# Patient Record
Sex: MALE | Race: WHITE | Employment: OTHER | URBAN - METROPOLITAN AREA
[De-identification: names, ages, dates, MRNs, and addresses within clinical notes are randomized per-mention and may not be internally consistent; named-entity substitution may affect disease eponyms.]

---

## 2017-01-20 ENCOUNTER — HOSPITAL ENCOUNTER (EMERGENCY)
Age: 77
Discharge: HOME | End: 2017-01-20
Attending: EMERGENCY MEDICINE
Payer: COMMERCIAL

## 2017-01-20 ENCOUNTER — APPOINTMENT (OUTPATIENT)
Dept: CT IMAGING | Age: 77
End: 2017-01-20
Attending: EMERGENCY MEDICINE
Payer: COMMERCIAL

## 2017-01-20 VITALS
BODY MASS INDEX: 23.1 KG/M2 | TEMPERATURE: 100.6 F | DIASTOLIC BLOOD PRESSURE: 71 MMHG | HEART RATE: 80 BPM | WEIGHT: 165 LBS | SYSTOLIC BLOOD PRESSURE: 161 MMHG | HEIGHT: 71 IN | RESPIRATION RATE: 17 BRPM | OXYGEN SATURATION: 98 %

## 2017-01-20 DIAGNOSIS — R33.9 URINARY RETENTION: Primary | ICD-10-CM

## 2017-01-20 LAB
ALBUMIN SERPL BCP-MCNC: 3.4 G/DL (ref 3.5–5)
ALBUMIN/GLOB SERPL: 0.9 {RATIO} (ref 1.1–2.2)
ALP SERPL-CCNC: 135 U/L (ref 45–117)
ALT SERPL-CCNC: 33 U/L (ref 12–78)
ANION GAP BLD CALC-SCNC: 7 MMOL/L (ref 5–15)
APPEARANCE UR: CLEAR
AST SERPL W P-5'-P-CCNC: 20 U/L (ref 15–37)
BACTERIA URNS QL MICRO: NEGATIVE /HPF
BASOPHILS # BLD AUTO: 0 K/UL (ref 0–0.1)
BASOPHILS # BLD: 0 % (ref 0–1)
BILIRUB SERPL-MCNC: 0.3 MG/DL (ref 0.2–1)
BILIRUB UR QL: NEGATIVE
BUN SERPL-MCNC: 20 MG/DL (ref 6–20)
BUN/CREAT SERPL: 9 (ref 12–20)
CALCIUM SERPL-MCNC: 8.7 MG/DL (ref 8.5–10.1)
CHLORIDE SERPL-SCNC: 95 MMOL/L (ref 97–108)
CO2 SERPL-SCNC: 27 MMOL/L (ref 21–32)
COLOR UR: ABNORMAL
CREAT SERPL-MCNC: 2.13 MG/DL (ref 0.7–1.3)
EOSINOPHIL # BLD: 0.1 K/UL (ref 0–0.4)
EOSINOPHIL NFR BLD: 0 % (ref 0–7)
EPITH CASTS URNS QL MICRO: ABNORMAL /LPF
ERYTHROCYTE [DISTWIDTH] IN BLOOD BY AUTOMATED COUNT: 14.3 % (ref 11.5–14.5)
GLOBULIN SER CALC-MCNC: 3.9 G/DL (ref 2–4)
GLUCOSE BLD STRIP.AUTO-MCNC: 308 MG/DL (ref 65–100)
GLUCOSE BLD STRIP.AUTO-MCNC: 385 MG/DL (ref 65–100)
GLUCOSE SERPL-MCNC: 619 MG/DL (ref 65–100)
GLUCOSE UR STRIP.AUTO-MCNC: >1000 MG/DL
HCT VFR BLD AUTO: 37.3 % (ref 36.6–50.3)
HGB BLD-MCNC: 11.9 G/DL (ref 12.1–17)
HGB UR QL STRIP: NEGATIVE
HYALINE CASTS URNS QL MICRO: ABNORMAL /LPF (ref 0–5)
KETONES UR QL STRIP.AUTO: NEGATIVE MG/DL
LEUKOCYTE ESTERASE UR QL STRIP.AUTO: NEGATIVE
LIPASE SERPL-CCNC: 109 U/L (ref 73–393)
LYMPHOCYTES # BLD AUTO: 10 % (ref 12–49)
LYMPHOCYTES # BLD: 1.2 K/UL (ref 0.8–3.5)
MCH RBC QN AUTO: 28.3 PG (ref 26–34)
MCHC RBC AUTO-ENTMCNC: 31.9 G/DL (ref 30–36.5)
MCV RBC AUTO: 88.6 FL (ref 80–99)
MONOCYTES # BLD: 0.7 K/UL (ref 0–1)
MONOCYTES NFR BLD AUTO: 6 % (ref 5–13)
NEUTS SEG # BLD: 9.9 K/UL (ref 1.8–8)
NEUTS SEG NFR BLD AUTO: 84 % (ref 32–75)
NITRITE UR QL STRIP.AUTO: NEGATIVE
PH UR STRIP: 5.5 [PH] (ref 5–8)
PLATELET # BLD AUTO: 261 K/UL (ref 150–400)
POTASSIUM SERPL-SCNC: 4.6 MMOL/L (ref 3.5–5.1)
POTASSIUM SERPL-SCNC: 5.5 MMOL/L (ref 3.5–5.1)
PROT SERPL-MCNC: 7.3 G/DL (ref 6.4–8.2)
PROT UR STRIP-MCNC: ABNORMAL MG/DL
RBC # BLD AUTO: 4.21 M/UL (ref 4.1–5.7)
RBC #/AREA URNS HPF: ABNORMAL /HPF (ref 0–5)
SERVICE CMNT-IMP: ABNORMAL
SERVICE CMNT-IMP: ABNORMAL
SODIUM SERPL-SCNC: 129 MMOL/L (ref 136–145)
SP GR UR REFRACTOMETRY: 1.03 (ref 1–1.03)
UA: UC IF INDICATED,UAUC: ABNORMAL
UROBILINOGEN UR QL STRIP.AUTO: 0.2 EU/DL (ref 0.2–1)
WBC # BLD AUTO: 11.9 K/UL (ref 4.1–11.1)
WBC URNS QL MICRO: ABNORMAL /HPF (ref 0–4)

## 2017-01-20 PROCEDURE — 83690 ASSAY OF LIPASE: CPT | Performed by: EMERGENCY MEDICINE

## 2017-01-20 PROCEDURE — 77030034850

## 2017-01-20 PROCEDURE — 96361 HYDRATE IV INFUSION ADD-ON: CPT

## 2017-01-20 PROCEDURE — 74176 CT ABD & PELVIS W/O CONTRAST: CPT

## 2017-01-20 PROCEDURE — 74011250636 HC RX REV CODE- 250/636: Performed by: EMERGENCY MEDICINE

## 2017-01-20 PROCEDURE — 85025 COMPLETE CBC W/AUTO DIFF WBC: CPT | Performed by: EMERGENCY MEDICINE

## 2017-01-20 PROCEDURE — 81001 URINALYSIS AUTO W/SCOPE: CPT | Performed by: EMERGENCY MEDICINE

## 2017-01-20 PROCEDURE — 82962 GLUCOSE BLOOD TEST: CPT

## 2017-01-20 PROCEDURE — 99285 EMERGENCY DEPT VISIT HI MDM: CPT

## 2017-01-20 PROCEDURE — 36415 COLL VENOUS BLD VENIPUNCTURE: CPT | Performed by: EMERGENCY MEDICINE

## 2017-01-20 PROCEDURE — 51702 INSERT TEMP BLADDER CATH: CPT

## 2017-01-20 PROCEDURE — 96374 THER/PROPH/DIAG INJ IV PUSH: CPT

## 2017-01-20 PROCEDURE — 74011636637 HC RX REV CODE- 636/637: Performed by: EMERGENCY MEDICINE

## 2017-01-20 PROCEDURE — 84132 ASSAY OF SERUM POTASSIUM: CPT | Performed by: EMERGENCY MEDICINE

## 2017-01-20 PROCEDURE — 80053 COMPREHEN METABOLIC PANEL: CPT | Performed by: EMERGENCY MEDICINE

## 2017-01-20 PROCEDURE — 77030029179 HC BAG URIN DRNG SIMS -A

## 2017-01-20 PROCEDURE — A9270 NON-COVERED ITEM OR SERVICE: HCPCS | Performed by: EMERGENCY MEDICINE

## 2017-01-20 RX ADMIN — SODIUM CHLORIDE 1000 ML: 900 INJECTION, SOLUTION INTRAVENOUS at 17:18

## 2017-01-20 RX ADMIN — SODIUM CHLORIDE 1000 ML: 900 INJECTION, SOLUTION INTRAVENOUS at 18:06

## 2017-01-20 RX ADMIN — HUMAN INSULIN 10 UNITS: 100 INJECTION, SOLUTION SUBCUTANEOUS at 18:06

## 2017-01-20 NOTE — ED NOTES
Pt resting quietly on stretcher, reports relief of pain. Provided blanket; denies further needs at this time. Fluids infusing without difficulty, call bell in reach.

## 2017-01-21 NOTE — ED PROVIDER NOTES
HPI Comments: 77 y.o. male with past medical history significant for diabetes, CAD and COPD who presents from home with chief complaint of severe abdominal pain. Pt says that starting this morning his bowels and kidneys have been hurting with associated nausea and vomiting(4-5 times). He says that he feels like he needs to urinate but is unable to. He has not urinated today but it burns when he tries. He says that he has never had symptoms like this before. He felt completely normal yesterday, and is not taking any new meds. He has no history of problems with his prostate. His last BM was a couple days ago. There are no other acute medical concerns at this time. He did pass a BM in his pants here. Social hx: 2 cigars daily, -alc    PCP: Betty Camarillo MD    Note written by Lazara Dorantes, as dictated by Anya Almanzar DO 4:50 PM      The history is provided by the patient. The history is limited by a language barrier. No  was used. Past Medical History:   Diagnosis Date    CAD (coronary artery disease)     [facility]     Diabetes ([facility]      1970a    Ill-defined condition      SOB    Pneumonia        Past Surgical History:   Procedure Laterality Date    Pr cardiac surg procedure unlist  2000     open heart    Hx heent  1975     nasal surgery    Hx mohs procedure  2013     left         Family History:   Problem Relation Age of Onset    Diabetes Mother     Diabetes Brother        Social History     Social History    Marital status:      Spouse name: N/A    Number of children: N/A    Years of education: N/A     Occupational History    Not on file.      Social History Main Topics    Smoking status: Current Every Day Smoker    Smokeless tobacco: Never Used      Comment: 1-2 cigars daily    Alcohol use No    Drug use: No    Sexual activity: Not on file     Other Topics Concern    Not on file     Social History Narrative         ALLERGIES: Review of patient's allergies indicates no known allergies. Review of Systems   Gastrointestinal: Positive for abdominal pain, nausea and vomiting. Genitourinary: Positive for difficulty urinating. All other systems reviewed and are negative. Vitals:    01/20/17 1630 01/20/17 1853   BP: 129/61 167/67   Pulse: 65 65   Resp: 20 17   Temp: 97.4 °F (36.3 °C) 99 °F (37.2 °C)   SpO2: 99% 97%   Weight: 74.8 kg (165 lb)    Height: 5' 11\" (1.803 m)             Physical Exam      Constitutional: Pt is awake and alert. Pt appears well-developed and well-nourished. NAD. Appears in pain. Thin and elderly. HENT:   Head: Normocephalic and atraumatic. Nose: Nose normal.   Mouth/Throat: Oropharynx is clear and moist. No oropharyngeal exudate. Eyes: Conjunctivae and extraocular motions are normal. Pupils are equal, round, and reactive to light. Right eye exhibits no discharge. Left eye exhibits no discharge. No scleral icterus. Neck: No tracheal deviation present. Supple neck. Cardiovascular: Normal rate, regular rhythm, normal heart sounds and intact distal pulses. Exam reveals no gallop and no friction rub. No murmur heard. Pulmonary/Chest: Effort normal and breath sounds normal.  Pt  has no wheezes. Pt  has no rales. Abdominal: Soft. Pt  exhibits no distension and no mass. Pt  has no rebound and no guarding. Exquisite tenderness suprapubicly. Can feel his bladder dome. Musculoskeletal:  Pt  exhibits no edema and no tenderness. Ext: Normal ROM in all four extremities; not tender to palpation; distal pulses are normal, no edema. Neurological:  Pt is alert. nonfocal neuro exam.  Skin: Skin is warm and dry. Pt  is not diaphoretic. Psychiatric:  Pt  has a normal mood and affect.  Behavior is normal.     Note written by Lazara Murray, as dictated by Juan Obrien DO 4:50 PM          MDM  Number of Diagnoses or Management Options  Urinary retention: ED Course       Procedures    PROGRESS NOTE:  5:10 PM  Belly pain is gone. 500 cc currently drained, more coming. PROGRESS NOTE:  7:05 PM  Rechecked blood sugar at 705 it is 385. Full urine out is 1025 ml.               9:14 PM - developed rectal pressure 20-30 min ago. I disimpacted him. Plan is dc home. Son was here earlier. golytely rx. F/u with Urology. Reviewed CT images        K was 5.5 - rechecking. S/o to Dr Hailee Mary - he will recheck K.  abd pain much better. Home with fox. Labs Reviewed   URINALYSIS W/ REFLEX CULTURE - Abnormal; Notable for the following:        Result Value    Protein TRACE (*)     Glucose >1000 (*)     All other components within normal limits   CBC WITH AUTOMATED DIFF - Abnormal; Notable for the following:     WBC 11.9 (*)     HGB 11.9 (*)     NEUTROPHILS 84 (*)     LYMPHOCYTES 10 (*)     ABS. NEUTROPHILS 9.9 (*)     All other components within normal limits   METABOLIC PANEL, COMPREHENSIVE - Abnormal; Notable for the following:     Sodium 129 (*)     Potassium 5.5 (*)     Chloride 95 (*)     Glucose 619 (*)     Creatinine 2.13 (*)     BUN/Creatinine ratio 9 (*)     GFR est AA 37 (*)     GFR est non-AA 30 (*)     Alk.  phosphatase 135 (*)     Albumin 3.4 (*)     A-G Ratio 0.9 (*)     All other components within normal limits   GLUCOSE, POC - Abnormal; Notable for the following:     Glucose (POC) 385 (*)     All other components within normal limits   GLUCOSE, POC - Abnormal; Notable for the following:     Glucose (POC) 308 (*)     All other components within normal limits   SAMPLES BEING HELD   LIPASE   POC GLUCOSE   POC GLUCOSE   POC GLUCOSE

## 2017-01-21 NOTE — DISCHARGE INSTRUCTIONS
We hope that we have addressed all of your medical concerns. The examination and treatment you received in the Emergency Department were for an emergent problem and were not intended as complete care. It is important that you follow up with your healthcare provider(s) for ongoing care. If your symptoms worsen or do not improve as expected, and you are unable to reach your usual health care provider(s), you should return to the Emergency Department. Today's healthcare is undergoing tremendous change, and patient satisfaction surveys are one of the many tools to assess the quality of medical care. You may receive a survey from the "[employer] regarding your experience in the Emergency Department. I hope that your experience has been completely positive, particularly the medical care that I provided. As such, please participate in the survey; anything less than excellent does not meet my expectations or intentions. [facility] and 8 [facility] participate in nationally recognized quality of care measures. If your blood pressure is greater than 120/80, as reported below, we urge that you seek medical care to address the potential of high blood pressure, commonly known as hypertension. Hypertension can be hereditary or can be caused by certain medical conditions, pain, stress, or \"white coat syndrome. \"       Please make an appointment with your health care provider(s) for follow up of your Emergency Department visit. VITALS:   Patient Vitals for the past 8 hrs:   Temp Pulse Resp BP SpO2   01/20/17 1957 99.5 °F (37.5 °C) 66 17 171/63 98 %   01/20/17 1853 99 °F (37.2 °C) 65 17 167/67 97 %   01/20/17 1630 97.4 °F (36.3 °C) 65 20 129/61 99 %          Thank you for allowing us to provide you with medical care today. We realize that you have many choices for your emergency care needs.   Please choose us in the future for any continued health care arun Benítez DO    [facility], [facility].   Office: [phone]            Recent Results (from the past 24 hour(s))   URINALYSIS W/ REFLEX CULTURE    Collection Time: 01/20/17  5:00 PM   Result Value Ref Range    Color YELLOW/STRAW      Appearance CLEAR CLEAR      Specific gravity 1.027 1.003 - 1.030      pH (UA) 5.5 5.0 - 8.0      Protein TRACE (A) NEG mg/dL    Glucose >1000 (A) NEG mg/dL    Ketone NEGATIVE  NEG mg/dL    Bilirubin NEGATIVE  NEG      Blood NEGATIVE  NEG      Urobilinogen 0.2 0.2 - 1.0 EU/dL    Nitrites NEGATIVE  NEG      Leukocyte Esterase NEGATIVE  NEG      WBC 0-4 0 - 4 /hpf    RBC 0-5 0 - 5 /hpf    Epithelial cells FEW FEW /lpf    Bacteria NEGATIVE  NEG /hpf    UA:UC IF INDICATED CULTURE NOT INDICATED BY UA RESULT CNI      Hyaline Cast 0-2 0 - 5 /lpf   CBC WITH AUTOMATED DIFF    Collection Time: 01/20/17  5:00 PM   Result Value Ref Range    WBC 11.9 (H) 4.1 - 11.1 K/uL    RBC 4.21 4.10 - 5.70 M/uL    HGB 11.9 (L) 12.1 - 17.0 g/dL    HCT 37.3 36.6 - 50.3 %    MCV 88.6 80.0 - 99.0 FL    MCH 28.3 26.0 - 34.0 PG    MCHC 31.9 30.0 - 36.5 g/dL    RDW 14.3 11.5 - 14.5 %    PLATELET 651 230 - 714 K/uL    NEUTROPHILS 84 (H) 32 - 75 %    LYMPHOCYTES 10 (L) 12 - 49 %    MONOCYTES 6 5 - 13 %    EOSINOPHILS 0 0 - 7 %    BASOPHILS 0 0 - 1 %    ABS. NEUTROPHILS 9.9 (H) 1.8 - 8.0 K/UL    ABS. LYMPHOCYTES 1.2 0.8 - 3.5 K/UL    ABS. MONOCYTES 0.7 0.0 - 1.0 K/UL    ABS. EOSINOPHILS 0.1 0.0 - 0.4 K/UL    ABS.  BASOPHILS 0.0 0.0 - 0.1 K/UL   METABOLIC PANEL, COMPREHENSIVE    Collection Time: 01/20/17  5:00 PM   Result Value Ref Range    Sodium 129 (L) 136 - 145 mmol/L    Potassium 5.5 (H) 3.5 - 5.1 mmol/L    Chloride 95 (L) 97 - 108 mmol/L    CO2 27 21 - 32 mmol/L    Anion gap 7 5 - 15 mmol/L    Glucose 619 (HH) 65 - 100 mg/dL    BUN 20 6 - 20 MG/DL    Creatinine 2.13 (H) 0.70 - 1.30 MG/DL    BUN/Creatinine ratio 9 (L) 12 - 20      GFR est AA 37 (L) >60 ml/min/1.73m2 GFR est non-AA 30 (L) >60 ml/min/1.73m2    Calcium 8.7 8.5 - 10.1 MG/DL    Bilirubin, total 0.3 0.2 - 1.0 MG/DL    ALT 33 12 - 78 U/L    AST 20 15 - 37 U/L    Alk. phosphatase 135 (H) 45 - 117 U/L    Protein, total 7.3 6.4 - 8.2 g/dL    Albumin 3.4 (L) 3.5 - 5.0 g/dL    Globulin 3.9 2.0 - 4.0 g/dL    A-G Ratio 0.9 (L) 1.1 - 2.2     LIPASE    Collection Time: 01/20/17  5:00 PM   Result Value Ref Range    Lipase 109 73 - 393 U/L   GLUCOSE, POC    Collection Time: 01/20/17  6:51 PM   Result Value Ref Range    Glucose (POC) 385 (H) 65 - 100 mg/dL    Performed by Pavel Kowalski, POC    Collection Time: 01/20/17  8:00 PM   Result Value Ref Range    Glucose (POC) 308 (H) 65 - 100 mg/dL    Performed by KELLY PACHECO        Ct Abd Pelv Wo Cont    Result Date: 1/20/2017  EXAM: CT ABDOMEN AND PELVIS WITHOUT CONTRAST INDICATION:  Abdominal pain with nausea vomiting and urinary retention. . COMPARISON: 4/11/2016. CONTRAST: None. TECHNIQUE: Unenhanced multislice helical CT was performed from the diaphragm to the symphysis pubis without intravenous contrast administration. Oral contrast was not administered. Contiguous 5 mm axial images were reconstructed and lung and soft tissue windows were generated. Coronal and sagittal reformations were generated. CT dose reduction was achieved through use of a standardized protocol tailored for this examination and automatic exposure control for dose modulation. Adaptive statistical iterative reconstruction (ASIR) was utilized for this examination. FINDINGS: The patient is status post median sternotomy. LOWER CHEST: The visualized portions of the lung bases are clear. The interventricular septum of the heart is visible. The absence of intravenous contrast material reduces the sensitivity for evaluation of the solid parenchymal organs of the abdomen. ABDOMEN: Liver: The liver is normal in size and contour with no focal abnormality. Gallbladder and bile ducts:  There is a tiny stone in the gallbladder. Spleen: No abnormality. Pancreas: No abnormality. Adrenal glands: No abnormality. Kidneys: No focal parenchymal abnormality. There is no renal or ureteral calculus or obstruction. There are renal artery calcifications. PELVIS: Reproductive organs: The prostate gland and seminal vesicles are symmetrical.  Bladder: There is a Maldonado catheter within the urinary bladder. RETROPERITONEUM: The aorta is atherosclerotic and tapers without aneurysm. There is no retroperitoneal adenopathy or mass. There is no pelvic mass or adenopathy. BOWEL AND MESENTERY: The small bowel is normal. The appendix is normal.  There is high attenuation material in the sigmoid colon and rectum and a large amount of stool in the proximal colon. PERITONEUM: There is no ascites or free intraperitoneal air. BONES AND SOFT TISSUES: There are degenerative changes of the spine. IMPRESSION: 1. No acute abdominal or pelvic abnormality. 2. Anemia. 3. Atherosclerotic abdominal aorta without aneurysm. 4. Contrast or high attenuation material in the sigmoid colon and distention of the remainder of the colon with gas and stool. Urinary Retention: Care Instructions  Your Care Instructions    Urinary retention means that you aren't able to urinate. In men, it is often caused by a blockage of the urinary tract from an enlarged prostate gland. In men and women, it can also be caused by an infection or nerve damage. Or it may be a side effect of a medicine. The doctor may have drained the urine from your bladder. If you still have problems passing urine, you may need to use a catheter at home. This is used to empty your bladder until the problem can be fixed. Your doctor may put a catheter in your bladder before you go home. If so, he or she will tell you when to come back to have the catheter removed. The doctor has checked you closely. But problems can develop later.  If you notice any problems or new symptoms, get medical treatment right away. Follow-up care is a key part of your treatment and safety. Be sure to make and go to all appointments, and call your doctor if you are having problems. It's also a good idea to know your test results and keep a list of the medicines you take. How can you care for yourself at home? · Take your medicines exactly as prescribed. Call your doctor if you think you are having a problem with your medicine. You will get more details on the specific medicines your doctor prescribes. · Check with your doctor before you use any over-the-counter medicines. Many cold and allergy medicines, for example, can make this problem worse. Make sure your doctor knows all of the medicines, vitamins, supplements, and herbal remedies you take. · Spread out through the day the amount of fluid you drink. Do not drink a lot at bedtime. · Avoid alcohol and caffeine. · If you have been given a catheter, or if one is already in place, follow the instructions you were given. Always wash your hands before and after you handle the catheter. When should you call for help? Call your doctor now or seek immediate medical care if:  · You cannot urinate at all, or it is getting harder to urinate. · You have symptoms of a urinary tract infection. These may include:  ¨ Pain or burning when you urinate. ¨ A frequent need to urinate without being able to pass much urine. ¨ Pain in the flank, which is just below the rib cage and above the waist on either side of the back. ¨ Blood in your urine. ¨ A fever. Watch closely for changes in your health, and be sure to contact your doctor if:  · You have any problems with your catheter. · You do not get better as expected. Where can you learn more? Go to http://evelio-rosalinda.info/. Enter M244 in the search box to learn more about \"Urinary Retention: Care Instructions. \"  Current as of: August 12, 2016  Content Version: 11.1  © 3604-6900 [employer]. Care instructions adapted under license by [employer] (which disclaims liability or warranty for this information). If you have questions about a medical condition or this instruction, always ask your healthcare professional. [address] any warranty or liability for your use of this information. Learning About Urinary Catheter Care to Prevent Infection  What is a urinary catheter? A urinary catheter is a flexible plastic tube used to drain urine from your bladder when you can't urinate on your own. The catheter allows urine to drain from the bladder into a bag. Two types of drainage bags may be used with a urinary catheter. · A bedside bag is a large bag that you can hang on the side of your bed or on a chair. You can use it overnight or anytime you will be sitting or lying down for a long time. · A leg bag is a small bag that you can use during the day. It is usually attached to your thigh or calf and hidden under your clothes. Having a urinary catheter increases your risk of getting a urinary tract infection. Germs may get on the catheter and cause an infection in your bladder or kidneys. The longer you have a catheter, the more likely it is that you will get an infection. You can help prevent this problem with good hygiene and careful handling of your catheter and drainage bags. How can you help prevent infection? Take care to be clean  · Always wash your hands well before and after you handle your catheter. · Clean the skin around the catheter twice a day using soap and water. Dry with a clean towel afterward. You can shower with your catheter and drainage bag in place unless your doctor told you not to. · When you clean around the catheter, check the surrounding skin for signs of infection. Look for things like pus or irritated, swollen, red, or tender skin around the catheter.   Be careful with your drainage bag  · Always keep the drainage bag below the level of your bladder. This will help keep urine from flowing back into your bladder. · Check often to see that urine is flowing through the catheter into the drainage bag. · Empty the drainage bag when it is half full. This will keep it from overflowing or backing up. · When you empty the drainage bag, do not let the tubing or drain spout touch anything. Be careful with your catheter  · Do not unhook the catheter from the drain tube. That could let germs get into the tube. · Make sure that the catheter tubing does not get twisted or kinked. · Do not tug or pull on the catheter. And make sure that the drainage bag does not drag or pull on the catheter. · Do not put powder or lotion on the skin around the catheter. · Do not have sexual intercourse while wearing a catheter. How do you empty a urine drainage bag? .  If your doctor has asked you to keep a record, write down the amount of urine in the bag before you empty it. Wash your hands before and after you touch the bag. 1. Remove the drain spout from its sleeve at the bottom of the drainage bag.  2. Open the valve on the drain spout. Let the urine flow out into the toilet or a container. Be careful not to let the tubing or drain spout touch anything. 3. After you empty the bag, wipe off any liquid on the end of the drain spout. Close the valve. Then put the drain spout back into its sleeve at the bottom of the collection bag. How do you change from a bedside bag to a leg bag? Wash your hands before and after you handle the bags. 1. Empty the bag attached to the catheter. 2. Put a clean towel under the catheter where it connects to the bag.  3. Fold and pinch the catheter closed to keep urine from leaking out. Many catheters have a clip you can use to pinch the tube closed. 4. Remove the used bag from the catheter. 5. Use an alcohol wipe to clean the tip of the leg bag. Then connect the leg bag to the catheter.   6. Strap the leg bag to your thigh or calf. Be sure the straps are not too tight. How can you clean a drainage bag? Clean your bags every day. Many people clean their bedside bag in the morning when they switch to a leg bag. At night, they attach the bedside bag and clean the leg bag. To clean a drainage ba. Remove the bag from the catheter. 2. Fill the bag with 2 parts vinegar and 3 parts water. Let it stand for 20 minutes. 3. Empty the bag, and let it air dry. When should you call for help? Call your doctor now or seek immediate medical care if:  · You have symptoms of a urinary infection. These may include:  ¨ Pain or burning when you urinate. ¨ A frequent need to urinate without being able to pass much urine. ¨ Pain in the flank, which is just below the rib cage and above the waist on either side of the back. ¨ Blood in your urine. ¨ A fever. · Your urine smells bad. · You see large blood clots in your urine. · No urine or very little urine is flowing into the bag for 4 or more hours. Watch closely for changes in your health, and be sure to contact your doctor if:  · The area around the catheter becomes irritated, swollen, red, or tender, or there is pus draining from it. · Urine is leaking from the place where the catheter enters your body. Follow-up care is a key part of your treatment and safety. Be sure to make and go to all appointments, and call your doctor if you are having problems. It's also a good idea to know your test results and keep a list of the medicines you take. Where can you learn more? Go to http://evelio-rosalinda.info/. Enter C910 in the search box to learn more about \"Learning About Urinary Catheter Care to Prevent Infection. \"  Current as of: 2016  Content Version: 11.1  © 0445-0694 [employer]. Care instructions adapted under license by [employer] (which disclaims liability or warranty for this information).  If you have questions about a medical condition or this instruction, always ask your healthcare professional. [place] [address] any warranty or liability for your use of this information.

## 2017-01-21 NOTE — ED NOTES
Pt resting quietly on stretcher. Pt and family updated on plan, questions answered. Pt bed adjusted for comfort. Call bell in reach.

## 2017-01-21 NOTE — ED NOTES
9:18 PM  Change of shift. Care of patient taken over from Eric Sebastian DO; H&P reviewed, handoff complete. Awaiting results of potassium. Note written by Josefa Perez. Eros Tommy, as dictated by Delmar Rodriguez MD 9:18 PM    PROGRESS NOTE:  10:25 PM  Patient's Potassium is normal. Will discharge. Note written by Josefa Perez.  Eros Georgein, as dictated by Delmar Rodriguez MD 10:26 PM

## 2017-03-29 RX ORDER — TRAZODONE HYDROCHLORIDE 100 MG/1
100 TABLET ORAL
Qty: 30 TAB | Refills: 3 | Status: SHIPPED | OUTPATIENT
Start: 2017-03-29 | End: 2017-04-11

## 2017-04-11 ENCOUNTER — APPOINTMENT (OUTPATIENT)
Dept: CT IMAGING | Age: 77
DRG: 638 | End: 2017-04-11
Attending: STUDENT IN AN ORGANIZED HEALTH CARE EDUCATION/TRAINING PROGRAM
Payer: MEDICARE

## 2017-04-11 ENCOUNTER — APPOINTMENT (OUTPATIENT)
Dept: GENERAL RADIOLOGY | Age: 77
DRG: 638 | End: 2017-04-11
Attending: STUDENT IN AN ORGANIZED HEALTH CARE EDUCATION/TRAINING PROGRAM
Payer: MEDICARE

## 2017-04-11 ENCOUNTER — HOSPITAL ENCOUNTER (INPATIENT)
Age: 77
LOS: 3 days | Discharge: HOME | DRG: 638 | End: 2017-04-14
Attending: STUDENT IN AN ORGANIZED HEALTH CARE EDUCATION/TRAINING PROGRAM | Admitting: INTERNAL MEDICINE
Payer: MEDICARE

## 2017-04-11 DIAGNOSIS — F03.90 DEMENTIA WITHOUT BEHAVIORAL DISTURBANCE, UNSPECIFIED DEMENTIA TYPE: ICD-10-CM

## 2017-04-11 DIAGNOSIS — Z91.199 NONCOMPLIANCE WITH DIABETES TREATMENT: ICD-10-CM

## 2017-04-11 DIAGNOSIS — I73.89 HHS (HYPOTHENAR HAMMER SYNDROME) (HCC): Primary | ICD-10-CM

## 2017-04-11 PROBLEM — R32 URINARY INCONTINENCE: Status: ACTIVE | Noted: 2017-04-11

## 2017-04-11 PROBLEM — J44.9 COPD (CHRONIC OBSTRUCTIVE PULMONARY DISEASE) (HCC): Status: ACTIVE | Noted: 2017-04-11

## 2017-04-11 PROBLEM — I25.10 CAD (CORONARY ARTERY DISEASE): Status: ACTIVE | Noted: 2017-04-11

## 2017-04-11 PROBLEM — E87.1 HYPONATREMIA: Status: ACTIVE | Noted: 2017-04-11

## 2017-04-11 PROBLEM — R55 SYNCOPE: Status: ACTIVE | Noted: 2017-04-11

## 2017-04-11 PROBLEM — E11.65 UNCONTROLLED DIABETES MELLITUS WITH HYPERGLYCEMIA (HCC): Status: ACTIVE | Noted: 2017-04-11

## 2017-04-11 PROBLEM — E86.9 VOLUME DEPLETION: Status: ACTIVE | Noted: 2017-04-11

## 2017-04-11 LAB
ALBUMIN SERPL BCP-MCNC: 3.6 G/DL (ref 3.5–5)
ALBUMIN/GLOB SERPL: 1 {RATIO} (ref 1.1–2.2)
ALP SERPL-CCNC: 117 U/L (ref 45–117)
ALT SERPL-CCNC: 27 U/L (ref 12–78)
ANION GAP BLD CALC-SCNC: 10 MMOL/L (ref 5–15)
APPEARANCE UR: CLEAR
AST SERPL W P-5'-P-CCNC: 17 U/L (ref 15–37)
ATRIAL RATE: 80 BPM
BACTERIA URNS QL MICRO: NEGATIVE /HPF
BASOPHILS # BLD AUTO: 0 K/UL (ref 0–0.1)
BASOPHILS # BLD: 0 % (ref 0–1)
BILIRUB SERPL-MCNC: 0.5 MG/DL (ref 0.2–1)
BILIRUB UR QL: NEGATIVE
BUN SERPL-MCNC: 29 MG/DL (ref 6–20)
BUN/CREAT SERPL: 12 (ref 12–20)
CALCIUM SERPL-MCNC: 9.4 MG/DL (ref 8.5–10.1)
CALCULATED P AXIS, ECG09: 84 DEGREES
CALCULATED R AXIS, ECG10: 78 DEGREES
CALCULATED T AXIS, ECG11: 130 DEGREES
CHLORIDE SERPL-SCNC: 89 MMOL/L (ref 97–108)
CO2 SERPL-SCNC: 25 MMOL/L (ref 21–32)
COLOR UR: ABNORMAL
CREAT SERPL-MCNC: 2.38 MG/DL (ref 0.7–1.3)
DIAGNOSIS, 93000: NORMAL
EOSINOPHIL # BLD: 0.1 K/UL (ref 0–0.4)
EOSINOPHIL NFR BLD: 2 % (ref 0–7)
EPITH CASTS URNS QL MICRO: ABNORMAL /LPF
ERYTHROCYTE [DISTWIDTH] IN BLOOD BY AUTOMATED COUNT: 14.3 % (ref 11.5–14.5)
EST. AVERAGE GLUCOSE BLD GHB EST-MCNC: 384 MG/DL
GLOBULIN SER CALC-MCNC: 3.7 G/DL (ref 2–4)
GLUCOSE BLD STRIP.AUTO-MCNC: 157 MG/DL (ref 65–100)
GLUCOSE BLD STRIP.AUTO-MCNC: 188 MG/DL (ref 65–100)
GLUCOSE BLD STRIP.AUTO-MCNC: 237 MG/DL (ref 65–100)
GLUCOSE BLD STRIP.AUTO-MCNC: 307 MG/DL (ref 65–100)
GLUCOSE BLD STRIP.AUTO-MCNC: 534 MG/DL (ref 65–100)
GLUCOSE BLD STRIP.AUTO-MCNC: >600 MG/DL (ref 65–100)
GLUCOSE SERPL-MCNC: 827 MG/DL (ref 65–100)
GLUCOSE UR STRIP.AUTO-MCNC: >1000 MG/DL
HBA1C MFR BLD: 15 % (ref 4.2–6.3)
HCT VFR BLD AUTO: 35.1 % (ref 36.6–50.3)
HGB BLD-MCNC: 11.4 G/DL (ref 12.1–17)
HGB UR QL STRIP: ABNORMAL
KETONES UR QL STRIP.AUTO: NEGATIVE MG/DL
LEUKOCYTE ESTERASE UR QL STRIP.AUTO: NEGATIVE
LYMPHOCYTES # BLD AUTO: 23 % (ref 12–49)
LYMPHOCYTES # BLD: 1.6 K/UL (ref 0.8–3.5)
MCH RBC QN AUTO: 28.6 PG (ref 26–34)
MCHC RBC AUTO-ENTMCNC: 32.5 G/DL (ref 30–36.5)
MCV RBC AUTO: 88.2 FL (ref 80–99)
MONOCYTES # BLD: 0.6 K/UL (ref 0–1)
MONOCYTES NFR BLD AUTO: 9 % (ref 5–13)
NEUTS SEG # BLD: 4.7 K/UL (ref 1.8–8)
NEUTS SEG NFR BLD AUTO: 66 % (ref 32–75)
NITRITE UR QL STRIP.AUTO: NEGATIVE
P-R INTERVAL, ECG05: 178 MS
PH UR STRIP: 5 [PH] (ref 5–8)
PLATELET # BLD AUTO: 277 K/UL (ref 150–400)
POTASSIUM SERPL-SCNC: 4.6 MMOL/L (ref 3.5–5.1)
PROT SERPL-MCNC: 7.3 G/DL (ref 6.4–8.2)
PROT UR STRIP-MCNC: NEGATIVE MG/DL
Q-T INTERVAL, ECG07: 338 MS
QRS DURATION, ECG06: 88 MS
QTC CALCULATION (BEZET), ECG08: 389 MS
RBC # BLD AUTO: 3.98 M/UL (ref 4.1–5.7)
RBC #/AREA URNS HPF: ABNORMAL /HPF (ref 0–5)
SERVICE CMNT-IMP: ABNORMAL
SODIUM SERPL-SCNC: 124 MMOL/L (ref 136–145)
SP GR UR REFRACTOMETRY: 1.03 (ref 1–1.03)
TROPONIN I SERPL-MCNC: 0.05 NG/ML
UROBILINOGEN UR QL STRIP.AUTO: 0.2 EU/DL (ref 0.2–1)
VENTRICULAR RATE, ECG03: 80 BPM
WBC # BLD AUTO: 7.1 K/UL (ref 4.1–11.1)
WBC URNS QL MICRO: ABNORMAL /HPF (ref 0–4)

## 2017-04-11 PROCEDURE — 85025 COMPLETE CBC W/AUTO DIFF WBC: CPT | Performed by: EMERGENCY MEDICINE

## 2017-04-11 PROCEDURE — 93005 ELECTROCARDIOGRAM TRACING: CPT

## 2017-04-11 PROCEDURE — 74011636637 HC RX REV CODE- 636/637: Performed by: NURSE PRACTITIONER

## 2017-04-11 PROCEDURE — 74011000258 HC RX REV CODE- 258: Performed by: STUDENT IN AN ORGANIZED HEALTH CARE EDUCATION/TRAINING PROGRAM

## 2017-04-11 PROCEDURE — 74011636637 HC RX REV CODE- 636/637: Performed by: INTERNAL MEDICINE

## 2017-04-11 PROCEDURE — 81001 URINALYSIS AUTO W/SCOPE: CPT | Performed by: STUDENT IN AN ORGANIZED HEALTH CARE EDUCATION/TRAINING PROGRAM

## 2017-04-11 PROCEDURE — 74011250636 HC RX REV CODE- 250/636: Performed by: INTERNAL MEDICINE

## 2017-04-11 PROCEDURE — 83036 HEMOGLOBIN GLYCOSYLATED A1C: CPT | Performed by: STUDENT IN AN ORGANIZED HEALTH CARE EDUCATION/TRAINING PROGRAM

## 2017-04-11 PROCEDURE — 74011636637 HC RX REV CODE- 636/637: Performed by: STUDENT IN AN ORGANIZED HEALTH CARE EDUCATION/TRAINING PROGRAM

## 2017-04-11 PROCEDURE — 74011250637 HC RX REV CODE- 250/637: Performed by: INTERNAL MEDICINE

## 2017-04-11 PROCEDURE — 65660000000 HC RM CCU STEPDOWN

## 2017-04-11 PROCEDURE — 82962 GLUCOSE BLOOD TEST: CPT

## 2017-04-11 PROCEDURE — 96365 THER/PROPH/DIAG IV INF INIT: CPT

## 2017-04-11 PROCEDURE — 74011250636 HC RX REV CODE- 250/636: Performed by: STUDENT IN AN ORGANIZED HEALTH CARE EDUCATION/TRAINING PROGRAM

## 2017-04-11 PROCEDURE — 70450 CT HEAD/BRAIN W/O DYE: CPT

## 2017-04-11 PROCEDURE — 80053 COMPREHEN METABOLIC PANEL: CPT | Performed by: EMERGENCY MEDICINE

## 2017-04-11 PROCEDURE — 99285 EMERGENCY DEPT VISIT HI MDM: CPT

## 2017-04-11 PROCEDURE — 71010 XR CHEST SNGL V: CPT

## 2017-04-11 PROCEDURE — 84484 ASSAY OF TROPONIN QUANT: CPT | Performed by: STUDENT IN AN ORGANIZED HEALTH CARE EDUCATION/TRAINING PROGRAM

## 2017-04-11 PROCEDURE — 36415 COLL VENOUS BLD VENIPUNCTURE: CPT | Performed by: EMERGENCY MEDICINE

## 2017-04-11 PROCEDURE — 96366 THER/PROPH/DIAG IV INF ADDON: CPT

## 2017-04-11 RX ORDER — TRAZODONE HYDROCHLORIDE 100 MG/1
50 TABLET ORAL
COMMUNITY
End: 2017-07-17

## 2017-04-11 RX ORDER — MAGNESIUM SULFATE 100 %
4 CRYSTALS MISCELLANEOUS AS NEEDED
Status: DISCONTINUED | OUTPATIENT
Start: 2017-04-11 | End: 2017-04-11 | Stop reason: SDUPTHER

## 2017-04-11 RX ORDER — GLIPIZIDE 2.5 MG/1
2.5 TABLET, EXTENDED RELEASE ORAL 2 TIMES DAILY
COMMUNITY
End: 2017-04-26

## 2017-04-11 RX ORDER — INSULIN LISPRO 100 [IU]/ML
INJECTION, SOLUTION INTRAVENOUS; SUBCUTANEOUS
Status: DISCONTINUED | OUTPATIENT
Start: 2017-04-12 | End: 2017-04-11

## 2017-04-11 RX ORDER — SODIUM CHLORIDE 0.9 % (FLUSH) 0.9 %
5-10 SYRINGE (ML) INJECTION AS NEEDED
Status: DISCONTINUED | OUTPATIENT
Start: 2017-04-11 | End: 2017-04-14 | Stop reason: HOSPADM

## 2017-04-11 RX ORDER — INSULIN LISPRO 100 [IU]/ML
INJECTION, SOLUTION INTRAVENOUS; SUBCUTANEOUS
Status: DISCONTINUED | OUTPATIENT
Start: 2017-04-11 | End: 2017-04-11

## 2017-04-11 RX ORDER — MAGNESIUM SULFATE 100 %
4 CRYSTALS MISCELLANEOUS AS NEEDED
Status: DISCONTINUED | OUTPATIENT
Start: 2017-04-11 | End: 2017-04-11

## 2017-04-11 RX ORDER — SODIUM CHLORIDE 9 MG/ML
100 INJECTION, SOLUTION INTRAVENOUS CONTINUOUS
Status: DISCONTINUED | OUTPATIENT
Start: 2017-04-11 | End: 2017-04-13

## 2017-04-11 RX ORDER — ARIPIPRAZOLE 5 MG/1
5 TABLET ORAL DAILY
Status: DISCONTINUED | OUTPATIENT
Start: 2017-04-12 | End: 2017-04-14 | Stop reason: HOSPADM

## 2017-04-11 RX ORDER — DEXTROSE 50 % IN WATER (D50W) INTRAVENOUS SYRINGE
12.5-25 AS NEEDED
Status: DISCONTINUED | OUTPATIENT
Start: 2017-04-11 | End: 2017-04-11 | Stop reason: SDUPTHER

## 2017-04-11 RX ORDER — FINASTERIDE 5 MG/1
5 TABLET, FILM COATED ORAL DAILY
COMMUNITY
End: 2017-10-14

## 2017-04-11 RX ORDER — DEXTROSE 50 % IN WATER (D50W) INTRAVENOUS SYRINGE
12.5-25 AS NEEDED
Status: DISCONTINUED | OUTPATIENT
Start: 2017-04-11 | End: 2017-04-11

## 2017-04-11 RX ORDER — DULOXETIN HYDROCHLORIDE 60 MG/1
60 CAPSULE, DELAYED RELEASE ORAL DAILY
Status: DISCONTINUED | OUTPATIENT
Start: 2017-04-12 | End: 2017-04-14 | Stop reason: HOSPADM

## 2017-04-11 RX ORDER — INSULIN LISPRO 100 [IU]/ML
INJECTION, SOLUTION INTRAVENOUS; SUBCUTANEOUS
Status: DISCONTINUED | OUTPATIENT
Start: 2017-04-11 | End: 2017-04-14 | Stop reason: HOSPADM

## 2017-04-11 RX ORDER — SODIUM CHLORIDE 0.9 % (FLUSH) 0.9 %
5-10 SYRINGE (ML) INJECTION EVERY 8 HOURS
Status: DISCONTINUED | OUTPATIENT
Start: 2017-04-11 | End: 2017-04-14 | Stop reason: HOSPADM

## 2017-04-11 RX ORDER — INSULIN GLARGINE 100 [IU]/ML
10 INJECTION, SOLUTION SUBCUTANEOUS
Status: DISCONTINUED | OUTPATIENT
Start: 2017-04-11 | End: 2017-04-12

## 2017-04-11 RX ORDER — ROSUVASTATIN CALCIUM 10 MG/1
5 TABLET, COATED ORAL
Status: DISCONTINUED | OUTPATIENT
Start: 2017-04-11 | End: 2017-04-14 | Stop reason: HOSPADM

## 2017-04-11 RX ORDER — MAGNESIUM SULFATE 100 %
4 CRYSTALS MISCELLANEOUS AS NEEDED
Status: DISCONTINUED | OUTPATIENT
Start: 2017-04-11 | End: 2017-04-14 | Stop reason: HOSPADM

## 2017-04-11 RX ORDER — GABAPENTIN 100 MG/1
100 CAPSULE ORAL 2 TIMES DAILY
COMMUNITY
End: 2017-04-26 | Stop reason: ALTCHOICE

## 2017-04-11 RX ORDER — DULOXETIN HYDROCHLORIDE 60 MG/1
60 CAPSULE, DELAYED RELEASE ORAL DAILY
COMMUNITY
End: 2019-02-04

## 2017-04-11 RX ORDER — TAMSULOSIN HYDROCHLORIDE 0.4 MG/1
0.4 CAPSULE ORAL DAILY
COMMUNITY
End: 2019-02-12

## 2017-04-11 RX ORDER — TRAZODONE HYDROCHLORIDE 50 MG/1
50 TABLET ORAL
Status: DISCONTINUED | OUTPATIENT
Start: 2017-04-11 | End: 2017-04-14 | Stop reason: HOSPADM

## 2017-04-11 RX ORDER — HEPARIN SODIUM 5000 [USP'U]/ML
5000 INJECTION, SOLUTION INTRAVENOUS; SUBCUTANEOUS EVERY 8 HOURS
Status: DISCONTINUED | OUTPATIENT
Start: 2017-04-11 | End: 2017-04-14 | Stop reason: HOSPADM

## 2017-04-11 RX ORDER — IPRATROPIUM BROMIDE AND ALBUTEROL SULFATE 2.5; .5 MG/3ML; MG/3ML
3 SOLUTION RESPIRATORY (INHALATION)
Status: DISCONTINUED | OUTPATIENT
Start: 2017-04-11 | End: 2017-04-14 | Stop reason: HOSPADM

## 2017-04-11 RX ORDER — TAMSULOSIN HYDROCHLORIDE 0.4 MG/1
0.4 CAPSULE ORAL DAILY
Status: DISCONTINUED | OUTPATIENT
Start: 2017-04-12 | End: 2017-04-14 | Stop reason: HOSPADM

## 2017-04-11 RX ORDER — GABAPENTIN 100 MG/1
100 CAPSULE ORAL 2 TIMES DAILY
Status: DISCONTINUED | OUTPATIENT
Start: 2017-04-11 | End: 2017-04-14 | Stop reason: HOSPADM

## 2017-04-11 RX ORDER — LEVOTHYROXINE SODIUM 50 UG/1
50 TABLET ORAL
Status: DISCONTINUED | OUTPATIENT
Start: 2017-04-12 | End: 2017-04-14 | Stop reason: HOSPADM

## 2017-04-11 RX ORDER — ARIPIPRAZOLE 5 MG/1
5 TABLET ORAL DAILY
COMMUNITY
End: 2017-07-17

## 2017-04-11 RX ORDER — UREA 10 %
100 LOTION (ML) TOPICAL DAILY
COMMUNITY
End: 2019-02-12

## 2017-04-11 RX ORDER — FINASTERIDE 5 MG/1
5 TABLET, FILM COATED ORAL DAILY
Status: DISCONTINUED | OUTPATIENT
Start: 2017-04-12 | End: 2017-04-14 | Stop reason: HOSPADM

## 2017-04-11 RX ORDER — ASPIRIN 81 MG/1
81 TABLET ORAL DAILY
Status: DISCONTINUED | OUTPATIENT
Start: 2017-04-12 | End: 2017-04-14 | Stop reason: HOSPADM

## 2017-04-11 RX ORDER — DEXTROSE 50 % IN WATER (D50W) INTRAVENOUS SYRINGE
12.5-25 AS NEEDED
Status: DISCONTINUED | OUTPATIENT
Start: 2017-04-11 | End: 2017-04-14 | Stop reason: HOSPADM

## 2017-04-11 RX ADMIN — ROSUVASTATIN CALCIUM 5 MG: 10 TABLET, FILM COATED ORAL at 21:27

## 2017-04-11 RX ADMIN — HEPARIN SODIUM 5000 UNITS: 5000 INJECTION, SOLUTION INTRAVENOUS; SUBCUTANEOUS at 21:26

## 2017-04-11 RX ADMIN — TRAZODONE HYDROCHLORIDE 50 MG: 50 TABLET ORAL at 21:27

## 2017-04-11 RX ADMIN — Medication 10 ML: at 21:27

## 2017-04-11 RX ADMIN — SODIUM CHLORIDE 10.8 UNITS/HR: 900 INJECTION, SOLUTION INTRAVENOUS at 13:01

## 2017-04-11 RX ADMIN — Medication 10 ML: at 21:28

## 2017-04-11 RX ADMIN — GABAPENTIN 100 MG: 100 CAPSULE ORAL at 21:27

## 2017-04-11 RX ADMIN — SODIUM CHLORIDE 1000 ML: 900 INJECTION, SOLUTION INTRAVENOUS at 13:07

## 2017-04-11 RX ADMIN — INSULIN LISPRO 2 UNITS: 100 INJECTION, SOLUTION INTRAVENOUS; SUBCUTANEOUS at 21:26

## 2017-04-11 RX ADMIN — INSULIN GLARGINE 10 UNITS: 100 INJECTION, SOLUTION SUBCUTANEOUS at 21:27

## 2017-04-11 RX ADMIN — SODIUM CHLORIDE 100 ML/HR: 900 INJECTION, SOLUTION INTRAVENOUS at 21:28

## 2017-04-11 NOTE — H&P
Hospitalist Admission Note    NAME:  Zeus Arias   :   1940   MRN:   [id number]     Date/Time:  2017 6:02 PM    Subjective:     CHIEF COMPLAINT:    Chief Complaint   Patient presents with   Lior Goldman    Dizziness       HISTORY OF PRESENT ILLNESS:     Martinez Fischer is a 77 y.o.  male with h/o diabetes,CAD,s/p CABG,COPD,Urinary incontinence,cognitive problem,brought to the emergency room due to syncopal episode at home which lasted few seconds. The records from the emergency room indicate that patient's family members have reported that patient has been dizzy and has stumbled the last few days. They added that today morning patient had a syncopal episode lasting few seconds and fell on the ground,injuring his left shoulder. The family also has reported that patient is not eating well and has not been taking his insulin as he should. Patient who is able to talk,says that he has been feeling dizzy for almost a year. He reports that he fell 3 days ago,trying to reach the bathroom and there was no loss of consciousness. Patient is concerned that his urinary incontinence is making him very uncomfortable and his balance is getting worse. In the ER,CT scan of head did not show any acute process. Lab works showed glucose 827,sodium 124,creatinine 2.38. Patient was started on insulin drip in the ER and I was called for admission.     Past Medical History:   Diagnosis Date    CAD (coronary artery disease)     [facility]) [facility] [address]     [facility]     1970a    Ill-defined condition     SOB    Pneumonia     Urinary incontinence         Social History   Substance Use Topics    Smoking status: Current Every Day Smoker    Smokeless tobacco: Never Used      Comment: 1-2 cigars daily    Alcohol use No        Family History   Problem Relation Age of Onset    Diabetes Mother     Diabetes Brother         No Known Allergies     Prior to Admission medications    Medication Sig Start Date End Date Taking? Authorizing Provider   cyanocobalamin (VITAMIN B-12) 100 mcg tablet Take 100 mcg by mouth daily. Yes Historical Provider   gabapentin (NEURONTIN) 100 mg capsule Take 100 mg by mouth two (2) times a day. Yes Historical Provider   traZODone (DESYREL) 100 mg tablet Take 50 mg by mouth nightly. Yes Historical Provider   DULoxetine (CYMBALTA) 60 mg capsule Take 60 mg by mouth daily. Yes Historical Provider   finasteride (PROSCAR) 5 mg tablet Take 5 mg by mouth daily. Yes Historical Provider   glipiZIDE SR (GLUCOTROL XL) 2.5 mg CR tablet Take 2.5 mg by mouth two (2) times a day. Yes Historical Provider   tamsulosin (FLOMAX) 0.4 mg capsule Take 0.4 mg by mouth daily. Yes Historical Provider   ARIPiprazole (ABILIFY) 5 mg tablet Take 5 mg by mouth daily. Yes Historical Provider   HYDROcodone-acetaminophen (NORCO) 7.5-325 mg per tablet Take 1 Tab by mouth two (2) times a day. 6/17/16  Yes Historical Provider   insulin detemir (LEVEMIR FLEXTOUCH) 100 unit/mL (3 mL) inpn 20 Units by SubCUTAneous route nightly. Yes Historical Provider   insulin aspart (NOVOLOG) 100 unit/mL inpn Sliding scale   Yes Historical Provider   omeprazole (PRILOSEC) 20 mg capsule Take 20 mg by mouth as needed. 3/11/16  Yes Historical Provider   levothyroxine (SYNTHROID) 50 mcg tablet Take 50 mcg by mouth Daily (before breakfast). 7/2/15  Yes Historical Provider   aspirin delayed-release (ASPIR-81) 81 mg tablet Take 81 mg by mouth daily. Yes Historical Provider   rosuvastatin (CRESTOR) 5 mg tablet Take 5 mg by mouth nightly. Historical Provider       REVIEW OF SYSTEMS:    Constitutional:  10 lbs loss as per family in 2 weeks. No fever. HEENT:  No headache or visual changes  Cardiovascular:  No chest pain, no palpitations. Respiratory:  No coughing, wheezing, or shortness of breath. GI:  No abdominal pain. No nausea or vomiting. No diarrhea  :  Urinary incontinence.   Skin: No rashes or moles  Neuro:  Syncope,unsteady gait.  Hematological:  No bruising or bleeding  Endocrine:  No diabetes or thyroid disease  Objective:   VITALS:       Visit Vitals    /51 (BP 1 Location: Right arm, BP Patient Position: At rest)    Pulse 62    Temp 97.4 °F (36.3 °C)    Resp 14    Ht 5' 11\" (1.803 m)    Wt 68.7 kg (151 lb 6.4 oz)    SpO2 98%    BMI 21.12 kg/m2       O2 Device: Room air    PHYSICAL EXAM:   General:  Lying in bed in no acute distress. HEENT:  Pupils equal.  Sclera anicteric. Conjunctiva pink. Mucous membranes dried                           Neck:      Supple. Trachea midline. No accessory muscle use. No thyromegaly. No jugular venous distention  CV:          Regular rate and rhythm. Lungs:     Clear to auscultation bilaterally. No Wheezing or Rhonchi. No rales. Abdomen:   Soft, non-tender. Not distended. Bowel sounds normal. No organomegaly  Extremities: No cyanosis. No edema. No clubbing  Neurologic: Alert and oriented X 3. Good sensation,good muscles tone and good sensation. Did not make patient walk. Skin:                Warm and dry. No rashes. LAB DATA REVIEWED:    Recent Results (from the past 24 hour(s))   EKG, 12 LEAD, INITIAL    Collection Time: 04/11/17 10:50 AM   Result Value Ref Range    Ventricular Rate 80 BPM    Atrial Rate 80 BPM    P-R Interval 178 ms    QRS Duration 88 ms    Q-T Interval 338 ms    QTC Calculation (Bezet) 389 ms    Calculated P Axis 84 degrees    Calculated R Axis 78 degrees    Calculated T Axis 130 degrees    Diagnosis       Sinus rhythm with premature atrial complexes in a pattern of bigeminy  Nonspecific T wave abnormality  When compared with ECG of 11-APR-2016 02:41,  premature atrial complexes are now present  Vent.  rate has increased BY  26 BPM  Nonspecific T wave abnormality now evident in Inferior leads  Nonspecific T wave abnormality, worse in Anterior leads  Confirmed by Dora Delacruz M.D., Pineda Bernardo ([id number]) on 4/11/2017 11:40:11 AM     CBC WITH AUTOMATED DIFF Collection Time: 04/11/17 10:56 AM   Result Value Ref Range    WBC 7.1 4.1 - 11.1 K/uL    RBC 3.98 (L) 4.10 - 5.70 M/uL    HGB 11.4 (L) 12.1 - 17.0 g/dL    HCT 35.1 (L) 36.6 - 50.3 %    MCV 88.2 80.0 - 99.0 FL    MCH 28.6 26.0 - 34.0 PG    MCHC 32.5 30.0 - 36.5 g/dL    RDW 14.3 11.5 - 14.5 %    PLATELET 156 809 - 382 K/uL    NEUTROPHILS 66 32 - 75 %    LYMPHOCYTES 23 12 - 49 %    MONOCYTES 9 5 - 13 %    EOSINOPHILS 2 0 - 7 %    BASOPHILS 0 0 - 1 %    ABS. NEUTROPHILS 4.7 1.8 - 8.0 K/UL    ABS. LYMPHOCYTES 1.6 0.8 - 3.5 K/UL    ABS. MONOCYTES 0.6 0.0 - 1.0 K/UL    ABS. EOSINOPHILS 0.1 0.0 - 0.4 K/UL    ABS. BASOPHILS 0.0 0.0 - 0.1 K/UL   METABOLIC PANEL, COMPREHENSIVE    Collection Time: 04/11/17 10:56 AM   Result Value Ref Range    Sodium 124 (L) 136 - 145 mmol/L    Potassium 4.6 3.5 - 5.1 mmol/L    Chloride 89 (L) 97 - 108 mmol/L    CO2 25 21 - 32 mmol/L    Anion gap 10 5 - 15 mmol/L    Glucose 827 (HH) 65 - 100 mg/dL    BUN 29 (H) 6 - 20 MG/DL    Creatinine 2.38 (H) 0.70 - 1.30 MG/DL    BUN/Creatinine ratio 12 12 - 20      GFR est AA 32 (L) >60 ml/min/1.73m2    GFR est non-AA 27 (L) >60 ml/min/1.73m2    Calcium 9.4 8.5 - 10.1 MG/DL    Bilirubin, total 0.5 0.2 - 1.0 MG/DL    ALT (SGPT) 27 12 - 78 U/L    AST (SGOT) 17 15 - 37 U/L    Alk.  phosphatase 117 45 - 117 U/L    Protein, total 7.3 6.4 - 8.2 g/dL    Albumin 3.6 3.5 - 5.0 g/dL    Globulin 3.7 2.0 - 4.0 g/dL    A-G Ratio 1.0 (L) 1.1 - 2.2     TROPONIN I    Collection Time: 04/11/17 10:56 AM   Result Value Ref Range    Troponin-I, Qt. 0.05 (H) <0.05 ng/mL   GLUCOSE, POC    Collection Time: 04/11/17 12:53 PM   Result Value Ref Range    Glucose (POC) >600 (HH) 65 - 100 mg/dL    Performed by Mikala Smith    URINALYSIS W/ RFLX MICROSCOPIC    Collection Time: 04/11/17  1:42 PM   Result Value Ref Range    Color YELLOW/STRAW      Appearance CLEAR CLEAR      Specific gravity 1.029 1.003 - 1.030      pH (UA) 5.0 5.0 - 8.0      Protein NEGATIVE  NEG mg/dL    Glucose >1000 (A) NEG mg/dL    Ketone NEGATIVE  NEG mg/dL    Bilirubin NEGATIVE  NEG      Blood SMALL (A) NEG      Urobilinogen 0.2 0.2 - 1.0 EU/dL    Nitrites NEGATIVE  NEG      Leukocyte Esterase NEGATIVE  NEG      WBC 0-4 0 - 4 /hpf    RBC 0-5 0 - 5 /hpf    Epithelial cells FEW FEW /lpf    Bacteria NEGATIVE  NEG /hpf   GLUCOSE, POC    Collection Time: 04/11/17  2:02 PM   Result Value Ref Range    Glucose (POC) 534 (H) 65 - 100 mg/dL    Performed by Kenyatta Burns    GLUCOSE, POC    Collection Time: 04/11/17  3:10 PM   Result Value Ref Range    Glucose (POC) 307 (H) 65 - 100 mg/dL    Performed by Kenyatta Burns    GLUCOSE, POC    Collection Time: 04/11/17  4:16 PM   Result Value Ref Range    Glucose (POC) 188 (H) 65 - 100 mg/dL    Performed by Kenyatta Burns        Assessment/Plan:      Principal Problem:    Uncontrolled diabetes mellitus with hyperglycemia ([facility] [address] (4/11/2017)    Active Problems:    Non compliance w medication regimen (7/23/2015)      Cognitive disorder (7/11/2016)      Overview: Due to [facility]      Syncope (4/11/2017)      Volume depletion (4/11/2017)      Hyponatremia (4/11/2017)      Urinary incontinence (4/11/2017)      CAD (coronary artery disease) (4/11/2017)      COPD (chronic obstructive pulmonary disease) ([facility] (4/11/2017)       ___________________________________________________  PLAN:    1. Uncontrolled diabetes with hyperglycemia/DM    -AG normal    -Pt was started on insulin drip in ER. Then on ssi after blood sugar stabilized. -Will start pt on lantus 10 units q hs and continue sliding scale with lispro.    -Hgba1C  2. Hyponatremia/Volume depletion    -Due to uncontrolled diabetes    -On iv fluid and will monitor electrolytes. 3.Syncope:    -Likely orthostatic change. Will also consider arrhythmia as EKG showed Sinus rhythm with premature atrial complexes in a pattern of bigeminy,nonspecific T wave abnormality.    -Will transfer to [facility] for monitoring. Will consider cardiology consult if any abnormality noted. -Continue cardiac enzymes. If necessary,will consider ECHO    -CT scan of head without acute process    -PT/OT  4. Cognitive disorder:dementia as per family report    -On psych medications including abilify,trozadone,cymbalta,neurontin. ?these medications can also cause cognitive issues. 5.Incontinence of urine    -No evidence of UTI. On proscar,flomar.    -Incontinence is a frequent cause leading to nursing home admission by family    -Will call for [facility] manager for placement consideration.    -Maldonado cath was ordered. Will also consider diapers. 6.CAD s/p CABG    -On aspirin    -Telemetry monitoring  7. COPD    -Duo-neb as needed.   GI:on ppi  DVT prophylaxis:on sc heparin  Full code      ___________________________________________________  Admitting Physician: Conor Ramey MD

## 2017-04-11 NOTE — ED NOTES
Patient's blood glucose is 188. Per Dr Ashlie Abdi, stop insulin infusion and continue to monitor blood glucose.

## 2017-04-11 NOTE — ED PROVIDER NOTES
HPI Comments: 77 y.o. male with past medical history significant for DM, CAD, COPD, pneumonia, urinary incontinence and open heart surgery who presents from home for evaluation after a syncopal episode. Per daughter and step-daughter, patient has been experiencing dizziness and stumbling for the past few weeks. He arrives today after experiencing a syncopal episode this morning, witnessed by a family member. Patient is described to have become dizzy, lose consciousness and fall to the ground, injuring his left shoulder. Patient was unconscious for only a few seconds. He currently complains of left shoulder and left neck ache. Per family, patient's dementia is progressing and he has not been taking his medications or insulin regularly. Patient has had a decreased appetite and lost 10 pounds in the past 2 weeks. Patient vomited multiple times Friday to Saturday (4 days ago to 2 days ago) but has not since. Patient is not on dialysis. There are no other acute medical concerns at this time. Social hx: Tobacco use. No alcohol use. PCP: Riya Bond MD    Note written by aníbal Dial, as dictated by Heide Luna MD 12:48 PM      The history is provided by the patient. Past Medical History:   Diagnosis Date    CAD (coronary artery disease)     [facility]) ([facility] [address]     Diabetes ([facility] [address]     1970a    Ill-defined condition     SOB    Pneumonia     Urinary incontinence        Past Surgical History:   Procedure Laterality Date    CARDIAC SURG PROCEDURE UNLIST  2000    open heart    HX HEENT  1975    nasal surgery    HX MOHS PROCEDURES  2013    left         Family History:   Problem Relation Age of Onset    Diabetes Mother     Diabetes Brother        Social History     Social History    Marital status:      Spouse name: N/A    Number of children: N/A    Years of education: N/A     Occupational History    Not on file.      Social History Main Topics    Smoking status: Current Every Day Smoker    Smokeless tobacco: Never Used      Comment: 1-2 cigars daily    Alcohol use No    Drug use: No    Sexual activity: Not on file     Other Topics Concern    Not on file     Social History Narrative         ALLERGIES: Review of patient's allergies indicates no known allergies. Review of Systems   Constitutional: Positive for appetite change and unexpected weight change. Respiratory: Negative for shortness of breath. Gastrointestinal: Negative for abdominal pain. Musculoskeletal: Positive for arthralgias. Neurological: Positive for dizziness and syncope. All other systems reviewed and are negative. Vitals:    04/11/17 1044   BP: 119/71   Pulse: 81   Resp: 20   Temp: 97.4 °F (36.3 °C)   Weight: 68.7 kg (151 lb 6.4 oz)   Height: 5' 11\" (1.803 m)            Physical Exam   Constitutional: He appears well-developed and well-nourished. No distress. HENT:   Head: Normocephalic and atraumatic. Dehydrated appearing. DMM. Eyes: Conjunctivae and EOM are normal.   Neck: Normal range of motion. Cardiovascular: Normal rate and normal heart sounds. No murmur heard. Pulmonary/Chest: Effort normal and breath sounds normal. No respiratory distress. He has no wheezes. Abdominal: Soft. Bowel sounds are normal. He exhibits no distension. There is no tenderness. There is no rebound. Musculoskeletal: Normal range of motion. He exhibits no edema or tenderness. Neurological: He is alert. No cranial nerve deficit. He exhibits normal muscle tone. Coordination normal.   Confused. Oriented to person and place only. Skin: Skin is warm and dry. He is not diaphoretic. Nursing note and vitals reviewed. Note written by aníbal Mohamud, as dictated by Suleman Fleming MD 12:49 PM      [facility]  ED Course       Procedures  ED EKG interpretation:  Rhythm: normal sinus rhythm with occasional PAC. Rate (approx.): 80; Bigeminy; nonspecific T wave changes;  No STEMI  Note written by aníbal Smith, as dictated by Vanessa Overton MD 12:49 PM     CONSULT NOTE:  3:33 PM Vanessa Overton MD spoke with Dr. Chinyere Wilkes, Consult for Hospitalist.  Discussed available diagnostic tests and clinical findings. He is in agreement with care plans as outlined and will admit.

## 2017-04-11 NOTE — ED TRIAGE NOTES
Pt. complains of dizziness that started over a week ago. Per family member pt. fell this am, found in bathroom by family member. Complains of neck pain described as ache. Per family member pt. Has had 10lb weight loss over the past 2-3 weeks, had episodes of vomiting this past weekend. Pt. states he has no appetite.

## 2017-04-11 NOTE — IP AVS SNAPSHOT
[address] 
[phone] Patient: Aliya Roach MRN: [id number] VHO:29/60/1945 You are allergic to the following No active allergies Recent Documentation Height Weight BMI Smoking Status 1.803 m 74.9 kg 23.03 kg/m2 Current Every Day Smoker Emergency Contacts Name Discharge Info Relation Home Work Mobile Concetta Henderson DISCHARGE CAREGIVER [3] Child [2] [phone] Albertina oCbos DISCHARGE CAREGIVER [3] Child [2]   [phone] About your hospitalization You were admitted on:  April 11, 2017 You last received care in the:  [address] You were discharged on:  April 14, 2017 Unit phone number:  [phone] Why you were hospitalized Your primary diagnosis was:  Uncontrolled Diabetes Mellitus With Hyperglycemia (Hcc) Your diagnoses also included:  Syncope, Volume Depletion, Hyponatremia, Cognitive Disorder, Urinary Incontinence, Cad (Coronary Artery Disease), Copd (Chronic Obstructive Pulmonary Disease) (Hcc), Non Compliance W Medication Regimen Providers Seen During Your Hospitalizations Provider Role Specialty Primary office phone Vanessa Overton MD Attending Provider Emergency Medicine [phone] Martha Arias MD Attending Provider Internal Medicine [phone] Emily Hercules MD Attending Provider Internal Medicine [phone] Your Primary Care Physician (PCP) Primary Care Physician Office Phone Office Fax Lluvia Osborne [phone] Follow-up Information Follow up With Details Comments Contact Info [address] [place] [id number] 
[phone] Caitlin Aj MD In 7 days for hospital follow up  [address] 
[phone]  Kenzie Lam MD In 14 days Please call to schedule follow up Diabetes appointment [address] [place] 
[phone] Current Discharge Medication List  
  
START taking these medications Dose & Instructions Dispensing Information Comments Morning Noon Evening Bedtime  
 midodrine 5 mg tablet Commonly known as:  Justin Knox Your last dose was: Your next dose is:    
   
   
 Dose:  5 mg Take 1 Tab by mouth two (2) times daily (with meals) for 30 days. Quantity:  60 Tab Refills:  0 CONTINUE these medications which have CHANGED Dose & Instructions Dispensing Information Comments Morning Noon Evening Bedtime  
 insulin detemir 100 unit/mL (3 mL) Inpn Commonly known as:  Desirae Dani What changed:  how much to take Your last dose was: Your next dose is:    
   
   
 Dose:  15 Units 15 Units by SubCUTAneous route nightly. Quantity:  2 Pen Refills:  0 CONTINUE these medications which have NOT CHANGED Dose & Instructions Dispensing Information Comments Morning Noon Evening Bedtime ARIPiprazole 5 mg tablet Commonly known as:  ABILIFY Your last dose was: Your next dose is:    
   
   
 Dose:  5 mg Take 5 mg by mouth daily. Refills:  0  
     
   
   
   
  
 ASPIR-81 81 mg tablet Generic drug:  aspirin delayed-release Your last dose was: Your next dose is:    
   
   
 Dose:  81 mg Take 81 mg by mouth daily. Refills:  0  
     
   
   
   
  
 CRESTOR 5 mg tablet Generic drug:  rosuvastatin Your last dose was: Your next dose is:    
   
   
 Dose:  5 mg Take 5 mg by mouth nightly. Refills:  0 DULoxetine 60 mg capsule Commonly known as:  CYMBALTA Your last dose was: Your next dose is:    
   
   
 Dose:  60 mg Take 60 mg by mouth daily. Refills:  0  
     
   
   
   
  
 finasteride 5 mg tablet Commonly known as:  PROSCAR Your last dose was: Your next dose is:    
   
   
 Dose:  5 mg Take 5 mg by mouth daily. Refills:  0  
     
   
   
   
  
 gabapentin 100 mg capsule Commonly known as:  NEURONTIN Your last dose was: Your next dose is:    
   
   
 Dose:  100 mg Take 100 mg by mouth two (2) times a day. Refills:  0  
     
   
   
   
  
 glipiZIDE SR 2.5 mg CR tablet Commonly known as:  GLUCOTROL XL Your last dose was: Your next dose is:    
   
   
 Dose:  2.5 mg Take 2.5 mg by mouth two (2) times a day. Refills:  0 HYDROcodone-acetaminophen 7.5-325 mg per tablet Commonly known as:  Inna Mandril Your last dose was: Your next dose is:    
   
   
 Dose:  1 Tab Take 1 Tab by mouth two (2) times a day. Refills:  0  
     
   
   
   
  
 insulin aspart 100 unit/mL Inpn Commonly known as:  Terri Ashland Your last dose was: Your next dose is:    
   
   
 Sliding scale Refills:  0  
     
   
   
   
  
 levothyroxine 50 mcg tablet Commonly known as:  SYNTHROID Your last dose was: Your next dose is:    
   
   
 Dose:  50 mcg Take 50 mcg by mouth Daily (before breakfast). Refills:  1  
     
   
   
   
  
 omeprazole 20 mg capsule Commonly known as:  PRILOSEC Your last dose was: Your next dose is:    
   
   
 Dose:  20 mg Take 20 mg by mouth as needed. Refills:  11  
     
   
   
   
  
 tamsulosin 0.4 mg capsule Commonly known as:  FLOMAX Your last dose was: Your next dose is:    
   
   
 Dose:  0.4 mg Take 0.4 mg by mouth daily. Refills:  0  
     
   
   
   
  
 traZODone 100 mg tablet Commonly known as:  Lequita Gallus Your last dose was: Your next dose is:    
   
   
 Dose:  50 mg Take 50 mg by mouth nightly. Refills:  0  
     
   
   
   
  
 VITAMIN B-12 100 mcg tablet Generic drug:  cyanocobalamin Your last dose was: Your next dose is:    
   
   
 Dose:  100 mcg Take 100 mcg by mouth daily. Refills:  0 Where to Get Your Medications Information on where to get these meds will be given to you by the nurse or doctor. ! Ask your nurse or doctor about these medications  
  insulin detemir 100 unit/mL (3 mL) Inpn  
 midodrine 5 mg tablet Discharge Instructions Discharge Instructions PATIENT ID: Twan Renee MRN: [id number] YOB: 1940 DATE OF ADMISSION: 4/11/2017 11:47 AM   
DATE OF DISCHARGE: 4/14/2017 PRIMARY CARE PROVIDER: Chan Dumas MD  
 
ATTENDING PHYSICIAN: Gokul Hull MD 
DISCHARGING PROVIDER: Telma Diehl NP To contact this individual call [phone] and ask the  to page. If unavailable ask to be transferred the Adult Hospitalist Department. DISCHARGE DIAGNOSES Uncontrolled Type II Diabetes with Severe Hyperglycemia Cognitive Dysfunction Orthostatic Hypotension Dizziness CONSULTATIONS: None PROCEDURES/SURGERIES: * No surgery found * PENDING TEST RESULTS:  
At the time of discharge the following test results are still pending: none FOLLOW UP APPOINTMENTS:  
Follow-up Information Follow up With Details Comments Contact Info [address] [place] [address] 
[phone] Chan Dumas MD In 7 days for hospital follow up  [facility] 
[phone] ADDITIONAL CARE RECOMMENDATIONS:  
 
1. Please follow up with Dr. Jordon Gregory within one month after discharge. 2. Please decrease your daily long acting insulin dose to 15 units . Follow a strict diabetic diet. Please check your blood sugar at least three times daily before meals and keep a record of these numbers to take with you to your follow up appointment with Dr. Jordon Gregory. 3. You have also been put on a medication called Midrodine 5 mg two times daily to help deal with your orthostatic hypotension. You should start taking this and let your primary care physician know that we have started you on this medication . 4. I recommend that you establish care with a  
 
DIET: Diabetic Diet ACTIVITY: Activity as tolerated DISCHARGE MEDICATIONS: 
 See Medication Reconciliation Form · It is important that you take the medication exactly as they are prescribed. · Keep your medication in the bottles provided by the pharmacist and keep a list of the medication names, dosages, and times to be taken in your wallet. · Do not take other medications without consulting your doctor. NOTIFY YOUR PHYSICIAN FOR ANY OF THE FOLLOWING:  
Fever over 101 degrees for 24 hours. Chest pain, shortness of breath, fever, chills, nausea, vomiting, diarrhea, change in mentation, falling, weakness, bleeding. Severe pain or pain not relieved by medications. Or, any other signs or symptoms that you may have questions about. DISPOSITION: 
 X Home With:  
X OT X PT X HH X RN  
  
 SNF/Inpatient Rehab/LTAC Independent/assisted living Hospice Other: CDMP Checked: Yes X PROBLEM LIST Updated: Yes X Signed:  
Maritza Verduzco NP 
4/14/2017 
1:31 PM 
 
 
 
Discharge Orders None Introducing [facility]! Kris Rico introduces [employer] patient portal. Now you can access parts of your medical record, email your doctor's office, and request medication refills online. 1. In your internet browser, go to [email or web address] 2. Click on the First Time User? Click Here link in the Sign In box. You will see the New Member Sign Up page. 3. Enter your [employer] Access Code exactly as it appears below. You will not need to use this code after youve completed the sign-up process.  If you do not sign up before the expiration date, you must request a new code. 
 
· [employer] Access Code: [email or web address] Expires: 4/20/2017  6:14 PM 
 
4. Enter the last four digits of your Social Security Number (xxxx) and Date of Birth (mm/dd/yyyy) as indicated and click Submit. You will be taken to the next sign-up page. 5. Create a [employer] ID. This will be your [employer] login ID and cannot be changed, so think of one that is secure and easy to remember. 6. Create a [employer] password. You can change your password at any time. 7. Enter your Password Reset Question and Answer. This can be used at a later time if you forget your password. 8. Enter your e-mail address. You will receive e-mail notification when new information is available in [address] 9. Click Sign Up. You can now view and download portions of your medical record. 10. Click the Download Summary menu link to download a portable copy of your medical information. If you have questions, please visit the Frequently Asked Questions section of the [employer] website. Remember, [employer] is NOT to be used for urgent needs. For medical emergencies, dial 911. Now available from your iPhone and Android! General Information Please provide this summary of care documentation to your next provider. Patient Signature:  ____________________________________________________________ Date:  ____________________________________________________________  
  
Franny Mayte Provider Signature:  ____________________________________________________________ Date:  ____________________________________________________________

## 2017-04-11 NOTE — DIABETES MGMT
DTC Initial Consult Note    Recommendations/ Comments: Patient admitted with [facility]. Started on the insulin drip BG>600 drip rate 10.8 units/hr. He has been seen by DTC staff when in-patient in 2015. Of note, patient last saw his Endo , Dr Margarito gilliam in August.  Dr. Suellen Soulier note includes concern with patient's dementia, and skipping his insulin. Appears he resides alone with family nearby. He has a BG meter has a history of checking 1-2 times a day but meds may only be taken 1-2 times a week. Patient had been advised to take meal time Novolog and glipizide. Due to concerns of dementia, likely will need a simplified regimen. Since insulin and diabetes meds have been inconsistent at home and renal status is poor,a reduced dose of the basal insulin from home dose will be needed. He calculates for his weight to require 15 units/basal insulin. Once A1c is available and have results from insulin drip, better able to address transition insulin needs and home basal dose. Patient will need assistance with his medications, nutrition and BG monitoring once discharged. Creatinine elevated on admission. Will reassess once patient is better hydrated  Chart reviewed on Rosa Mc. Patient is a 77 y.o. male with history Type 2 Diabetes on oral agent (monotherapy): glipizide (Glucotrol)  insulin injections: Levemir 24 units q day, Novolog insulin 3 untis with light carb meals, 5 units with lfull meal at home. A1c:   Lab Results   Component Value Date/Time    Hemoglobin A1c 9.3 08/06/2015 06:46 AM    Hemoglobin A1c, External 11.2 01/27/2016       Recent Glucose Results:   Lab Results   Component Value Date/Time     (HH) 04/11/2017 10:56 AM    GLUCPOC 534 (H) 04/11/2017 02:02 PM    GLUCPOC >600 ([place]) 04/11/2017 12:53 PM        Lab Results   Component Value Date/Time    Creatinine 2.38 04/11/2017 10:56 AM       Active Orders   There are no active orders of the following type(s): Diet.         PO intake: No data found. Current hospital DM medication: Insulin drip 10.8 units/hr, BG >600, multiplier 0.02, ordered to be target 200-300    Will continue to follow as needed.     Thank you  Oumar Pickett RD,CDE   Radha [address]

## 2017-04-11 NOTE — PROGRESS NOTES
Admission Medication Reconciliation:    Information obtained from: Patient's daughter / Rx Query    Chief Complaint for this Admission:  Fall / dizziness    Allergies:  Review of patient's allergies indicates no known allergies. Comments/Recommendations: Discussed PTA medications with the daughter. 1) Removed Advair  2) Added Duloxetine, Finasteride, Glipizide, Tamsulosin, and Aripiprazole - Patient's daughter stated that he was on these medications  3) Modified lorazepam dose to one-half tablet daily (from one daily), Norco to one BID (from Q6H prn), and gabapentin to 100 mg BID (from 300 mg QHS)  4) Patient's daughter is not aware of how many times he checks his blood sugar   5) She also reports that he is not compliant with his medications and takes his regimen once a week instead of daily  6) Confirmed with daughter and Rx Query that he is on scheduled Norco    Prior to Admission Medications:   Prior to Admission Medications   Prescriptions Last Dose Informant Patient Reported? Taking? ARIPiprazole (ABILIFY) 5 mg tablet 4/4/2017  Yes Yes   Sig: Take 5 mg by mouth daily. DULoxetine (CYMBALTA) 60 mg capsule 4/4/2017  Yes Yes   Sig: Take 60 mg by mouth daily. HYDROcodone-acetaminophen (NORCO) 7.5-325 mg per tablet 4/4/2017  Yes Yes   Sig: Take 1 Tab by mouth two (2) times a day. aspirin delayed-release (ASPIR-81) 81 mg tablet 4/4/2017  Yes Yes   Sig: Take 81 mg by mouth daily. cyanocobalamin (VITAMIN B-12) 100 mcg tablet 4/4/2017  Yes Yes   Sig: Take 100 mcg by mouth daily. finasteride (PROSCAR) 5 mg tablet 4/4/2017  Yes Yes   Sig: Take 5 mg by mouth daily. gabapentin (NEURONTIN) 100 mg capsule 4/4/2017  Yes Yes   Sig: Take 100 mg by mouth two (2) times a day. glipiZIDE SR (GLUCOTROL XL) 2.5 mg CR tablet 4/4/2017  Yes Yes   Sig: Take 2.5 mg by mouth two (2) times a day.    insulin aspart (NOVOLOG) 100 unit/mL inpn 4/4/2017  Yes Yes   Sig: Sliding scale   insulin detemir (LEVEMIR FLEXTOUCH) 100 unit/mL (3 mL) inpn 2017  Yes Yes   Si Units by SubCUTAneous route nightly. levothyroxine (SYNTHROID) 50 mcg tablet 2017  Yes Yes   Sig: Take 50 mcg by mouth Daily (before breakfast). omeprazole (PRILOSEC) 20 mg capsule 2017  Yes Yes   Sig: Take 20 mg by mouth as needed. rosuvastatin (CRESTOR) 5 mg tablet   Yes No   Sig: Take 5 mg by mouth nightly. tamsulosin (FLOMAX) 0.4 mg capsule 2017  Yes Yes   Sig: Take 0.4 mg by mouth daily. traZODone (DESYREL) 100 mg tablet 2017  Yes Yes   Sig: Take 50 mg by mouth nightly.       Facility-Administered Medications: None     Caro Shine  PharmD Candidate   CECILLE Foley

## 2017-04-11 NOTE — IP AVS SNAPSHOT
Current Discharge Medication List  
  
START taking these medications Dose & Instructions Dispensing Information Comments Morning Noon Evening Bedtime  
 midodrine 5 mg tablet Commonly known as:  Zoe Box Your last dose was: Your next dose is:    
   
   
 Dose:  5 mg Take 1 Tab by mouth two (2) times daily (with meals) for 30 days. Quantity:  60 Tab Refills:  0 CONTINUE these medications which have CHANGED Dose & Instructions Dispensing Information Comments Morning Noon Evening Bedtime  
 insulin detemir 100 unit/mL (3 mL) Inpn Commonly known as:  Dawn Villa What changed:  how much to take Your last dose was: Your next dose is:    
   
   
 Dose:  15 Units 15 Units by SubCUTAneous route nightly. Quantity:  2 Pen Refills:  0 CONTINUE these medications which have NOT CHANGED Dose & Instructions Dispensing Information Comments Morning Noon Evening Bedtime ARIPiprazole 5 mg tablet Commonly known as:  ABILIFY Your last dose was: Your next dose is:    
   
   
 Dose:  5 mg Take 5 mg by mouth daily. Refills:  0  
     
   
   
   
  
 ASPIR-81 81 mg tablet Generic drug:  aspirin delayed-release Your last dose was: Your next dose is:    
   
   
 Dose:  81 mg Take 81 mg by mouth daily. Refills:  0  
     
   
   
   
  
 CRESTOR 5 mg tablet Generic drug:  rosuvastatin Your last dose was: Your next dose is:    
   
   
 Dose:  5 mg Take 5 mg by mouth nightly. Refills:  0 DULoxetine 60 mg capsule Commonly known as:  CYMBALTA Your last dose was: Your next dose is:    
   
   
 Dose:  60 mg Take 60 mg by mouth daily. Refills:  0  
     
   
   
   
  
 finasteride 5 mg tablet Commonly known as:  PROSCAR Your last dose was: Your next dose is: Dose:  5 mg Take 5 mg by mouth daily. Refills:  0  
     
   
   
   
  
 gabapentin 100 mg capsule Commonly known as:  NEURONTIN Your last dose was: Your next dose is:    
   
   
 Dose:  100 mg Take 100 mg by mouth two (2) times a day. Refills:  0  
     
   
   
   
  
 glipiZIDE SR 2.5 mg CR tablet Commonly known as:  GLUCOTROL XL Your last dose was: Your next dose is:    
   
   
 Dose:  2.5 mg Take 2.5 mg by mouth two (2) times a day. Refills:  0 HYDROcodone-acetaminophen 7.5-325 mg per tablet Commonly known as:  Joyice Bretejase Your last dose was: Your next dose is:    
   
   
 Dose:  1 Tab Take 1 Tab by mouth two (2) times a day. Refills:  0  
     
   
   
   
  
 insulin aspart 100 unit/mL Inpn Commonly known as:  Clearence Finner Your last dose was: Your next dose is:    
   
   
 Sliding scale Refills:  0  
     
   
   
   
  
 levothyroxine 50 mcg tablet Commonly known as:  SYNTHROID Your last dose was: Your next dose is:    
   
   
 Dose:  50 mcg Take 50 mcg by mouth Daily (before breakfast). Refills:  1  
     
   
   
   
  
 omeprazole 20 mg capsule Commonly known as:  PRILOSEC Your last dose was: Your next dose is:    
   
   
 Dose:  20 mg Take 20 mg by mouth as needed. Refills:  11  
     
   
   
   
  
 tamsulosin 0.4 mg capsule Commonly known as:  FLOMAX Your last dose was: Your next dose is:    
   
   
 Dose:  0.4 mg Take 0.4 mg by mouth daily. Refills:  0  
     
   
   
   
  
 traZODone 100 mg tablet Commonly known as:  Ruthe Boga Your last dose was: Your next dose is:    
   
   
 Dose:  50 mg Take 50 mg by mouth nightly. Refills:  0  
     
   
   
   
  
 VITAMIN B-12 100 mcg tablet Generic drug:  cyanocobalamin Your last dose was: Your next dose is:    
   
   
 Dose:  100 mcg Take 100 mcg by mouth daily. Refills:  0 Where to Get Your Medications Information on where to get these meds will be given to you by the nurse or doctor. ! Ask your nurse or doctor about these medications  
  insulin detemir 100 unit/mL (3 mL) Inpn  
 midodrine 5 mg tablet

## 2017-04-12 LAB
ALBUMIN SERPL BCP-MCNC: 2.8 G/DL (ref 3.5–5)
ALBUMIN/GLOB SERPL: 0.8 {RATIO} (ref 1.1–2.2)
ALP SERPL-CCNC: 84 U/L (ref 45–117)
ALT SERPL-CCNC: 22 U/L (ref 12–78)
ANION GAP BLD CALC-SCNC: 7 MMOL/L (ref 5–15)
AST SERPL W P-5'-P-CCNC: 22 U/L (ref 15–37)
BASOPHILS # BLD AUTO: 0 K/UL (ref 0–0.1)
BASOPHILS # BLD: 1 % (ref 0–1)
BILIRUB SERPL-MCNC: 0.4 MG/DL (ref 0.2–1)
BUN SERPL-MCNC: 21 MG/DL (ref 6–20)
BUN/CREAT SERPL: 14 (ref 12–20)
CALCIUM SERPL-MCNC: 8.2 MG/DL (ref 8.5–10.1)
CHLORIDE SERPL-SCNC: 108 MMOL/L (ref 97–108)
CHOLEST SERPL-MCNC: 120 MG/DL
CK MB CFR SERPL CALC: 2.6 % (ref 0–2.5)
CK MB SERPL-MCNC: 3.7 NG/ML (ref 5–25)
CK SERPL-CCNC: 143 U/L (ref 39–308)
CO2 SERPL-SCNC: 25 MMOL/L (ref 21–32)
CREAT SERPL-MCNC: 1.55 MG/DL (ref 0.7–1.3)
EOSINOPHIL # BLD: 0.3 K/UL (ref 0–0.4)
EOSINOPHIL NFR BLD: 4 % (ref 0–7)
ERYTHROCYTE [DISTWIDTH] IN BLOOD BY AUTOMATED COUNT: 14.1 % (ref 11.5–14.5)
EST. AVERAGE GLUCOSE BLD GHB EST-MCNC: ABNORMAL MG/DL
GLOBULIN SER CALC-MCNC: 3.3 G/DL (ref 2–4)
GLUCOSE BLD STRIP.AUTO-MCNC: 189 MG/DL (ref 65–100)
GLUCOSE BLD STRIP.AUTO-MCNC: 227 MG/DL (ref 65–100)
GLUCOSE BLD STRIP.AUTO-MCNC: 290 MG/DL (ref 65–100)
GLUCOSE BLD STRIP.AUTO-MCNC: 299 MG/DL (ref 65–100)
GLUCOSE SERPL-MCNC: 197 MG/DL (ref 65–100)
HBA1C MFR BLD: 15.5 % (ref 4.2–6.3)
HCT VFR BLD AUTO: 29.4 % (ref 36.6–50.3)
HDLC SERPL-MCNC: 41 MG/DL
HDLC SERPL: 2.9 {RATIO} (ref 0–5)
HGB BLD-MCNC: 9.8 G/DL (ref 12.1–17)
LDLC SERPL CALC-MCNC: 51 MG/DL (ref 0–100)
LIPID PROFILE,FLP: NORMAL
LYMPHOCYTES # BLD AUTO: 31 % (ref 12–49)
LYMPHOCYTES # BLD: 2 K/UL (ref 0.8–3.5)
MCH RBC QN AUTO: 28.5 PG (ref 26–34)
MCHC RBC AUTO-ENTMCNC: 33.3 G/DL (ref 30–36.5)
MCV RBC AUTO: 85.5 FL (ref 80–99)
MONOCYTES # BLD: 0.6 K/UL (ref 0–1)
MONOCYTES NFR BLD AUTO: 10 % (ref 5–13)
NEUTS SEG # BLD: 3.7 K/UL (ref 1.8–8)
NEUTS SEG NFR BLD AUTO: 54 % (ref 32–75)
PLATELET # BLD AUTO: 238 K/UL (ref 150–400)
POTASSIUM SERPL-SCNC: 3.8 MMOL/L (ref 3.5–5.1)
PROT SERPL-MCNC: 6.1 G/DL (ref 6.4–8.2)
RBC # BLD AUTO: 3.44 M/UL (ref 4.1–5.7)
SERVICE CMNT-IMP: ABNORMAL
SODIUM SERPL-SCNC: 140 MMOL/L (ref 136–145)
TRIGL SERPL-MCNC: 140 MG/DL (ref ?–150)
TROPONIN I SERPL-MCNC: 0.04 NG/ML
VLDLC SERPL CALC-MCNC: 28 MG/DL
WBC # BLD AUTO: 6.7 K/UL (ref 4.1–11.1)

## 2017-04-12 PROCEDURE — 82550 ASSAY OF CK (CPK): CPT | Performed by: HOSPITALIST

## 2017-04-12 PROCEDURE — 83036 HEMOGLOBIN GLYCOSYLATED A1C: CPT | Performed by: HOSPITALIST

## 2017-04-12 PROCEDURE — 74011636637 HC RX REV CODE- 636/637: Performed by: NURSE PRACTITIONER

## 2017-04-12 PROCEDURE — 80053 COMPREHEN METABOLIC PANEL: CPT | Performed by: INTERNAL MEDICINE

## 2017-04-12 PROCEDURE — 82962 GLUCOSE BLOOD TEST: CPT

## 2017-04-12 PROCEDURE — G8978 MOBILITY CURRENT STATUS: HCPCS

## 2017-04-12 PROCEDURE — 80061 LIPID PANEL: CPT | Performed by: INTERNAL MEDICINE

## 2017-04-12 PROCEDURE — 74011250636 HC RX REV CODE- 250/636: Performed by: INTERNAL MEDICINE

## 2017-04-12 PROCEDURE — 74011250637 HC RX REV CODE- 250/637: Performed by: INTERNAL MEDICINE

## 2017-04-12 PROCEDURE — 97116 GAIT TRAINING THERAPY: CPT

## 2017-04-12 PROCEDURE — 36415 COLL VENOUS BLD VENIPUNCTURE: CPT | Performed by: INTERNAL MEDICINE

## 2017-04-12 PROCEDURE — 84484 ASSAY OF TROPONIN QUANT: CPT | Performed by: HOSPITALIST

## 2017-04-12 PROCEDURE — G8979 MOBILITY GOAL STATUS: HCPCS

## 2017-04-12 PROCEDURE — 74011000250 HC RX REV CODE- 250: Performed by: INTERNAL MEDICINE

## 2017-04-12 PROCEDURE — 74011250637 HC RX REV CODE- 250/637: Performed by: NURSE PRACTITIONER

## 2017-04-12 PROCEDURE — C9113 INJ PANTOPRAZOLE SODIUM, VIA: HCPCS | Performed by: INTERNAL MEDICINE

## 2017-04-12 PROCEDURE — 65660000000 HC RM CCU STEPDOWN

## 2017-04-12 PROCEDURE — 85025 COMPLETE CBC W/AUTO DIFF WBC: CPT | Performed by: INTERNAL MEDICINE

## 2017-04-12 PROCEDURE — 97161 PT EVAL LOW COMPLEX 20 MIN: CPT

## 2017-04-12 RX ORDER — INSULIN GLARGINE 100 [IU]/ML
20 INJECTION, SOLUTION SUBCUTANEOUS
Status: DISCONTINUED | OUTPATIENT
Start: 2017-04-12 | End: 2017-04-13

## 2017-04-12 RX ORDER — GLIPIZIDE 2.5 MG/1
2.5 TABLET, EXTENDED RELEASE ORAL 2 TIMES DAILY
Status: DISCONTINUED | OUTPATIENT
Start: 2017-04-12 | End: 2017-04-14 | Stop reason: HOSPADM

## 2017-04-12 RX ADMIN — SODIUM CHLORIDE 40 MG: 9 INJECTION INTRAMUSCULAR; INTRAVENOUS; SUBCUTANEOUS at 09:17

## 2017-04-12 RX ADMIN — INSULIN LISPRO 5 UNITS: 100 INJECTION, SOLUTION INTRAVENOUS; SUBCUTANEOUS at 17:43

## 2017-04-12 RX ADMIN — DULOXETINE HYDROCHLORIDE 60 MG: 60 CAPSULE, DELAYED RELEASE ORAL at 09:17

## 2017-04-12 RX ADMIN — INSULIN GLARGINE 20 UNITS: 100 INJECTION, SOLUTION SUBCUTANEOUS at 22:32

## 2017-04-12 RX ADMIN — TAMSULOSIN HYDROCHLORIDE 0.4 MG: 0.4 CAPSULE ORAL at 09:17

## 2017-04-12 RX ADMIN — Medication 10 ML: at 06:31

## 2017-04-12 RX ADMIN — Medication 10 ML: at 21:50

## 2017-04-12 RX ADMIN — INSULIN LISPRO 5 UNITS: 100 INJECTION, SOLUTION INTRAVENOUS; SUBCUTANEOUS at 12:26

## 2017-04-12 RX ADMIN — SODIUM CHLORIDE 100 ML/HR: 900 INJECTION, SOLUTION INTRAVENOUS at 17:43

## 2017-04-12 RX ADMIN — HEPARIN SODIUM 5000 UNITS: 5000 INJECTION, SOLUTION INTRAVENOUS; SUBCUTANEOUS at 21:49

## 2017-04-12 RX ADMIN — GABAPENTIN 100 MG: 100 CAPSULE ORAL at 17:43

## 2017-04-12 RX ADMIN — ARIPIPRAZOLE 5 MG: 5 TABLET ORAL at 09:17

## 2017-04-12 RX ADMIN — LEVOTHYROXINE SODIUM 50 MCG: 50 TABLET ORAL at 06:32

## 2017-04-12 RX ADMIN — GLIPIZIDE 2.5 MG: 2.5 TABLET, FILM COATED, EXTENDED RELEASE ORAL at 17:43

## 2017-04-12 RX ADMIN — HEPARIN SODIUM 5000 UNITS: 5000 INJECTION, SOLUTION INTRAVENOUS; SUBCUTANEOUS at 06:30

## 2017-04-12 RX ADMIN — TRAZODONE HYDROCHLORIDE 50 MG: 50 TABLET ORAL at 21:48

## 2017-04-12 RX ADMIN — GLIPIZIDE 2.5 MG: 2.5 TABLET, FILM COATED, EXTENDED RELEASE ORAL at 12:26

## 2017-04-12 RX ADMIN — ASPIRIN 81 MG: 81 TABLET, COATED ORAL at 09:18

## 2017-04-12 RX ADMIN — GABAPENTIN 100 MG: 100 CAPSULE ORAL at 09:17

## 2017-04-12 RX ADMIN — Medication 10 ML: at 15:20

## 2017-04-12 RX ADMIN — HEPARIN SODIUM 5000 UNITS: 5000 INJECTION, SOLUTION INTRAVENOUS; SUBCUTANEOUS at 15:19

## 2017-04-12 RX ADMIN — INSULIN LISPRO 2 UNITS: 100 INJECTION, SOLUTION INTRAVENOUS; SUBCUTANEOUS at 21:49

## 2017-04-12 RX ADMIN — FINASTERIDE 5 MG: 5 TABLET, FILM COATED ORAL at 09:17

## 2017-04-12 RX ADMIN — SODIUM CHLORIDE 100 ML/HR: 900 INJECTION, SOLUTION INTRAVENOUS at 06:31

## 2017-04-12 RX ADMIN — INSULIN LISPRO 2 UNITS: 100 INJECTION, SOLUTION INTRAVENOUS; SUBCUTANEOUS at 06:31

## 2017-04-12 RX ADMIN — ROSUVASTATIN CALCIUM 5 MG: 10 TABLET, FILM COATED ORAL at 21:48

## 2017-04-12 NOTE — PROGRESS NOTES
Problem: Falls - Risk of  Goal: *Absence of falls  Outcome: Progressing Towards Goal  Bed in low position, locked bed wheels. Call bell and personal items within reach. Bed alarm in place. Patient door is open. Hourly rounding performed. Goal: *Knowledge of fall prevention  Outcome: Progressing Towards Goal  Instructed patient to call when when needing assistance. Patient demonstrates understanding. 3715- Bedside and Verbal shift change report given to [address] [facility] (oncoming nurse) by Ed Sleet (offgoing nurse). Report included the following information SBAR, Kardex, Intake/Output, MAR and Recent Results.

## 2017-04-12 NOTE — PROGRESS NOTES
NUTRITION COMPLETE ASSESSMENT    RECOMMENDATIONS:   1. Continue current diet  2. Encourage increased po intake  3. Weekly weights     Interventions/Plan:   Food/Nutrient Delivery:            RD to add Glucerna BID  RD to follow po intake, meals/supplements, wt status    Assessment:   Reason for Assessment:   [x]BPA/MST Referral     Diet: Consistent carb (1800 kcal, 2g Na)  Supplements: None  Nutritionally Significant Medications: [x] Reviewed & Includes: Glipizide, Lantus, Humalog, Synthroid, Protonix, NS @100mL/hr  Meal Intake: No data found. Pre-Hospitalization:  Usual Appetite: Poor    Current Hospitalization:   Fluid Restriction:  None  Appetite: Fair  PO Ability: Independent Average po intake: Unsure  Average supplements intake:  N/A      Subjective:  \"I don't have much appetite. \"    Objective:  Pt seen for MST. Pt admitted with uncontrolled diabetes. PMHx: diabetes,CAD,s/p CABG,COPD,Urinary incontinence,cognitive problem (dementia per family report). Reviewed chart, spoke with pt. Pt states having no appetite recently; states eating at least half of bfast but couldn't remember what he had. Per family, EHR notes, pt has lost ~10 lbs over past few weeks. Per EHR -170; pt states  lbs. Offered pt supplements; pt states he will try (has never had before); offered snacks, pt accepted. Noted extremely elevated blood sugars, A1c 15.5 (pt non-compliant with insulin at home). Pt currently on insulin- DTC following. RD to follow po intake, supplement acceptance, wt status. Estimated Nutrition Needs:   Kcals/day: 1547 Kcals/day (1224-1520 kcal/day (MSJ x 1.2-1.3))  Protein: 70 g (70-77g/day (1-1.1g/kg))  Fluid: 1800 ml (1mL/kcal)  Based On: [address] [facility]  Weight Used: Actual wt    Pt expected to meet estimated nutrient needs:  []   Yes     []  No [x] Unable to predict at this time    Nutrition Diagnosis:   1.  Unintended weight loss related to decreased appetite, recent N/V as evidenced by pt with ~10 lb wt loss over past month (per family)      Goals:      Pt will consume at least 50% meals, supplements, snack over next 5-7 days     Monitoring & Evaluation:    - Total energy intake   - Weight/weight change   -      Previous Nutrition Goals Met:   N/A  Previous Recommendations:    N/A    Education & Discharge Needs:   [x] None Identified   [] Identified and addressed    [] Participated in care plan, discharge planning, and/or interdisciplinary rounds        Cultural, Anglican and ethnic food preferences identified: None    Skin Integrity: [x]Intact  []Other  Edema: [x]None []Other  Last BM: 4/7? Food Allergies: [x]None []Other  Diet Restrictions: Cultural/Catholic Preference(s): None     Anthropometrics:    Weight Loss Metrics 4/12/2017 1/20/2017 10/10/2016 8/2/2016 7/11/2016 4/21/2016 4/14/2016   Today's Wt 153 lb 10.6 oz 165 lb 172 lb 169 lb 165 lb 6.4 oz 171 lb 172 lb   BMI 21.43 kg/m2 23.01 kg/m2 23.99 kg/m2 23.58 kg/m2 23.08 kg/m2 23.86 kg/m2 24 kg/m2      Weight Source: Standing scale (comment)  Height: 5' 11\" (180.3 cm),    Body mass index is 21.43 kg/(m^2).    ,     ,      Labs:    Lab Results   Component Value Date/Time    Sodium 140 04/12/2017 03:18 AM    Potassium 3.8 04/12/2017 03:18 AM    Chloride 108 04/12/2017 03:18 AM    CO2 25 04/12/2017 03:18 AM    Glucose 197 04/12/2017 03:18 AM    BUN 21 04/12/2017 03:18 AM    Creatinine 1.55 04/12/2017 03:18 AM    Calcium 8.2 04/12/2017 03:18 AM    Magnesium 2.0 04/30/2015 11:35 AM    Albumin 2.8 04/12/2017 03:18 AM     Lab Results   Component Value Date/Time    Hemoglobin A1c 15.5 04/12/2017 03:18 AM    Hemoglobin A1c, External 11.2 01/27/2016         Edwin Baer RD

## 2017-04-12 NOTE — PROGRESS NOTES
Care Management Interventions  PCP Verified by CM: Yes Vadim Ferreira MD, last office visit was 10 days to 2 weeks ago)  Mode of Transport at Discharge: Other (see comment) (family)  Physical Therapy Consult: Yes  Current Support Network: Own Home (Step son and grandson live with him)  Confirm Follow Up Transport: Self  Plan discussed with Pt/Family/Caregiver: Yes     Chart reviewed. Met with pt to introduce self and role. Pt is a , self care, and retired. He has Medicare A & B and Medicaid insurances. He drives for transportation. He uses [facility] on [address] He does not use DME and has not used home O2. He had home health about 3 months ago but cannot remember the name. He was in rehab at [facility] about 2 to 3 years ago. His daughter, Eduin Recinos, [phone], is POA, and handles all his business. CM received consult to assess placement. Pt stated that he will return home after discharge. PT documentation recommending HH at this time. CM will follow for discharge planning.      Britney Singleton RN ACM CRM

## 2017-04-12 NOTE — PROGRESS NOTES
PT Note:    Orders received and acknowledged. Chart reviewed. Nursing requested to hold PT evaluation currently as patient's BP in standing 80's/50's and patient with unsteadiness in static standing. Will continue to follow.

## 2017-04-12 NOTE — ED NOTES
TRANSFER - OUT REPORT:    Verbal report given to Rita on Alina Sellers  being transferred to [facility] for routine progression of care       Report consisted of patients Situation, Background, Assessment and   Recommendations(SBAR). Information from the following report(s) SBAR, ED Summary, [facility] and Recent Results was reviewed with the receiving nurse. Lines:   Peripheral IV 04/11/17 Right Antecubital (Active)   Site Assessment Clean, dry, & intact 4/11/2017 11:02 AM   Phlebitis Assessment 0 4/11/2017 11:02 AM   Infiltration Assessment 0 4/11/2017 11:02 AM   Dressing Status Clean, dry, & intact 4/11/2017 11:02 AM   Dressing Type Topical skin adhesive;Transparent;Tape 4/11/2017 11:02 AM   Hub Color/Line Status Pink;Flushed 4/11/2017 11:02 AM   Action Taken Blood drawn 4/11/2017 11:02 AM       Peripheral IV 04/11/17 Left;Upper Arm (Active)   Site Assessment Clean, dry, & intact 4/11/2017  1:33 PM   Phlebitis Assessment 0 4/11/2017  1:33 PM   Infiltration Assessment 0 4/11/2017  1:33 PM   Dressing Status Clean, dry, & intact 4/11/2017  1:33 PM   Hub Color/Line Status Blue 4/11/2017  1:33 PM        Opportunity for questions and clarification was provided.       Patient transported with:   Monitor

## 2017-04-12 NOTE — CDMP QUERY
Please give the clinical significance of the following lab data in a patient without a documented h/o CKD. BUN 29/21  Creatinine 2.38/1.55  GFR 27/44      Please clarify and document your clinical opinion in the progress notes and discharge summary including the definitive and/or presumptive diagnosis, (suspected or probable), related to the above clinical findings. Please include clinical findings supporting your diagnosis.       Thank you  Francoise Madrigal  [facility]  [phone]

## 2017-04-12 NOTE — PROGRESS NOTES
Problem: Diabetes Self-Management  Goal: *Disease process and treatment process  Define diabetes and identify own type of diabetes; list 3 options for treating diabetes. Outcome: Not Progressing Towards Goal  Patient would benefit from continued education on diabetes process    Orthostatic blood pressure decreased from lying, to sit, to stand, lowest 80s/50s. Pt up to chair with PT, no c/o dizziness. Hourly rounds performed throughout shift. Bedside and Verbal shift change report given to LONNIE Orellana (oncoming nurse) by Emelia Spence RN (offgoing nurse). Report included the following information SBAR, Kardex, Intake/Output, MAR, Recent Results and Cardiac Rhythm NSR/Sbrady.

## 2017-04-12 NOTE — PROGRESS NOTES
Problem: Mobility Impaired (Adult and Pediatric)  Goal: *Acute Goals and Plan of Care (Insert Text)  Physical Therapy Goals  Initiated 4/12/2017  1. Patient will move from supine to sit and sit to supine , scoot up and down and roll side to side in bed with independence within 7 day(s). 2. Patient will transfer from bed to chair and chair to bed with modified independence using the least restrictive device within 7 day(s). 3. Patient will perform sit to stand with modified independence within 7 day(s). 4. Patient will ambulate with modified independence for 150 feet with the least restrictive device within 7 day(s). 5. Patient will ascend/descend 2 stairs with 1 handrail(s) with supervision/set-up within 7 day(s). PHYSICAL THERAPY EVALUATION  Patient: Andrade Ryan (25 y.o. male)  Date: 4/12/2017  Primary Diagnosis: Uncontrolled diabetes mellitus with hyperglycemia (HCC)        Precautions:   Fall      ASSESSMENT :  Based on the objective data described below, the patient presents with +orthostatic hypotension although symptomatic and generally decreased independence with mobility due to high fall risk. PTA patient was significant fall history per daughter who was present, she also reports patient with early signs of dementia. He lives with his son who is not home during the day, daughter reports they plan to hire an aide for during the day. Patient overall CGA for mobility. Patient with initial + orthostatic hypotension with sit to stand but asymptomatic-see doc flow for details. Patient ambulated 30 feet without AD and CGA, narrow SHARON noted although no overt LOB. Patient's BP improved with activity and stable for patient to stay up in chair at end of session. RN aware of patient's vitals. Next session recommend COMER assessment with DGI follow up if patient scores high on COMER. Recommend HHPT. .       Patient will benefit from skilled intervention to address the above impairments.   Patients rehabilitation potential is considered to be Good  Factors which may influence rehabilitation potential include:   [X]         None noted  [ ]         Mental ability/status  [ ]         Medical condition  [ ]         Home/family situation and support systems  [ ]         Safety awareness  [ ]         Pain tolerance/management  [ ]         Other:        PLAN :  Recommendations and Planned Interventions:  [X]           Bed Mobility Training             [X]    Neuromuscular Re-Education  [X]           Transfer Training                   [ ]    Orthotic/Prosthetic Training  [X]           Gait Training                         [ ]    Modalities  [X]           Therapeutic Exercises           [ ]    Edema Management/Control  [X]           Therapeutic Activities            [X]    Patient and Family Training/Education  [ ]           Other (comment):     Frequency/Duration: Patient will be followed by physical therapy  3 times a week to address goals. Discharge Recommendations: Home Health  Further Equipment Recommendations for Discharge: TBD       SUBJECTIVE:   Patient stated I felt lightheaded at first but now I'm better.       OBJECTIVE DATA SUMMARY:   HISTORY:    Past Medical History:   Diagnosis Date    CAD (coronary artery disease)      [facility]      Diabetes ([facility]       1970a    Ill-defined condition       SOB    Pneumonia      Urinary incontinence       Past Surgical History:   Procedure Laterality Date    CARDIAC SURG PROCEDURE UNLIST   2000     open heart    HX HEENT   1975     nasal surgery    HX MOHS PROCEDURES   2013     left     Prior Level of Function/Home Situation: independent but significant fall history (\"multiple\" per daughter), early signs of dementia per daughter  Personal factors and/or comorbidities impacting plan of care:      Home Situation  Home Environment: Private residence  # Steps to Enter: 3  Rails to Enter: Yes  Hand Rails : Bilateral  One/Two Story Residence: One story  Living Alone: No  Support Systems: Family member(s)  Patient Expects to be Discharged to[de-identified] Private residence  Current DME Used/Available at Home: johnie Calhoun     EXAMINATION/PRESENTATION/DECISION MAKING:   Critical Behavior:  Neurologic State: Alert, Appropriate for age, Restless  Orientation Level: Oriented to person, Oriented to place, Oriented to time, Disoriented to situation  Cognition: Impulsive, Follows commands, Impaired decision making, Poor safety awareness, Appropriate for age attention/concentration     Hearing: Auditory  Auditory Impairment: Hard of hearing, bilateral, Hearing aid(s)  Hearing Aids/Status: At home  Skin:    Edema:   Range Of Motion:  AROM: Within functional limits           PROM: Within functional limits           Strength:    Strength: Generally decreased, functional                    Tone & Sensation:                                  Coordination:     Vision:      Functional Mobility:  Bed Mobility:     Supine to Sit: Contact guard assistance        Transfers:  Sit to Stand: Contact guard assistance  Stand to Sit: Contact guard assistance  Stand Pivot Transfers: Contact guard assistance                    Balance:   Sitting: Intact  Standing: Impaired  Standing - Static: Good  Standing - Dynamic : Fair  Ambulation/Gait Training:  Distance (ft): 30 Feet (ft)  Assistive Device: Gait belt  Ambulation - Level of Assistance: Contact guard assistance        Gait Abnormalities: Decreased step clearance        Base of Support: Narrowed     Speed/Audra: Pace decreased (<100 feet/min)  Step Length: Right shortened;Left shortened                                           Stairs:                           Therapeutic Exercises:         Functional Measure:  Tinetti test:      Sitting Balance: 1  Arises: 2  Attempts to Rise: 2  Immediate Standing Balance: 2  Standing Balance: 2  Nudged: 2  Eyes Closed: 1  Turn 360 Degrees - Continuous/Discontinuous: 0  Turn 360 Degrees - Steady/Unsteady: 0  Sitting Down: 2  Balance Score: 14  Indication of Gait: 1  R Step Length/Height: 1  L Step Length/Height: 1  R Foot Clearance: 1  L Foot Clearance: 1  Step Symmetry: 1  Step Continuity: 1  Path: 1  Trunk: 2  Walking Time: 0  Gait Score: 10  Total Score: 24         Tinetti Test and G-code impairment scale:  Percentage of Impairment CH     0%    CI     1-19% CJ     20-39% CK     40-59% CL     60-79% CM     80-99% CN      100%   Tinetti  Score 0-28 28 23-27 17-22 12-16 6-11 1-5 0          Tinetti Tool Score Risk of Falls  <19 = High Fall Risk  19-24 = Moderate Fall Risk  25-28 = Low Fall Risk  Tinetti ME. Performance-Oriented Assessment of Mobility Problems in Elderly Patients. [address]; [id number]. (Scoring Description: PT Bulletin Feb. 10, 1993)     Older adults: Cielo Samaniego et al, 2009; n = 1000 [facility] elderly evaluated with ABC, SEVERO, ADL, and IADL)  · Mean SEVERO score for males aged 69-68 years = 26.21(3.40)  · Mean SEVERO score for females age 69-68 years = 25.16(4.30)  · Mean SEVERO score for males over 80 years = 23.29(6.02)  · Mean SEVERO score for females over 80 years = 17.20(8.32)            G codes: In compliance with CMSs Claims Based Outcome Reporting, the following G-code set was chosen for this patient based on their primary functional limitation being treated: The outcome measure chosen to determine the severity of the functional limitation was the Tinetti with a score of 24/28 which was correlated with the impairment scale.       · Mobility - Walking and Moving Around:               - CURRENT STATUS:    CI - 1%-19% impaired, limited or restricted               - GOAL STATUS:           CH - 0% impaired, limited or restricted               - D/C STATUS:                       ---------------To be determined---------------      Physical Therapy Evaluation Charge Determination   History Examination Presentation Decision-Making HIGH Complexity :3+ comorbidities / personal factors will impact the outcome/ POC  LOW Complexity : 1-2 Standardized tests and measures addressing body structure, function, activity limitation and / or participation in recreation  LOW Complexity : Stable, uncomplicated  Other outcome measures Tinetti  LOW       Based on the above components, the patient evaluation is determined to be of the following complexity level: LOW      Pain:  Pain Scale 1: Numeric (0 - 10)  Pain Intensity 1: 0              Activity Tolerance:   See above, +orthostatic hypotension although asymptomatic  Please refer to the flowsheet for vital signs taken during this treatment. After treatment:   [X]         Patient left in no apparent distress sitting up in chair  [ ]         Patient left in no apparent distress in bed  [X]         Call bell left within reach  [X]         Nursing notified  [X]         Caregiver present  [ ]         Bed alarm activated      COMMUNICATION/EDUCATION:   The patients plan of care was discussed with: Registered Nurse.  [X]         Fall prevention education was provided and the patient/caregiver indicated understanding. [X]         Patient/family have participated as able in goal setting and plan of care. [X]         Patient/family agree to work toward stated goals and plan of care. [ ]         Patient understands intent and goals of therapy, but is neutral about his/her participation. [ ]         Patient is unable to participate in goal setting and plan of care.      Thank you for this referral.  Rick Ferreira, PT   Time Calculation: 21 mins

## 2017-04-12 NOTE — PROGRESS NOTES
2000: TRANSFER - IN REPORT:    Verbal report received from David Sierra (name) on [facility]  being received from Susana Brito (unit) for routine progression of care    Report consisted of patients Situation, Background, Assessment and   Recommendations(SBAR). Information from the following report(s) SBAR, Kardex, ED Summary, Intake/Output, MAR and Recent Results was reviewed with the receiving nurse. Opportunity for questions and clarification was provided. Assessment completed upon patients arrival to unit and care assumed. 2030: Patient arrived on stretcher from ED, initial assessment, skin assessment, and vital signs completed  Primary Nurse Maurizio Naik and Simon Sanchez, RN performed a dual skin assessment on this patient No impairment noted    2100: Paged Hugh Hitchcock regarding the [facility] Дмитрий Todd then discontinued the insulin drip order and switched the carb coverage insulin to sliding scale to be given ACHS    2130: Blood glucose 237, 2 units Sliding scale insulin given along with 10 units of lantus. 1835 dose of heparin given. Paged pharmacy to retime the heparin.

## 2017-04-12 NOTE — DIABETES MGMT
DTC Progress Note    Recommendations/ Comments: Met with the patient to further assess what he is doing for diabetes management at home. He is able to state that he takes insulin at home . One is by a scale and the other once a day. Uses insulin pens. \"But I have been slack recently. \" He does have a BG meter states he is suppose to check every day 1-2 times. \"Been slack with that too. \"  When asked about seeing Dr. Katie Ryan his Endo he states \"I saw him about 8 months ago and all I got was a lecture. \"   He states his daughter helps with his pills, but he does not take them consistently. Concern is he lives alone with family close by. Would recommend closer supervision of his medications and BG monitoring. Explained the A1c means he has had really high BG and this could be what was precipitating the constant need to urinate. Will need to include family in his discharge plan to address management of his diabetes. Chart reviewed on [facility] Pat[facility]. Patient is a 77 y.o. male with history Type 2 Diabetes on oral agent (monotherapy): glipizide (Glucotrol)  insulin injections: Levemir 24 units q day, Novolog insulin 3 untis with light carb meals, 5 units with lfull meal at home. A1c:   Lab Results   Component Value Date/Time    Hemoglobin A1c 15.5 04/12/2017 03:18 AM    Hemoglobin A1c 15.0 04/11/2017 10:56 AM    Hemoglobin A1c, External 11.2 01/27/2016       Recent Glucose Results:   Lab Results   Component Value Date/Time     (H) 04/12/2017 03:18 AM    GLUCPOC 299 (H) 04/12/2017 12:06 PM    GLUCPOC 189 (H) 04/12/2017 06:22 AM    GLUCPOC 237 (H) 04/11/2017 09:16 PM        Lab Results   Component Value Date/Time    Creatinine 1.55 04/12/2017 03:18 AM       Active Orders   Diet    DIET DIABETIC WITH OPTIONS Consistent Carb 1800kcal; Regular; 2 GM NA (House Low NA)        PO intake: No data found.       Current hospital DM medication: Lantus 10 units q hs, Lispro Correctional insulin with normal sensitivity    Will continue to follow as needed.     Thank you  Casimiro Pires RD,CDE   [address]

## 2017-04-13 LAB
ALBUMIN SERPL BCP-MCNC: 2.9 G/DL (ref 3.5–5)
ALBUMIN/GLOB SERPL: 0.9 {RATIO} (ref 1.1–2.2)
ALP SERPL-CCNC: 88 U/L (ref 45–117)
ALT SERPL-CCNC: 28 U/L (ref 12–78)
ANION GAP BLD CALC-SCNC: 6 MMOL/L (ref 5–15)
AST SERPL W P-5'-P-CCNC: 37 U/L (ref 15–37)
BASOPHILS # BLD AUTO: 0 K/UL (ref 0–0.1)
BASOPHILS # BLD: 1 % (ref 0–1)
BILIRUB SERPL-MCNC: 0.2 MG/DL (ref 0.2–1)
BUN SERPL-MCNC: 16 MG/DL (ref 6–20)
BUN/CREAT SERPL: 12 (ref 12–20)
CALCIUM SERPL-MCNC: 7.7 MG/DL (ref 8.5–10.1)
CHLORIDE SERPL-SCNC: 113 MMOL/L (ref 97–108)
CO2 SERPL-SCNC: 24 MMOL/L (ref 21–32)
CREAT SERPL-MCNC: 1.38 MG/DL (ref 0.7–1.3)
EOSINOPHIL # BLD: 0.3 K/UL (ref 0–0.4)
EOSINOPHIL NFR BLD: 4 % (ref 0–7)
ERYTHROCYTE [DISTWIDTH] IN BLOOD BY AUTOMATED COUNT: 14.4 % (ref 11.5–14.5)
GLOBULIN SER CALC-MCNC: 3.3 G/DL (ref 2–4)
GLUCOSE BLD STRIP.AUTO-MCNC: 125 MG/DL (ref 65–100)
GLUCOSE BLD STRIP.AUTO-MCNC: 144 MG/DL (ref 65–100)
GLUCOSE BLD STRIP.AUTO-MCNC: 215 MG/DL (ref 65–100)
GLUCOSE BLD STRIP.AUTO-MCNC: 65 MG/DL (ref 65–100)
GLUCOSE BLD STRIP.AUTO-MCNC: 89 MG/DL (ref 65–100)
GLUCOSE SERPL-MCNC: 126 MG/DL (ref 65–100)
HCT VFR BLD AUTO: 31.1 % (ref 36.6–50.3)
HGB BLD-MCNC: 10.2 G/DL (ref 12.1–17)
LYMPHOCYTES # BLD AUTO: 32 % (ref 12–49)
LYMPHOCYTES # BLD: 2.1 K/UL (ref 0.8–3.5)
MAGNESIUM SERPL-MCNC: 2.3 MG/DL (ref 1.6–2.4)
MCH RBC QN AUTO: 28.9 PG (ref 26–34)
MCHC RBC AUTO-ENTMCNC: 32.8 G/DL (ref 30–36.5)
MCV RBC AUTO: 88.1 FL (ref 80–99)
MONOCYTES # BLD: 0.5 K/UL (ref 0–1)
MONOCYTES NFR BLD AUTO: 7 % (ref 5–13)
NEUTS SEG # BLD: 3.6 K/UL (ref 1.8–8)
NEUTS SEG NFR BLD AUTO: 56 % (ref 32–75)
PHOSPHATE SERPL-MCNC: 2.7 MG/DL (ref 2.6–4.7)
PLATELET # BLD AUTO: 232 K/UL (ref 150–400)
POTASSIUM SERPL-SCNC: 4 MMOL/L (ref 3.5–5.1)
PROT SERPL-MCNC: 6.2 G/DL (ref 6.4–8.2)
RBC # BLD AUTO: 3.53 M/UL (ref 4.1–5.7)
SERVICE CMNT-IMP: ABNORMAL
SERVICE CMNT-IMP: NORMAL
SERVICE CMNT-IMP: NORMAL
SODIUM SERPL-SCNC: 143 MMOL/L (ref 136–145)
WBC # BLD AUTO: 6.5 K/UL (ref 4.1–11.1)

## 2017-04-13 PROCEDURE — 74011250636 HC RX REV CODE- 250/636: Performed by: INTERNAL MEDICINE

## 2017-04-13 PROCEDURE — 65660000000 HC RM CCU STEPDOWN

## 2017-04-13 PROCEDURE — 83735 ASSAY OF MAGNESIUM: CPT

## 2017-04-13 PROCEDURE — 84100 ASSAY OF PHOSPHORUS: CPT

## 2017-04-13 PROCEDURE — 74011636637 HC RX REV CODE- 636/637: Performed by: NURSE PRACTITIONER

## 2017-04-13 PROCEDURE — 97530 THERAPEUTIC ACTIVITIES: CPT

## 2017-04-13 PROCEDURE — 97116 GAIT TRAINING THERAPY: CPT

## 2017-04-13 PROCEDURE — 74011250637 HC RX REV CODE- 250/637: Performed by: INTERNAL MEDICINE

## 2017-04-13 PROCEDURE — 36415 COLL VENOUS BLD VENIPUNCTURE: CPT

## 2017-04-13 PROCEDURE — 82962 GLUCOSE BLOOD TEST: CPT

## 2017-04-13 PROCEDURE — 74011250637 HC RX REV CODE- 250/637: Performed by: NURSE PRACTITIONER

## 2017-04-13 PROCEDURE — 93306 TTE W/DOPPLER COMPLETE: CPT

## 2017-04-13 PROCEDURE — 80053 COMPREHEN METABOLIC PANEL: CPT

## 2017-04-13 PROCEDURE — 97165 OT EVAL LOW COMPLEX 30 MIN: CPT

## 2017-04-13 PROCEDURE — 85025 COMPLETE CBC W/AUTO DIFF WBC: CPT

## 2017-04-13 RX ORDER — INSULIN GLARGINE 100 [IU]/ML
15 INJECTION, SOLUTION SUBCUTANEOUS
Status: DISCONTINUED | OUTPATIENT
Start: 2017-04-13 | End: 2017-04-14 | Stop reason: HOSPADM

## 2017-04-13 RX ORDER — MIDODRINE HYDROCHLORIDE 5 MG/1
5 TABLET ORAL 2 TIMES DAILY WITH MEALS
Status: DISCONTINUED | OUTPATIENT
Start: 2017-04-13 | End: 2017-04-14 | Stop reason: HOSPADM

## 2017-04-13 RX ORDER — INSULIN GLARGINE 100 [IU]/ML
12 INJECTION, SOLUTION SUBCUTANEOUS
Status: DISCONTINUED | OUTPATIENT
Start: 2017-04-13 | End: 2017-04-13

## 2017-04-13 RX ORDER — POLYETHYLENE GLYCOL 3350 17 G/17G
17 POWDER, FOR SOLUTION ORAL
Status: DISCONTINUED | OUTPATIENT
Start: 2017-04-13 | End: 2017-04-14 | Stop reason: HOSPADM

## 2017-04-13 RX ADMIN — TRAZODONE HYDROCHLORIDE 50 MG: 50 TABLET ORAL at 23:23

## 2017-04-13 RX ADMIN — INSULIN GLARGINE 15 UNITS: 100 INJECTION, SOLUTION SUBCUTANEOUS at 23:23

## 2017-04-13 RX ADMIN — Medication 10 ML: at 06:43

## 2017-04-13 RX ADMIN — MIDODRINE HYDROCHLORIDE 5 MG: 5 TABLET ORAL at 16:55

## 2017-04-13 RX ADMIN — ASPIRIN 81 MG: 81 TABLET, COATED ORAL at 09:08

## 2017-04-13 RX ADMIN — ROSUVASTATIN CALCIUM 5 MG: 10 TABLET, FILM COATED ORAL at 23:23

## 2017-04-13 RX ADMIN — HEPARIN SODIUM 5000 UNITS: 5000 INJECTION, SOLUTION INTRAVENOUS; SUBCUTANEOUS at 16:54

## 2017-04-13 RX ADMIN — Medication 10 ML: at 22:00

## 2017-04-13 RX ADMIN — GABAPENTIN 100 MG: 100 CAPSULE ORAL at 09:08

## 2017-04-13 RX ADMIN — HEPARIN SODIUM 5000 UNITS: 5000 INJECTION, SOLUTION INTRAVENOUS; SUBCUTANEOUS at 06:39

## 2017-04-13 RX ADMIN — Medication 10 ML: at 06:39

## 2017-04-13 RX ADMIN — SODIUM CHLORIDE 100 ML/HR: 900 INJECTION, SOLUTION INTRAVENOUS at 04:49

## 2017-04-13 RX ADMIN — TAMSULOSIN HYDROCHLORIDE 0.4 MG: 0.4 CAPSULE ORAL at 09:08

## 2017-04-13 RX ADMIN — GLIPIZIDE 2.5 MG: 2.5 TABLET, FILM COATED, EXTENDED RELEASE ORAL at 17:00

## 2017-04-13 RX ADMIN — INSULIN LISPRO 3 UNITS: 100 INJECTION, SOLUTION INTRAVENOUS; SUBCUTANEOUS at 16:54

## 2017-04-13 RX ADMIN — HEPARIN SODIUM 5000 UNITS: 5000 INJECTION, SOLUTION INTRAVENOUS; SUBCUTANEOUS at 23:24

## 2017-04-13 RX ADMIN — LEVOTHYROXINE SODIUM 50 MCG: 50 TABLET ORAL at 06:39

## 2017-04-13 RX ADMIN — DULOXETINE HYDROCHLORIDE 60 MG: 60 CAPSULE, DELAYED RELEASE ORAL at 09:08

## 2017-04-13 RX ADMIN — ARIPIPRAZOLE 5 MG: 5 TABLET ORAL at 09:08

## 2017-04-13 RX ADMIN — Medication 10 ML: at 14:00

## 2017-04-13 RX ADMIN — GLIPIZIDE 2.5 MG: 2.5 TABLET, FILM COATED, EXTENDED RELEASE ORAL at 09:08

## 2017-04-13 RX ADMIN — GABAPENTIN 100 MG: 100 CAPSULE ORAL at 16:56

## 2017-04-13 RX ADMIN — FINASTERIDE 5 MG: 5 TABLET, FILM COATED ORAL at 09:08

## 2017-04-13 NOTE — PROGRESS NOTES
Problem: Self Care Deficits Care Plan (Adult)  Goal: *Acute Goals and Plan of Care (Insert Text)  Occupational Therapy Goals  Initiated 4/13/2017  1. Patient will perform lower body ADLs with independence within 7 day(s). 2. Patient will perform upper body ADLs standing 5 mins without fatigue or LOB with independence within 7 day(s). 3. Patient will perform all aspects of toileting with independence within 7day(s). 4. Patient will participate in upper extremity therapeutic exercise/activities with independence for 10 minutes within 7 day(s). 5. Patient will utilize energy conservation techniques during functional activities without cues within 7 day(s). OCCUPATIONAL THERAPY EVALUATION  Patient: Ernestina Licona (89 y.o. male)  Date: 4/13/2017  Primary Diagnosis: Uncontrolled diabetes mellitus with hyperglycemia ([facility] [address]        Precautions:   Fall      ASSESSMENT :  Based on the objective data described below, the patient presents with overall supervision-CGA x1 with functional mobility, up to CGA x1 for dynamic standing ADLs, and independent for upper body ADLs s/p syncopal episode. Patient received supine in bed. Has been progressing well with mobility and PT for higher level balance deficits. Patient was oriented and able to provide full history today; has been impulsive with getting OOB. Patient would benefit from [facility] in next session to obtain baseline cognition. Patient will benefit from home health services and continued assist from family as appropriate. Will continue to follow for higher level cognition and safety education. Patient will benefit from skilled intervention to address the above impairments.   Patients rehabilitation potential is considered to be Good  Factors which may influence rehabilitation potential include:   [ ]             None noted  [ ]             Mental ability/status  [ ]             Medical condition  [ ]             Home/family situation and support systems  [ ] Safety awareness  [ ]             Pain tolerance/management  [ ]             Other:        PLAN :  Recommendations and Planned Interventions:  [X]               Self Care Training                  [X]        Therapeutic Activities  [X]               Functional Mobility Training    [ ]        Cognitive Retraining  [X]               Therapeutic Exercises           [X]        Endurance Activities  [X]               Balance Training                   [ ]        Neuromuscular Re-Education  [ ]               Visual/Perceptual Training     [X]   Home Safety Training  [X]               Patient Education                 [X]        Family Training/Education  [ ]               Other (comment):     Frequency/Duration: Patient will be followed by occupational therapy 3 times a week to address goals. Discharge Recommendations: Home Health  Further Equipment Recommendations for Discharge: Anticipate no needs       SUBJECTIVE:   Patient stated I have two boys at home to help me if I need it.       OBJECTIVE DATA SUMMARY:   HISTORY:   Past Medical History:   Diagnosis Date    CAD (coronary artery disease)      COPD (chronic obstructive pulmonary disease) ([facility] [address]      Diabetes ([facility] [address]       1970a    Ill-defined condition       SOB    Pneumonia      Urinary incontinence       Past Surgical History:   Procedure Laterality Date    CARDIAC SURG PROCEDURE UNLIST   2000     open heart    HX HEENT   1975     nasal surgery    HX MOHS PROCEDURES   2013     left        Prior Level of Function/Home Situation: Per patient report, lives at home with step-son and grandson (both adults). Patient with baseline dementia. Patient dresses/bathes self. He is independent with ADLs.   Expanded or extensive additional review of patient history:      Home Situation  Home Environment: Private residence  # Steps to Enter: 3  Rails to Enter: Yes  Hand Rails : Bilateral  One/Two Story Residence: One story  Living Alone: No  Support Systems: Family member(s)  Patient Expects to be Discharged to[de-identified] Private residence  Current DME Used/Available at Home: johnie Escoto  [ ]  Right hand dominant   [ ]  Left hand dominant     EXAMINATION OF PERFORMANCE DEFICITS:  Cognitive/Behavioral Status:  Neurologic State: Alert  Orientation Level: Oriented X4  Cognition: Appropriate for age attention/concentration; Follows commands  Perception: Appears intact  Perseveration: No perseveration noted  Safety/Judgement: Fall prevention; Awareness of environment;Decreased awareness of need for assistance (Impulsive)     Skin: Appears intact     Edema: None noted in BUEs     Hearing: Auditory  Auditory Impairment: Hard of hearing, bilateral, Hearing aid(s)  Hearing Aids/Status: At home     Vision/Perceptual:                           Acuity: Within Defined Limits;Able to read normal print without difficulty          Range of Motion:  AROM: Within functional limits  PROM: Within functional limits                       Strength:  Strength: Generally decreased, functional                 Coordination:  Coordination: Within functional limits  Fine Motor Skills-Upper: Left Intact; Right Intact    Gross Motor Skills-Upper: Left Intact; Right Intact     Tone & Sensation:  Tone: Normal  Sensation: Intact                       Balance:  Sitting: Intact; Without support  Standing: Intact; Without support  Standing - Static: Good  Standing - Dynamic : Good     Functional Mobility and Transfers for ADLs:  Bed Mobility:  Supine to Sit: Independent  Sit to Supine: Independent     Transfers:  Sit to Stand: Contact guard assistance  Stand to Sit: Contact guard assistance  Toilet Transfer : Contact guard assistance     ADL Assessment:  Feeding: Independent     Oral Facial Hygiene/Grooming: Independent     Bathing: Contact guard assistance     Upper Body Dressing: Independent     Lower Body Dressing: Contact guard assistance     Toileting: Independent                 ADL Intervention and task modifications:     Lower Body Dressing Assistance  Leg Crossed Method Used: Yes  Position Performed: Supine           Cognitive Retraining  Safety/Judgement: Fall prevention; Awareness of environment;Decreased awareness of need for assistance (Impulsive)     Functional Measure:  Barthel Index:      Bathin  Bladder: 10  Bowels: 10  Groomin  Dressin  Feeding: 10  Mobility: 10  Stairs: 0  Toilet Use: 10  Transfer (Bed to Chair and Back): 10  Total: 70         Barthel and G-code impairment scale:  Percentage of impairment CH  0% CI  1-19% CJ  20-39% CK  40-59% CL  60-79% CM  80-99% CN  100%   Barthel Score 0-100 100 99-80 79-60 59-40 20-39 1-19    0   Barthel Score 0-20 20 17-19 13-16 9-12 5-8 1-4 0      The Barthel ADL Index: Guidelines  1. The index should be used as a record of what a patient does, not as a record of what a patient could do. 2. The main aim is to establish degree of independence from any help, physical or verbal, however minor and for whatever reason. 3. The need for supervision renders the patient not independent. 4. A patient's performance should be established using the best available evidence. Asking the patient, friends/relatives and nurses are the usual sources, but direct observation and common sense are also important. However direct testing is not needed. 5. Usually the patient's performance over the preceding 24-48 hours is important, but occasionally longer periods will be relevant. 6. Middle categories imply that the patient supplies over 50 per cent of the effort. 7. Use of aids to be independent is allowed. Maria Antonia Oh., Barthel, D.W. (0809). Functional evaluation: the Barthel Index. [address] [facility] (14)2. Elvera Minors joon BROOKLYN Jansen, Ginna Verma., Brown Will., Roderick Brito, [address] (). Measuring the change indisability after inpatient rehabilitation; comparison of the responsiveness of the Barthel Index and Functional Broadbent Measure.  Journal of Neurology, Neurosurgery, and Psychiatry, 66(4), 188-184. AMANDA Niño, LINDA Anderson, & David Spicer M.A. (2004.) Assessment of post-stroke quality of life in cost-effectiveness studies: The usefulness of the Barthel Index and the EuroQoL-5D. Quality of Life Research, 13, 887-37            G codes: In compliance with CMSs Claims Based Outcome Reporting, the following G-code set was chosen for this patient based on their primary functional limitation being treated: The outcome measure chosen to determine the severity of the functional limitation was the Barthel Index with a score of 70/100 which was correlated with the impairment scale. · Self Care:               - CURRENT STATUS:    CJ - 20%-39% impaired, limited or restricted               - GOAL STATUS:           CI - 1%-19% impaired, limited or restricted               - D/C STATUS:                       ---------------To be determined---------------      Occupational Therapy Evaluation Charge Determination   History Examination Decision-Making   LOW Complexity : Brief history review  LOW Complexity : 1-3 performance deficits relating to physical, cognitive , or psychosocial skils that result in activity limitations and / or participation restrictions  MEDIUM Complexity : Patient may present with comorbidities that affect occupational performnce. Miniml to moderate modification of tasks or assistance (eg, physical or verbal ) with assesment(s) is necessary to enable patient to complete evaluation       Based on the above components, the patient evaluation is determined to be of the following complexity level: LOW   Activity Tolerance:   Good. Please refer to the flowsheet for vital signs taken during this treatment.   After treatment:   [ ] Patient left in no apparent distress sitting up in chair  [X] Patient left in no apparent distress in bed  [X] Call bell left within reach  [X] Nursing notified  [ ] Caregiver present  [X] Bed alarm activated      COMMUNICATION/EDUCATION:   The patients plan of care was discussed with: Registered Nurse.  [X] Home safety education was provided and the patient/caregiver indicated understanding. [X] Patient/family have participated as able in goal setting and plan of care. [ ] Patient/family agree to work toward stated goals and plan of care. [ ] Patient understands intent and goals of therapy, but is neutral about his/her participation. [ ] Patient is unable to participate in goal setting and plan of care. This patients plan of care is appropriate for delegation to [facility].      Thank you for this referral.  Gerianne Eisenmenger, OT  Time Calculation: 9 mins

## 2017-04-13 NOTE — PROGRESS NOTES
1600: Bedside and Verbal shift change report given to Nancy Cooper RN (oncoming nurse) by José Antonio Raman RN (offgoing nurse). Report included the following information SBAR, Kardex, Intake/Output, MAR, Accordion, Recent Results, Med Rec Status and Cardiac Rhythm NSR/Sinus Luci Khan.     Hourly rounds completed throughout shift

## 2017-04-13 NOTE — PROGRESS NOTES
Hospitalist Progress Note  Stepan Miguel NP  Office: [phone]  Cell: [phone]      Date of Service:  2017  NAME:  Karel Dominguez  :  1940  MRN:  [id number]      Admission Summary:     Mr. Rome Lemos is a 77year old male with type II Diabetes , CAD, s/p CABG, COPD, urinary incontinence, cognitive problems. He was brought into the ED from home due to syncopal episode at home. Family members report that he has been dizzy and stumbling around for the past few days. This morning he had a witnessed syncopal episode followed by falling to the ground. The patient reports that he has not been feeling well recently and cannot remember the last time he took his insulin as he should. He often \"forgets\" to take his medications and is not taking them consistently. His urinary incontinence and balance is reportedly getting worse. Labwork shows glucose of 827 , sodium 125, and creatinine of 2.38. He is admitted to the hospitalist service for further workup and treatment. Interval history / Subjective:       \" I just can't remember to take my medications when I am supposed to , I feel dizzy pretty much all the time. \"      Assessment & Plan:     Uncontrolled diabetes with hyperglycemia/DM - BS remains 189-290 today  -AG normal  -Pt was started on insulin drip in ER. Then on ssi after blood sugar stabilized. - Increase lantus to 20 units q hs ( takes 24 at home)  and continue sliding scale with lispro. - restart glipizide   -Hgba1C is 15.4 !  - follows with Dr. Pérez Kidd outpatient, but has not seen him in ~ 8 months     Hyponatremia/Volume depletion  -Due to uncontrolled diabetes  -On iv fluid and will monitor electrolytes. Acute Kidney Injury (POA):    - [pre-renal , related to above issues , resolving with IV fluid hydration   - creatinine down from 2.38--> 1.55  - will continue to monitor     Syncope in the setting of Orthostatic Hypotension:  - orthostatic vitals revealed significant drop in systolic BP from sitting to standing ( 822--> 94 systolic)   -Likely orthostatic change. Will also consider arrhythmia as EKG showed Sinus rhythm with premature atrial complexes in a pattern of bigeminy,nonspecific T wave abnormality.  -Will transfer to [facility] for monitoring. Will consider cardiology consult if any abnormality noted. -ECHO ordered -- will consult cards if needed   -CT scan of head without acute process  -PT/OT- reports the need for likely home health PT/OT   - consider midodrine     Cognitive disorder:dementia as per family report  -On psych medications including abilify,trazadone,cymbalta,neurontin. ?these medications can also cause cognitive issues. Incontinence of urine  -No evidence of UTI. On proscar,flomar.  -Incontinence is a frequent cause leading to nursing home admission by family  -his severely uncontrolled diabetes is likely leading to polyuria as well     CAD s/p CABG  -On aspirin  -Telemetry monitoring    COPD  -Duo-neb as needed. Code status: Full   DVT prophylaxis: Heparin     Care Plan discussed with: Patient/Family , , and Dr. Amarjit Mendez. Plan of care was discussed at length with his daughter at bedside.      Disposition: Home w/Family     Hospital Problems  Date Reviewed: 4/11/2017          Codes Class Noted POA    * (Principal)Uncontrolled diabetes mellitus with hyperglycemia ([facility] [address] ICD-10-CM: E11.65  ICD-9-CM: 250.02  4/11/2017 Yes        Syncope ICD-10-CM: R55  ICD-9-CM: 780.2  4/11/2017 Yes        Volume depletion ICD-10-CM: E86.9  ICD-9-CM: 276.50  4/11/2017 Yes        Hyponatremia ICD-10-CM: E87.1  ICD-9-CM: 276.1  4/11/2017 Yes        Urinary incontinence ICD-10-CM: R32  ICD-9-CM: 788.30  4/11/2017 Yes        CAD (coronary artery disease) ICD-10-CM: I25.10  ICD-9-CM: 414.00  4/11/2017 Yes        COPD (chronic obstructive pulmonary disease) (HCC) ICD-10-CM: J44.9  ICD-9-CM: 496  4/11/2017 Yes        Cognitive disorder ICD-10-CM: F09  ICD-9-CM: 294.9  7/11/2016 Yes    Overview Signed 7/11/2016  2:00 PM by Sasha Mcclendon MD     Due to [facility]             Non compliance w medication regimen ICD-10-CM: Z91.14  ICD-9-CM: V15.81  7/23/2015 Yes                Review of Systems:   A comprehensive review of systems was negative except for that written in the HPI. Vital Signs:    Last 24hrs VS reviewed since prior progress note. Most recent are:  Visit Vitals    /51 (BP 1 Location: Right arm, BP Patient Position: At rest)    Pulse 61    Temp 97.5 °F (36.4 °C)    Resp 17    Ht 5' 11\" (1.803 m)    Wt 69.7 kg (153 lb 10.6 oz)    SpO2 98%    BMI 21.43 kg/m2         Intake/Output Summary (Last 24 hours) at 04/12/17 2103  Last data filed at 04/12/17 1745   Gross per 24 hour   Intake          2008. 33 ml   Output             1250 ml   Net           758.33 ml        Physical Examination:             Constitutional:  No acute distress, cooperative, pleasant    ENT:  Oral mucous moist, oropharynx benign. Neck supple,    Resp:  CTA bilaterally. No wheezing/rhonchi/rales. No accessory muscle use   CV:  Regular rhythm, normal rate, + systolic murmur, gallops, rubs    GI:  Soft, non distended, non tender. normoactive bowel sounds, no hepatosplenomegaly     Musculoskeletal:  No edema, warm, 2+ pulses throughout    Neurologic:  Moves all extremities.   AAOx2-3 , slow response time with flat affect           Data Review:    Review and/or order of clinical lab test  Review and/or order of tests in the radiology section of CPT  Review and/or order of tests in the medicine section of CPT      Labs:     Recent Labs      04/12/17 0318 04/11/17   1056   WBC  6.7  7.1   HGB  9.8*  11.4*   HCT  29.4*  35.1*   PLT  238  277     Recent Labs      04/12/17 0318 04/11/17   1056   NA  140  124*   K  3.8  4.6   CL  108  89*   CO2  25  25   BUN  21*  29*   CREA  1.55*  2.38*   GLU  197*  827*   CA  8.2*  9.4     Recent Labs      04/12/17 0318 04/11/17   1056   SGOT  22  17   ALT  22  27   AP  84  117   TBILI  0.4  0.5   TP  6.1*  7.3   ALB  2.8*  3.6   GLOB  3.3  3.7     No results for input(s): INR, PTP, APTT in the last 72 hours. No lab exists for component: INREXT   No results for input(s): FE, TIBC, PSAT, FERR in the last 72 hours. No results found for: FOL, RBCF   No results for input(s): PH, PCO2, PO2 in the last 72 hours.   Recent Labs      04/12/17   0318  04/11/17   1056   CPK  143   --    CKNDX  2.6*   --    TROIQ  0.04  0.05*     Lab Results   Component Value Date/Time    Cholesterol, total 120 04/12/2017 03:18 AM    HDL Cholesterol 41 04/12/2017 03:18 AM    LDL, calculated 51 04/12/2017 03:18 AM    Triglyceride 140 04/12/2017 03:18 AM    CHOL/HDL Ratio 2.9 04/12/2017 03:18 AM     Lab Results   Component Value Date/Time    Glucose (POC) 290 04/12/2017 04:40 PM    Glucose (POC) 299 04/12/2017 12:06 PM    Glucose (POC) 189 04/12/2017 06:22 AM    Glucose (POC) 237 04/11/2017 09:16 PM    Glucose (POC) 157 04/11/2017 06:49 PM     Lab Results   Component Value Date/Time    Color YELLOW/STRAW 04/11/2017 01:42 PM    Appearance CLEAR 04/11/2017 01:42 PM    Specific gravity 1.029 04/11/2017 01:42 PM    pH (UA) 5.0 04/11/2017 01:42 PM    Protein NEGATIVE  04/11/2017 01:42 PM    Glucose >1000 04/11/2017 01:42 PM    Ketone NEGATIVE  04/11/2017 01:42 PM    Bilirubin NEGATIVE  04/11/2017 01:42 PM    Urobilinogen 0.2 04/11/2017 01:42 PM    Nitrites NEGATIVE  04/11/2017 01:42 PM    Leukocyte Esterase NEGATIVE  04/11/2017 01:42 PM    Epithelial cells FEW 04/11/2017 01:42 PM    Bacteria NEGATIVE  04/11/2017 01:42 PM    WBC 0-4 04/11/2017 01:42 PM    RBC 0-5 04/11/2017 01:42 PM         Medications Reviewed:     Current Facility-Administered Medications   Medication Dose Route Frequency    glipiZIDE SR (GLUCOTROL XL) tablet 2.5 mg  2.5 mg Oral BID    sodium chloride (NS) flush 5-10 mL  5-10 mL IntraVENous Q8H    sodium chloride (NS) flush 5-10 mL  5-10 mL IntraVENous PRN    ARIPiprazole (ABILIFY) tablet 5 mg  5 mg Oral DAILY    aspirin delayed-release tablet 81 mg  81 mg Oral DAILY    DULoxetine (CYMBALTA) capsule 60 mg  60 mg Oral DAILY    finasteride (PROSCAR) tablet 5 mg  5 mg Oral DAILY    gabapentin (NEURONTIN) capsule 100 mg  100 mg Oral BID    levothyroxine (SYNTHROID) tablet 50 mcg  50 mcg Oral ACB    rosuvastatin (CRESTOR) tablet 5 mg  5 mg Oral QHS    tamsulosin (FLOMAX) capsule 0.4 mg  0.4 mg Oral DAILY    traZODone (DESYREL) tablet 50 mg  50 mg Oral QHS    sodium chloride (NS) flush 5-10 mL  5-10 mL IntraVENous Q8H    sodium chloride (NS) flush 5-10 mL  5-10 mL IntraVENous PRN    heparin (porcine) injection 5,000 Units  5,000 Units SubCUTAneous Q8H    0.9% sodium chloride infusion  100 mL/hr IntraVENous CONTINUOUS    albuterol-ipratropium (DUO-NEB) 2.5 MG-0.5 MG/3 ML  3 mL Nebulization Q4H PRN    insulin glargine (LANTUS) injection 10 Units  10 Units SubCUTAneous QHS    glucose chewable tablet 16 g  4 Tab Oral PRN    dextrose (D50W) injection syrg 12.5-25 g  12.5-25 g IntraVENous PRN    glucagon (GLUCAGEN) injection 1 mg  1 mg IntraMUSCular PRN    insulin lispro (HUMALOG) injection   SubCUTAneous AC&HS    influenza vaccine 2016-17 (36mos+)(PF) (FLUZONE/FLUARIX/FLULAVAL QUAD) injection 0.5 mL  0.5 mL IntraMUSCular PRIOR TO DISCHARGE     ______________________________________________________________________  EXPECTED LENGTH OF STAY: 3d 0h  ACTUAL LENGTH OF STAY:          1                 Marleni Overton NP

## 2017-04-13 NOTE — DIABETES MGMT
DTC Progress Note    Recommendations/ Comments: Chart reviewed due to hypoglycemia. Noted that Lantus dose has been decreased. Spoke with  to request that when [address] is ordered for patient, that nursing is also ordered for assistance with diabetes management. Patient is a 77 y.o. male with history Type 2 Diabetes on oral agent (monotherapy): glipizide (Glucotrol)  insulin injections: Levemir 24 units q day, Novolog insulin 3 untis with light carb meals, 5 units with lfull meal at home. A1c:   Lab Results   Component Value Date/Time    Hemoglobin A1c 15.5 04/12/2017 03:18 AM    Hemoglobin A1c 15.0 04/11/2017 10:56 AM    Hemoglobin A1c, External 11.2 01/27/2016       Recent Glucose Results:   Lab Results   Component Value Date/Time     (H) 04/13/2017 01:27 AM    GLUCPOC 125 (H) 04/13/2017 11:32 AM    GLUCPOC 89 04/13/2017 06:45 AM    GLUCPOC 65 04/13/2017 06:25 AM        Lab Results   Component Value Date/Time    Creatinine 1.38 04/13/2017 01:27 AM       Active Orders   Diet    DIET DIABETIC WITH OPTIONS Consistent Carb 1800kcal; Regular; 2 GM NA (House Low NA)        PO intake:   Patient Vitals for the past 72 hrs:   % Diet Eaten   04/12/17 1745 100 %   04/12/17 1205 100 %   04/12/17 0801 100 %       Current hospital DM medication: Lantus 12 units,, Glipizide 2.5 mg BID and Humalog for correction, normal sensitivity. Will continue to follow as needed.     Thank you  Ean Ag, MS, RN, CDE

## 2017-04-13 NOTE — PROGRESS NOTES
Problem: Mobility Impaired (Adult and Pediatric)  Goal: *Acute Goals and Plan of Care (Insert Text)  Physical Therapy Goals  Initiated 4/12/2017  1. Patient will move from supine to sit and sit to supine , scoot up and down and roll side to side in bed with independence within 7 day(s). 2. Patient will transfer from bed to chair and chair to bed with modified independence using the least restrictive device within 7 day(s). 3. Patient will perform sit to stand with modified independence within 7 day(s). 4. Patient will ambulate with modified independence for 150 feet with the least restrictive device within 7 day(s). 5. Patient will ascend/descend 2 stairs with 1 handrail(s) with supervision/set-up within 7 day(s). PHYSICAL THERAPY TREATMENT  Patient: Karel Dominguez (62 y.o. male)  Date: 4/13/2017  Diagnosis: Uncontrolled diabetes mellitus with hyperglycemia ([address] Uncontrolled diabetes mellitus with hyperglycemia ([address]       Precautions: Fall      ASSESSMENT:  Patient more mobile today demonstrating good overall balance with gait and ADL's. He remains asymptomatic in terms of any orthostatic hypotension, but did experience some drop in BP with static standing. Orthostatics could not be formally  measured since he was already up with feet hanging off the edge of the bed upon arrival.  However, after walking to the bathroom and back and standing to change his clothes, his BP was 136/67 in sitting after walking. Then in static standing, /42 then 120/49 with HR stable at 90. He scored very highly on the COMER balance test, will try DGI tomorrow. At this point, his primary balance issues appear memory/cognitive in nature.   Progression toward goals:  [X]    Improving appropriately and progressing toward goals  [ ]    Improving slowly and progressing toward goals  [ ]    Not making progress toward goals and plan of care will be adjusted       PLAN:  Patient continues to benefit from skilled intervention to address the above impairments. Continue treatment per established plan of care. Discharge Recommendations:  Home Health  Further Equipment Recommendations for Discharge:  none       SUBJECTIVE:   Patient stated I need to get up and change.       OBJECTIVE DATA SUMMARY:   Critical Behavior:  Neurologic State: Alert  Orientation Level: Oriented X4  Cognition: Impulsive     Functional Mobility Training:  Bed Mobility:     Supine to Sit: Independent  Sit to Supine: Independent           Transfers:                                   Balance:  Sitting: Intact; Without support  Standing: Intact; Without support  Standing - Static: Good  Standing - Dynamic : Good  Ambulation/Gait Training:  Distance (ft): 100 Feet (ft)  Assistive Device: Gait belt  Ambulation - Level of Assistance: Supervision        Gait Abnormalities: Decreased step clearance              Speed/Audra: Pace decreased (<100 feet/min)  Step Length: Right shortened;Left shortened  Swing Pattern: Left symmetrical;Right symmetrical       Cano Balance Test:      Sitting to Standin  Standing Unsupported: 4  Sitting with Back Unsupported: 4  Standing to Sittin  Transfers: 4  Standing Unsupported with Eyes Closed: 4  Standing Unsupported with Feet Together: 4  Reach Forward with Outstretched Arm: 4   Object: 4  Turn to Look Over Shoulders: 4  Turn 360 Degrees: 4  Alternate Foot on Step/Stool: 1  Standing Unsupported One Foot in Front: 3  Stand on One Le  Total: 52             56=Maximum possible score;   0-20=High fall risk  21-40=Moderate fall risk   41-56=Low fall risk      Cano Balance Test and G-code impairment scale:  Percentage of Impairment CH     0%    CI     1-19% CJ     20-39% CK     40-59% CL     60-79% CM     80-99% CN      100%   Cano   Score 0-56 56 45-55 34-44 23-33 12-22 1-11 0               Pain:  Pain Scale 1: Numeric (0 - 10)  Pain Intensity 1: 0              Activity Tolerance:   Good overall, see BP's above  Please refer to the flowsheet for vital signs taken during this treatment.   After treatment:   [X]    Patient left in no apparent distress sitting up in chair  [ ]    Patient left in no apparent distress in bed  [X]    Call bell left within reach  [X]    Nursing notified  [ ]    Caregiver present  [X]    Bed alarm activated      COMMUNICATION/COLLABORATION:   The patients plan of care was discussed with: Physical Therapist and Registered Nurse     Cee Farley, PT   Time Calculation: 37 mins

## 2017-04-13 NOTE — PROGRESS NOTES
Bedside shift change report given to [place] (oncoming nurse) by Baudilio Gong and Raul Dupont RN (offgoing nurse).  Report included the following information SBAR, Kardex, ED Summary, Intake/Output, MAR, Recent Results and Cardiac Rhythm SR, SB.

## 2017-04-14 ENCOUNTER — HOME HEALTH ADMISSION (OUTPATIENT)
Dept: HOME HEALTH SERVICES | Facility: HOME HEALTH | Age: 77
End: 2017-04-14
Payer: MEDICARE

## 2017-04-14 VITALS
WEIGHT: 165.12 LBS | OXYGEN SATURATION: 99 % | HEART RATE: 78 BPM | BODY MASS INDEX: 23.12 KG/M2 | TEMPERATURE: 97.9 F | HEIGHT: 71 IN | RESPIRATION RATE: 16 BRPM | DIASTOLIC BLOOD PRESSURE: 50 MMHG | SYSTOLIC BLOOD PRESSURE: 118 MMHG

## 2017-04-14 LAB
ANION GAP BLD CALC-SCNC: 6 MMOL/L (ref 5–15)
BUN SERPL-MCNC: 11 MG/DL (ref 6–20)
BUN/CREAT SERPL: 8 (ref 12–20)
CALCIUM SERPL-MCNC: 7.7 MG/DL (ref 8.5–10.1)
CHLORIDE SERPL-SCNC: 114 MMOL/L (ref 97–108)
CO2 SERPL-SCNC: 24 MMOL/L (ref 21–32)
CREAT SERPL-MCNC: 1.32 MG/DL (ref 0.7–1.3)
GLUCOSE BLD STRIP.AUTO-MCNC: 243 MG/DL (ref 65–100)
GLUCOSE BLD STRIP.AUTO-MCNC: 84 MG/DL (ref 65–100)
GLUCOSE SERPL-MCNC: 99 MG/DL (ref 65–100)
POTASSIUM SERPL-SCNC: 4 MMOL/L (ref 3.5–5.1)
SERVICE CMNT-IMP: ABNORMAL
SERVICE CMNT-IMP: NORMAL
SODIUM SERPL-SCNC: 144 MMOL/L (ref 136–145)

## 2017-04-14 PROCEDURE — 36415 COLL VENOUS BLD VENIPUNCTURE: CPT

## 2017-04-14 PROCEDURE — 74011250637 HC RX REV CODE- 250/637: Performed by: NURSE PRACTITIONER

## 2017-04-14 PROCEDURE — 97530 THERAPEUTIC ACTIVITIES: CPT

## 2017-04-14 PROCEDURE — 82962 GLUCOSE BLOOD TEST: CPT

## 2017-04-14 PROCEDURE — 74011636637 HC RX REV CODE- 636/637: Performed by: NURSE PRACTITIONER

## 2017-04-14 PROCEDURE — 74011250636 HC RX REV CODE- 250/636: Performed by: INTERNAL MEDICINE

## 2017-04-14 PROCEDURE — 80048 BASIC METABOLIC PNL TOTAL CA: CPT

## 2017-04-14 PROCEDURE — 74011250637 HC RX REV CODE- 250/637: Performed by: INTERNAL MEDICINE

## 2017-04-14 PROCEDURE — 97116 GAIT TRAINING THERAPY: CPT

## 2017-04-14 RX ORDER — MIDODRINE HYDROCHLORIDE 5 MG/1
5 TABLET ORAL 2 TIMES DAILY WITH MEALS
Qty: 60 TAB | Refills: 0 | Status: SHIPPED | OUTPATIENT
Start: 2017-04-14 | End: 2017-04-26

## 2017-04-14 RX ORDER — DOCUSATE SODIUM 100 MG/1
100 CAPSULE, LIQUID FILLED ORAL
Status: DISCONTINUED | OUTPATIENT
Start: 2017-04-14 | End: 2017-04-14 | Stop reason: HOSPADM

## 2017-04-14 RX ADMIN — HEPARIN SODIUM 5000 UNITS: 5000 INJECTION, SOLUTION INTRAVENOUS; SUBCUTANEOUS at 06:23

## 2017-04-14 RX ADMIN — GLIPIZIDE 2.5 MG: 2.5 TABLET, FILM COATED, EXTENDED RELEASE ORAL at 08:38

## 2017-04-14 RX ADMIN — ASPIRIN 81 MG: 81 TABLET, COATED ORAL at 08:38

## 2017-04-14 RX ADMIN — ARIPIPRAZOLE 5 MG: 5 TABLET ORAL at 08:38

## 2017-04-14 RX ADMIN — TAMSULOSIN HYDROCHLORIDE 0.4 MG: 0.4 CAPSULE ORAL at 08:38

## 2017-04-14 RX ADMIN — FINASTERIDE 5 MG: 5 TABLET, FILM COATED ORAL at 08:38

## 2017-04-14 RX ADMIN — Medication 10 ML: at 06:23

## 2017-04-14 RX ADMIN — MIDODRINE HYDROCHLORIDE 5 MG: 5 TABLET ORAL at 08:38

## 2017-04-14 RX ADMIN — INSULIN LISPRO 3 UNITS: 100 INJECTION, SOLUTION INTRAVENOUS; SUBCUTANEOUS at 12:09

## 2017-04-14 RX ADMIN — DULOXETINE HYDROCHLORIDE 60 MG: 60 CAPSULE, DELAYED RELEASE ORAL at 08:38

## 2017-04-14 RX ADMIN — GABAPENTIN 100 MG: 100 CAPSULE ORAL at 08:38

## 2017-04-14 RX ADMIN — LEVOTHYROXINE SODIUM 50 MCG: 50 TABLET ORAL at 06:46

## 2017-04-14 NOTE — PROGRESS NOTES
Problem: Mobility Impaired (Adult and Pediatric)  Goal: *Acute Goals and Plan of Care (Insert Text)  Physical Therapy Goals  Initiated 4/12/2017  1. Patient will move from supine to sit and sit to supine , scoot up and down and roll side to side in bed with independence within 7 day(s). 2. Patient will transfer from bed to chair and chair to bed with modified independence using the least restrictive device within 7 day(s). 3. Patient will perform sit to stand with modified independence within 7 day(s). 4. Patient will ambulate with modified independence for 150 feet with the least restrictive device within 7 day(s). 5. Patient will ascend/descend 2 stairs with 1 handrail(s) with supervision/set-up within 7 day(s). PHYSICAL THERAPY TREATMENT  Patient: Abraham Patiño (50 y.o. male)  Date: 4/14/2017  Diagnosis: Uncontrolled diabetes mellitus with hyperglycemia ([address] Uncontrolled diabetes mellitus with hyperglycemia ([address]       Precautions: Fall, bed alarm because of fall      ASSESSMENT:  Pt received in supine agreeable to PT. Orthostatics performed: supine /52 and HR 62. He came to sitting independently and in sitting 113/53 and HR 64, He stood independently and in standing /41 and HR 79. He participated in a Tinetti and post test in sitting his BP was 118/50 and HR 78. He was NOT symptomatic when orthostatic. He amb another 200 feet with distant supervision and remained up to the chair post session. His Tinetti score was 28/28 indicating low fall risk. He is on target for discharge to home. .  Progression toward goals:  [X]    Improving appropriately and progressing toward goals  [ ]    Improving slowly and progressing toward goals  [ ]    Not making progress toward goals and plan of care will be adjusted       PLAN:  Patient continues to benefit from skilled intervention to address the above impairments. Continue treatment per established plan of care.   Discharge Recommendations:  Home Health, very short term, home safety assessment given fall prior to admission. Further Equipment Recommendations for Discharge:  none       SUBJECTIVE:   Patient stated I feel fine.       OBJECTIVE DATA SUMMARY:   Chart checked, pt cleared by nursing. Critical Behavior:  Neurologic State: Alert  Orientation Level: Oriented X4  Cognition: Poor safety awareness, Follows commands  Safety/Judgement: Fall prevention, Awareness of environment  Functional Mobility Training:  Bed Mobility:     Supine to Sit: Independent              Transfers:  Sit to Stand: Independent  Stand to Sit: Independent                             Balance:  Sitting: Intact; With support  Standing: Intact; Without support  Ambulation/Gait Training:  Distance (ft): 200 Feet (ft)  Assistive Device: Gait belt  Ambulation - Level of Assistance: Supervision        Gait Abnormalities: Decreased step clearance        Base of Support: Narrowed             Tinetti test:      Sitting Balance: 1  Arises: 2  Attempts to Rise: 2  Immediate Standing Balance: 2  Standing Balance: 2  Nudged: 2  Eyes Closed: 1  Turn 360 Degrees - Continuous/Discontinuous: 1  Turn 360 Degrees - Steady/Unsteady: 1  Sitting Down: 2  Balance Score: 16  Indication of Gait: 1  R Step Length/Height: 1  L Step Length/Height: 1  R Foot Clearance: 1  L Foot Clearance: 1  Step Symmetry: 1  Step Continuity: 1  Path: 2  Trunk: 2  Walking Time: 1  Gait Score: 12  Total Score: 28         Tinetti Test and G-code impairment scale:  Percentage of Impairment CH     0%    CI     1-19% CJ     20-39% CK     40-59% CL     60-79% CM     80-99% CN      100%   Tinetti  Score 0-28 28 23-27 17-22 12-16 6-11 1-5 0          Tinetti Tool Score Risk of Falls  <19 = High Fall Risk  19-24 = Moderate Fall Risk  25-28 = Low Fall Risk  Tinetti ME. Performance-Oriented Assessment of Mobility Problems in Elderly Patients. [address]; [id number].  (Scoring Description: PT Bulletin Feb. 10, 1993)     Older adults: Lella Brittle et al, 2009; n = [address] elderly evaluated with ABC, SEVERO, ADL, and IADL)  · Mean SEVERO score for males aged 69-68 years = 26.21(3.40)  · Mean SEVERO score for females age 69-68 years = 25.16(4.30)  · Mean SEVERO score for males over 80 years = 23.29(6.02)  · Mean SEVERO score for females over 80 years = 17.20(8.32)                                Stairs:                       Neuro Re-Education:     Therapeutic Exercises:      Pain:  Pain Scale 1: Numeric (0 - 10)  Pain Intensity 1: 0              Activity Tolerance:   See assessment above   Please refer to the flowsheet for vital signs taken during this treatment.   After treatment:   [X]    Patient left in no apparent distress sitting up in chair on activated alarm pad  [ ]    Patient left in no apparent distress in bed  [X]    Call bell left within reach  [X]    Nursing notified  [ ]    Caregiver present  [ ]    Bed alarm activated      COMMUNICATION/COLLABORATION:   The patients plan of care was discussed with: Registered Nurse     Jacqueline Pacheco   Time Calculation: 23 mins

## 2017-04-14 NOTE — DISCHARGE INSTRUCTIONS
Discharge Instructions       PATIENT ID: Hugh Fontaine  MRN: [id number]   YOB: 1940    DATE OF ADMISSION: 4/11/2017 11:47 AM    DATE OF DISCHARGE: 4/14/2017    PRIMARY CARE PROVIDER: Paulo Cox MD     ATTENDING PHYSICIAN: Maris Mora MD  DISCHARGING PROVIDER: Diego Nicholson NP    To contact this individual call [phone] and ask the  to page. If unavailable ask to be transferred the Adult Hospitalist Department. DISCHARGE DIAGNOSES     Uncontrolled Type II Diabetes with Severe Hyperglycemia   Cognitive Dysfunction   Orthostatic Hypotension   Dizziness       CONSULTATIONS: None    PROCEDURES/SURGERIES: * No surgery found *    PENDING TEST RESULTS:   At the time of discharge the following test results are still pending: none    FOLLOW UP APPOINTMENTS:   Follow-up Information     Follow up With Details Comments Contact Info    Stephanie Pedro [place] [phone]    Paulo Cox MD In 7 days for hospital follow up  [address]i [address] [phone]             ADDITIONAL CARE RECOMMENDATIONS:     1. Please follow up with Dr. Leonel Ann within one month after discharge. 2. Please decrease your daily long acting insulin dose to 15 units . Follow a strict diabetic diet. Please check your blood sugar at least three times daily before meals and keep a record of these numbers to take with you to your follow up appointment with Dr. Leonel Ann. 3. You have also been put on a medication called Midrodine 5 mg two times daily to help deal with your orthostatic hypotension. You should start taking this and let your primary care physician know that we have started you on this medication .    4. I recommend that you establish care with a     DIET: Diabetic Diet    ACTIVITY: Activity as tolerated      DISCHARGE MEDICATIONS:   See Medication Reconciliation Form    · It is important that you take the medication exactly as they are prescribed. · Keep your medication in the bottles provided by the pharmacist and keep a list of the medication names, dosages, and times to be taken in your wallet. · Do not take other medications without consulting your doctor. NOTIFY YOUR PHYSICIAN FOR ANY OF THE FOLLOWING:   Fever over 101 degrees for 24 hours. Chest pain, shortness of breath, fever, chills, nausea, vomiting, diarrhea, change in mentation, falling, weakness, bleeding. Severe pain or pain not relieved by medications. Or, any other signs or symptoms that you may have questions about. DISPOSITION:   X Home With:   X OT X PT X HH X RN       SNF/Inpatient Rehab/LTAC    Independent/assisted living    Hospice    Other:     CDMP Checked: Yes X     PROBLEM LIST Updated:   Yes X       Signed:   Benjie Talley NP  4/14/2017  1:31 PM

## 2017-04-14 NOTE — PROGRESS NOTES
Hospitalist Progress Note  Ashley Martínez NP  Office: [phone]  Cell: [phone]      Date of Service:  2017  NAME:  Ben Brunson  :  1940  MRN:  [id number]      Admission Summary:     Mr. Flaco Chopra is a 77year old male with type II Diabetes , CAD, s/p CABG, COPD, urinary incontinence, cognitive problems. He was brought into the ED from home due to syncopal episode at home. Family members report that he has been dizzy and stumbling around for the past few days. This morning he had a witnessed syncopal episode followed by falling to the ground. The patient reports that he has not been feeling well recently and cannot remember the last time he took his insulin as he should. He often \"forgets\" to take his medications and is not taking them consistently. His urinary incontinence and balance is reportedly getting worse. Labwork shows glucose of 827 , sodium 125, and creatinine of 2.38. He is admitted to the hospitalist service for further workup and treatment. Interval history / Subjective:       \" I am feeling much more level headed today, I have just been all kinds of messed up\". Assessment & Plan:     Uncontrolled diabetes with hyperglycemia/DM - BS remains 189-290 today  -AG normal  -Pt was started on insulin drip in ER. Then on ssi after blood sugar stabilized. - Increase lantus to 20 units q hs ( takes 24 at home)  However this caused him to have some lows including 65 this morning    - decrease Lantus to 15 units for tomorrow , continue to evaluate and adjust as needed  - patient to follow up with Dr. Agnieszka Mireles outpatient   - restart glipizide   -Hgba1C is 15.4 ! Hyponatremia/Volume depletion- resolved  -Due to uncontrolled diabetes  -On iv fluid and will monitor electrolytes.     Acute on Chronic Kidney Injury (POA): Likely Chronic related to Diabetic Nephropathy   - [pre-renal , related to above issues , resolving with IV fluid hydration   - creatinine down from 2.38--> 1.38  - will continue to monitor     Syncope in the setting of Orthostatic Hypotension:  - orthostatic vitals revealed significant drop in systolic BP from sitting to standing ( 056--> 94 systolic)   -Likely orthostatic change. Will also consider arrhythmia as EKG showed Sinus rhythm with premature atrial complexes in a pattern of bigeminy,nonspecific T wave abnormality. -ECHO ordered -- shows preserved EF with some mild MV regurgitation and left atrium dilation   -CT scan of head without acute process  -PT/OT- home health PT/OT has been ordered   - adding in midrodine 5 mg BID today , reassess orthostatic BP tomorrow with PT     Cognitive disorder:dementia as per family report  -On psych medications including abilify,trazadone,cymbalta,neurontin. ?these medications can also cause cognitive issues. - recommend follow up with a geriatric medicine doctor as an outpatient for further cognitive testing     Incontinence of urine  -No evidence of UTI. On proscar,flomar.  -Incontinence is a frequent cause leading to nursing home admission by family  -his severely uncontrolled diabetes is likely leading to polyuria as well     CAD s/p CABG  -On aspirin  -Telemetry monitoring    COPD  -Duo-neb as needed. Code status: Full   DVT prophylaxis: Heparin     Care Plan discussed with: Patient/Family , , and Dr. Divine Carrillo. Plan of care was discussed at length with his daughter at bedside.      Plan for discharge home tomorrow     Disposition: Home w/Family     Family plans to hire CNA to assist patient with medications and other ADL's      Hospital Problems  Date Reviewed: 4/11/2017          Codes Class Noted POA    * (Principal)Uncontrolled diabetes mellitus with hyperglycemia ([facility] ICD-10-CM: E11.65  ICD-9-CM: 250.02  4/11/2017 Yes        Syncope ICD-10-CM: R55  ICD-9-CM: 780.2  4/11/2017 Yes        Volume depletion ICD-10-CM: E86.9  ICD-9-CM: 276.50  4/11/2017 Yes Hyponatremia ICD-10-CM: E87.1  ICD-9-CM: 276.1  4/11/2017 Yes        Urinary incontinence ICD-10-CM: R32  ICD-9-CM: 788.30  4/11/2017 Yes        CAD (coronary artery disease) ICD-10-CM: I25.10  ICD-9-CM: 414.00  4/11/2017 Yes        COPD (chronic obstructive pulmonary disease) (HCC) ICD-10-CM: J44.9  ICD-9-CM: 496  4/11/2017 Yes        Cognitive disorder ICD-10-CM: F09  ICD-9-CM: 294.9  7/11/2016 Yes    Overview Signed 7/11/2016  2:00 PM by Cherie Alvarado MD     Due to [facility]             Non compliance w medication regimen ICD-10-CM: Z91.14  ICD-9-CM: V15.81  7/23/2015 Yes                Review of Systems:   A comprehensive review of systems was negative except for that written in the HPI. Vital Signs:    Last 24hrs VS reviewed since prior progress note. Most recent are:  Visit Vitals    BP (!) 151/96 (BP 1 Location: Left arm, BP Patient Position: At rest)    Pulse 79    Temp 97.9 °F (36.6 °C)    Resp 16    Ht 5' 11\" (1.803 m)    Wt 74.2 kg (163 lb 9.3 oz)    SpO2 99%    BMI 22.81 kg/m2         Intake/Output Summary (Last 24 hours) at 04/13/17 2023  Last data filed at 04/13/17 1737   Gross per 24 hour   Intake              480 ml   Output              300 ml   Net              180 ml        Physical Examination:             Constitutional:  No acute distress, cooperative, pleasant    ENT:  Oral mucous moist, oropharynx benign. Neck supple,    Resp:  CTA bilaterally. No wheezing/rhonchi/rales. No accessory muscle use   CV:  Regular rhythm, normal rate, + systolic murmur, gallops, rubs    GI:  Soft, non distended, non tender. normoactive bowel sounds, no hepatosplenomegaly     Musculoskeletal:  No edema, warm, 2+ pulses throughout    Neurologic:  Moves all extremities.   AAOx2-3 , slow response time with flat affect           Data Review:    Review and/or order of clinical lab test  Review and/or order of tests in the radiology section of CPT  Review and/or order of tests in the medicine section of CPT      Labs:     Recent Labs      04/13/17 0127 04/12/17 0318   WBC  6.5  6.7   HGB  10.2*  9.8*   HCT  31.1*  29.4*   PLT  232  238     Recent Labs      04/13/17 0127 04/12/17 0318 04/11/17   1056   NA  143  140  124*   K  4.0  3.8  4.6   CL  113*  108  89*   CO2  24  25  25   BUN  16  21*  29*   CREA  1.38*  1.55*  2.38*   GLU  126*  197*  827*   CA  7.7*  8.2*  9.4   MG  2.3   --    --    PHOS  2.7   --    --      Recent Labs      04/13/17 0127 04/12/17 0318 04/11/17   1056   SGOT  37  22  17   ALT  28  22  27   AP  88  84  117   TBILI  0.2  0.4  0.5   TP  6.2*  6.1*  7.3   ALB  2.9*  2.8*  3.6   GLOB  3.3  3.3  3.7     No results for input(s): INR, PTP, APTT in the last 72 hours. No lab exists for component: INREXT, INREXT   No results for input(s): FE, TIBC, PSAT, FERR in the last 72 hours. No results found for: FOL, RBCF   No results for input(s): PH, PCO2, PO2 in the last 72 hours.   Recent Labs      04/12/17 0318 04/11/17   1056   CPK  143   --    CKNDX  2.6*   --    TROIQ  0.04  0.05*     Lab Results   Component Value Date/Time    Cholesterol, total 120 04/12/2017 03:18 AM    HDL Cholesterol 41 04/12/2017 03:18 AM    LDL, calculated 51 04/12/2017 03:18 AM    Triglyceride 140 04/12/2017 03:18 AM    CHOL/HDL Ratio 2.9 04/12/2017 03:18 AM     Lab Results   Component Value Date/Time    Glucose (POC) 215 04/13/2017 04:32 PM    Glucose (POC) 125 04/13/2017 11:32 AM    Glucose (POC) 89 04/13/2017 06:45 AM    Glucose (POC) 65 04/13/2017 06:25 AM    Glucose (POC) 227 04/12/2017 09:25 PM     Lab Results   Component Value Date/Time    Color YELLOW/STRAW 04/11/2017 01:42 PM    Appearance CLEAR 04/11/2017 01:42 PM    Specific gravity 1.029 04/11/2017 01:42 PM    pH (UA) 5.0 04/11/2017 01:42 PM    Protein NEGATIVE  04/11/2017 01:42 PM    Glucose >1000 04/11/2017 01:42 PM    Ketone NEGATIVE  04/11/2017 01:42 PM    Bilirubin NEGATIVE  04/11/2017 01:42 PM    Urobilinogen 0.2 04/11/2017 01:42 PM Nitrites NEGATIVE  04/11/2017 01:42 PM    Leukocyte Esterase NEGATIVE  04/11/2017 01:42 PM    Epithelial cells FEW 04/11/2017 01:42 PM    Bacteria NEGATIVE  04/11/2017 01:42 PM    WBC 0-4 04/11/2017 01:42 PM    RBC 0-5 04/11/2017 01:42 PM         Medications Reviewed:     Current Facility-Administered Medications   Medication Dose Route Frequency    polyethylene glycol (MIRALAX) packet 17 g  17 g Oral DAILY PRN    insulin glargine (LANTUS) injection 15 Units  15 Units SubCUTAneous QHS    midodrine (PROAMITINE) tablet 5 mg  5 mg Oral BID WITH MEALS    glipiZIDE SR (GLUCOTROL XL) tablet 2.5 mg  2.5 mg Oral BID    sodium chloride (NS) flush 5-10 mL  5-10 mL IntraVENous Q8H    sodium chloride (NS) flush 5-10 mL  5-10 mL IntraVENous PRN    ARIPiprazole (ABILIFY) tablet 5 mg  5 mg Oral DAILY    aspirin delayed-release tablet 81 mg  81 mg Oral DAILY    DULoxetine (CYMBALTA) capsule 60 mg  60 mg Oral DAILY    finasteride (PROSCAR) tablet 5 mg  5 mg Oral DAILY    gabapentin (NEURONTIN) capsule 100 mg  100 mg Oral BID    levothyroxine (SYNTHROID) tablet 50 mcg  50 mcg Oral ACB    rosuvastatin (CRESTOR) tablet 5 mg  5 mg Oral QHS    tamsulosin (FLOMAX) capsule 0.4 mg  0.4 mg Oral DAILY    traZODone (DESYREL) tablet 50 mg  50 mg Oral QHS    sodium chloride (NS) flush 5-10 mL  5-10 mL IntraVENous Q8H    sodium chloride (NS) flush 5-10 mL  5-10 mL IntraVENous PRN    heparin (porcine) injection 5,000 Units  5,000 Units SubCUTAneous Q8H    albuterol-ipratropium (DUO-NEB) 2.5 MG-0.5 MG/3 ML  3 mL Nebulization Q4H PRN    glucose chewable tablet 16 g  4 Tab Oral PRN    dextrose (D50W) injection syrg 12.5-25 g  12.5-25 g IntraVENous PRN    glucagon (GLUCAGEN) injection 1 mg  1 mg IntraMUSCular PRN    insulin lispro (HUMALOG) injection   SubCUTAneous AC&HS    influenza vaccine 2016-17 (36mos+)(PF) (FLUZONE/FLUARIX/FLULAVAL QUAD) injection 0.5 mL  0.5 mL IntraMUSCular PRIOR TO DISCHARGE ______________________________________________________________________  EXPECTED LENGTH OF STAY: 3d 0h  ACTUAL LENGTH OF STAY:          [id number] [address], NP

## 2017-04-14 NOTE — PROGRESS NOTES
Problem: Falls - Risk of  Goal: *Absence of falls  Outcome: Progressing Towards Goal  Bed in low position, locked bed wheels. Call bell and personal items within reach. Bed alarm in place and audible. Hourly rounding performed. 0745- Bedside and Verbal shift change report given to Rory Andre (oncoming nurse) by Herman Koo (offgoing nurse). Report included the following information SBAR, Kardex, MAR and Recent Results.

## 2017-04-14 NOTE — PROGRESS NOTES
Discharge instructions given with prescriptions to patient and his son. Both verbalized understanding. PIV removed and tele removed.

## 2017-04-14 NOTE — PROGRESS NOTES
UPDATE:  Patient discharged home today. [facility] has accepted patient and knows to call patient's daughter to set up appointment times for therapies. Spoke with daughter to get [facility] choice. She chose [facility] INPAT[facility]. Referral sent to [facility] via [facility] per protocol. Also faxed MAYTE and Ty Antonio to [facility], at [phone], per request of patient's daughter, for personal care in patient's home.

## 2017-04-16 ENCOUNTER — HOME CARE VISIT (OUTPATIENT)
Dept: SCHEDULING | Facility: HOME HEALTH | Age: 77
End: 2017-04-16
Payer: MEDICARE

## 2017-04-16 VITALS
OXYGEN SATURATION: 99 % | BODY MASS INDEX: 21 KG/M2 | TEMPERATURE: 98.5 F | HEART RATE: 76 BPM | RESPIRATION RATE: 18 BRPM | HEIGHT: 71 IN | WEIGHT: 150 LBS | SYSTOLIC BLOOD PRESSURE: 104 MMHG | DIASTOLIC BLOOD PRESSURE: 58 MMHG

## 2017-04-16 PROCEDURE — G0299 HHS/HOSPICE OF RN EA 15 MIN: HCPCS

## 2017-04-16 PROCEDURE — 3331090002 HH PPS REVENUE DEBIT

## 2017-04-16 PROCEDURE — 400013 HH SOC

## 2017-04-16 PROCEDURE — 3331090001 HH PPS REVENUE CREDIT

## 2017-04-17 ENCOUNTER — HOME CARE VISIT (OUTPATIENT)
Dept: SCHEDULING | Facility: HOME HEALTH | Age: 77
End: 2017-04-17
Payer: MEDICARE

## 2017-04-17 PROCEDURE — 3331090001 HH PPS REVENUE CREDIT

## 2017-04-17 PROCEDURE — 3331090002 HH PPS REVENUE DEBIT

## 2017-04-17 PROCEDURE — G0151 HHCP-SERV OF PT,EA 15 MIN: HCPCS

## 2017-04-18 ENCOUNTER — HOME CARE VISIT (OUTPATIENT)
Dept: HOME HEALTH SERVICES | Facility: HOME HEALTH | Age: 77
End: 2017-04-18
Payer: MEDICARE

## 2017-04-18 VITALS
TEMPERATURE: 98 F | HEART RATE: 76 BPM | OXYGEN SATURATION: 97 % | RESPIRATION RATE: 17 BRPM | DIASTOLIC BLOOD PRESSURE: 60 MMHG | SYSTOLIC BLOOD PRESSURE: 117 MMHG

## 2017-04-18 PROCEDURE — 3331090002 HH PPS REVENUE DEBIT

## 2017-04-18 PROCEDURE — 3331090001 HH PPS REVENUE CREDIT

## 2017-04-19 ENCOUNTER — HOME CARE VISIT (OUTPATIENT)
Dept: SCHEDULING | Facility: HOME HEALTH | Age: 77
End: 2017-04-19
Payer: MEDICARE

## 2017-04-19 PROCEDURE — 3331090001 HH PPS REVENUE CREDIT

## 2017-04-19 PROCEDURE — G0299 HHS/HOSPICE OF RN EA 15 MIN: HCPCS

## 2017-04-19 PROCEDURE — 3331090002 HH PPS REVENUE DEBIT

## 2017-04-20 VITALS
SYSTOLIC BLOOD PRESSURE: 136 MMHG | DIASTOLIC BLOOD PRESSURE: 62 MMHG | HEART RATE: 75 BPM | RESPIRATION RATE: 18 BRPM | TEMPERATURE: 98.4 F | OXYGEN SATURATION: 96 %

## 2017-04-20 PROCEDURE — 3331090002 HH PPS REVENUE DEBIT

## 2017-04-20 PROCEDURE — 3331090001 HH PPS REVENUE CREDIT

## 2017-04-21 ENCOUNTER — HOME CARE VISIT (OUTPATIENT)
Dept: SCHEDULING | Facility: HOME HEALTH | Age: 77
End: 2017-04-21
Payer: MEDICARE

## 2017-04-21 VITALS
HEART RATE: 88 BPM | RESPIRATION RATE: 16 BRPM | DIASTOLIC BLOOD PRESSURE: 62 MMHG | TEMPERATURE: 98.2 F | OXYGEN SATURATION: 98 % | SYSTOLIC BLOOD PRESSURE: 118 MMHG

## 2017-04-21 PROCEDURE — G0157 HHC PT ASSISTANT EA 15: HCPCS

## 2017-04-21 PROCEDURE — 3331090001 HH PPS REVENUE CREDIT

## 2017-04-21 PROCEDURE — 3331090002 HH PPS REVENUE DEBIT

## 2017-04-21 NOTE — DISCHARGE SUMMARY
Discharge Summary       PATIENT ID: Morteza Smith  MRN: [id number]   YOB: 1940    DATE OF ADMISSION: 4/11/2017 11:47 AM    DATE OF DISCHARGE: 4/14/2017  PRIMARY CARE PROVIDER: Myrlene Nissen, MD       DISCHARGING PHYSICIAN: Yemi Moses NP    To contact this individual call [phone] and ask the  to page. If unavailable ask to be transferred the Adult Hospitalist Department. CONSULTATIONS: None    PROCEDURES/SURGERIES: * No surgery found *    ADMITTING DIAGNOSES & HOSPITAL COURSE:     Mr. Bertha Mejia is a 77year old male with type II Diabetes , CAD, s/p CABG, COPD, urinary incontinence, cognitive problems. He was brought into the ED from home due to syncopal episode at home. Family members report that he has been dizzy and stumbling around for the past few days. This morning he had a witnessed syncopal episode followed by falling to the ground. The patient reports that he has not been feeling well recently and cannot remember the last time he took his insulin as he should. He often \"forgets\" to take his medications and is not taking them consistently. His urinary incontinence and balance is reportedly getting worse. Labwork shows glucose of 827 , sodium 125, and creatinine of 2.38. He was treated for severe hyperglycemia , cognitive impairments, dehydration, acute on chronic kidney injury and orthostatic hypotension. He will discharge home with home health set up as well as plans for follow up with PCP, endocrinology . I also discussed with his daughter the need for a full cognitive workup with either PCP or a geriatric medicine physician. See below for further plans/ treatment.            DISCHARGE DIAGNOSES / PLAN:      Uncontrolled diabetes with hyperglycemia/DM - BS remains 189-290 today  -AG normal  -Pt was started on insulin drip in ER. Then on ssi after blood sugar stabilized.   - Increase lantus to 20 units q hs ( takes 24 at home) However this caused him to have some lows including 65 this morning   - discharge with Lantus to 15 units daily -- educated at length on administration and proper use of insulin. - patient to follow up with Dr. Rossy Mojica outpatient   - restart glipizide   -Hgba1C is 15.4 !     Hyponatremia/Volume depletion- resolved  -Due to uncontrolled diabetes  -On iv fluid and will monitor electrolytes.     Acute on Chronic Kidney Injury (POA): Likely Chronic related to Diabetic Nephropathy   - [pre-renal , related to above issues , resolving with IV fluid hydration   - creatinine down from 2.38--> 1.38  - follow up outpatient      Syncope in the setting of Orthostatic Hypotension:  - orthostatic vitals revealed significant drop in systolic BP from sitting to standing ( 196--> 94 systolic)   -Likely orthostatic change. Will also consider arrhythmia as EKG showed Sinus rhythm with premature atrial complexes in a pattern of bigeminy,nonspecific T wave abnormality. -ECHO ordered -- shows preserved EF with some mild MV regurgitation and left atrium dilation   -CT scan of head without acute process  -PT/OT- home health PT/OT has been ordered   - adding in midrodine 5 mg BID today , orthostatics much improved on day of discharge      Cognitive disorder:dementia as per family report  -On psych medications including abilify,trazadone,cymbalta,neurontin. ?these medications can also cause cognitive issues. - recommend follow up with a geriatric medicine doctor as an outpatient for further cognitive testing      Incontinence of urine  -No evidence of UTI. On proscar,flomar.  -Incontinence is a frequent cause leading to nursing home admission by family  -his severely uncontrolled diabetes is likely leading to polyuria as well      CAD s/p CABG  -On aspirin  -Telemetry monitoring     COPD  -Duo-neb as needed.        PENDING TEST RESULTS:   At the time of discharge the following test results are still pending: none    FOLLOW UP APPOINTMENTS:    Follow-up Information     Follow up With Details Comments Contact Info    [address] [facility] [address]  [place] [address]  [phone]    Riya Bond MD In 7 days for hospital follow up  834 Lana Rocha MD In 14 days Please call to schedule follow up Diabetes appointment [address] [facility] [address] [facility] [address]  [phone]             ADDITIONAL CARE RECOMMENDATIONS:     1. Please follow up with Dr. Evi Bennett within one month after discharge. 2. Please decrease your daily long acting insulin dose to 15 units . Follow a strict diabetic diet. Please check your blood sugar at least three times daily before meals and keep a record of these numbers to take with you to your follow up appointment with Dr. Evi Bennett. 3. You have also been put on a medication called Midrodine 5 mg two times daily to help deal with your orthostatic hypotension. You should start taking this and let your primary care physician know that we have started you on this medication . 4. I recommend that you establish care with a geriatric medicine physician. DIET: Resume previous diet    ACTIVITY: Activity as tolerated    DISCHARGE MEDICATIONS:  Discharge Medication List as of 4/14/2017  2:00 PM      START taking these medications    Details   midodrine (PROAMITINE) 5 mg tablet Take 1 Tab by mouth two (2) times daily (with meals) for 30 days. , Print, Disp-60 Tab, R-0         CONTINUE these medications which have CHANGED    Details   insulin detemir (LEVEMIR FLEXTOUCH) 100 unit/mL (3 mL) inpn 15 Units by SubCUTAneous route nightly. , Print, Disp-2 Pen, R-0         CONTINUE these medications which have NOT CHANGED    Details   cyanocobalamin (VITAMIN B-12) 100 mcg tablet Take 100 mcg by mouth daily. , Historical Med      gabapentin (NEURONTIN) 100 mg capsule Take 100 mg by mouth two (2) times a day., Historical Med      traZODone (DESYREL) 100 mg tablet Take 50 mg by mouth nightly., Historical Med DULoxetine (CYMBALTA) 60 mg capsule Take 60 mg by mouth daily. , Historical Med      finasteride (PROSCAR) 5 mg tablet Take 5 mg by mouth daily. , Historical Med      glipiZIDE SR (GLUCOTROL XL) 2.5 mg CR tablet Take 2.5 mg by mouth two (2) times a day., Historical Med      tamsulosin (FLOMAX) 0.4 mg capsule Take 0.4 mg by mouth daily. , Historical Med      ARIPiprazole (ABILIFY) 5 mg tablet Take 5 mg by mouth daily. , Historical Med      HYDROcodone-acetaminophen (NORCO) 7.5-325 mg per tablet Take 1 Tab by mouth two (2) times a day., Historical Med, R-0      insulin aspart (NOVOLOG) 100 unit/mL inpn Sliding scale, Historical Med      omeprazole (PRILOSEC) 20 mg capsule Take 20 mg by mouth as needed., Historical Med, R-11      levothyroxine (SYNTHROID) 50 mcg tablet Take 50 mcg by mouth Daily (before breakfast). , Historical Med, R-1      aspirin delayed-release (ASPIR-81) 81 mg tablet Take 81 mg by mouth daily. , Historical Med      rosuvastatin (CRESTOR) 5 mg tablet Take 5 mg by mouth nightly., Historical Med               NOTIFY YOUR PHYSICIAN FOR ANY OF THE FOLLOWING:   Fever over 101 degrees for 24 hours. Chest pain, shortness of breath, fever, chills, nausea, vomiting, diarrhea, change in mentation, falling, weakness, bleeding. Severe pain or pain not relieved by medications. Or, any other signs or symptoms that you may have questions about.     DISPOSITION:  X  Home With:  X OT X PT X HH  RN       Long term SNF/Inpatient Rehab    Independent/assisted living    Hospice    Other:       PATIENT CONDITION AT DISCHARGE:     Functional status    Poor     Deconditioned     Independent      Cognition     Lucid     Forgetful     Dementia      Catheters/lines (plus indication)    [place]     PICC     PEG    X None      Code status   X  Full code     DNR          CHRONIC MEDICAL DIAGNOSES:  Problem List as of 4/14/2017  Date Reviewed: 4/14/2017          Codes Class Noted - Resolved    * (Principal)Uncontrolled diabetes mellitus with hyperglycemia ([facility] ICD-10-CM: E11.65  ICD-9-CM: 250.02  4/11/2017 - Present        Syncope ICD-10-CM: R55  ICD-9-CM: 780.2  4/11/2017 - Present        Volume depletion ICD-10-CM: E86.9  ICD-9-CM: 276.50  4/11/2017 - Present        Hyponatremia ICD-10-CM: E87.1  ICD-9-CM: 276.1  4/11/2017 - Present        Urinary incontinence ICD-10-CM: R32  ICD-9-CM: 788.30  4/11/2017 - Present        CAD (coronary artery disease) ICD-10-CM: I25.10  ICD-9-CM: 414.00  4/11/2017 - Present        COPD (chronic obstructive pulmonary disease) ([facility] ICD-10-CM: J44.9  ICD-9-CM: 496  4/11/2017 - Present        Cognitive disorder ICD-10-CM: F09  ICD-9-CM: 294.9  7/11/2016 - Present    Overview Signed 7/11/2016  2:00 PM by Julita Langford MD     Due to [facility]             Moderate recurrent major depression ([facility] ICD-10-CM: F33.1  ICD-9-CM: 296.32  7/11/2016 - Present        Complicated grief GJU-16-ST: F43.29, Z63.4  ICD-9-CM: 309.0  7/23/2015 - Present        Non compliance w medication regimen ICD-10-CM: Z91.14  ICD-9-CM: V15.81  7/23/2015 - Present        RESOLVED: Pneumonia ICD-10-CM: J18.9  ICD-9-CM: 486  8/6/2015 - 8/8/2015        RESOLVED: Major psychotic depression, single episode ([facility] ICD-10-CM: F32.3  ICD-9-CM: 296.24  7/23/2015 - 7/11/2016              Greater than 30 minutes were spent with the patient on counseling and coordination of care    Signed:   Donnie Hoffman NP  4/21/2017  7:04 PM

## 2017-04-22 PROCEDURE — 3331090001 HH PPS REVENUE CREDIT

## 2017-04-22 PROCEDURE — 3331090002 HH PPS REVENUE DEBIT

## 2017-04-23 PROCEDURE — 3331090001 HH PPS REVENUE CREDIT

## 2017-04-23 PROCEDURE — 3331090002 HH PPS REVENUE DEBIT

## 2017-04-24 ENCOUNTER — HOME CARE VISIT (OUTPATIENT)
Dept: SCHEDULING | Facility: HOME HEALTH | Age: 77
End: 2017-04-24
Payer: MEDICARE

## 2017-04-24 VITALS
DIASTOLIC BLOOD PRESSURE: 60 MMHG | RESPIRATION RATE: 20 BRPM | HEART RATE: 80 BPM | TEMPERATURE: 97.9 F | SYSTOLIC BLOOD PRESSURE: 120 MMHG | OXYGEN SATURATION: 98 %

## 2017-04-24 PROCEDURE — 3331090002 HH PPS REVENUE DEBIT

## 2017-04-24 PROCEDURE — G0157 HHC PT ASSISTANT EA 15: HCPCS

## 2017-04-24 PROCEDURE — 3331090001 HH PPS REVENUE CREDIT

## 2017-04-25 ENCOUNTER — HOME CARE VISIT (OUTPATIENT)
Dept: SCHEDULING | Facility: HOME HEALTH | Age: 77
End: 2017-04-25
Payer: MEDICARE

## 2017-04-25 ENCOUNTER — TELEPHONE (OUTPATIENT)
Dept: ENDOCRINOLOGY | Age: 77
End: 2017-04-25

## 2017-04-25 VITALS
OXYGEN SATURATION: 98 % | RESPIRATION RATE: 16 BRPM | TEMPERATURE: 98.6 F | SYSTOLIC BLOOD PRESSURE: 114 MMHG | HEART RATE: 78 BPM | DIASTOLIC BLOOD PRESSURE: 54 MMHG

## 2017-04-25 PROCEDURE — 3331090002 HH PPS REVENUE DEBIT

## 2017-04-25 PROCEDURE — 3331090001 HH PPS REVENUE CREDIT

## 2017-04-25 PROCEDURE — G0299 HHS/HOSPICE OF RN EA 15 MIN: HCPCS

## 2017-04-25 NOTE — TELEPHONE ENCOUNTER
There is a cancellation tomorrow at the [facility] office at 1:30pm. I scheduled this and left a message for patient to call back to confirm.

## 2017-04-25 NOTE — TELEPHONE ENCOUNTER
4/25/2017  12:12 PM      Mr. Lam Moore daughter Shanon Braden called stating that his sugar reading today was 570 and last week he was in the hospital with a blood sugar of [phone] requesting a sooner appointment and a call back, please call [phone] when you have a moment.       Thanks

## 2017-04-26 ENCOUNTER — HOME CARE VISIT (OUTPATIENT)
Dept: SCHEDULING | Facility: HOME HEALTH | Age: 77
End: 2017-04-26
Payer: MEDICARE

## 2017-04-26 ENCOUNTER — OFFICE VISIT (OUTPATIENT)
Dept: ENDOCRINOLOGY | Age: 77
End: 2017-04-26

## 2017-04-26 VITALS
HEIGHT: 71 IN | WEIGHT: 157 LBS | BODY MASS INDEX: 21.98 KG/M2 | SYSTOLIC BLOOD PRESSURE: 102 MMHG | HEART RATE: 80 BPM | DIASTOLIC BLOOD PRESSURE: 47 MMHG

## 2017-04-26 VITALS
DIASTOLIC BLOOD PRESSURE: 66 MMHG | RESPIRATION RATE: 13 BRPM | TEMPERATURE: 99.1 F | HEART RATE: 76 BPM | SYSTOLIC BLOOD PRESSURE: 128 MMHG | OXYGEN SATURATION: 98 %

## 2017-04-26 DIAGNOSIS — G62.9 NEUROPATHY: ICD-10-CM

## 2017-04-26 DIAGNOSIS — I95.1 AUTONOMIC ORTHOSTATIC HYPOTENSION: ICD-10-CM

## 2017-04-26 PROCEDURE — 3331090001 HH PPS REVENUE CREDIT

## 2017-04-26 PROCEDURE — 3331090002 HH PPS REVENUE DEBIT

## 2017-04-26 PROCEDURE — G0157 HHC PT ASSISTANT EA 15: HCPCS

## 2017-04-26 RX ORDER — GABAPENTIN 100 MG/1
100 CAPSULE ORAL 2 TIMES DAILY
Qty: 180 CAP | Refills: 3 | OUTPATIENT
Start: 2017-04-26

## 2017-04-26 RX ORDER — FLUDROCORTISONE ACETATE 0.1 MG/1
TABLET ORAL
Qty: 30 TAB | Refills: 3 | Status: SHIPPED | OUTPATIENT
Start: 2017-04-26 | End: 2017-08-11 | Stop reason: SDUPTHER

## 2017-04-26 RX ORDER — GABAPENTIN 100 MG/1
100 CAPSULE ORAL 2 TIMES DAILY
Qty: 60 CAP | Refills: 10 | Status: SHIPPED | OUTPATIENT
Start: 2017-04-26 | End: 2018-03-20 | Stop reason: ALTCHOICE

## 2017-04-26 NOTE — MR AVS SNAPSHOT
Visit Information Date & Time Provider Department Dept. Phone Encounter #  
 4/26/2017  1:30 PM Rosi Fowler, [address] [facility] [phone] Follow-up Instructions Return in about 3 months (around 7/26/2017). Upcoming Health Maintenance Date Due  
 FOOT EXAM Q1 12/11/1950 MICROALBUMIN Q1 12/11/1950 EYE EXAM RETINAL OR DILATED Q1 12/11/1950 DTaP/Tdap/Td series (1 - Tdap) 12/11/1962 ZOSTER VACCINE AGE 60> 12/11/2001 GLAUCOMA SCREENING Q2Y 12/11/2006 Pneumococcal 65+ Low/Medium Risk (1 of 2 - PCV13) 12/11/2006 MEDICARE YEARLY EXAM 12/11/2006 INFLUENZA AGE 9 TO ADULT 8/1/2016 HEMOGLOBIN A1C Q6M 10/12/2017 LIPID PANEL Q1 4/12/2018 Allergies as of 4/26/2017  Review Complete On: 4/26/2017 By: Rosi Fowler MD  
 No Known Allergies Current Immunizations  Reviewed on 4/13/2017 No immunizations on file. Not reviewed this visit You Were Diagnosed With   
  
 Codes Comments Diabetes mellitus, insulin dependent (IDDM), controlled ([facility]    -  Primary ICD-10-CM: E11.9, Z79.4 ICD-9-CM: 250.00, V58.67 Vitals BP Pulse Height(growth percentile) Weight(growth percentile) BMI Smoking Status 102/47 (BP 1 Location: Left arm, BP Patient Position: Standing) 80 5' 11\" (1.803 m) 157 lb (71.2 kg) 21.9 kg/m2 Current Every Day Smoker Vitals History BMI and BSA Data Body Mass Index Body Surface Area  
 21.9 kg/m 2 1.89 m 2 Preferred Pharmacy Pharmacy Name Phone [id number] [address] [facility] [phone] Your Updated Medication List  
  
   
This list is accurate as of: 4/26/17  2:21 PM.  Always use your most recent med list.  
  
  
  
  
 ARIPiprazole 5 mg tablet Commonly known as:  ABILIFY Take 5 mg by mouth daily. ASPIR-81 81 mg tablet Generic drug:  aspirin delayed-release Take 81 mg by mouth daily. CRESTOR 5 mg tablet Generic drug:  rosuvastatin Take 5 mg by mouth nightly. DULoxetine 60 mg capsule Commonly known as:  CYMBALTA Take 60 mg by mouth daily. finasteride 5 mg tablet Commonly known as:  PROSCAR Take 5 mg by mouth daily. fludrocortisone 0.1 mg tablet Commonly known as:  FLORINEF One-half tablet (0.05 mg or 50 mcg). For low blood pressure  
  
 gabapentin 100 mg capsule Commonly known as:  NEURONTIN Take 100 mg by mouth two (2) times a day. HYDROcodone-acetaminophen 7.5-325 mg per tablet Commonly known as:  Kriste Rosangela Take 1 Tab by mouth two (2) times a day. insulin aspart 100 unit/mL Inpn Commonly known as:  Larraine Goodpasture Sliding scale  
  
 insulin detemir 100 unit/mL (3 mL) Inpn Commonly known as:  LEVEMIR FLEXTOUCH  
15 Units by SubCUTAneous route nightly. levothyroxine 50 mcg tablet Commonly known as:  SYNTHROID Take 50 mcg by mouth Daily (before breakfast). MIRALAX 17 gram/dose powder Generic drug:  polyethylene glycol Take 17 g by mouth daily. NAMENDA XR 28 mg capsule Generic drug:  memantine ER Take 28 mg by mouth daily. omeprazole 20 mg capsule Commonly known as:  PRILOSEC Take 20 mg by mouth as needed. tamsulosin 0.4 mg capsule Commonly known as:  FLOMAX Take 0.4 mg by mouth daily. traZODone 100 mg tablet Commonly known as:  Rahul Ramp Take 50 mg by mouth nightly. VITAMIN B-12 100 mcg tablet Generic drug:  cyanocobalamin Take 100 mcg by mouth daily. Prescriptions Sent to Pharmacy Refills  
 fludrocortisone (FLORINEF) 0.1 mg tablet 3 Sig: One-half tablet (0.05 mg or 50 mcg). For low blood pressure Class: Normal  
 Pharmacy: [facility] #[id number] - [address]  #: [phone] Follow-up Instructions Return in about 3 months (around 7/26/2017). To-Do List   
 04/27/2017 To Be Determined Appointment with Carly Martinez LPN at [place] [address]  
  
 05/02/2017 To Be Determined Appointment with Prachi Lyle PTA at [place] [address]  
  
 05/02/2017 To Be Determined Appointment with Carly Martinez LPN at [place] [address]  
  
 05/04/2017 To Be Determined Appointment with Elis Sousa PT at [place] [address]  
  
 05/04/2017 To Be Determined Appointment with Cm Richard RN at [place] [address] Patient Instructions Diabetes : 
Diabetes type II  
- Monitor glucoses in AM and other times as we can - Watch starch/carbohydrate intake * Stop glipizide Levemir - 20 units once daily in AM. Novolog for high glucoses:  
201-230: add 1 unit 231-260: add 2 units 261-290: add 3 units 291-320: add 4 units 321-350: add 5 units 351-380: add 6 units 381-410: add 7 units 411 +     : add 8 units Goals for blood glucose: 
Fasting  (less than 150) Lunch, Dinner, Bedtime -  (less than 180). Blood pressure: 
-fludrocortisone 1/2 tablet once daily Cholesterol: 
Can take rosuvastatin in AM. Introducing [facility]! Celso Cuveas introduces babberly patient portal. Now you can access parts of your medical record, email your doctor's office, and request medication refills online. 1. In your internet browser, go to [email or web address] 2. Click on the First Time User? Click Here link in the Sign In box. You will see the New Member Sign Up page. 3. Enter your babberly Access Code exactly as it appears below. You will not need to use this code after youve completed the sign-up process. If you do not sign up before the expiration date, you must request a new code. · babberly Access Code: [email or web address] Expires: 7/25/2017  2:21 PM 
 
 4. Enter the last four digits of your Social Security Number (xxxx) and Date of Birth (mm/dd/yyyy) as indicated and click Submit. You will be taken to the next sign-up page. 5. Create a WeatherBug ID. This will be your WeatherBug login ID and cannot be changed, so think of one that is secure and easy to remember. 6. Create a WeatherBug password. You can change your password at any time. 7. Enter your Password Reset Question and Answer. This can be used at a later time if you forget your password. 8. Enter your e-mail address. You will receive e-mail notification when new information is available in [address] 9. Click Sign Up. You can now view and download portions of your medical record. 10. Click the Download Summary menu link to download a portable copy of your medical information. If you have questions, please visit the Frequently Asked Questions section of the WeatherBug website. Remember, WeatherBug is NOT to be used for urgent needs. For medical emergencies, dial 911. Now available from your iPhone and Android! Please provide this summary of care documentation to your next provider. Your primary care clinician is listed as Rubin Best. If you have any questions after today's visit, please call [phone].

## 2017-04-26 NOTE — PATIENT INSTRUCTIONS
Diabetes :  Diabetes type II   - Monitor glucoses in AM and other times as we can  - Watch starch/carbohydrate intake    * Stop glipizide    Levemir - 20 units once daily in AM.     Novolog for high glucoses:   201-230: add 1 unit  231-260: add 2 units  261-290: add 3 units  291-320: add 4 units  321-350: add 5 units  351-380: add 6 units  381-410: add 7 units  411 +     : add 8 units    Goals for blood glucose:  Fasting  (less than 150)  Lunch, Dinner, Bedtime -  (less than 180).     Blood pressure:  -fludrocortisone 1/2 tablet once daily    Cholesterol:  Can take rosuvastatin in AM.

## 2017-04-26 NOTE — PROGRESS NOTES
History of Present Illness: Alina Sellers is a 77 y.o. male presents for follow-up of diabetes. Has had diabetes for 40 years. His daughter in-law and daughter accompany him today. He has had a recent admission earlier this month for syncopal episode due to poorly controlled diabetes, dehydration and orthostatic hypotension. This admission was reviewed, particularly MAR and glucose levels     Diabetes related complications:  + nephropathy - sees Dr Jasson Solano for eye exam. No known retinopathy  + neuropathy.      Current diabetes regimen:  Levemir 20 units at night - daughter and daughter-in-law suspect he misses this often. Glipizide XL 2.5 mg twice daily    He now has home CNA which comes daily in AM to help assure he takes medications.     Glucoses:brings log, which has glucoses noted daily at varying times. Values are high 200s-400s over last 10 days. CKD - GFR 27 on admission and 53 on discharge. Hospitalization  Component      Latest Ref Rng & Units 4/14/2017 4/14/2017 4/13/2017 4/13/2017          11:32 AM  6:23 AM  9:27 PM  4:32 PM   GLUCOSE,FAST - POC      65 - 100 mg/dL 243 (H) 84 144 (H) 215 (H)     Component      Latest Ref Rng & Units 4/13/2017 4/13/2017 4/13/2017 4/12/2017          11:32 AM  6:45 AM  6:25 AM  9:25 PM   GLUCOSE,FAST - POC      65 - 100 mg/dL 125 (H) 89 65 227 (H)     Component      Latest Ref Rng & Units 4/12/2017 4/12/2017 4/12/2017 4/11/2017           4:40 PM 12:06 PM  6:22 AM  9:16 PM   GLUCOSE,FAST - POC      65 - 100 mg/dL 290 (H) 299 (H) 189 (H) 237 (H)     Received Lantus 10 units on night of 11th, 20 units on night of 12th and 15 units on night of 13th. Humalog given in small doses for values > 150.    Mr Brandon Peng and family suspect intake during hospitalization was rather small        Past Medical History:   Diagnosis Date    CAD (coronary artery disease)     [facility]ronic obstructive [facility]) [facility]     Diabetes ([facility]     1970a    Ill-defined condition     SOB    Pneumonia     Urinary incontinence      Current Outpatient Prescriptions   Medication Sig    memantine ER (NAMENDA XR) 28 mg capsule Take 28 mg by mouth daily.  polyethylene glycol (MIRALAX) 17 gram/dose powder Take 17 g by mouth daily.  insulin detemir (LEVEMIR FLEXTOUCH) 100 unit/mL (3 mL) inpn 15 Units by SubCUTAneous route nightly.  midodrine (PROAMITINE) 5 mg tablet Take 1 Tab by mouth two (2) times daily (with meals) for 30 days.  cyanocobalamin (VITAMIN B-12) 100 mcg tablet Take 100 mcg by mouth daily.  gabapentin (NEURONTIN) 100 mg capsule Take 100 mg by mouth two (2) times a day.  traZODone (DESYREL) 100 mg tablet Take 50 mg by mouth nightly.  DULoxetine (CYMBALTA) 60 mg capsule Take 60 mg by mouth daily.  finasteride (PROSCAR) 5 mg tablet Take 5 mg by mouth daily.  glipiZIDE SR (GLUCOTROL XL) 2.5 mg CR tablet Take 2.5 mg by mouth two (2) times a day.  tamsulosin (FLOMAX) 0.4 mg capsule Take 0.4 mg by mouth daily.  ARIPiprazole (ABILIFY) 5 mg tablet Take 5 mg by mouth daily.  HYDROcodone-acetaminophen (NORCO) 7.5-325 mg per tablet Take 1 Tab by mouth two (2) times a day.  omeprazole (PRILOSEC) 20 mg capsule Take 20 mg by mouth as needed.  rosuvastatin (CRESTOR) 5 mg tablet Take 5 mg by mouth nightly.  levothyroxine (SYNTHROID) 50 mcg tablet Take 50 mcg by mouth Daily (before breakfast).  insulin aspart (NOVOLOG) 100 unit/mL inpn Sliding scale    aspirin delayed-release (ASPIR-81) 81 mg tablet Take 81 mg by mouth daily. No current facility-administered medications for this visit.       No Known Allergies      Physical Examination:  Visit Vitals    /47 (BP 1 Location: Left arm, BP Patient Position: Standing)    Pulse 80    Ht 5' 11\" (1.803 m)    Wt 157 lb (71.2 kg)    BMI 21.9 kg/m2   -   - General: pleasant, no distress, normal gait   HEENT: hearing intact, EOMI, clear sclera without icterus  - Cardiovascular: regular, normal rate   - Respiratory: normal effort  - Integumentary: no edema  - Psychiatric: normal mood and affect    Data Reviewed:   Component      Latest Ref Rng & Units 4/12/2017 4/12/2017           3:18 AM  3:18 AM   Cholesterol, total      <200 MG/DL  120   Triglyceride      <150 MG/DL  140   HDL Cholesterol      MG/DL  41   LDL, calculated      0 - 100 MG/DL  51   VLDL, calculated      MG/DL  28   CHOL/HDL Ratio      0 - 5.0    2.9   Hemoglobin A1c, (calculated)      4.2 - 6.3 % 15.5 (H)        Assessment/Plan:   1. Diabetes mellitus, insulin dependent (IDDM), controlled ([facility]   - not controlled. Due to dementia compliance is challenging  - will first have him take Levemir 20 units one daily in MORNING.   - Stop glipizide   - monitor glucoses. 2. Neuropathy - renewed gabapentin   3. Autonomic orthostatic hypotension   - he can only remember to take midodrine once daily in AM. Not sure he took this AM.  BP in office dropped 20 mg/dl from sitting to standing.  - Add fludrocortisone 50 mcg once daily as this is longer-lasting and lends itself more to once daily dosing. Reviewed recent echocardiogram showing very good function. - advised them to notify us if he develops significant edema   4. Dementia : will try to keep regimen as simple as possible and have most medications once daily  5. Dyslipidemia- can take rosuvastatin in AM too.      Greater than 50% of 40 minute visit was spent counseling the patient and family about diabetes, role of carbohydrate intake/diet, reviewing hospitalization medications and resultant glucose values, and discussing rationale for above treatment plan and goals    Patient Instructions   Diabetes :  Diabetes type II   - Monitor glucoses in AM and other times as we can  - Watch starch/carbohydrate intake    * Stop glipizide    Levemir - 20 units once daily in AM.     Novolog for high glucoses:   201-230: add 1 unit  231-260: add 2 units  261-290: add 3 units  291-320: add 4 units  321-350: add 5 units  351-380: add 6 units  381-410: add 7 units  411 +     : add 8 units    Goals for blood glucose:  Fasting  (less than 150)  Lunch, Dinner, Bedtime -  (less than 180). Blood pressure:  -fludrocortisone 1/2 tablet once daily    Cholesterol:  Can take rosuvastatin in AM.         Follow-up Disposition:  Return in about 3 months (around 7/26/2017).     Copy sent to:

## 2017-04-27 ENCOUNTER — HOME CARE VISIT (OUTPATIENT)
Dept: SCHEDULING | Facility: HOME HEALTH | Age: 77
End: 2017-04-27
Payer: MEDICARE

## 2017-04-27 PROCEDURE — 3331090002 HH PPS REVENUE DEBIT

## 2017-04-27 PROCEDURE — G0299 HHS/HOSPICE OF RN EA 15 MIN: HCPCS

## 2017-04-27 PROCEDURE — 3331090001 HH PPS REVENUE CREDIT

## 2017-04-28 VITALS
TEMPERATURE: 98.6 F | RESPIRATION RATE: 16 BRPM | HEART RATE: 75 BPM | DIASTOLIC BLOOD PRESSURE: 54 MMHG | SYSTOLIC BLOOD PRESSURE: 112 MMHG | OXYGEN SATURATION: 98 %

## 2017-04-28 PROCEDURE — 3331090001 HH PPS REVENUE CREDIT

## 2017-04-28 PROCEDURE — 3331090002 HH PPS REVENUE DEBIT

## 2017-04-29 PROCEDURE — 3331090002 HH PPS REVENUE DEBIT

## 2017-04-29 PROCEDURE — 3331090001 HH PPS REVENUE CREDIT

## 2017-04-30 PROCEDURE — 3331090002 HH PPS REVENUE DEBIT

## 2017-04-30 PROCEDURE — 3331090001 HH PPS REVENUE CREDIT

## 2017-05-01 ENCOUNTER — HOME CARE VISIT (OUTPATIENT)
Dept: SCHEDULING | Facility: HOME HEALTH | Age: 77
End: 2017-05-01
Payer: MEDICARE

## 2017-05-01 VITALS
DIASTOLIC BLOOD PRESSURE: 56 MMHG | OXYGEN SATURATION: 97 % | RESPIRATION RATE: 14 BRPM | SYSTOLIC BLOOD PRESSURE: 112 MMHG | HEART RATE: 87 BPM | TEMPERATURE: 98.1 F

## 2017-05-01 PROCEDURE — G0157 HHC PT ASSISTANT EA 15: HCPCS

## 2017-05-01 PROCEDURE — 3331090002 HH PPS REVENUE DEBIT

## 2017-05-01 PROCEDURE — 3331090001 HH PPS REVENUE CREDIT

## 2017-05-01 PROCEDURE — G0299 HHS/HOSPICE OF RN EA 15 MIN: HCPCS

## 2017-05-01 NOTE — TELEPHONE ENCOUNTER
Please have them increase Levemir to 30 units daily. The glipizide may have been worth 10 units or more, but I would prefer he simply take more Levemir. Please have him update us in 1 week. Remind him to watch carbohydrate intake as well.

## 2017-05-01 NOTE — TELEPHONE ENCOUNTER
5/1/2017  1:40 PM      Please call Doug Ayla Boy daughter Tita Romero at [phone].         Thanks

## 2017-05-01 NOTE — TELEPHONE ENCOUNTER
I returned the call and spoke to Mr. Cobos's daughter. Blood sugars have been 430-500 since his appointment. She confirmed patient is taking Levemir 20 units in the morning and following the sliding scale for Novolog. She is unsure why his blood sugar is so high and stated there have been no changes since his appointment. Please advise.

## 2017-05-02 PROCEDURE — 3331090001 HH PPS REVENUE CREDIT

## 2017-05-02 PROCEDURE — 3331090002 HH PPS REVENUE DEBIT

## 2017-05-03 VITALS
OXYGEN SATURATION: 98 % | DIASTOLIC BLOOD PRESSURE: 62 MMHG | TEMPERATURE: 97.7 F | HEART RATE: 76 BPM | RESPIRATION RATE: 18 BRPM | SYSTOLIC BLOOD PRESSURE: 138 MMHG

## 2017-05-03 PROCEDURE — 3331090002 HH PPS REVENUE DEBIT

## 2017-05-03 PROCEDURE — 3331090001 HH PPS REVENUE CREDIT

## 2017-05-04 ENCOUNTER — HOME CARE VISIT (OUTPATIENT)
Dept: SCHEDULING | Facility: HOME HEALTH | Age: 77
End: 2017-05-04
Payer: MEDICARE

## 2017-05-04 PROCEDURE — 3331090003 HH PPS REVENUE ADJ

## 2017-05-04 PROCEDURE — G0299 HHS/HOSPICE OF RN EA 15 MIN: HCPCS

## 2017-05-04 PROCEDURE — 3331090002 HH PPS REVENUE DEBIT

## 2017-05-04 PROCEDURE — 3331090001 HH PPS REVENUE CREDIT

## 2017-05-05 VITALS
TEMPERATURE: 98.4 F | RESPIRATION RATE: 18 BRPM | SYSTOLIC BLOOD PRESSURE: 140 MMHG | HEART RATE: 82 BPM | OXYGEN SATURATION: 99 % | DIASTOLIC BLOOD PRESSURE: 64 MMHG

## 2017-05-05 VITALS
SYSTOLIC BLOOD PRESSURE: 107 MMHG | OXYGEN SATURATION: 97 % | TEMPERATURE: 98 F | RESPIRATION RATE: 17 BRPM | HEART RATE: 70 BPM | DIASTOLIC BLOOD PRESSURE: 67 MMHG

## 2017-05-05 PROCEDURE — 3331090001 HH PPS REVENUE CREDIT

## 2017-05-05 PROCEDURE — 3331090002 HH PPS REVENUE DEBIT

## 2017-05-06 PROCEDURE — 3331090001 HH PPS REVENUE CREDIT

## 2017-05-06 PROCEDURE — 3331090002 HH PPS REVENUE DEBIT

## 2017-05-07 PROCEDURE — 3331090002 HH PPS REVENUE DEBIT

## 2017-05-07 PROCEDURE — 3331090001 HH PPS REVENUE CREDIT

## 2017-05-08 PROCEDURE — 3331090002 HH PPS REVENUE DEBIT

## 2017-05-08 PROCEDURE — 3331090001 HH PPS REVENUE CREDIT

## 2017-05-09 PROCEDURE — 3331090001 HH PPS REVENUE CREDIT

## 2017-05-09 PROCEDURE — 3331090002 HH PPS REVENUE DEBIT

## 2017-05-10 PROCEDURE — 3331090001 HH PPS REVENUE CREDIT

## 2017-05-10 PROCEDURE — 3331090002 HH PPS REVENUE DEBIT

## 2017-05-11 PROCEDURE — 3331090001 HH PPS REVENUE CREDIT

## 2017-05-11 PROCEDURE — 3331090002 HH PPS REVENUE DEBIT

## 2017-05-12 PROCEDURE — 3331090002 HH PPS REVENUE DEBIT

## 2017-05-12 PROCEDURE — 3331090001 HH PPS REVENUE CREDIT

## 2017-06-30 ENCOUNTER — TELEPHONE (OUTPATIENT)
Dept: ENDOCRINOLOGY | Age: 77
End: 2017-06-30

## 2017-06-30 NOTE — TELEPHONE ENCOUNTER
I called Mishel Ortega back. They have a CNA who visits every morning and administers 30 units of Levemir. Patient takes Novolog in the morning only for high blood sugars based on the following sliding scale:  201-230: add 1 unit  231-260: add 2 units  261-290: add 3 units  291-320: add 4 units  321-350: add 5 units  351-380: add 6 units  381-410: add 7 units  411 +     : add 8 units    There is no one with him to administer Novolog during the day, and patient refused to take any medication or insulin at night. Mishel Ortega stated the problem Is noncompliance and poor diet. I suggested removing foods high in sugar and carbs from the home, and she stated they do this the best they can but that is all he eats.

## 2017-06-30 NOTE — TELEPHONE ENCOUNTER
----- Message from Zoë Steele sent at 6/29/2017  5:46 PM EDT -----  Regarding: Dr. Katarzyna Schroeder, pt's daughter, states if able to increase Rx \"Levimer\" to 40 units from 30 units, due to blood sugar levels being above 550. Best contact number [phone].

## 2017-06-30 NOTE — TELEPHONE ENCOUNTER
Gina Weeks,  Please make a note to call back to check in to see if there has been any improvement after addition of Trulicity in 56-60 days.

## 2017-06-30 NOTE — TELEPHONE ENCOUNTER
It appears Victoza or Trulicity would be covered. We could try sending one of these to his pharmacy and I can call to confirm copay. Pharmacy insurance is [employer] [phone].

## 2017-06-30 NOTE — TELEPHONE ENCOUNTER
We could see if Victoza or Trulicity would be covered and affordable. These would help with mealtime control and may help decrease appetite.

## 2017-06-30 NOTE — TELEPHONE ENCOUNTER
I returned the call from patient's daughter, Lea Queen. Lea Queen stated patient's blood sugars have been 450-550+ for the past several weeks. There have been no medication changes, no illness or infection, diet remains very poor and she feels this is the reason for the high sugars. He is taking Levemir 30 units daily. Lea Queen asked if this can be increased?

## 2017-06-30 NOTE — TELEPHONE ENCOUNTER
Is she 100% sure he is receiving Levemir 30 units every day? 15 units of Lantus covered his baseline insulin needs very well in hospital.  I am hesitant to increase further because if he could not eat for a period he could become severely hypoglycemic. How often is he monitoring? Would she be able to provide Humalog/novolog insulin to be given based on the high glucose value? Could certain foods which are very high in sugars or carbohydrates be removed from his pantry/refigerator? I am not sure if he could afford a GLP-1 agonist, but this would likely be very helpful.

## 2017-07-03 RX ORDER — INSULIN PUMP SYRINGE, 3 ML
EACH MISCELLANEOUS
Qty: 1 KIT | Refills: 0 | Status: SHIPPED | OUTPATIENT
Start: 2017-07-03 | End: 2017-07-17

## 2017-07-05 ENCOUNTER — TELEPHONE (OUTPATIENT)
Dept: ENDOCRINOLOGY | Age: 77
End: 2017-07-05

## 2017-07-05 NOTE — TELEPHONE ENCOUNTER
----- Message from Dina García sent at 7/5/2017 12:05 PM EDT -----  Regarding: /telephone  Rosie Balbuena, patient's daughter would like a return phone call from [facility] or the nurse regarding the diabetes test strips. The wrong prescription was called in on Friday. Ms. Lalitha Kwon can be reached at [phone].

## 2017-07-05 NOTE — TELEPHONE ENCOUNTER
Patient's daughter, Ayla Saleh, stated the wrong test strips were sent to her pharmacy. Patient is using the Glycominds meter and would like strips for this sent to the pharmacy.

## 2017-07-06 RX ORDER — LANCETS
EACH MISCELLANEOUS
Qty: 100 EACH | Refills: 11 | Status: SHIPPED | OUTPATIENT
Start: 2017-07-06 | End: 2017-07-17

## 2017-07-06 RX ORDER — INSULIN PUMP SYRINGE, 3 ML
EACH MISCELLANEOUS
Qty: 1 KIT | Refills: 0 | Status: SHIPPED | OUTPATIENT
Start: 2017-07-06 | End: 2017-07-17

## 2017-07-07 NOTE — TELEPHONE ENCOUNTER
Patient's daughter had requested a branded test strip that the pharmacy does not carry. I called the pharmacy on 7/6/17 after receiving call from patient's daughter stating the pharmacy did not have a prescription for test strips. Per pharmacists request, One Touch ultra meter and testing supplies were sent on 7/6/17.

## 2017-07-17 ENCOUNTER — APPOINTMENT (OUTPATIENT)
Dept: CT IMAGING | Age: 77
End: 2017-07-17
Attending: HOSPITALIST
Payer: MEDICARE

## 2017-07-17 ENCOUNTER — HOSPITAL ENCOUNTER (OUTPATIENT)
Age: 77
Setting detail: OBSERVATION
Discharge: HOME HEALTH | End: 2017-07-18
Attending: EMERGENCY MEDICINE | Admitting: HOSPITALIST
Payer: MEDICARE

## 2017-07-17 DIAGNOSIS — R42 DIZZINESS: ICD-10-CM

## 2017-07-17 DIAGNOSIS — Z91.14 NONCOMPLIANCE WITH MEDICATION REGIMEN: ICD-10-CM

## 2017-07-17 DIAGNOSIS — I95.1 ORTHOSTATIC HYPOTENSION: Primary | ICD-10-CM

## 2017-07-17 LAB
ALBUMIN SERPL BCP-MCNC: 3.4 G/DL (ref 3.5–5)
ALBUMIN/GLOB SERPL: 1 {RATIO} (ref 1.1–2.2)
ALP SERPL-CCNC: 125 U/L (ref 45–117)
ALT SERPL-CCNC: 68 U/L (ref 12–78)
ANION GAP BLD CALC-SCNC: 7 MMOL/L (ref 5–15)
APPEARANCE UR: CLEAR
AST SERPL W P-5'-P-CCNC: 44 U/L (ref 15–37)
BACTERIA URNS QL MICRO: NEGATIVE /HPF
BASOPHILS # BLD AUTO: 0 K/UL (ref 0–0.1)
BASOPHILS # BLD: 0 % (ref 0–1)
BILIRUB SERPL-MCNC: 0.3 MG/DL (ref 0.2–1)
BILIRUB UR QL: NEGATIVE
BUN SERPL-MCNC: 14 MG/DL (ref 6–20)
BUN/CREAT SERPL: 9 (ref 12–20)
CALCIUM SERPL-MCNC: 8.7 MG/DL (ref 8.5–10.1)
CHLORIDE SERPL-SCNC: 96 MMOL/L (ref 97–108)
CO2 SERPL-SCNC: 31 MMOL/L (ref 21–32)
COLOR UR: ABNORMAL
CREAT SERPL-MCNC: 1.64 MG/DL (ref 0.7–1.3)
EOSINOPHIL # BLD: 0.1 K/UL (ref 0–0.4)
EOSINOPHIL NFR BLD: 2 % (ref 0–7)
EPITH CASTS URNS QL MICRO: ABNORMAL /LPF
ERYTHROCYTE [DISTWIDTH] IN BLOOD BY AUTOMATED COUNT: 14.1 % (ref 11.5–14.5)
FOLATE SERPL-MCNC: 12.1 NG/ML (ref 5–21)
GLOBULIN SER CALC-MCNC: 3.5 G/DL (ref 2–4)
GLUCOSE BLD STRIP.AUTO-MCNC: 400 MG/DL (ref 65–100)
GLUCOSE BLD STRIP.AUTO-MCNC: 444 MG/DL (ref 65–100)
GLUCOSE BLD STRIP.AUTO-MCNC: 93 MG/DL (ref 65–100)
GLUCOSE SERPL-MCNC: 384 MG/DL (ref 65–100)
GLUCOSE UR STRIP.AUTO-MCNC: >1000 MG/DL
HCT VFR BLD AUTO: 35 % (ref 36.6–50.3)
HGB BLD-MCNC: 11.2 G/DL (ref 12.1–17)
HGB UR QL STRIP: NEGATIVE
HYALINE CASTS URNS QL MICRO: ABNORMAL /LPF (ref 0–5)
KETONES UR QL STRIP.AUTO: NEGATIVE MG/DL
LEUKOCYTE ESTERASE UR QL STRIP.AUTO: NEGATIVE
LYMPHOCYTES # BLD AUTO: 27 % (ref 12–49)
LYMPHOCYTES # BLD: 1.8 K/UL (ref 0.8–3.5)
MCH RBC QN AUTO: 28.7 PG (ref 26–34)
MCHC RBC AUTO-ENTMCNC: 32 G/DL (ref 30–36.5)
MCV RBC AUTO: 89.7 FL (ref 80–99)
MONOCYTES # BLD: 0.6 K/UL (ref 0–1)
MONOCYTES NFR BLD AUTO: 8 % (ref 5–13)
NEUTS SEG # BLD: 4.2 K/UL (ref 1.8–8)
NEUTS SEG NFR BLD AUTO: 63 % (ref 32–75)
NITRITE UR QL STRIP.AUTO: NEGATIVE
PH UR STRIP: 6 [PH] (ref 5–8)
PLATELET # BLD AUTO: 229 K/UL (ref 150–400)
POTASSIUM SERPL-SCNC: 4.3 MMOL/L (ref 3.5–5.1)
PROT SERPL-MCNC: 6.9 G/DL (ref 6.4–8.2)
PROT UR STRIP-MCNC: ABNORMAL MG/DL
RBC # BLD AUTO: 3.9 M/UL (ref 4.1–5.7)
RBC #/AREA URNS HPF: ABNORMAL /HPF (ref 0–5)
SERVICE CMNT-IMP: ABNORMAL
SERVICE CMNT-IMP: ABNORMAL
SERVICE CMNT-IMP: NORMAL
SODIUM SERPL-SCNC: 134 MMOL/L (ref 136–145)
SP GR UR REFRACTOMETRY: 1.01 (ref 1–1.03)
TROPONIN I SERPL-MCNC: <0.04 NG/ML
TSH SERPL DL<=0.05 MIU/L-ACNC: 1.06 UIU/ML (ref 0.36–3.74)
UROBILINOGEN UR QL STRIP.AUTO: 1 EU/DL (ref 0.2–1)
VIT B12 SERPL-MCNC: 887 PG/ML (ref 211–911)
WBC # BLD AUTO: 6.7 K/UL (ref 4.1–11.1)
WBC URNS QL MICRO: ABNORMAL /HPF (ref 0–4)

## 2017-07-17 PROCEDURE — 96361 HYDRATE IV INFUSION ADD-ON: CPT

## 2017-07-17 PROCEDURE — 82962 GLUCOSE BLOOD TEST: CPT

## 2017-07-17 PROCEDURE — 84484 ASSAY OF TROPONIN QUANT: CPT | Performed by: EMERGENCY MEDICINE

## 2017-07-17 PROCEDURE — 82746 ASSAY OF FOLIC ACID SERUM: CPT | Performed by: HOSPITALIST

## 2017-07-17 PROCEDURE — 74011250636 HC RX REV CODE- 250/636: Performed by: EMERGENCY MEDICINE

## 2017-07-17 PROCEDURE — 36415 COLL VENOUS BLD VENIPUNCTURE: CPT | Performed by: EMERGENCY MEDICINE

## 2017-07-17 PROCEDURE — G8978 MOBILITY CURRENT STATUS: HCPCS

## 2017-07-17 PROCEDURE — 74011250637 HC RX REV CODE- 250/637: Performed by: HOSPITALIST

## 2017-07-17 PROCEDURE — 96360 HYDRATION IV INFUSION INIT: CPT

## 2017-07-17 PROCEDURE — G8979 MOBILITY GOAL STATUS: HCPCS

## 2017-07-17 PROCEDURE — 99285 EMERGENCY DEPT VISIT HI MDM: CPT

## 2017-07-17 PROCEDURE — 82607 VITAMIN B-12: CPT | Performed by: HOSPITALIST

## 2017-07-17 PROCEDURE — 85025 COMPLETE CBC W/AUTO DIFF WBC: CPT | Performed by: EMERGENCY MEDICINE

## 2017-07-17 PROCEDURE — 99218 HC RM OBSERVATION: CPT

## 2017-07-17 PROCEDURE — 81001 URINALYSIS AUTO W/SCOPE: CPT | Performed by: EMERGENCY MEDICINE

## 2017-07-17 PROCEDURE — G8980 MOBILITY D/C STATUS: HCPCS

## 2017-07-17 PROCEDURE — 70450 CT HEAD/BRAIN W/O DYE: CPT

## 2017-07-17 PROCEDURE — 74011636637 HC RX REV CODE- 636/637: Performed by: HOSPITALIST

## 2017-07-17 PROCEDURE — 84443 ASSAY THYROID STIM HORMONE: CPT | Performed by: HOSPITALIST

## 2017-07-17 PROCEDURE — 93005 ELECTROCARDIOGRAM TRACING: CPT

## 2017-07-17 PROCEDURE — 74011250636 HC RX REV CODE- 250/636: Performed by: HOSPITALIST

## 2017-07-17 PROCEDURE — 80053 COMPREHEN METABOLIC PANEL: CPT | Performed by: EMERGENCY MEDICINE

## 2017-07-17 RX ORDER — DULOXETIN HYDROCHLORIDE 60 MG/1
60 CAPSULE, DELAYED RELEASE ORAL DAILY
Status: DISCONTINUED | OUTPATIENT
Start: 2017-07-18 | End: 2017-07-18 | Stop reason: HOSPADM

## 2017-07-17 RX ORDER — FINASTERIDE 5 MG/1
5 TABLET, FILM COATED ORAL DAILY
Status: DISCONTINUED | OUTPATIENT
Start: 2017-07-18 | End: 2017-07-18 | Stop reason: HOSPADM

## 2017-07-17 RX ORDER — PANTOPRAZOLE SODIUM 40 MG/1
40 TABLET, DELAYED RELEASE ORAL DAILY
Status: DISCONTINUED | OUTPATIENT
Start: 2017-07-18 | End: 2017-07-18 | Stop reason: HOSPADM

## 2017-07-17 RX ORDER — ONDANSETRON 2 MG/ML
4 INJECTION INTRAMUSCULAR; INTRAVENOUS
Status: DISCONTINUED | OUTPATIENT
Start: 2017-07-17 | End: 2017-07-18 | Stop reason: HOSPADM

## 2017-07-17 RX ORDER — INSULIN LISPRO 100 [IU]/ML
10 INJECTION, SOLUTION INTRAVENOUS; SUBCUTANEOUS ONCE
Status: COMPLETED | OUTPATIENT
Start: 2017-07-17 | End: 2017-07-17

## 2017-07-17 RX ORDER — INSULIN LISPRO 100 [IU]/ML
INJECTION, SOLUTION INTRAVENOUS; SUBCUTANEOUS
Status: DISCONTINUED | OUTPATIENT
Start: 2017-07-17 | End: 2017-07-18 | Stop reason: HOSPADM

## 2017-07-17 RX ORDER — SODIUM CHLORIDE 0.9 % (FLUSH) 0.9 %
5-10 SYRINGE (ML) INJECTION EVERY 8 HOURS
Status: DISCONTINUED | OUTPATIENT
Start: 2017-07-17 | End: 2017-07-18 | Stop reason: HOSPADM

## 2017-07-17 RX ORDER — TRAZODONE HYDROCHLORIDE 50 MG/1
50 TABLET ORAL
COMMUNITY
End: 2017-10-14

## 2017-07-17 RX ORDER — GABAPENTIN 100 MG/1
100 CAPSULE ORAL 2 TIMES DAILY
Status: DISCONTINUED | OUTPATIENT
Start: 2017-07-17 | End: 2017-07-18 | Stop reason: HOSPADM

## 2017-07-17 RX ORDER — ROSUVASTATIN CALCIUM 10 MG/1
5 TABLET, COATED ORAL
Status: DISCONTINUED | OUTPATIENT
Start: 2017-07-17 | End: 2017-07-18 | Stop reason: HOSPADM

## 2017-07-17 RX ORDER — TRAZODONE HYDROCHLORIDE 50 MG/1
50 TABLET ORAL
Status: DISCONTINUED | OUTPATIENT
Start: 2017-07-17 | End: 2017-07-18 | Stop reason: HOSPADM

## 2017-07-17 RX ORDER — MIDODRINE HYDROCHLORIDE 5 MG/1
5 TABLET ORAL
Status: DISCONTINUED | OUTPATIENT
Start: 2017-07-18 | End: 2017-07-18 | Stop reason: HOSPADM

## 2017-07-17 RX ORDER — ASPIRIN 81 MG/1
81 TABLET ORAL DAILY
Status: DISCONTINUED | OUTPATIENT
Start: 2017-07-18 | End: 2017-07-18 | Stop reason: HOSPADM

## 2017-07-17 RX ORDER — FLUDROCORTISONE ACETATE 0.1 MG/1
50 TABLET ORAL DAILY
Status: DISCONTINUED | OUTPATIENT
Start: 2017-07-18 | End: 2017-07-18 | Stop reason: HOSPADM

## 2017-07-17 RX ORDER — ACETAMINOPHEN 325 MG/1
650 TABLET ORAL
Status: DISCONTINUED | OUTPATIENT
Start: 2017-07-17 | End: 2017-07-18 | Stop reason: HOSPADM

## 2017-07-17 RX ORDER — LEVOTHYROXINE SODIUM 50 UG/1
50 TABLET ORAL
Status: DISCONTINUED | OUTPATIENT
Start: 2017-07-18 | End: 2017-07-18 | Stop reason: HOSPADM

## 2017-07-17 RX ORDER — SODIUM CHLORIDE 9 MG/ML
100 INJECTION, SOLUTION INTRAVENOUS CONTINUOUS
Status: DISCONTINUED | OUTPATIENT
Start: 2017-07-17 | End: 2017-07-18 | Stop reason: HOSPADM

## 2017-07-17 RX ORDER — UREA 10 %
100 LOTION (ML) TOPICAL DAILY
Status: DISCONTINUED | OUTPATIENT
Start: 2017-07-18 | End: 2017-07-18 | Stop reason: HOSPADM

## 2017-07-17 RX ORDER — MAGNESIUM SULFATE 100 %
4 CRYSTALS MISCELLANEOUS AS NEEDED
Status: DISCONTINUED | OUTPATIENT
Start: 2017-07-17 | End: 2017-07-18 | Stop reason: HOSPADM

## 2017-07-17 RX ORDER — DEXTROSE 50 % IN WATER (D50W) INTRAVENOUS SYRINGE
12.5-25 AS NEEDED
Status: DISCONTINUED | OUTPATIENT
Start: 2017-07-17 | End: 2017-07-17 | Stop reason: CLARIF

## 2017-07-17 RX ORDER — LORAZEPAM 0.5 MG/1
0.5 TABLET ORAL
Status: DISCONTINUED | OUTPATIENT
Start: 2017-07-17 | End: 2017-07-18 | Stop reason: HOSPADM

## 2017-07-17 RX ORDER — SODIUM CHLORIDE 0.9 % (FLUSH) 0.9 %
5-10 SYRINGE (ML) INJECTION AS NEEDED
Status: DISCONTINUED | OUTPATIENT
Start: 2017-07-17 | End: 2017-07-18 | Stop reason: HOSPADM

## 2017-07-17 RX ORDER — DOCUSATE SODIUM 100 MG/1
100 CAPSULE, LIQUID FILLED ORAL 2 TIMES DAILY
Status: DISCONTINUED | OUTPATIENT
Start: 2017-07-17 | End: 2017-07-18 | Stop reason: HOSPADM

## 2017-07-17 RX ORDER — HYDROCODONE BITARTRATE AND ACETAMINOPHEN 7.5; 325 MG/1; MG/1
1 TABLET ORAL
Status: DISCONTINUED | OUTPATIENT
Start: 2017-07-17 | End: 2017-07-18 | Stop reason: HOSPADM

## 2017-07-17 RX ORDER — INSULIN GLARGINE 100 [IU]/ML
30 INJECTION, SOLUTION SUBCUTANEOUS ONCE
Status: COMPLETED | OUTPATIENT
Start: 2017-07-17 | End: 2017-07-17

## 2017-07-17 RX ORDER — INSULIN GLARGINE 100 [IU]/ML
30 INJECTION, SOLUTION SUBCUTANEOUS
Status: DISCONTINUED | OUTPATIENT
Start: 2017-07-18 | End: 2017-07-18 | Stop reason: HOSPADM

## 2017-07-17 RX ORDER — MIDODRINE HYDROCHLORIDE 5 MG/1
5 TABLET ORAL
COMMUNITY
End: 2019-03-20

## 2017-07-17 RX ORDER — LORAZEPAM 0.5 MG/1
0.5 TABLET ORAL
COMMUNITY
End: 2018-03-20

## 2017-07-17 RX ADMIN — DOCUSATE SODIUM 100 MG: 100 CAPSULE, LIQUID FILLED ORAL at 22:37

## 2017-07-17 RX ADMIN — INSULIN LISPRO 10 UNITS: 100 INJECTION, SOLUTION INTRAVENOUS; SUBCUTANEOUS at 18:53

## 2017-07-17 RX ADMIN — Medication 10 ML: at 22:43

## 2017-07-17 RX ADMIN — INSULIN GLARGINE 30 UNITS: 100 INJECTION, SOLUTION SUBCUTANEOUS at 18:54

## 2017-07-17 RX ADMIN — ROSUVASTATIN CALCIUM 5 MG: 10 TABLET ORAL at 22:38

## 2017-07-17 RX ADMIN — SODIUM CHLORIDE 100 ML/HR: 900 INJECTION, SOLUTION INTRAVENOUS at 22:37

## 2017-07-17 RX ADMIN — GABAPENTIN 100 MG: 100 CAPSULE ORAL at 22:37

## 2017-07-17 RX ADMIN — TRAZODONE HYDROCHLORIDE 50 MG: 50 TABLET ORAL at 22:38

## 2017-07-17 RX ADMIN — SODIUM CHLORIDE 1000 ML: 900 INJECTION, SOLUTION INTRAVENOUS at 17:41

## 2017-07-17 NOTE — SENIOR SERVICES NOTE
physical Therapy Emergency Department EVALUATION  Patient: Gilbert Bloom (85 y.o. male)  Date: 7/17/2017  Primary Diagnosis: There are no admission diagnoses documented for this encounter. Precautions: Orthostatic hypotension     ASSESSMENT :  Chart reviewed. Patient cleared to be seen by nursing. Please see flow sheet as patient is orthostatic. BPs dropping from 170/51 in supine to 105/41 in standing after 1-2 minutes. Pt is performing bed mobility and transfers with independence. Focus on education about orhtostatic hypotension for both family and patient and taking his time when changing positions. At this time, not safe to ambulate out in the hallway. Patient demonstrating marching in place and sidestepping along EOB with supervision. Pt reporting one fall in the last month when he \"got dizzy and fell outside\". Suspect that falling is secondary to a drop in BP rather than a balance concern, as patient scored 19/28 on Tinetti balance without completing gait assessment portion. Suspect fall risk will decrease as BPs are addressed. Family having concerns about declining mentation and some withdrawn behavior at home. Pt may benefit from a home safety evaluation, though suspect patient is approaching functional baseline. PLAN :  Discharge Recommendations:     [x]   Home with family, HHPT for safety evaluation    []   Skilled nursing facility  []   Admission to hospital with rehab likely needed  []   Inpatient rehab referral  []   Outpatient physical therapy referral  []   Other:    Further Equipment Recommendations for Discharge: none  []   Rolling walker with 5\" wheels  []   Crutches   []   Idamae Sciara   []   Wheelchair   []   Other:     COMMUNICATION/EDUCATION:   Communication/Collaboration:  [x]   Fall prevention education was provided and the patient/caregiver indicated understanding. [x]   Patient/family have participated as able and agree with findings and recommendations.   []   Patient is unable to participate in plan of care at this time. Findings and recommendations were discussed with: MD physician and CM       SUBJECTIVE:   Patient stated I just ended up on the lawn. Then I stood up and went about my day.     OBJECTIVE DATA SUMMARY:   HISTORY:    Past Medical History:   Diagnosis Date    CAD (coronary artery disease)     [facility] [address]     Diabetes ([facility] [address]     1970a    Ill-defined condition     SOB    Pneumonia     Urinary incontinence      Past Surgical History:   Procedure Laterality Date    CARDIAC SURG PROCEDURE UNLIST  2000    open heart    HX HEENT  1975    nasal surgery    HX MOHS PROCEDURES  2013    left     Prior Level of Function/Home Situation: Pt lives in an apartment alone. Previously driving and ambulating independently. One fall in the last month  Personal factors and/or comorbidities impacting plan of care:     Home Situation  Home Environment: Private residence  # Steps to Enter: 7  Rails to Enter: Yes  Hand Rails : Bilateral  One/Two Story Residence: One story  Living Alone: Yes  Support Systems: Child(del)  Patient Expects to be Discharged to[de-identified] Private residence  Current DME Used/Available at Home: None    EXAMINATION/PRESENTATION/DECISION MAKING:   Critical Behavior:  Neurologic State: Alert, Eyes open spontaneously  Orientation Level: Oriented X4  Cognition: Follows commands, Recognition of people/places     Hearing: Auditory  Auditory Impairment: Hard of hearing, bilateral  Strength:    Strength:  Within functional limits     Tone & Sensation:   Tone: Normal  Sensation: Impaired (bilat foot neuropathy)    Coordination:  Coordination: Within functional limits  Functional Mobility:  Bed Mobility:     Supine to Sit: Independent  Sit to Supine: Independent     Transfers:  Sit to Stand: Supervision  Stand to Sit: Supervision        Bed to Chair: Supervision              Balance:   Sitting: Intact  Standing: Impaired  Standing - Static: Good  Standing - Dynamic : Fair  Ambulation/Gait Training:  Distance (ft): 3 Feet (ft)     Ambulation - Level of Assistance: Supervision                 Base of Support: Narrowed     Speed/Audra: Slow  Step Length: Right shortened;Left shortened       Special Tests:  Tinetti test:    Sitting Balance: 1  Arises: 1  Attempts to Rise: 2  Immediate Standing Balance: 2  Standing Balance: 2  Nudged: 2  Eyes Closed: 1  Turn 360 Degrees - Continuous/Discontinuous: 1  Turn 360 Degrees - Steady/Unsteady: 1  Sitting Down: 1  Balance Score: 14  Indication of Gait: 1  R Step Length/Height: 0  L Step Length/Height: 0  R Foot Clearance: 1  L Foot Clearance: 1  Step Symmetry: 1  Step Continuity: 1  Path: 0  Trunk: 0  Walking Time: 0  Gait Score: 5  Total Score: 19       Tinetti Test and G-code impairment scale:  Percentage of Impairment CH    0%   CI    1-19% CJ    20-39% CK    40-59% CL    60-79% CM    80-99% CN     100%   Tinetti  Score 0-28 28 23-27 17-22 12-16 6-11 1-5 0       Tinetti Tool Score Risk of Falls  <19 = High Fall Risk  19-24 = Moderate Fall Risk  25-28 = Low Fall Risk  Tinetti ME. Performance-Oriented Assessment of Mobility Problems in Elderly Patients. Chaudhari 66; [id number]. (Scoring Description: PT Bulletin Feb. 10, 1993)    Older adults: Ervin Jefferson et al, 2009; n = 1000 [facility] elderly evaluated with ABC, SEVERO, ADL, and IADL)  · Mean SEVERO score for males aged 69-68 years = 26.21(3.40)  · Mean SEVERO score for females age 69-68 years = 25.16(4.30)  · Mean SEVERO score for males over 80 years = 23.29(6.02)  · Mean SEVERO score for females over 80 years = 17.20(8.32)        In compliance with CMSs Claims Based Outcome Reporting, the following G-code set was chosen for this patient based on their primary functional limitation being treated: The outcome measure chosen to determine the severity of the functional limitation was the Tinetti with a score of 19/28 which was correlated with the impairment scale.     ? Mobility - Walking and Moving Around:     - CURRENT STATUS: CJ - 20%-39% impaired, limited or restricted    - GOAL STATUS: CJ - 20%-39% impaired, limited or restricted    - D/C STATUS:  CJ - 20%-39% impaired, limited or restricted        Pain:Pain Scale 1: Numeric (0 - 10)  Pain Intensity 1: 0  Activity Tolerance:   Please refer to the flowsheet for vital signs taken during this treatment.   After treatment:   []         Patient left in no apparent distress sitting up in chair  [x]         Patient left in no apparent distress in bed  [x]         Call bell left within reach  [x]         Nursing notified  [x]         Caregiver present  []         Bed alarm activated        Thank you for this referral.  Wanda Burgess  PT, DPT   Time Calculation: 28 mins

## 2017-07-17 NOTE — PROGRESS NOTES
Admission Medication Reconciliation:    Information obtained from: Daughter, RX query    Significant PMH/Disease States:   Past Medical History:   Diagnosis Date    CAD (coronary artery disease)     [facility] [address]     Diabetes ([facility] [address]     1970a    Ill-defined condition     SOB    Pneumonia     Urinary incontinence        Chief Complaint for this Admission:  HTN, high blood sugar    Allergies:  Review of patient's allergies indicates no known allergies. Prior to Admission Medications:   Prior to Admission Medications   Prescriptions Last Dose Informant Patient Reported? Taking? DULoxetine (CYMBALTA) 60 mg capsule   Yes Yes   Sig: Take 60 mg by mouth daily. HYDROcodone-acetaminophen (NORCO) 7.5-325 mg per tablet   Yes Yes   Sig: Take 1 Tab by mouth two (2) times daily as needed. LORazepam (ATIVAN) 0.5 mg tablet   Yes Yes   Sig: Take 0.5 mg by mouth two (2) times daily as needed for Anxiety. aspirin delayed-release (ASPIR-81) 81 mg tablet   Yes Yes   Sig: Take 81 mg by mouth daily. cyanocobalamin (VITAMIN B-12) 100 mcg tablet   Yes Yes   Sig: Take 100 mcg by mouth daily. dulaglutide (TRULICITY) 4.06 NS/1.5 mL sub-q pen   No Yes   Si.5 mL by SubCUTAneous route every seven (7) days. finasteride (PROSCAR) 5 mg tablet   Yes Yes   Sig: Take 5 mg by mouth daily. fludrocortisone (FLORINEF) 0.1 mg tablet   No Yes   Sig: One-half tablet (0.05 mg or 50 mcg). For low blood pressure   gabapentin (NEURONTIN) 100 mg capsule   No Yes   Sig: Take 1 Cap by mouth two (2) times a day. insulin aspart (NOVOLOG) 100 unit/mL in 2017 at 0700  Yes Yes   Sig: Sliding scale   insulin detemir (LEVEMIR FLEXTOUCH) 100 unit/mL (3 mL) in 2017 at 0700  Yes Yes   Si Units by SubCUTAneous route daily. levothyroxine (SYNTHROID) 50 mcg tablet   Yes Yes   Sig: Take 50 mcg by mouth Daily (before breakfast).    midodrine (PROAMITINE) 5 mg tablet   Yes Yes   Sig: Take 5 mg by mouth two (2) times a day. omeprazole (PRILOSEC) 20 mg capsule   Yes Yes   Sig: Take 20 mg by mouth as needed. rosuvastatin (CRESTOR) 5 mg tablet   Yes Yes   Sig: Take 5 mg by mouth nightly. tamsulosin (FLOMAX) 0.4 mg capsule   Yes Yes   Sig: Take 0.4 mg by mouth daily. traZODone (DESYREL) 50 mg tablet   Yes Yes   Sig: Take 50 mg by mouth nightly. Facility-Administered Medications: None         Comments/Recommendations: Pt is a poor historian. Daughter provided medication history after comparing to RX query. Daughter reports that patient takes medication on his own on the weekend, so we don't know if he took any of his daily medications over the weekend. She did report that he took his insulin this am, 30 units levemir and 12 units novolog - reports patient currently taking levemir every morning rather than evening. Patient also taking Trulicity - daughter reports that his dose is due today, however she was informed that this medication is not on formulary and she would have to provide own med.       New:  Lorazepam (pt has not needed recently), Midodrine  Changed:  Levemir  D/C:  Abilify, Memantine, Miralax

## 2017-07-17 NOTE — ED PROVIDER NOTES
HPI Comments: 77 y.o. male with past medical history significant for DM, CAD, COPD, pneumonia, and urinary incontinence who presents in a wheelchair from his PCP's office accompanied by his 2 daughters and 1 son with chief complaint of dizziness. Pt states dizziness has been ongoing for 6-12 months with low blood pressure readings. Furthermore, pt's blood sugars have been uncontrolled. Pt states he is only taking Levemir to control his blood sugars. However, upon further investigation, pt's son and 2 daughters who arrived with the pt state that pt is also prescribed a short-acting glucose medication that he forgets to take. They express concern regarding multiple issues with the pt. Pt has been fallen several times over the last few weeks, with his most recent event occurring last week (they deny head injury). Pt has been increasingly confused with decreased short-team memory which is causing him to forget to take his medications and eat. Pt has a home-health nurse who comes to help him with food, but she is not with him 24/7. Pt was admitted to [facility] for 1 week 6 weeks ago due to uncontrolled blood sugars. Pt specifically denies CP, SOB, back pain and abd pain. There are no other acute medical concerns at this time. Social hx: current every day tobacco smoker; denies EtOH use; denies illicit drug use  PCP: Jennifer Villa MD    Note written by Lazara Lainez, as dictated by Gallo Krause MD 4:30 PM        The history is provided by the patient and a relative.         Past Medical History:   Diagnosis Date    CAD (coronary artery disease)     [facility] [address]     Diabetes ([facility] [address]     1970a    Ill-defined condition     SOB    Pneumonia     Urinary incontinence        Past Surgical History:   Procedure Laterality Date    CARDIAC SURG PROCEDURE UNLIST  2000    open heart    HX HEENT  1975    nasal surgery    HX MOHS PROCEDURES  2013    left         Family History: Problem Relation Age of Onset    Diabetes Mother     Diabetes Brother        Social History     Social History    Marital status:      Spouse name: N/A    Number of children: N/A    Years of education: N/A     Occupational History    Not on file. Social History Main Topics    Smoking status: Current Every Day Smoker    Smokeless tobacco: Never Used      Comment: 1-2 cigars daily    Alcohol use No    Drug use: No    Sexual activity: Not on file     Other Topics Concern    Not on file     Social History Narrative         ALLERGIES: Review of patient's allergies indicates no known allergies. Review of Systems   Respiratory: Negative for shortness of breath. Cardiovascular: Negative for chest pain. Gastrointestinal: Negative for abdominal pain. Neurological: Positive for dizziness. All other systems reviewed and are negative. Vitals:    07/17/17 1500 07/17/17 1620   BP: 115/56 173/51   Pulse: 62 (!) 59   Resp: 16 16   Temp: 98.1 °F (36.7 °C)    SpO2: 98% 100%   Weight: 69.2 kg (152 lb 9.6 oz)    Height: 5' 11\" (1.803 m)             Physical Exam   Constitutional: He is oriented to person, place, and time. He appears well-developed and well-nourished. No distress. HENT:   Head: Normocephalic and atraumatic. Eyes: Conjunctivae and EOM are normal. Pupils are equal, round, and reactive to light. Neck: Normal range of motion. Neck supple. Cardiovascular: Normal rate, regular rhythm and intact distal pulses. Exam reveals no friction rub. Murmur heard. Pulmonary/Chest: Effort normal and breath sounds normal. No respiratory distress. He has no wheezes. He has no rales. He exhibits no tenderness. Abdominal: Soft. Bowel sounds are normal. He exhibits no distension. There is no tenderness. There is no rebound and no guarding. Musculoskeletal: Normal range of motion. He exhibits no edema or tenderness. Neurological: He is alert and oriented to person, place, and time.  No cranial nerve deficit. Coordination normal.   Appears to know year and president   Skin: Skin is warm and dry. He is not diaphoretic. No pallor. Psychiatric: He has a normal mood and affect. His behavior is normal.   Nursing note and vitals reviewed. MDM  Number of Diagnoses or Management Options  Dizziness:   Noncompliance with medication regimen:   Orthostatic hypotension:   Diagnosis management comments: Likely chronic orthostatics and hyperglycemia. Not taking meds as rx'd? Discussed at length with family need for long term care. Was sent by pcp today for \"a tune up and rehab\" though I explained will likely need more than rehab as he is forgetting to take his meds. No head injury no sx indicating need for head ct. May benefit from inpt mri       Amount and/or Complexity of Data Reviewed  Clinical lab tests: ordered and reviewed  Obtain history from someone other than the patient: yes (family)  Review and summarize past medical records: yes (Echo 4/17 with mitral regurg)  Discuss the patient with other providers: yes (hospitalist)  Independent visualization of images, tracings, or specimens: yes (ekg)    Patient Progress  Patient progress: stable    ED Course       Procedures      EKG interpretation: (Preliminary)  Rhythm: normal sinus rhythm; and regular . Rate (approx.): 60; Axis: normal; P wave: normal; QRS interval: normal ; ST/T wave: non-specific changes;  5:01 PM  Spoke with PT. Pt's blood pressure dropped from 170 to 105 sitting to standing. She was unable to walk him secondary to concerns about blood pressure. 5:37 PM  Pt's family thinks the pt's dizziness is intermittent. Pt is not currently feeling dizzy. Pt's family thinks at one time pt was taking midodrine  but finished the prescription and did not refill it. Family does not think the pt hit his head when he fell. Pt had MRI of brain years ago     5:40 PM  Discussed available lab and imaging results with pt and family.  Both verbalize understanding and agree with care plan. Will admit pt to the hospitalist service for further evaluation and management. CONSULT NOTE:  5:44 PM Sukumar Finnegan MD spoke with Dr. Moris Argueta, Consult for Hospitalist.  Discussed available diagnostic tests and clinical findings. He is in agreement with care plans as outlined. Dr. Moris Argueta will evaluate and admit the patient.

## 2017-07-17 NOTE — ED TRIAGE NOTES
TRIAGE NOTE: Per daughter, VINICIUS JARQUIN [facility] doctor recommended that we bring him in because his blood sugar was 577 and also because of orthostatic hypotension. \" Per daughter his BP \"dropped 30 points\" from sitting to standing

## 2017-07-17 NOTE — IP AVS SNAPSHOT
[address] 
[phone] Patient: Hunter Gonzalez MRN: [id number] [facility]:[id number] Current Discharge Medication List  
  
START taking these medications Dose & Instructions Dispensing Information Comments Morning Noon Evening Bedtime  
 docusate sodium 100 mg capsule Commonly known as:  Grey Guppy Your last dose was: Your next dose is:    
   
   
 Dose:  100 mg Take 1 Cap by mouth daily for 90 days. Quantity:  14 Cap Refills:  0 CONTINUE these medications which have NOT CHANGED Dose & Instructions Dispensing Information Comments Morning Noon Evening Bedtime ASPIR-81 81 mg tablet Generic drug:  aspirin delayed-release Your last dose was: Your next dose is:    
   
   
 Dose:  81 mg Take 81 mg by mouth daily. Refills:  0  
     
   
   
   
  
 CRESTOR 5 mg tablet Generic drug:  rosuvastatin Your last dose was: Your next dose is:    
   
   
 Dose:  5 mg Take 5 mg by mouth nightly. Refills:  0  
     
   
   
   
  
 dulaglutide 0.75 mg/0.5 mL sub-q pen Commonly known as:  TRULICITY Your last dose was: Your next dose is:    
   
   
 Dose:  0.75 mg  
0.5 mL by SubCUTAneous route every seven (7) days. Quantity:  4 Pen Refills:  10 DULoxetine 60 mg capsule Commonly known as:  CYMBALTA Your last dose was: Your next dose is:    
   
   
 Dose:  60 mg Take 60 mg by mouth daily. Refills:  0  
     
   
   
   
  
 finasteride 5 mg tablet Commonly known as:  PROSCAR Your last dose was: Your next dose is:    
   
   
 Dose:  5 mg Take 5 mg by mouth daily. Refills:  0  
     
   
   
   
  
 fludrocortisone 0.1 mg tablet Commonly known as:  FLORINEF Your last dose was: Your next dose is: One-half tablet (0.05 mg or 50 mcg). For low blood pressure Quantity:  30 Tab Refills:  3  
     
   
   
   
  
 gabapentin 100 mg capsule Commonly known as:  NEURONTIN Your last dose was: Your next dose is:    
   
   
 Dose:  100 mg Take 1 Cap by mouth two (2) times a day. Quantity:  60 Cap Refills:  10 HYDROcodone-acetaminophen 7.5-325 mg per tablet Commonly known as:  Tita Jo Your last dose was: Your next dose is:    
   
   
 Dose:  1 Tab Take 1 Tab by mouth two (2) times daily as needed. Refills:  0  
     
   
   
   
  
 insulin aspart 100 unit/mL Inpn Commonly known as:  Jesusita Norlander Your last dose was: Your next dose is:    
   
   
 Sliding scale Refills:  0  
     
   
   
   
  
 insulin detemir 100 unit/mL (3 mL) Inpn Commonly known as:  Julia Kenney Your last dose was: Your next dose is:    
   
   
 Dose:  30 Units 30 Units by SubCUTAneous route daily. Refills:  0  
     
   
   
   
  
 levothyroxine 50 mcg tablet Commonly known as:  SYNTHROID Your last dose was: Your next dose is:    
   
   
 Dose:  50 mcg Take 50 mcg by mouth Daily (before breakfast). Refills:  1 LORazepam 0.5 mg tablet Commonly known as:  ATIVAN Your last dose was: Your next dose is:    
   
   
 Dose:  0.5 mg Take 0.5 mg by mouth two (2) times daily as needed for Anxiety. Refills:  0  
     
   
   
   
  
 midodrine 5 mg tablet Commonly known as:  Clover Quesada Your last dose was: Your next dose is:    
   
   
 Dose:  5 mg Take 5 mg by mouth two (2) times a day. Refills:  0  
     
   
   
   
  
 omeprazole 20 mg capsule Commonly known as:  PRILOSEC Your last dose was: Your next dose is:    
   
   
 Dose:  20 mg Take 20 mg by mouth as needed. Refills:  11  
     
   
   
   
  
 tamsulosin 0.4 mg capsule Commonly known as:  FLOMAX Your last dose was: Your next dose is:    
   
   
 Dose:  0.4 mg Take 0.4 mg by mouth daily. Refills:  0  
     
   
   
   
  
 traZODone 50 mg tablet Commonly known as:  Di Shields Your last dose was: Your next dose is:    
   
   
 Dose:  50 mg Take 50 mg by mouth nightly. Refills:  0  
     
   
   
   
  
 VITAMIN B-12 100 mcg tablet Generic drug:  cyanocobalamin Your last dose was: Your next dose is:    
   
   
 Dose:  100 mcg Take 100 mcg by mouth daily. Refills:  0 Where to Get Your Medications Information on where to get these meds will be given to you by the nurse or doctor. ! Ask your nurse or doctor about these medications  
  docusate sodium 100 mg capsule

## 2017-07-17 NOTE — ROUTINE PROCESS
TRANSFER - OUT REPORT:    Verbal report given to Susannah RN (name) on Mitchel Rosales  being transferred to [address] [place] [address] (unit) for routine progression of care       Report consisted of patients Situation, Background, Assessment and   Recommendations(SBAR). Information from the following report(s) SBAR, ED Summary, [facility] and Recent Results was reviewed with the receiving nurse. Lines:   Peripheral IV 07/17/17 Right Antecubital (Active)   Site Assessment Clean, dry, & intact 7/17/2017  3:50 PM   Phlebitis Assessment 0 7/17/2017  3:50 PM   Infiltration Assessment 0 7/17/2017  3:50 PM   Dressing Status Clean, dry, & intact 7/17/2017  3:50 PM   Dressing Type Transparent 7/17/2017  3:50 PM   Hub Color/Line Status Pink;Capped;Flushed;Patent 7/17/2017  3:50 PM   Action Taken Blood drawn 7/17/2017  3:50 PM        Opportunity for questions and clarification was provided.       Patient transported with:  Transport

## 2017-07-17 NOTE — IP AVS SNAPSHOT
[address] 
[phone] Patient: Britt Tang MRN: [id number] CEK:[id number] You are allergic to the following No active allergies Recent Documentation Height Weight BMI Smoking Status 1.803 m 69.2 kg 21.28 kg/m2 Current Every Day Smoker Emergency Contacts Name Discharge Info Relation Home Work Mobile Concetta Henderson DISCHARGE CAREGIVER [3] Child [2] [phone] Albertina Cobos DISCHARGE CAREGIVER [3] Child [2] [phone] About your hospitalization You were admitted on:  July 17, 2017 You last received care in the:  [facility] SUR[facility] You were discharged on:  July 18, 2017 Unit phone number:  [phone] Why you were hospitalized Your primary diagnosis was:  Orthostatic Hypotension Your diagnoses also included:  Non Compliance W Medication Regimen, Uncontrolled Diabetes Mellitus With Hyperglycemia (Hcc), Volume Depletion, Cad (Coronary Artery Disease), Copd (Chronic Obstructive Pulmonary Disease) (Hcc) Providers Seen During Your Hospitalizations Provider Role Specialty Primary office phone Jodie Last MD Attending Provider Emergency Medicine [phone] Heidi Singh MD Attending Provider Internal Medicine [phone] Jeyson Frost MD Attending Provider Internal Medicine [phone] Your Primary Care Physician (PCP) Primary Care Physician Office Phone Office Fax Paul Smith [phone] Follow-up Information Follow up With Details Comments Contact Info Holland Ansari MD On 7/19/2017 10:00 am [address] [facility] 
[phone] [address] [place] [address] On 7/20/2017 home health [address] 
1st Floor [facility] [address] 
[phone] Your Appointments  Monday July 24, 2017  1:40 PM EDT  
 ESTABLISHED PATIENT with Deidre Gallardo MD  
[facility] [address]) [address] [facility] 
[address]  
[phone] Friday August 11, 2017  1:50 PM EDT ROUTINE CARE with MD [facility] [address] [facility]) [address] [facility] [address]  
[phone] Current Discharge Medication List  
  
START taking these medications Dose & Instructions Dispensing Information Comments Morning Noon Evening Bedtime  
 docusate sodium 100 mg capsule Commonly known as:  Gwendel Laser Your last dose was: Your next dose is:    
   
   
 Dose:  100 mg Take 1 Cap by mouth daily for 90 days. Quantity:  14 Cap Refills:  0 CONTINUE these medications which have NOT CHANGED Dose & Instructions Dispensing Information Comments Morning Noon Evening Bedtime ASPIR-81 81 mg tablet Generic drug:  aspirin delayed-release Your last dose was: Your next dose is:    
   
   
 Dose:  81 mg Take 81 mg by mouth daily. Refills:  0  
     
   
   
   
  
 CRESTOR 5 mg tablet Generic drug:  rosuvastatin Your last dose was: Your next dose is:    
   
   
 Dose:  5 mg Take 5 mg by mouth nightly. Refills:  0  
     
   
   
   
  
 dulaglutide 0.75 mg/0.5 mL sub-q pen Commonly known as:  TRULICITY Your last dose was: Your next dose is:    
   
   
 Dose:  0.75 mg  
0.5 mL by SubCUTAneous route every seven (7) days. Quantity:  4 Pen Refills:  10 DULoxetine 60 mg capsule Commonly known as:  CYMBALTA Your last dose was: Your next dose is:    
   
   
 Dose:  60 mg Take 60 mg by mouth daily. Refills:  0  
     
   
   
   
  
 finasteride 5 mg tablet Commonly known as:  PROSCAR Your last dose was: Your next dose is: Dose:  5 mg Take 5 mg by mouth daily. Refills:  0  
     
   
   
   
  
 fludrocortisone 0.1 mg tablet Commonly known as:  FLORINEF Your last dose was: Your next dose is:    
   
   
 One-half tablet (0.05 mg or 50 mcg). For low blood pressure Quantity:  30 Tab Refills:  3  
     
   
   
   
  
 gabapentin 100 mg capsule Commonly known as:  NEURONTIN Your last dose was: Your next dose is:    
   
   
 Dose:  100 mg Take 1 Cap by mouth two (2) times a day. Quantity:  60 Cap Refills:  10 HYDROcodone-acetaminophen 7.5-325 mg per tablet Commonly known as:  Brook Dieter Your last dose was: Your next dose is:    
   
   
 Dose:  1 Tab Take 1 Tab by mouth two (2) times daily as needed. Refills:  0  
     
   
   
   
  
 insulin aspart 100 unit/mL Inpn Commonly known as:  Zainab Zamudio Your last dose was: Your next dose is:    
   
   
 Sliding scale Refills:  0  
     
   
   
   
  
 insulin detemir 100 unit/mL (3 mL) Inpn Commonly known as:  Favian Parks Your last dose was: Your next dose is:    
   
   
 Dose:  30 Units 30 Units by SubCUTAneous route daily. Refills:  0  
     
   
   
   
  
 levothyroxine 50 mcg tablet Commonly known as:  SYNTHROID Your last dose was: Your next dose is:    
   
   
 Dose:  50 mcg Take 50 mcg by mouth Daily (before breakfast). Refills:  1 LORazepam 0.5 mg tablet Commonly known as:  ATIVAN Your last dose was: Your next dose is:    
   
   
 Dose:  0.5 mg Take 0.5 mg by mouth two (2) times daily as needed for Anxiety. Refills:  0  
     
   
   
   
  
 midodrine 5 mg tablet Commonly known as:  Steven Heredia Your last dose was: Your next dose is:    
   
   
 Dose:  5 mg Take 5 mg by mouth two (2) times a day. Refills:  0  
     
   
   
   
  
 omeprazole 20 mg capsule Commonly known as:  PRILOSEC Your last dose was: Your next dose is:    
   
   
 Dose:  20 mg Take 20 mg by mouth as needed. Refills:  11  
     
   
   
   
  
 tamsulosin 0.4 mg capsule Commonly known as:  FLOMAX Your last dose was: Your next dose is:    
   
   
 Dose:  0.4 mg Take 0.4 mg by mouth daily. Refills:  0  
     
   
   
   
  
 traZODone 50 mg tablet Commonly known as:  Donnetandrés Ignacio Your last dose was: Your next dose is:    
   
   
 Dose:  50 mg Take 50 mg by mouth nightly. Refills:  0  
     
   
   
   
  
 VITAMIN B-12 100 mcg tablet Generic drug:  cyanocobalamin Your last dose was: Your next dose is:    
   
   
 Dose:  100 mcg Take 100 mcg by mouth daily. Refills:  0 Where to Get Your Medications Information on where to get these meds will be given to you by the nurse or doctor. ! Ask your nurse or doctor about these medications  
  docusate sodium 100 mg capsule Discharge Instructions PATIENT DISCHARGE INSTRUCTIONS PATIENT DISCHARGE INSTRUCTIONS Jasen Grullon / [id number] : 1940 Admitted 2017 Discharged: 2017 · It is important that you take the medication exactly as they are prescribed. · Keep your medication in the bottles provided by the pharmacist and keep a list of the medication names, dosages, and times to be taken in your wallet. · Do not take other medications without consulting your doctor. What to do at [facility] Recommended Diet:heart healthy diet Recommended Activity:as tolerated If you experience any of the following symptoms unresponsiveness,fever,chills,please seek medical attention. Keep good hydration. Follow up with your pcp Signed By: Gianluca Huynh MD   
 2017 Discharge Orders None [facility] Announcement We are excited to announce that we are making your provider's discharge notes available to you in [employer]. You will see these notes when they are completed and signed by the physician that discharged you from your recent hospital stay. If you have any questions or concerns about any information you see in [employer], please call the Health Information Department where you were seen or reach out to your Primary Care Provider for more information about your plan of care. Introducing [facility]! Marco Salomon introduces [employer] patient portal. Now you can access parts of your medical record, email your doctor's office, and request medication refills online. 1. In your internet browser, go to [email or web address] 2. Click on the First Time User? Click Here link in the Sign In box. You will see the New Member Sign Up page. 3. Enter your [employer] Access Code exactly as it appears below. You will not need to use this code after youve completed the sign-up process. If you do not sign up before the expiration date, you must request a new code. · [employer] Access Code: [email or web address] Expires: 7/25/2017  2:21 PM 
 
4. Enter the last four digits of your Social Security Number (xxxx) and Date of Birth (mm/dd/yyyy) as indicated and click Submit. You will be taken to the next sign-up page. 5. Create a [employer] ID. This will be your [employer] login ID and cannot be changed, so think of one that is secure and easy to remember. 6. Create a [employer] password. You can change your password at any time. 7. Enter your Password Reset Question and Answer. This can be used at a later time if you forget your password. 8. Enter your e-mail address. You will receive e-mail notification when new information is available in [address] 9. Click Sign Up. You can now view and download portions of your medical record.  
10. Click the Download Summary menu link to download a portable copy of your medical information. If you have questions, please visit the Frequently Asked Questions section of the [employer] website. Remember, MyChart is NOT to be used for urgent needs. For medical emergencies, dial 911. Now available from your iPhone and Android! General Information Please provide this summary of care documentation to your next provider. Patient Signature:  ____________________________________________________________ Date:  ____________________________________________________________  
  
Ashlie Armen Provider Signature:  ____________________________________________________________ Date:  ____________________________________________________________

## 2017-07-17 NOTE — ED NOTES
Patient resting comfortably in stretcher. VSS. Respirations equal and unlabored on RA. IV fluids infusing. Patient in no acute distress upon transfer to the floor.

## 2017-07-18 VITALS
HEIGHT: 71 IN | DIASTOLIC BLOOD PRESSURE: 55 MMHG | WEIGHT: 152.6 LBS | RESPIRATION RATE: 16 BRPM | TEMPERATURE: 97.7 F | HEART RATE: 76 BPM | BODY MASS INDEX: 21.36 KG/M2 | OXYGEN SATURATION: 97 % | SYSTOLIC BLOOD PRESSURE: 165 MMHG

## 2017-07-18 LAB
ANION GAP BLD CALC-SCNC: 7 MMOL/L (ref 5–15)
ATRIAL RATE: 62 BPM
BUN SERPL-MCNC: 14 MG/DL (ref 6–20)
BUN/CREAT SERPL: 11 (ref 12–20)
CALCIUM SERPL-MCNC: 8.4 MG/DL (ref 8.5–10.1)
CALCULATED P AXIS, ECG09: 94 DEGREES
CALCULATED R AXIS, ECG10: 87 DEGREES
CALCULATED T AXIS, ECG11: 44 DEGREES
CHLORIDE SERPL-SCNC: 107 MMOL/L (ref 97–108)
CHOLEST SERPL-MCNC: 110 MG/DL
CO2 SERPL-SCNC: 29 MMOL/L (ref 21–32)
CREAT SERPL-MCNC: 1.33 MG/DL (ref 0.7–1.3)
DIAGNOSIS, 93000: NORMAL
ERYTHROCYTE [DISTWIDTH] IN BLOOD BY AUTOMATED COUNT: 14.2 % (ref 11.5–14.5)
EST. AVERAGE GLUCOSE BLD GHB EST-MCNC: 341 MG/DL
GLUCOSE BLD STRIP.AUTO-MCNC: 135 MG/DL (ref 65–100)
GLUCOSE BLD STRIP.AUTO-MCNC: 58 MG/DL (ref 65–100)
GLUCOSE BLD STRIP.AUTO-MCNC: 60 MG/DL (ref 65–100)
GLUCOSE BLD STRIP.AUTO-MCNC: 96 MG/DL (ref 65–100)
GLUCOSE SERPL-MCNC: 56 MG/DL (ref 65–100)
HBA1C MFR BLD: 13.5 % (ref 4.2–6.3)
HCT VFR BLD AUTO: 30.4 % (ref 36.6–50.3)
HDLC SERPL-MCNC: 47 MG/DL
HDLC SERPL: 2.3 {RATIO} (ref 0–5)
HGB BLD-MCNC: 9.8 G/DL (ref 12.1–17)
LDLC SERPL CALC-MCNC: 44 MG/DL (ref 0–100)
LIPID PROFILE,FLP: NORMAL
MCH RBC QN AUTO: 28.5 PG (ref 26–34)
MCHC RBC AUTO-ENTMCNC: 32.2 G/DL (ref 30–36.5)
MCV RBC AUTO: 88.4 FL (ref 80–99)
P-R INTERVAL, ECG05: 146 MS
PLATELET # BLD AUTO: 227 K/UL (ref 150–400)
POTASSIUM SERPL-SCNC: 3.7 MMOL/L (ref 3.5–5.1)
Q-T INTERVAL, ECG07: 412 MS
QRS DURATION, ECG06: 90 MS
QTC CALCULATION (BEZET), ECG08: 418 MS
RBC # BLD AUTO: 3.44 M/UL (ref 4.1–5.7)
SERVICE CMNT-IMP: ABNORMAL
SERVICE CMNT-IMP: NORMAL
SODIUM SERPL-SCNC: 143 MMOL/L (ref 136–145)
TRIGL SERPL-MCNC: 95 MG/DL (ref ?–150)
VENTRICULAR RATE, ECG03: 62 BPM
VLDLC SERPL CALC-MCNC: 19 MG/DL
WBC # BLD AUTO: 8.2 K/UL (ref 4.1–11.1)

## 2017-07-18 PROCEDURE — 80061 LIPID PANEL: CPT | Performed by: HOSPITALIST

## 2017-07-18 PROCEDURE — 74011250636 HC RX REV CODE- 250/636: Performed by: HOSPITALIST

## 2017-07-18 PROCEDURE — 36415 COLL VENOUS BLD VENIPUNCTURE: CPT | Performed by: HOSPITALIST

## 2017-07-18 PROCEDURE — 97116 GAIT TRAINING THERAPY: CPT

## 2017-07-18 PROCEDURE — G8987 SELF CARE CURRENT STATUS: HCPCS

## 2017-07-18 PROCEDURE — 74011250637 HC RX REV CODE- 250/637: Performed by: HOSPITALIST

## 2017-07-18 PROCEDURE — G8988 SELF CARE GOAL STATUS: HCPCS

## 2017-07-18 PROCEDURE — 97532 HC OT COGNITIVE SKILL DEV 15 MIN: CPT

## 2017-07-18 PROCEDURE — 97165 OT EVAL LOW COMPLEX 30 MIN: CPT

## 2017-07-18 PROCEDURE — 99218 HC RM OBSERVATION: CPT

## 2017-07-18 PROCEDURE — 83036 HEMOGLOBIN GLYCOSYLATED A1C: CPT | Performed by: HOSPITALIST

## 2017-07-18 PROCEDURE — 82962 GLUCOSE BLOOD TEST: CPT

## 2017-07-18 PROCEDURE — 96361 HYDRATE IV INFUSION ADD-ON: CPT

## 2017-07-18 PROCEDURE — 80048 BASIC METABOLIC PNL TOTAL CA: CPT | Performed by: HOSPITALIST

## 2017-07-18 PROCEDURE — 85027 COMPLETE CBC AUTOMATED: CPT | Performed by: HOSPITALIST

## 2017-07-18 RX ORDER — DOCUSATE SODIUM 100 MG/1
100 CAPSULE, LIQUID FILLED ORAL DAILY
Qty: 14 CAP | Refills: 0 | Status: SHIPPED | OUTPATIENT
Start: 2017-07-18 | End: 2017-08-11

## 2017-07-18 RX ADMIN — PANTOPRAZOLE SODIUM 40 MG: 40 TABLET, DELAYED RELEASE ORAL at 10:31

## 2017-07-18 RX ADMIN — LEVOTHYROXINE SODIUM 50 MCG: 50 TABLET ORAL at 06:35

## 2017-07-18 RX ADMIN — SODIUM CHLORIDE 100 ML/HR: 900 INJECTION, SOLUTION INTRAVENOUS at 08:37

## 2017-07-18 RX ADMIN — DULOXETINE HYDROCHLORIDE 60 MG: 60 CAPSULE, DELAYED RELEASE ORAL at 10:31

## 2017-07-18 RX ADMIN — Medication 100 MCG: at 10:32

## 2017-07-18 RX ADMIN — Medication 10 ML: at 08:41

## 2017-07-18 RX ADMIN — FINASTERIDE 5 MG: 5 TABLET, FILM COATED ORAL at 10:31

## 2017-07-18 RX ADMIN — FLUDROCORTISONE ACETATE 50 MCG: 0.1 TABLET ORAL at 10:32

## 2017-07-18 RX ADMIN — ASPIRIN 81 MG: 81 TABLET, COATED ORAL at 10:31

## 2017-07-18 RX ADMIN — DOCUSATE SODIUM 100 MG: 100 CAPSULE, LIQUID FILLED ORAL at 10:31

## 2017-07-18 RX ADMIN — GABAPENTIN 100 MG: 100 CAPSULE ORAL at 10:31

## 2017-07-18 NOTE — PROGRESS NOTES
HYPOGLYCEMIC EPISODE DOCUMENTATION    Patient with hypoglycemic episode(s) at 0730(time) on t  (date). BG value(s) pre-treatment 77    Was patient symptomatic?  [] yes, [x] no  Patient was treated with the following rescue medications/treatments: [] D50                [] Glucose tablets                [] Glucagon                [x] 4oz juice                [] 6oz reg soda                [] 8oz low fat milk  BG value post-treatment: 96  Once BG treated and value greater than 80mg/dl, pt was provided with the following:  [] snack  [x] meal  Name of MD notified:protocol was followed  The following orders were received: none

## 2017-07-18 NOTE — DIABETES MGMT
DTC Consult Note    Recommendations/ Comments: Chart review for varied BG's. On admission  yesterday at 1505. Once he received insulin, BG dropped significantly. Hypoglycemia  occurred - BG 60 at 0731 today. Danilo on Lantus 30 units daily and lispro correction scale, normal sensitivity. This raises concerns about his ability to correctly take medication at home  Per chart review patient's appetite is low and he may only a little bit. If appropriate, please consider   Decrease Lantus to 25 units daily  Change correction scale to lispro high sensitivity    For discharge home:  Plans are in place for him to go to the [address] [facility] for rehab and then go home  Simplify home medications:  Discontinue Trulicity and Novolog AC  Continue basal insulin(Levemir) daily  He will need supervision with his medication to make sure he is taking insulin correctly    Consult received for:  [x]             Assessment of home management                []      Medication Recommendations                []             Meter/monitoring     []             Insulin instruction     []             New diagnosis     []             Outpatient education     []             Insulin pump patient     []             Insulin infusion     []             DKA/HHS    Chart reviewed and initial evaluation complete on Marie Irizarry. Patient is a 77 y.o. male with known DM on Levemir 30 units daily, Novolog TID with meals and Trulicity weekly. Chart reviewed for information. Pt was a limited historian due to mental status. Not appropriate for consult. BG monitoring at home - states he has a meter.      Provided patient with the following: [x]             Survival skills education materials               []             Insulin education materials               []             CHO counting education materials               [x]             Outpatient DTC contact number               []             Glucometer Discussed with patient and/or family need for follow up appointment for diabetes management after discharge. A1c:   Lab Results   Component Value Date/Time    Hemoglobin A1c 13.5 07/18/2017 02:14 AM    Hemoglobin A1c, External 11.2 01/27/2016       Recent Glucose Results:   Lab Results   Component Value Date/Time    GLU 56 (L) 07/18/2017 02:14 AM     (H) 07/17/2017 03:48 PM    GLUCPOC 96 07/18/2017 08:29 AM    GLUCPOC 58 (L) 07/18/2017 08:00 AM    GLUCPOC 60 (L) 07/18/2017 07:31 AM        Lab Results   Component Value Date/Time    Creatinine 1.33 07/18/2017 02:14 AM     Estimated Creatinine Clearance: 47 mL/min (based on Cr of 1.33). Active Orders   Diet    DIET DIABETIC CONSISTENT CARB Regular        PO intake: No data found. Current hospital DM medication: Lantus 30 units daily and lispro normal sensitivity correction scale    Will continue to follow as needed. Thank you.   Eloy Parmar RN, CDE

## 2017-07-18 NOTE — PROGRESS NOTES
Hospitalist Progress Note  Renny Pierre MD  Internal medicine/ Hospitalist    Daily Progress Note: 7/18/2017 8:25 AM      Interval history / Subjective: The patient is a 71-year-old gentleman with a past medical history of coronary artery disease as  well as type 2 diabetes, COPD, orthostatic hypotension, and CKD stage III who presented with his family for failure to thrive. Patient nt able to provide enough information due to cognitive dysfunction. He has not been able to take his medications as prescribed,not able to eat when there is no assistance from family. His blood sugar has been uncontrolled. He is admitted for failure to thrive,hyperglycemia,WENDY,mild hyponatremia.     Current Facility-Administered Medications   Medication Dose Route Frequency    aspirin delayed-release tablet 81 mg  81 mg Oral DAILY    cyanocobalamin (VITAMIN B12) tablet 100 mcg  100 mcg Oral DAILY    DULoxetine (CYMBALTA) capsule 60 mg  60 mg Oral DAILY    finasteride (PROSCAR) tablet 5 mg  5 mg Oral DAILY    fludrocortisone (FLORINEF) tablet 50 mcg  50 mcg Oral DAILY    gabapentin (NEURONTIN) capsule 100 mg  100 mg Oral BID    HYDROcodone-acetaminophen (NORCO) 7.5-325 mg per tablet 1 Tab  1 Tab Oral BID PRN    levothyroxine (SYNTHROID) tablet 50 mcg  50 mcg Oral ACB    LORazepam (ATIVAN) tablet 0.5 mg  0.5 mg Oral BID PRN    midodrine (PROAMITINE) tablet 5 mg  5 mg Oral 2 times per day    pantoprazole (PROTONIX) tablet 40 mg  40 mg Oral DAILY    rosuvastatin (CRESTOR) tablet 5 mg  5 mg Oral QHS    traZODone (DESYREL) tablet 50 mg  50 mg Oral QHS    0.9% sodium chloride infusion  100 mL/hr IntraVENous CONTINUOUS    insulin glargine (LANTUS) injection 30 Units  30 Units SubCUTAneous QHS    insulin lispro (HUMALOG) injection   SubCUTAneous AC&HS    glucose chewable tablet 16 g  4 Tab Oral PRN    glucagon (GLUCAGEN) injection 1 mg  1 mg IntraMUSCular PRN    sodium chloride (NS) flush 5-10 mL  5-10 mL IntraVENous Q8H  sodium chloride (NS) flush 5-10 mL  5-10 mL IntraVENous PRN    acetaminophen (TYLENOL) tablet 650 mg  650 mg Oral Q4H PRN    ondansetron (ZOFRAN) injection 4 mg  4 mg IntraVENous Q4H PRN    docusate sodium (COLACE) capsule 100 mg  100 mg Oral BID    dextrose 10 % infusion 125-250 mL  125-250 mL IntraVENous PRN        Objective:     Visit Vitals    /51 (BP 1 Location: Left arm, BP Patient Position: At rest)    Pulse 66    Temp 97.7 °F (36.5 °C)    Resp 16    Ht 5' 11\" (1.803 m)    Wt 69.2 kg (152 lb 9.6 oz)    SpO2 96%    BMI 21.28 kg/m2      O2 Device: Room air    Temp (24hrs), Av.2 °F (36.8 °C), Min:97.7 °F (36.5 °C), Max:98.4 °F (36.9 °C)  Lungs catb  Heart s1s2 nl  Abd:soft,not tender  Extr:no edema  Neuro:aaox3       Data Review    Recent Results (from the past 12 hour(s))   GLUCOSE, POC    Collection Time: 17 10:05 PM   Result Value Ref Range    Glucose (POC) 93 65 - 100 mg/dL    Performed by JB Pierce    Collection Time: 17  2:14 AM   Result Value Ref Range    Sodium 143 136 - 145 mmol/L    Potassium 3.7 3.5 - 5.1 mmol/L    Chloride 107 97 - 108 mmol/L    CO2 29 21 - 32 mmol/L    Anion gap 7 5 - 15 mmol/L    Glucose 56 (L) 65 - 100 mg/dL    BUN 14 6 - 20 MG/DL    Creatinine 1.33 (H) 0.70 - 1.30 MG/DL    BUN/Creatinine ratio 11 (L) 12 - 20      GFR est AA >60 >60 ml/min/1.73m2    GFR est non-AA 52 (L) >60 ml/min/1.73m2    Calcium 8.4 (L) 8.5 - 10.1 MG/DL   CBC W/O DIFF    Collection Time: 17  2:14 AM   Result Value Ref Range    WBC 8.2 4.1 - 11.1 K/uL    RBC 3.44 (L) 4.10 - 5.70 M/uL    HGB 9.8 (L) 12.1 - 17.0 g/dL    HCT 30.4 (L) 36.6 - 50.3 %    MCV 88.4 80.0 - 99.0 FL    MCH 28.5 26.0 - 34.0 PG    MCHC 32.2 30.0 - 36.5 g/dL    RDW 14.2 11.5 - 14.5 %    PLATELET 041 940 - 390 K/uL   LIPID PANEL    Collection Time: 17  2:14 AM   Result Value Ref Range    LIPID PROFILE          Cholesterol, total 110 <200 MG/DL    Triglyceride 95 <150 MG/DL    HDL Cholesterol 47 MG/DL    LDL, calculated 44 0 - 100 MG/DL    VLDL, calculated 19 MG/DL    CHOL/HDL Ratio 2.3 0 - 5.0     HEMOGLOBIN A1C WITH EAG    Collection Time: 07/18/17  2:14 AM   Result Value Ref Range    Hemoglobin A1c 13.5 (H) 4.2 - 6.3 %    Est. average glucose 341 mg/dL   GLUCOSE, POC    Collection Time: 07/18/17  7:31 AM   Result Value Ref Range    Glucose (POC) 60 (L) 65 - 100 mg/dL    Performed by Vika Gutierrez    GLUCOSE, POC    Collection Time: 07/18/17  8:00 AM   Result Value Ref Range    Glucose (POC) 58 (L) 65 - 100 mg/dL    Performed by Sidra Frazier          Assessment/Plan:     Principal Problem:    Orthostatic hypotension (7/17/2017)    Active Problems:    Non compliance w medication regimen (7/23/2015)      Uncontrolled diabetes mellitus with hyperglycemia ([address] (4/11/2017)      Volume depletion (4/11/2017)      CAD (coronary artery disease) (4/11/2017)      COPD (chronic obstructive pulmonary disease) ([address] (4/11/2017)          Care Plan  IV fluid   for placement  SSI  DVT prophylaxis

## 2017-07-18 NOTE — DISCHARGE SUMMARY
Discharge Summary    Patient: Vandana Coleman               Sex: male          DOA: 7/17/2017         YOB: 1940      Age:  77 y.o.        LOS:  LOS: 0 days                Admit Date: 7/17/2017    Discharge Date: 7/18/2017    Admission Diagnoses: Orthostatic hypotension    Discharge Diagnoses:   Dehydration  WENDY  Failure to thrive  Uncontrolled diabetes  Hyponatremia  Cognitive decline   Problem List as of 7/18/2017  Date Reviewed: 4/26/2017          Codes Class Noted - Resolved    * (Principal)Orthostatic hypotension ICD-10-CM: I95.1  ICD-9-CM: 458.0  7/17/2017 - Present        Uncontrolled diabetes mellitus with hyperglycemia ([facility] ICD-10-CM: E11.65  ICD-9-CM: 250.02  4/11/2017 - Present        Syncope ICD-10-CM: R55  ICD-9-CM: 780.2  4/11/2017 - Present        Volume depletion ICD-10-CM: E86.9  ICD-9-CM: 276.50  4/11/2017 - Present        Hyponatremia ICD-10-CM: E87.1  ICD-9-CM: 276.1  4/11/2017 - Present        Urinary incontinence ICD-10-CM: R32  ICD-9-CM: 788.30  4/11/2017 - Present        CAD (coronary artery disease) ICD-10-CM: I25.10  ICD-9-CM: 414.00  4/11/2017 - Present        COPD (chronic obstructive pulmonary disease) ([facility] ICD-10-CM: J44.9  ICD-9-CM: 496  4/11/2017 - Present        Cognitive disorder ICD-10-CM: F09  ICD-9-CM: 294.9  7/11/2016 - Present    Overview Signed 7/11/2016  2:00 PM by Nataliia Serna MD     Due to [facility]             Moderate recurrent major depression ([facility] ICD-10-CM: F33.1  ICD-9-CM: 296.32  7/11/2016 - Present        Complicated grief [id number]: F43.29, Z63.4  ICD-9-CM: 309.0  7/23/2015 - Present        Non compliance w medication regimen ICD-10-CM: Z91.14  ICD-9-CM: V15.81  7/23/2015 - Present        RESOLVED: Pneumonia ICD-10-CM: J18.9  ICD-9-CM: 486  8/6/2015 - 8/8/2015        RESOLVED: Major psychotic depression, single episode ([facility] ICD-10-CM: F32.3  ICD-9-CM: 296.24  7/23/2015 - 7/11/2016              Discharge Medications:     Current Discharge Medication List      START taking these medications    Details   docusate sodium (COLACE) 100 mg capsule Take 1 Cap by mouth daily for 90 days. Qty: 14 Cap, Refills: 0         CONTINUE these medications which have NOT CHANGED    Details   LORazepam (ATIVAN) 0.5 mg tablet Take 0.5 mg by mouth two (2) times daily as needed for Anxiety. midodrine (PROAMITINE) 5 mg tablet Take 5 mg by mouth two (2) times a day. insulin detemir (LEVEMIR FLEXTOUCH) 100 unit/mL (3 mL) inpn 30 Units by SubCUTAneous route daily. traZODone (DESYREL) 50 mg tablet Take 50 mg by mouth nightly. dulaglutide (TRULICITY) 8.82 EJ/0.7 mL sub-q pen 0.5 mL by SubCUTAneous route every seven (7) days. Qty: 4 Pen, Refills: 10      fludrocortisone (FLORINEF) 0.1 mg tablet One-half tablet (0.05 mg or 50 mcg). For low blood pressure  Qty: 30 Tab, Refills: 3      gabapentin (NEURONTIN) 100 mg capsule Take 1 Cap by mouth two (2) times a day. Qty: 60 Cap, Refills: 10      cyanocobalamin (VITAMIN B-12) 100 mcg tablet Take 100 mcg by mouth daily. DULoxetine (CYMBALTA) 60 mg capsule Take 60 mg by mouth daily. finasteride (PROSCAR) 5 mg tablet Take 5 mg by mouth daily. tamsulosin (FLOMAX) 0.4 mg capsule Take 0.4 mg by mouth daily. HYDROcodone-acetaminophen (NORCO) 7.5-325 mg per tablet Take 1 Tab by mouth two (2) times daily as needed. Refills: 0      insulin aspart (NOVOLOG) 100 unit/mL inpn Sliding scale      omeprazole (PRILOSEC) 20 mg capsule Take 20 mg by mouth as needed. Refills: 11      rosuvastatin (CRESTOR) 5 mg tablet Take 5 mg by mouth nightly. levothyroxine (SYNTHROID) 50 mcg tablet Take 50 mcg by mouth Daily (before breakfast). Refills: 1      aspirin delayed-release (ASPIR-81) 81 mg tablet Take 81 mg by mouth daily.              Follow-up:pcp    Discharge Condition:good    Activity:as tolerated    Diet:heart healthy diet    Labs:  Labs: Results:       Chemistry Recent Labs      07/18/17   0214  07/17/17   1548 GLU  56*  384*   NA  143  134*   K  3.7  4.3   CL  107  96*   CO2  29  31   BUN  14  14   CREA  1.33*  1.64*   CA  8.4*  8.7   AGAP  7  7   BUCR  11*  9*   AP   --   125*   TP   --   6.9   ALB   --   3.4*   GLOB   --   3.5   AGRAT   --   1.0*      CBC w/Diff Recent Labs      07/18/17   0214  07/17/17   1548   WBC  8.2  6.7   RBC  3.44*  3.90*   HGB  9.8*  11.2*   HCT  30.4*  35.0*   PLT  227  229   GRANS   --   63   LYMPH   --   27   EOS   --   2      Cardiac Enzymes No results for input(s): CPK, CKND1, MONROE in the last 72 hours. No lab exists for component: CKRMB, TROIP   Coagulation No results for input(s): PTP, INR, APTT in the last 72 hours. No lab exists for component: INREXT    Lipid Panel Lab Results   Component Value Date/Time    Cholesterol, total 110 07/18/2017 02:14 AM    HDL Cholesterol 47 07/18/2017 02:14 AM    LDL, calculated 44 07/18/2017 02:14 AM    VLDL, calculated 19 07/18/2017 02:14 AM    Triglyceride 95 07/18/2017 02:14 AM    CHOL/HDL Ratio 2.3 07/18/2017 02:14 AM      BNP No results for input(s): BNPP in the last 72 hours. Liver Enzymes Recent Labs      07/17/17   1548   TP  6.9   ALB  3.4*   AP  125*   SGOT  44*      Thyroid Studies Lab Results   Component Value Date/Time    TSH 1.06 07/17/2017 03:48 PM          Imaging:  CT head  No hemorrhage or other acute intracranial abnormality. Consults:   none    Treatment Team: Treatment Team: Attending Provider: Sheryl Palafox MD; Consulting Provider: Juanpablo Wagner MD; Utilization Review: Tracee Zepeda RN; Care Manager: Mat Galvan LCSW    Significant Diagnostic Studies:    HISTORY OF PRESENT ILLNESS: The patient is a 71-year-old   gentleman with a past medical history of coronary artery disease as   well as type 2 diabetes, COPD, orthostatic hypotension, and CKD   stage III who presents with his family for failure to thrive.  The patient   unfortunately does have some memory issues and a lot of the history   is provided by his family. Apparently, he has been doing more and   more poorly over the past few months. In particular, he has been   increasingly dizzy with standing. This has been true for about the past   year or so. He is diagnosed with orthostatic hypotension and is   prescribed both midodrine and fludrocortisone. He also has diabetes. His family states the issue is that he often forgets to take his medicine. Per the patient's son, he has a home care aide who comes in the   morning to help him and will give him his medicines. They have made   some adjustments so that they are able to give him his evening   medicines as well when the son or daughter come to visit, but   nonetheless he has been noncompliant. The patient states that he is   taking his insulin, but cannot remember the last time he gave it to   himself. His son notes that nurse checks is blood glucose. The patient   is not sure what his blood glucose has been. According to his family, he   was admitted to [facility] approximately a week ago   due to uncontrolled blood sugars. The patient has had a number of falls   over the past several weeks, the last of which was last week. The   patient and his family deny that he injured himself in any way and did   not hit his head. The patient has been losing weight though he cannot   tell me how much. He notes that occasionally he will make himself a   sandwich at home. The patient's home aide will make him breakfast.   Patient receives Meals On Wheels and his son will bring him dinner,   but the patient is intermittent about eating and oftentimes will only eat a   few berries rather than a full meal which makes the family worried   about hypoglycemia. The patient has had worsening mental status that   has caused a great deal of the issues he is facing right now.      Hospital Course:  Patient was admitted for failure to thrive. He was noted to have WENDY and was started in uv fluid. His kidney functions have improved as his creatinine today is 1.33 from 1.64 on admission. He was hyperglycemic and his sugar is now controlled. Hyponatremia has been corrected. The patient's failure to thrive seems mostly to be secondary to worsening cognitive functions. So long term he will need a residential living facility where he could be monitored and have adjustments made to his medications. Case management has been involved,unfortunatelly patient's insurance did not qualify him for the facility contacted. So  has arranger has arranged for home with home health: skilled nursing for nutrition, diabetic teaching, reality orientation, and PT. Patient is clinically stable stable and cleared for discharge. P/E  Lungs ctab  Heart s1s2 nl  Abdm:soft,not tender  Extr:no edema  Neuro:aaox3,cooperative    Time for discharge:35 minutes.     Sheryl Palafox MD  July 18, 2017

## 2017-07-18 NOTE — H&P
[address]   [address]   HISTORY AND PHYSICAL       Name:  Thor Galeas   MR#:  [id number]   :  1940   Account #:  [de-identified]        Date of Adm:  2017       PRIMARY CARE DOCTOR: Joselyn Lawrence MD.     CHIEF COMPLAINT: Failure to thrive. HISTORY OF PRESENT ILLNESS: The patient is a 78-year-old   gentleman with a past medical history of coronary artery disease as   well as type 2 diabetes, COPD, orthostatic hypotension, and CKD   stage III who presents with his family for failure to thrive. The patient   unfortunately does have some memory issues and a lot of the history   is provided by his family. Apparently, he has been doing more and   more poorly over the past few months. In particular, he has been   increasingly dizzy with standing. This has been true for about the past   year or so. He is diagnosed with orthostatic hypotension and is   prescribed both midodrine and fludrocortisone. He also has diabetes. His family states the issue is that he often forgets to take his medicine. Per the patient's son, he has a home care aide who comes in the   morning to help him and will give him his medicines. They have made   some adjustments so that they are able to give him his evening   medicines as well when the son or daughter come to visit, but   nonetheless he has been noncompliant. The patient states that he is   taking his insulin, but cannot remember the last time he gave it to   himself. His son notes that nurse checks is blood glucose. The patient   is not sure what his blood glucose has been. According to his family, he   was admitted to [facility] approximately a week ago   due to uncontrolled blood sugars. The patient has had a number of falls   over the past several weeks, the last of which was last week. The   patient and his family deny that he injured himself in any way and did   not hit his head.  The patient has been losing weight though he cannot   tell me how much. He notes that occasionally he will make himself a   sandwich at home. The patient's home aide will make him breakfast.   Patient receives Meals On Wheels and his son will bring him dinner,   but the patient is intermittent about eating and oftentimes will only eat a   few berries rather than a full meal which makes the family worried   about hypoglycemia. The patient has had worsening mental status that   has caused a great deal of the issues he is facing right now. PAST MEDICAL HISTORY    1. Type 2 diabetes mellitus. 2. Coronary artery disease. 3. COPD.    4. Orthostatic hypotension. 5. BPH. 6. Unspecified cognitive disorder. MEDICATIONS: Reviewed. ALLERGIES: NO KNOWN DRUG ALLERGIES. REVIEW OF SYSTEMS: The patient denies any dysuria, hematuria,   urinary frequency. He denies any chest pain, palpitations, shortness of   breath. Otherwise 10-point review of systems is negative except for as   mentioned in the HPI and past medical history. FAMILY HISTORY: Reviewed and noncontributory for falls. SOCIAL HISTORY: The patient lives at home alone, though has aide   as described above. No current tobacco, alcohol or illicit drug use. PHYSICAL EXAMINATION   VITAL SIGNS: Temperature 36.7, pulse 57, blood pressure 153/58,   respirations 16, oxygen saturation 98% on room air. GENERAL APPEARANCE: The patient is a pleasant, elderly   gentleman in no acute distress. EYES: Pupils equal, round, reactive to light. Extraocular movements   intact. HEENT: Head is normocephalic, atraumatic. Mucous membranes are   dry. Oropharynx is benign. NECK: Supple. No tenderness to palpation of the cervical spine. Full   range of motion, no jugular venous distention. HEART: Regular rate and rhythm with a 3/6 systolic murmur best   auscultated at the apex. RESPIRATORY: Lungs clear to auscultation bilaterally with normal   work of breathing. GASTROINTESTINAL: Abdomen soft, nontender, nondistended with   posterior bowel sounds. GENITOURINARY: No costovertebral angle tenderness. SKIN: No rash, pallor or jaundice. MUSCULOSKELETAL: Muscle tone is normal. Bulk is decreased even   more so than expected with age. No tenderness to palpation of the   ankle joints, knee joints, hip joints, shoulders, elbows or wrists. NEUROLOGICAL: The patient is alert and oriented x3. Cranial nerves   2 through 12 are intact. Moving all 4 extremities without focal deficits. SIGNIFICANT LABORATORY DATA: White blood cell count 6.7,   hemoglobin 11.2, platelets 923. Sodium 134, potassium 4.3, bicarb 31,   BUN 14, creatinine 1.64, glucose 384, bilirubin 0.3, AST 44, ALT   alkaline phosphatase 125. Troponin less than 0.04. Urinalysis is   negative. OTHER STUDIES: EKG shows sinus rhythm with left ventricular   hypertrophy. ASSESSMENT AND PLAN: The patient is a 69-year-old gentleman   present with failure to thrive as well as orthostatic hypotension and   hyperglycemia. 1. Failure to thrive. The patient's failure to thrive seems mostly to be   secondary to worsening cognitive function. His failure to take his   medications coupled with his medical problems seem to be driving this. I would like to admit him to the hospital. We can consult nutrition as   well as PT and OT, but I think long term he will need a residential living   facility where he could be monitored and have adjustments made to his   medications. 2. Hyperglycemia. The patient has hyperglycemia likely secondary to   noncompliance with his diabetes medications. We will start him on   Lantus insulin as well as sliding scale here in the hospital. He was not   given insulin in the ED and I would like to provide now to bring his   blood sugar down. I would like to also provide IV fluids as he seems to   be dehydrated. We will check a hemoglobin A1c.   3. Orthostatic hypotension.  The patient has chronic orthostatic   hypotension. We will continue him on his fludrocortisone as well as his   midodrine. Unfortunately, he was somewhat hypertensive when supine. He may be a candidate for Northera. At this point, I would like to   hydrate him, schedule his medicines, and see his blood pressure   reacts. He is on an alpha blocker for his benign prostatic hypertrophy   which I would like to hold and we can monitor as this is likely   contributing to his orthostasis. 4. Coronary artery disease, currently no signs of ischemia. I will   continue him on his home medicines. 5. Cognitive dysfunction. The patient has ongoing cognitive dysfunction   that I think is underlying is problems here. He recently had  CT of the   head on 04/11/2017 that showed no acute intracranial process and I   think, given his falls, evaluating for subdural hematoma could be worthwhile. I would like to   obtain a CT of the head to evaluate for subdural hematoma given his   ongoing cog deficits, that would be very helpful. 6. Chronic obstructive pulmonary disease, currently quiescent. Nebs as   needed.          Lin Capellan MD      WT / Alexey Sifuentes   D:  07/17/2017   18:25   T:  07/17/2017   20:12   Job #:  [id number]

## 2017-07-18 NOTE — PROGRESS NOTES
Spiritual Care Partner Volunteer visited patient in 2N on 7/18/17. Documented by:  Osiel Marley M.Div.    Paging Service [phone])

## 2017-07-18 NOTE — PROGRESS NOTES
physical Therapy TREATMENT/DISCHARGE  Patient: Gilbert Bloom (54 y.o. male)  Date: 7/18/2017  Diagnosis: Orthostatic hypotension Orthostatic hypotension  Precautions:  fall, hx of AMS    ASSESSMENT:  Pt is progressing well from evaluation. Pt agreeable to ambulate after BP assessment with this PT. Both OT and RN warn of pt being challenging though not observed in this interaction (DTR-in-law initially present though went home to retrieve clothing for pt). Pt demonstrates stable BPs at this time with sitting and standing and post ambulation (2 laps around nursing station). Supine BP not obtained as pt was standing prior to arrival for pants donning (pt educated on safety with this task and remaining seated during LB dressing until stance to pull up clothing). Pt does not present with significant LOB during ambulation this date though BP stable. Recommend home with 24/7 supervision and care for ADLs. Please contact with any additional needs or concerns should they arise prior to discharge. RN aware. Progression toward goals:  [x]      Improving appropriately and progressing toward goals  []      Improving slowly and progressing toward goals  []      Not making progress toward goals and plan of care will be adjusted     PLAN:  Patient will be discharged from physical therapy at this time. Rationale for discharge:  [x] Goals Achieved  [] [place]  [] Patient not participating in therapy  [] Other:  Discharge Recommendations:  24/7 supervision  Further Equipment Recommendations for Discharge:  NA     SUBJECTIVE:   Patient stated I dont keep track of that [day of the week]. ..    OBJECTIVE DATA SUMMARY:   Critical Behavior:  Neurologic State: Alert  Orientation Level: Oriented X4  Cognition: Memory loss, Impaired decision making, Follows commands, Poor safety awareness  Safety/Judgement: Decreased awareness of environment  Functional Mobility Training:  Bed Mobility:     Supine to Sit: Supervision  Sit to Supine: Independent  Scooting: Independent  Transfers:  Sit to Stand: Supervision  Stand to Sit: Supervision  Bed to Chair:  (NT)  Balance:  Sitting: Intact  Standing: Intact  Standing - Static: Good  Standing - Dynamic : Good (fair-)  Ambulation/Gait Training:  Distance (ft): 400 Feet (ft)  Ambulation - Level of Assistance: Supervision;Stand-by asssistance (IV pole push only)  Base of Support: Narrowed  Speed/Audra: Other (comment) (moderate-fast pace)  Step Length:  (WNL)   Activity Tolerance:   Pt with drop in 's to 160's, asymptomatic and no concerns  Vitals provided to pt and RN (on white board)  After treatment:   [] Patient left in no apparent distress sitting up in chair  [x] Patient left in no apparent distress in bed  [x] Call bell left within reach  [x] Nursing notified  [] Caregiver present  [x] Bed alarm activated    COMMUNICATION/COLLABORATION:   The patients plan of care was discussed with: Registered Nurse    Angel Luis Anand   Time Calculation: 23 mins

## 2017-07-18 NOTE — PROGRESS NOTES
Bedside shift change report given to [address] (oncoming nurse) Mitchel Rebollar RN (offgoing nurse). Report included the following information SBAR, Kardex and Recent Results.

## 2017-07-18 NOTE — PROGRESS NOTES
Reviewed chart. Contacted daughter this am. Discussed admission status along with pt's [facility] program NOT requiring inpt status/3 night stay. She indicated the desire for pt to go short term to SNF for rehab then home. Pt has medicaid personal care via [employer]. Reviewed facilities in which she received listing last night. She selected [facility] due to location near her brothers. Her greatest concern is pt's willingness to go short term. Note PT note in ED. Sent referrals to both facilities this am. Await decision/recommendatons.   Angela Sanchez LCSW

## 2017-07-18 NOTE — PROGRESS NOTES
Problem: Self Care Deficits Care Plan (Adult)  Goal:  Occupational Therapy Goals  Initiated 7/18/2017  1. Patient will complete medication management activity with supervision/set-up within 7 day(s). 2.  Patient will improve their MOCA score by 5 points within 7 days. 3.  Patient will participate in upper extremity therapeutic exercise/activities with supervision/set-up for within 7 day(s). 4.  Patient will utilize energy conservation techniques during functional activities with verbal cues within 7 day(s). OCCUPATIONAL THERAPY EVALUATION  Patient: Mitchel Rosales (64 y.o. male)  Date: 7/18/2017  Primary Diagnosis: Orthostatic hypotension        Precautions: fall         ASSESSMENT :  Based on the objective data described below, the patient presents with good dynamic sitting/stadning balance, good functional reach to feet via ismael sitting, and functional UE ROM/strength however has impaired short term memory,  decreased concentration/attention and has poor safety awareness. Pt cleared for therapy by nursing and was received in bed asleep, but woke with therapist voice and agreeable to work with therapy. Upon arrival BP (133/54). In sitting position BP dropped to 113/55 however pt denied dizziness or discomfort. Pt with overall supervision to CGA (for posterior lean during donning/doffing socks EOB) for bed/fucntional mobility and for completing basic ADLs. BP consistent in standing position so pt transferred to chair with supervision. Pt prefromed MOCA assessment and scored 11/30. Lydia Gaxiola Pt demonstrated frustration during the assessment asking why he needs to do this. Therapy provided pt with the purpose however pt continued to be reluctant and stated that he wasn't concentrating at the end of the test. Overall pt appears to be near his baseline with bed/functional mobility and with completing basic ADLs however has decreased concentration/attention, impaired memory and poor safety awareness.  Recommend completing medication management activity for next session. Pt left in bed, with all needs met expressing that he wants to go home and he is done with therapy. Nursing notified with pt status. Patient will benefit from skilled intervention to address the above impairments. Patients rehabilitation potential is considered to be Good  Factors which may influence rehabilitation potential include:   [ ]             None noted  [X]             Mental ability/status  [ ]             Medical condition  [X]             Home/family situation and support systems  [X]             Safety awareness  [ ]             Pain tolerance/management  [ ]             Other:        PLAN :  Recommendations and Planned Interventions:  [X]               Self Care Training                  [ ]        Therapeutic Activities  [ ]               Functional Mobility Training    [X]        Cognitive Retraining  [ ]               Therapeutic Exercises           [ ]        Endurance Activities  [ ]               Balance Training                   [ ]        Neuromuscular Re-Education  [X]               Visual/Perceptual Training     [X]   Home Safety Training  [X]               Patient Education                 [X]        Family Training/Education  [ ]               Other (comment):     Frequency/Duration: Patient will be followed by occupational therapy 3 times a week to address goals. Discharge Recommendations: [facility] return home with 24 hour care with assist for meals/meds and transportation  Further Equipment Recommendations for Discharge: none       SUBJECTIVE:   Patient stated what do you need me to do? I'm cold.       OBJECTIVE DATA SUMMARY:   HISTORY:   Past Medical History:   Diagnosis Date    CAD (coronary artery disease)      [facility] [address]      [facility]       1970a    Ill-defined condition       SOB    Pneumonia      Urinary incontinence       Past Surgical History: Procedure Laterality Date    CARDIAC SURG PROCEDURE UNLIST   2000     open heart    HX HEENT   1975     nasal surgery    HX MOHS PROCEDURES   2013     left        Prior Level of Function/Home Situation: at baseline, pt in IND in completing basic ADLs, lives with his son in a one story home and ambulates with an AD. Pt has [facility] nursing M-F from 9-2pm.    Expanded or extensive additional review of patient history:      Home Situation  Home Environment: Private residence  # Steps to Enter: 7  Rails to Enter: Yes  Hand Rails : Bilateral  One/Two Story Residence: One story  Living Alone: No  Support Systems: Family member(s)  Patient Expects to be Discharged to[de-identified] Private residence  Current DME Used/Available at Home: None  Tub or Shower Type: Tub/Shower combination (grab bars in bathroom)  [X]  Right hand dominant             [ ]  Left hand dominant     EXAMINATION OF PERFORMANCE DEFICITS:  Cognitive/Behavioral Status:  Neurologic State: Alert  Orientation Level: Oriented X4  Cognition: Memory loss; Impaired decision making; Follows commands;Poor safety awareness  Perception: Appears intact  Perseveration: No perseveration noted  Safety/Judgement: Decreased awareness of environment     Skin: appears intact      Edema: none noted     Hearing: Auditory  Auditory Impairment: Hard of hearing, bilateral     Vision/Perceptual:        Corrective Lenses: Glasses     Range of Motion:     AROM: Generally decreased, functional     Strength:     Strength: Generally decreased, functional     Coordination:  Coordination: Generally decreased, functional  Fine Motor Skills-Upper: Left Intact; Right Intact    Gross Motor Skills-Upper: Left Intact; Right Intact     Tone & Sensation:     Tone: Normal     Balance:  Sitting: Intact  Standing: Intact     Functional Mobility and Transfers for ADLs:  Bed Mobility:  Supine to Sit: Supervision  Sit to Supine: Supervision  Scooting: Supervision     Transfers:  Sit to Stand: Supervision  Stand to Sit: Supervision  Bed to Chair: Supervision  Toilet Transfer : Supervision     ADL Assessment:  Feeding: Independent     Oral Facial Hygiene/Grooming: Independent     Bathing: Stand-by assistance     Upper Body Dressing: Supervision;Setup     Lower Body Dressing: Contact guard assistance     Toileting: Stand by assistance     ADL Intervention and task modifications:      Patient evaluated with [place] Cognitive Assessment ([address] [facility]) to assess cognition in areas of visuospatial, executive, naming, memory, attention, language, abstraction, delayed recall, orientation. Patient scored 11/30. Normal score is greater than or equal to 26/30. Patient with low score in the areas of trail making, copying, and drawing a clock. In addition pt also scored low with memory demonstrating a difficult time with remembering the 5 words. Pt scored low with language demonstrating inability to repeat sentence correctly back to therapy and scored low in abstraction area. Pt orientated to year, place and city. It should be noted that pt reported that he wasn't concentrating throughout the assessment. Confounding factor(s) indicate that the pt has decreased attention/concentration, distractions from hospital room/noises/alarms going off and irritability/minimal participation from patient. . It is recommended that pt receives help with completing IADLs specifically medication management.      Lower Body Dressing Assistance  Socks: Contact guard assistance (CGA d/t post. lean when bring legs up via ismael sitting)  Leg Crossed Method Used: Yes  Position Performed: Seated edge of bed  Cues: Verbal cues provided     Toileting  Toileting Assistance: Stand-by assistance  Bladder Hygiene: Independent  Clothing Management: Stand-by assistance     Cognitive Retraining  Safety/Judgement: Decreased awareness of environment        Functional Measure:  Barthel Index:      Bathin  Bladder: 10  Bowels: 10  Groomin  Dressin  Feeding: 10  Mobility: 0  Stairs: 0  Toilet Use: 10  Transfer (Bed to Chair and Back): 10  Total: 60         Barthel and G-code impairment scale:  Percentage of impairment CH  0% CI  1-19% CJ  20-39% CK  40-59% CL  60-79% CM  80-99% CN  100%   Barthel Score 0-100 100 99-80 79-60 59-40 20-39 1-19    0   Barthel Score 0-20 20 17-19 13-16 9-12 5-8 1-4 0      The Barthel ADL Index: Guidelines  1. The index should be used as a record of what a patient does, not as a record of what a patient could do. 2. The main aim is to establish degree of independence from any help, physical or verbal, however minor and for whatever reason. 3. The need for supervision renders the patient not independent. 4. A patient's performance should be established using the best available evidence. Asking the patient, friends/relatives and nurses are the usual sources, but direct observation and common sense are also important. However direct testing is not needed. 5. Usually the patient's performance over the preceding 24-48 hours is important, but occasionally longer periods will be relevant. 6. Middle categories imply that the patient supplies over 50 per cent of the effort. 7. Use of aids to be independent is allowed. Salvador Toro., Barthel, D.W. (5001). Functional evaluation: the Barthel Index. [facility] (14)2. Lula Libman [place], GULSHANJJOSELITO, Linette Ayers., Gabriela Ramirez., [facility] [address] [facility] (1999). Measuring the change indisability after inpatient rehabilitation; comparison of the responsiveness of the Barthel Index and Functional Dalton Measure. Journal of Neurology, Neurosurgery, and Psychiatry, 66(4), 622-516. Jesus Ramirez, N.J.A, LINDA Anderson, & Trisha Jones, M.A. (2004.) Assessment of post-stroke quality of life in cost-effectiveness studies: The usefulness of the Barthel Index and the EuroQoL-5D. Quality of Life Research, 13, 294-30         G codes:   In compliance with CMSs Claims Based Outcome Reporting, the following G-code set was chosen for this patient based on their primary functional limitation being treated: The outcome measure chosen to determine the severity of the functional limitation was the Barthel Index with a score of 60/100 which was correlated with the impairment scale. · Self Care:               - CURRENT STATUS:    CJ - 20%-39% impaired, limited or restricted               - GOAL STATUS:           CI - 1%-19% impaired, limited or restricted               - D/C STATUS:                       ---------------To be determined---------------      Occupational Therapy Evaluation Charge Determination   History Examination Decision-Making   LOW Complexity : Brief history review  LOW Complexity : 1-3 performance deficits relating to physical, cognitive , or psychosocial skils that result in activity limitations and / or participation restrictions  LOW Complexity : No comorbidities that affect functional and no verbal or physical assistance needed to complete eval tasks       Based on the above components, the patient evaluation is determined to be of the following complexity level: LOW   Activity Tolerance:   B/p lying/sit/standing taken. Pt's BP dropped with positional change from supine to sit however pt denied any dizziness or discomfort. Pt's BP recovered after sitting for a while. Pt tolerated and participated in session well with no NAD throughout session. BP back to 152/67 when sitting in chair. Vitals:     07/18/17 0936 07/18/17 0941 07/18/17 0943 07/18/17 1002   BP: 114/55 124/62 101/58 152/67   BP 1 Location:           BP Patient Position: Sitting Sitting Standing Post activity; Sitting   Pulse: 60 61 61 61   Resp:           Temp:           SpO2: 98% 97% 97% 97%   Weight:           Height:              Please refer to the flowsheet for vital signs taken during this treatment.   After treatment:   [ ] Patient left in no apparent distress sitting up in chair  [X] Patient left in no apparent distress in bed  [X] Call bell left within reach  [X] Nursing notified  [ ] Caregiver present  [X] Bed alarm activated      COMMUNICATION/EDUCATION:   The patients plan of care was discussed with: Registered Nurse.  [X] Home safety education was provided and the patient/caregiver indicated understanding. [ ] Patient/family have participated as able in goal setting and plan of care. [X] Patient/family agree to work toward stated goals and plan of care. [ ] Patient understands intent and goals of therapy, but is neutral about his/her participation. [ ] Patient is unable to participate in goal setting and plan of care. This patients plan of care is appropriate for delegation to [facility]. Thank you for this referral.  Vivian Mclean. Sera  Time Calculation: 40 mins       Regarding student involvement in patient care:  A student participated in this treatment session. Per CMS Medicare statements and AOTA guidelines I certify that the following was true:  1. I was present and directly observed the entire session. 2. I made all skilled judgments and clinical decisions regarding care. 3. I am the practitioner responsible for assessment, treatment, and documentation.           Robles Gonzalez, OT

## 2017-07-18 NOTE — DISCHARGE INSTRUCTIONS
PATIENT DISCHARGE INSTRUCTIONS      PATIENT DISCHARGE INSTRUCTIONS    Romayne Lopes / [id number] : 1940    Admitted 2017 Discharged: 2017       · It is important that you take the medication exactly as they are prescribed. · Keep your medication in the bottles provided by the pharmacist and keep a list of the medication names, dosages, and times to be taken in your wallet. · Do not take other medications without consulting your doctor. What to do at [facility] Accredible diet    Recommended Activity:as tolerated    If you experience any of the following symptoms unresponsiveness,fever,chills,please seek medical attention. Keep good hydration.   Follow up with your pcp        Signed By: Rosa Walker MD     2017

## 2017-07-18 NOTE — PROGRESS NOTES
NUTRITION BRIEF    RECOMMENDATIONS:   1. Basal insulin (Levemir) only if discharging home - per DTC recommendations   2. Include prescription for Glucerna TID to continue at home - can sometimes be provided by [employer] Whe[employer]  3. Referral to outpatient RD if wt loss continues at home  Assessment:   Reason for Assessment: [x] Provider Consult    Diet:  consistent CHO  Supplements: none  Nutritionally Significant Medications: [x] Reviewed & Includes: vit b12, colace, fludrocortisone, lantus (30 units), SSI, synthroid, protonix, middrine, NS @ 100ml/hr     Objective:  Pt admitted for failure to thrive. PMHx: DM, CAD, COPD, CKD3, memory impairment. Decline over the past few months noted with decreased appetite and poor compliance to DM medications d/t poor memory. Several falls noted. Issues with BG control noted but likely d/t medication compliance issues. A1c 13.5%. Hypoglycemia noted this morning. Seen by DTC, recommending decrease in lantus as inpatient. Once home recommending to simplify home regimen for better compliance - just basal insulin (Levemir) daily (see note 7/18). 92% UBW. #. Question if some of the wt loss may be related to poorly controlled BG at home. Wt Readings from Last 10 Encounters:   07/17/17 69.2 kg (152 lb 9.6 oz)   04/26/17 71.2 kg (157 lb)   04/16/17 68 kg (150 lb)   04/14/17 74.9 kg (165 lb 2 oz)   01/20/17 74.8 kg (165 lb)   Per H&P: \"He notes that occasionally he will make himself a   sandwich at home. The patient's home aide will make him breakfast. Patient receives Meals On Wheels and his son will bring him dinner, but the patient is intermittent about eating and oftentimes will only eat a few berries rather than a full meal which makes the family worried about hypoglycemia. \"    Plans for d/c home this afternoon. Recommend continuing Glucerna shakes at home. Can sometimes be sent with [employer] Whe[employer] meals. Consider referral to outpatient dietitian.  If not discharged home will continue to follow for PO intake, BG control and supplement consumption.      Kelsey Vaughn, RD

## 2017-07-18 NOTE — PROGRESS NOTES
Bedside shift change report given to Allegra Molina (oncoming nurse) by Axel Robertson (offgoing nurse). Report included the following information SBAR, ED Summary and MAR.

## 2017-07-18 NOTE — PROGRESS NOTES
[phone]: daughter returned my call from earlier messages. She insisted pt has [facility] [employer]. Explained information given to me. Informed her pt was medically ready for discharge. Arrangements could not be made for SNF care due to having observation status. Facility can not accept pt unless [facility] program gives authorization and per insurance, he is not an enrollee. Informed her of home health arrangements with [facility] as indicated in care manager note in ED as well as follow up appt at MD office for tomorrow. She stated that will not do any good; for, he was just there. They were told by MD he needs short rehab. Supplied her with [facility] [employer] telephone number number to call and speak directly with admissions. She was told if this is resolved and he can go with auth to facility from home, not having to be admitted to hospital for purpose of placement. I also contacted facility this pm and was told when they \"ran\" pt's social security number, it indicated he has traditional medicare and medicaid coverage.  (home health: [facility])  Jan Ferreira      0102: referral sent to [facility] as indicated previous home health agency in ED conversation. He will planned to be seen on July 20th. Follow up appointment with PCP made for July 19th @ 10:00 am. Per office request, faxed discharge instructions/summary/lab (care management notes. Left another message with daughter informing her of above information. (home health: [facility])  Jan Ferreira      1510: [facility]nette of [employer] contacted listed insurance company for authorization and was informed pt does not have this [facility] program as indicated. Left long message for daughter indicating the above. Since he is observation status, he does not meet medicare criteria to enter nursing home setting. Requesting resource center to verify insurance.  Conferred with MD. The option of returning home with home health: skilled nursing for nutrition, diabetic teaching, reality orientation, and PT. Pt has meals on wheels delivery. Await medical plan.   Toyin Ramirez LCSW

## 2017-07-19 ENCOUNTER — HOME HEALTH ADMISSION (OUTPATIENT)
Dept: HOME HEALTH SERVICES | Facility: HOME HEALTH | Age: 77
End: 2017-07-19
Payer: MEDICARE

## 2017-07-23 ENCOUNTER — HOME CARE VISIT (OUTPATIENT)
Dept: HOME HEALTH SERVICES | Facility: HOME HEALTH | Age: 77
End: 2017-07-23

## 2017-07-24 ENCOUNTER — HOME CARE VISIT (OUTPATIENT)
Dept: SCHEDULING | Facility: HOME HEALTH | Age: 77
End: 2017-07-24
Payer: MEDICARE

## 2017-07-24 PROCEDURE — 3331090002 HH PPS REVENUE DEBIT

## 2017-07-24 PROCEDURE — G0299 HHS/HOSPICE OF RN EA 15 MIN: HCPCS

## 2017-07-24 PROCEDURE — 400013 HH SOC

## 2017-07-24 PROCEDURE — 3331090001 HH PPS REVENUE CREDIT

## 2017-07-25 ENCOUNTER — HOME CARE VISIT (OUTPATIENT)
Dept: SCHEDULING | Facility: HOME HEALTH | Age: 77
End: 2017-07-25
Payer: MEDICARE

## 2017-07-25 VITALS
RESPIRATION RATE: 18 BRPM | BODY MASS INDEX: 22.4 KG/M2 | DIASTOLIC BLOOD PRESSURE: 70 MMHG | HEIGHT: 71 IN | TEMPERATURE: 98 F | WEIGHT: 160 LBS | OXYGEN SATURATION: 98 % | HEART RATE: 71 BPM | SYSTOLIC BLOOD PRESSURE: 114 MMHG

## 2017-07-25 VITALS
RESPIRATION RATE: 18 BRPM | DIASTOLIC BLOOD PRESSURE: 80 MMHG | OXYGEN SATURATION: 98 % | TEMPERATURE: 98.9 F | HEART RATE: 60 BPM | SYSTOLIC BLOOD PRESSURE: 120 MMHG

## 2017-07-25 PROCEDURE — 3331090002 HH PPS REVENUE DEBIT

## 2017-07-25 PROCEDURE — 3331090001 HH PPS REVENUE CREDIT

## 2017-07-25 PROCEDURE — G0151 HHCP-SERV OF PT,EA 15 MIN: HCPCS

## 2017-07-26 PROCEDURE — 3331090001 HH PPS REVENUE CREDIT

## 2017-07-26 PROCEDURE — 3331090002 HH PPS REVENUE DEBIT

## 2017-07-27 ENCOUNTER — HOME CARE VISIT (OUTPATIENT)
Dept: SCHEDULING | Facility: HOME HEALTH | Age: 77
End: 2017-07-27
Payer: MEDICARE

## 2017-07-27 ENCOUNTER — HOME CARE VISIT (OUTPATIENT)
Dept: HOME HEALTH SERVICES | Facility: HOME HEALTH | Age: 77
End: 2017-07-27
Payer: MEDICARE

## 2017-07-27 VITALS
HEART RATE: 70 BPM | TEMPERATURE: 98.5 F | SYSTOLIC BLOOD PRESSURE: 140 MMHG | RESPIRATION RATE: 18 BRPM | DIASTOLIC BLOOD PRESSURE: 80 MMHG | OXYGEN SATURATION: 98 %

## 2017-07-27 PROCEDURE — G0151 HHCP-SERV OF PT,EA 15 MIN: HCPCS

## 2017-07-27 PROCEDURE — 3331090002 HH PPS REVENUE DEBIT

## 2017-07-27 PROCEDURE — 3331090001 HH PPS REVENUE CREDIT

## 2017-07-28 ENCOUNTER — HOME CARE VISIT (OUTPATIENT)
Dept: HOME HEALTH SERVICES | Facility: HOME HEALTH | Age: 77
End: 2017-07-28
Payer: MEDICARE

## 2017-07-28 PROCEDURE — 3331090002 HH PPS REVENUE DEBIT

## 2017-07-28 PROCEDURE — 3331090001 HH PPS REVENUE CREDIT

## 2017-07-29 PROCEDURE — 3331090001 HH PPS REVENUE CREDIT

## 2017-07-29 PROCEDURE — 3331090002 HH PPS REVENUE DEBIT

## 2017-07-30 ENCOUNTER — HOME CARE VISIT (OUTPATIENT)
Dept: HOME HEALTH SERVICES | Facility: HOME HEALTH | Age: 77
End: 2017-07-30
Payer: MEDICARE

## 2017-07-30 PROCEDURE — 3331090001 HH PPS REVENUE CREDIT

## 2017-07-30 PROCEDURE — 3331090002 HH PPS REVENUE DEBIT

## 2017-07-31 PROCEDURE — 3331090001 HH PPS REVENUE CREDIT

## 2017-07-31 PROCEDURE — 3331090002 HH PPS REVENUE DEBIT

## 2017-08-01 ENCOUNTER — TELEPHONE (OUTPATIENT)
Dept: ENDOCRINOLOGY | Age: 77
End: 2017-08-01

## 2017-08-01 ENCOUNTER — HOME CARE VISIT (OUTPATIENT)
Dept: HOME HEALTH SERVICES | Facility: HOME HEALTH | Age: 77
End: 2017-08-01
Payer: MEDICARE

## 2017-08-01 ENCOUNTER — HOME CARE VISIT (OUTPATIENT)
Dept: SCHEDULING | Facility: HOME HEALTH | Age: 77
End: 2017-08-01
Payer: MEDICARE

## 2017-08-01 VITALS — OXYGEN SATURATION: 98 % | HEART RATE: 95 BPM | RESPIRATION RATE: 16 BRPM | TEMPERATURE: 98 F

## 2017-08-01 PROCEDURE — G0300 HHS/HOSPICE OF LPN EA 15 MIN: HCPCS

## 2017-08-01 PROCEDURE — 3331090001 HH PPS REVENUE CREDIT

## 2017-08-01 PROCEDURE — 3331090002 HH PPS REVENUE DEBIT

## 2017-08-01 NOTE — TELEPHONE ENCOUNTER
Received call from [address] with [facility] reporting patient's blood sugar was 443 at 11:50am. He took 30 units Levemir at time of call and has not taken any Novolog today. Sliding scale for Novolog only goes to 300 (6 units). He has missed Trulicity the past 2 weeks but filled it yesterday and is planning to resume today. He has not eaten yet today. Per Dr. Linda No, patient should take one time dose of 12 units Novolog now. Home Health will visit him later in the week and will call with an update. Office visit scheduled for 8/11/17.

## 2017-08-02 ENCOUNTER — HOME CARE VISIT (OUTPATIENT)
Dept: SCHEDULING | Facility: HOME HEALTH | Age: 77
End: 2017-08-02
Payer: MEDICARE

## 2017-08-02 PROCEDURE — 3331090001 HH PPS REVENUE CREDIT

## 2017-08-02 PROCEDURE — 3331090002 HH PPS REVENUE DEBIT

## 2017-08-03 ENCOUNTER — HOME CARE VISIT (OUTPATIENT)
Dept: HOME HEALTH SERVICES | Facility: HOME HEALTH | Age: 77
End: 2017-08-03
Payer: MEDICARE

## 2017-08-03 ENCOUNTER — HOME CARE VISIT (OUTPATIENT)
Dept: SCHEDULING | Facility: HOME HEALTH | Age: 77
End: 2017-08-03
Payer: MEDICARE

## 2017-08-03 PROCEDURE — 3331090002 HH PPS REVENUE DEBIT

## 2017-08-03 PROCEDURE — 3331090003 HH PPS REVENUE ADJ

## 2017-08-03 PROCEDURE — 3331090001 HH PPS REVENUE CREDIT

## 2017-08-03 NOTE — TELEPHONE ENCOUNTER
8/3/2017  3:40 PM      Please call [address] [place] Nurse with Ana Marquis at [phone]        Thanks

## 2017-08-03 NOTE — TELEPHONE ENCOUNTER
I returned the call and spoke to nurse Kuldeep Flores. She said patient has been discharged from home health because he is not home bound and Medicare will not pay for home health services. Patient's daughter is planning to bring him to office visit next week.

## 2017-08-06 ENCOUNTER — APPOINTMENT (OUTPATIENT)
Dept: GENERAL RADIOLOGY | Age: 77
End: 2017-08-06
Attending: STUDENT IN AN ORGANIZED HEALTH CARE EDUCATION/TRAINING PROGRAM
Payer: COMMERCIAL

## 2017-08-06 ENCOUNTER — HOSPITAL ENCOUNTER (EMERGENCY)
Age: 77
Discharge: HOME | End: 2017-08-06
Attending: STUDENT IN AN ORGANIZED HEALTH CARE EDUCATION/TRAINING PROGRAM
Payer: COMMERCIAL

## 2017-08-06 VITALS
DIASTOLIC BLOOD PRESSURE: 48 MMHG | HEART RATE: 55 BPM | WEIGHT: 160 LBS | BODY MASS INDEX: 22.4 KG/M2 | TEMPERATURE: 97.2 F | RESPIRATION RATE: 16 BRPM | SYSTOLIC BLOOD PRESSURE: 123 MMHG | OXYGEN SATURATION: 98 % | HEIGHT: 71 IN

## 2017-08-06 DIAGNOSIS — R07.89 ATYPICAL CHEST PAIN: Primary | ICD-10-CM

## 2017-08-06 LAB
ALBUMIN SERPL BCP-MCNC: 3.2 G/DL (ref 3.5–5)
ALBUMIN/GLOB SERPL: 0.7 {RATIO} (ref 1.1–2.2)
ALP SERPL-CCNC: 101 U/L (ref 45–117)
ALT SERPL-CCNC: 38 U/L (ref 12–78)
ANION GAP BLD CALC-SCNC: 2 MMOL/L (ref 5–15)
AST SERPL W P-5'-P-CCNC: ABNORMAL U/L (ref 15–37)
ATRIAL RATE: 66 BPM
BASOPHILS # BLD AUTO: 0 K/UL (ref 0–0.1)
BASOPHILS # BLD: 0 % (ref 0–1)
BILIRUB SERPL-MCNC: 0.4 MG/DL (ref 0.2–1)
BUN SERPL-MCNC: 13 MG/DL (ref 6–20)
BUN/CREAT SERPL: 8 (ref 12–20)
CALCIUM SERPL-MCNC: 8.1 MG/DL (ref 8.5–10.1)
CALCULATED P AXIS, ECG09: 48 DEGREES
CALCULATED R AXIS, ECG10: 63 DEGREES
CALCULATED T AXIS, ECG11: 69 DEGREES
CHLORIDE SERPL-SCNC: 100 MMOL/L (ref 97–108)
CK SERPL-CCNC: 210 U/L (ref 39–308)
CO2 SERPL-SCNC: 27 MMOL/L (ref 21–32)
CREAT SERPL-MCNC: 1.7 MG/DL (ref 0.7–1.3)
DIAGNOSIS, 93000: NORMAL
EOSINOPHIL # BLD: 0.2 K/UL (ref 0–0.4)
EOSINOPHIL NFR BLD: 2 % (ref 0–7)
ERYTHROCYTE [DISTWIDTH] IN BLOOD BY AUTOMATED COUNT: 14.7 % (ref 11.5–14.5)
GLOBULIN SER CALC-MCNC: 4.4 G/DL (ref 2–4)
GLUCOSE BLD STRIP.AUTO-MCNC: 287 MG/DL (ref 65–100)
GLUCOSE SERPL-MCNC: 402 MG/DL (ref 65–100)
HCT VFR BLD AUTO: 35.3 % (ref 36.6–50.3)
HGB BLD-MCNC: 11.3 G/DL (ref 12.1–17)
LYMPHOCYTES # BLD AUTO: 23 % (ref 12–49)
LYMPHOCYTES # BLD: 1.7 K/UL (ref 0.8–3.5)
MCH RBC QN AUTO: 28.8 PG (ref 26–34)
MCHC RBC AUTO-ENTMCNC: 32 G/DL (ref 30–36.5)
MCV RBC AUTO: 89.8 FL (ref 80–99)
MONOCYTES # BLD: 0.6 K/UL (ref 0–1)
MONOCYTES NFR BLD AUTO: 9 % (ref 5–13)
NEUTS SEG # BLD: 4.7 K/UL (ref 1.8–8)
NEUTS SEG NFR BLD AUTO: 66 % (ref 32–75)
P-R INTERVAL, ECG05: 176 MS
PLATELET # BLD AUTO: 216 K/UL (ref 150–400)
POTASSIUM SERPL-SCNC: ABNORMAL MMOL/L (ref 3.5–5.1)
PROT SERPL-MCNC: 7.6 G/DL (ref 6.4–8.2)
Q-T INTERVAL, ECG07: 400 MS
QRS DURATION, ECG06: 84 MS
QTC CALCULATION (BEZET), ECG08: 419 MS
RBC # BLD AUTO: 3.93 M/UL (ref 4.1–5.7)
SERVICE CMNT-IMP: ABNORMAL
SODIUM SERPL-SCNC: 129 MMOL/L (ref 136–145)
TROPONIN I SERPL-MCNC: <0.04 NG/ML
TROPONIN I SERPL-MCNC: <0.04 NG/ML
VENTRICULAR RATE, ECG03: 66 BPM
WBC # BLD AUTO: 7.2 K/UL (ref 4.1–11.1)

## 2017-08-06 PROCEDURE — 82962 GLUCOSE BLOOD TEST: CPT

## 2017-08-06 PROCEDURE — 84484 ASSAY OF TROPONIN QUANT: CPT | Performed by: STUDENT IN AN ORGANIZED HEALTH CARE EDUCATION/TRAINING PROGRAM

## 2017-08-06 PROCEDURE — 36415 COLL VENOUS BLD VENIPUNCTURE: CPT | Performed by: STUDENT IN AN ORGANIZED HEALTH CARE EDUCATION/TRAINING PROGRAM

## 2017-08-06 PROCEDURE — 85025 COMPLETE CBC W/AUTO DIFF WBC: CPT | Performed by: STUDENT IN AN ORGANIZED HEALTH CARE EDUCATION/TRAINING PROGRAM

## 2017-08-06 PROCEDURE — 71010 XR CHEST PORT: CPT

## 2017-08-06 PROCEDURE — 99284 EMERGENCY DEPT VISIT MOD MDM: CPT

## 2017-08-06 PROCEDURE — 96360 HYDRATION IV INFUSION INIT: CPT

## 2017-08-06 PROCEDURE — 82550 ASSAY OF CK (CPK): CPT | Performed by: STUDENT IN AN ORGANIZED HEALTH CARE EDUCATION/TRAINING PROGRAM

## 2017-08-06 PROCEDURE — 93005 ELECTROCARDIOGRAM TRACING: CPT

## 2017-08-06 PROCEDURE — 80053 COMPREHEN METABOLIC PANEL: CPT | Performed by: STUDENT IN AN ORGANIZED HEALTH CARE EDUCATION/TRAINING PROGRAM

## 2017-08-06 PROCEDURE — 74011250636 HC RX REV CODE- 250/636: Performed by: STUDENT IN AN ORGANIZED HEALTH CARE EDUCATION/TRAINING PROGRAM

## 2017-08-06 PROCEDURE — 74011636637 HC RX REV CODE- 636/637: Performed by: STUDENT IN AN ORGANIZED HEALTH CARE EDUCATION/TRAINING PROGRAM

## 2017-08-06 RX ORDER — INSULIN LISPRO 100 [IU]/ML
8 INJECTION, SOLUTION INTRAVENOUS; SUBCUTANEOUS ONCE
Status: DISCONTINUED | OUTPATIENT
Start: 2017-08-06 | End: 2017-08-06

## 2017-08-06 RX ADMIN — INSULIN LISPRO 8 UNITS: 100 INJECTION, SOLUTION INTRAVENOUS; SUBCUTANEOUS at 17:58

## 2017-08-06 RX ADMIN — SODIUM CHLORIDE 1000 ML: 900 INJECTION, SOLUTION INTRAVENOUS at 18:11

## 2017-08-06 NOTE — DISCHARGE INSTRUCTIONS
Chest Pain: Care Instructions  Your Care Instructions  There are many things that can cause chest pain. Some are not serious and will get better on their own in a few days. But some kinds of chest pain need more testing and treatment. Your doctor may have recommended a follow-up visit in the next 8 to 12 hours. If you are not getting better, you may need more tests or treatment. Even though your doctor has released you, you still need to watch for any problems. The doctor carefully checked you, but sometimes problems can develop later. If you have new symptoms or if your symptoms do not get better, get medical care right away. If you have worse or different chest pain or pressure that lasts more than 5 minutes or you passed out (lost consciousness), call 911 or seek other emergency help right away. A medical visit is only one step in your treatment. Even if you feel better, you still need to do what your doctor recommends, such as going to all suggested follow-up appointments and taking medicines exactly as directed. This will help you recover and help prevent future problems. How can you care for yourself at home? · Rest until you feel better. · Take your medicine exactly as prescribed. Call your doctor if you think you are having a problem with your medicine. · Do not drive after taking a prescription pain medicine. When should you call for help? Call 911 if:  · You passed out (lost consciousness). · You have severe difficulty breathing. · You have symptoms of a heart attack. These may include:  ¨ Chest pain or pressure, or a strange feeling in your chest.  ¨ Sweating. ¨ Shortness of breath. ¨ Nausea or vomiting. ¨ Pain, pressure, or a strange feeling in your back, neck, jaw, or upper belly or in one or both shoulders or arms. ¨ Lightheadedness or sudden weakness. ¨ A fast or irregular heartbeat.   After you call 911, the  may tell you to chew 1 adult-strength or 2 to 4 low-dose aspirin. Wait for an ambulance. Do not try to drive yourself. Call your doctor today if:  · You have any trouble breathing. · Your chest pain gets worse. · You are dizzy or lightheaded, or you feel like you may faint. · You are not getting better as expected. · You are having new or different chest pain. Where can you learn more? Go to http://evelio-rosalinda.info/. Enter A120 in the search box to learn more about \"Chest Pain: Care Instructions. \"  Current as of: March 20, 2017  Content Version: 11.3  © 5563-7656 [employer]. Care instructions adapted under license by [employer] (which disclaims liability or warranty for this information). If you have questions about a medical condition or this instruction, always ask your healthcare professional. [address] any warranty or liability for your use of this information. We hope that we have addressed all of your medical concerns. The examination and treatment you received in the Emergency Department were for an emergent problem and were not intended as complete care. It is important that you follow up with your healthcare provider(s) for ongoing care. If your symptoms worsen or do not improve as expected, and you are unable to reach your usual health care provider(s), you should return to the Emergency Department. Today's healthcare is undergoing tremendous change, and patient satisfaction surveys are one of the many tools to assess the quality of medical care. You may receive a survey from the [employer] organization regarding your experience in the Emergency Department. I hope that your experience has been completely positive, particularly the medical care that I provided. As such, please participate in the survey; anything less than excellent does not meet my expectations or intentions.         [address] [facility] and [facility] participate in nationally recognized quality of care measures. If your blood pressure is greater than 120/80, as reported below, we urge that you seek medical care to address the potential of high blood pressure, commonly known as hypertension. Hypertension can be hereditary or can be caused by certain medical conditions, pain, stress, or \"white coat syndrome. \"       Please make an appointment with your health care provider(s) for follow up of your Emergency Department visit. VITALS:   Patient Vitals for the past 8 hrs:   Temp Pulse Resp BP SpO2   08/06/17 1815 97.8 °F (36.6 °C) (!) 53 17 137/46 98 %   08/06/17 1630 - (!) 55 14 145/52 95 %   08/06/17 1426 98 °F (36.7 °C) (!) 58 18 142/59 98 %          Thank you for allowing us to provide you with medical care today. We realize that you have many choices for your emergency care needs. Please choose us in the future for any continued health care needs. Mirtha Chance  [facility], [address] [place] [address].   Office: [phone]            Recent Results (from the past 24 hour(s))   EKG, 12 LEAD, INITIAL    Collection Time: 08/06/17  2:30 PM   Result Value Ref Range    Ventricular Rate 66 BPM    Atrial Rate 66 BPM    P-R Interval 176 ms    QRS Duration 84 ms    Q-T Interval 400 ms    QTC Calculation (Bezet) 419 ms    Calculated P Axis 48 degrees    Calculated R Axis 63 degrees    Calculated T Axis 69 degrees    Diagnosis       Sinus rhythm with marked sinus arrhythmia  Nonspecific ST and T wave abnormality  When compared with ECG of 17-JUL-2017 17:00,  premature atrial complexes are no longer present  Confirmed by Stephanie Estrada M.D., Dorys Carrasquillo ([id number]) on 8/6/2017 5:38:55 PM     CBC WITH AUTOMATED DIFF    Collection Time: 08/06/17  2:35 PM   Result Value Ref Range    WBC 7.2 4.1 - 11.1 K/uL    RBC 3.93 (L) 4.10 - 5.70 M/uL    HGB 11.3 (L) 12.1 - 17.0 g/dL    HCT 35.3 (L) 36.6 - 50.3 %    MCV 89.8 80.0 - 99.0 FL    MCH 28.8 26.0 - 34.0 PG    MCHC 32.0 30.0 - 36.5 g/dL    RDW 14.7 (H) 11.5 - 14.5 %    PLATELET 122 909 - 490 K/uL    NEUTROPHILS 66 32 - 75 %    LYMPHOCYTES 23 12 - 49 %    MONOCYTES 9 5 - 13 %    EOSINOPHILS 2 0 - 7 %    BASOPHILS 0 0 - 1 %    ABS. NEUTROPHILS 4.7 1.8 - 8.0 K/UL    ABS. LYMPHOCYTES 1.7 0.8 - 3.5 K/UL    ABS. MONOCYTES 0.6 0.0 - 1.0 K/UL    ABS. EOSINOPHILS 0.2 0.0 - 0.4 K/UL    ABS. BASOPHILS 0.0 0.0 - 0.1 K/UL   METABOLIC PANEL, COMPREHENSIVE    Collection Time: 08/06/17  2:35 PM   Result Value Ref Range    Sodium 129 (L) 136 - 145 mmol/L    Potassium HEMOLYZED,RECOLLECT REQUESTED 3.5 - 5.1 mmol/L    Chloride 100 97 - 108 mmol/L    CO2 27 21 - 32 mmol/L    Anion gap 2 (L) 5 - 15 mmol/L    Glucose 402 (H) 65 - 100 mg/dL    BUN 13 6 - 20 MG/DL    Creatinine 1.70 (H) 0.70 - 1.30 MG/DL    BUN/Creatinine ratio 8 (L) 12 - 20      GFR est AA 48 (L) >60 ml/min/1.73m2    GFR est non-AA 39 (L) >60 ml/min/1.73m2    Calcium 8.1 (L) 8.5 - 10.1 MG/DL    Bilirubin, total 0.4 0.2 - 1.0 MG/DL    ALT (SGPT) 38 12 - 78 U/L    AST (SGOT) HEMOLYZED,RECOLLECT REQUESTED 15 - 37 U/L    Alk. phosphatase 101 45 - 117 U/L    Protein, total 7.6 6.4 - 8.2 g/dL    Albumin 3.2 (L) 3.5 - 5.0 g/dL    Globulin 4.4 (H) 2.0 - 4.0 g/dL    A-G Ratio 0.7 (L) 1.1 - 2.2     TROPONIN I    Collection Time: 08/06/17  2:35 PM   Result Value Ref Range    Troponin-I, Qt. <0.04 <0.05 ng/mL   CK W/ REFLX CKMB    Collection Time: 08/06/17  2:35 PM   Result Value Ref Range     39 - 308 U/L   TROPONIN I    Collection Time: 08/06/17  5:56 PM   Result Value Ref Range    Troponin-I, Qt. <0.04 <0.05 ng/mL   GLUCOSE, POC    Collection Time: 08/06/17  5:58 PM   Result Value Ref Range    Glucose (POC) 287 (H) 65 - 100 mg/dL    Performed by Ronald Starks        Xr Chest Port    Result Date: 8/6/2017  Chest portable AP History: Chest pain. Comparison: 4/11/2017 Findings:  The patient is status post median sternotomy and CABG. The lungs are well expanded.   No focal consolidation, pleural effusion, or pneumothorax. The cardiomediastinal silhouette is unremarkable. The visualized osseous structures are unremarkable. Impression: No acute cardiopulmonary process.

## 2017-08-06 NOTE — ED TRIAGE NOTES
Pt presents with complaints of intermittent chest pain x 1 week. Pt states these episodes last approximately 10-15 seconds and occur with exertion. Pt denies any chest pain at this time.

## 2017-08-06 NOTE — ED PROVIDER NOTES
HPI Comments: 77 y.o. male with past medical history significant for insulin dependent DM type 2, CAD, COPD who presents from home via EMS with chief complaint of CP. Pt reports that for the past few months he has had transient fleeting intermittent sharp, \"electric-like\" left side CP that last a few seconds before spontaneously resolving. The pain does not radiate and has no exacerbating or relieving factors. Pt's pain is not brought on or worsened by exertion. He denies SOB, palpitations, diaphoresis, dizziness, nausea, vomiting, diarrhea, abdominal pain, fever, chills, cough. Pt reports There are no other acute medical concerns at this time. PCP: Audrey Oh MD  Note written by aníbal Castle, as dictated by Magalie Ronquillo MD 6:20 PM         The history is provided by the patient. Past Medical History:   Diagnosis Date    CAD (coronary artery disease)     [facility] [address]     Diabetes ([facility] [address]     1970a    Ill-defined condition     SOB    Pneumonia     Urinary incontinence        Past Surgical History:   Procedure Laterality Date    CARDIAC SURG PROCEDURE UNLIST  2000    open heart    HX HEENT  1975    nasal surgery    HX MOHS PROCEDURES  2013    left         Family History:   Problem Relation Age of Onset    Diabetes Mother     Diabetes Brother        Social History     Social History    Marital status:      Spouse name: N/A    Number of children: N/A    Years of education: N/A     Occupational History    Not on file. Social History Main Topics    Smoking status: Current Every Day Smoker    Smokeless tobacco: Never Used      Comment: 1-2 cigars daily    Alcohol use No    Drug use: No    Sexual activity: Not on file     Other Topics Concern    Not on file     Social History Narrative         ALLERGIES: Review of patient's allergies indicates no known allergies.     Review of Systems   Constitutional: Negative for chills, diaphoresis and fever. HENT: Negative for congestion, rhinorrhea, sneezing and sore throat. Eyes: Negative for redness and visual disturbance. Respiratory: Negative for cough and shortness of breath. Cardiovascular: Positive for chest pain. Negative for palpitations and leg swelling. Gastrointestinal: Negative for abdominal pain, constipation, diarrhea, nausea and vomiting. Genitourinary: Negative for difficulty urinating and frequency. Musculoskeletal: Negative for back pain, myalgias and neck stiffness. Skin: Negative for rash. Neurological: Negative for dizziness, syncope, weakness and headaches. Hematological: Negative for adenopathy. All other systems reviewed and are negative. Vitals:    08/06/17 1426 08/06/17 1630   BP: 142/59 145/52   Pulse: (!) 58 (!) 55   Resp: 18 14   Temp: 98 °F (36.7 °C)    SpO2: 98% 95%   Weight: 72.6 kg (160 lb)    Height: 5' 11\" (1.803 m)             Physical Exam   Constitutional: He is oriented to person, place, and time. He appears well-developed. No distress. HENT:   Head: Normocephalic and atraumatic. Eyes: Conjunctivae and EOM are normal.   Neck: Normal range of motion. Neck supple. Cardiovascular: Normal rate, regular rhythm and normal heart sounds. No murmur heard. Pulmonary/Chest: Effort normal and breath sounds normal. No respiratory distress. Abdominal: Soft. Bowel sounds are normal. He exhibits no distension. There is no tenderness. There is no rebound. Musculoskeletal: Normal range of motion. He exhibits no edema or tenderness. Neurological: He is alert and oriented to person, place, and time. No cranial nerve deficit. He exhibits normal muscle tone. Coordination normal.   Skin: Skin is warm and dry. Nursing note and vitals reviewed.    Note written by aníbal Castle, as dictated by Magalie Ronquillo MD 6:33 PM      [facility]  ED Course       Procedures    2:30 PM  EKG interpretation: (Preliminary)  Rhythm: Sinus rhythm with marked sinus arrhythmia. Rate (approx.): 66 bpm; Axis: normal; P wave: normal; QRS interval: normal ; ST/T wave: normal.  No significant change when compared to EKG from 7/17/17. Note written by aníbal Ordonez, as dictated by Lyndsey Briseno MD 5:33 PM    The patient is resting comfortably and feels better, is alert and in no distress. The repeat examination is unremarkable and benign. The electrocardiogram shows no signs of acute ischemia and the history, exam, diagnostic testing and current condition do not suggest that this patient is having an acute myocardial infarction, significant arrhythmia, unstable angina, esophageal perforation, pulmonary embolism, aortic dissection, pneumothorax, severe pneumonia, sepsis or other significant pathology that would warrant further testing, continued ED treatment, admission, or cardiology or other specialist consultation at this point. The vital signs have been stable. The patient's condition is stable and appropriate for discharge. The patient will pursue further outpatient evaluation with the primary care physician, other designated physician or cardiologist. The patient and/or caregivers have expressed a clear and thorough understanding and agree to follow up as instructed.

## 2017-08-11 ENCOUNTER — OFFICE VISIT (OUTPATIENT)
Dept: ENDOCRINOLOGY | Age: 77
End: 2017-08-11

## 2017-08-11 VITALS
HEART RATE: 59 BPM | DIASTOLIC BLOOD PRESSURE: 55 MMHG | BODY MASS INDEX: 21.56 KG/M2 | SYSTOLIC BLOOD PRESSURE: 133 MMHG | WEIGHT: 154 LBS | HEIGHT: 71 IN

## 2017-08-11 DIAGNOSIS — I95.1 ORTHOSTATIC HYPOTENSION: ICD-10-CM

## 2017-08-11 DIAGNOSIS — Z79.4 UNCONTROLLED TYPE 2 DIABETES MELLITUS WITH HYPERGLYCEMIA, WITH LONG-TERM CURRENT USE OF INSULIN (HCC): Primary | ICD-10-CM

## 2017-08-11 DIAGNOSIS — E11.65 UNCONTROLLED TYPE 2 DIABETES MELLITUS WITH HYPERGLYCEMIA, WITH LONG-TERM CURRENT USE OF INSULIN (HCC): Primary | ICD-10-CM

## 2017-08-11 RX ORDER — FLUDROCORTISONE ACETATE 0.1 MG/1
TABLET ORAL
Qty: 30 TAB | Refills: 3 | Status: SHIPPED | OUTPATIENT
Start: 2017-08-11 | End: 2017-10-14

## 2017-08-11 NOTE — PATIENT INSTRUCTIONS
Diabetes type II   - Monitor glucoses in AM and other times as we can  - Watch starch/carbohydrate intake    Levemir - 30 units once daily in AM.     Novolog for high glucoses:   201-230: add 1 unit  231-260: add 2 units  261-290: add 3 units  291-320: add 4 units  321-350: add 5 units  351-380: add 6 units  381-410: add 7 units  411 +     : add 8 units    Goals for blood glucose:  Fasting  (less than 150)  Lunch, Dinner, Bedtime -  (less than 180).     Blood pressure:  -fludrocortisone 1/2 tablet once daily  - continue midodrine    Cholesterol:  Can take rosuvastatin in AM.

## 2017-08-11 NOTE — PROGRESS NOTES
History of Present Illness: Bethel Long is a 77 y.o. male presents for follow-up of diabetes. Has had diabetes for 40 years. His daughter in-law accompanies him today. Since his last visit in 4/2017, he was admitted to the hospital for orthostatic hypotension and \"failure to thrive\". He had challenges taking his medications properly at home due to his dementia. Diabetes related complications:  + nephropathy - sees Dr Candelario Gomez for eye exam. No known retinopathy  + neuropathy.      Current diabetes regimen:  Levemir 30 units  Trulicity 8.11 mg weekly -he had been out of this for several weeks, per his family member, was likely not injecting this properly. She described him removing injector away from his body prior to the full injection being given  Novolog for hyperglycemia - he is not regularly taking this    He denies having hypoglycemia    No glucoses available for review. Did review glucoses from hospitalization  Component      Latest Ref Rng & Units 7/18/2017 7/18/2017 7/18/2017 7/17/2017          11:42 AM  8:00 AM  7:31 AM 10:05 PM   GLUCOSE,FAST - POC      65 - 100 mg/dL 135 (H) 58 (L) 60 (L) 93     Component      Latest Ref Rng & Units 7/17/2017           3:44 PM   GLUCOSE,FAST - POC      65 - 100 mg/dL 400 (H)     * He received 10 units Humalog and 30 units Lantus at 4 pm on 7/17/2017     Breakfast: does not recall what he ate for breakfast - banana nut muffin leads he had  Lunch: none yet. Call what he had for dinner last night. Orthostatic hypotension  He is prescribed Midodine 5 mg 3 times daily as well as fludrocortisone 0.05 daily. Not sure whether he is taking fludrocortisone or not.   Crusted a new prescription be sent to the pharmacy be sure    Past Medical History:   Diagnosis Date    CAD (coronary artery disease)     [facility]) [facility] [address]     Diabetes ([facility] [address]     1970a    Ill-defined condition     SOB    Pneumonia     Urinary incontinence Current Outpatient Prescriptions   Medication Sig    LORazepam (ATIVAN) 0.5 mg tablet Take 0.5 mg by mouth two (2) times daily as needed for Anxiety.  midodrine (PROAMITINE) 5 mg tablet Take 5 mg by mouth two (2) times a day.  insulin detemir (LEVEMIR FLEXTOUCH) 100 unit/mL (3 mL) inpn 30 Units by SubCUTAneous route daily.  traZODone (DESYREL) 50 mg tablet Take 50 mg by mouth nightly.  dulaglutide (TRULICITY) 9.04 AQ/8.1 mL sub-q pen 0.5 mL by SubCUTAneous route every seven (7) days.  gabapentin (NEURONTIN) 100 mg capsule Take 1 Cap by mouth two (2) times a day.  cyanocobalamin (VITAMIN B-12) 100 mcg tablet Take 100 mcg by mouth daily.  DULoxetine (CYMBALTA) 60 mg capsule Take 60 mg by mouth daily.  finasteride (PROSCAR) 5 mg tablet Take 5 mg by mouth daily.  tamsulosin (FLOMAX) 0.4 mg capsule Take 0.4 mg by mouth daily.  HYDROcodone-acetaminophen (NORCO) 7.5-325 mg per tablet Take 1 Tab by mouth two (2) times daily as needed.  rosuvastatin (CRESTOR) 5 mg tablet Take 5 mg by mouth nightly.  levothyroxine (SYNTHROID) 50 mcg tablet Take 50 mcg by mouth Daily (before breakfast).  aspirin delayed-release (ASPIR-81) 81 mg tablet Take 81 mg by mouth daily.  docusate sodium (COLACE) 100 mg capsule Take 1 Cap by mouth daily for 90 days.  fludrocortisone (FLORINEF) 0.1 mg tablet One-half tablet (0.05 mg or 50 mcg). For low blood pressure    insulin aspart (NOVOLOG) 100 unit/mL inpn Sliding scale    omeprazole (PRILOSEC) 20 mg capsule Take 20 mg by mouth as needed. No current facility-administered medications for this visit.       No Known Allergies    Review of Systems:  He denies complaints currently, but due to dementia, the accuracy of the review of symptoms of systems is likely to be low    Physical Examination:  Visit Vitals    /55    Pulse (!) 59    Ht 5' 11\" (1.803 m)    Wt 154 lb (69.9 kg)    BMI 21.48 kg/m2   - BP standing - 95/40  - General: pleasant, no distress, normal gait   HEENT: hearing intact, EOMI, clear sclera without icterus  - Cardiovascular: irregular - has a forceful beat, alternating with a weak beat which does not conduct well to wrist or BP cuff  - Respiratory: normal effort  - Integumentary: no edema  - Psychiatric: normal mood and affect    Data Reviewed:   Component      Latest Ref Rng & Units 7/18/2017 7/18/2017 7/18/2017 7/17/2017           2:14 AM  2:14 AM  2:14 AM  3:48 PM   Sodium      136 - 145 mmol/L  143     Potassium      3.5 - 5.1 mmol/L  3.7     Chloride      97 - 108 mmol/L  107     CO2      21 - 32 mmol/L  29     Anion gap      5 - 15 mmol/L  7     Glucose      65 - 100 mg/dL  56 (L)     BUN      6 - 20 MG/DL  14     Creatinine      0.70 - 1.30 MG/DL  1.33 (H)     BUN/Creatinine ratio      12 - 20    11 (L)     GFR est AA      >60 ml/min/1.73m2  >60     GFR est non-AA      >60 ml/min/1.73m2  52 (L)     Calcium      8.5 - 10.1 MG/DL  8.4 (L)     Cholesterol, total      <200 MG/      Triglyceride      <150 MG/DL 95      HDL Cholesterol      MG/DL 47      LDL, calculated      0 - 100 MG/DL 44      VLDL, calculated      MG/DL 19      CHOL/HDL Ratio      0 - 5.0   2.3      Hemoglobin A1c, (calculated)      4.2 - 6.3 %   13.5 (H)    Est. average glucose      mg/dL   341    TSH      0.36 - 3.74 uIU/mL    1.06       Assessment/Plan:   1. Uncontrolled type 2 diabetes mellitus with hyperglycemia, with long-term current use of insulin ([facility] [address]   - reviewed with him that he responded very well to insulin when admitted. Each unit of Humalog lowered glucose about 30 mg/dl and 30 units of Lantus was too much as glucoses dropped low overnight  - Biggest concern for him is compliance. His daughter in law feels he can get Trulicity every week. Reviewed how to perform this injection again. - needs to assure Levemir 30 units every day  - Novolog for high glucoses - reviewed directions again.       2. Orthostatic hypotension   - need to verify he is taking fludrocortisone. Feel this is very important due to his unlikely ability to comply with multiple daily doses of midodrine. Fludrocortisone would be more likely to help control BP overnight for when he awakens  - continue midodrine   Greater than 50% of 25 minute visit was spent counseling the patient about above, reviewing and discussing above and recent hospitalization    Patient Instructions   Diabetes type II   - Monitor glucoses in AM and other times as we can  - Watch starch/carbohydrate intake    Levemir - 30 units once daily in AM.     Novolog for high glucoses:   201-230: add 1 unit  231-260: add 2 units  261-290: add 3 units  291-320: add 4 units  321-350: add 5 units  351-380: add 6 units  381-410: add 7 units  411 +     : add 8 units    Goals for blood glucose:  Fasting  (less than 150)  Lunch, Dinner, Bedtime -  (less than 180). Blood pressure:  -fludrocortisone 1/2 tablet once daily  - continue midodrine    Cholesterol:  Can take rosuvastatin in AM.         Follow-up Disposition:  Return in about 3 months (around 11/11/2017).     Copy sent to:

## 2017-08-11 NOTE — MR AVS SNAPSHOT
Visit Information Date & Time Provider Department Dept. Phone Encounter #  
 8/11/2017  1:50 PM Jose Alberto Bran, [address] [facility] Endocr[facility]ology [id number] Follow-up Instructions Return in about 3 months (around 11/11/2017). Upcoming Health Maintenance Date Due  
 FOOT EXAM Q1 12/11/1950 MICROALBUMIN Q1 12/11/1950 EYE EXAM RETINAL OR DILATED Q1 12/11/1950 DTaP/Tdap/Td series (1 - Tdap) 12/11/1962 ZOSTER VACCINE AGE 60> 10/11/2001 GLAUCOMA SCREENING Q2Y 12/11/2006 Pneumococcal 65+ Low/Medium Risk (1 of 2 - PCV13) 12/11/2006 MEDICARE YEARLY EXAM 12/11/2006 INFLUENZA AGE 9 TO ADULT 8/1/2017 HEMOGLOBIN A1C Q6M 1/18/2018 LIPID PANEL Q1 7/18/2018 Allergies as of 8/11/2017  Review Complete On: 8/11/2017 By: Jose Alberto Bran MD  
 No Known Allergies Current Immunizations  Reviewed on 4/13/2017 No immunizations on file. Not reviewed this visit You Were Diagnosed With   
  
 Codes Comments Uncontrolled type 2 diabetes mellitus with hyperglycemia, with long-term current use of insulin (HCC)    -  Primary ICD-10-CM: E11.65, Z79.4 ICD-9-CM: 250.02, V58.67 Orthostatic hypotension     ICD-10-CM: I95.1 ICD-9-CM: 458.0 Vitals BP Pulse Height(growth percentile) Weight(growth percentile) BMI Smoking Status 133/55 (!) 59 5' 11\" (1.803 m) 154 lb (69.9 kg) 21.48 kg/m2 Current Every Day Smoker Vitals History BMI and BSA Data Body Mass Index Body Surface Area  
 21.48 kg/m 2 1.87 m 2 Preferred Pharmacy Pharmacy Name Phone [id number], [address] [place] [address] [phone] Your Updated Medication List  
  
   
This list is accurate as of: 8/11/17  2:59 PM.  Always use your most recent med list.  
  
  
  
  
 ASPIR-81 81 mg tablet Generic drug:  aspirin delayed-release Take 81 mg by mouth daily. CRESTOR 5 mg tablet Generic drug:  rosuvastatin Take 5 mg by mouth nightly. dulaglutide 0.75 mg/0.5 mL sub-q pen Commonly known as:  TRULICITY  
0.5 mL by SubCUTAneous route every seven (7) days. DULoxetine 60 mg capsule Commonly known as:  CYMBALTA Take 60 mg by mouth daily. finasteride 5 mg tablet Commonly known as:  PROSCAR Take 5 mg by mouth daily. fludrocortisone 0.1 mg tablet Commonly known as:  FLORINEF One-half tablet (0.05 mg or 50 mcg). For low blood pressure  
  
 gabapentin 100 mg capsule Commonly known as:  NEURONTIN Take 1 Cap by mouth two (2) times a day. HYDROcodone-acetaminophen 7.5-325 mg per tablet Commonly known as:  [address] Take 1 Tab by mouth two (2) times daily as needed. insulin aspart 100 unit/mL Inpn Commonly known as:  Jo Ann Miss Sliding scale  
  
 insulin detemir 100 unit/mL (3 mL) Inpn Commonly known as:  LEVEMIR FLEXTOUCH  
30 Units by SubCUTAneous route daily. levothyroxine 50 mcg tablet Commonly known as:  SYNTHROID Take 50 mcg by mouth Daily (before breakfast). LORazepam 0.5 mg tablet Commonly known as:  ATIVAN Take 0.5 mg by mouth two (2) times daily as needed for Anxiety. midodrine 5 mg tablet Commonly known as:  Candance Passe Take 5 mg by mouth two (2) times a day. omeprazole 20 mg capsule Commonly known as:  PRILOSEC Take 20 mg by mouth as needed. tamsulosin 0.4 mg capsule Commonly known as:  FLOMAX Take 0.4 mg by mouth daily. traZODone 50 mg tablet Commonly known as:  Ton[address]eren Lean Take 50 mg by mouth nightly. VITAMIN B-12 100 mcg tablet Generic drug:  cyanocobalamin Take 100 mcg by mouth daily. Follow-up Instructions Return in about 3 months (around 11/11/2017). Patient Instructions Diabetes type II  
- Monitor glucoses in AM and other times as we can - Watch starch/carbohydrate intake Levemir - 30 units once daily in AM. Novolog for high glucoses:  
201-230: add 1 unit 231-260: add 2 units 261-290: add 3 units 291-320: add 4 units 321-350: add 5 units 351-380: add 6 units 381-410: add 7 units 411 +     : add 8 units Goals for blood glucose: 
Fasting  (less than 150) Lunch, Dinner, Bedtime -  (less than 180). Blood pressure: 
-fludrocortisone 1/2 tablet once daily 
- continue midodrine Cholesterol: 
Can take rosuvastatin in AM. Introducing [facility]! [facility] introduces [email or web address] patient portal. Now you can access parts of your medical record, email your doctor's office, and request medication refills online. 1. In your internet browser, go to [email or web address] [employer] 2. Click on the First Time User? Click Here link in the Sign In box. You will see the New Member Sign Up page. 3. Enter your [email or web address] Access Code exactly as it appears below. You will not need to use this code after youve completed the sign-up process. If you do not sign up before the expiration date, you must request a new code. · [email or web address] Access Code: [email or web address] Expires: 10/23/2017  2:29 PM 
 
4. Enter the last four digits of your Social Security Number (xxxx) and Date of Birth (mm/dd/yyyy) as indicated and click Submit. You will be taken to the next sign-up page. 5. Create a Achieved.co ID. This will be your [email or web address] login ID and cannot be changed, so think of one that is secure and easy to remember. 6. Create a Achieved.co password. You can change your password at any time. 7. Enter your Password Reset Question and Answer. This can be used at a later time if you forget your password. 8. Enter your e-mail address. You will receive e-mail notification when new information is available in [address] 9. Click Sign Up. You can now view and download portions of your medical record. 10. Click the Download Summary menu link to download a portable copy of your medical information. If you have questions, please visit the Frequently Asked Questions section of the [employer] website. Remember, [employer] is NOT to be used for urgent needs. For medical emergencies, dial 911. Now available from your iPhone and Android! Please provide this summary of care documentation to your next provider. Your primary care clinician is listed as Soham Ruelas. If you have any questions after today's visit, please call [phone].

## 2017-10-05 ENCOUNTER — TELEPHONE (OUTPATIENT)
Dept: ENDOCRINOLOGY | Age: 77
End: 2017-10-05

## 2017-10-05 NOTE — TELEPHONE ENCOUNTER
Sakshi from [facility] is requesting a call back in regards to patients insulin. She stated that she can be reached at [phone].

## 2017-10-10 RX ORDER — INSULIN GLARGINE 100 [IU]/ML
INJECTION, SOLUTION SUBCUTANEOUS
Qty: 15 ML | Refills: 10 | Status: SHIPPED | OUTPATIENT
Start: 2017-10-10 | End: 2018-04-17 | Stop reason: SDUPTHER

## 2017-10-10 RX ORDER — INSULIN ASPART 100 [IU]/ML
INJECTION, SOLUTION INTRAVENOUS; SUBCUTANEOUS
Qty: 15 ML | Refills: 10 | Status: SHIPPED | OUTPATIENT
Start: 2017-10-10 | End: 2017-10-10 | Stop reason: CLARIF

## 2017-10-10 RX ORDER — INSULIN GLARGINE 100 [IU]/ML
30 INJECTION, SOLUTION SUBCUTANEOUS DAILY
Qty: 15 ML | Refills: 10 | Status: SHIPPED | OUTPATIENT
Start: 2017-10-10 | End: 2017-10-10 | Stop reason: SDUPTHER

## 2017-10-10 RX ORDER — INSULIN LISPRO 100 [IU]/ML
INJECTION, SOLUTION INTRAVENOUS; SUBCUTANEOUS
Qty: 15 ML | Refills: 10 | Status: SHIPPED | OUTPATIENT
Start: 2017-10-10 | End: 2018-03-20

## 2017-10-10 NOTE — TELEPHONE ENCOUNTER
Please confirm total units/day for Novolog. Insurance prefers [employer] or Lantus over [employer]. Please advise which he should switch to.

## 2017-10-10 NOTE — TELEPHONE ENCOUNTER
[facility] sent [facility] stating Novolog is not covered, Humalog is preferred. Please advise if OK to switch to Humalog.

## 2017-10-10 NOTE — TELEPHONE ENCOUNTER
Lantus or Touje[employer] is formulary for Levemir now. Will change to 30 units Lantus once daily. This dose worked well for him while in the hospital in the past and actually lead to a low glucose. His insulin needs appeared much lower while in hospital.    I recommend they monitor more closely after this insulin change. Although Levemir and Lantus are similar, they are not interchangeable.

## 2017-10-14 ENCOUNTER — APPOINTMENT (OUTPATIENT)
Dept: CT IMAGING | Age: 77
End: 2017-10-14
Attending: EMERGENCY MEDICINE
Payer: MEDICARE

## 2017-10-14 ENCOUNTER — HOSPITAL ENCOUNTER (EMERGENCY)
Age: 77
Discharge: HOME | End: 2017-10-14
Attending: EMERGENCY MEDICINE | Admitting: EMERGENCY MEDICINE
Payer: MEDICARE

## 2017-10-14 VITALS
WEIGHT: 152 LBS | TEMPERATURE: 98.2 F | BODY MASS INDEX: 21.28 KG/M2 | SYSTOLIC BLOOD PRESSURE: 144 MMHG | DIASTOLIC BLOOD PRESSURE: 56 MMHG | RESPIRATION RATE: 16 BRPM | OXYGEN SATURATION: 99 % | HEART RATE: 69 BPM | HEIGHT: 71 IN

## 2017-10-14 DIAGNOSIS — R73.9 HYPERGLYCEMIA: ICD-10-CM

## 2017-10-14 DIAGNOSIS — E86.0 DEHYDRATION: Primary | ICD-10-CM

## 2017-10-14 DIAGNOSIS — I95.9 HYPOTENSION, UNSPECIFIED HYPOTENSION TYPE: ICD-10-CM

## 2017-10-14 LAB
ALBUMIN SERPL-MCNC: 3.8 G/DL (ref 3.5–5)
ALBUMIN/GLOB SERPL: 1.1 {RATIO} (ref 1.1–2.2)
ALP SERPL-CCNC: 93 U/L (ref 45–117)
ALT SERPL-CCNC: 32 U/L (ref 12–78)
ANION GAP SERPL CALC-SCNC: 8 MMOL/L (ref 5–15)
APPEARANCE UR: CLEAR
AST SERPL-CCNC: 52 U/L (ref 15–37)
BACTERIA URNS QL MICRO: NEGATIVE /HPF
BASOPHILS # BLD: 0 K/UL (ref 0–0.1)
BASOPHILS NFR BLD: 1 % (ref 0–1)
BILIRUB SERPL-MCNC: 0.5 MG/DL (ref 0.2–1)
BILIRUB UR QL: NEGATIVE
BUN SERPL-MCNC: 16 MG/DL (ref 6–20)
BUN/CREAT SERPL: 9 (ref 12–20)
CALCIUM SERPL-MCNC: 8.7 MG/DL (ref 8.5–10.1)
CHLORIDE SERPL-SCNC: 100 MMOL/L (ref 97–108)
CO2 SERPL-SCNC: 27 MMOL/L (ref 21–32)
COLOR UR: ABNORMAL
CREAT SERPL-MCNC: 1.87 MG/DL (ref 0.7–1.3)
EOSINOPHIL # BLD: 0.1 K/UL (ref 0–0.4)
EOSINOPHIL NFR BLD: 1 % (ref 0–7)
EPITH CASTS URNS QL MICRO: ABNORMAL /LPF
ERYTHROCYTE [DISTWIDTH] IN BLOOD BY AUTOMATED COUNT: 15.1 % (ref 11.5–14.5)
GLOBULIN SER CALC-MCNC: 3.4 G/DL (ref 2–4)
GLUCOSE SERPL-MCNC: 267 MG/DL (ref 65–100)
GLUCOSE UR STRIP.AUTO-MCNC: >1000 MG/DL
HCT VFR BLD AUTO: 36.7 % (ref 36.6–50.3)
HGB BLD-MCNC: 12.2 G/DL (ref 12.1–17)
HGB UR QL STRIP: NEGATIVE
HYALINE CASTS URNS QL MICRO: ABNORMAL /LPF (ref 0–5)
KETONES UR QL STRIP.AUTO: NEGATIVE MG/DL
LEUKOCYTE ESTERASE UR QL STRIP.AUTO: NEGATIVE
LYMPHOCYTES # BLD: 2.1 K/UL (ref 0.8–3.5)
LYMPHOCYTES NFR BLD: 30 % (ref 12–49)
MCH RBC QN AUTO: 29.4 PG (ref 26–34)
MCHC RBC AUTO-ENTMCNC: 33.2 G/DL (ref 30–36.5)
MCV RBC AUTO: 88.4 FL (ref 80–99)
MONOCYTES # BLD: 1 K/UL (ref 0–1)
MONOCYTES NFR BLD: 14 % (ref 5–13)
NEUTS SEG # BLD: 3.8 K/UL (ref 1.8–8)
NEUTS SEG NFR BLD: 54 % (ref 32–75)
NITRITE UR QL STRIP.AUTO: NEGATIVE
PH UR STRIP: 5.5 [PH] (ref 5–8)
PLATELET # BLD AUTO: 272 K/UL (ref 150–400)
POTASSIUM SERPL-SCNC: 4.7 MMOL/L (ref 3.5–5.1)
PROT SERPL-MCNC: 7.2 G/DL (ref 6.4–8.2)
PROT UR STRIP-MCNC: ABNORMAL MG/DL
RBC # BLD AUTO: 4.15 M/UL (ref 4.1–5.7)
RBC #/AREA URNS HPF: ABNORMAL /HPF (ref 0–5)
SODIUM SERPL-SCNC: 135 MMOL/L (ref 136–145)
SP GR UR REFRACTOMETRY: 1.02 (ref 1–1.03)
TROPONIN I SERPL-MCNC: <0.04 NG/ML
UROBILINOGEN UR QL STRIP.AUTO: 1 EU/DL (ref 0.2–1)
WBC # BLD AUTO: 6.9 K/UL (ref 4.1–11.1)
WBC URNS QL MICRO: ABNORMAL /HPF (ref 0–4)

## 2017-10-14 PROCEDURE — 96360 HYDRATION IV INFUSION INIT: CPT

## 2017-10-14 PROCEDURE — 93005 ELECTROCARDIOGRAM TRACING: CPT

## 2017-10-14 PROCEDURE — 84484 ASSAY OF TROPONIN QUANT: CPT | Performed by: EMERGENCY MEDICINE

## 2017-10-14 PROCEDURE — 99284 EMERGENCY DEPT VISIT MOD MDM: CPT

## 2017-10-14 PROCEDURE — 36415 COLL VENOUS BLD VENIPUNCTURE: CPT | Performed by: EMERGENCY MEDICINE

## 2017-10-14 PROCEDURE — 81001 URINALYSIS AUTO W/SCOPE: CPT | Performed by: EMERGENCY MEDICINE

## 2017-10-14 PROCEDURE — 80053 COMPREHEN METABOLIC PANEL: CPT | Performed by: EMERGENCY MEDICINE

## 2017-10-14 PROCEDURE — 70450 CT HEAD/BRAIN W/O DYE: CPT

## 2017-10-14 PROCEDURE — 74011250636 HC RX REV CODE- 250/636: Performed by: EMERGENCY MEDICINE

## 2017-10-14 PROCEDURE — 85025 COMPLETE CBC W/AUTO DIFF WBC: CPT | Performed by: EMERGENCY MEDICINE

## 2017-10-14 RX ORDER — DIPHENHYDRAMINE HCL 50 MG
25 CAPSULE ORAL
COMMUNITY
End: 2018-03-20

## 2017-10-14 RX ADMIN — SODIUM CHLORIDE 1000 ML: 900 INJECTION, SOLUTION INTRAVENOUS at 19:11

## 2017-10-14 NOTE — ED TRIAGE NOTES
Pt reports ha been feeling dizzy with low blood pressure for the past 3 days. Son states if he gets up to fast he becomes dizzy and unstable on his feel. Pt has been eating and drinking well. Pt has had 5 falls in the past 2 days.

## 2017-10-15 LAB
ATRIAL RATE: 85 BPM
CALCULATED P AXIS, ECG09: 104 DEGREES
CALCULATED R AXIS, ECG10: 74 DEGREES
CALCULATED T AXIS, ECG11: 53 DEGREES
DIAGNOSIS, 93000: NORMAL
P-R INTERVAL, ECG05: 152 MS
Q-T INTERVAL, ECG07: 370 MS
QRS DURATION, ECG06: 92 MS
QTC CALCULATION (BEZET), ECG08: 440 MS
VENTRICULAR RATE, ECG03: 85 BPM

## 2017-10-15 NOTE — ED NOTES
Assumed care of patient. In NAD. Pt has no complaints currently. Urine specimen obtained and sent. VSS. Awaiting urine results and disposition.

## 2017-10-15 NOTE — DISCHARGE INSTRUCTIONS
Dehydration: Care Instructions  Your Care Instructions  Dehydration happens when your body loses too much fluid. This might happen when you do not drink enough water or you lose large amounts of fluids from your body because of diarrhea, vomiting, or sweating. Severe dehydration can be life-threatening. Water and minerals called electrolytes help put your body fluids back in balance. Learn the early signs of fluid loss, and drink more fluids to prevent dehydration. Follow-up care is a key part of your treatment and safety. Be sure to make and go to all appointments, and call your doctor if you are having problems. It's also a good idea to know your test results and keep a list of the medicines you take. How can you care for yourself at home? · To prevent dehydration, drink plenty of fluids, enough so that your urine is light yellow or clear like water. Choose water and other caffeine-free clear liquids until you feel better. If you have kidney, heart, or liver disease and have to limit fluids, talk with your doctor before you increase the amount of fluids you drink. · If you do not feel like eating or drinking, try taking small sips of water, sports drinks, or other rehydration drinks. · Get plenty of rest.  To prevent dehydration  · Add more fluids to your diet and daily routine, unless your doctor has told you not to. · During hot weather, drink more fluids. Drink even more fluids if you exercise a lot. Stay away from drinks with alcohol or caffeine. · Watch for the symptoms of dehydration. These include:  ¨ A dry, sticky mouth. ¨ Dark yellow urine, and not much of it. ¨ Dry and sunken eyes. ¨ Feeling very tired. · Learn what problems can lead to dehydration. These include:  ¨ Diarrhea, fever, and vomiting. ¨ Any illness with a fever, such as pneumonia or the flu. ¨ Activities that cause heavy sweating, such as endurance races and heavy outdoor work in hot or humid weather.   ¨ Alcohol or drug abuse or withdrawal.  ¨ Certain medicines, such as cold and allergy pills (antihistamines), diet pills (diuretics), and laxatives. ¨ Certain diseases, such as diabetes, cancer, and heart or kidney disease. When should you call for help? Call 911 anytime you think you may need emergency care. For example, call if:  · You passed out (lost consciousness). Call your doctor now or seek immediate medical care if:  · You are confused and cannot think clearly. · You are dizzy or lightheaded, or you feel like you may faint. · You have signs of needing more fluids. You have sunken eyes and a dry mouth, and you pass only a little dark urine. · You cannot keep fluids down. Watch closely for changes in your health, and be sure to contact your doctor if:  · You are not making tears. · Your skin is very dry and sags slowly back into place after you pinch it. · Your mouth and eyes are very dry. Where can you learn more? Go to http://evelio-rosalinda.info/. Enter X953 in the search box to learn more about \"Dehydration: Care Instructions. \"  Current as of: March 20, 2017  Content Version: 11.3  © 8675-8128 [employer] / ?????.??. Care instructions adapted under license by [employer] (which disclaims liability or warranty for this information). If you have questions about a medical condition or this instruction, always ask your healthcare professional. [place] [address] any warranty or liability for your use of this information. Oral Rehydration: Care Instructions  Your Care Instructions  Dehydration occurs when your body loses too much water. This can happen if you do not drink enough fluids or lose a lot of fluid due to diarrhea, vomiting, or sweating. Being dehydrated can cause health problems and can even be life-threatening. To replace lost fluids, you need to drink liquid that contains special chemicals called electrolytes.  Electrolytes keep your body working well. Plain water does not have electrolytes. You also need to rest to prevent more fluid loss. Replacing water and electrolytes (oral rehydration) completely takes about 36 hours. But you should feel better within a few hours. Follow-up care is a key part of your treatment and safety. Be sure to make and go to all appointments, and call your doctor if you are having problems. It's also a good idea to know your test results and keep a list of the medicines you take. How can you care for yourself at home? · Take frequent sips of a drink such as Gatorade, Powerade, or other rehydration drinks that your doctor suggests. These replace both fluid and important chemicals (electrolytes) you need for balance in your blood. · Drink 2 quarts of cool liquid over 2 to 4 hours. You should have at least 10 glasses of liquid a day to replace lost fluid. If you have kidney, heart, or liver disease and have to limit fluids, talk with your doctor before you increase the amount of fluids you drink. · Make your own drink. Measure everything carefully. The drink may not work well or may even be harmful if the amounts are off. ¨ 1 quart water  ¨ ½ teaspoon salt  ¨ 6 teaspoons sugar  · Do not drink liquid with caffeine, such as coffee and serafin. · Do not drink any alcohol. It can make you dehydrated. · Drink plenty of fluids, enough so that your urine is light yellow or clear like water. If you have kidney, heart, or liver disease and have to limit fluids, talk with your doctor before you increase the amount of fluids you drink. When should you call for help? Call 911 anytime you think you may need emergency care. For example, call if:  · You have signs of severe dehydration, such as:  ¨ You are confused or unable to stay awake. ¨ You passed out (lost consciousness). Call your doctor now or seek immediate medical care if:  · You still have signs of dehydration.  You have sunken eyes and a dry mouth, and you pass only a little dark urine. · You are dizzy or lightheaded, or you feel like you may faint. · You are not able to keep down fluids. Watch closely for changes in your health, and be sure to contact your doctor if:  · You do not get better as expected. Where can you learn more? Go to http://evelio-rosalinda.info/. Enter I040 in the search box to learn more about \"Oral Rehydration: Care Instructions. \"  Current as of: March 20, 2017  Content Version: 11.3  © 4197-6200 [employer]. Care instructions adapted under license by [employer] (which disclaims liability or warranty for this information). If you have questions about a medical condition or this instruction, always ask your healthcare professional. [address] any warranty or liability for your use of this information. Learning About High Blood Sugar  What is high blood sugar? Your body turns the food you eat into glucose (sugar), which it uses for energy. But if your body isn't able to use the sugar right away, it can build up in your blood and lead to high blood sugar. When the amount of sugar in your blood stays too high for too much of the time, you may have diabetes. Diabetes is a disease that can cause serious health problems. The good news is that lifestyle changes may help you get your blood sugar back to normal and avoid or delay diabetes. What causes high blood sugar? Sugar (glucose) can build up in your blood if you:  · Are overweight. · Have a family history of diabetes. · Take certain medicines, such as steroids. What are the symptoms? Having high blood sugar may not cause any symptoms at all. Or it may make you feel very thirsty or very hungry. You may also urinate more often than usual, have blurry vision, or lose weight without trying. How is high blood sugar treated? You can take steps to lower your blood sugar level if you understand what makes it get higher.  Your doctor may want you to learn how to test your blood sugar level at home. Then you can see how illness, stress, or different kinds of food or medicine raise or lower your blood sugar level. Other tests may be needed to see if you have diabetes. How can you prevent high blood sugar? · Watch your weight. If you're overweight, losing just a small amount of weight may help. Reducing fat around your waist is most important. · Limit the amount of calories, sweets, and unhealthy fat you eat. Ask your doctor if a dietitian can help you. A registered dietitian can help you create meal plans that fit your lifestyle. · Get at least 30 minutes of exercise on most days of the week. Exercise helps control your blood sugar. It also helps you maintain a healthy weight. Walking is a good choice. You also may want to do other activities, such as running, swimming, cycling, or playing tennis or team sports. · If your doctor prescribed medicines, take them exactly as prescribed. Call your doctor if you think you are having a problem with your medicine. You will get more details on the specific medicines your doctor prescribes. Follow-up care is a key part of your treatment and safety. Be sure to make and go to all appointments, and call your doctor if you are having problems. It's also a good idea to know your test results and keep a list of the medicines you take. Where can you learn more? Go to http://evelio-rosalinda.info/. Enter O108 in the search box to learn more about \"Learning About High Blood Sugar. \"  Current as of: March 13, 2017  Content Version: 11.3  © 1807-1106 [employer]. Care instructions adapted under license by [employer] (which disclaims liability or warranty for this information).  If you have questions about a medical condition or this instruction, always ask your healthcare professional. [address] any warranty or liability for your use of this information.

## 2017-10-15 NOTE — PROGRESS NOTES
Admission Medication Reconciliation:    Information obtained from: Daughter Infabio [place])    Significant PMH/Disease States:   Past Medical History:   Diagnosis Date    CAD (coronary artery disease)     [facility] [address]     Diabetes ([facility] [address]     1970a    Ill-defined condition     SOB    Pneumonia     Urinary incontinence        Chief Complaint for this Admission:  hypotension, falls    Allergies:  Review of patient's allergies indicates no known allergies. Prior to Admission Medications:   Prior to Admission Medications   Prescriptions Last Dose Informant Patient Reported? Taking? DULoxetine (CYMBALTA) 60 mg capsule 10/14/2017 at am  Yes Yes   Sig: Take 60 mg by mouth daily. HYDROcodone-acetaminophen (NORCO) 7.5-325 mg per tablet   Yes Yes   Sig: Take 1 Tab by mouth two (2) times daily as needed. LANTUS SOLOSTAR 100 unit/mL (3 mL) inpn 10/14/2017 at am  No Yes   Si units once daily. LORazepam (ATIVAN) 0.5 mg tablet   Yes Yes   Sig: Take 0.5 mg by mouth two (2) times daily as needed for Anxiety. aspirin delayed-release (ASPIR-81) 81 mg tablet 10/14/2017 at am  Yes Yes   Sig: Take 81 mg by mouth daily. cyanocobalamin (VITAMIN B-12) 100 mcg tablet 10/14/2017 at am  Yes Yes   Sig: Take 100 mcg by mouth daily. diphenhydrAMINE (BENADRYL) 50 mg capsule 10/14/2017 at Unknown time  Yes Yes   Sig: Take 25 mg by mouth every six (6) hours as needed for Skin Irritation. dulaglutide (TRULICITY) 9.48 GG/3.7 mL sub-q pen 10/9/2017 at am  No No   Si.5 mL by SubCUTAneous route every seven (7) days. gabapentin (NEURONTIN) 100 mg capsule   No Yes   Sig: Take 1 Cap by mouth two (2) times a day. insulin lispro (HUMALOG KWIKPEN) 100 unit/mL kwikpen 10/14/2017 at am  No Yes   Sig: Take as directed with meals and for high blood sugars - up to 32 units/day.    levothyroxine (SYNTHROID) 50 mcg tablet 10/14/2017 at am  Yes Yes   Sig: Take 50 mcg by mouth Daily (before breakfast). midodrine (PROAMITINE) 5 mg tablet 10/14/2017 at am  Yes Yes   Sig: Take 5 mg by mouth two (2) times a day. omeprazole (PRILOSEC) 20 mg capsule 10/14/2017 at am  Yes Yes   Sig: Take 20 mg by mouth as needed. rosuvastatin (CRESTOR) 5 mg tablet 10/14/2017 at am  Yes Yes   Sig: Take 5 mg by mouth daily. tamsulosin (FLOMAX) 0.4 mg capsule 10/14/2017 at am  Yes Yes   Sig: Take 0.4 mg by mouth daily. Facility-Administered Medications: None         Comments/Recommendations: Patient not able to participate in interview due to dementia. Daughter spent a long time talking with me about her dad's health issues and medications. Deleted:  1. Fludrocortisone (stopped, never re-started, she doesn't know why)  2. Sodium chloride tablets (stopped, never restarted, doesn't know why)    Recommended that she take current med list to patient's physician and ask why the above medications were stopped / not re-started. We discussed the role of midodrine, fludrocortisone and sodium tablets in the role of certain types of hypotension and the side effects that can be expected as possible reasons for why they were started / discontinued. Daughter stated that patient recently restarted using his trazodone again, in combination with diphenhydramine (for a rash), we discussed that this combination may exacerbate his hypotension /dizziness and to talk with physician about appropriate use for this gentleman. Daughter stated that patient's falls worsened this past week as well, suggested that she share all of this information with his physician. Patient has taken tamsulosin for many years and did not have issues with orthostasis / dizsiness / falls until more recently. I provided daughter with updated med list and guidance for some of her questions. Thank you for allowing me to participate in the care of this most charming gentleman.     Michelle OvallesD, RN #[id number]

## 2017-10-15 NOTE — ED NOTES
Pt provided with discharge instructions. States he is very upset with the length of time he was in the ED. Verbalizes understanding of his instructions and his follow up care. Left ambulatory with steady gait, all belongings, and stable VS.  Male  with patient.

## 2017-10-15 NOTE — ED NOTES
Pt able to sit up, stand, and ambulate around department without issue. VSS upon returning to bed.   Awaiting disposition

## 2017-10-17 NOTE — CALL BACK NOTE
[facility] PSYCHIATRIC [facility] Emergency Department Follow Up Call Record    Discharged to : Home/Family Home/Home Health/Skilled Facility/Rehab/Assisted Living/Other__home_____  1) Did you receive your discharge instructions? Yes        2) Do you understand them? Yes   This writer spoke briefly with daughter, Nataliia Arcos who stated he was \"about the same\". Brief  discussion with Nataliia Arcos as she was not receptive to follow up call. She stated, \"He's about the same and there's not much we can do about it. \". 3) Are you able to follow them? Yes           If NO, what can I clarify for you? 4) Do you understand your diagnosis? Not applicable         5) Do you know which symptoms should prompt you to call the doctor? Not applicable     6) Were you able to fill and  any medications that were prescribed? Not applicable     7) You were prescribed __N/A_________for ____________________. Common side effects of this medication are____________________. This is not a complete list so please review the forms given from the pharmacy for a complete list.      8) Are there any questions about your medications? No            Have you scheduled any recommended doctors appointments (specialty, PCP) UNknown  If NO, what barriers are you encountering (transportation/lost contact info/cost/  didnt think necessary/no PCP  9) If discharged with Home Health, has the agency contacted you to schedule visit? This patient does receive Home Health. {Mega there anyone available to help you at home (meals, errands, transportation    monitoring) (adult children, neighbors, private duty companions) Not applicable    10) Are you on a special diet? No         If YES, do you understand the requirements for this diet? Education provided? 11) If presented with cough, bronchitis, COPD, asthma, is it ok to ask that the   respiratory disease management educator call you?  Not applicable      12)  A) If presented with fall, were you issued an assistive device in the ED    Are you using? Yes  This patient does use assistive devices. B) If given RX for device, have you obtained? Not applicable       If NO, barriers? C) Therapist recommended: YES. Unknown as to wether the family has discussed need for PT with PCP. Are you able to implement the suggestions? Not applicable        If NO, barriers to implementation? D) Are you having any difficulties with mobility inside your home?     (steps, bed, tub) This patient uses walker. If YES, ask if the [facility] PT can contact patient and good time and number?  13)  At the end of your discharge instructions, there is information about accessing hospitals & HEALTH SERVICES, have you had a chance to review those? No         Do you have any questions about signing up for this service? No   We encourage our patients to be active participants in their healthcare and this site is one of the ways to do that. It will allow you to access parts of your medical record, email your doctors office, schedule appointments, and request medications refills . 14) Are there any other questions that I can answer for you regarding    your Emergency department visit?  NO               Estimated Call Time:____11:20 AM  _______________ Date/Time:_______________

## 2017-10-28 ENCOUNTER — HOSPITAL ENCOUNTER (EMERGENCY)
Age: 77
Discharge: HOME | End: 2017-10-28
Attending: EMERGENCY MEDICINE
Payer: COMMERCIAL

## 2017-10-28 VITALS
TEMPERATURE: 97.4 F | WEIGHT: 138 LBS | BODY MASS INDEX: 19.32 KG/M2 | RESPIRATION RATE: 18 BRPM | SYSTOLIC BLOOD PRESSURE: 126 MMHG | HEART RATE: 79 BPM | DIASTOLIC BLOOD PRESSURE: 77 MMHG | HEIGHT: 71 IN | OXYGEN SATURATION: 99 %
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
## 2017-10-28 PROCEDURE — 99282 EMERGENCY DEPT VISIT SF MDM: CPT

## 2017-10-28 NOTE — ED TRIAGE NOTES
Pt. complains of rash to RLE, left upper arm and hand, looks like ecchymotic area to LUQ. States rash started about a week ago. Pain like fire and itches per pt.

## 2017-10-28 NOTE — ED NOTES
Patient has received verbal discharge instructions from ER MD, verbalizes understanding. Patient left department prior to nursing reassessment or receiving discharge papers.

## 2017-10-28 NOTE — ED PROVIDER NOTES
HPI Comments: 77 y.o. male with past medical history significant for diabetes, CAD, COPD, SOB, pneumonia, and urinary incontinence who presents from home with chief complaint of rash. Patient states that he started to have \"small spots\" on his left hand which were \"itchy and burning\" approximately 1 week ago. Since then, patient states that he started to develop similar rashes on his left upper arm, as well as his right lower leg near his ankles. He reports using topical hydrocortisone ointment without relief. Patient's son states that patient wakes up multiple times at night to take hot showers which reportedly provide patient relief from the itching and burning sensation. Patient is unsure if he is on any new medication. He denies using any new soaps, lotions, or detergents. He denies being on any blood thinners. Patient denies having any mouth sores or fever. There are no other acute medical concerns at this time. Social hx: current everyday smoker, no EtOH use, no drug use  PCP: Ryan Villafuerte MD    Note written by Lazara Lauren, as dictated by Vivien Garcia MD 3:15 PM      The history is provided by the patient. No  was used. Past Medical History:   Diagnosis Date    CAD (coronary artery disease)     [facility] [address]     Diabetes [facility] [address]     1970a    Ill-defined condition     SOB    Pneumonia     Urinary incontinence        Past Surgical History:   Procedure Laterality Date    CARDIAC SURG PROCEDURE UNLIST  2000    open heart    HX HEENT  1975    nasal surgery    HX MOHS PROCEDURES  2013    left         Family History:   Problem Relation Age of Onset    Diabetes Mother     Diabetes Brother        Social History     Social History    Marital status:      Spouse name: N/A    Number of children: N/A    Years of education: N/A     Occupational History    Not on file.      Social History Main Topics    Smoking status: Current Every Day Smoker    Smokeless tobacco: Never Used      Comment: 1-2 cigars daily    Alcohol use No    Drug use: No    Sexual activity: Not on file     Other Topics Concern    Not on file     Social History Narrative         ALLERGIES: Review of patient's allergies indicates no known allergies. Review of Systems   Constitutional: Negative for activity change, chills and fever. HENT: Negative for mouth sores, nosebleeds, sore throat, trouble swallowing and voice change. Eyes: Negative for visual disturbance. Respiratory: Negative for shortness of breath. Cardiovascular: Negative for chest pain and palpitations. Gastrointestinal: Negative for abdominal pain, constipation, diarrhea and nausea. Genitourinary: Negative for difficulty urinating, dysuria, hematuria and urgency. Musculoskeletal: Negative for back pain, neck pain and neck stiffness. Skin: Positive for color change and rash. Allergic/Immunologic: Negative for immunocompromised state. Neurological: Negative for dizziness, seizures, syncope, weakness, light-headedness, numbness and headaches. Psychiatric/Behavioral: Negative for behavioral problems, confusion, hallucinations, self-injury and suicidal ideas. Vitals:    10/28/17 1421   BP: 126/77   Pulse: 79   Resp: 18   Temp: 97.4 °F (36.3 °C)   SpO2: 99%   Weight: 62.6 kg (138 lb)   Height: 5' 11\" (1.803 m)            Physical Exam   Constitutional: He is oriented to person, place, and time. He appears well-developed and well-nourished. No distress. HENT:   Head: Normocephalic and atraumatic. Eyes: Pupils are equal, round, and reactive to light. Neck: Normal range of motion. Neck supple. Cardiovascular: Normal rate, regular rhythm and normal heart sounds. Exam reveals no gallop and no friction rub. No murmur heard. Pulmonary/Chest: Effort normal and breath sounds normal. No respiratory distress. He has no wheezes. Abdominal: Soft.  Bowel sounds are normal. He exhibits no distension. There is no tenderness. There is no rebound and no guarding. Musculoskeletal: Normal range of motion. Neurological: He is alert and oriented to person, place, and time. Skin: Skin is warm. Ecchymosis noted. He is not diaphoretic. Scattered, small, raised, erythematous lesions on arms and legs. 3x4 cm area of ecchymosis secondary to scratching on the lateral aspect of the left upper extremity. Psychiatric: He has a normal mood and affect. His behavior is normal. Judgment and thought content normal.   Nursing note and vitals reviewed. Note written by Lazara Flynn, as dictated by Lit Zapata MD 3:15 PM    [facility]  ED Course   This is a 67-year-old male, with past medical history, review of systems, physical exam as above, isn't in with complaints of rash. Patient states proximally one week of slowly developing rash, characterized as punctate lesions, developing on his left upper extremity first, on the upper arm, now distally, as well as lesions on the lateral aspect of the right lower extremity. She states rash change to an ecchymotic structure in his left arm, likely due to scratching producing ecchymosis. Patient's son states he is concerned as the patient is waking in the middle of night to take hot showers frequently as a provide some relief. As it was in [facility] for elderly appearing male, no acute distress calm with no oral lesions, or edematous scattered punctate lesions on the left upper extremity, left hand, and right lower extremity, resembling insect envenomation, versus contact mild dermatitis, not consistent with developed of shingles. Patient is not able to take H1 patient's due to interference with his blood pressure medicines. We'll advise continue topical care, careful inspection of bedding for bedbugs etc. and follow up with primary care physician.     Procedures

## 2017-10-28 NOTE — DISCHARGE INSTRUCTIONS
Insect Stings and Bites: Care Instructions  Your Care Instructions  Stings and bites from bees, wasps, ants, and other insects often cause pain, swelling, redness, and itching. In some people, especially children, the redness and swelling may be worse. It may extend several inches beyond the affected area. But in most cases, stings and bites don't cause reactions all over the body. If you have had a reaction to an insect sting or bite, you are at risk for a reaction if you get stung or bitten again. Follow-up care is a key part of your treatment and safety. Be sure to make and go to all appointments, and call your doctor if you are having problems. It's also a good idea to know your test results and keep a list of the medicines you take. How can you care for yourself at home? · Do not scratch or rub the skin where the sting or bite occurred. · Put a cold pack or ice cube on the area. Put a thin cloth between the ice and your skin. For some people, a paste of baking soda mixed with a little water helps relieve pain and decrease the reaction. · Take an over-the-counter antihistamine, such as diphenhydramine (Benadryl) or loratadine (Claritin), to relieve swelling, redness, and itching. Calamine lotion or hydrocortisone cream may also help. Do not give antihistamines to your child unless you have checked with the doctor first.  · Be safe with medicines. If your doctor prescribed medicine for your allergy, take it exactly as prescribed. Call your doctor if you think you are having a problem with your medicine. You will get more details on the specific medicines your doctor prescribes. · Your doctor may prescribe a shot of epinephrine to carry with you in case you have a severe reaction. Learn how and when to give yourself the shot, and keep it with you at all times. Make sure it has not . · Go to the emergency room anytime you have a severe reaction.  Go even if you have given yourself epinephrine and are feeling better. Symptoms can come back. When should you call for help? Call 911 anytime you think you may need emergency care. For example, call if:  ? · You have symptoms of a severe allergic reaction. These may include:  ¨ Sudden raised, red areas (hives) all over your body. ¨ Swelling of the throat, mouth, lips, or tongue. ¨ Trouble breathing. ¨ Passing out (losing consciousness). Or you may feel very lightheaded or suddenly feel weak, confused, or restless. ?Call your doctor now or seek immediate medical care if:  ? · You have symptoms of an allergic reaction not right at the sting or bite, such as:  ¨ A rash or small area of hives (raised, red areas on the skin). ¨ Itching. ¨ Swelling. ¨ Belly pain, nausea, or vomiting. ? · You have a lot of swelling around the site (such as your entire arm or leg is swollen). ? · You have signs of infection, such as:  ¨ Increased pain, swelling, redness, or warmth around the sting. ¨ Red streaks leading from the area. ¨ Pus draining from the sting. ¨ A fever. ? Watch closely for changes in your health, and be sure to contact your doctor if:  ? · You do not get better as expected. Where can you learn more? Go to http://evelio-rosalinda.info/. Enter P390 in the search box to learn more about \"Insect Stings and Bites: Care Instructions. \"  Current as of: March 20, 2017  Content Version: 11.4  © 8228-7257 [employer]. Care instructions adapted under license by [employer] (which disclaims liability or warranty for this information). If you have questions about a medical condition or this instruction, always ask your healthcare professional. [place] [address] any warranty or liability for your use of this information.

## 2017-12-13 RX ORDER — TRAZODONE HYDROCHLORIDE 100 MG/1
TABLET ORAL
Qty: 30 TAB | Refills: 3 | OUTPATIENT
Start: 2017-12-13

## 2018-01-22 RX ORDER — TRAZODONE HYDROCHLORIDE 100 MG/1
TABLET ORAL
Qty: 30 TAB | Refills: 3 | Status: SHIPPED | OUTPATIENT
Start: 2018-01-22 | End: 2018-10-10

## 2018-02-09 ENCOUNTER — HOSPITAL ENCOUNTER (OUTPATIENT)
Dept: ULTRASOUND IMAGING | Age: 78
Discharge: HOME | End: 2018-02-09
Attending: INTERNAL MEDICINE
Payer: MEDICARE

## 2018-02-09 DIAGNOSIS — N18.30 CHRONIC KIDNEY DISEASE, STAGE III (MODERATE) (HCC): ICD-10-CM

## 2018-02-09 PROCEDURE — 76770 US EXAM ABDO BACK WALL COMP: CPT

## 2018-02-23 ENCOUNTER — HOSPITAL ENCOUNTER (OUTPATIENT)
Dept: CT IMAGING | Age: 78
Discharge: HOME | End: 2018-02-23
Attending: UROLOGY
Payer: MEDICARE

## 2018-02-23 ENCOUNTER — HOSPITAL ENCOUNTER (OUTPATIENT)
Dept: NUCLEAR MEDICINE | Age: 78
Discharge: HOME | End: 2018-02-23
Attending: UROLOGY
Payer: MEDICARE

## 2018-02-23 DIAGNOSIS — C61 PROSTATE CANCER (HCC): ICD-10-CM

## 2018-02-23 LAB — CREAT BLD-MCNC: 1.6 MG/DL (ref 0.6–1.3)

## 2018-02-23 PROCEDURE — A9503 TC99M MEDRONATE: HCPCS

## 2018-02-23 PROCEDURE — 74176 CT ABD & PELVIS W/O CONTRAST: CPT

## 2018-02-23 PROCEDURE — 74011636320 HC RX REV CODE- 636/320: Performed by: UROLOGY

## 2018-02-23 PROCEDURE — 82565 ASSAY OF CREATININE: CPT

## 2018-02-23 RX ORDER — SODIUM CHLORIDE 0.9 % (FLUSH) 0.9 %
10 SYRINGE (ML) INJECTION
Status: DISCONTINUED | OUTPATIENT
Start: 2018-02-23 | End: 2018-02-23

## 2018-02-23 RX ADMIN — IOHEXOL 50 ML: 240 INJECTION, SOLUTION INTRATHECAL; INTRAVASCULAR; INTRAVENOUS; ORAL at 14:40

## 2018-03-20 ENCOUNTER — OFFICE VISIT (OUTPATIENT)
Dept: ENDOCRINOLOGY | Age: 78
End: 2018-03-20

## 2018-03-20 VITALS
WEIGHT: 157 LBS | SYSTOLIC BLOOD PRESSURE: 122 MMHG | BODY MASS INDEX: 21.98 KG/M2 | HEIGHT: 71 IN | HEART RATE: 69 BPM | DIASTOLIC BLOOD PRESSURE: 59 MMHG

## 2018-03-20 DIAGNOSIS — Z79.4 UNCONTROLLED TYPE 2 DIABETES MELLITUS WITH HYPERGLYCEMIA, WITH LONG-TERM CURRENT USE OF INSULIN (HCC): Primary | ICD-10-CM

## 2018-03-20 DIAGNOSIS — F03.90 DEMENTIA WITHOUT BEHAVIORAL DISTURBANCE, UNSPECIFIED DEMENTIA TYPE: ICD-10-CM

## 2018-03-20 DIAGNOSIS — E11.65 UNCONTROLLED TYPE 2 DIABETES MELLITUS WITH HYPERGLYCEMIA, WITH LONG-TERM CURRENT USE OF INSULIN (HCC): Primary | ICD-10-CM

## 2018-03-20 DIAGNOSIS — I95.1 ORTHOSTATIC HYPOTENSION: ICD-10-CM

## 2018-03-20 DIAGNOSIS — G62.9 NEUROPATHY: ICD-10-CM

## 2018-03-20 RX ORDER — PREGABALIN 50 MG/1
50 CAPSULE ORAL 2 TIMES DAILY
Qty: 60 CAP | Refills: 5 | Status: SHIPPED | OUTPATIENT
Start: 2018-03-20 | End: 2019-02-04

## 2018-03-20 RX ORDER — FLUDROCORTISONE ACETATE 0.1 MG/1
0.2 TABLET ORAL DAILY
Qty: 60 TAB | Refills: 5 | Status: SHIPPED | OUTPATIENT
Start: 2018-03-20 | End: 2018-04-16 | Stop reason: SDUPTHER

## 2018-03-20 NOTE — PROGRESS NOTES
History of Present Illness: Oriana Colon is a 69 y.o. male presents for follow-up of diabetes. Has had diabetes for 40 years. He also has orthostatic hypotension and dementia. Diabetes related complications:  + nephropathy - sees Dr Radha Moss for eye exam. No known retinopathy  + neuropathy. His aunt reports that he complains about his neuropathy a lot. Even described that he will call her the middle the night to complain about his neuropathy.     Current diabetes regimen:  Levemir 30 units  Trulicity 1.5 mg    Glucoses 200s-300s  88 this AM.     Diet:  Reported to eat \"whenever he can get his hands on\"  Breakfast: muffin and oatmeal cookie  Lunch: none yet. Call what he had for dinner last night. Orthostatic hypotension  He is prescribed Midodine 5 mg 3 times daily as well as fludrocortisone 0.1 daily. He continues to feel tired and lightheaded with standing. Diabetic neuropathy: Taking gabapentin duloxetine currently, but control is poor at this time. Past Medical History:   Diagnosis Date    CAD (coronary artery disease)     [facility]) [facility] [address]     Diabetes ([facility] [address]     1970a    Ill-defined condition     SOB    Pneumonia     Urinary incontinence      Current Outpatient Prescriptions   Medication Sig    GLIPIZIDE PO Take  by mouth.  TRULICITY 1.5 IA/6.4 mL sub-q pen INJECT 0.5ML (1.5MG) UNDER THE SKIN ONCE WEEKLY AS DIRECTED    traZODone (DESYREL) 100 mg tablet TAKE 1 TABLET BY MOUTH EVERY NIGHT (Patient taking differently: 50 mg at night)    fludrocortisone (FLORINEF) 0.1 mg tablet Take 1 Tab by mouth daily. To maintain blood pressure    LANTUS SOLOSTAR 100 unit/mL (3 mL) inpn 30 units once daily.  midodrine (PROAMITINE) 5 mg tablet Take 5 mg by mouth three (3) times daily (with meals).  gabapentin (NEURONTIN) 100 mg capsule Take 1 Cap by mouth two (2) times a day.     cyanocobalamin (VITAMIN B-12) 100 mcg tablet Take 100 mcg by mouth daily.    DULoxetine (CYMBALTA) 60 mg capsule Take 60 mg by mouth daily.  tamsulosin (FLOMAX) 0.4 mg capsule Take 0.4 mg by mouth daily.  omeprazole (PRILOSEC) 20 mg capsule Take 20 mg by mouth as needed.  rosuvastatin (CRESTOR) 5 mg tablet Take 5 mg by mouth daily.  levothyroxine (SYNTHROID) 50 mcg tablet Take 50 mcg by mouth Daily (before breakfast).  aspirin delayed-release (ASPIR-81) 81 mg tablet Take 81 mg by mouth daily.  diphenhydrAMINE (BENADRYL) 50 mg capsule Take 25 mg by mouth every six (6) hours as needed for Skin Irritation.  insulin lispro (HUMALOG KWIKPEN) 100 unit/mL kwikpen Take as directed with meals and for high blood sugars - up to 32 units/day.  LORazepam (ATIVAN) 0.5 mg tablet Take 0.5 mg by mouth two (2) times daily as needed for Anxiety.  HYDROcodone-acetaminophen (NORCO) 7.5-325 mg per tablet Take 1 Tab by mouth two (2) times daily as needed. No current facility-administered medications for this visit.       No Known Allergies    Review of Systems:  - Eyes: no blurry vision or double vision  - Cardiovascular: no chest pain  - Respiratory: no shortness of breath  - Musculoskeletal: no myalgias  - Neurological: See HPI  Physical Examination:  Visit Vitals    /59    Pulse 69    Ht 5' 11\" (1.803 m)    Wt 157 lb (71.2 kg)    BMI 21.9 kg/m2   -   - General: pleasant, no distress, normal gait   HEENT: Hard of hearing t, EOMI, clear sclera without icterus  - Cardiovascular: regular, normal rate   - Respiratory: normal effort  - Integumentary: no edema   Diabetic foot exam:     Right: Filament test absent sensation with micro filament   Pulse DP: 2+ (normal)   Deformities: None    - Psychiatric: normal mood and affect    Data Reviewed:   Component      Latest Ref Rng & Units 10/14/2017 7/18/2017 7/18/2017           6:14 PM  2:14 AM  2:14 AM   Sodium      136 - 145 mmol/L 135 (L)     Potassium      3.5 - 5.1 mmol/L 4.7     Chloride      97 - 108 mmol/L 100 CO2      21 - 32 mmol/L 27     Anion gap      5 - 15 mmol/L 8     Glucose      65 - 100 mg/dL 267 (H)     BUN      6 - 20 MG/DL 16     Creatinine      0.70 - 1.30 MG/DL 1.87 (H)     BUN/Creatinine ratio      12 - 20   9 (L)     GFR est AA      >60 ml/min/1.73m2 43 (L)     GFR est non-AA      >60 ml/min/1.73m2 35 (L)     Calcium      8.5 - 10.1 MG/DL 8.7     Bilirubin, total      0.2 - 1.0 MG/DL 0.5     ALT (SGPT)      12 - 78 U/L 32     AST      15 - 37 U/L 52 (H)     Alk. phosphatase      45 - 117 U/L 93     Protein, total      6.4 - 8.2 g/dL 7.2     Albumin      3.5 - 5.0 g/dL 3.8     Globulin      2.0 - 4.0 g/dL 3.4     A-G Ratio      1.1 - 2.2   1.1     Cholesterol, total      <200 MG/DL  110    Triglyceride      <150 MG/DL  95    HDL Cholesterol      MG/DL  47    LDL, calculated      0 - 100 MG/DL  44    VLDL, calculated      MG/DL  19    CHOL/HDL Ratio      0 - 5.0    2.3    Hemoglobin A1c, (calculated)      4.2 - 6.3 %   13.5 (H)   Est. average glucose      mg/dL   341       Assessment/Plan:   1. Uncontrolled type 2 diabetes mellitus with hyperglycemia, with long-term current use of insulin (HCC)   Continue Lantus 30 units daily and Trulicity. Encouraged consideration of carbohydrate intake 1 reviewed glucose control. Recommended monitoring glucoses fasting, dinnertime, and bedtime. Continue Lantus 20 units daily and Trulicity   2. Orthostatic hypotension   Blood pressure decreased to 90 systolic with standing  Increase fludrocortisone to 0.2 mg daily  Continue midodrine 5 mg 3 times daily. May consider increasing to 10 mg further blood pressure raising as needed   3. Neuropathy   Discussed with patient and his daughter that side effects of medications can sometimes be worse of the condition. However, the condition is bad enough that he would like to start medication to help with symptoms.   Discussed importance of glucose control for helping the underlying process  Add Lyrica 50 mg at bedtime and increase to 50 mg twice daily as needed. This is based on school recommendations   4. Dementia without behavioral disturbance, unspecified dementia type   Avoid substantial swings in blood glucose. Dementia does make diabetes challenging as he is not able to remind himself of proper mealtime/diet efforts     Greater than 50% of 25 minute visit was spent counseling the patient about above. Patient Instructions   Diabetes type II   - Monitor glucoses in AM and other times as we can  - Watch starch/carbohydrate intake    Lantus - 30 units once daily in AM.   Continue Trulicity 1.5 mg daily    Goals for blood glucose:  Fasting  (less than 150)  Lunch, Dinner, Bedtime -  (less than 180). Blood pressure:  -fludrocortisone - increase to 2 tablets daily  - continue midodrine 5 mg three times daily. Can increase to 10 mg dose, if needed    Monitor blood pressure standing  - Omron series 3 upper arm cuff    Neuropathy:  Continue duloxetine  Stop gabapentin  Start Lyrica 50 mg once daily at night. If tolerating and greater effect is desired, then increase to 50 mg twice daily. Follow-up Disposition:  Return in about 3 months (around 6/20/2018).     Copy sent to:

## 2018-03-20 NOTE — PATIENT INSTRUCTIONS
Diabetes type II   - Monitor glucoses in AM and other times as we can  - Watch starch/carbohydrate intake    Lantus - 30 units once daily in AM.   Continue Trulicity 1.5 mg daily    Goals for blood glucose:  Fasting  (less than 150)  Lunch, Dinner, Bedtime -  (less than 180). Blood pressure:  -fludrocortisone - increase to 2 tablets daily  - continue midodrine 5 mg three times daily. Can increase to 10 mg dose, if needed    Monitor blood pressure standing  - Omron series 3 upper arm cuff    Neuropathy:  Continue duloxetine  Stop gabapentin  Start Lyrica 50 mg once daily at night. If tolerating and greater effect is desired, then increase to 50 mg twice daily.

## 2018-03-20 NOTE — MR AVS SNAPSHOT
[address] 
[phone] Patient: Chelle Key MRN: [id number] OWY:51/73/7429 Visit Information Date & Time Provider Department Dept. Phone Encounter #  
 3/20/2018 12:10 PM Farzaneh Guevara, [address] [facility] Diabetes and Endocrinology [id number] Follow-up Instructions Return in about 3 months (around 6/20/2018). Upcoming Health Maintenance Date Due  
 FOOT EXAM Q1 12/11/1950 MICROALBUMIN Q1 12/11/1950 EYE EXAM RETINAL OR DILATED Q1 12/11/1950 DTaP/Tdap/Td series (1 - Tdap) 12/11/1962 ZOSTER VACCINE AGE 60> 10/11/2001 GLAUCOMA SCREENING Q2Y 12/11/2006 Pneumococcal 65+ High/Highest Risk (1 of 2 - PCV13) 12/11/2006 Influenza Age 5 to Adult 8/1/2017 HEMOGLOBIN A1C Q6M 1/18/2018 MEDICARE YEARLY EXAM 3/20/2018 LIPID PANEL Q1 7/18/2018 Allergies as of 3/20/2018  Review Complete On: 3/20/2018 By: Farzaneh Guevara MD  
 No Known Allergies Current Immunizations  Reviewed on 4/13/2017 No immunizations on file. Not reviewed this visit You Were Diagnosed With   
  
 Codes Comments Uncontrolled type 2 diabetes mellitus with hyperglycemia, with long-term current use of insulin (HCC)    -  Primary ICD-10-CM: E11.65, Z79.4 ICD-9-CM: 250.02, V58.67 Orthostatic hypotension     ICD-10-CM: I95.1 ICD-9-CM: 458.0 Neuropathy     ICD-10-CM: G62.9 ICD-9-CM: 137. 9 Vitals BP Pulse Height(growth percentile) Weight(growth percentile) BMI Smoking Status 122/59 69 5' 11\" (1.803 m) 157 lb (71.2 kg) 21.9 kg/m2 Current Every Day Smoker Vitals History BMI and BSA Data Body Mass Index Body Surface Area  
 21.9 kg/m 2 1.89 m 2 Preferred Pharmacy Pharmacy Name Phone [id number] [place], [address] [facility] [phone] Your Updated Medication List  
  
   
 This list is accurate as of 3/20/18  1:47 PM.  Always use your most recent med list.  
  
  
  
  
 ASPIR-81 81 mg tablet Generic drug:  aspirin delayed-release Take 81 mg by mouth daily. CRESTOR 5 mg tablet Generic drug:  rosuvastatin Take 5 mg by mouth daily. DULoxetine 60 mg capsule Commonly known as:  CYMBALTA Take 60 mg by mouth daily. fludrocortisone 0.1 mg tablet Commonly known as:  FLORINEF Take 2 Tabs by mouth daily. To maintain blood pressure GLIPIZIDE PO Take  by mouth. HYDROcodone-acetaminophen 7.5-325 mg per tablet Commonly known as:  Mortimer Newanoop Take 1 Tab by mouth two (2) times daily as needed. LANTUS SOLOSTAR U-100 INSULIN 100 unit/mL (3 mL) Inpn Generic drug:  insulin glargine 30 units once daily. levothyroxine 50 mcg tablet Commonly known as:  SYNTHROID Take 50 mcg by mouth Daily (before breakfast). midodrine 5 mg tablet Commonly known as:  Evette Bakes Take 5 mg by mouth three (3) times daily (with meals). omeprazole 20 mg capsule Commonly known as:  PRILOSEC Take 20 mg by mouth as needed. pregabalin 50 mg capsule Commonly known as:  Ellen Milo Take 1 Cap by mouth two (2) times a day. Max Daily Amount: 100 mg.  
  
 tamsulosin 0.4 mg capsule Commonly known as:  FLOMAX Take 0.4 mg by mouth daily. traZODone 100 mg tablet Commonly known as:  DESYREL  
TAKE 1 TABLET BY MOUTH EVERY NIGHT  
  
 TRULICITY 1.5 AX/1.6 mL sub-q pen Generic drug:  dulaglutide INJECT 0.5ML (1.5MG) UNDER THE SKIN ONCE WEEKLY AS DIRECTED  
  
 VITAMIN B-12 100 mcg tablet Generic drug:  cyanocobalamin Take 100 mcg by mouth daily. Prescriptions Printed Refills  
 pregabalin (LYRICA) 50 mg capsule 5 Sig: Take 1 Cap by mouth two (2) times a day. Max Daily Amount: 100 mg. Class: Print Route: Oral  
  
Prescriptions Sent to Pharmacy  Refills  
 fludrocortisone (FLORINEF) 0.1 mg tablet 5  
 Sig: Take 2 Tabs by mouth daily. To maintain blood pressure Class: Normal  
 Pharmacy: [facility]/pharmacy #[id number] #: [phone] Route: Oral  
  
We Performed the Following HEMOGLOBIN A1C WITH EAG [59777 CPT(R)] LIPID PANEL [78350 CPT(R)] METABOLIC PANEL, COMPREHENSIVE [55989 CPT(R)] MICROALBUMIN, UR, RAND W/ MICROALB/CREAT RATIO [id number] CPT(R)] TSH 3RD GENERATION [23625 CPT(R)] Follow-up Instructions Return in about 3 months (around 6/20/2018). To-Do List   
 04/03/2018 8:30 AM  
  Appointment with RAD ONC THERAPY [facility] at [address] ([phone]) Patient Instructions Diabetes type II  
- Monitor glucoses in AM and other times as we can - Watch starch/carbohydrate intake Lantus - 30 units once daily in AM. Continue Trulicity 1.5 mg daily Goals for blood glucose: 
Fasting  (less than 150) Lunch, Dinner, Bedtime -  (less than 180). Blood pressure: 
-fludrocortisone - increase to 2 tablets daily 
- continue midodrine 5 mg three times daily. Can increase to 10 mg dose, if needed Monitor blood pressure standing - Omron series 3 upper arm cuff Neuropathy: 
Continue duloxetine Stop gabapentin Start Lyrica 50 mg once daily at night. If tolerating and greater effect is desired, then increase to 50 mg twice daily. Introducing [facility]! [facility] Rodriguez introduces "[employer] patient portal. Now you can access parts of your medical record, email your doctor's office, and request medication refills online. 1. In your internet browser, go to [email or web address] 2. Click on the First Time User? Click Here link in the Sign In box. You will see the New Member Sign Up page. 3. Enter your "[employer] Access Code exactly as it appears below. You will not need to use this code after youve completed the sign-up process.  If you do not sign up before the expiration date, you must request a new code. · [email or web address] Access Code: [email or web address] Expires: 5/3/2018 10:24 AM 
 
4. Enter the last four digits of your Social Security Number (xxxx) and Date of Birth (mm/dd/yyyy) as indicated and click Submit. You will be taken to the next sign-up page. 5. Create a [employer] ID. This will be your [employer] login ID and cannot be changed, so think of one that is secure and easy to remember. 6. Create a [employer] password. You can change your password at any time. 7. Enter your Password Reset Question and Answer. This can be used at a later time if you forget your password. 8. Enter your e-mail address. You will receive e-mail notification when new information is available in [address] 9. Click Sign Up. You can now view and download portions of your medical record. 10. Click the Download Summary menu link to download a portable copy of your medical information. If you have questions, please visit the Frequently Asked Questions section of the [email or web address] website. Remember, [email or web address] is NOT to be used for urgent needs. For medical emergencies, dial 911. Now available from your iPhone and Android! Please provide this summary of care documentation to your next provider. Your primary care clinician is listed as Payton Olmstead. If you have any questions after today's visit, please call [phone].

## 2018-04-16 RX ORDER — FLUDROCORTISONE ACETATE 0.1 MG/1
0.2 TABLET ORAL DAILY
Qty: 180 TAB | Refills: 3 | Status: SHIPPED | OUTPATIENT
Start: 2018-04-16 | End: 2018-12-29

## 2018-04-17 RX ORDER — INSULIN GLARGINE 100 [IU]/ML
INJECTION, SOLUTION SUBCUTANEOUS
Qty: 30 ML | Refills: 3 | Status: ON HOLD | OUTPATIENT
Start: 2018-04-17 | End: 2018-12-26

## 2018-10-09 ENCOUNTER — APPOINTMENT (OUTPATIENT)
Dept: CT IMAGING | Age: 78
End: 2018-10-09
Attending: EMERGENCY MEDICINE
Payer: MEDICARE

## 2018-10-09 ENCOUNTER — HOSPITAL ENCOUNTER (EMERGENCY)
Age: 78
Discharge: HOME | End: 2018-10-09
Attending: EMERGENCY MEDICINE
Payer: MEDICARE

## 2018-10-09 VITALS
WEIGHT: 157 LBS | TEMPERATURE: 97.8 F | HEART RATE: 68 BPM | RESPIRATION RATE: 19 BRPM | DIASTOLIC BLOOD PRESSURE: 68 MMHG | SYSTOLIC BLOOD PRESSURE: 145 MMHG | HEIGHT: 71 IN | OXYGEN SATURATION: 98 % | BODY MASS INDEX: 21.98 KG/M2

## 2018-10-09 DIAGNOSIS — R55 NEAR SYNCOPE: Primary | ICD-10-CM

## 2018-10-09 LAB
ALBUMIN SERPL-MCNC: 3.3 G/DL (ref 3.5–5)
ALBUMIN/GLOB SERPL: 0.9 {RATIO} (ref 1.1–2.2)
ALP SERPL-CCNC: 79 U/L (ref 45–117)
ALT SERPL-CCNC: 33 U/L (ref 12–78)
ANION GAP SERPL CALC-SCNC: 9 MMOL/L (ref 5–15)
AST SERPL-CCNC: 38 U/L (ref 15–37)
ATRIAL RATE: 94 BPM
BASOPHILS # BLD: 0.1 K/UL (ref 0–0.1)
BASOPHILS NFR BLD: 1 % (ref 0–1)
BILIRUB SERPL-MCNC: 0.3 MG/DL (ref 0.2–1)
BNP SERPL-MCNC: 1897 PG/ML (ref 0–450)
BUN SERPL-MCNC: 11 MG/DL (ref 6–20)
BUN/CREAT SERPL: 7 (ref 12–20)
CALCIUM SERPL-MCNC: 8.4 MG/DL (ref 8.5–10.1)
CALCULATED P AXIS, ECG09: -14 DEGREES
CALCULATED R AXIS, ECG10: 52 DEGREES
CALCULATED T AXIS, ECG11: 138 DEGREES
CHLORIDE SERPL-SCNC: 109 MMOL/L (ref 97–108)
CO2 SERPL-SCNC: 27 MMOL/L (ref 21–32)
COMMENT, HOLDF: NORMAL
CREAT SERPL-MCNC: 1.56 MG/DL (ref 0.7–1.3)
DIAGNOSIS, 93000: NORMAL
DIFFERENTIAL METHOD BLD: ABNORMAL
EOSINOPHIL # BLD: 0.2 K/UL (ref 0–0.4)
EOSINOPHIL NFR BLD: 2 % (ref 0–7)
ERYTHROCYTE [DISTWIDTH] IN BLOOD BY AUTOMATED COUNT: 13.1 % (ref 11.5–14.5)
GLOBULIN SER CALC-MCNC: 3.5 G/DL (ref 2–4)
GLUCOSE BLD STRIP.AUTO-MCNC: 192 MG/DL (ref 65–100)
GLUCOSE SERPL-MCNC: 48 MG/DL (ref 65–100)
HCT VFR BLD AUTO: 36.8 % (ref 36.6–50.3)
HGB BLD-MCNC: 11.8 G/DL (ref 12.1–17)
IMM GRANULOCYTES # BLD: 0 K/UL (ref 0–0.04)
IMM GRANULOCYTES NFR BLD AUTO: 0 % (ref 0–0.5)
LYMPHOCYTES # BLD: 2.4 K/UL (ref 0.8–3.5)
LYMPHOCYTES NFR BLD: 29 % (ref 12–49)
MAGNESIUM SERPL-MCNC: 2 MG/DL (ref 1.6–2.4)
MCH RBC QN AUTO: 31.9 PG (ref 26–34)
MCHC RBC AUTO-ENTMCNC: 32.1 G/DL (ref 30–36.5)
MCV RBC AUTO: 99.5 FL (ref 80–99)
MONOCYTES # BLD: 1.1 K/UL (ref 0–1)
MONOCYTES NFR BLD: 13 % (ref 5–13)
NEUTS SEG # BLD: 4.5 K/UL (ref 1.8–8)
NEUTS SEG NFR BLD: 55 % (ref 32–75)
NRBC # BLD: 0 K/UL (ref 0–0.01)
NRBC BLD-RTO: 0 PER 100 WBC
P-R INTERVAL, ECG05: 162 MS
PLATELET # BLD AUTO: 208 K/UL (ref 150–400)
PMV BLD AUTO: 12.3 FL (ref 8.9–12.9)
POTASSIUM SERPL-SCNC: 3.3 MMOL/L (ref 3.5–5.1)
PROT SERPL-MCNC: 6.8 G/DL (ref 6.4–8.2)
Q-T INTERVAL, ECG07: 362 MS
QRS DURATION, ECG06: 82 MS
QTC CALCULATION (BEZET), ECG08: 466 MS
RBC # BLD AUTO: 3.7 M/UL (ref 4.1–5.7)
SAMPLES BEING HELD,HOLD: NORMAL
SERVICE CMNT-IMP: ABNORMAL
SODIUM SERPL-SCNC: 145 MMOL/L (ref 136–145)
TROPONIN I SERPL-MCNC: <0.05 NG/ML
VENTRICULAR RATE, ECG03: 100 BPM
WBC # BLD AUTO: 8.3 K/UL (ref 4.1–11.1)

## 2018-10-09 PROCEDURE — 83735 ASSAY OF MAGNESIUM: CPT | Performed by: EMERGENCY MEDICINE

## 2018-10-09 PROCEDURE — 74011000250 HC RX REV CODE- 250

## 2018-10-09 PROCEDURE — 82962 GLUCOSE BLOOD TEST: CPT

## 2018-10-09 PROCEDURE — 99285 EMERGENCY DEPT VISIT HI MDM: CPT

## 2018-10-09 PROCEDURE — 96361 HYDRATE IV INFUSION ADD-ON: CPT

## 2018-10-09 PROCEDURE — 83880 ASSAY OF NATRIURETIC PEPTIDE: CPT | Performed by: EMERGENCY MEDICINE

## 2018-10-09 PROCEDURE — 85025 COMPLETE CBC W/AUTO DIFF WBC: CPT | Performed by: EMERGENCY MEDICINE

## 2018-10-09 PROCEDURE — 80053 COMPREHEN METABOLIC PANEL: CPT | Performed by: EMERGENCY MEDICINE

## 2018-10-09 PROCEDURE — 96374 THER/PROPH/DIAG INJ IV PUSH: CPT

## 2018-10-09 PROCEDURE — 84484 ASSAY OF TROPONIN QUANT: CPT | Performed by: EMERGENCY MEDICINE

## 2018-10-09 PROCEDURE — 74011250636 HC RX REV CODE- 250/636: Performed by: EMERGENCY MEDICINE

## 2018-10-09 PROCEDURE — 93005 ELECTROCARDIOGRAM TRACING: CPT

## 2018-10-09 PROCEDURE — 70450 CT HEAD/BRAIN W/O DYE: CPT

## 2018-10-09 PROCEDURE — 36415 COLL VENOUS BLD VENIPUNCTURE: CPT | Performed by: EMERGENCY MEDICINE

## 2018-10-09 RX ORDER — DEXTROSE 50 % IN WATER (D50W) INTRAVENOUS SYRINGE
50
Status: COMPLETED | OUTPATIENT
Start: 2018-10-09 | End: 2018-10-09

## 2018-10-09 RX ORDER — DEXTROSE 50 % IN WATER (D50W) INTRAVENOUS SYRINGE
Status: COMPLETED
Start: 2018-10-09 | End: 2018-10-09

## 2018-10-09 RX ADMIN — SODIUM CHLORIDE 1000 ML: 900 INJECTION, SOLUTION INTRAVENOUS at 13:20

## 2018-10-09 RX ADMIN — DEXTROSE 50 % IN WATER (D50W) INTRAVENOUS SYRINGE 25 G: at 13:59

## 2018-10-09 RX ADMIN — DEXTROSE MONOHYDRATE 25 G: 25 INJECTION, SOLUTION INTRAVENOUS at 13:59

## 2018-10-09 NOTE — ED TRIAGE NOTES
Triage note; pt arrives via EMS from PCP office after having near syncopal episode. Pt BG was 64 at PCP office, was given a piece of candy and EMS reports BG of 144. EMS also reports pt is in A Fib, no known hx of such per pt and daughters.

## 2018-10-09 NOTE — ED NOTES
ED EKG interpretation: 
Rhythm: normal sinus rhythm w/ premature atrial complexes; and regular . Rate (approx.): 100; Axis: normal; ST/T wave: normal; 12:43 As dictated by Shruthi Culver.  Anna Lott MD

## 2018-10-09 NOTE — ED PROVIDER NOTES
HPI Comments: Minerva Bradshaw is a 69 y.o. male who presents to the ED via EMS  with a c/o dizziness, near syncope onset today. Pt's daughter states that he went to see his PCP for a flu shot and medication check, and he was walking to the bathroom when he began falling and almost passed out. Pt denies LOC but his daughter states that he was complaining of being unable to see for 10-15 seconds. Of note, pt's family states that they noticed a rash with surrounding erythema and \"whiteness\" about 1 week ago. Earlier this week, they also report pt had flu/uri sx. Also his blood sugar was 64 at his PCP's office. Pt specifically denies chest pain, shortness of breath, n/v,d , fever, chills, numbness, tingling, abdominal pain, back pain, cough, leg swelling, or any other acute sx. PCP: Liberty Arcos MD 
PMHx significant for: DM, CAD, COPD, PNA PSHx significant for: CABG, Nasal Surgery, Left MOH's Procedure Social Hx: Tobacco: Current every day smoker EtOH: none Illicit drug use: none There are no further complaints or symptoms at this time. Signed by: nandini Hernandezibnatanael for [facility] Rabia Osorio on October 9th, 2018 at 12:53pm. 
 
 
The history is provided by the patient and a relative. No  was used. Past Medical History:  
Diagnosis Date  CAD (coronary artery disease)  COPD (chronic obstructive pulmonary disease) ([facility])  Diabetes ([facility] [address] [facility].  Ill-defined condition SOB  Pneumonia  Urinary incontinence Past Surgical History:  
Procedure Laterality Date [address]  
 open heart  HX HEENT  1975  
 nasal surgery [address] PROCEDURES  2013  
 left Family History:  
Problem Relation Age of Onset  Diabetes Mother  Diabetes Brother Social History Social History  Marital status:    Spouse name: N/A  
 Number of children: N/A  
 Years of education: N/A  
 
 Occupational History  Not on file. Social History Main Topics  Smoking status: Current Every Day Smoker  Smokeless tobacco: Never Used Comment: 1-2 cigars daily  Alcohol use No  
 Drug use: No  
 Sexual activity: Not on file Other Topics Concern  Not on file Social History Narrative ALLERGIES: Review of patient's allergies indicates no known allergies. Review of Systems Constitutional: Negative for chills and fever. Respiratory: Negative for cough and shortness of breath. Cardiovascular: Negative for chest pain and leg swelling. Gastrointestinal: Negative for nausea and vomiting. Musculoskeletal: Negative for back pain and neck pain. Neurological: Positive for dizziness and tremors. All other systems reviewed and are negative. Vitals:  
 10/09/18 1247 BP: 139/53 Pulse: 97 Resp: 18 Temp: 97.8 °F (36.6 °C) SpO2: 99% Weight: 71.2 kg (157 lb) Height: 5' 11\" (1.803 m) Physical Exam  
Constitutional: He is oriented to person, place, and time. He appears well-developed and well-nourished. No distress. HENT:  
Head: Normocephalic and atraumatic. Eyes: Pupils are equal, round, and reactive to light. Neck: Normal range of motion. Neck supple. Cardiovascular: Normal rate and regular rhythm. Exam reveals no gallop and no friction rub. Murmur heard. Systolic murmur is present Irregular systolic ejection murmur Pulmonary/Chest: Effort normal and breath sounds normal. No respiratory distress. He has no wheezes. Abdominal: Soft. Bowel sounds are normal. He exhibits no distension. There is no tenderness. There is no rebound and no guarding. Musculoskeletal: Normal range of motion. Neurological: He is alert and oriented to person, place, and time. Skin: Skin is warm. No rash noted. He is not diaphoretic. Psychiatric: He has a normal mood and affect.  His behavior is normal. Judgment and thought content normal.  
 Nursing note and vitals reviewed. Signed by: Melina Barros, scribing for Bri Denton on October 9th, 2018 at 12:53pm. 
 
 
MDM 
 
 
ED Course This is a 61-year-old male with past medical history, review of systems, physical exam as above, presenting with complaints of near syncope, in the setting of a history of chronic orthostatic hypotension. Per family, the patient was at his primary care physician's office for well check, when he began to fall forward, caught before his family before striking the ground, without loss of consciousness. Upon arrival he is awake, alert, noted to be hypoglycemic while in route. He denies complaints such as chest pain, shortness of breath, focal weakness. Physical exam is remarkable for well-appearing elderly male, in no acute distress with clear breath sounds, soft nontender abdomen, nonfocal neurologic exam, systolic ejection murmur, irregular heartbeat. Patient states he did not take his midodrine this morning, unclear as to how much of his insulin he took. Plan to recheck blood glucose, obtain CBC, CMP, EKG, chest x-ray, cardiac enzymes, BNP and head CT. We will reevaluate and make a disposition based the patient's diagnostics and response to therapy. Procedures 3:37 PM 
Patient with unremarkable lab work and imaging, BG improved with D50, family states took insulin this morning w/o eating, baseline orthostatic hypotension. Unclear as to whether these caused near syncope, offered admission for complete syncope workup, family declined. Will encourage PCP f/u and return precautions.

## 2018-10-09 NOTE — DISCHARGE INSTRUCTIONS
Lightheadedness or Faintness: Care Instructions  Your Care Instructions  Lightheadedness is a feeling that you are about to faint or \"pass out. \" You do not feel as if you or your surroundings are moving. It is different from vertigo, which is the feeling that you or things around you are spinning or tilting. Lightheadedness usually goes away or gets better when you lie down. If lightheadedness gets worse, it can lead to a fainting spell. It is common to feel lightheaded from time to time. Lightheadedness usually is not caused by a serious problem. It often is caused by a short-lasting drop in blood pressure and blood flow to your head that occurs when you get up too quickly from a seated or lying position. Follow-up care is a key part of your treatment and safety. Be sure to make and go to all appointments, and call your doctor if you are having problems. It's also a good idea to know your test results and keep a list of the medicines you take. How can you care for yourself at home? · Lie down for 1 or 2 minutes when you feel lightheaded. After lying down, sit up slowly and remain sitting for 1 to 2 minutes before slowly standing up. · Avoid movements, positions, or activities that have made you lightheaded in the past.  · Get plenty of rest, especially if you have a cold or flu, which can cause lightheadedness. · Make sure you drink plenty of fluids, especially if you have a fever or have been sweating. · Do not drive or put yourself and others in danger while you feel lightheaded. When should you call for help? Call 911 anytime you think you may need emergency care. For example, call if:    · You have symptoms of a stroke. These may include:  ¨ Sudden numbness, tingling, weakness, or loss of movement in your face, arm, or leg, especially on only one side of your body. ¨ Sudden vision changes. ¨ Sudden trouble speaking. ¨ Sudden confusion or trouble understanding simple statements.   ¨ Sudden problems with walking or balance. ¨ A sudden, severe headache that is different from past headaches.     · You have symptoms of a heart attack. These may include:  ¨ Chest pain or pressure, or a strange feeling in the chest.  ¨ Sweating. ¨ Shortness of breath. ¨ Nausea or vomiting. ¨ Pain, pressure, or a strange feeling in the back, neck, jaw, or upper belly or in one or both shoulders or arms. ¨ Lightheadedness or sudden weakness. ¨ A fast or irregular heartbeat. After you call 911, the  may tell you to chew 1 adult-strength or 2 to 4 low-dose aspirin. Wait for an ambulance. Do not try to drive yourself.    Watch closely for changes in your health, and be sure to contact your doctor if:    · Your lightheadedness gets worse or does not get better with home care. Where can you learn more? Go to http://evelio-rosalinda.info/. Enter O878 in the search box to learn more about \"Lightheadedness or Faintness: Care Instructions. \"  Current as of: November 20, 2017  Content Version: 11.8  © 1750-7927 [employer]. Care instructions adapted under license by [employer] (which disclaims liability or warranty for this information). If you have questions about a medical condition or this instruction, always ask your healthcare professional. [address] any warranty or liability for your use of this information.

## 2018-10-09 NOTE — ED NOTES
1400:  Pt given amp D50 after lab called to report critical value on chemistry panel. 1407: Recheck of pt's 192

## 2018-10-10 ENCOUNTER — OFFICE VISIT (OUTPATIENT)
Dept: ENDOCRINOLOGY | Age: 78
End: 2018-10-10

## 2018-10-10 VITALS
SYSTOLIC BLOOD PRESSURE: 136 MMHG | HEART RATE: 86 BPM | DIASTOLIC BLOOD PRESSURE: 66 MMHG | WEIGHT: 156.2 LBS | RESPIRATION RATE: 16 BRPM | OXYGEN SATURATION: 96 % | HEIGHT: 71 IN | BODY MASS INDEX: 21.87 KG/M2

## 2018-10-10 DIAGNOSIS — I25.10 CORONARY ARTERY DISEASE INVOLVING NATIVE CORONARY ARTERY OF NATIVE HEART WITHOUT ANGINA PECTORIS: ICD-10-CM

## 2018-10-10 DIAGNOSIS — I95.1 ORTHOSTATIC HYPOTENSION: ICD-10-CM

## 2018-10-10 DIAGNOSIS — E11.42 DIABETIC POLYNEUROPATHY ASSOCIATED WITH TYPE 2 DIABETES MELLITUS (HCC): ICD-10-CM

## 2018-10-10 DIAGNOSIS — R40.0 DROWSINESS: ICD-10-CM

## 2018-10-10 DIAGNOSIS — E11.65 UNCONTROLLED TYPE 2 DIABETES MELLITUS WITH HYPERGLYCEMIA (HCC): Primary | ICD-10-CM

## 2018-10-10 LAB — GLUCOSE POC: 181 MG/DL

## 2018-10-10 NOTE — PATIENT INSTRUCTIONS
Diabetes type II  
- Monitor glucoses in AM and other times as we can Lantus -  Decrease dose from 30 units to 22 units daily. If taking glipizide, stop Continue Trulicity 1.5 mg daily Goals for blood glucose: 
Fasting  (less than 150) Lunch, Dinner, Bedtime -  (less than 180). ** Notify me if glucoses are often less than 90 and if any are < 70. Blood pressure: 
-fludrocortisone - continue 2 tablets daily - Take midodrine 5 mg upon awakening, and at lunch. Monitor blood pressure sitting AND standing Standing blood pressure is ok if > 358 for systolic/top number. Too low if < 90 Neuropathy (tingling/burning in feet): Lyrica 50 mg once daily at night. This seems to help Cymbalta/duloxetine - unsure if this is helping. It could be causing some of the sleepiness and dizziness.  Could try lowering dose to 30 mg

## 2018-10-10 NOTE — MR AVS SNAPSHOT
[address] [facility] [address] [address] [phone] Patient: Christian Bartlett MRN: [id number] MENDY:45/77/8453 Visit Information Date & Time Provider Department Dept. Phone Encounter #  
 10/10/2018  2:50 PM Deanna Lieberman, [address] [place] [address] Diabetes and Endocrinology [phone] [id number] Follow-up Instructions Return in about 3 months (around 1/10/2019). Upcoming Health Maintenance Date Due  
 FOOT EXAM Q1 12/11/1950 MICROALBUMIN Q1 12/11/1950 EYE EXAM RETINAL OR DILATED Q1 12/11/1950 DTaP/Tdap/Td series (1 - Tdap) 12/11/1962 Shingrix Vaccine Age 50> (1 of 2) 12/11/1991 GLAUCOMA SCREENING Q2Y 12/11/2006 Pneumococcal 65+ High/Highest Risk (1 of 2 - PCV13) 12/11/2006 HEMOGLOBIN A1C Q6M 1/18/2018 MEDICARE YEARLY EXAM 3/20/2018 LIPID PANEL Q1 7/18/2018 Influenza Age 5 to Adult 8/1/2018 Allergies as of 10/10/2018  Review Complete On: 10/10/2018 By: Deanna Lieberman MD  
 No Known Allergies Current Immunizations  Reviewed on 4/13/2017 No immunizations on file. Not reviewed this visit You Were Diagnosed With   
  
 Codes Comments Uncontrolled type 2 diabetes mellitus with hyperglycemia (HCC)    -  Primary ICD-10-CM: E11.65 ICD-9-CM: 250.02 Vitals BP Pulse Resp Height(growth percentile) Weight(growth percentile) SpO2  
 136/66 (BP 1 Location: Left arm, BP Patient Position: Sitting) 86 16 5' 11\" (1.803 m) 156 lb 3.2 oz (70.9 kg) 96% BMI Smoking Status 21.79 kg/m2 Current Every Day Smoker Vitals History BMI and BSA Data Body Mass Index Body Surface Area 21.79 kg/m 2 1.88 m 2 Preferred Pharmacy Pharmacy Name Phone [id number], [address] [place] [address] [phone] Your Updated Medication List  
  
   
This list is accurate as of 10/10/18  3:49 PM.  Always use your most recent med list.  
  
  
  
  
 ASPIR-81 81 mg tablet Generic drug:  aspirin delayed-release Take 81 mg by mouth daily. CRESTOR 5 mg tablet Generic drug:  rosuvastatin Take 5 mg by mouth daily. DULoxetine 60 mg capsule Commonly known as:  CYMBALTA Take 60 mg by mouth daily. ergocalciferol 50,000 unit capsule Commonly known as:  ERGOCALCIFEROL Take 1 Cap by mouth every seven (7) days. Please call to schedule follow-up visit. fludrocortisone 0.1 mg tablet Commonly known as:  FLORINEF Take 2 Tabs by mouth daily. To maintain blood pressure HYDROcodone-acetaminophen 7.5-325 mg per tablet Commonly known as:  Primitivo Awilda Take 1 Tab by mouth two (2) times daily as needed. LANTUS SOLOSTAR U-100 INSULIN 100 unit/mL (3 mL) Inpn Generic drug:  insulin glargine 30 units once daily. levothyroxine 50 mcg tablet Commonly known as:  SYNTHROID Take 50 mcg by mouth Daily (before breakfast). midodrine 5 mg tablet Commonly known as:  [employer] Take 5 mg by mouth three (3) times daily (with meals). omeprazole 20 mg capsule Commonly known as:  PRILOSEC Take 20 mg by mouth daily. * ONETOUCH ULTRA BLUE TEST STRIP strip Generic drug:  glucose blood VI test strips USE TO CHECK BLOOD SUGAR 3 TIMES DAILY. DIAGNOSIS CODE: E11.9  
  
 * ONETOUCH ULTRA BLUE TEST STRIP strip Generic drug:  glucose blood VI test strips USE TO CHECK BLOOD SUGAR 3 TIMES DAILY. DIAGNOSIS CODE: E11.9  
  
 pregabalin 50 mg capsule Commonly known as:  May Laughter Take 1 Cap by mouth two (2) times a day. Max Daily Amount: 100 mg.  
  
 tamsulosin 0.4 mg capsule Commonly known as:  FLOMAX Take 0.4 mg by mouth daily. TRULICITY 1.5 HZ/9.8 mL sub-q pen Generic drug:  dulaglutide INJECT 0.5ML (1.5MG) UNDER THE SKIN ONCE WEEKLY AS DIRECTED  
  
 VITAMIN B-12 100 mcg tablet Generic drug:  cyanocobalamin Take 100 mcg by mouth daily. * Notice: This list has 2 medication(s) that are the same as other medications prescribed for you. Read the directions carefully, and ask your doctor or other care provider to review them with you. We Performed the Following AMB POC GLUCOSE BLOOD, BY GLUCOSE MONITORING DEVICE [10102 CPT(R)] Follow-up Instructions Return in about 3 months (around 1/10/2019). Patient Instructions Diabetes type II  
- Monitor glucoses in AM and other times as we can Lantus -  Decrease dose from 30 units to 22 units daily. If taking glipizide, stop Continue Trulicity 1.5 mg daily Goals for blood glucose: 
Fasting  (less than 150) Lunch, Dinner, Bedtime -  (less than 180). ** Notify me if glucoses are often less than 90 and if any are < 70. Blood pressure: 
-fludrocortisone - continue 2 tablets daily - Take midodrine 5 mg upon awakening, and at lunch. Monitor blood pressure sitting AND standing Standing blood pressure is ok if > 050 for systolic/top number. Too low if < 90 Neuropathy (tingling/burning in feet): Lyrica 50 mg once daily at night. This seems to help Cymbalta/duloxetine - unsure if this is helping. It could be causing some of the sleepiness and dizziness. Could try lowering dose to 30 mg Introducing [facility]! [facility] introduces [employer] patient portal. Now you can access parts of your medical record, email your doctor's office, and request medication refills online. 1. In your internet browser, go to [email or web address] 2. Click on the First Time User? Click Here link in the Sign In box. You will see the New Member Sign Up page. 3. Enter your [employer] Access Code exactly as it appears below. You will not need to use this code after youve completed the sign-up process. If you do not sign up before the expiration date, you must request a new code.  
 
· [employer] Access Code: [email or web address] 
 Expires: 1/7/2019 12:43 PM 
 
4. Enter the last four digits of your Social Security Number (xxxx) and Date of Birth (mm/dd/yyyy) as indicated and click Submit. You will be taken to the next sign-up page. 5. Create a tuul ID. This will be your tuul login ID and cannot be changed, so think of one that is secure and easy to remember. 6. Create a tuul password. You can change your password at any time. 7. Enter your Password Reset Question and Answer. This can be used at a later time if you forget your password. 8. Enter your e-mail address. You will receive e-mail notification when new information is available in [address] 9. Click Sign Up. You can now view and download portions of your medical record. 10. Click the Download Summary menu link to download a portable copy of your medical information. If you have questions, please visit the Frequently Asked Questions section of the tuul website. Remember, tuul is NOT to be used for urgent needs. For medical emergencies, dial 911. Now available from your iPhone and Android! Please provide this summary of care documentation to your next provider. Your primary care clinician is listed as Mahnaz Ballesteros. If you have any questions after today's visit, please call [phone].

## 2018-10-10 NOTE — PROGRESS NOTES
Alcus Phoenix is a 69 y.o. male Chief Complaint Patient presents with  Follow-up  Diabetes Last Glucose 192 mg/dl 1. Have you been to the ER, urgent care clinic since your last visit? Yes, ER on 10/10/18 for Dizziness   Hospitalized since your last visit? No 
 
2. Have you seen or consulted any other health care providers outside of the [address] [place] [address] since your last visit? Include any pap smears or colon screening.  No

## 2018-10-10 NOTE — PROGRESS NOTES
History of Present Illness: Rishi George is a 69 y.o. male presents for follow-up of diabetes. Has had diabetes for 40 years. He also has orthostatic hypotension and dementia. He comes for urgent visit after being seen in ED yesterday for glucose of 48 Diabetes related complications: 
+ nephropathy - sees Dr Susannah Paulino for eye exam. No known retinopathy 
+ neuropathy.  
  
Current diabetes regimen: 
Lantus 30 units Trulicity 1.5 mg Also taking glipizide daily as well Glucoses Log brought. Values can be in 60s fasting or midday. Highest values are typically in 180 range. Diet: 
Appetite and portions vary at home. Orthostatic hypotension Fludrocortisone 0.2 mg daily Midodrine 5 mg three times daily prescribed, but he was only taking this as needed when BP was measured lower. Feels dizzy with standing often. Also feels tired and sleepy during the daytime - not taking trazodone regularly. He is taking Lyrica and Cymbalta for neuropathy Was told to take midodrine more often by ER. Took Midodrine this AM and around noon. BP better in office today. Diabetic neuropathy:  
Taking Lyrica and Cymbalta. Clinically doing well. Improvement with adding Lyrica noted. Past Medical History:  
Diagnosis Date  CAD (coronary artery disease)  COPD (chronic obstructive pulmonary disease) ([facility])  Diabetes ([facility].  Ill-defined condition SOB  Pneumonia  Urinary incontinence Current Outpatient Prescriptions Medication Sig  ergocalciferol (ERGOCALCIFEROL) 50,000 unit capsule Take 1 Cap by mouth every seven (7) days. Please call to schedule follow-up visit.  TRULICITY 1.5 KG/1.2 mL sub-q pen INJECT 0.5ML (1.5MG) UNDER THE SKIN ONCE WEEKLY AS DIRECTED  
 ONETOUCH ULTRA BLUE TEST STRIP strip USE TO CHECK BLOOD SUGAR 3 TIMES DAILY. DIAGNOSIS CODE: E11.9  
 ONETOUCH ULTRA BLUE TEST STRIP strip USE TO CHECK BLOOD SUGAR 3 TIMES DAILY. DIAGNOSIS CODE: E11.9  LANTUS SOLOSTAR U-100 INSULIN 100 unit/mL (3 mL) inpn 30 units once daily.  fludrocortisone (FLORINEF) 0.1 mg tablet Take 2 Tabs by mouth daily. To maintain blood pressure  GLIPIZIDE PO Take  by mouth.  pregabalin (LYRICA) 50 mg capsule Take 1 Cap by mouth two (2) times a day. Max Daily Amount: 100 mg.  traZODone (DESYREL) 100 mg tablet TAKE 1 TABLET BY MOUTH EVERY NIGHT (Patient taking differently: 50 mg at night)  midodrine (PROAMITINE) 5 mg tablet Take 5 mg by mouth three (3) times daily (with meals).  cyanocobalamin (VITAMIN B-12) 100 mcg tablet Take 100 mcg by mouth daily.  DULoxetine (CYMBALTA) 60 mg capsule Take 60 mg by mouth daily.  tamsulosin (FLOMAX) 0.4 mg capsule Take 0.4 mg by mouth daily.  HYDROcodone-acetaminophen (NORCO) 7.5-325 mg per tablet Take 1 Tab by mouth two (2) times daily as needed.  omeprazole (PRILOSEC) 20 mg capsule Take 20 mg by mouth daily.  rosuvastatin (CRESTOR) 5 mg tablet Take 5 mg by mouth daily.  levothyroxine (SYNTHROID) 50 mcg tablet Take 50 mcg by mouth Daily (before breakfast).  aspirin delayed-release (ASPIR-81) 81 mg tablet Take 81 mg by mouth daily. No current facility-administered medications for this visit. No Known Allergies Review of Systems: - Eyes: no blurry vision or double vision - Cardiovascular: no chest pain - Respiratory: no shortness of breath - Musculoskeletal: no myalgias - Neurological: see HPI Physical Examination: 
Visit Vitals  /66 (BP 1 Location: Left arm, BP Patient Position: Sitting)  Pulse 86  Resp 16  
 Ht 5' 11\" (1.803 m)  Wt 156 lb 3.2 oz (70.9 kg)  SpO2 96%  BMI 21.79 kg/m2  
-  systolic when standing - General: pleasant, no distress, normal gait HEENT: hearing intact, EOMI, clear sclera without icterus - Cardiovascular: regular, normal rate - Respiratory: normal effort - Integumentary: no edema Diabetic foot exam:  
 
Left Foot: Visual Exam: normal  
 Pulse DP: 2+ (normal) Filament test: absent sensation - Psychiatric: normal mood and affect Data Reviewed:  
 
Component Latest Ref Rng & Units 10/9/2018 7/18/2017 7/18/2017 12:49 PM  2:14 AM  2:14 AM  
Sodium 136 - 145 mmol/L 145 Potassium 3.5 - 5.1 mmol/L 3.3 (L) Chloride 97 - 108 mmol/L 109 (H)    
CO2 
    21 - 32 mmol/L 27 Anion gap 5 - 15 mmol/L 9 Glucose 65 - 100 mg/dL 48 (LL)    
BUN 
    6 - 20 MG/DL 11 Creatinine 
    0.70 - 1.30 MG/DL 1.56 (H) BUN/Creatinine ratio 12 - 20   7 (L) GFR est AA 
    >60 ml/min/1.73m2 53 (L)    
GFR est non-AA 
    >60 ml/min/1.73m2 43 (L) Calcium 8.5 - 10.1 MG/DL 8.4 (L) Bilirubin, total 
    0.2 - 1.0 MG/DL 0.3 ALT (SGPT) 12 - 78 U/L 33 AST 
    15 - 37 U/L 38 (H) Alk. phosphatase 45 - 117 U/L 79 Protein, total 
    6.4 - 8.2 g/dL 6.8 Albumin 3.5 - 5.0 g/dL 3.3 (L) Globulin 2.0 - 4.0 g/dL 3.5 A-G Ratio 1.1 - 2.2   0.9 (L) Cholesterol, total 
    <200 MG/DL  110 Triglyceride 
    <150 MG/DL  95 HDL Cholesterol MG/DL  47 LDL, calculated 0 - 100 MG/DL  44 VLDL, calculated MG/DL  19   
CHOL/HDL Ratio 0 - 5.0    2.3 Hemoglobin A1c, (calculated) 4.2 - 6.3 %   13.5 (H) Est. average glucose 
    mg/dL   341 Assessment/Plan: 1. Uncontrolled type 2 diabetes mellitus with hyperglycemia (HCC)  
- control has improved. Glucoses are much lower and medication needs have decreased. Main problem currently is hypoglycemia 
- stop glipizide - Lower lantus from 30 to 22 units daily. May need to increase this back up. Should lower dose for values < 90 often. 2. Orthostatic hypotension  
- continue fludrocoritsone 0.2 mg daily 
- recommend he take midodrine 5 mg every day upon awakening and midday. Check BP sitting and standing at home. 3. Coronary artery disease involving native coronary artery of native heart without angina pectoris 4. Diabetic polyneuropathy associated with type 2 diabetes mellitus ([facility]  
- continue Lyrica 
- question benefit for Cymbalta 5. Drowsiness:  Lyrica vs Cymbalta vs other. Consider decreasing or stopping Cymbalta Greater than 50% of 40 minute visit was spent counseling the patient about above. Another family member (niec?) was telephoned in and had questions Patient Instructions Diabetes type II  
- Monitor glucoses in AM and other times as we can Lantus -  Decrease dose from 30 units to 22 units daily. If taking glipizide, stop Continue Trulicity 1.5 mg daily Goals for blood glucose: 
Fasting  (less than 150) Lunch, Dinner, Bedtime -  (less than 180). ** Notify me if glucoses are often less than 90 and if any are < 70. Blood pressure: 
-fludrocortisone - continue 2 tablets daily - Take midodrine 5 mg upon awakening, and at lunch. Monitor blood pressure sitting AND standing Standing blood pressure is ok if > 210 for systolic/top number. Too low if < 90 Neuropathy (tingling/burning in feet): Lyrica 50 mg once daily at night. This seems to help Cymbalta/duloxetine - unsure if this is helping. It could be causing some of the sleepiness and dizziness. Could try lowering dose to 30 mg Follow-up Disposition: 
Return in about 3 months (around 1/10/2019). Copy sent to:

## 2018-10-12 ENCOUNTER — HOSPITAL ENCOUNTER (EMERGENCY)
Age: 78
Discharge: HOME | End: 2018-10-12
Attending: EMERGENCY MEDICINE | Admitting: EMERGENCY MEDICINE
Payer: MEDICARE

## 2018-10-12 VITALS
HEIGHT: 71 IN | OXYGEN SATURATION: 98 % | TEMPERATURE: 98 F | BODY MASS INDEX: 22.4 KG/M2 | DIASTOLIC BLOOD PRESSURE: 76 MMHG | RESPIRATION RATE: 16 BRPM | WEIGHT: 160 LBS | HEART RATE: 89 BPM | SYSTOLIC BLOOD PRESSURE: 151 MMHG

## 2018-10-12 DIAGNOSIS — L03.312 CELLULITIS OF BACK EXCEPT BUTTOCK: ICD-10-CM

## 2018-10-12 DIAGNOSIS — R11.2 NON-INTRACTABLE VOMITING WITH NAUSEA, UNSPECIFIED VOMITING TYPE: Primary | ICD-10-CM

## 2018-10-12 LAB
ANION GAP SERPL CALC-SCNC: 7 MMOL/L (ref 5–15)
APPEARANCE UR: CLEAR
BACTERIA URNS QL MICRO: ABNORMAL /HPF
BASOPHILS # BLD: 0.1 K/UL (ref 0–0.1)
BASOPHILS NFR BLD: 1 % (ref 0–1)
BILIRUB UR QL: NEGATIVE
BUN SERPL-MCNC: 12 MG/DL (ref 6–20)
BUN/CREAT SERPL: 6 (ref 12–20)
CALCIUM SERPL-MCNC: 8.5 MG/DL (ref 8.5–10.1)
CHLORIDE SERPL-SCNC: 108 MMOL/L (ref 97–108)
CO2 SERPL-SCNC: 28 MMOL/L (ref 21–32)
COLOR UR: ABNORMAL
COMMENT, HOLDF: NORMAL
CREAT SERPL-MCNC: 1.88 MG/DL (ref 0.7–1.3)
DIFFERENTIAL METHOD BLD: ABNORMAL
EOSINOPHIL # BLD: 0.1 K/UL (ref 0–0.4)
EOSINOPHIL NFR BLD: 1 % (ref 0–7)
EPITH CASTS URNS QL MICRO: ABNORMAL /LPF
ERYTHROCYTE [DISTWIDTH] IN BLOOD BY AUTOMATED COUNT: 13 % (ref 11.5–14.5)
GLUCOSE SERPL-MCNC: 110 MG/DL (ref 65–100)
GLUCOSE UR STRIP.AUTO-MCNC: NEGATIVE MG/DL
HCT VFR BLD AUTO: 37.2 % (ref 36.6–50.3)
HGB BLD-MCNC: 11.9 G/DL (ref 12.1–17)
HGB UR QL STRIP: NEGATIVE
HYALINE CASTS URNS QL MICRO: ABNORMAL /LPF (ref 0–5)
IMM GRANULOCYTES # BLD: 0 K/UL (ref 0–0.04)
IMM GRANULOCYTES NFR BLD AUTO: 0 % (ref 0–0.5)
KETONES UR QL STRIP.AUTO: NEGATIVE MG/DL
LEUKOCYTE ESTERASE UR QL STRIP.AUTO: NEGATIVE
LYMPHOCYTES # BLD: 1.2 K/UL (ref 0.8–3.5)
LYMPHOCYTES NFR BLD: 14 % (ref 12–49)
MCH RBC QN AUTO: 31.6 PG (ref 26–34)
MCHC RBC AUTO-ENTMCNC: 32 G/DL (ref 30–36.5)
MCV RBC AUTO: 98.9 FL (ref 80–99)
MONOCYTES # BLD: 1.1 K/UL (ref 0–1)
MONOCYTES NFR BLD: 12 % (ref 5–13)
NEUTS SEG # BLD: 6.5 K/UL (ref 1.8–8)
NEUTS SEG NFR BLD: 73 % (ref 32–75)
NITRITE UR QL STRIP.AUTO: NEGATIVE
NRBC # BLD: 0 K/UL (ref 0–0.01)
NRBC BLD-RTO: 0 PER 100 WBC
PH UR STRIP: 6 [PH] (ref 5–8)
PLATELET # BLD AUTO: 200 K/UL (ref 150–400)
PMV BLD AUTO: 12.5 FL (ref 8.9–12.9)
POTASSIUM SERPL-SCNC: 3.1 MMOL/L (ref 3.5–5.1)
PROT UR STRIP-MCNC: 30 MG/DL
RBC # BLD AUTO: 3.76 M/UL (ref 4.1–5.7)
RBC #/AREA URNS HPF: ABNORMAL /HPF (ref 0–5)
SAMPLES BEING HELD,HOLD: NORMAL
SODIUM SERPL-SCNC: 143 MMOL/L (ref 136–145)
SP GR UR REFRACTOMETRY: 1.02 (ref 1–1.03)
UR CULT HOLD, URHOLD: NORMAL
UROBILINOGEN UR QL STRIP.AUTO: 1 EU/DL (ref 0.2–1)
WBC # BLD AUTO: 9 K/UL (ref 4.1–11.1)
WBC URNS QL MICRO: ABNORMAL /HPF (ref 0–4)

## 2018-10-12 PROCEDURE — 85025 COMPLETE CBC W/AUTO DIFF WBC: CPT | Performed by: EMERGENCY MEDICINE

## 2018-10-12 PROCEDURE — 74011250637 HC RX REV CODE- 250/637: Performed by: EMERGENCY MEDICINE

## 2018-10-12 PROCEDURE — 93005 ELECTROCARDIOGRAM TRACING: CPT

## 2018-10-12 PROCEDURE — 80048 BASIC METABOLIC PNL TOTAL CA: CPT | Performed by: EMERGENCY MEDICINE

## 2018-10-12 PROCEDURE — 99285 EMERGENCY DEPT VISIT HI MDM: CPT

## 2018-10-12 PROCEDURE — 81001 URINALYSIS AUTO W/SCOPE: CPT | Performed by: EMERGENCY MEDICINE

## 2018-10-12 PROCEDURE — 36415 COLL VENOUS BLD VENIPUNCTURE: CPT | Performed by: EMERGENCY MEDICINE

## 2018-10-12 RX ORDER — ONDANSETRON 4 MG/1
8 TABLET, ORALLY DISINTEGRATING ORAL
Qty: 15 TAB | Refills: 0 | Status: ON HOLD | OUTPATIENT
Start: 2018-10-12 | End: 2018-12-26

## 2018-10-12 RX ORDER — POTASSIUM CHLORIDE 750 MG/1
40 TABLET, FILM COATED, EXTENDED RELEASE ORAL
Status: COMPLETED | OUTPATIENT
Start: 2018-10-12 | End: 2018-10-12

## 2018-10-12 RX ORDER — DOXYCYCLINE HYCLATE 100 MG
100 TABLET ORAL 2 TIMES DAILY
Qty: 20 TAB | Refills: 0 | Status: SHIPPED | OUTPATIENT
Start: 2018-10-12 | End: 2018-10-22

## 2018-10-12 RX ADMIN — POTASSIUM CHLORIDE 40 MEQ: 750 TABLET, FILM COATED, EXTENDED RELEASE ORAL at 16:20

## 2018-10-12 NOTE — ED PROVIDER NOTES
HPI Comments: 69 y.o. male with past medical history significant for DM, CAD, COPD, PNA, and urinary incontinence who presents from home via EMS with chief complaint of fatigue. Pt's daughter reports Pt was evaluated here 2 days ago for hypoglycemia w/ lightheadedness and dizziness; he began taking sulfamethoxazole yesterday. Per Pt's daughter, Pt began vomiting today accompanied by new congestion, decreased appetite, and occasional confusion. Pt's daughter notes Pt is a severe fall risk. Pt denies prior hx of MRSA. Pt denies diarrhea and abdominal pain. NKDA. There are no other acute medical concerns at this time. Old Chart Review: Pt was found to be hypoglycemic w/ blood glucose of 64 during a PCP visit w/ near-syncope 3 days ago. Pt had his medications adjusted 2 days ago: Lantus was decreased, glipizide was stopped, and midodrine BID was started. Pt's PCP recently considered stopping Pt's Cymbalta d/t fatigue. PCP: Radha Elliott MD 
 
Note written by Lazara Mccrary, as dictated by Mahnaz Palmer MD 3:10 PM 
 
The history is provided by the patient. Past Medical History:  
Diagnosis Date  CAD (coronary artery disease)  COPD (chronic obstructive pulmonary disease) (HCC)  Diabetes ([facility].  Ill-defined condition SOB  Pneumonia  Urinary incontinence Past Surgical History:  
Procedure Laterality Date [address]  
 open heart  HX HEENT  1975  
 nasal surgery [address] PROCEDURES  2013  
 left Family History:  
Problem Relation Age of Onset  Diabetes Mother  Diabetes Brother Social History Social History  Marital status:  Spouse name: N/A  
 Number of children: N/A  
 Years of education: N/A Occupational History  Not on file. Social History Main Topics  Smoking status: Current Every Day Smoker  Smokeless tobacco: Never Used Comment: 1-2 cigars daily  Alcohol use No  
 Drug use: No  
 Sexual activity: Not on file Other Topics Concern  Not on file Social History Narrative ALLERGIES: Review of patient's allergies indicates no known allergies. Review of Systems Constitutional: Positive for appetite change. Negative for chills and fever. HENT: Positive for congestion. Respiratory: Negative for shortness of breath. Cardiovascular: Negative for chest pain. Gastrointestinal: Positive for nausea and vomiting. Negative for abdominal pain, constipation and diarrhea. Neurological: Positive for dizziness. Negative for light-headedness. All other systems reviewed and are negative. Vitals:  
 10/12/18 1429 BP: 149/74 Pulse: 95 Resp: 16 Temp: 98.5 °F (36.9 °C) SpO2: 97% Weight: 72.6 kg (160 lb) Height: 5' 11\" (1.803 m) Physical Exam  
Constitutional: He is oriented to person, place, and time. He appears well-developed. No distress. HENT:  
Head: Normocephalic. R upper posterior head: 3 x 1.5 cm wound w/ surrounding erythema. No surrounding induration or swelling. Eyes: Pupils are equal, round, and reactive to light. No scleral icterus. Neck: Normal range of motion. Neck supple. Cardiovascular: Normal rate and regular rhythm. Murmur heard. Systolic murmur is present Pulmonary/Chest: Effort normal and breath sounds normal.  
Abdominal: Soft. He exhibits distension (mild). There is no tenderness. There is no rebound and no guarding. Musculoskeletal: Normal range of motion. Neurological: He is alert and oriented to person, place, and time. Skin: Skin is warm and dry. He is not diaphoretic. Psychiatric: He has a normal mood and affect. His behavior is normal. Thought content normal.  
Nursing note and vitals reviewed. Note written by Lazara Archibald, as dictated by Katherine Gardiner MD 3:10 PM 
 
MDM Number of Diagnoses or Management Options Cellulitis of back except buttock: established and improving Non-intractable vomiting with nausea, unspecified vomiting type: new and requires workup Diagnosis management comments: Pt presents w nausea/vomiting in the setting of recently starting bactrim for mild cellulitis. No evidence of electrolyte disturbance, systemic infection, sepsis, intractable vomiting, dehydration, allergic reaction, anaphylaxis. Pt improved with IV fluids and Zofran. Will DC on Doxy with Zofran to manage infection and possible side effects of treatment. ED Course Procedures ED EKG interpretation: 
Rhythm: normal sinus rhythm w/ PAC's; Rate (approx.): 90; Axis: normal; ST/T wave: diffuse T wave flattening and inversions; Note written by Lazara Bustillos, as dictated by Barb Aldana MD 3:10 PM 
 
 
PROGRESS NOTE: 
5:50 PM 
Barb Aldana MD plans to switch Pt's abx to doxycycline and discharge home w/ Zofran. The patient's results have been reviewed with them and/or available family. Patient and/or family verbally conveyed their understanding and agreement of the patient's signs, symptoms, diagnosis, treatment and prognosis and additionally agree to follow up as recommended in the discharge instructions or to return to the Emergency Room should their condition change prior to their follow-up appointment. The patient/family verbally agrees with the care-plan and verbally conveys that all of their questions have been answered. The discharge instructions have also been provided to the patient and/or family with some educational information regarding the patient's diagnosis as well a list of reasons why the patient would want to return to the ER prior to their follow-up appointment, should their condition change.

## 2018-10-12 NOTE — ED NOTES
Patient received discharge instructions by MD. Patient verbalized understanding of medication use and other discharge instructions. Updated vitals, IV DC and patient ambulated out of ED with steady gait, assisted by crutch, showing no signs of distress. Pt reports relief of most intense pain. All questions answered.

## 2018-10-13 LAB
ATRIAL RATE: 90 BPM
CALCULATED P AXIS, ECG09: 69 DEGREES
CALCULATED R AXIS, ECG10: 53 DEGREES
CALCULATED T AXIS, ECG11: 169 DEGREES
DIAGNOSIS, 93000: NORMAL
P-R INTERVAL, ECG05: 174 MS
Q-T INTERVAL, ECG07: 330 MS
QRS DURATION, ECG06: 78 MS
QTC CALCULATION (BEZET), ECG08: 403 MS
VENTRICULAR RATE, ECG03: 90 BPM

## 2018-12-24 ENCOUNTER — APPOINTMENT (OUTPATIENT)
Dept: GENERAL RADIOLOGY | Age: 78
DRG: 280 | End: 2018-12-24
Attending: EMERGENCY MEDICINE
Payer: MEDICARE

## 2018-12-24 ENCOUNTER — HOSPITAL ENCOUNTER (INPATIENT)
Age: 78
LOS: 4 days | Discharge: NURSING, REHAB OR LONG-TERM CARE | DRG: 280 | End: 2018-12-29
Attending: EMERGENCY MEDICINE | Admitting: HOSPITALIST
Payer: MEDICARE

## 2018-12-24 DIAGNOSIS — Z71.89 DNR (DO NOT RESUSCITATE) DISCUSSION: ICD-10-CM

## 2018-12-24 DIAGNOSIS — R63.0 ANOREXIA: ICD-10-CM

## 2018-12-24 DIAGNOSIS — R54 FRAILTY: ICD-10-CM

## 2018-12-24 DIAGNOSIS — I50.33 ACUTE ON CHRONIC DIASTOLIC HEART FAILURE (HCC): Primary | ICD-10-CM

## 2018-12-24 DIAGNOSIS — R53.81 DEBILITY: ICD-10-CM

## 2018-12-24 DIAGNOSIS — E87.6 HYPOKALEMIA: ICD-10-CM

## 2018-12-24 DIAGNOSIS — J81.0 ACUTE PULMONARY EDEMA (HCC): ICD-10-CM

## 2018-12-24 DIAGNOSIS — R06.02 SHORTNESS OF BREATH: ICD-10-CM

## 2018-12-24 DIAGNOSIS — R77.8 TROPONIN LEVEL ELEVATED: ICD-10-CM

## 2018-12-24 DIAGNOSIS — E16.2 HYPOGLYCEMIA: ICD-10-CM

## 2018-12-24 DIAGNOSIS — R09.02 HYPOXEMIA: ICD-10-CM

## 2018-12-24 DIAGNOSIS — Z71.89 GOALS OF CARE, COUNSELING/DISCUSSION: ICD-10-CM

## 2018-12-24 LAB
ALBUMIN SERPL-MCNC: 2.5 G/DL (ref 3.5–5)
ALBUMIN/GLOB SERPL: 0.7 {RATIO} (ref 1.1–2.2)
ALP SERPL-CCNC: 82 U/L (ref 45–117)
ALT SERPL-CCNC: 53 U/L (ref 12–78)
ANION GAP SERPL CALC-SCNC: 6 MMOL/L (ref 5–15)
APPEARANCE UR: ABNORMAL
ARTERIAL PATENCY WRIST A: ABNORMAL
ARTERIAL PATENCY WRIST A: YES
AST SERPL-CCNC: 79 U/L (ref 15–37)
BACTERIA URNS QL MICRO: NEGATIVE /HPF
BASE EXCESS BLD CALC-SCNC: 12 MMOL/L
BASE EXCESS BLDV CALC-SCNC: 20 MMOL/L
BASOPHILS # BLD: 0 K/UL (ref 0–0.1)
BASOPHILS NFR BLD: 1 % (ref 0–1)
BDY SITE: ABNORMAL
BDY SITE: ABNORMAL
BILIRUB SERPL-MCNC: 0.4 MG/DL (ref 0.2–1)
BILIRUB UR QL: NEGATIVE
BNP SERPL-MCNC: ABNORMAL PG/ML (ref 0–450)
BUN SERPL-MCNC: 11 MG/DL (ref 6–20)
BUN/CREAT SERPL: 7 (ref 12–20)
CALCIUM SERPL-MCNC: 7.8 MG/DL (ref 8.5–10.1)
CAOX CRY URNS QL MICRO: ABNORMAL
CHLORIDE SERPL-SCNC: 95 MMOL/L (ref 97–108)
CO2 SERPL-SCNC: 37 MMOL/L (ref 21–32)
COLOR UR: ABNORMAL
COMMENT, HOLDF: NORMAL
CREAT SERPL-MCNC: 1.63 MG/DL (ref 0.7–1.3)
DIFFERENTIAL METHOD BLD: ABNORMAL
EOSINOPHIL # BLD: 0.1 K/UL (ref 0–0.4)
EOSINOPHIL NFR BLD: 1 % (ref 0–7)
EPITH CASTS URNS QL MICRO: ABNORMAL /LPF
ERYTHROCYTE [DISTWIDTH] IN BLOOD BY AUTOMATED COUNT: 15.3 % (ref 11.5–14.5)
GAS FLOW.O2 O2 DELIVERY SYS: ABNORMAL L/MIN
GAS FLOW.O2 O2 DELIVERY SYS: ABNORMAL L/MIN
GAS FLOW.O2 SETTING OXYMISER: 2 L/M
GAS FLOW.O2 SETTING OXYMISER: 2 L/M
GLOBULIN SER CALC-MCNC: 3.5 G/DL (ref 2–4)
GLUCOSE BLD STRIP.AUTO-MCNC: 67 MG/DL (ref 65–100)
GLUCOSE BLD STRIP.AUTO-MCNC: 91 MG/DL (ref 65–100)
GLUCOSE SERPL-MCNC: 56 MG/DL (ref 65–100)
GLUCOSE UR STRIP.AUTO-MCNC: 100 MG/DL
HCO3 BLD-SCNC: 35.8 MMOL/L (ref 22–26)
HCO3 BLDV-SCNC: 44.3 MMOL/L (ref 23–28)
HCT VFR BLD AUTO: 34.9 % (ref 36.6–50.3)
HGB BLD-MCNC: 11 G/DL (ref 12.1–17)
HGB UR QL STRIP: NEGATIVE
IMM GRANULOCYTES # BLD: 0 K/UL (ref 0–0.04)
IMM GRANULOCYTES NFR BLD AUTO: 0 % (ref 0–0.5)
KETONES UR QL STRIP.AUTO: NEGATIVE MG/DL
LACTATE BLD-SCNC: 2.08 MMOL/L (ref 0.4–2)
LEUKOCYTE ESTERASE UR QL STRIP.AUTO: NEGATIVE
LYMPHOCYTES # BLD: 0.9 K/UL (ref 0.8–3.5)
LYMPHOCYTES NFR BLD: 13 % (ref 12–49)
MAGNESIUM SERPL-MCNC: 2.1 MG/DL (ref 1.6–2.4)
MCH RBC QN AUTO: 31.3 PG (ref 26–34)
MCHC RBC AUTO-ENTMCNC: 31.5 G/DL (ref 30–36.5)
MCV RBC AUTO: 99.1 FL (ref 80–99)
MONOCYTES # BLD: 0.6 K/UL (ref 0–1)
MONOCYTES NFR BLD: 9 % (ref 5–13)
NEUTS SEG # BLD: 5.4 K/UL (ref 1.8–8)
NEUTS SEG NFR BLD: 76 % (ref 32–75)
NITRITE UR QL STRIP.AUTO: NEGATIVE
NRBC # BLD: 0 K/UL (ref 0–0.01)
NRBC BLD-RTO: 0 PER 100 WBC
PCO2 BLD: 48.8 MMHG (ref 35–45)
PCO2 BLDV: 63.8 MMHG (ref 41–51)
PH BLD: 7.47 [PH] (ref 7.35–7.45)
PH BLDV: 7.45 [PH] (ref 7.32–7.42)
PH UR STRIP: 6 [PH] (ref 5–8)
PLATELET # BLD AUTO: 237 K/UL (ref 150–400)
PMV BLD AUTO: 12.2 FL (ref 8.9–12.9)
PO2 BLD: 53 MMHG (ref 80–100)
PO2 BLDV: 26 MMHG (ref 25–40)
POTASSIUM SERPL-SCNC: 2.3 MMOL/L (ref 3.5–5.1)
PROT SERPL-MCNC: 6 G/DL (ref 6.4–8.2)
PROT UR STRIP-MCNC: 100 MG/DL
RBC # BLD AUTO: 3.52 M/UL (ref 4.1–5.7)
RBC #/AREA URNS HPF: ABNORMAL /HPF (ref 0–5)
SAMPLES BEING HELD,HOLD: NORMAL
SAO2 % BLD: 89 % (ref 92–97)
SAO2 % BLDV: 49 % (ref 65–88)
SERVICE CMNT-IMP: NORMAL
SERVICE CMNT-IMP: NORMAL
SODIUM SERPL-SCNC: 138 MMOL/L (ref 136–145)
SP GR UR REFRACTOMETRY: 1.02 (ref 1–1.03)
SPECIMEN TYPE: ABNORMAL
SPECIMEN TYPE: ABNORMAL
TROPONIN I SERPL-MCNC: 2.57 NG/ML
TSH SERPL DL<=0.05 MIU/L-ACNC: 2.38 UIU/ML (ref 0.36–3.74)
UR CULT HOLD, URHOLD: NORMAL
UROBILINOGEN UR QL STRIP.AUTO: 0.2 EU/DL (ref 0.2–1)
WBC # BLD AUTO: 7 K/UL (ref 4.1–11.1)
WBC URNS QL MICRO: ABNORMAL /HPF (ref 0–4)

## 2018-12-24 PROCEDURE — 85025 COMPLETE CBC W/AUTO DIFF WBC: CPT

## 2018-12-24 PROCEDURE — 36415 COLL VENOUS BLD VENIPUNCTURE: CPT

## 2018-12-24 PROCEDURE — 84443 ASSAY THYROID STIM HORMONE: CPT

## 2018-12-24 PROCEDURE — 84484 ASSAY OF TROPONIN QUANT: CPT

## 2018-12-24 PROCEDURE — 96365 THER/PROPH/DIAG IV INF INIT: CPT

## 2018-12-24 PROCEDURE — 80053 COMPREHEN METABOLIC PANEL: CPT

## 2018-12-24 PROCEDURE — 83880 ASSAY OF NATRIURETIC PEPTIDE: CPT

## 2018-12-24 PROCEDURE — 93005 ELECTROCARDIOGRAM TRACING: CPT

## 2018-12-24 PROCEDURE — 99285 EMERGENCY DEPT VISIT HI MDM: CPT

## 2018-12-24 PROCEDURE — 82962 GLUCOSE BLOOD TEST: CPT

## 2018-12-24 PROCEDURE — 74011250636 HC RX REV CODE- 250/636: Performed by: EMERGENCY MEDICINE

## 2018-12-24 PROCEDURE — 71045 X-RAY EXAM CHEST 1 VIEW: CPT

## 2018-12-24 PROCEDURE — 77030011943

## 2018-12-24 PROCEDURE — 96366 THER/PROPH/DIAG IV INF ADDON: CPT

## 2018-12-24 PROCEDURE — 82803 BLOOD GASES ANY COMBINATION: CPT

## 2018-12-24 PROCEDURE — 81001 URINALYSIS AUTO W/SCOPE: CPT

## 2018-12-24 PROCEDURE — 96375 TX/PRO/DX INJ NEW DRUG ADDON: CPT

## 2018-12-24 PROCEDURE — 83735 ASSAY OF MAGNESIUM: CPT

## 2018-12-24 PROCEDURE — 36600 WITHDRAWAL OF ARTERIAL BLOOD: CPT

## 2018-12-24 PROCEDURE — 83605 ASSAY OF LACTIC ACID: CPT

## 2018-12-24 PROCEDURE — 74011250637 HC RX REV CODE- 250/637: Performed by: EMERGENCY MEDICINE

## 2018-12-24 RX ORDER — POTASSIUM CHLORIDE 750 MG/1
40 TABLET, FILM COATED, EXTENDED RELEASE ORAL
Status: COMPLETED | OUTPATIENT
Start: 2018-12-24 | End: 2018-12-24

## 2018-12-24 RX ORDER — POTASSIUM CHLORIDE 7.45 MG/ML
10 INJECTION INTRAVENOUS
Status: DISPENSED | OUTPATIENT
Start: 2018-12-24 | End: 2018-12-25

## 2018-12-24 RX ORDER — ASPIRIN 325 MG
325 TABLET ORAL
Status: COMPLETED | OUTPATIENT
Start: 2018-12-24 | End: 2018-12-24

## 2018-12-24 RX ORDER — FUROSEMIDE 10 MG/ML
80 INJECTION INTRAMUSCULAR; INTRAVENOUS
Status: COMPLETED | OUTPATIENT
Start: 2018-12-24 | End: 2018-12-24

## 2018-12-24 RX ADMIN — POTASSIUM CHLORIDE 40 MEQ: 750 TABLET, EXTENDED RELEASE ORAL at 22:23

## 2018-12-24 RX ADMIN — FUROSEMIDE 80 MG: 10 INJECTION, SOLUTION INTRAMUSCULAR; INTRAVENOUS at 22:23

## 2018-12-24 RX ADMIN — POTASSIUM CHLORIDE 10 MEQ: 10 INJECTION, SOLUTION INTRAVENOUS at 22:23

## 2018-12-24 RX ADMIN — ASPIRIN 325 MG: 325 TABLET ORAL at 22:23

## 2018-12-25 PROBLEM — I21.4 NSTEMI (NON-ST ELEVATED MYOCARDIAL INFARCTION) (HCC): Status: ACTIVE | Noted: 2018-12-25

## 2018-12-25 PROBLEM — I50.9 CHF (CONGESTIVE HEART FAILURE) (HCC): Status: ACTIVE | Noted: 2018-12-25

## 2018-12-25 PROBLEM — E87.6 HYPOKALEMIA: Status: ACTIVE | Noted: 2018-12-25

## 2018-12-25 LAB
ANION GAP SERPL CALC-SCNC: 6 MMOL/L (ref 5–15)
ANION GAP SERPL CALC-SCNC: 8 MMOL/L (ref 5–15)
APTT PPP: 25.7 SEC (ref 22.1–32)
APTT PPP: 28.9 SEC (ref 22.1–32)
ATRIAL RATE: 92 BPM
BASOPHILS # BLD: 0 K/UL (ref 0–0.1)
BASOPHILS NFR BLD: 0 % (ref 0–1)
BUN SERPL-MCNC: 10 MG/DL (ref 6–20)
BUN SERPL-MCNC: 11 MG/DL (ref 6–20)
BUN/CREAT SERPL: 6 (ref 12–20)
BUN/CREAT SERPL: 7 (ref 12–20)
CALCIUM SERPL-MCNC: 7.6 MG/DL (ref 8.5–10.1)
CALCIUM SERPL-MCNC: 7.6 MG/DL (ref 8.5–10.1)
CALCULATED R AXIS, ECG10: 67 DEGREES
CALCULATED T AXIS, ECG11: 154 DEGREES
CHLORIDE SERPL-SCNC: 92 MMOL/L (ref 97–108)
CHLORIDE SERPL-SCNC: 93 MMOL/L (ref 97–108)
CO2 SERPL-SCNC: 35 MMOL/L (ref 21–32)
CO2 SERPL-SCNC: 40 MMOL/L (ref 21–32)
CREAT SERPL-MCNC: 1.64 MG/DL (ref 0.7–1.3)
CREAT SERPL-MCNC: 1.69 MG/DL (ref 0.7–1.3)
DIAGNOSIS, 93000: NORMAL
DIFFERENTIAL METHOD BLD: ABNORMAL
EOSINOPHIL # BLD: 0.1 K/UL (ref 0–0.4)
EOSINOPHIL NFR BLD: 1 % (ref 0–7)
ERYTHROCYTE [DISTWIDTH] IN BLOOD BY AUTOMATED COUNT: 14.8 % (ref 11.5–14.5)
EST. AVERAGE GLUCOSE BLD GHB EST-MCNC: 143 MG/DL
GLUCOSE BLD STRIP.AUTO-MCNC: 133 MG/DL (ref 65–100)
GLUCOSE BLD STRIP.AUTO-MCNC: 203 MG/DL (ref 65–100)
GLUCOSE BLD STRIP.AUTO-MCNC: 218 MG/DL (ref 65–100)
GLUCOSE BLD STRIP.AUTO-MCNC: 249 MG/DL (ref 65–100)
GLUCOSE BLD STRIP.AUTO-MCNC: 73 MG/DL (ref 65–100)
GLUCOSE BLD STRIP.AUTO-MCNC: 83 MG/DL (ref 65–100)
GLUCOSE SERPL-MCNC: 124 MG/DL (ref 65–100)
GLUCOSE SERPL-MCNC: 235 MG/DL (ref 65–100)
HBA1C MFR BLD: 6.6 % (ref 4.2–6.3)
HCT VFR BLD AUTO: 37.1 % (ref 36.6–50.3)
HGB BLD-MCNC: 11.9 G/DL (ref 12.1–17)
IMM GRANULOCYTES # BLD: 0 K/UL (ref 0–0.04)
IMM GRANULOCYTES NFR BLD AUTO: 0 % (ref 0–0.5)
LYMPHOCYTES # BLD: 1.3 K/UL (ref 0.8–3.5)
LYMPHOCYTES NFR BLD: 19 % (ref 12–49)
MAGNESIUM SERPL-MCNC: 1.9 MG/DL (ref 1.6–2.4)
MCH RBC QN AUTO: 31.2 PG (ref 26–34)
MCHC RBC AUTO-ENTMCNC: 32.1 G/DL (ref 30–36.5)
MCV RBC AUTO: 97.1 FL (ref 80–99)
MONOCYTES # BLD: 0.6 K/UL (ref 0–1)
MONOCYTES NFR BLD: 9 % (ref 5–13)
NEUTS SEG # BLD: 4.9 K/UL (ref 1.8–8)
NEUTS SEG NFR BLD: 71 % (ref 32–75)
NRBC # BLD: 0 K/UL (ref 0–0.01)
NRBC BLD-RTO: 0 PER 100 WBC
PLATELET # BLD AUTO: 254 K/UL (ref 150–400)
PMV BLD AUTO: 12.3 FL (ref 8.9–12.9)
POTASSIUM SERPL-SCNC: 2.4 MMOL/L (ref 3.5–5.1)
POTASSIUM SERPL-SCNC: 2.9 MMOL/L (ref 3.5–5.1)
Q-T INTERVAL, ECG07: 382 MS
QRS DURATION, ECG06: 90 MS
QTC CALCULATION (BEZET), ECG08: 464 MS
RBC # BLD AUTO: 3.82 M/UL (ref 4.1–5.7)
SERVICE CMNT-IMP: ABNORMAL
SERVICE CMNT-IMP: NORMAL
SERVICE CMNT-IMP: NORMAL
SODIUM SERPL-SCNC: 136 MMOL/L (ref 136–145)
SODIUM SERPL-SCNC: 138 MMOL/L (ref 136–145)
THERAPEUTIC RANGE,PTTT: NORMAL SECS (ref 58–77)
THERAPEUTIC RANGE,PTTT: NORMAL SECS (ref 58–77)
TROPONIN I SERPL-MCNC: 2.57 NG/ML
VENTRICULAR RATE, ECG03: 89 BPM
WBC # BLD AUTO: 7 K/UL (ref 4.1–11.1)

## 2018-12-25 PROCEDURE — 74011250637 HC RX REV CODE- 250/637: Performed by: INTERNAL MEDICINE

## 2018-12-25 PROCEDURE — 83735 ASSAY OF MAGNESIUM: CPT

## 2018-12-25 PROCEDURE — 74011250637 HC RX REV CODE- 250/637: Performed by: HOSPITALIST

## 2018-12-25 PROCEDURE — 65660000000 HC RM CCU STEPDOWN

## 2018-12-25 PROCEDURE — 74011250636 HC RX REV CODE- 250/636: Performed by: EMERGENCY MEDICINE

## 2018-12-25 PROCEDURE — 80048 BASIC METABOLIC PNL TOTAL CA: CPT

## 2018-12-25 PROCEDURE — 82962 GLUCOSE BLOOD TEST: CPT

## 2018-12-25 PROCEDURE — 74011250636 HC RX REV CODE- 250/636: Performed by: INTERNAL MEDICINE

## 2018-12-25 PROCEDURE — 85730 THROMBOPLASTIN TIME PARTIAL: CPT

## 2018-12-25 PROCEDURE — 74011250636 HC RX REV CODE- 250/636: Performed by: HOSPITALIST

## 2018-12-25 PROCEDURE — 84484 ASSAY OF TROPONIN QUANT: CPT

## 2018-12-25 PROCEDURE — 85025 COMPLETE CBC W/AUTO DIFF WBC: CPT

## 2018-12-25 PROCEDURE — 74011636637 HC RX REV CODE- 636/637: Performed by: INTERNAL MEDICINE

## 2018-12-25 PROCEDURE — 83036 HEMOGLOBIN GLYCOSYLATED A1C: CPT

## 2018-12-25 PROCEDURE — 36415 COLL VENOUS BLD VENIPUNCTURE: CPT

## 2018-12-25 PROCEDURE — 74011250636 HC RX REV CODE- 250/636: Performed by: FAMILY MEDICINE

## 2018-12-25 RX ORDER — POTASSIUM CHLORIDE 14.9 MG/ML
10 INJECTION INTRAVENOUS ONCE
Status: COMPLETED | OUTPATIENT
Start: 2018-12-25 | End: 2018-12-25

## 2018-12-25 RX ORDER — MIDODRINE HYDROCHLORIDE 5 MG/1
2.5 TABLET ORAL
Status: DISCONTINUED | OUTPATIENT
Start: 2018-12-25 | End: 2018-12-25

## 2018-12-25 RX ORDER — FUROSEMIDE 10 MG/ML
40 INJECTION INTRAMUSCULAR; INTRAVENOUS DAILY
Status: DISCONTINUED | OUTPATIENT
Start: 2018-12-25 | End: 2018-12-25

## 2018-12-25 RX ORDER — FUROSEMIDE 10 MG/ML
40 INJECTION INTRAMUSCULAR; INTRAVENOUS EVERY 12 HOURS
Status: DISCONTINUED | OUTPATIENT
Start: 2018-12-25 | End: 2018-12-25

## 2018-12-25 RX ORDER — ONDANSETRON 2 MG/ML
4 INJECTION INTRAMUSCULAR; INTRAVENOUS
Status: DISCONTINUED | OUTPATIENT
Start: 2018-12-25 | End: 2018-12-29 | Stop reason: HOSPADM

## 2018-12-25 RX ORDER — ROSUVASTATIN CALCIUM 10 MG/1
5 TABLET, COATED ORAL DAILY
Status: DISCONTINUED | OUTPATIENT
Start: 2018-12-25 | End: 2018-12-29 | Stop reason: HOSPADM

## 2018-12-25 RX ORDER — POTASSIUM CHLORIDE 750 MG/1
20 TABLET, FILM COATED, EXTENDED RELEASE ORAL 2 TIMES DAILY
Status: DISCONTINUED | OUTPATIENT
Start: 2018-12-25 | End: 2018-12-25

## 2018-12-25 RX ORDER — DULOXETIN HYDROCHLORIDE 60 MG/1
60 CAPSULE, DELAYED RELEASE ORAL DAILY
Status: DISCONTINUED | OUTPATIENT
Start: 2018-12-25 | End: 2018-12-29 | Stop reason: HOSPADM

## 2018-12-25 RX ORDER — FLUDROCORTISONE ACETATE 0.1 MG/1
0.2 TABLET ORAL DAILY
Status: DISCONTINUED | OUTPATIENT
Start: 2018-12-25 | End: 2018-12-25

## 2018-12-25 RX ORDER — INSULIN LISPRO 100 [IU]/ML
INJECTION, SOLUTION INTRAVENOUS; SUBCUTANEOUS
Status: DISCONTINUED | OUTPATIENT
Start: 2018-12-25 | End: 2018-12-29 | Stop reason: HOSPADM

## 2018-12-25 RX ORDER — POTASSIUM CHLORIDE 7.45 MG/ML
10 INJECTION INTRAVENOUS
Status: DISPENSED | OUTPATIENT
Start: 2018-12-25 | End: 2018-12-25

## 2018-12-25 RX ORDER — SODIUM CHLORIDE 0.9 % (FLUSH) 0.9 %
5-10 SYRINGE (ML) INJECTION AS NEEDED
Status: DISCONTINUED | OUTPATIENT
Start: 2018-12-25 | End: 2018-12-29 | Stop reason: HOSPADM

## 2018-12-25 RX ORDER — POTASSIUM CHLORIDE 750 MG/1
40 TABLET, FILM COATED, EXTENDED RELEASE ORAL
Status: COMPLETED | OUTPATIENT
Start: 2018-12-25 | End: 2018-12-25

## 2018-12-25 RX ORDER — DEXTROSE 50 % IN WATER (D50W) INTRAVENOUS SYRINGE
12.5-25 AS NEEDED
Status: DISCONTINUED | OUTPATIENT
Start: 2018-12-25 | End: 2018-12-29 | Stop reason: HOSPADM

## 2018-12-25 RX ORDER — HEPARIN SODIUM 5000 [USP'U]/ML
4000 INJECTION, SOLUTION INTRAVENOUS; SUBCUTANEOUS ONCE
Status: DISCONTINUED | OUTPATIENT
Start: 2018-12-25 | End: 2018-12-25

## 2018-12-25 RX ORDER — MIDODRINE HYDROCHLORIDE 5 MG/1
5 TABLET ORAL
Status: DISCONTINUED | OUTPATIENT
Start: 2018-12-25 | End: 2018-12-29 | Stop reason: HOSPADM

## 2018-12-25 RX ORDER — HEPARIN SODIUM 5000 [USP'U]/ML
4000 INJECTION, SOLUTION INTRAVENOUS; SUBCUTANEOUS ONCE
Status: COMPLETED | OUTPATIENT
Start: 2018-12-25 | End: 2018-12-25

## 2018-12-25 RX ORDER — PREGABALIN 25 MG/1
50 CAPSULE ORAL 2 TIMES DAILY
Status: DISCONTINUED | OUTPATIENT
Start: 2018-12-25 | End: 2018-12-29 | Stop reason: HOSPADM

## 2018-12-25 RX ORDER — SODIUM CHLORIDE 0.9 % (FLUSH) 0.9 %
5-10 SYRINGE (ML) INJECTION EVERY 8 HOURS
Status: DISCONTINUED | OUTPATIENT
Start: 2018-12-25 | End: 2018-12-29 | Stop reason: HOSPADM

## 2018-12-25 RX ORDER — ACETAMINOPHEN 325 MG/1
650 TABLET ORAL
Status: DISCONTINUED | OUTPATIENT
Start: 2018-12-25 | End: 2018-12-29 | Stop reason: HOSPADM

## 2018-12-25 RX ORDER — MAGNESIUM SULFATE 100 %
4 CRYSTALS MISCELLANEOUS AS NEEDED
Status: DISCONTINUED | OUTPATIENT
Start: 2018-12-25 | End: 2018-12-29 | Stop reason: HOSPADM

## 2018-12-25 RX ORDER — ASPIRIN 81 MG/1
81 TABLET ORAL DAILY
Status: DISCONTINUED | OUTPATIENT
Start: 2018-12-25 | End: 2018-12-29 | Stop reason: HOSPADM

## 2018-12-25 RX ORDER — LEVOTHYROXINE SODIUM 50 UG/1
50 TABLET ORAL
Status: DISCONTINUED | OUTPATIENT
Start: 2018-12-25 | End: 2018-12-29 | Stop reason: HOSPADM

## 2018-12-25 RX ORDER — TAMSULOSIN HYDROCHLORIDE 0.4 MG/1
0.4 CAPSULE ORAL DAILY
Status: DISCONTINUED | OUTPATIENT
Start: 2018-12-25 | End: 2018-12-29 | Stop reason: HOSPADM

## 2018-12-25 RX ORDER — HEPARIN SODIUM 10000 [USP'U]/100ML
11-25 INJECTION, SOLUTION INTRAVENOUS
Status: DISCONTINUED | OUTPATIENT
Start: 2018-12-25 | End: 2018-12-25

## 2018-12-25 RX ORDER — PANTOPRAZOLE SODIUM 40 MG/1
40 TABLET, DELAYED RELEASE ORAL DAILY
Status: DISCONTINUED | OUTPATIENT
Start: 2018-12-25 | End: 2018-12-29 | Stop reason: HOSPADM

## 2018-12-25 RX ORDER — POTASSIUM CHLORIDE 750 MG/1
20 TABLET, FILM COATED, EXTENDED RELEASE ORAL 2 TIMES DAILY
Status: DISCONTINUED | OUTPATIENT
Start: 2018-12-26 | End: 2018-12-29

## 2018-12-25 RX ORDER — FUROSEMIDE 10 MG/ML
40 INJECTION INTRAMUSCULAR; INTRAVENOUS EVERY 12 HOURS
Status: DISCONTINUED | OUTPATIENT
Start: 2018-12-26 | End: 2018-12-27

## 2018-12-25 RX ORDER — FUROSEMIDE 10 MG/ML
60 INJECTION INTRAMUSCULAR; INTRAVENOUS ONCE
Status: COMPLETED | OUTPATIENT
Start: 2018-12-25 | End: 2018-12-25

## 2018-12-25 RX ADMIN — MIDODRINE HYDROCHLORIDE 5 MG: 5 TABLET ORAL at 16:26

## 2018-12-25 RX ADMIN — HEPARIN SODIUM 4000 UNITS: 5000 INJECTION INTRAVENOUS; SUBCUTANEOUS at 03:48

## 2018-12-25 RX ADMIN — Medication 10 ML: at 15:17

## 2018-12-25 RX ADMIN — PANTOPRAZOLE SODIUM 40 MG: 40 TABLET, DELAYED RELEASE ORAL at 08:38

## 2018-12-25 RX ADMIN — Medication 10 ML: at 06:00

## 2018-12-25 RX ADMIN — POTASSIUM CHLORIDE 10 MEQ: 7.46 INJECTION, SOLUTION INTRAVENOUS at 10:45

## 2018-12-25 RX ADMIN — TAMSULOSIN HYDROCHLORIDE 0.4 MG: 0.4 CAPSULE ORAL at 08:38

## 2018-12-25 RX ADMIN — PREGABALIN 50 MG: 25 CAPSULE ORAL at 16:31

## 2018-12-25 RX ADMIN — PREGABALIN 50 MG: 25 CAPSULE ORAL at 08:37

## 2018-12-25 RX ADMIN — Medication 10 ML: at 22:00

## 2018-12-25 RX ADMIN — INSULIN LISPRO 3 UNITS: 100 INJECTION, SOLUTION INTRAVENOUS; SUBCUTANEOUS at 16:26

## 2018-12-25 RX ADMIN — DULOXETINE 60 MG: 60 CAPSULE, DELAYED RELEASE ORAL at 08:37

## 2018-12-25 RX ADMIN — FUROSEMIDE 60 MG: 10 INJECTION, SOLUTION INTRAMUSCULAR; INTRAVENOUS at 19:01

## 2018-12-25 RX ADMIN — LEVOTHYROXINE SODIUM 50 MCG: 50 TABLET ORAL at 07:11

## 2018-12-25 RX ADMIN — POTASSIUM CHLORIDE 10 MEQ: 7.46 INJECTION, SOLUTION INTRAVENOUS at 11:45

## 2018-12-25 RX ADMIN — POTASSIUM CHLORIDE 10 MEQ: 14.9 INJECTION, SOLUTION INTRAVENOUS at 07:11

## 2018-12-25 RX ADMIN — POTASSIUM CHLORIDE 10 MEQ: 7.46 INJECTION, SOLUTION INTRAVENOUS at 08:37

## 2018-12-25 RX ADMIN — HEPARIN SODIUM AND DEXTROSE 11 UNITS/KG/HR: 10000; 5 INJECTION INTRAVENOUS at 03:39

## 2018-12-25 RX ADMIN — MIDODRINE HYDROCHLORIDE 2.5 MG: 5 TABLET ORAL at 08:38

## 2018-12-25 RX ADMIN — POTASSIUM CHLORIDE 40 MEQ: 750 TABLET, EXTENDED RELEASE ORAL at 00:18

## 2018-12-25 RX ADMIN — POTASSIUM CHLORIDE 10 MEQ: 10 INJECTION, SOLUTION INTRAVENOUS at 00:17

## 2018-12-25 RX ADMIN — FLUDROCORTISONE ACETATE 200 MCG: 0.1 TABLET ORAL at 08:37

## 2018-12-25 RX ADMIN — Medication 81 MG: at 08:38

## 2018-12-25 RX ADMIN — MIDODRINE HYDROCHLORIDE 2.5 MG: 5 TABLET ORAL at 12:49

## 2018-12-25 RX ADMIN — ROSUVASTATIN CALCIUM 5 MG: 10 TABLET, FILM COATED ORAL at 08:38

## 2018-12-25 RX ADMIN — Medication 10 ML: at 02:00

## 2018-12-25 RX ADMIN — POTASSIUM CHLORIDE 40 MEQ: 750 TABLET, FILM COATED, EXTENDED RELEASE ORAL at 15:17

## 2018-12-25 RX ADMIN — INSULIN LISPRO 3 UNITS: 100 INJECTION, SOLUTION INTRAVENOUS; SUBCUTANEOUS at 22:50

## 2018-12-25 NOTE — H&P
HISTORY AND PHYSICAL      PCP: Elvi Plaza MD  History source: the patient' daughter (patient is a very poor historian due to dementia)      CC: shortness of breath      HPI: 69 y.o man w/ CAD, CHF, COPD, DM, orthostatic hypotension, who presents with shortness of breath. He is a very poor historian due to dementia and doesn't know he's in a hospital. I spoke to his daughter/POA over the phone who tells me that last night he began complaining of shortness of breath. She also notes that over the last couple of weeks his oral intake has been very poor, and he's had low blood glucose readings in the 60's. The patient denies chest pain but does admit to shortness of breath when specifically asked. Family is not aware of any fever. PMH/PSH:  Past Medical History:   Diagnosis Date    CAD (coronary artery disease)     [facility] [address]     Diabetes ([facility] [address]     1970a    Ill-defined condition     SOB    Pneumonia     Urinary incontinence      Past Surgical History:   Procedure Laterality Date    CARDIAC SURG PROCEDURE UNLIST  2000    open heart    HX HEENT  1975    nasal surgery    HX MOHS PROCEDURES  2013    left       Home meds:   Prior to Admission medications    Medication Sig Start Date End Date Taking? Authorizing Provider   ondansetron (ZOFRAN ODT) 4 mg disintegrating tablet Take 2 Tabs by mouth every eight (8) hours as needed for Nausea. 10/12/18   Erick Strong MD   ergocalciferol (ERGOCALCIFEROL) 50,000 unit capsule Take 1 Cap by mouth every seven (7) days. Please call to schedule follow-up visit. 10/4/18   Philippe Gibbs MD   TRULICITY 1.5 JA/6.2 mL sub-q pen INJECT 0.5ML (1.5MG) UNDER THE SKIN ONCE WEEKLY AS DIRECTED 8/30/18   Philippe Gibbs MD   [facility] ULTRA BLUE TEST STRIP strip USE TO CHECK BLOOD SUGAR 3 TIMES DAILY. DIAGNOSIS CODE: E11.9 8/1/18   Philippe Gibbs MD   [facility] ULTRA BLUE TEST STRIP strip USE TO CHECK BLOOD SUGAR 3 TIMES DAILY. DIAGNOSIS CODE: E11.9 7/17/18   Robles Badder, MD   LANTUS SOLOSTAR U-100 INSULIN 100 unit/mL (3 mL) inpn 30 units once daily. 4/17/18   Delia Wagner MD   fludrocortisone (FLORINEF) 0.1 mg tablet Take 2 Tabs by mouth daily. To maintain blood pressure 4/16/18   Delia Wagner MD   pregabalin (LYRICA) 50 mg capsule Take 1 Cap by mouth two (2) times a day. Max Daily Amount: 100 mg. 3/20/18   Delia Wagner MD   midodrine (PROAMITINE) 5 mg tablet Take 5 mg by mouth three (3) times daily (with meals). Provider, Historical   cyanocobalamin (VITAMIN B-12) 100 mcg tablet Take 100 mcg by mouth daily. Provider, Historical   DULoxetine (CYMBALTA) 60 mg capsule Take 60 mg by mouth daily. Provider, Historical   tamsulosin (FLOMAX) 0.4 mg capsule Take 0.4 mg by mouth daily. Provider, Historical   HYDROcodone-acetaminophen (NORCO) 7.5-325 mg per tablet Take 1 Tab by mouth two (2) times daily as needed. 6/17/16   Provider, Historical   omeprazole (PRILOSEC) 20 mg capsule Take 20 mg by mouth daily. 3/11/16   Provider, Historical   rosuvastatin (CRESTOR) 5 mg tablet Take 5 mg by mouth daily. Provider, Historical   levothyroxine (SYNTHROID) 50 mcg tablet Take 50 mcg by mouth Daily (before breakfast). 7/2/15   Provider, Historical   aspirin delayed-release (ASPIR-81) 81 mg tablet Take 81 mg by mouth daily. Provider, Historical       Allergies:  No Known Allergies    FH:  Family History   Problem Relation Age of Onset    Diabetes Mother     Diabetes Brother        SH:  Social History     Tobacco Use    Smoking status: Current Every Day Smoker    Smokeless tobacco: Never Used    Tobacco comment: 1-2 cigars daily   Substance Use Topics    Alcohol use: No     Alcohol/week: 0.0 oz       ROS: Review of systems not obtained due to patient factors.       PHYSICAL EXAM:  Visit Vitals  /73   Pulse 95   Temp 98.2 °F (36.8 °C)   Resp 16   Ht 5' 11\" (1.803 m)   Wt 88.5 kg (195 lb 1.7 oz)   SpO2 97%   BMI 27.21 kg/m²       Gen: NAD, non-toxic  HEENT: anicteric sclerae, normal conjunctiva, oropharynx clear, MM moist  Neck: supple, trachea midline, no adenopathy  Heart: irreg irreg, systolic murmur, no JVD, 1+ b/l LE edema more significant on the LLE  Lungs: diminished breath sounds in both bases, bibasilar crackles, non-labored respirations  Abd: soft, NT, ND, BS+  Extr: warm  Skin: dry, no rash  Neuro: CN grossly intact, moves all extremities  Psych: confused, oriented to self only      Labs/Imaging:  Recent Results (from the past 24 hour(s))   GLUCOSE, POC    Collection Time: 12/24/18  9:06 PM   Result Value Ref Range    Glucose (POC) 67 65 - 100 mg/dL    Performed by [facility] NIKKI    EKG, 12 LEAD, INITIAL    Collection Time: 12/24/18  9:07 PM   Result Value Ref Range    Ventricular Rate 89 BPM    Atrial Rate 92 BPM    QRS Duration 90 ms    Q-T Interval 382 ms    QTC Calculation (Bezet) 464 ms    Calculated R Axis 67 degrees    Calculated T Axis 154 degrees    Diagnosis       Atrial fibrillation  ST & T wave abnormality, consider lateral ischemia  When compared with ECG of 12-OCT-2018 15:01,  Atrial fibrillation has replaced Sinus rhythm  ST no longer elevated in Inferior leads  T wave inversion no longer evident in Anterior leads  QT has lengthened     SAMPLES BEING HELD    Collection Time: 12/24/18  9:13 PM   Result Value Ref Range    SAMPLES BEING HELD [id number]     COMMENT        Add-on orders for these samples will be processed based on acceptable specimen integrity and analyte stability, which may vary by analyte.    CBC WITH AUTOMATED DIFF    Collection Time: 12/24/18  9:13 PM   Result Value Ref Range    WBC 7.0 4.1 - 11.1 K/uL    RBC 3.52 (L) 4.10 - 5.70 M/uL    HGB 11.0 (L) 12.1 - 17.0 g/dL    HCT 34.9 (L) 36.6 - 50.3 %    MCV 99.1 (H) 80.0 - 99.0 FL    MCH 31.3 26.0 - 34.0 PG    MCHC 31.5 30.0 - 36.5 g/dL    RDW 15.3 (H) 11.5 - 14.5 %    PLATELET 494 245 - 581 K/uL    MPV 12.2 8.9 - 12.9 FL    NRBC 0.0 0  WBC    ABSOLUTE NRBC 0.00 0.00 - 0.01 K/uL    NEUTROPHILS 76 (H) 32 - 75 %    LYMPHOCYTES 13 12 - 49 %    MONOCYTES 9 5 - 13 %    EOSINOPHILS 1 0 - 7 %    BASOPHILS 1 0 - 1 %    IMMATURE GRANULOCYTES 0 0.0 - 0.5 %    ABS. NEUTROPHILS 5.4 1.8 - 8.0 K/UL    ABS. LYMPHOCYTES 0.9 0.8 - 3.5 K/UL    ABS. MONOCYTES 0.6 0.0 - 1.0 K/UL    ABS. EOSINOPHILS 0.1 0.0 - 0.4 K/UL    ABS. BASOPHILS 0.0 0.0 - 0.1 K/UL    ABS. IMM. GRANS. 0.0 0.00 - 0.04 K/UL    DF AUTOMATED     MAGNESIUM    Collection Time: 12/24/18  9:13 PM   Result Value Ref Range    Magnesium 2.1 1.6 - 2.4 mg/dL   METABOLIC PANEL, COMPREHENSIVE    Collection Time: 12/24/18  9:13 PM   Result Value Ref Range    Sodium 138 136 - 145 mmol/L    Potassium 2.3 (LL) 3.5 - 5.1 mmol/L    Chloride 95 (L) 97 - 108 mmol/L    CO2 37 (H) 21 - 32 mmol/L    Anion gap 6 5 - 15 mmol/L    Glucose 56 (L) 65 - 100 mg/dL    BUN 11 6 - 20 MG/DL    Creatinine 1.63 (H) 0.70 - 1.30 MG/DL    BUN/Creatinine ratio 7 (L) 12 - 20      GFR est AA 50 (L) >60 ml/min/1.73m2    GFR est non-AA 41 (L) >60 ml/min/1.73m2    Calcium 7.8 (L) 8.5 - 10.1 MG/DL    Bilirubin, total 0.4 0.2 - 1.0 MG/DL    ALT (SGPT) 53 12 - 78 U/L    AST (SGOT) 79 (H) 15 - 37 U/L    Alk.  phosphatase 82 45 - 117 U/L    Protein, total 6.0 (L) 6.4 - 8.2 g/dL    Albumin 2.5 (L) 3.5 - 5.0 g/dL    Globulin 3.5 2.0 - 4.0 g/dL    A-G Ratio 0.7 (L) 1.1 - 2.2     NT-PRO BNP    Collection Time: 12/24/18  9:13 PM   Result Value Ref Range    NT pro-BNP 21,823 (H) 0 - 450 PG/ML   TROPONIN I    Collection Time: 12/24/18  9:13 PM   Result Value Ref Range    Troponin-I, Qt. 2.57 (H) <0.05 ng/mL   TSH 3RD GENERATION    Collection Time: 12/24/18  9:13 PM   Result Value Ref Range    TSH 2.38 0.36 - 3.74 uIU/mL   POC LACTIC ACID    Collection Time: 12/24/18  9:29 PM   Result Value Ref Range    Lactic Acid (POC) 2.08 (HH) 0.40 - 2.00 mmol/L   GLUCOSE, POC    Collection Time: 12/24/18  9:36 PM   Result Value Ref Range    Glucose (POC) 91 65 - 100 mg/dL    Performed by Jamshid Villa    URINALYSIS W/MICROSCOPIC    Collection Time: 12/24/18  9:37 PM   Result Value Ref Range    Color YELLOW/STRAW      Appearance CLOUDY (A) CLEAR      Specific gravity 1.019 1.003 - 1.030      pH (UA) 6.0 5.0 - 8.0      Protein 100 (A) NEG mg/dL    Glucose 100 (A) NEG mg/dL    Ketone NEGATIVE  NEG mg/dL    Bilirubin NEGATIVE  NEG      Blood NEGATIVE  NEG      Urobilinogen 0.2 0.2 - 1.0 EU/dL    Nitrites NEGATIVE  NEG      Leukocyte Esterase NEGATIVE  NEG      WBC 0-4 0 - 4 /hpf    RBC 0-5 0 - 5 /hpf    Epithelial cells FEW FEW /lpf    Bacteria NEGATIVE  NEG /hpf    CA Oxalate crystals 1+ (A) NEG   URINE CULTURE HOLD SAMPLE    Collection Time: 12/24/18  9:37 PM   Result Value Ref Range    Urine culture hold        URINE ON HOLD IN MICROBIOLOGY DEPT FOR 3 DAYS. IF UNPRESERVED URINE IS SUBMITTED, IT CANNOT BE USED FOR ADDITIONAL TESTING AFTER 24 HRS, RECOLLECTION WILL BE REQUIRED.    POC VENOUS BLOOD GAS    Collection Time: 12/24/18 10:07 PM   Result Value Ref Range    Device: NASAL CANNULA      Flow rate (POC) 2 L/M    pH, venous (POC) 7.450 (H) 7.32 - 7.42      pCO2, venous (POC) 63.8 (H) 41 - 51 MMHG    pO2, venous (POC) 26 25 - 40 mmHg    HCO3, venous (POC) 44.3 (H) 23.0 - 28.0 MMOL/L    sO2, venous (POC) 49 (L) 65 - 88 %    Base excess, venous (POC) 20 mmol/L    Allens test (POC) N/A      Site OTHER      Specimen type (POC) VENOUS BLOOD     POC G3 - PUL    Collection Time: 12/24/18 10:36 PM   Result Value Ref Range    pH (POC) 7.474 (H) 7.35 - 7.45      pCO2 (POC) 48.8 (H) 35.0 - 45.0 MMHG    pO2 (POC) 53 (L) 80 - 100 MMHG    HCO3 (POC) 35.8 (H) 22 - 26 MMOL/L    sO2 (POC) 89 (L) 92 - 97 %    Base excess (POC) 12 mmol/L    Site RIGHT RADIAL      Device: NASAL CANNULA      Flow rate (POC) 2 L/M    Allens test (POC) YES      Specimen type (POC) ARTERIAL     GLUCOSE, POC    Collection Time: 12/25/18 12:52 AM   Result Value Ref Range    Glucose (POC) 73 65 - 100 mg/dL Performed by Rox Perez        Recent Labs     12/24/18 2113   WBC 7.0   HGB 11.0*   HCT 34.9*        Recent Labs     12/24/18 2113      K 2.3*   CL 95*   CO2 37*   BUN 11   CREA 1.63*   GLU 56*   CA 7.8*   MG 2.1     Recent Labs     12/24/18 2113   SGOT 79*   ALT 53   AP 82   TBILI 0.4   TP 6.0*   ALB 2.5*   GLOB 3.5       Recent Labs     12/24/18 2113   TROIQ 2.57*       No results for input(s): INR, PTP, APTT in the last 72 hours. No lab exists for component: INREXT     No results for input(s): PH, PCO2, PO2 in the last 72 hours. Xr Chest Port    Result Date: 12/24/2018  IMPRESSION: CHF. Assessment & Plan:     Acute on chronic CHF, unspecified type: (POA) as evidenced by pulmonary edema, elevated NT pro-BNP, NYHA IV on admission. Last TTE in 2017 with normal LVEF, mild-mod MR  -diurese with IV Lasix  -I/O's, daily weight  -TTE  -note he is on fludrocortisone    NSTEMI: (POA) trop 2.57, this may have precipitated CHF  -trend biomarkers  -start heparin drip (due to borderline renal function)  -continue rosuvastatin  -consult cardiology    Atrial fibrillation: (POA) this is a new diagnosis  -monitor on tele  -heparin drip as above    Hypokalemia: (POA)  -replete  -monitor closely with diuresis    Type 2 DM w/ hypoglycemia, peripheral neuropathy and CKD: (POA)  -hold all insulin  -POC checks q4h  -continue duloxetine and pregabalin for neuropathy  -check a1c    History of orthostatic hypotension:   -on midodrine and fludrocortisone  -BP on the higher side, will begin weaning midodrine. If it's able to be weaned off would start beta blocker  -continue fludrocortisone for now    CKD III:  -monitor with diuresis    Dementia: high risk for delirium while here. Hypoglycemia likely exacerbating confusion.     DVT ppx: anticoagulated  Code status: full - d/w his daughter and Irl Zoëcham  Disposition: TBD    Signed By: Caitlyn Cast MD     December 25, 2018

## 2018-12-25 NOTE — ED TRIAGE NOTES
PT arrives via EMS after developing SOB around dinner time. Upon EMS arrival pt was sating at 85% on RA. Went up to 93% on 4Ls. Hx of A-fib, and CHF. Blood sugar was 60 for EMS.  Blood glucose on arrival 67, pt given 250ml ginger ale on arrival

## 2018-12-25 NOTE — PROGRESS NOTES
Bedside and Verbal shift change report given to Jordyn Escamilla (oncoming nurse) by Salazar Naidu (offgoing nurse). Report included the following information SBAR, Kardex, Intake/Output, MAR and Cardiac Rhythm a-fib.

## 2018-12-25 NOTE — ROUTINE PROCESS
TRANSFER - OUT REPORT:    Verbal report given to Maria Ines(name) on Blaine Primes  being transferred to 6S(unit) for routine progression of care       Report consisted of patients Situation, Background, Assessment and   Recommendations(SBAR). Information from the following report(s) SBAR, ED Summary and MAR was reviewed with the receiving nurse. Lines:   Peripheral IV 12/24/18 Right Wrist (Active)   Site Assessment Clean, dry, & intact 12/24/2018 11:34 PM   Phlebitis Assessment 0 12/24/2018 11:34 PM   Infiltration Assessment 0 12/24/2018 11:34 PM   Dressing Status Clean, dry, & intact 12/24/2018 11:34 PM   Dressing Type Transparent 12/24/2018 11:34 PM   Hub Color/Line Status Pink 12/24/2018 11:34 PM   Action Taken Wrapped 12/24/2018 11:34 PM   Alcohol Cap Used Yes 12/24/2018 11:34 PM        Opportunity for questions and clarification was provided.       Patient transported with:   Registered Nurse

## 2018-12-25 NOTE — CONSULTS
[address]    Yassine Wyatt  MR#: [id number]  : 1940  ACCOUNT #: [de-identified]   DATE OF SERVICE: 2018    HISTORY OF PRESENT ILLNESS:  This 78-year-old man was admitted to the hospital with acute-on-chronic systolic congestive heart failure, and he has a history of coronary artery disease with coronary bypass grafting in  without subsequent catheterization. He has not had clearly regular followup. He has an EF in the 40% range with mild to moderate aortic stenosis. He has diuresed reasonably well and feels much better. There is a history of dementia. He is a very poor historian. With some effort, he knows that he is in the hospital at [facility]. Not clear that he understands the situation overall. There has apparently been no chest pain. No clear-cut orthopnea, PND, edema. Family brought him in. He lives with his son because of shortness of breath. In addition, he was found to have a troponin 2.57. No CPKs done. ProBNP was 21,823. Chest x-ray shows pulmonary edema. PAST MEDICAL HISTORY:  There is significant dementia, urinary incontinence, prior pneumonia, diabetes, COPD, coronary artery disease as described, along with aortic stenosis in addition to open heart bypass surgery in . He has had a Mohs procedure on the left side, nasal surgery in . FAMILY HISTORY:  Positive for diabetes. SOCIAL HISTORY:  Retired . Continues to smoke. No alcohol. Lives with a son. MEDICATIONS:  At presentation, aspirin 81 mg a day, B12 with 100 mcg daily, Cymbalta 60 mg a day, ergocalciferol 50,000 units every 7 days, Florinef 0.1 two a day, Norco p.r.n., Lantus 30 units daily, L-thyroxine 50 mcg daily, midodrine 5 three times a day with meals, omeprazole 20 mg a day, Zofran 4 mg as needed, rosuvastatin 5 mg a day, Lyrica 50 twice daily, Flomax 0.4 daily, Trulicity injections. ALLERGIES:  NONE KNOWN.     REVIEW OF SYSTEMS:  Not reliable. PHYSICAL EXAMINATION:  GENERAL:  Frail elderly man in no acute distress. VITAL SIGNS:  Blood pressure 168/78, pulse 96 and regular, afebrile. He is on 2 liters nasal.  Last sat was 97%. HEENT:  Sitting up, there is no jugular venous distention. Carotids are full, without bruits. Sclerae are clear. LUNGS:  Show scant crackles. HEART:  Regular rate and rhythm with a II/VI systolic ejection murmur at the base. No definite gallop. ABDOMEN:  Soft, nontender. EXTREMITIES:  Without edema. Distal pulses are diminished, but intact. SKIN:  Intact. No skeletal deformities. NEUROLOGIC:  Nonlateralizing, not clearly fully oriented. LABORATORY DATA:  Sodium 136, potassium 2.9, chloride 93, CO2 of 35, glucose 235, BUN 10, creatinine 1.69. Hemoglobin 11.9, hematocrit 37.1, white count 7000, platelets 038,352. Chest x-ray shows pulmonary edema. EKG:  Atrial fibrillation, nonspecific ST-T wave changes, could be a wandering atrial pacemaker. PROBLEMS:  1. Acute-on-chronic systolic congestive heart failure. 2.  Coronary artery disease. 3.  Mild to moderate aortic stenosis. 4.  Significant dementia. 5.  Type 2 diabetes mellitus. 6.  Hypothyroidism. 7.  COPD with current smoking. 8.  History of urinary incontinence. 9.  CKD III  PLAN/RECOMMENDATIONS:  Agree with potassium repletion. Would stop Florinef, it is counterproductive. Continue midodrine at 5 mg a day with meals. Diuresis as appropriate. We can get an updated echo, but no aggressive measures. We will follow with you.       MD Mahi Fortune [facility] [address]  D: 12/25/2018 14:35     T: 12/25/2018 16:04  JOB #: [id number]

## 2018-12-25 NOTE — PROGRESS NOTES
Problem: Falls - Risk of  Goal: *Absence of Falls  Document Jose Fall Risk and appropriate interventions in the flowsheet.   Outcome: Progressing Towards Goal  Fall Risk Interventions:       Mentation Interventions: Bed/chair exit alarm         Elimination Interventions: Call light in reach, Toileting schedule/hourly rounds, Toilet paper/wipes in reach

## 2018-12-25 NOTE — ED PROVIDER NOTES
69 y.o. male with past medical history significant for CAD, COPD, CHF, and diabetes who presents from home via EMS with chief complaint of shortness of breath. Per son, he was waking the pt from his sleep to feed him, but the pt appeared to be having trouble breathing and breathing faster than normal. Furthermore, son reports the pt's blood glucose was 60 upon checking it prior to arrival. Son states there has been no loss of consciousness. Son reports the pt has been increasingly denying meals and drinks over the last several weeks. Son states they have been force feeding and giving him drinks, but is concerned given his history of insulin dependent diabetes. Son notes the pt also appears to be retaining some fluid, as he looks \"puffier\" today. Daughter reports the pt is also more delusional and confused than his dementia baseline. Daughter reports the pt has a history of orthostatic hypertension and is currently on medications. Furthermore, daughter states the pt has not been ambulating much recently. There are no other acute medical concerns at this time. Full history, physical exam, and ROS unable to be obtained due to:  History was mostly provided by the son. Social hx - Tobacco use: current smoker, Alcohol Use: none    PCP: Xena Hu MD    Note written by Lazara Schreiber, as dictated by Iona Lion MD 9:03 PM.          The history is provided by a relative. No  was used.         Past Medical History:   Diagnosis Date    CAD (coronary artery disease)     [facility] [address]     Diabetes ([facility] [address]     1970a    Ill-defined condition     SOB    Pneumonia     Urinary incontinence        Past Surgical History:   Procedure Laterality Date    CARDIAC SURG PROCEDURE UNLIST  2000    open heart    HX HEENT  1975    nasal surgery    HX MOHS PROCEDURES  2013    left         Family History:   Problem Relation Age of Onset    Diabetes Mother     Diabetes Brother        Social History     Socioeconomic History    Marital status:      Spouse name: Not on file    Number of children: Not on file    Years of education: Not on file    Highest education level: Not on file   Social Needs    Financial resource strain: Not on file    Food insecurity - worry: Not on file    Food insecurity - inability: Not on file   [employer] needs - medical: Not on file   [employer] needs - non-medical: Not on file   Occupational History    Not on file   Tobacco Use    Smoking status: Current Every Day Smoker    Smokeless tobacco: Never Used    Tobacco comment: 1-2 cigars daily   Substance and Sexual Activity    Alcohol use: No     Alcohol/week: 0.0 oz    Drug use: No    Sexual activity: Not on file   Other Topics Concern    Not on file   Social History Narrative    Not on file         ALLERGIES: Patient has no known allergies. Review of Systems   Unable to perform ROS: Dementia       Vitals:    12/24/18 2103 12/24/18 2116   BP: 128/62    Pulse: 94    Resp: 21    Temp: 98 °F (36.7 °C)    SpO2: 94% 95%            Physical Exam   Constitutional: He appears well-developed and well-nourished. No distress. HENT:   Head: Normocephalic and atraumatic. Eyes: Conjunctivae are normal.   Neck: Neck supple. No tracheal deviation present. Cardiovascular: Normal rate. An irregularly irregular rhythm present. Murmur heard. Systolic murmur is present with a grade of 2/6. Pulmonary/Chest: Effort normal and breath sounds normal. No respiratory distress. Clear lung sounds bilaterally. Abdominal: Soft. He exhibits no distension. There is no tenderness. Musculoskeletal: Normal range of motion. He exhibits no deformity. 1+ bilateral leg edema. Neurological: He is alert. No cranial nerve deficit. Disoriented with normal speech. Skin: Skin is warm and dry.    Psychiatric: His behavior is normal.   Nursing note and vitals reviewed. Note written by Lazara Valles, as dictated by Cuco Mills MD 9:03 PM.    MDM     69 y.o. male presents with episode of shortness of breath that he complained to his son about while awaking from sleep tonight. Son checked his CBG which was in the 62s and they were concerned this was due to his hypoglycemia. He took some sips of juice en route with EMS. He was hypoxemic into the mid 80s for EMS but this has improved on O2 and he was weaned to 2L. Labs concerning for acute CHF excacerbation and XR shows edema. Chronic a fib may be causing diastolic failure with hypervolemia and now respiratory compromise. Provided ASA for ACS coverage with elevated troponin but no EKG changes or symptoms that are suggestive of this. Hospitalist was consulted for admission and will see the patient in the emergency department. Procedures  ED EKG interpretation:  9:07 PM  Rhythm: atrial fib; and irregular.  Rate (approx.): 89 bpm; ST/T wave: Non-specific ST abnormalities in the inferolateral leads; tracing unchanged from prior on 10/12/18  Note written by Lazara Valles, as dictated by Cuco Mills MD 9:31 PM.

## 2018-12-26 ENCOUNTER — PATIENT OUTREACH (OUTPATIENT)
Dept: INTERNAL MEDICINE CLINIC | Age: 78
End: 2018-12-26

## 2018-12-26 LAB
ANION GAP SERPL CALC-SCNC: 8 MMOL/L (ref 5–15)
BASOPHILS # BLD: 0 K/UL (ref 0–0.1)
BASOPHILS NFR BLD: 0 % (ref 0–1)
BUN SERPL-MCNC: 10 MG/DL (ref 6–20)
BUN/CREAT SERPL: 6 (ref 12–20)
CALCIUM SERPL-MCNC: 8 MG/DL (ref 8.5–10.1)
CHLORIDE SERPL-SCNC: 89 MMOL/L (ref 97–108)
CK MB CFR SERPL CALC: 0.9 % (ref 0–2.5)
CK MB SERPL-MCNC: 6.1 NG/ML (ref 5–25)
CK SERPL-CCNC: 716 U/L (ref 39–308)
CO2 SERPL-SCNC: 39 MMOL/L (ref 21–32)
CREAT SERPL-MCNC: 1.78 MG/DL (ref 0.7–1.3)
DIFFERENTIAL METHOD BLD: ABNORMAL
EOSINOPHIL # BLD: 0.1 K/UL (ref 0–0.4)
EOSINOPHIL NFR BLD: 1 % (ref 0–7)
ERYTHROCYTE [DISTWIDTH] IN BLOOD BY AUTOMATED COUNT: 15.2 % (ref 11.5–14.5)
GLUCOSE BLD STRIP.AUTO-MCNC: 149 MG/DL (ref 65–100)
GLUCOSE BLD STRIP.AUTO-MCNC: 191 MG/DL (ref 65–100)
GLUCOSE BLD STRIP.AUTO-MCNC: 200 MG/DL (ref 65–100)
GLUCOSE BLD STRIP.AUTO-MCNC: 217 MG/DL (ref 65–100)
GLUCOSE SERPL-MCNC: 152 MG/DL (ref 65–100)
HCT VFR BLD AUTO: 36.4 % (ref 36.6–50.3)
HGB BLD-MCNC: 11.5 G/DL (ref 12.1–17)
IMM GRANULOCYTES # BLD: 0 K/UL (ref 0–0.04)
IMM GRANULOCYTES NFR BLD AUTO: 0 % (ref 0–0.5)
LYMPHOCYTES # BLD: 1.1 K/UL (ref 0.8–3.5)
LYMPHOCYTES NFR BLD: 13 % (ref 12–49)
MAGNESIUM SERPL-MCNC: 1.8 MG/DL (ref 1.6–2.4)
MCH RBC QN AUTO: 30.7 PG (ref 26–34)
MCHC RBC AUTO-ENTMCNC: 31.6 G/DL (ref 30–36.5)
MCV RBC AUTO: 97.1 FL (ref 80–99)
MONOCYTES # BLD: 0.9 K/UL (ref 0–1)
MONOCYTES NFR BLD: 11 % (ref 5–13)
NEUTS SEG # BLD: 6.1 K/UL (ref 1.8–8)
NEUTS SEG NFR BLD: 74 % (ref 32–75)
NRBC # BLD: 0 K/UL (ref 0–0.01)
NRBC BLD-RTO: 0 PER 100 WBC
PLATELET # BLD AUTO: 250 K/UL (ref 150–400)
PMV BLD AUTO: 12 FL (ref 8.9–12.9)
POTASSIUM SERPL-SCNC: 2.5 MMOL/L (ref 3.5–5.1)
POTASSIUM SERPL-SCNC: 2.8 MMOL/L (ref 3.5–5.1)
RBC # BLD AUTO: 3.75 M/UL (ref 4.1–5.7)
SERVICE CMNT-IMP: ABNORMAL
SODIUM SERPL-SCNC: 136 MMOL/L (ref 136–145)
T4 FREE SERPL-MCNC: 1.3 NG/DL (ref 0.8–1.5)
TROPONIN I SERPL-MCNC: 1.58 NG/ML
TSH SERPL DL<=0.05 MIU/L-ACNC: 2.89 UIU/ML (ref 0.36–3.74)
WBC # BLD AUTO: 8.2 K/UL (ref 4.1–11.1)

## 2018-12-26 PROCEDURE — 74011636637 HC RX REV CODE- 636/637: Performed by: INTERNAL MEDICINE

## 2018-12-26 PROCEDURE — 83735 ASSAY OF MAGNESIUM: CPT

## 2018-12-26 PROCEDURE — 36415 COLL VENOUS BLD VENIPUNCTURE: CPT

## 2018-12-26 PROCEDURE — 80048 BASIC METABOLIC PNL TOTAL CA: CPT

## 2018-12-26 PROCEDURE — 74011250636 HC RX REV CODE- 250/636: Performed by: INTERNAL MEDICINE

## 2018-12-26 PROCEDURE — 74011250637 HC RX REV CODE- 250/637: Performed by: INTERNAL MEDICINE

## 2018-12-26 PROCEDURE — 82550 ASSAY OF CK (CPK): CPT

## 2018-12-26 PROCEDURE — 97535 SELF CARE MNGMENT TRAINING: CPT

## 2018-12-26 PROCEDURE — 84443 ASSAY THYROID STIM HORMONE: CPT

## 2018-12-26 PROCEDURE — G8987 SELF CARE CURRENT STATUS: HCPCS

## 2018-12-26 PROCEDURE — 97165 OT EVAL LOW COMPLEX 30 MIN: CPT

## 2018-12-26 PROCEDURE — G8988 SELF CARE GOAL STATUS: HCPCS

## 2018-12-26 PROCEDURE — 85025 COMPLETE CBC W/AUTO DIFF WBC: CPT

## 2018-12-26 PROCEDURE — 65660000000 HC RM CCU STEPDOWN

## 2018-12-26 PROCEDURE — 74011250637 HC RX REV CODE- 250/637: Performed by: HOSPITALIST

## 2018-12-26 PROCEDURE — 84484 ASSAY OF TROPONIN QUANT: CPT

## 2018-12-26 PROCEDURE — 93306 TTE W/DOPPLER COMPLETE: CPT

## 2018-12-26 PROCEDURE — 84439 ASSAY OF FREE THYROXINE: CPT

## 2018-12-26 PROCEDURE — 84132 ASSAY OF SERUM POTASSIUM: CPT

## 2018-12-26 PROCEDURE — 82962 GLUCOSE BLOOD TEST: CPT

## 2018-12-26 RX ORDER — POTASSIUM CHLORIDE 14.9 MG/ML
10 INJECTION INTRAVENOUS
Status: DISCONTINUED | OUTPATIENT
Start: 2018-12-26 | End: 2018-12-26

## 2018-12-26 RX ORDER — DICLOFENAC SODIUM 10 MG/G
4 GEL TOPICAL
COMMUNITY
End: 2019-02-04

## 2018-12-26 RX ORDER — INSULIN LISPRO 100 [IU]/ML
3 INJECTION, SOLUTION INTRAVENOUS; SUBCUTANEOUS
Status: ON HOLD | COMMUNITY
End: 2019-02-06 | Stop reason: SDUPTHER

## 2018-12-26 RX ORDER — HEPARIN SODIUM 5000 [USP'U]/ML
5000 INJECTION, SOLUTION INTRAVENOUS; SUBCUTANEOUS EVERY 8 HOURS
Status: DISCONTINUED | OUTPATIENT
Start: 2018-12-26 | End: 2018-12-29 | Stop reason: HOSPADM

## 2018-12-26 RX ORDER — POTASSIUM CHLORIDE 14.9 MG/ML
10 INJECTION INTRAVENOUS
Status: COMPLETED | OUTPATIENT
Start: 2018-12-26 | End: 2018-12-27

## 2018-12-26 RX ORDER — MEMANTINE HYDROCHLORIDE 10 MG/1
10 TABLET ORAL EVERY 12 HOURS
Status: DISCONTINUED | OUTPATIENT
Start: 2018-12-26 | End: 2018-12-29 | Stop reason: HOSPADM

## 2018-12-26 RX ORDER — GUAIFENESIN 100 MG/5ML
81 LIQUID (ML) ORAL DAILY
COMMUNITY
End: 2019-02-04

## 2018-12-26 RX ORDER — ERGOCALCIFEROL 1.25 MG/1
50000 CAPSULE ORAL
COMMUNITY
End: 2019-01-15 | Stop reason: SDUPTHER

## 2018-12-26 RX ORDER — LIDOCAINE 50 MG/G
1 PATCH TOPICAL
COMMUNITY
End: 2018-12-29

## 2018-12-26 RX ORDER — POTASSIUM CHLORIDE 20 MEQ/1
20 TABLET, EXTENDED RELEASE ORAL 2 TIMES DAILY
COMMUNITY
End: 2018-12-29

## 2018-12-26 RX ORDER — LORAZEPAM 2 MG/ML
0.5 INJECTION INTRAMUSCULAR ONCE
Status: COMPLETED | OUTPATIENT
Start: 2018-12-26 | End: 2018-12-26

## 2018-12-26 RX ORDER — INSULIN GLARGINE 100 [IU]/ML
18 INJECTION, SOLUTION SUBCUTANEOUS DAILY
COMMUNITY
End: 2018-12-29

## 2018-12-26 RX ORDER — MEMANTINE HYDROCHLORIDE 28 MG/1
28 CAPSULE, EXTENDED RELEASE ORAL DAILY
COMMUNITY
End: 2019-03-02 | Stop reason: SDUPTHER

## 2018-12-26 RX ORDER — HYDROCODONE BITARTRATE AND ACETAMINOPHEN 5; 325 MG/1; MG/1
1 TABLET ORAL
COMMUNITY
End: 2018-12-29

## 2018-12-26 RX ADMIN — POTASSIUM CHLORIDE 10 MEQ: 200 INJECTION, SOLUTION INTRAVENOUS at 05:14

## 2018-12-26 RX ADMIN — POTASSIUM CHLORIDE 10 MEQ: 200 INJECTION, SOLUTION INTRAVENOUS at 07:28

## 2018-12-26 RX ADMIN — DULOXETINE 60 MG: 60 CAPSULE, DELAYED RELEASE ORAL at 09:00

## 2018-12-26 RX ADMIN — Medication 10 ML: at 06:00

## 2018-12-26 RX ADMIN — FUROSEMIDE 40 MG: 10 INJECTION, SOLUTION INTRAMUSCULAR; INTRAVENOUS at 21:23

## 2018-12-26 RX ADMIN — HEPARIN SODIUM 5000 UNITS: 5000 INJECTION INTRAVENOUS; SUBCUTANEOUS at 13:29

## 2018-12-26 RX ADMIN — FUROSEMIDE 40 MG: 10 INJECTION, SOLUTION INTRAMUSCULAR; INTRAVENOUS at 13:20

## 2018-12-26 RX ADMIN — POTASSIUM CHLORIDE 10 MEQ: 200 INJECTION, SOLUTION INTRAVENOUS at 21:23

## 2018-12-26 RX ADMIN — INSULIN LISPRO 2 UNITS: 100 INJECTION, SOLUTION INTRAVENOUS; SUBCUTANEOUS at 12:24

## 2018-12-26 RX ADMIN — MEMANTINE 10 MG: 10 TABLET ORAL at 21:23

## 2018-12-26 RX ADMIN — HEPARIN SODIUM 5000 UNITS: 5000 INJECTION INTRAVENOUS; SUBCUTANEOUS at 21:23

## 2018-12-26 RX ADMIN — POTASSIUM CHLORIDE 10 MEQ: 200 INJECTION, SOLUTION INTRAVENOUS at 22:33

## 2018-12-26 RX ADMIN — POTASSIUM CHLORIDE 10 MEQ: 200 INJECTION, SOLUTION INTRAVENOUS at 06:31

## 2018-12-26 RX ADMIN — PANTOPRAZOLE SODIUM 40 MG: 40 TABLET, DELAYED RELEASE ORAL at 09:00

## 2018-12-26 RX ADMIN — MIDODRINE HYDROCHLORIDE 5 MG: 5 TABLET ORAL at 17:42

## 2018-12-26 RX ADMIN — INSULIN LISPRO 2 UNITS: 100 INJECTION, SOLUTION INTRAVENOUS; SUBCUTANEOUS at 17:42

## 2018-12-26 RX ADMIN — INSULIN LISPRO 2 UNITS: 100 INJECTION, SOLUTION INTRAVENOUS; SUBCUTANEOUS at 06:53

## 2018-12-26 RX ADMIN — Medication 10 ML: at 21:24

## 2018-12-26 RX ADMIN — POTASSIUM CHLORIDE 10 MEQ: 200 INJECTION, SOLUTION INTRAVENOUS at 04:18

## 2018-12-26 RX ADMIN — LEVOTHYROXINE SODIUM 50 MCG: 50 TABLET ORAL at 06:57

## 2018-12-26 RX ADMIN — ROSUVASTATIN CALCIUM 5 MG: 10 TABLET, FILM COATED ORAL at 09:00

## 2018-12-26 RX ADMIN — MIDODRINE HYDROCHLORIDE 5 MG: 5 TABLET ORAL at 12:00

## 2018-12-26 RX ADMIN — TAMSULOSIN HYDROCHLORIDE 0.4 MG: 0.4 CAPSULE ORAL at 09:00

## 2018-12-26 RX ADMIN — PREGABALIN 50 MG: 25 CAPSULE ORAL at 17:42

## 2018-12-26 RX ADMIN — Medication 10 ML: at 13:20

## 2018-12-26 RX ADMIN — POTASSIUM CHLORIDE 20 MEQ: 750 TABLET, EXTENDED RELEASE ORAL at 17:42

## 2018-12-26 RX ADMIN — Medication 81 MG: at 09:00

## 2018-12-26 RX ADMIN — LORAZEPAM 0.5 MG: 2 INJECTION INTRAMUSCULAR; INTRAVENOUS at 08:17

## 2018-12-26 RX ADMIN — INSULIN LISPRO 3 UNITS: 100 INJECTION, SOLUTION INTRAVENOUS; SUBCUTANEOUS at 21:55

## 2018-12-26 RX ADMIN — POTASSIUM CHLORIDE 20 MEQ: 750 TABLET, EXTENDED RELEASE ORAL at 09:00

## 2018-12-26 NOTE — PROGRESS NOTES
Problem: Heart Failure: Day 1  Goal: Diagnostic Test/Procedures  Outcome: Progressing Towards Goal  Echo completed today. Will relay information to family.

## 2018-12-26 NOTE — PROGRESS NOTES
Transitional Care Team: Initial HUG Note    Date of Assessment: 12/26/18  Time of Assessment:  1:00 PM  Hospital Nurse Navigator: Bennie NOLAN,RN,[facility]    Minerva Bradshaw is a 69 y.o. male inpatient at [facility] with CHF (congestive heart failure) ([facility] on  12/24/2018. This Nurse Navigator accessed chart to offer support and placement in the [address] [place] [address] Team bridges the gaps in care and education surrounding discharge from the acute care facility. The objective is to empower the patient and family in taking a proactive role in the task of preventing readmission within the first thirty days after discharge from the acute care setting. The team is also involved in the efforts to reduce readmission to the acute care setting after stabilization and discharge from the acute care environment either to the skilled nursing facilities or community. NN met with patient and his son however patient was sleeping and been medicated for agitation. Patient has dementia. NN spoke with patient's daughter Maciej Tanner who is interested in a Palliative Consultation with the family to discuss patient goals of care. The following opportunities were noted following assessment:   1. NN requested via Andrea Shell Dr. Mishel Vincent place a Palliative Consultation for patient and family . 2. Patient lives with his adult son Davin Mccarthy. 3. Will follow to determine safest discharge plan. Please note 's assessment and documentation. 4. Please note PharmD documentation 12/26/2018 10:55 hours.     Past Medical History:   Diagnosis Date    CAD (coronary artery disease)     COPD (chronic obstructive pulmonary disease) ([facility]     Diabetes ([facility]     1970a    Ill-defined condition     SOB    Pneumonia     Urinary incontinence        Advance Care Planning 12/25/2018   Patient's Healthcare Decision Maker is: Legal Next of Kin   Confirm Advance Directive None   Patient Would Like to Complete Advance Directive No

## 2018-12-26 NOTE — PROGRESS NOTES
Problem: Falls - Risk of  Goal: *Absence of Falls  Document Jose Fall Risk and appropriate interventions in the flowsheet. Outcome: Progressing Towards Goal  Fall Risk Interventions:  Mobility Interventions: Bed/chair exit alarm, Patient to call before getting OOB    Mentation Interventions: Door open when patient unattended    Medication Interventions: Teach patient to arise slowly, Patient to call before getting OOB    Elimination Interventions: Call light in reach, Toileting schedule/hourly rounds, Toilet paper/wipes in reach             Problem: Pressure Injury - Risk of  Goal: *Prevention of pressure injury  Document Flynn Scale and appropriate interventions in the flowsheet.   Outcome: Progressing Towards Goal  Pressure Injury Interventions:  Sensory Interventions: Assess changes in LOC, Float heels    Moisture Interventions: Absorbent underpads, Limit adult briefs    Activity Interventions: PT/OT evaluation, Increase time out of bed    Mobility Interventions: Float heels, PT/OT evaluation    Nutrition Interventions: Document food/fluid/supplement intake, Offer support with meals,snacks and hydration    Friction and Shear Interventions: Apply protective barrier, creams and emollients, Lift sheet

## 2018-12-26 NOTE — PROGRESS NOTES
Physical Therapy    Orders acknowledged and chart reviewed. Attempted PT eval, however pt sleeping and unable to awaken for participation. Per son and RN report, pt received Ativan this morning. Will return to complete pt evaluation as able.      Claudeen Rising, PT, MPT

## 2018-12-26 NOTE — DIABETES MGMT
DTC Progress Note    Recommendations/ Comments: Chart reviewed due to hyperglycemia. Diet being changed to include consistent carbohydrate. If appropriate, please consider:   - Adding lantus 10 units tid ac (home dose is 18 untis)    Patient is a 69 y.o. male with history Type 2 Diabetes on Lantus 18 units, Lispro scale with meals, and Trulicity at home. A1c:   Lab Results   Component Value Date/Time    Hemoglobin A1c 6.6 (H) 12/25/2018 02:25 AM    Hemoglobin A1c 13.5 (H) 07/18/2017 02:14 AM    Hemoglobin A1c, External 11.2 01/27/2016       Recent Glucose Results:   Lab Results   Component Value Date/Time     (H) 12/26/2018 02:49 AM    GLUCPOC 149 (H) 12/26/2018 11:41 AM    GLUCPOC 191 (H) 12/26/2018 06:47 AM    GLUCPOC 218 (H) 12/25/2018 10:38 PM        Lab Results   Component Value Date/Time    Creatinine 1.78 (H) 12/26/2018 02:49 AM       Active Orders   Diet    DIET CARDIAC Regular        PO intake:   No data found. Will continue to follow as needed.     Thank you  Aliyah Decker, MS, RN, CDE    Time spent: 6 mintues

## 2018-12-26 NOTE — PROGRESS NOTES
Cardiology Progress Note  2018     Admit Date: 2018  Admit Diagnosis: CHF (congestive heart failure) ([facility])  CC: none currently    Assessment:   Principal Problem:    CHF (congestive heart failure) ([facility] (2018)    Active Problems:    NSTEMI (non-ST elevated myocardial infarction) ([facility] (2018)      Hypokalemia (2018)      Plan:   Stable on current regimen. Agree with plans and manage conservatively. DC Telemetry. F/u with PCP. SOR. Volume status:euvolemic  Renal function: stable    For other plans, see orders. Subjective: Meet Dai reports   Chest Pain:  [x]   none,  consistent with  []   non-cardiac   []   atypical   []   angina             [x]   none now    []      on-going  Dyspnea: [x]   none  []   at rest  []   with exertion     []   improved   []   unchanged   []   worsening  PND:       [x]   none  []   overnight    Orthopnea: [x]   none  []   improved  []   unchanged  []   worsening  Presyncope: [x]   none   []   improved    []   unchanged    []   worsening  Ambulated in hallway without symptoms  []   Yes  Ambulated in room without symptoms  []   Yes    Objective:    Physical Exam:  Overall VSSAF;    Visit Vitals  /58 (BP 1 Location: Left arm, BP Patient Position: At rest)   Pulse 88   Temp 98.4 °F (36.9 °C)   Resp 19   Ht 5' 11\" (1.803 m)   Wt 88.5 kg (195 lb 1.7 oz)   SpO2 99%   BMI 27.21 kg/m²     Temp (24hrs), Av.1 °F (36.7 °C), Min:97.3 °F (36.3 °C), Max:98.7 °F (37.1 °C)    Patient Vitals for the past 8 hrs:   Pulse   18 1058 88   18 0700 95    Patient Vitals for the past 8 hrs:   Resp   18 1058 19   18 0700 19    Patient Vitals for the past 8 hrs:   BP   18 1058 136/58   18 0700 150/64          Intake/Output Summary (Last 24 hours) at 2018 1412  Last data filed at 2018 1348  Gross per 24 hour   Intake    Output 1400 ml   Net -1400 ml       General Appearance: No acute distress.   Lethargic post lorazepam.   Ears/Nose/Mouth/Throat:   Normal MM; anicteric. JVP: WNL   Resp:   Lungs clear to auscultation bilaterally. Nl resp effort. Cardiovascular:  irreg, S1, S2 normal, no new murmur. No gallop or rub. Abdomen:   Soft, non-tender, bowel sounds are present. Extremities: No edema bilaterally. Skin:  Neuro: Warm and dry. A/O x3, grossly nonfocal    []      cath site intact w/o hematoma or bruit; distal pulse unchanged. Data Review:     Telemetry independently reviewed : []   Probable wandering atrial pacemaker  ECG independently reviewed:  []   NSR   []   no significant changes  []   no new ECG provided for review  Lab results reviewed as noted below. Current medications reviewed as noted below. No results for input(s): PH, PCO2, PO2 in the last 72 hours. Recent Labs     12/26/18 0249 12/25/18 0213 12/24/18 2113   *  --   --    CKMB 6.1*  --   --    TROIQ 1.58* 2.57* 2.57*     Recent Labs     12/26/18 0249 12/25/18  1251 12/25/18  0529 12/25/18 0213 12/24/18 2113    136 138  --  138   K 2.5* 2.9* 2.4*  --  2.3*   CL 89* 93* 92*  --  95*   CO2 39* 35* 40*  --  37*   BUN 10 10 11  --  11   CREA 1.78* 1.69* 1.64*  --  1.63*   * 235* 124*  --  56*   CA 8.0* 7.6* 7.6*  --  7.8*   ALB  --   --   --   --  2.5*   WBC 8.2  --   --  7.0 7.0   HGB 11.5*  --   --  11.9* 11.0*   HCT 36.4*  --   --  37.1 34.9*     --   --  254 237     Recent Labs     12/24/18 2113   SGOT 79*   ALT 53   AP 82   TBILI 0.4   TP 6.0*   ALB 2.5*   GLOB 3.5     Recent Labs     12/25/18  1251 12/25/18 0213   APTT 25.7 28.9      No results for input(s): FE, TIBC, PSAT, FERR in the last 72 hours.    Lab Results   Component Value Date/Time    Glucose (POC) 149 (H) 12/26/2018 11:41 AM    Glucose (POC) 191 (H) 12/26/2018 06:47 AM    Glucose (POC) 218 (H) 12/25/2018 10:38 PM    Glucose (POC) 249 (H) 12/25/2018 04:09 PM    Glucose (POC) 203 (H) 12/25/2018 12:01 PM       Current Facility-Administered Medications   Medication Dose Route Frequency    heparin (porcine) injection 5,000 Units  5,000 Units SubCUTAneous Q8H    memantine (NAMENDA) tablet 10 mg  10 mg Oral Q12H    aspirin delayed-release tablet 81 mg  81 mg Oral DAILY    DULoxetine (CYMBALTA) capsule 60 mg  60 mg Oral DAILY    levothyroxine (SYNTHROID) tablet 50 mcg  50 mcg Oral ACB    pantoprazole (PROTONIX) tablet 40 mg  40 mg Oral DAILY    rosuvastatin (CRESTOR) tablet 5 mg  5 mg Oral DAILY    tamsulosin (FLOMAX) capsule 0.4 mg  0.4 mg Oral DAILY    sodium chloride (NS) flush 5-10 mL  5-10 mL IntraVENous Q8H    sodium chloride (NS) flush 5-10 mL  5-10 mL IntraVENous PRN    acetaminophen (TYLENOL) tablet 650 mg  650 mg Oral Q4H PRN    ondansetron (ZOFRAN) injection 4 mg  4 mg IntraVENous Q4H PRN    pregabalin (LYRICA) capsule 50 mg  50 mg Oral BID    midodrine (PROAMITINE) tablet 5 mg  5 mg Oral TID WITH MEALS    furosemide (LASIX) injection 40 mg  40 mg IntraVENous Q12H    potassium chloride SR (KLOR-CON 10) tablet 20 mEq  20 mEq Oral BID    insulin lispro (HUMALOG) injection   SubCUTAneous AC&HS    glucose chewable tablet 16 g  4 Tab Oral PRN    dextrose (D50W) injection syrg 12.5-25 g  12.5-25 g IntraVENous PRN    glucagon (GLUCAGEN) injection 1 mg  1 mg IntraMUSCular PRN        Urvashi Stevenson MD

## 2018-12-26 NOTE — PROGRESS NOTES
Problem: Self Care Deficits Care Plan (Adult)  Goal: *Acute Goals and Plan of Care (Insert Text)  Occupational Therapy Goals  Initiated 12/26/2018  1. Patient will perform upper body dressing with modified independence within 7 day(s). 2.  Patient will perform lower body dressing with modified independence within 7 day(s). 3.  Patient will perform bathing with modified independence within 7 day(s). 4.  Patient will perform toilet transfers with modified independence within 7 day(s). 5.  Patient will perform all aspects of toileting with modified independence within 7 day(s). 6.  Patient will participate in upper extremity therapeutic exercise/activities with supervision/set-up for 5 minutes within 7 day(s). 7.  Patient will utilize energy conservation techniques during functional activities with verbal cues within 7 day(s). Occupational Therapy EVALUATION  Patient: Alma Diane (43 y.o. male)  Date: 12/26/2018  Primary Diagnosis: CHF (congestive heart failure) ([facility]       Precautions:   Fall    ASSESSMENT :  Based on the objective data described below, the patient presents with largely MAX A-MIN A for ADL transfers and tasks secondary to confusion (A&Ox1)/dementia, decreased endurance (patient on 4L NC O2 with O2 sats in mid 80s with activity and patient requiring vc for pursed lip breathing), decreased strength, decreased coordination, and decreased balance. Patient is a questionable historian at this time however per chart report, patient lives with his son. Patient reports they live in a 1 story house and patient likes to drive his motorcycle for fun (unknown if this is true). Per chart, patient has had decreased PO intake and has decline with self-care tasks. Recommend rehab vs. Home with 24/7 supervision and Elizabeth Frames pending patient medical progression. Patient will benefit from skilled intervention to address the above impairments.   Patients rehabilitation potential is considered to be Good  Factors which may influence rehabilitation potential include:   [x]             None noted  []             Mental ability/status  []             Medical condition  []             Home/family situation and support systems  []             Safety awareness  []             Pain tolerance/management  []             Other:      PLAN :  Recommendations and Planned Interventions:  [x]               Self Care Training                  [x]        Therapeutic Activities  [x]               Functional Mobility Training    [x]        Cognitive Retraining  [x]               Therapeutic Exercises           [x]        Endurance Activities  [x]               Balance Training                   [x]        Neuromuscular Re-Education  []               Visual/Perceptual Training     [x]   Home Safety Training  [x]               Patient Education                 [x]        Family Training/Education  []               Other (comment):    Frequency/Duration: Patient will be followed by occupational therapy 5 times a week to address goals. Discharge Recommendations: Rehab  Further Equipment Recommendations for Discharge: TBD     SUBJECTIVE:   Patient stated I know Josie Marcus is the President (did not know year).     OBJECTIVE DATA SUMMARY:   HISTORY:   Past Medical History:   Diagnosis Date    CAD (coronary artery disease)     [facility]) [facility]     Diabetes ([facility]     1970a    Ill-defined condition     SOB    Pneumonia     Urinary incontinence      Past Surgical History:   Procedure Laterality Date    CARDIAC SURG PROCEDURE UNLIST  2000    open heart    HX HEENT  1975    nasal surgery    HX MOHS PROCEDURES  2013    left       Prior Level of Function/Environment/Context: Patient is a questionable historian at this time secondary to confusion/dementia. Per chart, patient lives with son. Per patient, the below history was obtained.   Expanded or extensive additional review of patient history:     Home Situation  Home Environment: Private residence  # Steps to Enter: 4  One/Two Story Residence: One story  Living Alone: No  Support Systems: Child(del)  Patient Expects to be Discharged to[de-identified] Unknown  Current DME Used/Available at Home: Cane, straight  Tub or Shower Type: Tub/Shower combination    Hand dominance: Right    EXAMINATION OF PERFORMANCE DEFICITS:  Cognitive/Behavioral Status:  Neurologic State: Alert  Orientation Level: Oriented to person;Disoriented to time;Disoriented to situation;Disoriented to place  Cognition: Follows commands;Decreased attention/concentration;Poor safety awareness  Perception: Appears intact  Perseveration: No perseveration noted  Safety/Judgement: Decreased awareness of need for safety;Decreased awareness of need for assistance    Skin: exposed areas grossly intact    Edema: none noted    Hearing: Auditory  Auditory Impairment: None    Vision/Perceptual:                                Corrective Lenses: Glasses    Range of Motion:  BUE  AROM: Generally decreased, functional  PROM: Generally decreased, functional                      Strength:  BUE  Strength: Generally decreased, functional                Coordination:  Coordination: Generally decreased, functional  Fine Motor Skills-Upper: Left Impaired;Right Impaired    Gross Motor Skills-Upper: Left Intact; Right Intact    Tone & Sensation:  BUE  Tone: Normal  Sensation: Intact(will continue to assess; pt poor historian)                      Balance:  Sitting: Intact; Without support  Standing: Impaired; With support  Standing - Static: Fair  Standing - Dynamic : Fair    Functional Mobility and Transfers for ADLs:  Bed Mobility:  Supine to Sit: Supervision    Transfers:  Sit to Stand: Minimum assistance;Assist x1;Additional time  Stand to Sit: Minimum assistance;Assist x1;Additional time  Bed to Chair: Minimum assistance;Assist x1;Additional time    ADL Assessment:  Feeding: Supervision;Setup; Additional time    Oral Facial Hygiene/Grooming: Setup;Supervision(infer seated 2* decreased balance and FM coordination)    Bathing: Minimum assistance;Assist x1;Additional time    Upper Body Dressing: Minimum assistance;Assist x1;Additional time(hospital gown)    Lower Body Dressing: Stand-by assistance(seated in chair for socks)    Toileting: Assist x1;Additional time;Maximum assistance(EOB; decreased problem-solving)                ADL Intervention and task modifications:                 Lower Body Bathing  Perineal  : Maximum assistance    Upper Body [address]: Minimum  assistance    Lower Body Dressing Assistance  Socks: Supervision/set-up    Toileting  Bowel Hygiene: Total assistance (dependent)  Clothing Management: Maximum assistance    Cognitive Retraining  Safety/Judgement: Decreased awareness of need for safety;Decreased awareness of need for assistance    Functional Measure:  Barthel Index:    Bathin  Bladder: 5  Bowels: 5  Groomin  Dressin  Feeding: 10  Mobility: 10  Stairs: 0  Toilet Use: 5  Transfer (Bed to Chair and Back): 10  Total: 55       Barthel and G-code impairment scale:  Percentage of impairment CH  0% CI  1-19% CJ  20-39% CK  40-59% CL  60-79% CM  80-99% CN  100%   Barthel Score 0-100 100 99-80 79-60 59-40 20-39 1-19   0   Barthel Score 0-20 20 17-19 13-16 9-12 5-8 1-4 0      The Barthel ADL Index: Guidelines  1. The index should be used as a record of what a patient does, not as a record of what a patient could do. 2. The main aim is to establish degree of independence from any help, physical or verbal, however minor and for whatever reason. 3. The need for supervision renders the patient not independent. 4. A patient's performance should be established using the best available evidence. Asking the patient, friends/relatives and nurses are the usual sources, but direct observation and common sense are also important. However direct testing is not needed.   5. Usually the patient's performance over the preceding 24-48 hours is important, but occasionally longer periods will be relevant. 6. Middle categories imply that the patient supplies over 50 per cent of the effort. 7. Use of aids to be independent is allowed. Patito Munoz., Barthel, D.W. (1841). Functional evaluation: the Barthel Index. [address] [facility] (14)2. Wendy George joon BROOKLYN Jansen, Alicia Avila., [facility] [place], [address] [place] [address] Ave (1999). Measuring the change indisability after inpatient rehabilitation; comparison of the responsiveness of the Barthel Index and Functional [place] Measure. Journal of Neurology, Neurosurgery, and Psychiatry, 66(4), 022-880. Judge Frias, N.J.A, LINDA Anderson, & Jono Lawrence M.A. (2004.) Assessment of post-stroke quality of life in cost-effectiveness studies: The usefulness of the Barthel Index and the EuroQoL-5D. Quality of Life Research, 13, 749-95       G codes: In compliance with CMSs Claims Based Outcome Reporting, the following G-code set was chosen for this patient based on their primary functional limitation being treated: The outcome measure chosen to determine the severity of the functional limitation was the Barthel Index with a score of 55/100 which was correlated with the impairment scale. ? Self Care:     - CURRENT STATUS: CK - 40%-59% impaired, limited or restricted    - GOAL STATUS: CI - 1%-19% impaired, limited or restricted    - D/C STATUS:  ---------------To be determined---------------     Occupational Therapy Evaluation Charge Determination   History Examination Decision-Making   LOW Complexity : Brief history review  MEDIUM Complexity : 3-5 performance deficits relating to physical, cognitive , or psychosocial skils that result in activity limitations and / or participation restrictions MEDIUM Complexity : Patient may present with comorbidities that affect occupational performnce.  Miniml to moderate modification of tasks or assistance (eg, physical or verbal ) with assesment(s) is necessary to enable patient to complete evaluation       Based on the above components, the patient evaluation is determined to be of the following complexity level: LOW   Pain:  Pain Scale 1: Numeric (0 - 10)  Pain Intensity 1: 0              Activity Tolerance:   -120 bpm with activity; O2 (4L NC) reaching mid 80s with activity  Please refer to the flowsheet for vital signs taken during this treatment. After treatment:   [x] Patient left in no apparent distress sitting up in chair  [] Patient left in no apparent distress in bed  [x] Call bell left within reach  [x] Nursing notified  [] Caregiver present  [x] Chair alarm activated    COMMUNICATION/EDUCATION:   The patients plan of care was discussed with: Physical Therapist and Registered Nurse. [x] Home safety education was provided and the patient/caregiver indicated understanding. [x] Patient/family have participated as able in goal setting and plan of care. [x] Patient/family agree to work toward stated goals and plan of care. [] Patient understands intent and goals of therapy, but is neutral about his/her participation. [] Patient is unable to participate in goal setting and plan of care. This patients plan of care is appropriate for delegation to [facility].     Thank you for this referral.  Jerry Segundo  Time Calculation: 19 mins

## 2018-12-26 NOTE — PROGRESS NOTES
Hospitalist Progress Note  Ever Dee MD  Answering service: [phone] OR [phone] from in house phone         Date of Service:  2018  NAME:  Anna Mills  :  1940  MRN:  [id number]      Admission Summary:   CC: shortness of breath        HPI: 69 y.o man w/ CAD, CHF, COPD, DM, orthostatic hypotension, who presents with shortness of breath. He is a very poor historian due to dementia and doesn't know he's in a hospital. I spoke to his daughter/POA over the phone who tells me that last night he began complaining of shortness of breath. She also notes that over the last couple of weeks his oral intake has been very poor, and he's had low blood glucose readings in the 60's. The patient denies chest pain but does admit to shortness of breath when specifically asked. Family is not aware of any fever. Interval history / Subjective:       2018 :  No issues other than low k, pul status stable,   Pt was up w [facility] occ delusional but w self care to dressing and eating ;PTA, lives son, and plan is to return home. Discussed w son the likelihood that he may not be as functional on discharge as he was pta. Assessment & Plan:     Assessment & Plan:      Acute on chronic CHF, unspecified type: (POA) as evidenced by pulmonary edema, elevated NT pro-BNP, NYHA IV on admission.  Last TTE in 2017 with normal LVEF, mild-mod MR  -diurese with IV Lasix  -I/O's, daily weight  -TTE  -note he is on fludrocortisone     NSTEMI: (POA) trop 2.57, this may have precipitated CHF  -trend biomarkers  -start heparin drip (due to borderline renal function)  -continue rosuvastatin  -consult cardiology     Atrial fibrillation: (POA) this is a new diagnosis  -monitor on tele  -heparin drip as above     Hypokalemia: (POA)  -replete  -monitor closely with diuresis  Lab Results   Component Value Date/Time    Potassium 2.5 (LL) 2018 02:49 AM    replaced iv and po and f/u closely 12/26/2018     Type 2 DM w/ hypoglycemia, peripheral neuropathy and CKD: (POA)  -hold all insulin  -POC checks q4h  -continue duloxetine and pregabalin for neuropathy  -check a1c  Lab Results   Component Value Date/Time    Glucose 152 (H) 12/26/2018 02:49 AM    Glucose (POC) 191 (H) 12/26/2018 06:47 AM      Lab Results   Component Value Date/Time    Hemoglobin A1c 6.6 (H) 12/25/2018 02:25 AM    Hemoglobin A1c, External 11.2 01/27/2016          History of orthostatic hypotension:   -on midodrine and fludrocortisone  -BP on the higher side, will begin weaning midodrine. If it's able to be weaned off would start beta blocker  -continue fludrocortisone for now     CKD III:  -monitor with diuresis     Dementia: high risk for delirium while here. Hypoglycemia likely exacerbating confusion.        Code status: full - d/w his daughter and Kenia Solares  Disposition: TBD         DVT prophylaxis: heparin     Care Plan discussed with: Patient/Family and Nurse  Disposition: Home w/Family and TBD     Hospital Problems  Date Reviewed: 10/10/2018          Codes Class Noted POA    * (Principal) CHF (congestive heart failure) ([facility] ICD-10-CM: I50.9  ICD-9-CM: 428.0  12/25/2018 Yes        NSTEMI (non-ST elevated myocardial infarction) ([facility] ICD-10-CM: I21.4  ICD-9-CM: 410.70  12/25/2018 Yes        Hypokalemia ICD-10-CM: E87.6  ICD-9-CM: 276.8  12/25/2018 Yes                Review of Systems:   Review of systems not obtained due to patient factors. Vital Signs:    Last 24hrs VS reviewed since prior progress note.  Most recent are:  Visit Vitals  /64 (BP 1 Location: Left arm, BP Patient Position: At rest)   Pulse 95   Temp 97.6 °F (36.4 °C)   Resp 19   Ht 5' 11\" (1.803 m)   Wt 88.5 kg (195 lb 1.7 oz)   SpO2 92%   BMI 27.21 kg/m²         Intake/Output Summary (Last 24 hours) at 12/26/2018 1017  Last data filed at 12/26/2018 0443  Gross per 24 hour   Intake    Output 1150 ml   Net -1150 ml Physical Examination:             Constitutional:  No acute distress, sound asleep    ENT:  Oral mucous moist, oropharynx benign. Neck supple,    Resp:  CTA bilaterally. No wheezing/rhonchi/rales. No accessory muscle use   CV:  Regular rhythm, normal rate, no murmurs, gallops, rubs    GI:  Soft, non distended . normoactive bowel sounds, no hepatosplenomegaly     Musculoskeletal:  No edema, warm, 1+ pulses throughout    Neurologic: Sound asleep      Skin:  Good turgor, no rashes or ulcers       Data Review:    Review and/or order of clinical lab test      Labs:     Recent Labs     12/26/18 0249 12/25/18 0213   WBC 8.2 7.0   HGB 11.5* 11.9*   HCT 36.4* 37.1    254     Recent Labs     12/26/18 0249 12/25/18  1251 12/25/18 0529 12/24/18 2113    136 138 138   K 2.5* 2.9* 2.4* 2.3*   CL 89* 93* 92* 95*   CO2 39* 35* 40* 37*   BUN 10 10 11 11   CREA 1.78* 1.69* 1.64* 1.63*   * 235* 124* 56*   CA 8.0* 7.6* 7.6* 7.8*   MG 1.8 1.9  --  2.1     Recent Labs     12/24/18 2113   SGOT 79*   ALT 53   AP 82   TBILI 0.4   TP 6.0*   ALB 2.5*   GLOB 3.5     Recent Labs     12/25/18  1251 12/25/18 0213   APTT 25.7 28.9      No results for input(s): FE, TIBC, PSAT, FERR in the last 72 hours. Lab Results   Component Value Date/Time    Folate 12.1 07/17/2017 03:48 PM      No results for input(s): PH, PCO2, PO2 in the last 72 hours.   Recent Labs     12/26/18  0249 12/25/18 0213 12/24/18 2113   *  --   --    CKNDX 0.9  --   --    TROIQ 1.58* 2.57* 2.57*     Lab Results   Component Value Date/Time    Cholesterol, total 110 07/18/2017 02:14 AM    HDL Cholesterol 47 07/18/2017 02:14 AM    LDL, calculated 44 07/18/2017 02:14 AM    Triglyceride 95 07/18/2017 02:14 AM    CHOL/HDL Ratio 2.3 07/18/2017 02:14 AM     Lab Results   Component Value Date/Time    Glucose (POC) 191 (H) 12/26/2018 06:47 AM    Glucose (POC) 218 (H) 12/25/2018 10:38 PM    Glucose (POC) 249 (H) 12/25/2018 04:09 PM    Glucose (POC) 203 (H) 12/25/2018 12:01 PM    Glucose (POC) 133 (H) 12/25/2018 08:05 AM     Lab Results   Component Value Date/Time    Color YELLOW/STRAW 12/24/2018 09:37 PM    Appearance CLOUDY (A) 12/24/2018 09:37 PM    Specific gravity 1.019 12/24/2018 09:37 PM    Specific gravity 1.010 07/17/2017 04:12 PM    pH (UA) 6.0 12/24/2018 09:37 PM    Protein 100 (A) 12/24/2018 09:37 PM    Glucose 100 (A) 12/24/2018 09:37 PM    Ketone NEGATIVE  12/24/2018 09:37 PM    Bilirubin NEGATIVE  12/24/2018 09:37 PM    Urobilinogen 0.2 12/24/2018 09:37 PM    Nitrites NEGATIVE  12/24/2018 09:37 PM    Leukocyte Esterase NEGATIVE  12/24/2018 09:37 PM    Epithelial cells FEW 12/24/2018 09:37 PM    Bacteria NEGATIVE  12/24/2018 09:37 PM    WBC 0-4 12/24/2018 09:37 PM    RBC 0-5 12/24/2018 09:37 PM         Medications Reviewed:     Current Facility-Administered Medications   Medication Dose Route Frequency    aspirin delayed-release tablet 81 mg  81 mg Oral DAILY    DULoxetine (CYMBALTA) capsule 60 mg  60 mg Oral DAILY    levothyroxine (SYNTHROID) tablet 50 mcg  50 mcg Oral ACB    pantoprazole (PROTONIX) tablet 40 mg  40 mg Oral DAILY    rosuvastatin (CRESTOR) tablet 5 mg  5 mg Oral DAILY    tamsulosin (FLOMAX) capsule 0.4 mg  0.4 mg Oral DAILY    sodium chloride (NS) flush 5-10 mL  5-10 mL IntraVENous Q8H    sodium chloride (NS) flush 5-10 mL  5-10 mL IntraVENous PRN    acetaminophen (TYLENOL) tablet 650 mg  650 mg Oral Q4H PRN    ondansetron (ZOFRAN) injection 4 mg  4 mg IntraVENous Q4H PRN    pregabalin (LYRICA) capsule 50 mg  50 mg Oral BID    midodrine (PROAMITINE) tablet 5 mg  5 mg Oral TID WITH MEALS    furosemide (LASIX) injection 40 mg  40 mg IntraVENous Q12H    potassium chloride SR (KLOR-CON 10) tablet 20 mEq  20 mEq Oral BID    insulin lispro (HUMALOG) injection   SubCUTAneous AC&HS    glucose chewable tablet 16 g  4 Tab Oral PRN    dextrose (D50W) injection syrg 12.5-25 g  12.5-25 g IntraVENous PRN    glucagon (GLUCAGEN) injection 1 mg  1 mg IntraMUSCular PRN     ______________________________________________________________________  EXPECTED LENGTH OF STAY: 2d 9h  ACTUAL LENGTH OF STAY:          1                 Emiliana Bailey MD

## 2018-12-26 NOTE — PROGRESS NOTES
Admission Medication Reconciliation:    Information obtained from: This medication history was obtained from the patient's son who manages his father's medications. He appears to be a reliable historian. An Rx Query is available, and I reviewed his [place] . Of note, the son denies that hydrocodone-APAP 5-325 mg is a current medication. However, #300 tablets have been filled in 2018. The most recent prescription was on 12/22/18 for #60 tablets for a 30 day supply. The prescribing physician is Alethea Jim[facility]nez at [facility]. Summary:     Medications added: insulin lispro, potassium chloride, memantine ER, lidocaine patches, diclofenac gel  Medications deleted: none  Dose changes: midodrine changed to BID (from TID), hydrocodone changed to 5-325 mg (from 7.5-325 mg)    Inpatient orders were reviewed and Dr. Ruben Patel was contacted regarding changes to the PTA medication list.       Chief Complaint for this Admission:  CHF exacerbation    Significant PMH/Disease States:   Past Medical History:   Diagnosis Date    CAD (coronary artery disease)     COPD (chronic obstructive pulmonary disease) ([facility]     Diabetes ([facility]     1970a    Ill-defined condition     SOB    Pneumonia     Urinary incontinence        Allergies:  Patient has no known allergies. Prior to Admission Medications:   Prior to Admission Medications   Prescriptions Last Dose Informant Patient Reported? Taking? DULoxetine (CYMBALTA) 60 mg capsule   Yes Yes   Sig: Take 60 mg by mouth daily. HYDROcodone-acetaminophen (NORCO) 5-325 mg per tablet   Yes Yes   Sig: Take 1 Tab by mouth two (2) times daily as needed for Pain. aspirin 81 mg chewable tablet   Yes Yes   Sig: Take 81 mg by mouth daily. cyanocobalamin (VITAMIN B-12) 100 mcg tablet   Yes Yes   Sig: Take 100 mcg by mouth daily. diclofenac (VOLTAREN) 1 % gel   Yes Yes   Sig: Apply 4 g to affected area four (4) times daily as needed.    dulaglutide (TRULICITY) 1.5 XZ/6.9 mL sub-q pen   Yes Yes Si.5 mg by SubCUTAneous route Every Thursday. ergocalciferol (ERGOCALCIFEROL) 50,000 unit capsule   Yes Yes   Sig: Take 50,000 Units by mouth Every Friday. fludrocortisone (FLORINEF) 0.1 mg tablet   No Yes   Sig: Take 2 Tabs by mouth daily. To maintain blood pressure   insulin glargine (LANTUS SOLOSTAR U-100 INSULIN) 100 unit/mL (3 mL) inpn   Yes Yes   Si Units by SubCUTAneous route daily. insulin lispro (HUMALOG U-100 INSULIN) 100 unit/mL injection   Yes Yes   Sig: by SubCUTAneous route Before breakfast, lunch, and dinner. (sliding scale insulin)   levothyroxine (SYNTHROID) 50 mcg tablet   Yes Yes   Sig: Take 50 mcg by mouth Daily (before breakfast). lidocaine (LIDODERM) 5 %   Yes Yes   Si Patch by TransDERmal route daily as needed. Apply patch to the affected area for 12 hours a day and remove for 12 hours a day. memantine ER (NAMENDA XR) 28 mg capsule   Yes Yes   Sig: Take 28 mg by mouth daily. midodrine (PROAMITINE) 5 mg tablet   Yes Yes   Sig: Take 5 mg by mouth two (2) times a day. omeprazole (PRILOSEC) 20 mg capsule   Yes Yes   Sig: Take 20 mg by mouth Daily (before breakfast). potassium chloride (K-DUR, KLOR-CON) 20 mEq tablet   Yes Yes   Sig: Take 20 mEq by mouth two (2) times a day. pregabalin (LYRICA) 50 mg capsule   No Yes   Sig: Take 1 Cap by mouth two (2) times a day. Max Daily Amount: 100 mg.   rosuvastatin (CRESTOR) 5 mg tablet   Yes Yes   Sig: Take 5 mg by mouth daily. tamsulosin (FLOMAX) 0.4 mg capsule   Yes Yes   Sig: Take 0.4 mg by mouth daily. Facility-Administered Medications: None         Thank you for allowing me to participate in the care of this patient. Please contact the pharmacy () or the medication reconciliation pharmacist () with any questions. Francis Hwang, Pharm. D., BCPS, BCPPS

## 2018-12-26 NOTE — PROGRESS NOTES
Bedside and Verbal shift change report given to Porfirio (oncoming nurse) by Sasha Goodwin (offgoing nurse). Report included the following information SBAR, Kardex, Intake/Output, MAR and Cardiac Rhythm NSR/Sinus tach.

## 2018-12-26 NOTE — PROGRESS NOTES
Orders acknowledged, chart reviewed, and attempted to see patient for OT evaluation however per RN, patient REYNALDO for echo at this time. Will follow up for OT evaluation as able and appropriate. Thank you.

## 2018-12-26 NOTE — CARDIO/PULMONARY
Cardiac Rehab: Per EMR, patient is a current smoker.  Smoking Cessation Program information placed on the AVS.    Darya Caballero RN

## 2018-12-26 NOTE — PROGRESS NOTES
[address] [facility] met with patient and son Miesha Osborn today to provide an introduction to self, the [id number] [facility]. Bundled Payment Care Program:    Patient was provided a copy of the [facility] letter. Patient's son  states that they have a functioning scale at home. Patient's son Miesha Osborn reports he lives with patient and is caregiver   . Reinforced the importance of checking their weight at the same time daily and calling the cardiologist if their weight is up 3 lbs. in a day or 5 lbs. in a week (or per MD guidelines). Patient's son   has received a \"Living with Heart Failure\" patient education book. Hug At Home Program:    Discussed IPad program with patient's son Miesha Osborn, he was not interested Hug at 's. Faheem Patten to document patient weight daily on the calendar located in the Living with Heart Failure book and bring it to the physician appointments. Patient verbalized agreement. All questions answered at this time. Patient verbalized understanding of all information discussed.

## 2018-12-26 NOTE — NURSE NAVIGATOR
Chart reviewed by Heart Failure Nurse Navigator. Heart Failure database completed. EF:  Prior EF 40/45%; echo currently pending    ACEi/ARB/ARNi:     BB:     Aldosterone Antagonist:     CRT not currently indicated. NYHA Functional Class documentation requested. Heart Failure Teach Back in Patient Education. Heart Failure Avoiding Triggers on Discharge Instructions. Cardiologist: Dr. Seb Ramirez ([facility])      Post discharge follow up phone call to be made within 48-72 hours of discharge.       HF BUNDLE ACTIVE

## 2018-12-27 LAB
ANION GAP SERPL CALC-SCNC: 10 MMOL/L (ref 5–15)
BASOPHILS # BLD: 0 K/UL (ref 0–0.1)
BASOPHILS NFR BLD: 1 % (ref 0–1)
BUN SERPL-MCNC: 12 MG/DL (ref 6–20)
BUN/CREAT SERPL: 7 (ref 12–20)
CALCIUM SERPL-MCNC: 7.6 MG/DL (ref 8.5–10.1)
CHLORIDE SERPL-SCNC: 88 MMOL/L (ref 97–108)
CO2 SERPL-SCNC: 39 MMOL/L (ref 21–32)
CREAT SERPL-MCNC: 1.68 MG/DL (ref 0.7–1.3)
DIFFERENTIAL METHOD BLD: ABNORMAL
EOSINOPHIL # BLD: 0.1 K/UL (ref 0–0.4)
EOSINOPHIL NFR BLD: 1 % (ref 0–7)
ERYTHROCYTE [DISTWIDTH] IN BLOOD BY AUTOMATED COUNT: 15.6 % (ref 11.5–14.5)
GLUCOSE BLD STRIP.AUTO-MCNC: 106 MG/DL (ref 65–100)
GLUCOSE BLD STRIP.AUTO-MCNC: 159 MG/DL (ref 65–100)
GLUCOSE BLD STRIP.AUTO-MCNC: 194 MG/DL (ref 65–100)
GLUCOSE BLD STRIP.AUTO-MCNC: 240 MG/DL (ref 65–100)
GLUCOSE SERPL-MCNC: 165 MG/DL (ref 65–100)
HCT VFR BLD AUTO: 34.2 % (ref 36.6–50.3)
HGB BLD-MCNC: 11 G/DL (ref 12.1–17)
IMM GRANULOCYTES # BLD: 0 K/UL (ref 0–0.04)
IMM GRANULOCYTES NFR BLD AUTO: 0 % (ref 0–0.5)
LYMPHOCYTES # BLD: 1.2 K/UL (ref 0.8–3.5)
LYMPHOCYTES NFR BLD: 16 % (ref 12–49)
MCH RBC QN AUTO: 31.3 PG (ref 26–34)
MCHC RBC AUTO-ENTMCNC: 32.2 G/DL (ref 30–36.5)
MCV RBC AUTO: 97.2 FL (ref 80–99)
MONOCYTES # BLD: 0.9 K/UL (ref 0–1)
MONOCYTES NFR BLD: 11 % (ref 5–13)
NEUTS SEG # BLD: 5.5 K/UL (ref 1.8–8)
NEUTS SEG NFR BLD: 71 % (ref 32–75)
NRBC # BLD: 0 K/UL (ref 0–0.01)
NRBC BLD-RTO: 0 PER 100 WBC
PLATELET # BLD AUTO: 221 K/UL (ref 150–400)
PMV BLD AUTO: 12.5 FL (ref 8.9–12.9)
POTASSIUM SERPL-SCNC: 2.6 MMOL/L (ref 3.5–5.1)
POTASSIUM SERPL-SCNC: 3.6 MMOL/L (ref 3.5–5.1)
RBC # BLD AUTO: 3.52 M/UL (ref 4.1–5.7)
SERVICE CMNT-IMP: ABNORMAL
SODIUM SERPL-SCNC: 137 MMOL/L (ref 136–145)
WBC # BLD AUTO: 7.7 K/UL (ref 4.1–11.1)

## 2018-12-27 PROCEDURE — 77010033678 HC OXYGEN DAILY

## 2018-12-27 PROCEDURE — 85025 COMPLETE CBC W/AUTO DIFF WBC: CPT

## 2018-12-27 PROCEDURE — 74011250637 HC RX REV CODE- 250/637: Performed by: INTERNAL MEDICINE

## 2018-12-27 PROCEDURE — 97530 THERAPEUTIC ACTIVITIES: CPT

## 2018-12-27 PROCEDURE — 74011250636 HC RX REV CODE- 250/636: Performed by: INTERNAL MEDICINE

## 2018-12-27 PROCEDURE — 97161 PT EVAL LOW COMPLEX 20 MIN: CPT

## 2018-12-27 PROCEDURE — 36416 COLLJ CAPILLARY BLOOD SPEC: CPT

## 2018-12-27 PROCEDURE — 97535 SELF CARE MNGMENT TRAINING: CPT

## 2018-12-27 PROCEDURE — 74011250637 HC RX REV CODE- 250/637: Performed by: HOSPITALIST

## 2018-12-27 PROCEDURE — 80048 BASIC METABOLIC PNL TOTAL CA: CPT

## 2018-12-27 PROCEDURE — 65660000000 HC RM CCU STEPDOWN

## 2018-12-27 PROCEDURE — 84132 ASSAY OF SERUM POTASSIUM: CPT

## 2018-12-27 PROCEDURE — 74011636637 HC RX REV CODE- 636/637: Performed by: INTERNAL MEDICINE

## 2018-12-27 PROCEDURE — 82962 GLUCOSE BLOOD TEST: CPT

## 2018-12-27 PROCEDURE — 94762 N-INVAS EAR/PLS OXIMTRY CONT: CPT

## 2018-12-27 RX ORDER — FUROSEMIDE 10 MG/ML
60 INJECTION INTRAMUSCULAR; INTRAVENOUS DAILY
Status: DISCONTINUED | OUTPATIENT
Start: 2018-12-28 | End: 2018-12-28

## 2018-12-27 RX ORDER — POTASSIUM CHLORIDE 14.9 MG/ML
10 INJECTION INTRAVENOUS
Status: COMPLETED | OUTPATIENT
Start: 2018-12-27 | End: 2018-12-27

## 2018-12-27 RX ADMIN — POTASSIUM CHLORIDE 10 MEQ: 200 INJECTION, SOLUTION INTRAVENOUS at 06:35

## 2018-12-27 RX ADMIN — MEMANTINE 10 MG: 10 TABLET ORAL at 08:23

## 2018-12-27 RX ADMIN — POTASSIUM CHLORIDE 20 MEQ: 750 TABLET, EXTENDED RELEASE ORAL at 17:23

## 2018-12-27 RX ADMIN — PREGABALIN 50 MG: 25 CAPSULE ORAL at 08:24

## 2018-12-27 RX ADMIN — TAMSULOSIN HYDROCHLORIDE 0.4 MG: 0.4 CAPSULE ORAL at 08:23

## 2018-12-27 RX ADMIN — LEVOTHYROXINE SODIUM 50 MCG: 50 TABLET ORAL at 06:35

## 2018-12-27 RX ADMIN — HEPARIN SODIUM 5000 UNITS: 5000 INJECTION INTRAVENOUS; SUBCUTANEOUS at 14:11

## 2018-12-27 RX ADMIN — ROSUVASTATIN CALCIUM 5 MG: 10 TABLET, FILM COATED ORAL at 08:23

## 2018-12-27 RX ADMIN — POTASSIUM CHLORIDE 10 MEQ: 200 INJECTION, SOLUTION INTRAVENOUS at 02:14

## 2018-12-27 RX ADMIN — PREGABALIN 50 MG: 25 CAPSULE ORAL at 17:23

## 2018-12-27 RX ADMIN — Medication 10 ML: at 06:00

## 2018-12-27 RX ADMIN — POTASSIUM CHLORIDE 10 MEQ: 200 INJECTION, SOLUTION INTRAVENOUS at 12:01

## 2018-12-27 RX ADMIN — HEPARIN SODIUM 5000 UNITS: 5000 INJECTION INTRAVENOUS; SUBCUTANEOUS at 06:35

## 2018-12-27 RX ADMIN — POTASSIUM CHLORIDE 10 MEQ: 200 INJECTION, SOLUTION INTRAVENOUS at 00:26

## 2018-12-27 RX ADMIN — INSULIN LISPRO 2 UNITS: 100 INJECTION, SOLUTION INTRAVENOUS; SUBCUTANEOUS at 21:47

## 2018-12-27 RX ADMIN — INSULIN LISPRO 2 UNITS: 100 INJECTION, SOLUTION INTRAVENOUS; SUBCUTANEOUS at 12:37

## 2018-12-27 RX ADMIN — DULOXETINE 60 MG: 60 CAPSULE, DELAYED RELEASE ORAL at 08:23

## 2018-12-27 RX ADMIN — POTASSIUM CHLORIDE 20 MEQ: 750 TABLET, EXTENDED RELEASE ORAL at 08:23

## 2018-12-27 RX ADMIN — Medication 10 ML: at 13:57

## 2018-12-27 RX ADMIN — POTASSIUM CHLORIDE 10 MEQ: 200 INJECTION, SOLUTION INTRAVENOUS at 09:38

## 2018-12-27 RX ADMIN — MEMANTINE 10 MG: 10 TABLET ORAL at 21:47

## 2018-12-27 RX ADMIN — HEPARIN SODIUM 5000 UNITS: 5000 INJECTION INTRAVENOUS; SUBCUTANEOUS at 21:47

## 2018-12-27 RX ADMIN — Medication 81 MG: at 08:23

## 2018-12-27 RX ADMIN — Medication 10 ML: at 17:24

## 2018-12-27 RX ADMIN — MIDODRINE HYDROCHLORIDE 5 MG: 5 TABLET ORAL at 17:24

## 2018-12-27 RX ADMIN — PANTOPRAZOLE SODIUM 40 MG: 40 TABLET, DELAYED RELEASE ORAL at 08:23

## 2018-12-27 RX ADMIN — POTASSIUM CHLORIDE 10 MEQ: 200 INJECTION, SOLUTION INTRAVENOUS at 14:13

## 2018-12-27 RX ADMIN — INSULIN LISPRO 2 UNITS: 100 INJECTION, SOLUTION INTRAVENOUS; SUBCUTANEOUS at 08:18

## 2018-12-27 NOTE — ROUTINE PROCESS
Bedside shift change report given to Eleazar Perez (oncoming nurse) by Randene Leventhal (offgoing nurse). Report included the following information SBAR, ED Summary, MAR, Recent Results and Cardiac Rhythm AFIB.

## 2018-12-27 NOTE — PROGRESS NOTES
Problem: Mobility Impaired (Adult and Pediatric)  Goal: *Acute Goals and Plan of Care (Insert Text)  Physical Therapy Goals  Initiated 12/27/2018  1. Patient will move from supine to sit and sit to supine  in bed with modified independence within 7 day(s). 2.  Patient will transfer from bed to chair and chair to bed with supervision/set-up using the least restrictive device within 7 day(s). 3.  Patient will perform sit to stand with supervision/set-up within 7 day(s). 4.  Patient will ambulate with minimal assistance/contact guard assist for 50 feet with the least restrictive device within 7 day(s). 5.  Patient will ascend/descend 4 stairs with single HR handrail(s) with minimal assistance/contact guard assist within 7 day(s). physical Therapy EVALUATION  Patient: Rodo Hernandez (7 y.o. male)  Date: 12/27/2018  Primary Diagnosis: CHF (congestive heart failure) ([facility]       Precautions:   Fall    ASSESSMENT :  Based on the objective data described below, the patient presents with general weakness, decreased tolerance to activity, impaired balance, decreased indep with functional mobility due to CHF. Pt's son notes gradual decline in function over last 6 months, but pt able to ambulate household distances indep with SPC at baseline. Required assist with shower and stair negotiation to porch. Pt with 24 hr assist available between family and aide. Pt oriented to self, place, month this session. Required min A for transfers and side stepping along bed; unable to tolerate further mobility assessment due to drowsiness, HR to 140s with activity, fatigue. Required cues to keep eyes open, BP stable, SpO2 88-92% with 4 L supplemental O2 via NC in place. Pt demo decreased function with mobility as compared to baseline, and increased risk for fall as supported by [facility] score of 9/56. Pt may benefit from follow up SNF level PT at d/c v home with 24 hr assist and [facility].   Will continue to follow and assess rehab potential.    Patient will benefit from skilled intervention to address the above impairments. Patients rehabilitation potential is considered to be Good  Factors which may influence rehabilitation potential include:   []         None noted  []         Mental ability/status  [x]         Medical condition  []         Home/family situation and support systems  []         Safety awareness  []         Pain tolerance/management  []         Other:      PLAN :  Recommendations and Planned Interventions:  [x]           Bed Mobility Training             []    Neuromuscular Re-Education  [x]           Transfer Training                   []    Orthotic/Prosthetic Training  [x]           Gait Training                         []    Modalities  [x]           Therapeutic Exercises           []    Edema Management/Control  [x]           Therapeutic Activities            [x]    Patient and Family Training/Education  []           Other (comment):    Frequency/Duration: Patient will be followed by physical therapy  5 times a week to address goals. Discharge Recommendations: SNF v 24 hr assist and [facility] PT  Further Equipment Recommendations for Discharge: to be determined     SUBJECTIVE:   Patient stated  Min verbal interaction with therapist    OBJECTIVE DATA SUMMARY:   HISTORY:    Past Medical History:   Diagnosis Date    CAD (coronary artery disease)     [facility]     Diabetes ([facility]     1970a    Ill-defined condition     SOB    Pneumonia     Urinary incontinence      Past Surgical History:   Procedure Laterality Date    CARDIAC SURG PROCEDURE UNLIST  2000    open heart    HX HEENT  1975    nasal surgery    HX MOHS PROCEDURES  2013    left     Prior Level of Function/Home Situation: pt lives with son, has aide to assist when family not available. One level home, 4 LAKISHA with HR in place. Pt amb indep with SPC, assist for shower and stairs PTA.   Personal factors and/or comorbidities impacting plan of care: medical status    Home Situation  Home Environment: Private residence  # Steps to Enter: 4  One/Two Story Residence: One story  Living Alone: No  Support Systems: Child(del)  Patient Expects to be Discharged to[de-identified] Unknown  Current DME Used/Available at Home: Cane, straight  Tub or Shower Type: Tub/Shower combination    EXAMINATION/PRESENTATION/DECISION MAKING:   Critical Behavior:  Neurologic State: Alert  Orientation Level: Oriented to person, Oriented to time, Disoriented to situation, Disoriented to place  Cognition: Follows commands  Safety/Judgement: Decreased awareness of need for safety, Decreased awareness of need for assistance  Hearing: Auditory  Auditory Impairment: None  Skin:    Edema:   Range Of Motion:  AROM: Generally decreased, functional                       Strength:    Strength: Generally decreased, functional                    Tone & Sensation:   Tone: Normal              Sensation: Intact               Coordination:  Coordination: Generally decreased, functional  Vision:      Functional Mobility:  Bed Mobility:     Supine to Sit: Supervision  Sit to Supine: Supervision     Transfers:  Sit to Stand: Minimum assistance(assist for lift/ balance)  Stand to Sit: Contact guard assistance                       Balance:   Sitting: Intact; Without support  Standing: Impaired; With support  Standing - Static: Fair  Standing - Dynamic : Fair  Ambulation/Gait Training:                                                         Stairs:               Therapeutic Exercises:       Functional Measure:  Cano Balance Test:    Sitting to Standin  Standing Unsupported: 0  Sitting with Back Unsupported: 4  Standing to Sittin  Transfers: 1  Standing Unsupported with Eyes Closed: 1  Standing Unsupported with Feet Together: 0  Reach Forward with Outstretched Arm: 0   Object: 0  Turn to Look Over Shoulders: 0  Turn 360 Degrees: 0  Alternate Foot on Step/Stool: 0  Standing Unsupported One Foot in Front: 0  Stand on One Le  Total: 9         56=Maximum possible score;   0-20=High fall risk  21-40=Moderate fall risk   41-56=Low fall risk     Cano Balance Test and G-code impairment scale:  Percentage of Impairment CH    0%   CI    1-19% CJ    20-39% CK    40-59% CL    60-79% CM    80-99% CN     100%   Cano   Score 0-56 56 45-55 34-44 23-33 12-22 1-11 0         G codes: In compliance with CMSs Claims Based Outcome Reporting, the following G-code set was chosen for this patient based on their primary functional limitation being treated: The outcome measure chosen to determine the severity of the functional limitation was the [facility] with a score of  which was correlated with the impairment scale. ? Mobility - Walking and Moving Around:     - CURRENT STATUS: CM - 80%-99% impaired, limited or restricted    - GOAL STATUS: CJ - 20%-39% impaired, limited or restricted    - D/C STATUS:  ---------------To be determined---------------      Physical Therapy Evaluation Charge Determination   History Examination Presentation Decision-Making   HIGH Complexity :3+ comorbidities / personal factors will impact the outcome/ POC  LOW Complexity : 1-2 Standardized tests and measures addressing body structure, function, activity limitation and / or participation in recreation  LOW Complexity : Stable, uncomplicated  LOW Complexity : FOTO score of       Based on the above components, the patient evaluation is determined to be of the following complexity level: LOW     Pain:  Pain Scale 1: Numeric (0 - 10)  Pain Intensity 1: 0              Activity Tolerance:   Pt tolerated EOB and side stepping along bed with assist; noted HR up to 140s and Spo2 88-92% with NCO2 in place at 4L  Please refer to the flowsheet for vital signs taken during this treatment.   After treatment:   []         Patient left in no apparent distress sitting up in chair  [x]         Patient left in no apparent distress in bed  [x]         Call bell left within reach  [x]         Nursing notified  []         Caregiver present  [x]         Bed alarm activated    COMMUNICATION/EDUCATION:   The patients plan of care was discussed with: Registered Nurse. [x]         Fall prevention education was provided and the patient/caregiver indicated understanding. [x]         Patient/family have participated as able in goal setting and plan of care. [x]         Patient/family agree to work toward stated goals and plan of care. []         Patient understands intent and goals of therapy, but is neutral about his/her participation. []         Patient is unable to participate in goal setting and plan of care.     Thank you for this referral.  Chay Melchor, PT   Time Calculation: 15 mins

## 2018-12-27 NOTE — PROGRESS NOTES
Bedside shift change report given to [address] [place] [address] (oncoming nurse) by Delaney Price (offgoing nurse). Report included the following information SBAR, MAR, Recent Results and Cardiac Rhythm aFIB.

## 2018-12-27 NOTE — CDMP QUERY
Please clarify if this patient is (was) being treated/managed for:     => Hypertension (POA) in the setting of midodrine for hypotension requiring weaning midodrine and BP monitor.  => Other explanation of clinical findings  => Clinically Undetermined (no explanation for clinical findings)    The medical record reflects the following clinical findings, treatment, and risk factors. Risk Factors:  NSTEMI/CHF/CKD3  Clinical Indicators:    BP range 140s-160s/80s  Treatment: begin weaning midodrine. If it's able to be weaned off would start beta blocker - continue fludrocortisone for now; monitor BP    Please clarify and document your clinical opinion in the progress notes and discharge summary including the definitive and/or presumptive diagnosis, (suspected or probable), related to the above clinical findings. Please include clinical findings supporting your diagnosis.     Thank you,  Gunjan Frankel RN  [phone]

## 2018-12-27 NOTE — PROGRESS NOTES
Hospitalist Progress Note  Deward Burkitt, MD  Answering service: [phone] OR [phone] from in house phone         Date of Service:  2018  NAME:  Hubert Santana  :  1940  MRN:  [id number]      Admission Summary:   CC: shortness of breath        HPI: 69 y.o man w/ CAD, CHF, COPD, DM, orthostatic hypotension, who presents with shortness of breath. He is a very poor historian due to dementia and doesn't know he's in a hospital. I spoke to his daughter/POA over the phone who tells me that last night he began complaining of shortness of breath. She also notes that over the last couple of weeks his oral intake has been very poor, and he's had low blood glucose readings in the 60's. The patient denies chest pain but does admit to shortness of breath when specifically asked. Family is not aware of any fever. Interval history / Subjective:       2018 :   pt somnolent on rounds, no distree, non labored breathing, trace distal edema   Lasix changed to daily at 60 mg , k replaced iv again today lab for am to include mag. Note case discussed with navigator who notes family ( daughter Brynn Al) wishes conference discussion as to best goals of care, and as of 2018 have noted the discrepancy that pharmacy appreciated as to use of opiods. See pharm note,      Assessment & Plan:     Assessment & Plan:      Acute on chronic CHF, unspecified type: (POA) as evidenced by pulmonary edema, elevated NT pro-BNP, NYHA IV on admission.  Last TTE in 2017 with normal LVEF, mild-mod MR  -diurese with IV Lasix  -I/O's, daily weight  -TTE  -note he is on fludrocortisone     NSTEMI: (POA) trop 2.57, this may have precipitated CHF  -trend biomarkers  -start heparin drip (due to borderline renal function)  -continue rosuvastatin  -consult cardiology     Atrial fibrillation: (POA) this is a new diagnosis  -monitor on tele  -heparin drip as above     Hypokalemia: (POA)  -replete  -monitor closely with diuresis  Lab Results   Component Value Date/Time    Potassium 2.6 (LL) 12/27/2018 04:13 AM    replaced iv and po and f/u closely 12/27/2018     Type 2 DM w/ hypoglycemia, peripheral neuropathy and CKD: (POA)  -hold all insulin  -POC checks q4h  -continue duloxetine and pregabalin for neuropathy  -check a1c  Lab Results   Component Value Date/Time    Glucose 165 (H) 12/27/2018 04:13 AM    Glucose (POC) 159 (H) 12/27/2018 07:26 AM      Lab Results   Component Value Date/Time    Hemoglobin A1c 6.6 (H) 12/25/2018 02:25 AM    Hemoglobin A1c, External 11.2 01/27/2016          History of orthostatic hypotension:   -on midodrine and fludrocortisone  -BP on the higher side, will begin weaning midodrine. If it's able to be weaned off would start beta blocker  -continue fludrocortisone for now     CKD III:  -monitor with diuresis     Dementia: high risk for delirium while here. Hypoglycemia likely exacerbating confusion.        Code status: full - d/w his daughter and Greg Double  Disposition: TBD         DVT prophylaxis: heparin     Care Plan discussed with: Patient/Family and Nurse  Disposition: Home w/Family and TBD     Hospital Problems  Date Reviewed: 10/10/2018          Codes Class Noted POA    * (Principal) CHF (congestive heart failure) ([facility] [address] ICD-10-CM: I50.9  ICD-9-CM: 428.0  12/25/2018 Yes        NSTEMI (non-ST elevated [facility] infarction) [facility] ICD-10-CM: I21.4  ICD-9-CM: 410.70  12/25/2018 Yes        Hypokalemia ICD-10-CM: E87.6  ICD-9-CM: 276.8  12/25/2018 Yes                Review of Systems:   Review of systems not obtained due to patient factors. Vital Signs:    Last 24hrs VS reviewed since prior progress note.  Most recent are:  Visit Vitals  /69 (BP 1 Location: Left arm, BP Patient Position: At rest)   Pulse (!) 106   Temp 98.7 °F (37.1 °C)   Resp 20   Ht 5' 11\" (1.803 m)   Wt 71.8 kg (158 lb 4.6 oz)   SpO2 93%   BMI 22.08 kg/m²         Intake/Output Summary (Last 24 hours) at 12/27/2018 0908  Last data filed at 12/27/2018 0818  Gross per 24 hour   Intake 150 ml   Output 2220 ml   Net -2070 ml        Physical Examination:             Constitutional:  No acute distress, somnolent    ENT:  Oral mucous moist, oropharynx benign. Neck supple,    Resp:  CTA bilaterally. No wheezing/rhonchi/rales. No accessory muscle use   CV:  Regular rhythm, normal rate, no murmurs, gallops, rubs    GI:  Soft, non distended . normoactive bowel sounds, no hepatosplenomegaly     Musculoskeletal:  No edema, warm, 1+ pulses throughout    Neurologic: Somnolent      Skin:  Good turgor, no rashes or ulcers       Data Review:    Review and/or order of clinical lab test      Labs:     Recent Labs     12/27/18 0413 12/26/18 0249   WBC 7.7 8.2   HGB 11.0* 11.5*   HCT 34.2* 36.4*    250     Recent Labs     12/27/18 0413 12/26/18  1720 12/26/18  0249 12/25/18  1251  12/24/18  2113     --  136 136   < > 138   K 2.6* 2.8* 2.5* 2.9*   < > 2.3*   CL 88*  --  89* 93*   < > 95*   CO2 39*  --  39* 35*   < > 37*   BUN 12  --  10 10   < > 11   CREA 1.68*  --  1.78* 1.69*   < > 1.63*   *  --  152* 235*   < > 56*   CA 7.6*  --  8.0* 7.6*   < > 7.8*   MG  --   --  1.8 1.9  --  2.1    < > = values in this interval not displayed. Recent Labs     12/24/18 2113   SGOT 79*   ALT 53   AP 82   TBILI 0.4   TP 6.0*   ALB 2.5*   GLOB 3.5     Recent Labs     12/25/18  1251 12/25/18  0213   APTT 25.7 28.9      No results for input(s): FE, TIBC, PSAT, FERR in the last 72 hours. Lab Results   Component Value Date/Time    Folate 12.1 07/17/2017 03:48 PM      No results for input(s): PH, PCO2, PO2 in the last 72 hours.   Recent Labs     12/26/18  0249 12/25/18  0213 12/24/18 2113   *  --   --    CKNDX 0.9  --   --    TROIQ 1.58* 2.57* 2.57*     Lab Results   Component Value Date/Time    Cholesterol, total 110 07/18/2017 02:14 AM    HDL Cholesterol 47 07/18/2017 02:14 AM    LDL, calculated 44 07/18/2017 02:14 AM    Triglyceride 95 07/18/2017 02:14 AM    CHOL/HDL Ratio 2.3 07/18/2017 02:14 AM     Lab Results   Component Value Date/Time    Glucose (POC) 159 (H) 12/27/2018 07:26 AM    Glucose (POC) 217 (H) 12/26/2018 09:51 PM    Glucose (POC) 200 (H) 12/26/2018 04:27 PM    Glucose (POC) 149 (H) 12/26/2018 11:41 AM    Glucose (POC) 191 (H) 12/26/2018 06:47 AM     Lab Results   Component Value Date/Time    Color YELLOW/STRAW 12/24/2018 09:37 PM    Appearance CLOUDY (A) 12/24/2018 09:37 PM    Specific gravity 1.019 12/24/2018 09:37 PM    Specific gravity 1.010 07/17/2017 04:12 PM    pH (UA) 6.0 12/24/2018 09:37 PM    Protein 100 (A) 12/24/2018 09:37 PM    Glucose 100 (A) 12/24/2018 09:37 PM    Ketone NEGATIVE  12/24/2018 09:37 PM    Bilirubin NEGATIVE  12/24/2018 09:37 PM    Urobilinogen 0.2 12/24/2018 09:37 PM    Nitrites NEGATIVE  12/24/2018 09:37 PM    Leukocyte Esterase NEGATIVE  12/24/2018 09:37 PM    Epithelial cells FEW 12/24/2018 09:37 PM    Bacteria NEGATIVE  12/24/2018 09:37 PM    WBC 0-4 12/24/2018 09:37 PM    RBC 0-5 12/24/2018 09:37 PM         Medications Reviewed:     Current Facility-Administered Medications   Medication Dose Route Frequency    potassium chloride 10 mEq in 50 ml IVPB  10 mEq IntraVENous Q1H    [START ON 12/28/2018] furosemide (LASIX) injection 60 mg  60 mg IntraVENous DAILY    heparin (porcine) injection 5,000 Units  5,000 Units SubCUTAneous Q8H    memantine (NAMENDA) tablet 10 mg  10 mg Oral Q12H    aspirin delayed-release tablet 81 mg  81 mg Oral DAILY    DULoxetine (CYMBALTA) capsule 60 mg  60 mg Oral DAILY    levothyroxine (SYNTHROID) tablet 50 mcg  50 mcg Oral ACB    pantoprazole (PROTONIX) tablet 40 mg  40 mg Oral DAILY    rosuvastatin (CRESTOR) tablet 5 mg  5 mg Oral DAILY    tamsulosin (FLOMAX) capsule 0.4 mg  0.4 mg Oral DAILY    sodium chloride (NS) flush 5-10 mL  5-10 mL IntraVENous Q8H    sodium chloride (NS) flush 5-10 mL  5-10 mL IntraVENous PRN    acetaminophen (TYLENOL) tablet 650 mg  650 mg Oral Q4H PRN    ondansetron (ZOFRAN) injection 4 mg  4 mg IntraVENous Q4H PRN    pregabalin (LYRICA) capsule 50 mg  50 mg Oral BID    midodrine (PROAMITINE) tablet 5 mg  5 mg Oral TID WITH MEALS    potassium chloride SR (KLOR-CON 10) tablet 20 mEq  20 mEq Oral BID    insulin lispro (HUMALOG) injection   SubCUTAneous AC&HS    glucose chewable tablet 16 g  4 Tab Oral PRN    dextrose (D50W) injection syrg 12.5-25 g  12.5-25 g IntraVENous PRN    glucagon (GLUCAGEN) injection 1 mg  1 mg IntraMUSCular PRN     ______________________________________________________________________  EXPECTED LENGTH OF STAY: 4d 4h  ACTUAL LENGTH OF STAY:          2                 Andrés Yip MD

## 2018-12-27 NOTE — PROGRESS NOTES
Problem: Falls - Risk of  Goal: *Absence of Falls  Document Jose Fall Risk and appropriate interventions in the flowsheet. Outcome: Progressing Towards Goal  Fall Risk Interventions:  Mobility Interventions: Patient to call before getting OOB    Mentation Interventions: Bed/chair exit alarm, Adequate sleep, hydration, pain control, Increase mobility, More frequent rounding, Reorient patient, Room close to nurse's station    Medication Interventions: Bed/chair exit alarm, Patient to call before getting OOB    Elimination Interventions: Patient to call for help with toileting needs, Toileting schedule/hourly rounds             Comments: Problem: Falls - Risk of  Goal: *Absence of Falls  Document Jose Fall Risk and appropriate interventions in the flowsheet.   Outcome: Progressing Towards Goal  Fall Risk Interventions:  Mobility Interventions: Patient to call before getting OOB     Mentation Interventions: Bed/chair exit alarm, Adequate sleep, hydration, pain control, Increase mobility, More frequent rounding, Reorient patient, Room close to nurse's station     Medication Interventions: Bed/chair exit alarm, Patient to call before getting OOB     Elimination Interventions: Patient to call for help with toileting needs, Toileting schedule/hourly rounds

## 2018-12-27 NOTE — CONSULTS
Palliative Medicine Consult  Sam: [phone])    Patient Name: Kailyn Hough  YOB: 1940    Date of Initial Consult: 12/27/2018  Reason for Consult: Care Decisions  Requesting Provider: Cruz Bhat MD  Primary Care Physician: Racheal Cam MD     SUMMARY:   Kailyn Hough is a 69 y.o. with a past history of CAD, CHF, COPD, DM, and orthostatic hypotension, who was admitted on 12/24/2018 from home with a diagnosis of Acute on chronic CHF. Patient presented to the ED with complaints of shortness of breath. Daughter also notes that over the last couple of weeks his oral intake has been very poor, and he's had low blood glucose readings in the 60's     Current medical issues leading to Palliative Medicine involvement include: goals of care discussion in setting of dementia and other co-morbidites putting him at high risk for readmission       PALLIATIVE DIAGNOSES:   1. Goals of care discussion  2. Frailty  3. Anorexia  4. Debility       PLAN:   1. Met with patient briefly, he was preoccupied with his IV, which he states hurts, because they had to \"poke him with needles\". Otherwise he was a very poor historian  2. I called his daughter Sharyn Go- she is listed in notes as POA, although we have no documentation of this, but there is documentation that Mr Alfa Kang son (whom he lives with) defers to his sister  1. She asked me to call tomorrow between 9-12 as she was at work and was not in a position to have a conversation with me at that point  4. Initial consult note routed to primary continuity provider  5.  Communicated plan of care with: Palliative IDT       GOALS OF CARE / TREATMENT PREFERENCES:     GOALS OF CARE:  Patient/Health Care Proxy Stated Goals: (TBD- discussion to be held tomorrow)      TREATMENT PREFERENCES:   Code Status: Full Code    Advance Care Planning:  [x] [facility] Interdisciplinary Team has updated the ACP Navigator with [address] and Patient Capacity    Primary Decision Maker (Health Care Agent): Starla Reece  Relationship to patient: Daughter  Phone number: [phone]  [] Named in a scanned document   [x] Legal Next of Kin  [] Guardian    Secondary Decision Maker ([address]):   Relationship to patient:  Phone number:  [] Named in a scanned document   [] Legal Next of Kin  [] Guardian        Other Instructions: Other:    As far as possible, the palliative care team has discussed with patient / health care proxy about goals of care / treatment preferences for patient. HISTORY:     History obtained from: chart    CHIEF COMPLAINT: complaints about IV attempts earlier that day    HPI/SUBJECTIVE:    The patient is:   [x] Verbal and participatory  [] Non-participatory due to:     Poor historian  In bed  Does not appear to be in distress, but is sad about multiple IV attempts that made his arm sore      Clinical Pain Assessment (nonverbal scale for severity on nonverbal patients):   Clinical Pain Assessment  Severity: 0     Activity (Movement): Lying quietly, normal position    Duration: for how long has pt been experiencing pain (e.g., 2 days, 1 month, years)  Frequency: how often pain is an issue (e.g., several times per day, once every few days, constant)     FUNCTIONAL ASSESSMENT:     Palliative Performance Scale (PPS):  PPS: 40       PSYCHOSOCIAL/SPIRITUAL SCREENING:     Palliative IDT has assessed this patient for cultural preferences / practices and a referral made as appropriate to needs (Cultural Services, Patient Advocacy, Ethics, etc.)    Any spiritual / Judaism concerns:  [] Yes /  [x] No    Caregiver Burnout:  [] Yes /  [] No /  [x] No Caregiver Present      Anticipatory grief assessment:   [] Normal  / [] Maladaptive       ESAS Anxiety:   unable to assess    ESAS Depression:   unable to assess       REVIEW OF SYSTEMS:     Positive and pertinent negative findings in ROS are noted above in HPI.   The following systems were [] reviewed / [x] unable to be reviewed as noted in HPI  Other findings are noted below. Systems: constitutional, ears/nose/mouth/throat, respiratory, gastrointestinal, genitourinary, musculoskeletal, integumentary, neurologic, psychiatric, endocrine. Positive findings noted below. Modified ESAS Completed by: provider           Pain: 0           Dyspnea: 0                    PHYSICAL EXAM:     From RN flowsheet:  Wt Readings from Last 3 Encounters:   12/27/18 158 lb 4.6 oz (71.8 kg)   10/12/18 160 lb (72.6 kg)   10/10/18 156 lb 3.2 oz (70.9 kg)     Blood pressure 137/61, pulse 98, temperature 98.7 °F (37.1 °C), resp. rate 20, height 5' 11\" (1.803 m), weight 158 lb 4.6 oz (71.8 kg), SpO2 94 %. Pain Scale 1: Numeric (0 - 10)  Pain Intensity 1: 0                 Last bowel movement, if known:     Constitutional: alert, not oriented  Eyes: pupils equal, anicteric  ENMT: no nasal discharge, moist mucous membranes  Respiratory: breathing not labored, symmetric  Musculoskeletal: no deformity, no tenderness to palpation  Skin: warm, dry  Psychiatric: unable to assess  Other:       HISTORY:     Principal Problem:    CHF (congestive heart failure) ([facility] [address] (12/25/2018)    Active Problems:    NSTEMI (non-ST elevated myocardial infarction) ([facility] [address] (12/25/2018)      Hypokalemia (12/25/2018)      Past Medical History:   Diagnosis Date    CAD (coronary artery disease)     COPD (chronic obstructive pulmonary disease) ([facility] [address] [facility]     Diabetes ([facility] [address]     1970a    Ill-defined condition     SOB    Pneumonia     Urinary incontinence       Past Surgical History:   Procedure Laterality Date    CARDIAC SURG PROCEDURE UNLIST  2000    open heart    HX HEENT  1975    nasal surgery    HX MOHS PROCEDURES  2013    left      Family History   Problem Relation Age of Onset    Diabetes Mother     Diabetes Brother       History reviewed, no pertinent family history.   Social History     Tobacco Use    Smoking status: Current Every Day Smoker    Smokeless tobacco: Never Used    Tobacco comment: 1-2 cigars daily   Substance Use Topics    Alcohol use: No     Alcohol/week: 0.0 oz     No Known Allergies   Current Facility-Administered Medications   Medication Dose Route Frequency    potassium chloride 10 mEq in 50 ml IVPB  10 mEq IntraVENous Q1H    [START ON 12/28/2018] furosemide (LASIX) injection 60 mg  60 mg IntraVENous DAILY    heparin (porcine) injection 5,000 Units  5,000 Units SubCUTAneous Q8H    memantine (NAMENDA) tablet 10 mg  10 mg Oral Q12H    aspirin delayed-release tablet 81 mg  81 mg Oral DAILY    DULoxetine (CYMBALTA) capsule 60 mg  60 mg Oral DAILY    levothyroxine (SYNTHROID) tablet 50 mcg  50 mcg Oral ACB    pantoprazole (PROTONIX) tablet 40 mg  40 mg Oral DAILY    rosuvastatin (CRESTOR) tablet 5 mg  5 mg Oral DAILY    tamsulosin (FLOMAX) capsule 0.4 mg  0.4 mg Oral DAILY    sodium chloride (NS) flush 5-10 mL  5-10 mL IntraVENous Q8H    sodium chloride (NS) flush 5-10 mL  5-10 mL IntraVENous PRN    acetaminophen (TYLENOL) tablet 650 mg  650 mg Oral Q4H PRN    ondansetron (ZOFRAN) injection 4 mg  4 mg IntraVENous Q4H PRN    pregabalin (LYRICA) capsule 50 mg  50 mg Oral BID    midodrine (PROAMITINE) tablet 5 mg  5 mg Oral TID WITH MEALS    potassium chloride SR (KLOR-CON 10) tablet 20 mEq  20 mEq Oral BID    insulin lispro (HUMALOG) injection   SubCUTAneous AC&HS    glucose chewable tablet 16 g  4 Tab Oral PRN    dextrose (D50W) injection syrg 12.5-25 g  12.5-25 g IntraVENous PRN    glucagon (GLUCAGEN) injection 1 mg  1 mg IntraMUSCular PRN          LAB AND IMAGING FINDINGS:     Lab Results   Component Value Date/Time    WBC 7.7 12/27/2018 04:13 AM    HGB 11.0 (L) 12/27/2018 04:13 AM    PLATELET 866 55/48/2858 04:13 AM     Lab Results   Component Value Date/Time    Sodium 137 12/27/2018 04:13 AM    Potassium 2.6 (LL) 12/27/2018 04:13 AM    Chloride 88 (L) 12/27/2018 04:13 AM    CO2 39 (H) 12/27/2018 04:13 AM    BUN 12 12/27/2018 04:13 AM    Creatinine 1.68 (H) 12/27/2018 04:13 AM    Calcium 7.6 (L) 12/27/2018 04:13 AM    Magnesium 1.8 12/26/2018 02:49 AM    Phosphorus 2.7 04/13/2017 01:27 AM      Lab Results   Component Value Date/Time    AST (SGOT) 79 (H) 12/24/2018 09:13 PM    Alk. phosphatase 82 12/24/2018 09:13 PM    Protein, total 6.0 (L) 12/24/2018 09:13 PM    Albumin 2.5 (L) 12/24/2018 09:13 PM    Globulin 3.5 12/24/2018 09:13 PM     Lab Results   Component Value Date/Time    aPTT 25.7 12/25/2018 12:51 PM      No results found for: IRON, FE, TIBC, IBCT, PSAT, FERR   No results found for: PH, PCO2, PO2  No components found for: [place]   Lab Results   Component Value Date/Time     (H) 12/26/2018 02:49 AM    CK - MB 6.1 (H) 12/26/2018 02:49 AM                Total time:   Counseling / coordination time, spent as noted above:   > 50% counseling / coordination?:     Prolonged service was provided for  []30 min   []75 min in face to face time in the presence of the patient, spent as noted above. Time Start:   Time End:   Note: this can only be billed with 79995 (initial) or [id number] (follow up). If multiple start / stop times, list each separately.

## 2018-12-27 NOTE — PROGRESS NOTES
Problem: Self Care Deficits Care Plan (Adult)  Goal: *Acute Goals and Plan of Care (Insert Text)  Occupational Therapy Goals  Initiated 12/26/2018  1. Patient will perform upper body dressing with modified independence within 7 day(s). 2.  Patient will perform lower body dressing with modified independence within 7 day(s). 3.  Patient will perform bathing with modified independence within 7 day(s). 4.  Patient will perform toilet transfers with modified independence within 7 day(s). 5.  Patient will perform all aspects of toileting with modified independence within 7 day(s). 6.  Patient will participate in upper extremity therapeutic exercise/activities with supervision/set-up for 5 minutes within 7 day(s). 7.  Patient will utilize energy conservation techniques during functional activities with verbal cues within 7 day(s). Occupational Therapy TREATMENT  Patient: Yordan Gaines (84 y.o. male)  Date: 12/27/2018  Diagnosis: CHF (congestive heart failure) ([facility]) [facility]      Precautions: Fall  Chart, occupational therapy assessment, plan of care, and goals were reviewed. ASSESSMENT:  Patient received supine in bed, alert, confused (Ox2), and agreeable to therapy with encouragement. Patient performed supine-sit with SPV and donned slip on shoes with back seated EOB with set-up. Patient performed sit-stand with min-A and performed standing marching for ~30 seconds with HR fluctuating 110s-130s. Therapist instructed patient to return to seated, patient became drowsy while standing, with difficulty keeping eyes open, decreased responsiveness, reporting dizziness, sudden posterior lean, and was unable to flex hips/ bend to sit. Required max-A for stand-sit/ return to supine, RN notified. Patient with improved alertness with return to supine, responded to questions appropriately. /66,  supine/ post-activity. Recommend rehab at d/c.       Progression toward goals:  []       Improving appropriately and progressing toward goals  [x]       Improving slowly and progressing toward goals  []       Not making progress toward goals and plan of care will be adjusted     PLAN:  Patient continues to benefit from skilled intervention to address the above impairments. Continue treatment per established plan of care. Discharge Recommendations:  Rehab  Further Equipment Recommendations for Discharge:  TBD     SUBJECTIVE:   Patient stated I feel tired.     OBJECTIVE DATA SUMMARY:   Cognitive/Behavioral Status:  Neurologic State: Alert;Confused  Orientation Level: Oriented to person;Oriented to place; Disoriented to situation;Disoriented to time  Cognition: Follows commands  Perception: Appears intact  Perseveration: No perseveration noted  Safety/Judgement: Decreased insight into deficits; Decreased awareness of need for assistance;Decreased awareness of need for safety    Functional Mobility and Transfers for ADLs:  Bed Mobility:  Supine to Sit: Supervision  Sit to Supine: Maximum assistance    Transfers:  Sit to Stand: Minimum assistance;Maximum assistance(min-A sit-stand, max-A stand-sit/ supine)          Balance:  Sitting: Intact  Standing: Impaired  Standing - Static: Fair  Standing - Dynamic : Poor    ADL Intervention:          Lower Body Dressing Assistance  Slip on Shoes with Back: Contact guard assistance(sitting EOB)         Cognitive Retraining  Safety/Judgement: Decreased insight into deficits; Decreased awareness of need for assistance;Decreased awareness of need for safety    Pain:  Pain Scale 1: Numeric (0 - 10)  Pain Intensity 1: 0              Activity Tolerance:   Vitals:    12/27/18 1532 12/27/18 1545   BP: 133/60 121/66   BP 1 Location: Left arm Left arm   BP Patient Position: At rest;Supine Supine; Post activity   Pulse: (!) 105 (!) 110   SpO2: 95% on NC      Please refer to the flowsheet for vital signs taken during this treatment.   After treatment:   [] Patient left in no apparent distress sitting up in chair  [x] Patient left in no apparent distress in bed  [x] Call bell left within reach  [x] Nursing notified  [] Caregiver present  [x] Bed alarm activated    COMMUNICATION/COLLABORATION:   The patients plan of care was discussed with: Physical Therapist and Registered Nurse    Shalonda Chavez OT  Time Calculation: 24 mins

## 2018-12-27 NOTE — PROGRESS NOTES
1930: Bedside shift report received from CARLOS GARCIA [facility]. VSS. 2030: Hospitalist paged again for K of 2.8. [facility]   5679: Critical K- 2.6. Hospitalist paged. Orders received from [facility] ASHA MURCIA MD.  9242: Bedside and Verbal shift change report given to [address] [place] [address] (oncoming nurse) by Quin Kendall (offgoing nurse). Report included the following information SBAR, Kardex, Procedure Summary, Intake/Output, MAR, Accordion and Recent Results.

## 2018-12-27 NOTE — PROGRESS NOTES
Reason for Admission:  Acute respiratory failure. RRAT Score:   28               Resources/supports as identified by patient/family:   Pt has his son and two daughters for his support system. Top Challenges facing patient (as identified by patient/family and CM): Finances/Medication cost?     No. Pt has medicare and medicaid. Transportation? No.Family transports pt and he can utilize [id number] [employer] arrangements? Pt lives with his son. .              Self-care/ADLs/Cognition? This pt needs some assistance with ADL's. Current Advanced Directive/Advance Care Plan:  Not on file. Plan for utilizing home health:    No                      Likelihood of readmission: high                 Transition of Care Plan:   CM met with pt briefly to discuss d/c planning. He deferred this  to his daughter, Erika De Guzman ([phone]). CM did speak with Erika De Guzman to introduce her to the role of CM and transition of care. She verbalized understanding. She stated this pt has medicaid aides every day for about 5 hours. They assist pt with ADL's, cooking, etc She added that her older brother lives with this pt and is there at night time. CM informed the daughter, that as of now, the therapists are recommending rehab for pt at d/c. She is in agreement with this plan and would like referrals to go to [facility] [address]. CM did send these referrals out. Will follow. [address] Management Interventions  PCP Verified by CM: Yes  Palliative Care Criteria Met (RRAT>21 & CHF Dx)?: No  Transition of Care Consult (CM Consult): Discharge Planning  MyChart Signup: No  Discharge Durable Medical Equipment: No  Physical Therapy Consult: Yes  Occupational Therapy Consult: Yes  Speech Therapy Consult: No  Current Support Network:  Other(Pt lives with his son.)  Confirm Follow Up Transport: Family  Plan discussed with Pt/Family/Caregiver: Yes  Freedom of Choice Offered: Yes  [address] Provided?: No

## 2018-12-27 NOTE — PROGRESS NOTES
0730 - Bedside and Verbal shift change report given to lucy pettit (oncoming nurse) by Favio Sosa (offgoing nurse). Report included the following information SBAR, Kardex, Intake/Output, MAR, Recent Results and Cardiac Rhythm afib. 1245 - Bedside and Verbal shift change report given to clif (oncoming nurse) by Sarah Villa (offgoing nurse). Report included the following information SBAR, Kardex, Intake/Output, MAR, Recent Results and Cardiac Rhythm afib.

## 2018-12-27 NOTE — PROGRESS NOTES
Spiritual Care Assessment/Progress Note  [facility]      NAME: Kevin Ramos      MRN: [id number]  AGE: 69 y.o. SEX: male  Alevism Affiliation: Latter day   Language: English     12/27/2018     Total Time (in minutes): 6     Spiritual Assessment begun in [address] through conversation with:         []Patient        [] Family    [] Friend(s)        Reason for Consult: Palliative Care, Initial/Spiritual Assessment     Spiritual beliefs: (Please include comment if needed)     [] Identifies with a stevie tradition:         [] Supported by a stevie community:            [] Claims no spiritual orientation:           [] Seeking spiritual identity:                [] Adheres to an individual form of spirituality:           [x] Not able to assess:                           Identified resources for coping:      [] Prayer                               [] Music                  [] Guided Imagery     [] Family/friends                 [] Pet visits     [] Devotional reading                         [] Unknown     [] Other:                                              Interventions offered during this visit: (See comments for more details)    Patient Interventions:  Other (comment)(Left note at bedside)     Family/Friend(s): Other (comment)(Left note at bedside)     Plan of Care:     [] Support spiritual and/or cultural needs    [] Support AMD and/or advance care planning process      [] Support grieving process   [] Coordinate Rites and/or Rituals    [] Coordination with community clergy   [] No spiritual needs identified at this time   [] Detailed Plan of Care below (See Comments)  [] Make referral to Music Therapy  [] Make referral to Pet Therapy     [] Make referral to Addiction services  [] Make referral to [facility]  [] Make referral to Spiritual Care Partner  [] No future visits requested        [] Follow up visits as needed     Comments: Visited Mr Aby Ro in room [phone] for initial Palliative Care spiritual assessment. Mr Alyson Adam appeared to be sleeping and did not arouse when name was called; no family was present. Left note at bedside assuring them of  availability for support. : Rev. Gabriel Quintana.  Signe Osler; [facility], to contact [phone] call: [phone]

## 2018-12-27 NOTE — DIABETES MGMT
DTC Progress Note    Recommendations/ Comments: Chart reviewed due to hyperglycemia. If appropriate, please consider:   - Adding lantus 10 units tid ac (home dose is 18 units)    Current hospital diabetes medications: correction scale Humalog, normal sensitivity. Patient is a 69 y.o. male with history Type 2 Diabetes on Lantus 18 units, Lispro scale with meals, and Trulicity at home. A1c:   Lab Results   Component Value Date/Time    Hemoglobin A1c 6.6 (H) 12/25/2018 02:25 AM    Hemoglobin A1c 13.5 (H) 07/18/2017 02:14 AM    Hemoglobin A1c, External 11.2 01/27/2016       Recent Glucose Results:   Lab Results   Component Value Date/Time     (H) 12/27/2018 04:13 AM    GLUCPOC 194 (H) 12/27/2018 11:37 AM    GLUCPOC 159 (H) 12/27/2018 07:26 AM    GLUCPOC 217 (H) 12/26/2018 09:51 PM        Lab Results   Component Value Date/Time    Creatinine 1.68 (H) 12/27/2018 04:13 AM       Active Orders   Diet    DIET DIABETIC WITH OPTIONS Consistent Carb 2000kcal; Regular; AHA-LOW-CHOL FAT        PO intake:   No data found. Will continue to follow as needed.     Thank you  Ailyn Mustafa RD, CDE      Time spent: 5 mintues

## 2018-12-28 LAB
ANION GAP SERPL CALC-SCNC: 10 MMOL/L (ref 5–15)
BNP SERPL-MCNC: ABNORMAL PG/ML (ref 0–450)
BUN SERPL-MCNC: 11 MG/DL (ref 6–20)
BUN/CREAT SERPL: 7 (ref 12–20)
CALCIUM SERPL-MCNC: 7.8 MG/DL (ref 8.5–10.1)
CHLORIDE SERPL-SCNC: 90 MMOL/L (ref 97–108)
CO2 SERPL-SCNC: 37 MMOL/L (ref 21–32)
CREAT SERPL-MCNC: 1.57 MG/DL (ref 0.7–1.3)
GLUCOSE BLD STRIP.AUTO-MCNC: 178 MG/DL (ref 65–100)
GLUCOSE BLD STRIP.AUTO-MCNC: 221 MG/DL (ref 65–100)
GLUCOSE BLD STRIP.AUTO-MCNC: 268 MG/DL (ref 65–100)
GLUCOSE BLD STRIP.AUTO-MCNC: 76 MG/DL (ref 65–100)
GLUCOSE BLD STRIP.AUTO-MCNC: 81 MG/DL (ref 65–100)
GLUCOSE SERPL-MCNC: 176 MG/DL (ref 65–100)
MAGNESIUM SERPL-MCNC: 2 MG/DL (ref 1.6–2.4)
POTASSIUM SERPL-SCNC: 3.2 MMOL/L (ref 3.5–5.1)
SERVICE CMNT-IMP: ABNORMAL
SERVICE CMNT-IMP: NORMAL
SERVICE CMNT-IMP: NORMAL
SODIUM SERPL-SCNC: 137 MMOL/L (ref 136–145)

## 2018-12-28 PROCEDURE — 94762 N-INVAS EAR/PLS OXIMTRY CONT: CPT

## 2018-12-28 PROCEDURE — 36415 COLL VENOUS BLD VENIPUNCTURE: CPT

## 2018-12-28 PROCEDURE — 83735 ASSAY OF MAGNESIUM: CPT

## 2018-12-28 PROCEDURE — 97530 THERAPEUTIC ACTIVITIES: CPT

## 2018-12-28 PROCEDURE — 77010033678 HC OXYGEN DAILY

## 2018-12-28 PROCEDURE — 65660000000 HC RM CCU STEPDOWN

## 2018-12-28 PROCEDURE — 80048 BASIC METABOLIC PNL TOTAL CA: CPT

## 2018-12-28 PROCEDURE — 83880 ASSAY OF NATRIURETIC PEPTIDE: CPT

## 2018-12-28 PROCEDURE — 82962 GLUCOSE BLOOD TEST: CPT

## 2018-12-28 PROCEDURE — 76450000000

## 2018-12-28 PROCEDURE — 74011636637 HC RX REV CODE- 636/637: Performed by: INTERNAL MEDICINE

## 2018-12-28 PROCEDURE — 74011250637 HC RX REV CODE- 250/637: Performed by: INTERNAL MEDICINE

## 2018-12-28 PROCEDURE — 74011250636 HC RX REV CODE- 250/636: Performed by: INTERNAL MEDICINE

## 2018-12-28 PROCEDURE — 74011250637 HC RX REV CODE- 250/637: Performed by: HOSPITALIST

## 2018-12-28 RX ORDER — FUROSEMIDE 40 MG/1
80 TABLET ORAL
Status: DISCONTINUED | OUTPATIENT
Start: 2018-12-28 | End: 2018-12-29 | Stop reason: HOSPADM

## 2018-12-28 RX ORDER — FUROSEMIDE 10 MG/ML
60 INJECTION INTRAMUSCULAR; INTRAVENOUS
Status: COMPLETED | OUTPATIENT
Start: 2018-12-28 | End: 2018-12-28

## 2018-12-28 RX ADMIN — MIDODRINE HYDROCHLORIDE 5 MG: 5 TABLET ORAL at 12:00

## 2018-12-28 RX ADMIN — TAMSULOSIN HYDROCHLORIDE 0.4 MG: 0.4 CAPSULE ORAL at 09:30

## 2018-12-28 RX ADMIN — DULOXETINE 60 MG: 60 CAPSULE, DELAYED RELEASE ORAL at 09:30

## 2018-12-28 RX ADMIN — INSULIN LISPRO 2 UNITS: 100 INJECTION, SOLUTION INTRAVENOUS; SUBCUTANEOUS at 08:30

## 2018-12-28 RX ADMIN — ACETAMINOPHEN 650 MG: 325 TABLET ORAL at 20:26

## 2018-12-28 RX ADMIN — HEPARIN SODIUM 5000 UNITS: 5000 INJECTION INTRAVENOUS; SUBCUTANEOUS at 14:29

## 2018-12-28 RX ADMIN — ROSUVASTATIN CALCIUM 5 MG: 10 TABLET, FILM COATED ORAL at 09:31

## 2018-12-28 RX ADMIN — Medication 10 ML: at 21:17

## 2018-12-28 RX ADMIN — INSULIN LISPRO 3 UNITS: 100 INJECTION, SOLUTION INTRAVENOUS; SUBCUTANEOUS at 21:24

## 2018-12-28 RX ADMIN — MEMANTINE 10 MG: 10 TABLET ORAL at 20:23

## 2018-12-28 RX ADMIN — Medication 81 MG: at 09:00

## 2018-12-28 RX ADMIN — INSULIN LISPRO 3 UNITS: 100 INJECTION, SOLUTION INTRAVENOUS; SUBCUTANEOUS at 12:15

## 2018-12-28 RX ADMIN — PREGABALIN 50 MG: 25 CAPSULE ORAL at 09:30

## 2018-12-28 RX ADMIN — HEPARIN SODIUM 5000 UNITS: 5000 INJECTION INTRAVENOUS; SUBCUTANEOUS at 06:17

## 2018-12-28 RX ADMIN — LEVOTHYROXINE SODIUM 50 MCG: 50 TABLET ORAL at 09:31

## 2018-12-28 RX ADMIN — MEMANTINE 10 MG: 10 TABLET ORAL at 09:31

## 2018-12-28 RX ADMIN — Medication 10 ML: at 13:08

## 2018-12-28 RX ADMIN — Medication 10 ML: at 06:20

## 2018-12-28 RX ADMIN — PANTOPRAZOLE SODIUM 40 MG: 40 TABLET, DELAYED RELEASE ORAL at 09:30

## 2018-12-28 RX ADMIN — MIDODRINE HYDROCHLORIDE 5 MG: 5 TABLET ORAL at 17:22

## 2018-12-28 RX ADMIN — MIDODRINE HYDROCHLORIDE 5 MG: 5 TABLET ORAL at 09:32

## 2018-12-28 RX ADMIN — PREGABALIN 50 MG: 25 CAPSULE ORAL at 17:22

## 2018-12-28 RX ADMIN — POTASSIUM CHLORIDE 20 MEQ: 750 TABLET, EXTENDED RELEASE ORAL at 09:30

## 2018-12-28 RX ADMIN — FUROSEMIDE 60 MG: 10 INJECTION, SOLUTION INTRAMUSCULAR; INTRAVENOUS at 09:32

## 2018-12-28 RX ADMIN — HEPARIN SODIUM 5000 UNITS: 5000 INJECTION INTRAVENOUS; SUBCUTANEOUS at 21:17

## 2018-12-28 RX ADMIN — FUROSEMIDE 80 MG: 40 TABLET ORAL at 17:23

## 2018-12-28 RX ADMIN — POTASSIUM CHLORIDE 20 MEQ: 750 TABLET, EXTENDED RELEASE ORAL at 17:21

## 2018-12-28 NOTE — PROGRESS NOTES
Problem: Mobility Impaired (Adult and Pediatric) Goal: *Acute Goals and Plan of Care (Insert Text) Physical Therapy Goals Initiated 12/27/2018 1. Patient will move from supine to sit and sit to supine  in bed with modified independence within 7 day(s). 2.  Patient will transfer from bed to chair and chair to bed with supervision/set-up using the least restrictive device within 7 day(s). 3.  Patient will perform sit to stand with supervision/set-up within 7 day(s). 4.  Patient will ambulate with minimal assistance/contact guard assist for 50 feet with the least restrictive device within 7 day(s). 5.  Patient will ascend/descend 4 stairs with single HR handrail(s) with minimal assistance/contact guard assist within 7 day(s). physical Therapy TREATMENT Patient: Charlotte Quesada (48 y.o. male) Date: 12/28/2018 Diagnosis: CHF (congestive heart failure) ([facility]) CHF (congestive heart failure) ([facility] Precautions: Fall Chart, physical therapy assessment, plan of care and goals were reviewed. ASSESSMENT: 
Pt received in supine, alert, oriented to self, place only. Pt moved supine to sit toward L with min A for lift at trunk; able to scoot to EOB with SBA. Pt reported dizziness; BP measured 91/47. Pt performed AROM bilat ankle pumps and knee ext; BP re-measured at 90/43, pt c/o feeling sleepy. Pt instructed to scoot toward pillow for return to supine, but initiated sit to stand; required mod A for lift/ balance. Pt reported increased dizziness, instructed to return to sit, required mod A to initiate movement due to not responding to verbal cues. Moved sit to supine with mod A to initiate movement and lift LEs; BP measured in flat supine position 92/53. Pt able to scoot toward [facility] with LEs and verbal cues. BP re-measured with MAP of 74, unable to obtain specific value per monitor function. Pt left with HOB elevated to 30 deg, no c/o dizziness. Pt more alert this session, but tolerance to activity limited by orthostatic hypotension. RN informed. Will progress as tolerated. Progression toward goals: 
[]    Improving appropriately and progressing toward goals [x]    Improving slowly and progressing toward goals 
[]    Not making progress toward goals and plan of care will be adjusted PLAN: 
Patient continues to benefit from skilled intervention to address the above impairments. Continue treatment per established plan of care. Discharge Recommendations:  Rehab Further Equipment Recommendations for Discharge: To be determined SUBJECTIVE:  
Patient stated I'll do whatever you want me to. OBJECTIVE DATA SUMMARY:  
Critical Behavior: 
Neurologic State: Drowsy, Confused Orientation Level: Oriented to person, Oriented to place Cognition: Follows commands, Decreased attention/concentration Safety/Judgement: Decreased insight into deficits, Decreased awareness of need for assistance, Decreased awareness of need for safety Functional Mobility Training: 
Bed Mobility: 
  
Supine to Sit: Minimum assistance(min A for lift at trunk) Sit to Supine: Moderate assistance(mod A for lowering control at trunk and lifting LEs) Transfers: 
Sit to Stand: Moderate assistance(assist for lift/ balance) Stand to Sit: Moderate assistance(assist to initiate movement) Balance: 
Sitting: Intact Standing: Impaired Standing - Static: Fair Standing - Dynamic : PoorAmbulation/Gait Training: 
  
  
  
  
  
  
  
  
  
  
  
  
  
  
  
  
  
  
Stairs: 
  
  
   
 
Neuro Re-Education: 
 
Therapeutic Exercises:  
 
Pain: 
Pain Scale 1: Numeric (0 - 10) Pain Intensity 1: 0 Activity Tolerance:  
Poor tolerance to upright due to orthostatic hypotension Please refer to the flowsheet for vital signs taken during this treatment. After treatment:  
[]    Patient left in no apparent distress sitting up in chair [x]    Patient left in no apparent distress in bed 
[x]    Call bell left within reach [x]    Nursing notified 
[]    Caregiver present [x]    Bed alarm activated COMMUNICATION/COLLABORATION:  
The patients plan of care was discussed with: Physical Therapy Assistant and Registered Nurse Coby Rubio PT Time Calculation: 20 mins

## 2018-12-28 NOTE — PROGRESS NOTES
12/28/18 0246 Vital Signs Temp 98.4 °F (36.9 °C) Temp Source Oral  
Pulse (Heart Rate) (!) 101 Heart Rate Source Monitor Cardiac Rhythm A Fib Resp Rate 21  
O2 Sat (%) 99 % Level of Consciousness Alert /50 MAP (Calculated) 84 MEWS Score 3 Md aware. Patient stable, will continue to monitor and contact MD with any changes.

## 2018-12-28 NOTE — ROUTINE PROCESS
Bedside shift change report given to Michelle (oncoming nurse) by Lenka (offgoing nurse). Report included the following information SBAR, Kardex, Procedure Summary, Accordion, Recent Results and Cardiac Rhythm A fib.

## 2018-12-28 NOTE — PROGRESS NOTES
Hospitalist Progress Note Ita Zamudio MD 
Answering service: [phone] OR [phone] from in house phone Date of Service:  2018 NAME:  Randell Quinonez :  1940 MRN:  [id number] Admission Summary:  
CC: shortness of breath 
  
  
HPI: 69 y.o man w/ CAD, CHF, COPD, DM, orthostatic hypotension, who presents with shortness of breath. He is a very poor historian due to dementia and doesn't know he's in a hospital. I spoke to his daughter/POA over the phone who tells me that last night he began complaining of shortness of breath. She also notes that over the last couple of weeks his oral intake has been very poor, and he's had low blood glucose readings in the 60's. The patient denies chest pain but does admit to shortness of breath when specifically asked. Family is not aware of any fever. Interval history / Subjective:  
   
2018 : 
 pt somnolent on rounds, no distree, non labored breathing, trace distal edema Lasix changed to daily at 60 mg , k replaced iv again today lab for am to include mag. Note case discussed with navigator who notes family ( daughter Abdullahi Álvarez) wishes conference discussion as to best goals of care, and as of 2018 have noted the discrepancy that pharmacy appreciated as to use of opiods. See pharm note,  
 
2018 : 
Pt is with markedly elevated nBNP but exam suggest eu-volemic,  Lasix however is changed to po and will start at 80 mg bid and cont to monitor lab shaji electrolytes and nBNP. ; pt is not in distress and alert/pleasant,  Case discussed with RN, pt for family /palliaite meeting today which is a good thing as pt a high risk for recurrent admissions Assessment & Plan:  
 
Assessment & Plan:  
  
Acute on chronic CHF, unspecified type: (POA) as evidenced by pulmonary edema, elevated NT pro-BNP, NYHA IV on admission.  Last TTE in 2017 with normal LVEF, mild-mod MR 
 -diurese with IV Lasix 
-I/O's, daily weight 
-TTE 
-note he is on fludrocortisone Lasix 12/28/2018 is adjusted to 80 mg bid po Adding coreg 12/28/2018 as bp up, Pt not a acd/arb candidate 2n2 to ckd 3 +  
  
 
Hypertension (POA) in the setting of midodrine for hypotension requiring weaning midodrine and BP monitor. NSTEMI: (POA) trop 2.57, this may have precipitated CHF 
-trend biomarkers 
-start heparin drip (due to borderline renal function) 
-continue rosuvastatin 
-consult cardiology 
  
Atrial fibrillation: (POA) this is a new diagnosis 
-monitor on tele 
-heparin drip as above 
 
CKD 3 + not a acd/arb candidate.  
  
Hypokalemia: (POA) -replete 
-monitor closely with diuresis Lab Results Component Value Date/Time Potassium 3.2 (L) 12/28/2018 05:48 AM  
 replaced iv and po and f/u closely  - Type 2 DM w/ hypoglycemia, peripheral neuropathy and CKD: (POA) -hold all insulin 
-POC checks q4h 
-continue duloxetine and pregabalin for neuropathy 
-check a1c Lab Results Component Value Date/Time Glucose 176 (H) 12/28/2018 05:48 AM  
 Glucose (POC) 178 (H) 12/28/2018 07:18 AM  
  
Lab Results Component Value Date/Time Hemoglobin A1c 6.6 (H) 12/25/2018 02:25 AM  
 Hemoglobin A1c, External 11.2 01/27/2016  
 
 
  
History of orthostatic hypotension:  
-on midodrine and fludrocortisone 
-BP on the higher side, will begin weaning midodrine. If it's able to be weaned off would start beta blocker 
-continue fludrocortisone for now 
  
CKD III: 
-monitor with diuresis 
  
Dementia: high risk for delirium while here. Hypoglycemia likely exacerbating confusion. 
  
  
Code status: full - d/w his daughter and SAY Mayen Disposition: TBD 
 
 
  
DVT prophylaxis: heparin Care Plan discussed with: Patient/Family and Nurse Disposition: Home w/Family and TBD Hospital Problems  Date Reviewed: 10/10/2018 Codes Class Noted POA * (Principal) CHF (congestive heart failure) ([facility]) ICD-10-CM: I50.9 ICD-9-CM: 428.0  12/25/2018 Yes NSTEMI (non-ST elevated myocardial infarction) ([facility] ICD-10-CM: I21.4 ICD-9-CM: 410.70  12/25/2018 Yes Hypokalemia ICD-10-CM: E87.6 ICD-9-CM: 276.8  12/25/2018 Yes Review of Systems: No cp no n/v/d/f/chills no sob at rest.  
 
Vital Signs:  
 Last 24hrs VS reviewed since prior progress note. Most recent are: 
Visit Vitals /74 Pulse 98 Temp 98.4 °F (36.9 °C) Resp 20 Ht 5' 11\" (1.803 m) Wt 78.9 kg (173 lb 15.1 oz) SpO2 96% BMI 24.26 kg/m² Intake/Output Summary (Last 24 hours) at 12/28/2018 1108 Last data filed at 12/28/2018 1037 Gross per 24 hour Intake 480 ml Output 500 ml Net -20 ml Physical Examination:  
 
 
     
Constitutional:  No acute distress, somnolent ENT:  Oral mucous moist, oropharynx benign. Neck supple, Resp:  CTA bilaterally. No wheezing/rhonchi/rales. No accessory muscle use CV:  Regular rhythm, normal rate, no murmurs, gallops, rubs GI:  Soft, non distended . normoactive bowel sounds, no hepatosplenomegaly Musculoskeletal:  No edema, warm, 1+ pulses throughout Neurologic: Somnolent Skin:  Good turgor, no rashes or ulcers Data Review:  
 Review and/or order of clinical lab test 
 
 
Labs:  
 
Recent Labs  
  12/27/18 
0413 12/26/18 
0249 WBC 7.7 8.2 HGB 11.0* 11.5* HCT 34.2* 36.4*  
 250 Recent Labs  
  12/28/18 
0548 12/27/18 
1730 12/27/18 
0413  12/26/18 
0249 12/25/18 
1251   --  137  --  136 136  
K 3.2* 3.6 2.6*   < > 2.5* 2.9*  
CL 90*  --  88*  --  89* 93* CO2 37*  --  39*  --  39* 35* BUN 11  --  12  --  10 10 CREA 1.57*  --  1.68*  --  1.78* 1.69* *  --  165*  --  152* 235* CA 7.8*  --  7.6*  --  8.0* 7.6*  
MG 2.0  --   --   --  1.8 1.9  
 < > = values in this interval not displayed. No results for input(s): SGOT, GPT, ALT, AP, TBIL, TBILI, TP, ALB, GLOB, GGT, AML, LPSE in the last 72 hours. No lab exists for component: AMYP, HLPSE Recent Labs  
  12/25/18 
1251 APTT 25.7 No results for input(s): FE, TIBC, PSAT, FERR in the last 72 hours. Lab Results Component Value Date/Time Folate 12.1 07/17/2017 03:48 PM  
  
No results for input(s): PH, PCO2, PO2 in the last 72 hours. Recent Labs  
  12/26/18 
0249 * CKNDX 0.9  
TROIQ 1.58* Lab Results Component Value Date/Time Cholesterol, total 110 07/18/2017 02:14 AM  
 HDL Cholesterol 47 07/18/2017 02:14 AM  
 LDL, calculated 44 07/18/2017 02:14 AM  
 Triglyceride 95 07/18/2017 02:14 AM  
 CHOL/HDL Ratio 2.3 07/18/2017 02:14 AM  
 
Lab Results Component Value Date/Time Glucose (POC) 178 (H) 12/28/2018 07:18 AM  
 Glucose (POC) 240 (H) 12/27/2018 09:12 PM  
 Glucose (POC) 106 (H) 12/27/2018 04:43 PM  
 Glucose (POC) 194 (H) 12/27/2018 11:37 AM  
 Glucose (POC) 159 (H) 12/27/2018 07:26 AM  
 
Lab Results Component Value Date/Time Color YELLOW/STRAW 12/24/2018 09:37 PM  
 Appearance CLOUDY (A) 12/24/2018 09:37 PM  
 Specific gravity 1.019 12/24/2018 09:37 PM  
 Specific gravity 1.010 07/17/2017 04:12 PM  
 pH (UA) 6.0 12/24/2018 09:37 PM  
 Protein 100 (A) 12/24/2018 09:37 PM  
 Glucose 100 (A) 12/24/2018 09:37 PM  
 Ketone NEGATIVE  12/24/2018 09:37 PM  
 Bilirubin NEGATIVE  12/24/2018 09:37 PM  
 Urobilinogen 0.2 12/24/2018 09:37 PM  
 Nitrites NEGATIVE  12/24/2018 09:37 PM  
 Leukocyte Esterase NEGATIVE  12/24/2018 09:37 PM  
 Epithelial cells FEW 12/24/2018 09:37 PM  
 Bacteria NEGATIVE  12/24/2018 09:37 PM  
 WBC 0-4 12/24/2018 09:37 PM  
 RBC 0-5 12/24/2018 09:37 PM  
 
 
 
Medications Reviewed:  
 
Current Facility-Administered Medications Medication Dose Route Frequency  furosemide (LASIX) tablet 80 mg  80 mg Oral ACB&D  
 heparin (porcine) injection 5,000 Units  5,000 Units SubCUTAneous Q8H  
  memantine (NAMENDA) tablet 10 mg  10 mg Oral Q12H  aspirin delayed-release tablet 81 mg  81 mg Oral DAILY  DULoxetine (CYMBALTA) capsule 60 mg  60 mg Oral DAILY  levothyroxine (SYNTHROID) tablet 50 mcg  50 mcg Oral ACB  pantoprazole (PROTONIX) tablet 40 mg  40 mg Oral DAILY  rosuvastatin (CRESTOR) tablet 5 mg  5 mg Oral DAILY  tamsulosin (FLOMAX) capsule 0.4 mg  0.4 mg Oral DAILY  sodium chloride (NS) flush 5-10 mL  5-10 mL IntraVENous Q8H  
 sodium chloride (NS) flush 5-10 mL  5-10 mL IntraVENous PRN  
 acetaminophen (TYLENOL) tablet 650 mg  650 mg Oral Q4H PRN  
 ondansetron (ZOFRAN) injection 4 mg  4 mg IntraVENous Q4H PRN  pregabalin (LYRICA) capsule 50 mg  50 mg Oral BID  midodrine (PROAMITINE) tablet 5 mg  5 mg Oral TID WITH MEALS  potassium chloride SR (KLOR-CON 10) tablet 20 mEq  20 mEq Oral BID  insulin lispro (HUMALOG) injection   SubCUTAneous AC&HS  
 glucose chewable tablet 16 g  4 Tab Oral PRN  
 dextrose (D50W) injection syrg 12.5-25 g  12.5-25 g IntraVENous PRN  
 glucagon (GLUCAGEN) injection 1 mg  1 mg IntraMUSCular PRN  
 
______________________________________________________________________ EXPECTED LENGTH OF STAY: 4d 4h 
ACTUAL LENGTH OF STAY:          3 Deward Burkitt, MD

## 2018-12-28 NOTE — PROGRESS NOTES
Problem: Falls - Risk of 
Goal: *Absence of Falls Document Britney Hilton Fall Risk and appropriate interventions in the flowsheet. Outcome: Progressing Towards Goal 
Fall Risk Interventions: 
Mobility Interventions: Patient to call before getting OOB, OT consult for ADLs, PT Consult for mobility concerns, PT Consult for assist device competence Mentation Interventions: Bed/chair exit alarm, Increase mobility, More frequent rounding, Reorient patient, Update white board Medication Interventions: Bed/chair exit alarm, Patient to call before getting OOB Elimination Interventions: Bed/chair exit alarm, Call light in reach, Toileting schedule/hourly rounds Problem: Pressure Injury - Risk of 
Goal: *Prevention of pressure injury Document Flynn Scale and appropriate interventions in the flowsheet. Outcome: Progressing Towards Goal 
Pressure Injury Interventions: 
Sensory Interventions: Assess changes in LOC, Discuss PT/OT consult with provider Moisture Interventions: Internal/External urinary devices, Limit adult briefs Activity Interventions: Increase time out of bed, Pressure redistribution bed/mattress(bed type), PT/OT evaluation Mobility Interventions: HOB 30 degrees or less, Pressure redistribution bed/mattress (bed type), PT/OT evaluation Nutrition Interventions: Document food/fluid/supplement intake Friction and Shear Interventions: Apply protective barrier, creams and emollients, HOB 30 degrees or less

## 2018-12-28 NOTE — PROGRESS NOTES
Problem: Falls - Risk of 
Goal: *Absence of Falls Document Britney Hilton Fall Risk and appropriate interventions in the flowsheet. Outcome: Progressing Towards Goal 
Fall Risk Interventions: 
Mobility Interventions: Patient to call before getting OOB, OT consult for ADLs, PT Consult for mobility concerns, PT Consult for assist device competence Mentation Interventions: Bed/chair exit alarm, Increase mobility, More frequent rounding, Reorient patient, Update white board Medication Interventions: Bed/chair exit alarm, Patient to call before getting OOB Elimination Interventions: Bed/chair exit alarm, Call light in reach, Toileting schedule/hourly rounds

## 2018-12-28 NOTE — PROGRESS NOTES
Problem: Falls - Risk of 
Goal: *Absence of Falls Document Halie Paige Fall Risk and appropriate interventions in the flowsheet. Outcome: Progressing Towards Goal 
Fall Risk Interventions: 
Mobility Interventions: Patient to call before getting OOB, OT consult for ADLs, PT Consult for mobility concerns, PT Consult for assist device competence Mentation Interventions: Bed/chair exit alarm, Increase mobility, More frequent rounding, Reorient patient, Update white board Medication Interventions: Bed/chair exit alarm, Patient to call before getting OOB Elimination Interventions: Bed/chair exit alarm, Call light in reach, Toileting schedule/hourly rounds Problem: Pressure Injury - Risk of 
Goal: *Prevention of pressure injury Document Flynn Scale and appropriate interventions in the flowsheet. Outcome: Progressing Towards Goal 
Pressure Injury Interventions: 
Sensory Interventions: Assess changes in LOC, Avoid rigorous massage over bony prominences, Discuss PT/OT consult with provider, Maintain/enhance activity level, Minimize linen layers, Pressure redistribution bed/mattress (bed type) Moisture Interventions: Absorbent underpads, Apply protective barrier, creams and emollients, Check for incontinence Q2 hours and as needed, Maintain skin hydration (lotion/cream), Minimize layers, Moisture barrier, Offer toileting Q_hr Activity Interventions: Increase time out of bed, Pressure redistribution bed/mattress(bed type), PT/OT evaluation Mobility Interventions: HOB 30 degrees or less, Pressure redistribution bed/mattress (bed type), PT/OT evaluation Nutrition Interventions: Document food/fluid/supplement intake, Offer support with meals,snacks and hydration Friction and Shear Interventions: HOB 30 degrees or less, Lift sheet, Minimize layers

## 2018-12-28 NOTE — PROGRESS NOTES
Palliative Medicine Consult Sam: [id number]) Patient Name: Meet Dai YOB: 1940 Date of Initial Consult: 12/27/2018 Reason for Consult: Care Decisions Requesting Provider: Marek Meng MD 
Primary Care Physician: Arabella Barton MD 
 
 SUMMARY:  
Meet Dai is a 69 y.o. with a past history of CAD, CHF, COPD, DM, CKD3, and orthostatic hypotension, who was admitted on 12/24/2018 from home with a diagnosis of Acute on chronic CHF. Other issues include afib, dementia, peripheral neuropathy, anorexia, orthostatic hypotension, and pulmonary edema. Patient presented to the ED with complaints of shortness of breath. Daughter also notes that over the last couple of weeks his oral intake has been very poor, and he's had low blood glucose readings in the 60's Current medical issues leading to Palliative Medicine involvement include: goals of care discussion in setting of dementia and other co-morbidites putting him at high risk for readmission Social: former truckdriver, 3 children, , lives at home with son PALLIATIVE DIAGNOSES:  
1. Shortness of breath 2. DNR discussion 3. Frailty 4. Anorexia 5. Debility PLAN:  
1. Family meeting held with 3 children Lopez Dawn and daughter in law Rasheeda Zhong 2. Pt declined option to participate and is limited by dementia 3. Introduced our services and inquired about family's understanding of the patient's condition. 4. Family arrived to the hospital in a rush thinking the pt was imminent. They were unable to verbalize specifics regarding his condition, only that it was serious 5. Explained that the pt has lung disease, heart failure, anorexia, debility, and kidney dysfunction. Because of this his prognosis is not good. Explained that heart failure is progressive and with each admission the pt will become more compromised 6. answered all questions 7. Code status addressed and family agrees to DNR. DDNR signed 8. Referral to our outpatient team placed for home services once the pt is released from skilled rehab 9. HUGS to follow up with family about additional resources 10. Contact information provided if needed 11. Requested copy of MPOA docs again. All the children made aware that without documents, all of the children have equal say in medical decisions. 12. Initial consult note routed to primary continuity provider 13. Communicated plan of care with: Palliative IDT 
 
 
 GOALS OF CARE / TREATMENT PREFERENCES:  
 
GOALS OF CARE: 
Patient/Health Care Proxy Stated Goals: Rehabilitation TREATMENT PREFERENCES:  
Code Status: DNR Advance Care Planning: 
[x] [facility] Interdisciplinary Team has updated the ACP Navigator with [address] and Patient Capacity Primary Decision Maker (Health Care Agent): Gaudencio Saliva Relationship to patient: Daughter Phone number: [phone] [] Named in a scanned document  
[x] Legal Next of Kin 
[] Guardian Secondary Decision Maker (First Alternate Health Care Agent):  
Relationship to patient: 
Phone number: 
[] Named in a scanned document  
[] Legal Next of Kin 
[] Guardian Medical Interventions: Limited additional interventions Other Instructions: Other: As far as possible, the palliative care team has discussed with patient / health care proxy about goals of care / treatment preferences for patient. HISTORY:  
 
History obtained from: chart CHIEF COMPLAINT: \"I'm tired\" HPI/SUBJECTIVE: The patient is:  
[x] Verbal and participatory [] Non-participatory due to:  
 
Poor historian In bed Does not appear to be in distress. Denies pain or shortness of breath Clinical Pain Assessment (nonverbal scale for severity on nonverbal patients):  
Clinical Pain Assessment Severity: 0 Activity (Movement): Lying quietly, normal position Duration: for how long has pt been experiencing pain (e.g., 2 days, 1 month, years) Frequency: how often pain is an issue (e.g., several times per day, once every few days, constant) FUNCTIONAL ASSESSMENT:  
 
Palliative Performance Scale (PPS): PPS: 40 PSYCHOSOCIAL/SPIRITUAL SCREENING:  
 
Palliative IDT has assessed this patient for cultural preferences / practices and a referral made as appropriate to needs (Cultural Services, Patient Advocacy, Ethics, etc.) Any spiritual / Zoroastrian concerns: 
[] Yes /  [x] No 
 
Caregiver Burnout: 
[] Yes /  [x] No /  [] No Caregiver Present Anticipatory grief assessment:  
[x] Normal  / [] Maladaptive ESAS Anxiety: Anxiety: 0 
 
ESAS Depression: Depression: 0 REVIEW OF SYSTEMS:  
 
Positive and pertinent negative findings in ROS are noted above in HPI. The following systems were [x] reviewed / [] unable to be reviewed as noted in HPI Other findings are noted below. Systems: constitutional, ears/nose/mouth/throat, respiratory, gastrointestinal, genitourinary, musculoskeletal, integumentary, neurologic, psychiatric, endocrine. Positive findings noted below. Modified ESAS Completed by: provider Fatigue: 2 Drowsiness: 0 Depression: 0 Pain: 0 Anxiety: 0 Nausea: 0 Anorexia: 5 Dyspnea: 0 Stool Occurrence(s): 1 PHYSICAL EXAM:  
 
From RN flowsheet: 
Wt Readings from Last 3 Encounters:  
12/28/18 173 lb 15.1 oz (78.9 kg) 10/12/18 160 lb (72.6 kg) 10/10/18 156 lb 3.2 oz (70.9 kg) Blood pressure 118/56, pulse 94, temperature 98.5 °F (36.9 °C), resp. rate 16, height 5' 11\" (1.803 m), weight 173 lb 15.1 oz (78.9 kg), SpO2 96 %. Pain Scale 1: Numeric (0 - 10) Pain Intensity 1: 0 Last bowel movement, if known:  
 
Constitutional: alert, nad Eyes: pupils equal, anicteric ENMT: no nasal discharge, moist mucous membranes Respiratory: breathing not labored, symmetric Musculoskeletal: no deformity, no tenderness to palpation Skin: warm, dry Psychiatric: unable to assess Other: 
 
 
 HISTORY:  
 
Principal Problem: 
  CHF (congestive heart failure) ([facility] (12/25/2018) Active Problems: 
  NSTEMI (non-ST elevated myocardial infarction) ([facility] (12/25/2018) Hypokalemia (12/25/2018) Past Medical History:  
Diagnosis Date  CAD (coronary artery disease)  COPD (chronic obstructive pulmonary disease) ([facility])  Diabetes ([facility] [address] [facility].  Ill-defined condition SOB  Pneumonia  Urinary incontinence Past Surgical History:  
Procedure Laterality Date [address]  
 open heart  HX HEENT  1975  
 nasal surgery [address] PROCEDURES  2013  
 left Family History Problem Relation Age of Onset  Diabetes Mother  Diabetes Brother History reviewed, no pertinent family history. Social History Tobacco Use  Smoking status: Current Every Day Smoker  Smokeless tobacco: Never Used  Tobacco comment: 1-2 cigars daily Substance Use Topics  Alcohol use: No  
  Alcohol/week: 0.0 oz No Known Allergies Current Facility-Administered Medications Medication Dose Route Frequency  furosemide (LASIX) tablet 80 mg  80 mg Oral ACB&D  
 heparin (porcine) injection 5,000 Units  5,000 Units SubCUTAneous Q8H  
 memantine (NAMENDA) tablet 10 mg  10 mg Oral Q12H  aspirin delayed-release tablet 81 mg  81 mg Oral DAILY  DULoxetine (CYMBALTA) capsule 60 mg  60 mg Oral DAILY  levothyroxine (SYNTHROID) tablet 50 mcg  50 mcg Oral ACB  pantoprazole (PROTONIX) tablet 40 mg  40 mg Oral DAILY  rosuvastatin (CRESTOR) tablet 5 mg  5 mg Oral DAILY  tamsulosin (FLOMAX) capsule 0.4 mg  0.4 mg Oral DAILY  sodium chloride (NS) flush 5-10 mL  5-10 mL IntraVENous Q8H  
 sodium chloride (NS) flush 5-10 mL  5-10 mL IntraVENous PRN  
 acetaminophen (TYLENOL) tablet 650 mg  650 mg Oral Q4H PRN  
  ondansetron (ZOFRAN) injection 4 mg  4 mg IntraVENous Q4H PRN  pregabalin (LYRICA) capsule 50 mg  50 mg Oral BID  midodrine (PROAMITINE) tablet 5 mg  5 mg Oral TID WITH MEALS  potassium chloride SR (KLOR-CON 10) tablet 20 mEq  20 mEq Oral BID  insulin lispro (HUMALOG) injection   SubCUTAneous AC&HS  
 glucose chewable tablet 16 g  4 Tab Oral PRN  
 dextrose (D50W) injection syrg 12.5-25 g  12.5-25 g IntraVENous PRN  
 glucagon (GLUCAGEN) injection 1 mg  1 mg IntraMUSCular PRN  
 
 
 
 LAB AND IMAGING FINDINGS:  
 
Lab Results Component Value Date/Time WBC 7.7 12/27/2018 04:13 AM  
 HGB 11.0 (L) 12/27/2018 04:13 AM  
 PLATELET 143 63/21/0050 04:13 AM  
 
Lab Results Component Value Date/Time Sodium 137 12/28/2018 05:48 AM  
 Potassium 3.2 (L) 12/28/2018 05:48 AM  
 Chloride 90 (L) 12/28/2018 05:48 AM  
 CO2 37 (H) 12/28/2018 05:48 AM  
 BUN 11 12/28/2018 05:48 AM  
 Creatinine 1.57 (H) 12/28/2018 05:48 AM  
 Calcium 7.8 (L) 12/28/2018 05:48 AM  
 Magnesium 2.0 12/28/2018 05:48 AM  
 Phosphorus 2.7 04/13/2017 01:27 AM  
  
Lab Results Component Value Date/Time AST (SGOT) 79 (H) 12/24/2018 09:13 PM  
 Alk. phosphatase 82 12/24/2018 09:13 PM  
 Protein, total 6.0 (L) 12/24/2018 09:13 PM  
 Albumin 2.5 (L) 12/24/2018 09:13 PM  
 Globulin 3.5 12/24/2018 09:13 PM  
 
Lab Results Component Value Date/Time  
 aPTT 25.7 12/25/2018 12:51 PM  
  
No results found for: IRON, FE, TIBC, IBCT, PSAT, FERR No results found for: PH, PCO2, PO2 No components found for: [place] Lab Results Component Value Date/Time  (H) 12/26/2018 02:49 AM  
 CK - MB 6.1 (H) 12/26/2018 02:49 AM  
  
 
 
   
 
Total time: 65 minutes Counseling / coordination time, spent as noted above: 55 minutes 
> 50% counseling / coordination?: y 
 
Prolonged service was provided for  [x]30 min   []75 min in face to face time in the presence of the patient, spent as noted above. Time Start:  1600 Time End: 2194 Note: this can only be billed with 93720 (initial) or 33314 (follow up). If multiple start / stop times, list each separately.

## 2018-12-28 NOTE — PROGRESS NOTES
Occupational Therapy: defer Chart reviewed, consulted with RN, attempted to join physical therapist for co-treat. Patient with orthostatic hypotension, symptomatic with dizziness/ decreased alertness, BP 90/43 sitting EOB. Will defer at this time and will f/u as able and appropriate.  
 
Minor Jose G, OTR/L

## 2018-12-28 NOTE — CARDIO/PULMONARY
Cardiac Wellness: MI and HF education material to bedside. Pt has history of dementia and is asleep at time of visit. No family in.

## 2018-12-28 NOTE — PROGRESS NOTES
Tara completed a [employer] (medicaid pre-screening) and submitted it to [facility] via the portal. TARA informed Jen from [place] that it was completed. Also, TARA spoke with attending and he will likely d/c pt tomorrow. Gabby Segura

## 2018-12-28 NOTE — PROGRESS NOTES
TARA received bed offers from [facility] [address] for this pt. TARA spoke with daughter, Erika De Guzman, to inform her. She would like this pt to go to [facility]. TARA informed Jen in admissions at [facility]. TARA also notified attending. Arabella Butler

## 2018-12-29 VITALS
RESPIRATION RATE: 16 BRPM | TEMPERATURE: 97.7 F | WEIGHT: 144.7 LBS | BODY MASS INDEX: 20.26 KG/M2 | HEART RATE: 84 BPM | SYSTOLIC BLOOD PRESSURE: 134 MMHG | DIASTOLIC BLOOD PRESSURE: 63 MMHG | HEIGHT: 71 IN | OXYGEN SATURATION: 95 %

## 2018-12-29 LAB
ANION GAP SERPL CALC-SCNC: 7 MMOL/L (ref 5–15)
BNP SERPL-MCNC: ABNORMAL PG/ML (ref 0–450)
BUN SERPL-MCNC: 13 MG/DL (ref 6–20)
BUN/CREAT SERPL: 8 (ref 12–20)
CALCIUM SERPL-MCNC: 7.8 MG/DL (ref 8.5–10.1)
CHLORIDE SERPL-SCNC: 91 MMOL/L (ref 97–108)
CO2 SERPL-SCNC: 39 MMOL/L (ref 21–32)
CREAT SERPL-MCNC: 1.66 MG/DL (ref 0.7–1.3)
GLUCOSE BLD STRIP.AUTO-MCNC: 177 MG/DL (ref 65–100)
GLUCOSE BLD STRIP.AUTO-MCNC: 231 MG/DL (ref 65–100)
GLUCOSE SERPL-MCNC: 193 MG/DL (ref 65–100)
POTASSIUM SERPL-SCNC: 3.2 MMOL/L (ref 3.5–5.1)
SERVICE CMNT-IMP: ABNORMAL
SERVICE CMNT-IMP: ABNORMAL
SODIUM SERPL-SCNC: 137 MMOL/L (ref 136–145)

## 2018-12-29 PROCEDURE — 74011636637 HC RX REV CODE- 636/637: Performed by: INTERNAL MEDICINE

## 2018-12-29 PROCEDURE — 83880 ASSAY OF NATRIURETIC PEPTIDE: CPT

## 2018-12-29 PROCEDURE — 74011250637 HC RX REV CODE- 250/637: Performed by: INTERNAL MEDICINE

## 2018-12-29 PROCEDURE — 80048 BASIC METABOLIC PNL TOTAL CA: CPT

## 2018-12-29 PROCEDURE — 82962 GLUCOSE BLOOD TEST: CPT

## 2018-12-29 PROCEDURE — 74011250636 HC RX REV CODE- 250/636: Performed by: INTERNAL MEDICINE

## 2018-12-29 PROCEDURE — 36415 COLL VENOUS BLD VENIPUNCTURE: CPT

## 2018-12-29 PROCEDURE — 74011250637 HC RX REV CODE- 250/637: Performed by: HOSPITALIST

## 2018-12-29 RX ORDER — POTASSIUM CHLORIDE 750 MG/1
30 TABLET, FILM COATED, EXTENDED RELEASE ORAL 2 TIMES DAILY
Qty: 180 TAB | Refills: 0 | Status: SHIPPED | OUTPATIENT
Start: 2018-12-29 | End: 2019-02-04

## 2018-12-29 RX ORDER — POTASSIUM CHLORIDE 750 MG/1
30 TABLET, FILM COATED, EXTENDED RELEASE ORAL 2 TIMES DAILY
Status: DISCONTINUED | OUTPATIENT
Start: 2018-12-29 | End: 2018-12-29 | Stop reason: HOSPADM

## 2018-12-29 RX ORDER — FUROSEMIDE 40 MG/1
60 TABLET ORAL 2 TIMES DAILY
Qty: 90 TAB | Refills: 0 | Status: SHIPPED | OUTPATIENT
Start: 2018-12-29 | End: 2019-02-04

## 2018-12-29 RX ADMIN — Medication 81 MG: at 09:31

## 2018-12-29 RX ADMIN — LEVOTHYROXINE SODIUM 50 MCG: 50 TABLET ORAL at 09:31

## 2018-12-29 RX ADMIN — TAMSULOSIN HYDROCHLORIDE 0.4 MG: 0.4 CAPSULE ORAL at 09:31

## 2018-12-29 RX ADMIN — DULOXETINE 60 MG: 60 CAPSULE, DELAYED RELEASE ORAL at 09:31

## 2018-12-29 RX ADMIN — MIDODRINE HYDROCHLORIDE 5 MG: 5 TABLET ORAL at 09:31

## 2018-12-29 RX ADMIN — MEMANTINE 10 MG: 10 TABLET ORAL at 09:30

## 2018-12-29 RX ADMIN — POTASSIUM CHLORIDE 30 MEQ: 750 TABLET, EXTENDED RELEASE ORAL at 09:31

## 2018-12-29 RX ADMIN — HEPARIN SODIUM 5000 UNITS: 5000 INJECTION INTRAVENOUS; SUBCUTANEOUS at 13:03

## 2018-12-29 RX ADMIN — ROSUVASTATIN CALCIUM 5 MG: 10 TABLET, FILM COATED ORAL at 09:30

## 2018-12-29 RX ADMIN — HEPARIN SODIUM 5000 UNITS: 5000 INJECTION INTRAVENOUS; SUBCUTANEOUS at 06:23

## 2018-12-29 RX ADMIN — PANTOPRAZOLE SODIUM 40 MG: 40 TABLET, DELAYED RELEASE ORAL at 09:31

## 2018-12-29 RX ADMIN — INSULIN LISPRO 2 UNITS: 100 INJECTION, SOLUTION INTRAVENOUS; SUBCUTANEOUS at 08:30

## 2018-12-29 RX ADMIN — MIDODRINE HYDROCHLORIDE 5 MG: 5 TABLET ORAL at 12:00

## 2018-12-29 RX ADMIN — INSULIN LISPRO 3 UNITS: 100 INJECTION, SOLUTION INTRAVENOUS; SUBCUTANEOUS at 12:30

## 2018-12-29 RX ADMIN — PREGABALIN 50 MG: 25 CAPSULE ORAL at 09:30

## 2018-12-29 RX ADMIN — Medication 10 ML: at 06:23

## 2018-12-29 RX ADMIN — Medication 10 ML: at 13:03

## 2018-12-29 RX ADMIN — FUROSEMIDE 80 MG: 40 TABLET ORAL at 08:30

## 2018-12-29 NOTE — PROGRESS NOTES
Problem: Falls - Risk of 
Goal: *Absence of Falls Document Scott Chisholm Fall Risk and appropriate interventions in the flowsheet. Outcome: Progressing Towards Goal 
Fall Risk Interventions: 
Mobility Interventions: Bed/chair exit alarm, OT consult for ADLs, Patient to call before getting OOB, PT Consult for mobility concerns, PT Consult for assist device competence, Strengthening exercises (ROM-active/passive) Mentation Interventions: Bed/chair exit alarm, Door open when patient unattended, Familiar objects from home, Increase mobility, More frequent rounding, Reorient patient, Room close to nurse's station, Update white board Medication Interventions: Patient to call before getting OOB, Evaluate medications/consider consulting pharmacy Elimination Interventions: Bed/chair exit alarm, Call light in reach, Patient to call for help with toileting needs, Toileting schedule/hourly rounds Problem: Pressure Injury - Risk of 
Goal: *Prevention of pressure injury Document Flynn Scale and appropriate interventions in the flowsheet. Outcome: Progressing Towards Goal 
Pressure Injury Interventions: 
Sensory Interventions: Assess changes in LOC, Avoid rigorous massage over bony prominences, Discuss PT/OT consult with provider, Maintain/enhance activity level, Minimize linen layers, Pressure redistribution bed/mattress (bed type) Moisture Interventions: Absorbent underpads, Check for incontinence Q2 hours and as needed Activity Interventions: Increase time out of bed, Pressure redistribution bed/mattress(bed type), PT/OT evaluation Mobility Interventions: HOB 30 degrees or less, Pressure redistribution bed/mattress (bed type), PT/OT evaluation Nutrition Interventions: Document food/fluid/supplement intake, Offer support with meals,snacks and hydration Friction and Shear Interventions: HOB 30 degrees or less, Lift sheet, Minimize layers

## 2018-12-29 NOTE — ROUTINE PROCESS
I have reviewed discharge instructions with the patient. Discharge medications reviewed and appropriate educational materials and side effects teaching were provided. The patient is confused, A/O to self and does not understand instructions. PIV and Telemetry discontinued. Pt discharged to [facility] facility transferred via stretcher by AMS. Signed copy of AVS placed on pt chart. MARS, KARDEX, H&P,Discharge summary, copy of DDNR sent with pt. Pt denies pain or discomfort. No visible s/s of distress. Pt family aware of transfer. [facility]  No answer or return call.

## 2018-12-29 NOTE — PROGRESS NOTES
Discharge noted. This CM spoke with nursing supervisor Elisha Stapleton at [facility] and they are ready to accept patient today. Nursing can call report to [phone] unit 2. Nursing is also to place AVS, Kardex and MARS in envelope provided. This CM also spoke with patient's daughter Rivka Torres [phone] and she is aware of discharge today as well. Transportation arranged with [facility] for 1500. Starla Bran RN CRM Ext [phone] Received call from [facility] that they are running 30-45 minutes behind for . Unit called and made aware.

## 2018-12-29 NOTE — DISCHARGE INSTRUCTIONS
Smoking Cessation Program: This is a free, phone/text/email based, smoking cessation program. The program is individualized to meet each patient's needs. To enroll use the link - [facility]. [email or web address] or text Agustín Michelle to [phone] from any smart phone.     Yanni Salguero see dc note  Danelle Shirley MD 12/29/2018

## 2018-12-29 NOTE — PROGRESS NOTES
Bedside and Verbal shift change report given to Lenka, [facility] (oncoming nurse) by Varghese Garsia RN (offgoing nurse). Report included the following information SBAR, Kardex, MAR, Recent Results and Cardiac Rhythm A-fib.

## 2018-12-29 NOTE — ROUTINE PROCESS
Bedside shift change report given to Leena Salinas (oncoming nurse) by Jaiden Horan (offgoing nurse). Report included the following information SBAR, Kardex, Procedure Summary, Accordion, Recent Results and Cardiac Rhythm A fib.

## 2018-12-29 NOTE — DISCHARGE SUMMARY
Discharge Summary       PATIENT ID: Rubina Grossman  MRN: [id number]   YOB: 1940    DATE OF ADMISSION: 12/24/2018  8:56 PM    DATE OF DISCHARGE: 12/29/2018    PRIMARY CARE PROVIDER: Jimbo Lorenzo MD     ATTENDING PHYSICIAN: Zac Perez MD   DISCHARGING PROVIDER: Zac Perez MD    To contact this individual call [phone] and ask the  to page. If unavailable ask to be transferred the Adult Hospitalist Department. CONSULTATIONS: IP CONSULT TO PALLIATIVE CARE - PROVIDER    PROCEDURES/SURGERIES: * No surgery found *    ADMITTING DIAGNOSES & HOSPITAL COURSE:   Per  Palliative medicine:        PALLIATIVE DIAGNOSES:   1. Shortness of breath  2. DNR discussion  3. Frailty  4. Anorexia  5. Debility         PLAN:   1. Family meeting held with 3 children Veronica Browne and daughter in law Cloyce Shows  2. Pt declined option to participate and is limited by dementia  3. Introduced our services and inquired about family's understanding of the patient's condition. 4. Family arrived to the hospital in a rush thinking the pt was imminent. They were unable to verbalize specifics regarding his condition, only that it was serious  5. Explained that the pt has lung disease, heart failure, anorexia, debility, and kidney dysfunction. Because of this his prognosis is not good. Explained that heart failure is progressive and with each admission the pt will become more compromised  6. answered all questions  7. Code status addressed and family agrees to DNR. DDNR signed  8. Referral to our outpatient team placed for home services once the pt is released from skilled rehab  9. HUGS to follow up with family about additional resources     10. Contact information provided if needed  11. Requested copy of MPOA docs again. All the children made aware that without documents, all of the children have equal say in medical decisions.   12. Initial consult note routed to primary continuity provider  13. Communicated plan of care with: Palliative IDT          Recent notes:    Admission Summary:   CC: shortness of breath        HPI: 78 y.o man w/ CAD, CHF, COPD, DM, orthostatic hypotension, who presents with shortness of breath. He is a very poor historian due to dementia and doesn't know he's in a hospital. I spoke to his daughter/POA over the phone who tells me that last night he began complaining of shortness of breath. She also notes that over the last couple of weeks his oral intake has been very poor, and he's had low blood glucose readings in the 60's. The patient denies chest pain but does admit to shortness of breath when specifically asked. Family is not aware of any fever.            Interval history / Subjective:       12/27/2018 :   pt somnolent on rounds, no distree, non labored breathing, trace distal edema   Lasix changed to daily at 60 mg , k replaced iv again today lab for am to include mag. Note case discussed with navigator who notes family ( daughter Linda Garcia) wishes conference discussion as to best goals of care, and as of 12/27/2018 have noted the discrepancy that pharmacy appreciated as to use of opiods.  See pharm note,      12/28/2018 :  Pt is with markedly elevated nBNP but exam suggest eu-volemic,  Lasix however is changed to po and will start at 80 mg bid and cont to monitor lab shaji electrolytes and nBNP. ; pt is not in distress and alert/pleasant,  Case discussed with RN, pt for family /palliaite meeting today which is a good thing as pt a high risk for recurrent admissions     12/29/2018 :  Pt's renals essentially stable over week   Recommend changing lasix to 60 mg bid and for close monitoring of BMP on discharge along with bed side exam , consider at snf a range of 40 go 80 mg bid but for now go with 60 mg bid and adjust /hold as needed but would not hold more than one dose in a day.       Assessment & Plan:      Assessment & Plan:      Acute on chronic CHF, unspecified type: (POA) as evidenced by pulmonary edema, elevated NT pro-BNP, NYHA IV on admission. Last TTE in 2017 with normal LVEF, mild-mod MR  -diurese with IV Lasix  -I/O's, daily weight  -TTE  -note he is on fludrocortisone  Lasix 12/28/2018 is adjusted to 80 mg bid po   Adding coreg 12/28/2018 as bp up,   Pt not a acd/arb candidate 2n2 to ckd 3 +         Hypertension (POA) in the setting of midodrine for hypotension requiring weaning midodrine and BP monitor.     NSTEMI: (POA) trop 2.57, this may have precipitated CHF  -trend biomarkers  -start heparin drip (due to borderline renal function)  -continue rosuvastatin  -consult cardiology     Atrial fibrillation: (POA) this is a new diagnosis  -monitor on tele  -heparin drip as above     CKD 3 + not a acd/arb candidate.      Hypokalemia: (POA)  -replete  -monitor closely with diuresis        Lab Results   Component Value Date/Time     Potassium 3.2 (L) 12/28/2018 05:48 AM    replaced iv and po and f/u closely  -      Type 2 DM w/ hypoglycemia, peripheral neuropathy and CKD: (POA)  -hold all insulin  -POC checks q4h  -continue duloxetine and pregabalin for neuropathy  -check a1c        Lab Results   Component Value Date/Time     Glucose 176 (H) 12/28/2018 05:48 AM     Glucose (POC) 178 (H) 12/28/2018 07:18 AM            Lab Results   Component Value Date/Time     Hemoglobin A1c 6.6 (H) 12/25/2018 02:25 AM     Hemoglobin A1c, External 11.2 01/27/2016            History of orthostatic hypotension:   -on midodrine and fludrocortisone  -BP on the higher side, will begin weaning midodrine. If it's able to be weaned off would start beta blocker  -continue fludrocortisone for now     CKD III:  -monitor with diuresis     Dementia: high risk for delirium while here.  Hypoglycemia likely exacerbating confusion.        Code status DNR   Disposition: TBD           DVT prophylaxis: heparin      Care Plan discussed with: Patient/Family and Nurse  Disposition: SNF                DISCHARGE DIAGNOSES / PLAN:      1.  as above       PENDING TEST RESULTS:   At the time of discharge the following test results are still pending: na    FOLLOW UP APPOINTMENTS:    Follow-up Information     Follow up With Specialties Details Why Contact Info    Mariya Mojica MD Internal Medicine In 1 week  [address]  [phone]      Mariya Mojica MD Internal Medicine   [address]    [address] [address] [phone]             ADDITIONAL CARE RECOMMENDATIONS:     Pt's renals essentially stable over week   Recommend changing lasix to 60 mg bid and for close monitoring of BMP on discharge along with bed side exam , consider at snf a range of 40 go 80 mg bid but for now go with 60 mg bid and adjust /hold as needed but would not hold more than one dose in a day. DIET: Diabetic Diet     ACTIVITY: Activity as tolerated    WOUND CARE: na    EQUIPMENT needed: na      DISCHARGE MEDICATIONS:  Current Discharge Medication List      START taking these medications    Details   potassium chloride SR (KLOR-CON 10) 10 mEq tablet Take 3 Tabs by mouth two (2) times a day. Qty: 180 Tab, Refills: 0      furosemide (LASIX) 40 mg tablet Take 1.5 Tabs by mouth two (2) times a day. Qty: 90 Tab, Refills: 0         CONTINUE these medications which have NOT CHANGED    Details   aspirin 81 mg chewable tablet Take 81 mg by mouth daily. ergocalciferol (ERGOCALCIFEROL) 50,000 unit capsule Take 50,000 Units by mouth Every Friday. insulin lispro (HUMALOG U-100 INSULIN) 100 unit/mL injection by SubCUTAneous route Before breakfast, lunch, and dinner. (sliding scale insulin)      memantine ER (NAMENDA XR) 28 mg capsule Take 28 mg by mouth daily. diclofenac (VOLTAREN) 1 % gel Apply 4 g to affected area four (4) times daily as needed. pregabalin (LYRICA) 50 mg capsule Take 1 Cap by mouth two (2) times a day.  Max Daily Amount: 100 mg.  Qty: 60 Cap, Refills: 5    Associated Diagnoses: Neuropathy      midodrine (PROAMITINE) 5 mg tablet Take 5 mg by mouth two (2) times a day. cyanocobalamin (VITAMIN B-12) 100 mcg tablet Take 100 mcg by mouth daily. DULoxetine (CYMBALTA) 60 mg capsule Take 60 mg by mouth daily. tamsulosin (FLOMAX) 0.4 mg capsule Take 0.4 mg by mouth daily. omeprazole (PRILOSEC) 20 mg capsule Take 20 mg by mouth Daily (before breakfast). Refills: 11      rosuvastatin (CRESTOR) 5 mg tablet Take 5 mg by mouth daily. levothyroxine (SYNTHROID) 50 mcg tablet Take 50 mcg by mouth Daily (before breakfast). Refills: 1         STOP taking these medications       insulin glargine (LANTUS SOLOSTAR U-100 INSULIN) 100 unit/mL (3 mL) inpn Comments:   Reason for Stopping:         dulaglutide (TRULICITY) 1.5 XI/5.8 mL sub-q pen Comments:   Reason for Stopping:         potassium chloride (K-DUR, KLOR-CON) 20 mEq tablet Comments:   Reason for Stopping:         lidocaine (LIDODERM) 5 % Comments:   Reason for Stopping:         HYDROcodone-acetaminophen (NORCO) 5-325 mg per tablet Comments:   Reason for Stopping:         fludrocortisone (FLORINEF) 0.1 mg tablet Comments:   Reason for Stopping:         aspirin delayed-release (ASPIR-81) 81 mg tablet Comments:   Reason for Stopping:                 NOTIFY YOUR PHYSICIAN FOR ANY OF THE FOLLOWING:   Fever over 101 degrees for 24 hours. Chest pain, shortness of breath, fever, chills, nausea, vomiting, diarrhea, change in mentation, falling, weakness, bleeding. Severe pain or pain not relieved by medications. Or, any other signs or symptoms that you may have questions about.     DISPOSITION:    Home With:   OT  PT  HH  RN      x Long term SNF/Inpatient Rehab    Independent/assisted living    Hospice    Other:       PATIENT CONDITION AT DISCHARGE:     Functional status   x Poor    x Deconditioned     Independent      Cognition     Lucid     Forgetful    x Dementia Catheters/lines (plus indication)    [place]     PICC     PEG    x None      Code status     Full code    x DNR      PHYSICAL EXAMINATION AT DISCHARGE:   Refer to Progress Note        CHRONIC MEDICAL DIAGNOSES:  Problem List as of 12/29/2018 Date Reviewed: 10/10/2018          Codes Class Noted - Resolved    * (Principal) CHF (congestive heart failure) ([facility] ICD-10-CM: I50.9  ICD-9-CM: 428.0  12/25/2018 - Present        NSTEMI (non-ST elevated myocardial infarction) ([facility] ICD-10-CM: I21.4  ICD-9-CM: 410.70  12/25/2018 - Present        Hypokalemia ICD-10-CM: E87.6  ICD-9-CM: 276.8  12/25/2018 - Present        Orthostatic hypotension ICD-10-CM: I95.1  ICD-9-CM: 458.0  7/17/2017 - Present        Uncontrolled diabetes mellitus with hyperglycemia ([facility] ICD-10-CM: E11.65  ICD-9-CM: 250.02  4/11/2017 - Present        Syncope ICD-10-CM: R55  ICD-9-CM: 780.2  4/11/2017 - Present        Volume depletion ICD-10-CM: E86.9  ICD-9-CM: 276.50  4/11/2017 - Present        Hyponatremia ICD-10-CM: E87.1  ICD-9-CM: 276.1  4/11/2017 - Present        Urinary incontinence ICD-10-CM: R32  ICD-9-CM: 788.30  4/11/2017 - Present        CAD (coronary artery disease) ICD-10-CM: I25.10  ICD-9-CM: 414.00  4/11/2017 - Present        COPD (chronic obstructive pulmonary disease) ([facility] ICD-10-CM: J44.9  ICD-9-CM: 496  4/11/2017 - Present        Cognitive disorder ICD-10-CM: F09  ICD-9-CM: 294.9  7/11/2016 - Present    Overview Signed 7/11/2016  2:00 PM by Kacie Logan MD     Due to [facility]             Moderate recurrent major depression ([facility] ICD-10-CM: F33.1  ICD-9-CM: 296.32  7/11/2016 - Present        Complicated grief QRP-20-RI: F43.29, Z63.4  ICD-9-CM: 309.0  7/23/2015 - Present        Non compliance w medication regimen ICD-10-CM: Z91.14  ICD-9-CM: V15.81  7/23/2015 - Present        RESOLVED: Pneumonia ICD-10-CM: J18.9  ICD-9-CM: 486  8/6/2015 - 8/8/2015        RESOLVED: Major psychotic depression, single episode ([facility] ICD-10-CM: F32.3  ICD-9-CM: 296.24  7/23/2015 - 7/11/2016              Greater than 30  minutes were spent with the patient on counseling and coordination of care    Signed:   Griselda Fray, MD  12/29/2018  9:16 AM

## 2018-12-31 ENCOUNTER — DOCUMENTATION ONLY (OUTPATIENT)
Dept: CASE MANAGEMENT | Age: 78
End: 2018-12-31

## 2018-12-31 NOTE — PROGRESS NOTES
Continuation [facility] [place] [facility] according to the [facility] note:    NN contacted patient's daughter Benjamin Fierro per her request. Patient has Medicare/ LTC  Medicaid and is receiving Personal Care daily in his home. Benjamin Fierro may be interested in [phone] for the patient. She will contact Palliative Care after her father is discharged from rehabilitation. NN explained importance of close follow up with providers soon after discharge. NN remains available to patient / family should they need further assistance or have questions.   Please note Palliative Care Consultation 12/28/2018 @ 1545 hr.  Thank you Physical form needs completion. Placed in Kjersti E Knox, MD mail box. When complete please place in MA outgoing bin

## 2019-01-02 ENCOUNTER — PATIENT OUTREACH (OUTPATIENT)
Dept: CASE MANAGEMENT | Age: 79
End: 2019-01-02

## 2019-01-02 NOTE — PROGRESS NOTES
Community Care Team documentation for patient in [facility] Initial Follow Up Hospital Admission and Diagnosis: [facility] 12/24-12/29/18  Acute on chronic CHF Patient was discharged to [facility]. RRAT score: 28 Advance Care Planning:  DDNR on file. PCP : Vicenta Romo MD 
 
SNF Attending:  Leidy Fallon MD 
 
Spoke with SNF team.  Ensured patient arrived to SNF safely with admission packet in order. PT/OT providing skilled therapy in SNF. Currently ambulating 120 ft with rolling walker. Barrier - breathing with gait training. Patient had a fall today 1/2/19 with right hip pain. Xray being done. Blood sugar in 300's today, medications changed and accuchecks increased to 3 x daily. Plan for discharge home per daughter. Per daughter patient refuses to use rolling walker or any assistive device at home. Barrier - poor safety awareness. LOS/Home health TBD. Community Care Team will follow up weekly with [facility] until discharge. Medications were not reconciled and general patient assessment was not completed during this [facility] outreach. Rex Capellan, MSN, RN, ACNS-BC, [facility] Nurse Navigator, [address] [place] [address] [phone]

## 2019-01-03 ENCOUNTER — PATIENT OUTREACH (OUTPATIENT)
Dept: CARDIOLOGY CLINIC | Age: 79
End: 2019-01-03

## 2019-01-03 NOTE — PROGRESS NOTES
Transition of Care Coordination/Hospital to Post Acute Facility: 
  
Date/Time:  1/3/2019 8:23 AM 
 
Patient was admitted to [address] [place] [address] on 12/24/18 and discharged on 12/29/18 for shortness of breath. Patient was transferred to [address] for continuation of care. Inpatient RRAT score: 28 Top Challenges reviewed Safety concerns/barriers: Patient has dementia. SNF has reported one fall already on 1/2/19. Per chart daughter reports pt will not use assistive devices at  Home. Labs of concern at DC: K 3.2, CR 1.66, Ca 7.8, proBNP 79928 Palliative consulted in hospital, will need follow up at discharge from rehab Method of communication with care team :none Nurse Navigator(NN) unable to reach staff personnel at this time to provide introduction to self and explanation of the Nurse Navigator Role. ACP:  
Does the patient have a current ACP (including DDNR):  yes Does the post acute facility have a copy of the patients ACP:  yes per chart notes copies were sent Medication(s):  
New Medications at Discharge: Potassium 30 meq BID, Lasix 60 mg BID Changed Medications at Discharge: na 
Discontinued Medications at Discharge: lantus, trulicity, lidoderm patch, norco, florinef PCP/Specialist follow up: No future appointments. Opportunity to ask questions was provided. Contact information was provided for future reference or further questions. Will continue to monitor.

## 2019-01-08 ENCOUNTER — PATIENT OUTREACH (OUTPATIENT)
Dept: CARDIOLOGY CLINIC | Age: 79
End: 2019-01-08

## 2019-01-08 ENCOUNTER — PATIENT OUTREACH (OUTPATIENT)
Dept: CASE MANAGEMENT | Age: 79
End: 2019-01-08

## 2019-01-08 NOTE — PROGRESS NOTES
Community Care Team Documentation for Patient in [facility] Subsequent Follow up Patient remains at [facility] ([facility]). See previous [facility] Team notes. PCP : Jo Ann Jensen MD 
 
Spoke with SNF team. Pt had a fall 1/2/19 and was readmitted to [facility] due to hip fx. Pt returned to [facility] today 1/8. PT/OT to reeval today. Daily weights to restart. Pt lives with son and has Medicaid aides every day for about 5 hours. Medications were not reconciled and general patient assessment was not completed during this skilled nursing facility outreach.

## 2019-01-08 NOTE — PROGRESS NOTES
Goals Addressed This Visit's Progress  Reduce risk of CHF exacerbations and complications. 01/03/19 [facility] of [facility]F symptoms/exacerbation [facility] [employer], was transferred to [address]. Left message requesting return call to discuss patient and perform med rec. NN will follow up---love 01/08/19 NN participated in [facility] weekly call. Was informed by [facility] that patient fell and fx'd hip on 1/2/19, was hospitalized at [facility] from 1/2/19 and returned today 1/8/19. PT/OT will re-evaluate. NN will follow up with care coordinator to reconcile medication list and to attempt to obtain lab and weight records tomorrow. ---love

## 2019-01-09 ENCOUNTER — PATIENT OUTREACH (OUTPATIENT)
Dept: CARDIOLOGY CLINIC | Age: 79
End: 2019-01-09

## 2019-01-09 RX ORDER — FLUTICASONE PROPIONATE AND SALMETEROL 250; 50 UG/1; UG/1
1 POWDER RESPIRATORY (INHALATION) EVERY 12 HOURS
COMMUNITY
End: 2019-02-04

## 2019-01-09 RX ORDER — LANOLIN ALCOHOL/MO/W.PET/CERES
325 CREAM (GRAM) TOPICAL DAILY
COMMUNITY
End: 2019-02-12

## 2019-01-09 RX ORDER — ARIPIPRAZOLE 10 MG/1
10 TABLET ORAL DAILY
COMMUNITY
End: 2019-02-04

## 2019-01-09 RX ORDER — TRAMADOL HYDROCHLORIDE 50 MG/1
25 TABLET ORAL
COMMUNITY
End: 2019-02-12

## 2019-01-09 RX ORDER — TRAZODONE HYDROCHLORIDE 50 MG/1
50 TABLET ORAL
COMMUNITY
End: 2019-03-08

## 2019-01-09 RX ORDER — ACETAMINOPHEN 325 MG/1
TABLET ORAL
COMMUNITY
End: 2019-02-04

## 2019-01-09 RX ORDER — ENOXAPARIN SODIUM 100 MG/ML
40 INJECTION SUBCUTANEOUS
COMMUNITY
End: 2019-02-04

## 2019-01-09 RX ORDER — GLIPIZIDE 5 MG/1
TABLET ORAL 2 TIMES DAILY
COMMUNITY
End: 2019-02-04

## 2019-01-09 NOTE — PROGRESS NOTES
Goals Addressed This Visit's Progress  Reduce risk of CHF exacerbations and complications. 01/03/19 [facility] symptoms/exacerbation [facility], was transferred to [address]. Left message requesting return call to discuss patient and perform med rec. NN will follow up---mkbrionna 01/08/19 NN participated in [facility] weekly call. Was informed by [facility] that patient fell and fx'd hip on 1/2/19, was hospitalized at [facility] from 1/2/19 and returned today 1/8/19. PT/OT will re-evaluate. NN will follow up with care coordinator to reconcile medication list and to attempt to obtain lab and weight records tomorrow. ---love 01/09/19 L/M for [facility] mgr contact Jose Miguel Marisa [phone] to obtain information on pt's recent admission. Spoke to Geovanny Sharif, nurse at [facility] taking care of patient today. Reviewed medication list patient is now on post discharge from [facility]. Noted he is no longer on Lasix and Potassium. Geovanny Sharif reports patient was taken off of this due to low BP, however today's /65. She will address restarting these with NP who rounds daily. Gave her Dr Rodo Rome name as MD who saw patient while he was in [facility]. Patient's weight today 135 lbs, Geovanny Sharif reports he is on daily weights. NN will follow up on next CC call next week for updates---[email or web address]

## 2019-01-15 ENCOUNTER — PATIENT OUTREACH (OUTPATIENT)
Dept: CASE MANAGEMENT | Age: 79
End: 2019-01-15

## 2019-01-15 RX ORDER — ERGOCALCIFEROL 1.25 MG/1
CAPSULE ORAL
Qty: 4 CAP | Refills: 3 | Status: SHIPPED | OUTPATIENT
Start: 2019-01-15 | End: 2019-02-04

## 2019-01-15 NOTE — PROGRESS NOTES
Community Care Team Documentation for Patient in [facility] Subsequent Follow up Patient remains at [facility] ([facility]). See previous [facility] Team notes. PCP : Iraida Najera MD 
 
Spoke with SNF team.  PT/OT continue. Ambulating up to 11 ft with rolling walker; self propelling wheelchair; mod to max with all ADLs. Barrier - pain. Changed tylenol order and added oxycodone 5 mg. Discontinued Seroquel due to lethargy. Weight stable at 136 lbs. Plan for discharge to home with son. Has Medicaid aides every day for about 5 hours. Home health TBD. Medications were not reconciled and general patient assessment was not completed during this skilled nursing facility outreach. Ale Capellan, MSN, RN, ACNS-BC, [facility] Nurse Navigator, [address] [place] [address] [phone]

## 2019-01-16 ENCOUNTER — PATIENT OUTREACH (OUTPATIENT)
Dept: CARDIOLOGY CLINIC | Age: 79
End: 2019-01-16

## 2019-01-22 ENCOUNTER — PATIENT OUTREACH (OUTPATIENT)
Dept: CASE MANAGEMENT | Age: 79
End: 2019-01-22

## 2019-01-23 ENCOUNTER — PATIENT OUTREACH (OUTPATIENT)
Dept: CARDIOLOGY CLINIC | Age: 79
End: 2019-01-23

## 2019-01-23 NOTE — PROGRESS NOTES
Goals  Reduce risk of CHF exacerbations and complications. 01/03/19 NN will coordinate efforts with [facility] to reduce risk of CHF symptoms/exacerbation [employer], was transferred to [address]. Left message requesting return call to discuss patient and perform med rec. NN will follow up---mkrw 01/08/19 NN participated in [facility] weekly call. Was informed by [facility] that patient fell and fx'd hip on 1/2/19, was hospitalized at [facility] from 1/2/19 and returned today 1/8/19. PT/OT will re-evaluate. NN will follow up with care coordinator to reconcile medication list and to attempt to obtain lab and weight records tomorrow. ---mkrw 01/09/19 L/M for [facility] Jorge A Dutta  contact Windy Lam [phone] to obtain information on pt's recent admission. Spoke to Veronique Hernandez, nurse at [facility] taking care of patient today. Reviewed medication list patient is now on post discharge from [facility]. Noted he is no longer on Lasix and Potassium. Veronique Hernandez reports patient was taken off of this due to low BP, however today's /65. She will address restarting these with NP who rounds daily. Gave her Dr Ean Ruano name as MD who saw patient while he was in [facility]. Patient's weight today 135 lbs, Veronique Hernandez reports he is on daily weights. NN will follow up on next CC call next week for updates---mkrw 01/16/19 Per [facility] notes patient weight stable at 136 lbs. Is only able to ambulate 11 ft with PT with walker, uses self propelled wheelchair. Pain is a barrier---is on oxycodone and tylenol. Plan is for patient to return home with son and home care sitters during the day. NN will follow up next weekfor updates---mkrw 01/23/19 Staff from [facility] not available for CCT call yesterday. NN reached out to Wilson Talavera, nurse at [facility] taking care of patient today. Weight today 132.2, down 4 lbs from last week.  She reports patient did not get Lasix or potassium restarted from discharge from [facility]. NN will address with team today and follow up next week---love

## 2019-01-29 ENCOUNTER — PATIENT OUTREACH (OUTPATIENT)
Dept: CASE MANAGEMENT | Age: 79
End: 2019-01-29

## 2019-01-29 ENCOUNTER — PATIENT OUTREACH (OUTPATIENT)
Dept: CARDIOLOGY CLINIC | Age: 79
End: 2019-01-29

## 2019-01-29 NOTE — PROGRESS NOTES
Goals Addressed This Visit's Progress  Reduce risk of CHF exacerbations and complications. 01/03/19 [facility] of [facility]F symptoms/exacerbation [employer], was transferred to [address]. Left message requesting return call to discuss patient and perform med rec. NN will follow up---mkrw 01/08/19 NN participated in [facility] weekly call. Was informed by [facility] that patient fell and fx'd hip on 1/2/19, was hospitalized at [facility] from 1/2/19 and returned today 1/8/19. PT/OT will re-evaluate. NN will follow up with care coordinator to reconcile medication list and to attempt to obtain lab and weight records tomorrow. ---mkrw 01/09/19 L/M for [facility] Tra Diaz mgr contact Rio Swartz [phone] to obtain information on pt's recent admission. Spoke to Red Forbes, nurse at [facility] taking care of patient today. Reviewed medication list patient is now on post discharge from [facility]. Noted he is no longer on Lasix and Potassium. [employer] reports patient was taken off of this due to low BP, however today's /65. She will address restarting these with NP who rounds daily. Gave her Dr Yuko Swan name as MD who saw patient while he was in [facility]. Patient's weight today 135 lbs, Red Forbes reports he is on daily weights. NN will follow up on next CC call next week for updates---mkrw 01/16/19 Per [facility] notes patient weight stable at 136 lbs. Is only able to ambulate 11 ft with PT with walker, uses self propelled wheelchair. Pain is a barrier---is on oxycodone and tylenol. Plan is for patient to return home with son and home care sitters during the day. NN will follow up next weekfor updates---mkrw 01/23/19 Staff from [facility] not available for CCT call yesterday. NN reached out to [facility], nurse at [facility] taking care of patient today.  Weight today 132.2, down 4 lbs from last week. She reports patient did not get Lasix or potassium restarted from discharge from [facility]. NN will address with team today and follow up next week---love 01/29/19 NN participated in CCT weekly call. Patient has discharged today home to live with son and will be using [facility] for home care services. NN will contact family for updates and to confirm transition of services. Patient to be discharged from [facility] NN services on 2/1/19---rebeccarw

## 2019-01-29 NOTE — PROGRESS NOTES
Community Care Team Documentation for Patient in [facility] Discharge Note Patient has been discharged from [facility] ([facility] See previous [facility] Team notes. PCP : Jose M Cristobal MD 
 
Spoke with SNF team.  Confirmed pt discharged today 1/29 home with son with [facility]. [facility] for Medicaid personal care aides 5 hours per day. Community Care Team will sign off at this time. Medications were not reconciled and general patient assessment was not completed during this skilled nursing facility outreach.

## 2019-01-30 ENCOUNTER — PATIENT OUTREACH (OUTPATIENT)
Dept: CARDIOLOGY CLINIC | Age: 79
End: 2019-01-30

## 2019-01-31 ENCOUNTER — PATIENT OUTREACH (OUTPATIENT)
Dept: CARDIOLOGY CLINIC | Age: 79
End: 2019-01-31

## 2019-01-31 NOTE — PROGRESS NOTES
Goals  Reduce risk of CHF exacerbations and complications. 01/03/19 NN will coordinate efforts with [facility] to reduce risk of CHF symptoms/exacerbation [employer], was transferred to [address]. Left message requesting return call to discuss patient and perform med rec. NN will follow up---mkrw 01/08/19 NN participated in [facility] weekly call. Was informed by [facility] that patient fell and fx'd hip on 1/2/19, was hospitalized at [facility] from 1/2/19 and returned today 1/8/19. PT/OT will re-evaluate. NN will follow up with care coordinator to reconcile medication list and to attempt to obtain lab and weight records tomorrow. ---mkrw 01/09/19 L/M for [facility] Jorge A Dutta  contact Rio Swartz [phone] to obtain information on pt's recent admission. Spoke to Zee Rainey, nurse at [facility] taking care of patient today. Reviewed medication list patient is now on post discharge from [facility]. Noted he is no longer on Lasix and Potassium. Zee Rainey reports patient was taken off of this due to low BP, however today's /65. She will address restarting these with NP who rounds daily. Gave her Dr Yuko Swan name as MD who saw patient while he was in [facility]. Patient's weight today 135 lbs, Zee Rainey reports he is on daily weights. NN will follow up on next CC call next week for updates---mkrw 01/16/19 Per [facility] notes patient weight stable at 136 lbs. Is only able to ambulate 11 ft with PT with walker, uses self propelled wheelchair. Pain is a barrier---is on oxycodone and tylenol. Plan is for patient to return home with son and home care sitters during the day. NN will follow up next weekfor updates---mkrw 01/23/19 Staff from [facility] not available for CCT call yesterday. NN reached out to [facility], nurse at [facility] taking care of patient today. Weight today 132.2, down 4 lbs from last week.  She reports patient did not get Lasix or potassium restarted from discharge from [facility]. NN will address with team today and follow up next week---mkrw 01/29/19 NN participated in CCT weekly call. Patient has discharged today home to live with son and will be using [facility] for home care services. NN  Contacted Bhaskar Sneed, patient's daughter. She reports that [facility] should be sending SN and PT to start tomorrow. [facility] aides will not be coming until tomorrow as well, however her brother is at home with patient currently. NN will call both agencies to confirm start of care. Patient to be discharged from [facility] NN services on 2/1/19---mkrw 01/30/19 Called [facility], spoke to Beau. He states that patient is on their schedule to be seen today. Left Voicemail for [facility] [phone] requesting return call to confirm services for home aids have been resumed. Spoke to Bhaskar Sneed, patient's daughter. Asked her to contact cardiolgist, Dr Yesenia Lagos to inquire about restarting Lasix and Potassium that was discontinued at [facility] when patient fell. She is still interested in [address] and Pall[facility]. Mailed information today to include  [facility], [address] low sodium diet pamphlets. She reports she still has palliative care information that was given to her in the hospital and will contact. NN will follow up to confirm [facility] in place tomorrow---mkrw 01/31/19 Unable to contact tiana Rubio by phone. Called [facility] to confirm patient was on their services for [facility] aides. Per Ms. Elvis Escalante staff was in a meeting, she will relay message and have someone return call. ---mkrw

## 2019-02-04 ENCOUNTER — APPOINTMENT (OUTPATIENT)
Dept: CT IMAGING | Age: 79
DRG: 637 | End: 2019-02-04
Attending: INTERNAL MEDICINE
Payer: MEDICARE

## 2019-02-04 ENCOUNTER — PATIENT OUTREACH (OUTPATIENT)
Dept: CARDIOLOGY CLINIC | Age: 79
End: 2019-02-04

## 2019-02-04 ENCOUNTER — APPOINTMENT (OUTPATIENT)
Dept: GENERAL RADIOLOGY | Age: 79
DRG: 637 | End: 2019-02-04
Attending: EMERGENCY MEDICINE
Payer: MEDICARE

## 2019-02-04 ENCOUNTER — HOSPITAL ENCOUNTER (INPATIENT)
Age: 79
LOS: 2 days | Discharge: HOME HEALTH | DRG: 637 | End: 2019-02-06
Attending: EMERGENCY MEDICINE | Admitting: INTERNAL MEDICINE
Payer: MEDICARE

## 2019-02-04 ENCOUNTER — TELEPHONE (OUTPATIENT)
Dept: ENDOCRINOLOGY | Age: 79
End: 2019-02-04

## 2019-02-04 DIAGNOSIS — R74.8 CARDIAC ENZYMES ELEVATED: ICD-10-CM

## 2019-02-04 DIAGNOSIS — E16.2 HYPOGLYCEMIA: ICD-10-CM

## 2019-02-04 DIAGNOSIS — I50.9 OTHER CONGESTIVE HEART FAILURE (HCC): Primary | ICD-10-CM

## 2019-02-04 PROBLEM — R41.0 ACUTE DELIRIUM: Status: ACTIVE | Noted: 2019-02-04

## 2019-02-04 LAB
ALBUMIN SERPL-MCNC: 2.3 G/DL (ref 3.5–5)
ALBUMIN/GLOB SERPL: 0.7 {RATIO} (ref 1.1–2.2)
ALP SERPL-CCNC: 107 U/L (ref 45–117)
ALT SERPL-CCNC: 21 U/L (ref 12–78)
ANION GAP SERPL CALC-SCNC: 5 MMOL/L (ref 5–15)
APPEARANCE UR: CLEAR
AST SERPL-CCNC: 32 U/L (ref 15–37)
BASOPHILS # BLD: 0.1 K/UL (ref 0–0.1)
BASOPHILS NFR BLD: 1 % (ref 0–1)
BILIRUB SERPL-MCNC: 0.2 MG/DL (ref 0.2–1)
BILIRUB UR QL: NEGATIVE
BNP SERPL-MCNC: 4731 PG/ML (ref 0–450)
BUN SERPL-MCNC: 17 MG/DL (ref 6–20)
BUN/CREAT SERPL: 15 (ref 12–20)
CALCIUM SERPL-MCNC: 7.9 MG/DL (ref 8.5–10.1)
CHLORIDE SERPL-SCNC: 113 MMOL/L (ref 97–108)
CK MB CFR SERPL CALC: 4.4 % (ref 0–2.5)
CK MB CFR SERPL CALC: 5.1 % (ref 0–2.5)
CK MB SERPL-MCNC: 3.9 NG/ML (ref 5–25)
CK MB SERPL-MCNC: 4.2 NG/ML (ref 5–25)
CK SERPL-CCNC: 82 U/L (ref 39–308)
CK SERPL-CCNC: 88 U/L (ref 39–308)
CO2 SERPL-SCNC: 24 MMOL/L (ref 21–32)
COLOR UR: NORMAL
CREAT SERPL-MCNC: 1.16 MG/DL (ref 0.7–1.3)
CRP SERPL-MCNC: <0.29 MG/DL (ref 0–0.6)
DIFFERENTIAL METHOD BLD: ABNORMAL
EOSINOPHIL # BLD: 0.5 K/UL (ref 0–0.4)
EOSINOPHIL NFR BLD: 5 % (ref 0–7)
ERYTHROCYTE [DISTWIDTH] IN BLOOD BY AUTOMATED COUNT: 17.2 % (ref 11.5–14.5)
ERYTHROCYTE [SEDIMENTATION RATE] IN BLOOD: 46 MM/HR (ref 0–20)
FERRITIN SERPL-MCNC: 75 NG/ML (ref 26–388)
FOLATE SERPL-MCNC: 21.9 NG/ML (ref 5–21)
GLOBULIN SER CALC-MCNC: 3.3 G/DL (ref 2–4)
GLUCOSE BLD STRIP.AUTO-MCNC: 119 MG/DL (ref 65–100)
GLUCOSE BLD STRIP.AUTO-MCNC: 45 MG/DL (ref 65–100)
GLUCOSE BLD STRIP.AUTO-MCNC: 49 MG/DL (ref 65–100)
GLUCOSE BLD STRIP.AUTO-MCNC: 77 MG/DL (ref 65–100)
GLUCOSE BLD STRIP.AUTO-MCNC: 86 MG/DL (ref 65–100)
GLUCOSE BLD STRIP.AUTO-MCNC: 87 MG/DL (ref 65–100)
GLUCOSE BLD STRIP.AUTO-MCNC: 91 MG/DL (ref 65–100)
GLUCOSE SERPL-MCNC: 73 MG/DL (ref 65–100)
GLUCOSE UR STRIP.AUTO-MCNC: NEGATIVE MG/DL
HCT VFR BLD AUTO: 28.4 % (ref 36.6–50.3)
HGB BLD-MCNC: 8.9 G/DL (ref 12.1–17)
HGB UR QL STRIP: NEGATIVE
IMM GRANULOCYTES # BLD AUTO: 0 K/UL (ref 0–0.04)
IMM GRANULOCYTES NFR BLD AUTO: 0 % (ref 0–0.5)
IRON SATN MFR SERPL: 20 % (ref 20–50)
IRON SERPL-MCNC: 39 UG/DL (ref 35–150)
KETONES UR QL STRIP.AUTO: NEGATIVE MG/DL
LEUKOCYTE ESTERASE UR QL STRIP.AUTO: NEGATIVE
LYMPHOCYTES # BLD: 0.9 K/UL (ref 0.8–3.5)
LYMPHOCYTES NFR BLD: 9 % (ref 12–49)
MCH RBC QN AUTO: 30.5 PG (ref 26–34)
MCHC RBC AUTO-ENTMCNC: 31.3 G/DL (ref 30–36.5)
MCV RBC AUTO: 97.3 FL (ref 80–99)
MONOCYTES # BLD: 0.8 K/UL (ref 0–1)
MONOCYTES NFR BLD: 8 % (ref 5–13)
NEUTS SEG # BLD: 8.3 K/UL (ref 1.8–8)
NEUTS SEG NFR BLD: 78 % (ref 32–75)
NITRITE UR QL STRIP.AUTO: NEGATIVE
NRBC # BLD: 0 K/UL (ref 0–0.01)
NRBC BLD-RTO: 0 PER 100 WBC
PH UR STRIP: 5 [PH] (ref 5–8)
PLATELET # BLD AUTO: 257 K/UL (ref 150–400)
PMV BLD AUTO: 12.2 FL (ref 8.9–12.9)
POTASSIUM SERPL-SCNC: 4 MMOL/L (ref 3.5–5.1)
PROT SERPL-MCNC: 5.6 G/DL (ref 6.4–8.2)
PROT UR STRIP-MCNC: NEGATIVE MG/DL
RBC # BLD AUTO: 2.92 M/UL (ref 4.1–5.7)
SERVICE CMNT-IMP: ABNORMAL
SERVICE CMNT-IMP: NORMAL
SODIUM SERPL-SCNC: 142 MMOL/L (ref 136–145)
SP GR UR REFRACTOMETRY: 1.01 (ref 1–1.03)
TIBC SERPL-MCNC: 196 UG/DL (ref 250–450)
TROPONIN I SERPL-MCNC: 0.13 NG/ML
TROPONIN I SERPL-MCNC: 0.22 NG/ML
UR CULT HOLD, URHOLD: NORMAL
UROBILINOGEN UR QL STRIP.AUTO: 0.2 EU/DL (ref 0.2–1)
VIT B12 SERPL-MCNC: 394 PG/ML (ref 193–986)
WBC # BLD AUTO: 10.7 K/UL (ref 4.1–11.1)

## 2019-02-04 PROCEDURE — 74011250637 HC RX REV CODE- 250/637: Performed by: INTERNAL MEDICINE

## 2019-02-04 PROCEDURE — 71046 X-RAY EXAM CHEST 2 VIEWS: CPT

## 2019-02-04 PROCEDURE — 85025 COMPLETE CBC W/AUTO DIFF WBC: CPT

## 2019-02-04 PROCEDURE — 80053 COMPREHEN METABOLIC PANEL: CPT

## 2019-02-04 PROCEDURE — 82746 ASSAY OF FOLIC ACID SERUM: CPT

## 2019-02-04 PROCEDURE — 65660000001 HC RM ICU INTERMED STEPDOWN

## 2019-02-04 PROCEDURE — 93005 ELECTROCARDIOGRAM TRACING: CPT

## 2019-02-04 PROCEDURE — 74011250636 HC RX REV CODE- 250/636: Performed by: INTERNAL MEDICINE

## 2019-02-04 PROCEDURE — 96374 THER/PROPH/DIAG INJ IV PUSH: CPT

## 2019-02-04 PROCEDURE — 74011000250 HC RX REV CODE- 250

## 2019-02-04 PROCEDURE — 36415 COLL VENOUS BLD VENIPUNCTURE: CPT

## 2019-02-04 PROCEDURE — 84484 ASSAY OF TROPONIN QUANT: CPT

## 2019-02-04 PROCEDURE — 85652 RBC SED RATE AUTOMATED: CPT

## 2019-02-04 PROCEDURE — 82607 VITAMIN B-12: CPT

## 2019-02-04 PROCEDURE — 83540 ASSAY OF IRON: CPT

## 2019-02-04 PROCEDURE — 82550 ASSAY OF CK (CPK): CPT

## 2019-02-04 PROCEDURE — 83880 ASSAY OF NATRIURETIC PEPTIDE: CPT

## 2019-02-04 PROCEDURE — 70450 CT HEAD/BRAIN W/O DYE: CPT

## 2019-02-04 PROCEDURE — 81003 URINALYSIS AUTO W/O SCOPE: CPT

## 2019-02-04 PROCEDURE — 99285 EMERGENCY DEPT VISIT HI MDM: CPT

## 2019-02-04 PROCEDURE — 86140 C-REACTIVE PROTEIN: CPT

## 2019-02-04 PROCEDURE — 82728 ASSAY OF FERRITIN: CPT

## 2019-02-04 PROCEDURE — 82962 GLUCOSE BLOOD TEST: CPT

## 2019-02-04 RX ORDER — MIDODRINE HYDROCHLORIDE 5 MG/1
5 TABLET ORAL DAILY
Status: DISCONTINUED | OUTPATIENT
Start: 2019-02-05 | End: 2019-02-06 | Stop reason: HOSPADM

## 2019-02-04 RX ORDER — ACETAMINOPHEN 500 MG
1000 TABLET ORAL
COMMUNITY
End: 2021-01-01

## 2019-02-04 RX ORDER — GUAIFENESIN 100 MG/5ML
81 LIQUID (ML) ORAL DAILY
Status: CANCELLED | OUTPATIENT
Start: 2019-02-05

## 2019-02-04 RX ORDER — LEVOTHYROXINE SODIUM 50 UG/1
50 TABLET ORAL
Status: DISCONTINUED | OUTPATIENT
Start: 2019-02-05 | End: 2019-02-06 | Stop reason: HOSPADM

## 2019-02-04 RX ORDER — ACETAMINOPHEN 500 MG
1000 TABLET ORAL 3 TIMES DAILY
Status: DISCONTINUED | OUTPATIENT
Start: 2019-02-04 | End: 2019-02-06 | Stop reason: HOSPADM

## 2019-02-04 RX ORDER — ASPIRIN 325 MG
325 TABLET, DELAYED RELEASE (ENTERIC COATED) ORAL EVERY 12 HOURS
COMMUNITY
End: 2019-02-12

## 2019-02-04 RX ORDER — LIDOCAINE 50 MG/G
1 PATCH TOPICAL EVERY 24 HOURS
COMMUNITY
End: 2019-02-12 | Stop reason: SDUPTHER

## 2019-02-04 RX ORDER — QUETIAPINE FUMARATE 50 MG/1
50 TABLET, FILM COATED ORAL 2 TIMES DAILY
COMMUNITY
End: 2019-02-12 | Stop reason: SDUPTHER

## 2019-02-04 RX ORDER — TRAZODONE HYDROCHLORIDE 50 MG/1
50 TABLET ORAL
Status: DISCONTINUED | OUTPATIENT
Start: 2019-02-04 | End: 2019-02-06 | Stop reason: HOSPADM

## 2019-02-04 RX ORDER — OMEPRAZOLE 20 MG/1
20 CAPSULE, DELAYED RELEASE ORAL
Status: CANCELLED | OUTPATIENT
Start: 2019-02-05

## 2019-02-04 RX ORDER — ARIPIPRAZOLE 10 MG/1
10 TABLET ORAL DAILY
Status: CANCELLED | OUTPATIENT
Start: 2019-02-05

## 2019-02-04 RX ORDER — HEPARIN SODIUM 5000 [USP'U]/ML
5000 INJECTION, SOLUTION INTRAVENOUS; SUBCUTANEOUS EVERY 8 HOURS
Status: DISCONTINUED | OUTPATIENT
Start: 2019-02-04 | End: 2019-02-06 | Stop reason: HOSPADM

## 2019-02-04 RX ORDER — ASPIRIN 325 MG
325 TABLET, DELAYED RELEASE (ENTERIC COATED) ORAL EVERY 12 HOURS
Status: DISCONTINUED | OUTPATIENT
Start: 2019-02-04 | End: 2019-02-06 | Stop reason: HOSPADM

## 2019-02-04 RX ORDER — UREA 10 %
100 LOTION (ML) TOPICAL DAILY
Status: DISCONTINUED | OUTPATIENT
Start: 2019-02-05 | End: 2019-02-06 | Stop reason: HOSPADM

## 2019-02-04 RX ORDER — FLUTICASONE PROPIONATE AND SALMETEROL 100; 50 UG/1; UG/1
1 POWDER RESPIRATORY (INHALATION) EVERY 12 HOURS
COMMUNITY
End: 2019-02-12

## 2019-02-04 RX ORDER — ROSUVASTATIN CALCIUM 10 MG/1
5 TABLET, COATED ORAL DAILY
Status: DISCONTINUED | OUTPATIENT
Start: 2019-02-05 | End: 2019-02-06 | Stop reason: HOSPADM

## 2019-02-04 RX ORDER — SODIUM CHLORIDE 0.9 % (FLUSH) 0.9 %
5-40 SYRINGE (ML) INJECTION AS NEEDED
Status: DISCONTINUED | OUTPATIENT
Start: 2019-02-04 | End: 2019-02-06 | Stop reason: HOSPADM

## 2019-02-04 RX ORDER — TAMSULOSIN HYDROCHLORIDE 0.4 MG/1
0.4 CAPSULE ORAL DAILY
Status: DISCONTINUED | OUTPATIENT
Start: 2019-02-05 | End: 2019-02-06 | Stop reason: HOSPADM

## 2019-02-04 RX ORDER — BUDESONIDE 0.5 MG/2ML
500 INHALANT ORAL
Status: DISCONTINUED | OUTPATIENT
Start: 2019-02-04 | End: 2019-02-06 | Stop reason: HOSPADM

## 2019-02-04 RX ORDER — DULOXETIN HYDROCHLORIDE 60 MG/1
60 CAPSULE, DELAYED RELEASE ORAL
Status: DISCONTINUED | OUTPATIENT
Start: 2019-02-04 | End: 2019-02-06 | Stop reason: HOSPADM

## 2019-02-04 RX ORDER — ASCORBIC ACID 500 MG
500 TABLET ORAL DAILY
COMMUNITY
End: 2019-02-12

## 2019-02-04 RX ORDER — ACETAMINOPHEN 325 MG/1
325 TABLET ORAL
Status: DISCONTINUED | OUTPATIENT
Start: 2019-02-04 | End: 2019-02-06 | Stop reason: HOSPADM

## 2019-02-04 RX ORDER — AMOXICILLIN 250 MG
2 CAPSULE ORAL
COMMUNITY
End: 2019-02-12 | Stop reason: SDUPTHER

## 2019-02-04 RX ORDER — MEMANTINE HYDROCHLORIDE 10 MG/1
10 TABLET ORAL EVERY 12 HOURS
Status: DISCONTINUED | OUTPATIENT
Start: 2019-02-04 | End: 2019-02-06 | Stop reason: HOSPADM

## 2019-02-04 RX ORDER — FUROSEMIDE 40 MG/1
60 TABLET ORAL 2 TIMES DAILY
Status: CANCELLED | OUTPATIENT
Start: 2019-02-04

## 2019-02-04 RX ORDER — FLUTICASONE PROPIONATE AND SALMETEROL 250; 50 UG/1; UG/1
1 POWDER RESPIRATORY (INHALATION) EVERY 12 HOURS
Status: CANCELLED | OUTPATIENT
Start: 2019-02-04

## 2019-02-04 RX ORDER — PREGABALIN 25 MG/1
50 CAPSULE ORAL 2 TIMES DAILY
Status: CANCELLED | OUTPATIENT
Start: 2019-02-04

## 2019-02-04 RX ORDER — SODIUM CHLORIDE 0.9 % (FLUSH) 0.9 %
5-40 SYRINGE (ML) INJECTION EVERY 8 HOURS
Status: DISCONTINUED | OUTPATIENT
Start: 2019-02-04 | End: 2019-02-06 | Stop reason: HOSPADM

## 2019-02-04 RX ORDER — ARFORMOTEROL TARTRATE 15 UG/2ML
15 SOLUTION RESPIRATORY (INHALATION)
Status: DISCONTINUED | OUTPATIENT
Start: 2019-02-04 | End: 2019-02-06 | Stop reason: HOSPADM

## 2019-02-04 RX ORDER — DULOXETIN HYDROCHLORIDE 20 MG/1
60 CAPSULE, DELAYED RELEASE ORAL
COMMUNITY
End: 2019-02-12

## 2019-02-04 RX ORDER — TRAMADOL HYDROCHLORIDE 50 MG/1
25 TABLET ORAL
Status: DISCONTINUED | OUTPATIENT
Start: 2019-02-04 | End: 2019-02-06 | Stop reason: HOSPADM

## 2019-02-04 RX ORDER — POTASSIUM CHLORIDE 750 MG/1
30 TABLET, FILM COATED, EXTENDED RELEASE ORAL 2 TIMES DAILY
Status: CANCELLED | OUTPATIENT
Start: 2019-02-04

## 2019-02-04 RX ORDER — QUETIAPINE FUMARATE 25 MG/1
50 TABLET, FILM COATED ORAL EVERY 12 HOURS
Status: DISCONTINUED | OUTPATIENT
Start: 2019-02-04 | End: 2019-02-06 | Stop reason: HOSPADM

## 2019-02-04 RX ORDER — ACETAMINOPHEN 325 MG/1
650 TABLET ORAL
Status: DISCONTINUED | OUTPATIENT
Start: 2019-02-04 | End: 2019-02-04

## 2019-02-04 RX ORDER — ONDANSETRON 4 MG/1
4 TABLET, ORALLY DISINTEGRATING ORAL
COMMUNITY
End: 2019-02-12 | Stop reason: SDUPTHER

## 2019-02-04 RX ORDER — DEXTROSE 50 % IN WATER (D50W) INTRAVENOUS SYRINGE
Status: COMPLETED
Start: 2019-02-04 | End: 2019-02-04

## 2019-02-04 RX ORDER — ONDANSETRON 2 MG/ML
4 INJECTION INTRAMUSCULAR; INTRAVENOUS
Status: DISCONTINUED | OUTPATIENT
Start: 2019-02-04 | End: 2019-02-06 | Stop reason: HOSPADM

## 2019-02-04 RX ORDER — DEXTROSE 50 % IN WATER (D50W) INTRAVENOUS SYRINGE
50
Status: COMPLETED | OUTPATIENT
Start: 2019-02-04 | End: 2019-02-05

## 2019-02-04 RX ORDER — IPRATROPIUM BROMIDE AND ALBUTEROL SULFATE 2.5; .5 MG/3ML; MG/3ML
3 SOLUTION RESPIRATORY (INHALATION)
Status: DISCONTINUED | OUTPATIENT
Start: 2019-02-04 | End: 2019-02-06 | Stop reason: HOSPADM

## 2019-02-04 RX ORDER — INSULIN GLARGINE 100 [IU]/ML
15 INJECTION, SOLUTION SUBCUTANEOUS DAILY
Status: ON HOLD | COMMUNITY
End: 2019-02-06 | Stop reason: SDUPTHER

## 2019-02-04 RX ORDER — IBUPROFEN 200 MG
1 TABLET ORAL DAILY
Status: DISCONTINUED | OUTPATIENT
Start: 2019-02-05 | End: 2019-02-06 | Stop reason: HOSPADM

## 2019-02-04 RX ORDER — THERA TABS 400 MCG
1 TAB ORAL DAILY
COMMUNITY
End: 2019-02-12

## 2019-02-04 RX ADMIN — TRAZODONE HYDROCHLORIDE 50 MG: 50 TABLET ORAL at 22:08

## 2019-02-04 RX ADMIN — ASPIRIN 325 MG: 325 TABLET, DELAYED RELEASE ORAL at 22:08

## 2019-02-04 RX ADMIN — DEXTROSE MONOHYDRATE 25 G: 25 INJECTION, SOLUTION INTRAVENOUS at 16:14

## 2019-02-04 RX ADMIN — Medication 10 ML: at 22:08

## 2019-02-04 RX ADMIN — ACETAMINOPHEN 1000 MG: 500 TABLET ORAL at 22:13

## 2019-02-04 RX ADMIN — DULOXETINE 60 MG: 60 CAPSULE, DELAYED RELEASE ORAL at 22:07

## 2019-02-04 RX ADMIN — QUETIAPINE FUMARATE 50 MG: 25 TABLET ORAL at 22:08

## 2019-02-04 RX ADMIN — DEXTROSE 50 % IN WATER (D50W) INTRAVENOUS SYRINGE 25 G: at 16:14

## 2019-02-04 RX ADMIN — MEMANTINE 10 MG: 10 TABLET ORAL at 22:08

## 2019-02-04 NOTE — ED NOTES
Assumed care of patient at this time. Pt alert to self, disoriented to situation, time and place. BG repeated and 45. Dr. Shawn Molina notified. See new orders. 4:33 PM 
Repeat  after treatment. Pt ordered meal tray. Hospitalist at bedside.

## 2019-02-04 NOTE — ROUTINE PROCESS
TRANSFER - OUT REPORT: 
 
Verbal report given to Rafaela Patiño (name) on Mallory Robertson  being transferred to [facility] (unit) for routine progression of care Report consisted of patients Situation, Background, Assessment and  
Recommendations(SBAR). Information from the following report(s) SBAR, Kardex, Intake/Output, MAR and Recent Results was reviewed with the receiving nurse. Lines:  
Peripheral IV 02/04/19 Left; Lower Forearm (Active) Site Assessment Clean, dry, & intact 2/4/2019  4:26 PM  
Phlebitis Assessment 0 2/4/2019  4:26 PM  
Infiltration Assessment 0 2/4/2019  4:26 PM  
Dressing Status Clean, dry, & intact 2/4/2019  4:26 PM  
Dressing Type Transparent 2/4/2019  4:26 PM  
Hub Color/Line Status Flushed 2/4/2019  4:26 PM  
Action Taken Blood drawn 2/4/2019  4:26 PM  
  
 
Opportunity for questions and clarification was provided. Patient transported with: 
 Monitor

## 2019-02-04 NOTE — PROGRESS NOTES
Goals Addressed This Visit's Progress  Reduce risk of CHF exacerbations and complications. 01/03/19 [facility] of [facility] symptoms/exacerbation [employer], was transferred to [address]. Left message requesting return call to discuss patient and perform med rec. NN will follow up---mkrw 01/08/19 NN participated in [facility] weekly call. Was informed by [facility] that patient fell and fx'd hip on 1/2/19, was hospitalized at [facility] from 1/2/19 and returned today 1/8/19. PT/OT will re-evaluate. NN will follow up with care coordinator to reconcile medication list and to attempt to obtain lab and weight records tomorrow. ---mkrw 01/09/19 L/M for [facility] Tra Diaz mgr contact [place] Mon [phone] to obtain information on pt's recent admission. Spoke to Red Forbes, nurse at [facility] taking care of patient today. Reviewed medication list patient is now on post discharge from [facility]. Noted he is no longer on Lasix and Potassium. [employer] reports patient was taken off of this due to low BP, however today's /65. She will address restarting these with NP who rounds daily. Gave her Dr Trinity Rutledge name as MD who saw patient while he was in [facility]. Patient's weight today 135 lbs, Red Forbes reports he is on daily weights. NN will follow up on next CC call next week for updates---mkrw 01/16/19 Per [facility] patient weight stable at 136 lbs. Is only able to ambulate 11 ft with PT with walker, uses self propelled wheelchair. Pain is a barrier---is on oxycodone and tylenol. Plan is for patient to return home with son and home care sitters during the day. NN will follow up next weekfor updates---mkrw 01/23/19 Staff from [facility] not available for CCT call yesterday. NN reached out to [facility], nurse at [facility] taking care of patient today.  Weight today 132.2, down 4 lbs from last week. She reports patient did not get Lasix or potassium restarted from discharge from [facility]. NN will address with team today and follow up next week---mkrw 01/29/19 NN participated in CCT weekly call. Patient has discharged today home to live with son and will be using [facility] and [facility] for home care services. NN  Contacted Melvin Mobley, patient's daughter. She reports that [facility] should be sending SN and PT to start tomorrow. [place] aides will not be coming until tomorrow as well, however her brother is at home with patient currently. NN will call both agencies to confirm start of care. Patient to be discharged from [facility] NN services on 2/1/19---mkrw 01/30/19 Called [facility], spoke to [place]. He states that patient is on their schedule to be seen today. Left Voicemail for [facility] [phone] requesting return call to confirm services for home aids have been resumed. Spoke to Melvin Mobley, patient's daughter. Asked her to contact cardiolgist, Dr Alyssa Brennan to inquire about restarting Lasix and Potassium that was discontinued at [facility] when patient fell. She is still interested in [address] and Palliative. Mailed information today to include  [facility] Based Primary Care Services, [address] low sodium diet pamphlets. She reports she still has palliative care information that was given to her in the hospital and will contact. NN will follow up to confirm [place] in place tomorrow---mkrw 01/31/19 Unable to contact daughter Ghislaine Cox by phone. Called [facility] to confirm patient was on their services for [place] aides. Per Ms. Valarie Fallon staff was in a meeting, she will relay message and have someone return call. ---mkrw 02/04/19 Spoke to daughter Ghislaine Cox. She received information NN sent for [address] and is contacting today.  She also reports [facility] is starting today as well. Daughter reports that she is concerned about patient, she states he is beligerent, refuses to take his medications, is more confused. Asked her if she felt patient should be in a memory care facility vs living with her brother--she states yes however she is getting resistance from her family about this. Sent message to Carlos Rich at [facility] home care to inform her of this. NN will follow up---love

## 2019-02-04 NOTE — ED TRIAGE NOTES
PT presents to the ER from home with complaints of low blood sugar initial  33 and given 1amp glucagon, then 52, and 3rd time blood sugar 65 5 min ago. PT with blood sugar of 91 in Er. PT unable to respond to family, commands according to EMS. PT alert and oriented X2 at present time.

## 2019-02-04 NOTE — ED NOTES
Bedside shift change report given to Belinda Augustine  (oncoming nurse) by Maricel Jackson (offgoing nurse). Report included the following information SBAR, ED Summary and MAR.

## 2019-02-04 NOTE — ED PROVIDER NOTES
69 y.o. male with past medical history significant for dementia, diabetes, CHF h/o NSTEMI, AFIB, CAD, s/p open heart surgery, COPD, h/o pneumonia, s/p right hip replacement, who presents to the ED via EMS, with chief complaint of low blood sugar and altered mental status. EMS personnel report that they transported the patient from home with complaints of altered mental status and low blood sugar. They state that the patient was initially awake, but unresponsive to questions or commands, and his initial blood sugar taken at scene was \"33\". EMS report that they gave the patient oral glucose which raised his blood sugar to \"37\" and later 1 amp glucagon which raised his blood sugar to \"52\" en route, and \"91\" here in the ED. They state that the patient was answering questions by the time they loaded the patient into the ambulance. Family reports that the patient was \"fine\" this morning, but while walking through the house he shouted \"help me, I feel nauseated, I feel like I am going to pass out\". They state that the patient was disoriented, \"did not know where he was\" and became \"clammy\" and pale. Family also notes that the patient has \"slight\" swelling in his bilateral hands, but note that the patient does not exhibit any increased swelling in his arms or legs. They state that the patient experienced swelling and disorientation prior to be admitted for heart failure in December. Pt also complains of urinary urgency and urinary incontinence. Family reports that the patient was released from rehab, where he was receiving care for ~4-5 weeks s/p right hip replacement, on 1/29/19. They state that the patient's insulin was adjusted at the rehab center and he was placed on HumaLog, but they are concerned that the patient may be taking too much insulin. They also note that the patient was taken off of Lasix while in rehab.  Family states that the patient has been \"wandering\" at night, which they believe to be part of dementia. They deny that the patient has experienced any cough, congestion, vomiting or shortness of breath. They also deny that the patient is taking any diuretic. Family notes that the patient ate breakfast and took his insulin the morning. They state that the patient has had a \"voracious\" appetite and has gained \"6 pounds from 136 on 1/29/19\". Pt specifically denies nausea, vomiting, or shortness of breath. There are no other acute medical concerns at this time. Full history, physical exam, and ROS unable to be obtained due to:  dementia. Review of medical records indicate that the patient was admitted from 12/24/18 to 12/29/18 with acute on chronic diastolic heart failure. Records from rehab facility provided by family indicate that the patient is taking Humalog 3 units with all meals, and Lantus 15 units subcutaneously every morning. Social hx: Positive for Tobacco use (current every day smoker); Negative for EtOH use; Negative for Illicit Drug use PCP: Rashida Ojeda MD 
Endocrinologist: Edwin Almazan MD 
 
Note written by Lazara Londono, as dictated by Rosalee Blancas MD 1:40 PM 
 
 
The history is provided by the patient, medical records and a relative. The history is limited by the condition of the patient (dementia). No  was used. Past Medical History:  
Diagnosis Date  CAD (coronary artery disease)  COPD (chronic obstructive pulmonary disease) ([facility])  [facility].  Ill-defined condition SOB  Pneumonia  Urinary incontinence Past Surgical History:  
Procedure Laterality Date [address]  
 open heart  HX HEENT  1975  
 nasal surgery [address] [facility] PROCEDURES  2013  
 left Family History:  
Problem Relation Age of Onset  Diabetes Mother  Diabetes Brother Social History Socioeconomic History  Marital status:  Spouse name: Not on file  Number of children: Not on file  Years of education: Not on file  Highest education level: Not on file Social Needs  Financial resource strain: Not on file  Food insecurity - worry: Not on file  Food insecurity - inability: Not on file  Transportation needs - medical: Not on file  Transportation needs - non-medical: Not on file Occupational History  Not on file Tobacco Use  Smoking status: Current Every Day Smoker  Smokeless tobacco: Never Used  Tobacco comment: 1-2 cigars daily Substance and Sexual Activity  Alcohol use: No  
  Alcohol/week: 0.0 oz  Drug use: No  
 Sexual activity: Not on file Other Topics Concern  Not on file Social History Narrative  Not on file ALLERGIES: Sulfa (sulfonamide antibiotics) Review of Systems Constitutional: Positive for appetite change, diaphoresis and unexpected weight change. Negative for activity change and fatigue. HENT: Negative for congestion, ear pain, facial swelling, sore throat and trouble swallowing. Eyes: Negative for pain, discharge and visual disturbance. Respiratory: Negative for cough, chest tightness, shortness of breath and wheezing. Cardiovascular: Negative for chest pain and palpitations. Gastrointestinal: Negative for abdominal pain, blood in stool, nausea and vomiting. Genitourinary: Positive for urgency. Negative for difficulty urinating, flank pain and hematuria. Positive for urinary incontinence. Musculoskeletal: Positive for joint swelling (bilateral hands). Negative for arthralgias, myalgias and neck pain. Skin: Positive for pallor. Negative for color change and rash. Neurological: Negative for dizziness, weakness, numbness and headaches. Hematological: Negative for adenopathy. Does not bruise/bleed easily. Psychiatric/Behavioral: Positive for confusion. Negative for behavioral problems and sleep disturbance. All other systems reviewed and are negative. Vitals:  
 02/04/19 1310 BP: 114/51 Pulse: 92 Resp: 18 Temp: 97.4 °F (36.3 °C) SpO2: 93% Weight: 70.3 kg (155 lb) Height: 5' 11\" (1.803 m) Physical Exam  
Constitutional: He is oriented to person, place, and time. He appears well-developed and well-nourished. No distress. HENT:  
Head: Normocephalic and atraumatic. Nose: Nose normal.  
Mouth/Throat: Oropharynx is clear and moist.  
Eyes: Conjunctivae and EOM are normal. Pupils are equal, round, and reactive to light. No scleral icterus. Neck: Normal range of motion. Neck supple. No JVD present. No tracheal deviation present. No thyromegaly present. No carotid bruits noted. Cardiovascular: Normal rate, normal heart sounds and intact distal pulses. Exam reveals no gallop and no friction rub. No murmur heard. Occasional arrhythmia. Pulmonary/Chest: Effort normal and breath sounds normal. No respiratory distress. He has no wheezes. He has no rales. He exhibits no tenderness. Abdominal: Soft. Bowel sounds are normal. He exhibits no distension and no mass. There is no tenderness. There is no rebound and no guarding. Musculoskeletal: Normal range of motion. He exhibits no edema or tenderness. Trace pretibial edema. Lymphadenopathy:  
  He has no cervical adenopathy. Neurological: He is alert and oriented to person, place, and time. He has normal reflexes. No cranial nerve deficit. Coordination normal.  
Skin: Skin is warm and dry. No rash noted. No erythema. There is pallor. Psychiatric: He has a normal mood and affect. His behavior is normal. Judgment and thought content normal.  
Nursing note and vitals reviewed. Note written by Lazara Dukes, as dictated by Indigo Gregory MD 1:40 PM 
 
MDM Number of Diagnoses or Management Options Amount and/or Complexity of Data Reviewed Clinical lab tests: ordered and reviewed Tests in the radiology section of CPT®: ordered and reviewed Decide to obtain previous medical records or to obtain history from someone other than the patient: yes Review and summarize past medical records: yes Discuss the patient with other providers: yes Independent visualization of images, tracings, or specimens: yes Risk of Complications, Morbidity, and/or Mortality Presenting problems: high Diagnostic procedures: high Management options: high Patient Progress Patient progress: stable Procedures ED EKG interpretation: 
Time: 13:42 Rhythm: normal sinus rhythm with occasional extra PACs;. Rate (approx.): 74 bpm; Axis: normal; Intervals: normal; ST/T wave: non-specific ST/T wave changes; no ectopy or other acute changes; Note written by Lazara Gerardo, as dictated by Ryne Logan MD 1:48 PM 
 
CONSULT NOTE: 
3:35 PM Ryne Logan MD spoke with Dr. Joseph Moody MD, Consult for Endocrinology. Discussed available diagnostic tests and clinical findings. Dr. Nathan Judge reports that patient had been previously taking Trulicity and may place him back on it. For now, he recommends decreasing dosage of Lantus to 8 units every morning and admitting the patient to the hospitalist service. CONSULT NOTE: 
3:46 PM Ryne Logan MD spoke with Dr. Kenisha Mott MD, Consult for Cardiology. Discussed available diagnostic tests and clinical findings. Dr. Arash Hu reports that Dr. Rudy Cee will evaluate the patient. CONSULT NOTE: 
4:06 PM Ryne Logan MD communicated with Dr. Saniya Heard MD, Consult for Hospitalist via [facility] Text. Discussed available diagnostic tests and clinical findings.  Dr. Marla Orr will evaluate the patient for admission to the hospital. 
 
4:06 PM 
Patient is being admitted to the hospital.  The results of their tests and reasons for their admission have been discussed with them and/or available family. They convey agreement and understanding for the need to be admitted and for their admission diagnosis. Consultation will be made now with the inpatient physician for hospitalization. PROGRESS NOTE: 
5:20 PM 
Dr. Bianca Skinner in the ED at this time to evaluate the patient.

## 2019-02-04 NOTE — PROGRESS NOTES
Admission Medication Reconciliation: 
 
Information obtained from: Discharge medication list (dated 1/29/19) from [facility]. It appears that patient recently had orthopedic surgery based on notations on the medication list.   
 
Patient was instructed to take aspirin  mg Q12H starting 1/18/19 x 4 weeks. Comments/Recommendations: Patient is unable to provide any information regarding home medications due to dementia. No family is present at this time. The following changes were made to the PTA medication list: 
Medications added: insulin [id number] units, ondansetron 4mg ODT, quetiapine 50mg, lidocaine patches Medications removed:aripiprazole 10mg, enoxaparin, furosemide 40mg, glipizide 5mg, menthol, oxycodone, potassium [id number] Medications changed: aspirin 325 mg BID (versus 81 mg daily), APAP 325mg changed to 1000 mg TID, Advair diskus 250/50 changed to 100/50, midodrine changed from BID to once daily, Senna changed to Senna-Plus 8.6-50 Significant PMH/Disease States:  
Past Medical History:  
Diagnosis Date  CAD (coronary artery disease)  COPD (chronic obstructive pulmonary disease) ([facility])  Diabetes ([facility] 1970[facility]  
 Heart failure ([facility]  Ill-defined condition SOB  MI (myocardial infarction) ([facility] 01/2019  Pneumonia  Urinary incontinence Chief Complaint for this Admission: Chief Complaint Patient presents with  Low Blood Sugar  Altered mental status Allergies:  Sulfa (sulfonamide antibiotics) Prior to Admission Medications:  
Prior to Admission Medications Prescriptions Last Dose Informant Patient Reported? Taking? DULoxetine (CYMBALTA) 20 mg capsule   Yes Yes Sig: Take 20 mg by mouth daily. QUEtiapine (SEROQUEL) 50 mg tablet   Yes Yes Sig: Take 50 mg by mouth two (2) times a day. acetaminophen (TYLENOL) 500 mg tablet   Yes Yes Sig: Take 500 mg by mouth every eight (8) hours as needed for Pain. aspirin 81 mg chewable tablet   Yes Yes Sig: Take 81 mg by mouth daily. cyanocobalamin (VITAMIN B-12) 100 mcg tablet   Yes Yes Sig: Take 100 mcg by mouth daily. ferrous sulfate 325 mg (65 mg iron) tablet   Yes Yes Sig: Take  by mouth Daily (before breakfast). fluticasone-salmeterol (ADVAIR DISKUS) 100-50 mcg/dose diskus inhaler   Yes Yes Sig: Take 1 Puff by inhalation every twelve (12) hours. insulin glargine (LANTUS,BASAGLAR) 100 unit/mL (3 mL) inpn   Yes Yes Sig: 15 Units by SubCUTAneous route daily. insulin lispro (HUMALOG U-100 INSULIN) 100 unit/mL injection   Yes Yes Sig: 3 Units by SubCUTAneous route Before breakfast, lunch, and dinner. (sliding scale insulin) levothyroxine (SYNTHROID) 50 mcg tablet   Yes Yes Sig: Take 50 mcg by mouth Daily (before breakfast). memantine ER (NAMENDA XR) 28 mg capsule   Yes Yes Sig: Take 28 mg by mouth daily. midodrine (PROAMITINE) 5 mg tablet   Yes Yes Sig: Take 5 mg by mouth daily. omeprazole (PRILOSEC) 20 mg capsule   Yes Yes Sig: Take 20 mg by mouth Daily (before breakfast). ondansetron (ZOFRAN ODT) 4 mg disintegrating tablet   Yes Yes Sig: Take 4 mg by mouth two (2) times daily as needed for Nausea. rosuvastatin (CRESTOR) 5 mg tablet   Yes Yes Sig: Take 5 mg by mouth daily. senna-docusate (PERICOLACE) 8.6-50 mg per tablet   Yes Yes Sig: Take 1 Tab by mouth daily. tamsulosin (FLOMAX) 0.4 mg capsule   Yes Yes Sig: Take 0.4 mg by mouth daily. traMADol (ULTRAM) 50 mg tablet   Yes Yes Sig: Take 50 mg by mouth every six (6) hours as needed for Pain. traZODone (DESYREL) 50 mg tablet   Yes Yes Sig: Take  by mouth nightly. Facility-Administered Medications: None Alli Reyes, PharmD Candidate [id number]

## 2019-02-04 NOTE — PROGRESS NOTES
02/04/19 Patient met 30 days post hospital FELICIA time as of 2/1/19. Note:  Review of EMR shows patient is currently in ED of [facility] for low blood sugar, AMS.

## 2019-02-04 NOTE — TELEPHONE ENCOUNTER
Venancio Christianson from [facility] ER called regarding this pt and being treated in the ER for hypoglycemia. Dr. Estella Saba is asking Dr. Josie Pink to contact him ASAP. Forwarding verbal message and electronic message to Dr. Josie Pink at this time.

## 2019-02-04 NOTE — PROGRESS NOTES
Spiritual Care Assessment/Progress Note [address] 
 
 
NAME: Ben Hines      MRN: [id number] AGE: 69 y.o. SEX: male Caodaism Affiliation: Be Veloz Language: [place] 2/4/2019     Total Time (in minutes): 10 Spiritual Assessment begun in [address] [facility] [address] DEP through conversation with: 
  
    [x]Patient        [] Family    [] Friend(s) Reason for Consult: Emergency Department visit Spiritual beliefs: (Please include comment if needed) [x] Identifies with a stevie tradition:     
   [x] Supported by a stevie community:        
   [] Claims no spiritual orientation:       
   [] Seeking spiritual identity:            
   [] Adheres to an individual form of spirituality:       
   [] Not able to assess:                   
 
    
Identified resources for coping:  
   [x] Prayer                           
   [] Music                  [] Guided Imagery [x] Family/friends                 [] Pet visits [] Devotional reading                         [] Unknown 
   [] Other:                                        
 
 
Interventions offered during this visit: (See comments for more details) Patient Interventions: Affirmation of emotions/emotional suffering, Affirmation of stevie, Coping skills reviewed/reinforced, Iconic (affirming the presence of God/Higher Power), Prayer (actual), Prayer (assurance of), Caodaism beliefs/image of God discussed Family/Friend(s): Affirmation of emotions/emotional suffering, Affirmation of stevie, Coping skills reviewed/reinforced, Iconic (affirming the presence of God/Higher Power), Normalization of emotional/spiritual concerns, Caodaism beliefs/image of God discussed Plan of Care: 
 
 [x] Support spiritual and/or cultural needs  
 [] Support AMD and/or advance care planning process    
 [] Support grieving process 
 [] Coordinate Rites and/or Rituals  
 [] Coordination with community clergy [] No spiritual needs identified at this time [] Detailed Plan of Care below (See Comments)  [] Make referral to Music Therapy 
[] Make referral to Pet Therapy    
[] Make referral to Addiction services 
[] Make referral to [facility] 
[] Make referral to Spiritual Care Partner 
[] No future visits requested       
[x] Follow up visits as needed Comments: [place] in [place], this  notices pt attempting to get [place] of bed [place] [address] with his daughter helping.  secured assistance for pt from Nurse Moyock. Nurse Gauthier got help for the pt.  stayed with pt and daughter until help getting pt back in bed arrived.  listened as pt shared about his life.  prayed with pt and offered continued Spiritual Support. Chaplain Farhat Intern, MDiv 
[phone])

## 2019-02-05 LAB
ALBUMIN SERPL-MCNC: 2.4 G/DL (ref 3.5–5)
ALBUMIN/GLOB SERPL: 0.7 {RATIO} (ref 1.1–2.2)
ALP SERPL-CCNC: 104 U/L (ref 45–117)
ALT SERPL-CCNC: 23 U/L (ref 12–78)
ANION GAP SERPL CALC-SCNC: 7 MMOL/L (ref 5–15)
AST SERPL-CCNC: 32 U/L (ref 15–37)
ATRIAL RATE: 79 BPM
ATRIAL RATE: 91 BPM
BASOPHILS # BLD: 0.1 K/UL (ref 0–0.1)
BASOPHILS NFR BLD: 1 % (ref 0–1)
BILIRUB SERPL-MCNC: 0.2 MG/DL (ref 0.2–1)
BUN SERPL-MCNC: 15 MG/DL (ref 6–20)
BUN/CREAT SERPL: 12 (ref 12–20)
CALCIUM SERPL-MCNC: 8.4 MG/DL (ref 8.5–10.1)
CALCULATED R AXIS, ECG10: 75 DEGREES
CALCULATED R AXIS, ECG10: 80 DEGREES
CALCULATED T AXIS, ECG11: 23 DEGREES
CALCULATED T AXIS, ECG11: 26 DEGREES
CHLORIDE SERPL-SCNC: 112 MMOL/L (ref 97–108)
CK MB CFR SERPL CALC: 5.5 % (ref 0–2.5)
CK MB SERPL-MCNC: 4.5 NG/ML (ref 5–25)
CK SERPL-CCNC: 82 U/L (ref 39–308)
CO2 SERPL-SCNC: 24 MMOL/L (ref 21–32)
CREAT SERPL-MCNC: 1.21 MG/DL (ref 0.7–1.3)
DIAGNOSIS, 93000: NORMAL
DIAGNOSIS, 93000: NORMAL
DIFFERENTIAL METHOD BLD: ABNORMAL
EOSINOPHIL # BLD: 0.3 K/UL (ref 0–0.4)
EOSINOPHIL NFR BLD: 4 % (ref 0–7)
ERYTHROCYTE [DISTWIDTH] IN BLOOD BY AUTOMATED COUNT: 17.2 % (ref 11.5–14.5)
EST. AVERAGE GLUCOSE BLD GHB EST-MCNC: 154 MG/DL
GLOBULIN SER CALC-MCNC: 3.6 G/DL (ref 2–4)
GLUCOSE BLD STRIP.AUTO-MCNC: 167 MG/DL (ref 65–100)
GLUCOSE BLD STRIP.AUTO-MCNC: 191 MG/DL (ref 65–100)
GLUCOSE BLD STRIP.AUTO-MCNC: 219 MG/DL (ref 65–100)
GLUCOSE BLD STRIP.AUTO-MCNC: 228 MG/DL (ref 65–100)
GLUCOSE BLD STRIP.AUTO-MCNC: 62 MG/DL (ref 65–100)
GLUCOSE BLD STRIP.AUTO-MCNC: 77 MG/DL (ref 65–100)
GLUCOSE BLD STRIP.AUTO-MCNC: 99 MG/DL (ref 65–100)
GLUCOSE SERPL-MCNC: 59 MG/DL (ref 65–100)
HBA1C MFR BLD: 7 % (ref 4.2–6.3)
HCT VFR BLD AUTO: 28.4 % (ref 36.6–50.3)
HGB BLD-MCNC: 9.2 G/DL (ref 12.1–17)
IMM GRANULOCYTES # BLD AUTO: 0 K/UL (ref 0–0.04)
IMM GRANULOCYTES NFR BLD AUTO: 0 % (ref 0–0.5)
LYMPHOCYTES # BLD: 1.4 K/UL (ref 0.8–3.5)
LYMPHOCYTES NFR BLD: 21 % (ref 12–49)
MAGNESIUM SERPL-MCNC: 2 MG/DL (ref 1.6–2.4)
MCH RBC QN AUTO: 30.4 PG (ref 26–34)
MCHC RBC AUTO-ENTMCNC: 32.4 G/DL (ref 30–36.5)
MCV RBC AUTO: 93.7 FL (ref 80–99)
MONOCYTES # BLD: 0.6 K/UL (ref 0–1)
MONOCYTES NFR BLD: 9 % (ref 5–13)
NEUTS SEG # BLD: 4.2 K/UL (ref 1.8–8)
NEUTS SEG NFR BLD: 65 % (ref 32–75)
NRBC # BLD: 0 K/UL (ref 0–0.01)
NRBC BLD-RTO: 0 PER 100 WBC
PHOSPHATE SERPL-MCNC: 3.8 MG/DL (ref 2.6–4.7)
PLATELET # BLD AUTO: 274 K/UL (ref 150–400)
PMV BLD AUTO: 12.1 FL (ref 8.9–12.9)
POTASSIUM SERPL-SCNC: 4 MMOL/L (ref 3.5–5.1)
PROT SERPL-MCNC: 6 G/DL (ref 6.4–8.2)
Q-T INTERVAL, ECG07: 416 MS
Q-T INTERVAL, ECG07: 418 MS
QRS DURATION, ECG06: 80 MS
QRS DURATION, ECG06: 84 MS
QTC CALCULATION (BEZET), ECG08: 461 MS
QTC CALCULATION (BEZET), ECG08: 476 MS
RBC # BLD AUTO: 3.03 M/UL (ref 4.1–5.7)
SERVICE CMNT-IMP: ABNORMAL
SERVICE CMNT-IMP: NORMAL
SERVICE CMNT-IMP: NORMAL
SODIUM SERPL-SCNC: 143 MMOL/L (ref 136–145)
TROPONIN I SERPL-MCNC: 0.22 NG/ML
VENTRICULAR RATE, ECG03: 74 BPM
VENTRICULAR RATE, ECG03: 78 BPM
WBC # BLD AUTO: 6.5 K/UL (ref 4.1–11.1)

## 2019-02-05 PROCEDURE — 82550 ASSAY OF CK (CPK): CPT

## 2019-02-05 PROCEDURE — 80053 COMPREHEN METABOLIC PANEL: CPT

## 2019-02-05 PROCEDURE — 94640 AIRWAY INHALATION TREATMENT: CPT

## 2019-02-05 PROCEDURE — 82962 GLUCOSE BLOOD TEST: CPT

## 2019-02-05 PROCEDURE — 83735 ASSAY OF MAGNESIUM: CPT

## 2019-02-05 PROCEDURE — 84100 ASSAY OF PHOSPHORUS: CPT

## 2019-02-05 PROCEDURE — 74011000250 HC RX REV CODE- 250

## 2019-02-05 PROCEDURE — 83036 HEMOGLOBIN GLYCOSYLATED A1C: CPT

## 2019-02-05 PROCEDURE — 65270000029 HC RM PRIVATE

## 2019-02-05 PROCEDURE — 36415 COLL VENOUS BLD VENIPUNCTURE: CPT

## 2019-02-05 PROCEDURE — 74011250636 HC RX REV CODE- 250/636: Performed by: INTERNAL MEDICINE

## 2019-02-05 PROCEDURE — 74011250637 HC RX REV CODE- 250/637: Performed by: INTERNAL MEDICINE

## 2019-02-05 PROCEDURE — 74011636637 HC RX REV CODE- 636/637: Performed by: INTERNAL MEDICINE

## 2019-02-05 PROCEDURE — 84484 ASSAY OF TROPONIN QUANT: CPT

## 2019-02-05 PROCEDURE — 85025 COMPLETE CBC W/AUTO DIFF WBC: CPT

## 2019-02-05 PROCEDURE — 74011000250 HC RX REV CODE- 250: Performed by: INTERNAL MEDICINE

## 2019-02-05 RX ORDER — DEXTROSE 50 % IN WATER (D50W) INTRAVENOUS SYRINGE
12.5-25 AS NEEDED
Status: DISCONTINUED | OUTPATIENT
Start: 2019-02-05 | End: 2019-02-06 | Stop reason: HOSPADM

## 2019-02-05 RX ORDER — INSULIN GLARGINE 100 [IU]/ML
8 INJECTION, SOLUTION SUBCUTANEOUS
Status: DISCONTINUED | OUTPATIENT
Start: 2019-02-05 | End: 2019-02-06 | Stop reason: HOSPADM

## 2019-02-05 RX ORDER — MAGNESIUM SULFATE 100 %
4 CRYSTALS MISCELLANEOUS AS NEEDED
Status: DISCONTINUED | OUTPATIENT
Start: 2019-02-05 | End: 2019-02-06 | Stop reason: HOSPADM

## 2019-02-05 RX ORDER — DEXTROSE 50 % IN WATER (D50W) INTRAVENOUS SYRINGE
Status: COMPLETED
Start: 2019-02-05 | End: 2019-02-05

## 2019-02-05 RX ADMIN — MEMANTINE 10 MG: 10 TABLET ORAL at 08:56

## 2019-02-05 RX ADMIN — BUDESONIDE 500 MCG: 0.5 INHALANT RESPIRATORY (INHALATION) at 21:54

## 2019-02-05 RX ADMIN — TAMSULOSIN HYDROCHLORIDE 0.4 MG: 0.4 CAPSULE ORAL at 08:57

## 2019-02-05 RX ADMIN — HEPARIN SODIUM 5000 UNITS: 5000 INJECTION, SOLUTION INTRAVENOUS; SUBCUTANEOUS at 12:47

## 2019-02-05 RX ADMIN — BUDESONIDE 500 MCG: 0.5 INHALANT RESPIRATORY (INHALATION) at 07:48

## 2019-02-05 RX ADMIN — Medication 10 ML: at 22:00

## 2019-02-05 RX ADMIN — HEPARIN SODIUM 5000 UNITS: 5000 INJECTION, SOLUTION INTRAVENOUS; SUBCUTANEOUS at 20:51

## 2019-02-05 RX ADMIN — ASPIRIN 325 MG: 325 TABLET, DELAYED RELEASE ORAL at 20:50

## 2019-02-05 RX ADMIN — QUETIAPINE FUMARATE 50 MG: 25 TABLET ORAL at 08:56

## 2019-02-05 RX ADMIN — ARFORMOTEROL TARTRATE 15 MCG: 15 SOLUTION RESPIRATORY (INHALATION) at 21:54

## 2019-02-05 RX ADMIN — QUETIAPINE FUMARATE 50 MG: 25 TABLET ORAL at 20:49

## 2019-02-05 RX ADMIN — LEVOTHYROXINE SODIUM 50 MCG: 50 TABLET ORAL at 06:30

## 2019-02-05 RX ADMIN — ASPIRIN 325 MG: 325 TABLET, DELAYED RELEASE ORAL at 08:56

## 2019-02-05 RX ADMIN — DULOXETINE 60 MG: 60 CAPSULE, DELAYED RELEASE ORAL at 21:03

## 2019-02-05 RX ADMIN — Medication 10 ML: at 18:33

## 2019-02-05 RX ADMIN — ROSUVASTATIN CALCIUM 5 MG: 10 TABLET, FILM COATED ORAL at 08:56

## 2019-02-05 RX ADMIN — DEXTROSE MONOHYDRATE 25 G: 25 INJECTION, SOLUTION INTRAVENOUS at 02:00

## 2019-02-05 RX ADMIN — VITAM B12 100 MCG: 100 TAB at 09:05

## 2019-02-05 RX ADMIN — MEMANTINE 10 MG: 10 TABLET ORAL at 20:49

## 2019-02-05 RX ADMIN — ACETAMINOPHEN 1000 MG: 500 TABLET ORAL at 18:32

## 2019-02-05 RX ADMIN — HEPARIN SODIUM 5000 UNITS: 5000 INJECTION, SOLUTION INTRAVENOUS; SUBCUTANEOUS at 06:29

## 2019-02-05 RX ADMIN — INSULIN GLARGINE 8 UNITS: 100 INJECTION, SOLUTION SUBCUTANEOUS at 21:00

## 2019-02-05 RX ADMIN — ACETAMINOPHEN 1000 MG: 500 TABLET ORAL at 08:56

## 2019-02-05 RX ADMIN — DEXTROSE 50 % IN WATER (D50W) INTRAVENOUS SYRINGE 25 G: at 02:00

## 2019-02-05 RX ADMIN — MIDODRINE HYDROCHLORIDE 5 MG: 5 TABLET ORAL at 08:56

## 2019-02-05 RX ADMIN — ARFORMOTEROL TARTRATE 15 MCG: 15 SOLUTION RESPIRATORY (INHALATION) at 07:48

## 2019-02-05 RX ADMIN — TRAZODONE HYDROCHLORIDE 50 MG: 50 TABLET ORAL at 21:03

## 2019-02-05 RX ADMIN — ACETAMINOPHEN 1000 MG: 500 TABLET ORAL at 21:03

## 2019-02-05 NOTE — PROGRESS NOTES
Reason for Admission:   Patient admitted from home with complaints confusion. Found to be hypoglycemic on admission. History of dementia, CHF and DM. RRAT Score:     32 Resources/supports as identified by patient/family:   Patient lives with his family at home. He also has caregivers 5/hr/day. He is retired and insured with Medicare A/B and Medicaid. Family provides transportation for medical appointments. Top Challenges facing patient (as identified by patient/family and CM): Finances/Medication cost?     See above Transportation? See above Support system or lack thereof? See above Living arrangements? See above Self-care/ADLs/Cognition? Needs assist with all ADLs Current Advanced Directive/Advance Care Plan:   Patient has a DDNR on chart. Plan for utilizing home health:     Patient was open to At [address] [place] [address] and [facility] for caregivers. Likelihood of readmission:  High risk Transition of Care Plan:   Anticipate discharge to home with family and caregivers when medically stable. Care Management Interventions PCP Verified by CM: Yes(Dr Bailey) Palliative Care Criteria Met (RRAT>21 & CHF Dx)?: No 
Mode of Transport at Discharge: (Family car) Transition of Care Consult (CM Consult): Home Health [address]: No 
Reason Outside [place]: Patient already serviced by other home care/hospice agency(Pt was open to At Home care) Current Support Network: Has Personal Caregivers Confirm Follow Up Transport: Family Plan discussed with Pt/Family/Caregiver: Yes [employer] Information Provided?: (NA) Discharge Location Discharge Placement: (TBD) Erica Arenas RN CRM Ext [phone]

## 2019-02-05 NOTE — PROGRESS NOTES
Hospitalist Progress Note Georgia Blancas MD 
Answering service: [phone] OR [phone] from in house phone Date of Service:  2019 NAME:  Nicole Bond :  1940 MRN:  [id number] Admission Summary:  
Acute delirium, low blood surgar in fields, chronic dementia Interval history / Subjective:  
   
2019 : 
No acute distress, no issues. As pt not able to given any useful history, confused to events, persons/place /time. Is pleasant, not agitated not anxious. Assessment & Plan:  
 
Acute delirium resolved Chronic dementia Type 2 diabetes with hypoglycemia. 
 elevated troponin, nl ck, suggest vol/renal issue vs stain contributing, no f/u planned. Anemia chronic. Dyslipidemia. Hypothyroidism. Chronic obstructive pulmonary disease. Depression. Chronic diastolic congestive heart failure. No acute issues thereof Persistent atrial fibrillation, stable rate Tobacco abuse. 
  
 
 
Code status: full DVT prophylaxis: Heparin Care Plan discussed with: Consultant card, Disposition: Home w/Family and TBD Hospital Problems  Date Reviewed: 2019 Codes Class Noted POA * (Principal) Acute delirium ICD-10-CM: R41.0 ICD-9-CM: 780.09  2019 Yes Review of Systems:  
Review of systems not obtained due to patient factors. Vital Signs:  
 Last 24hrs VS reviewed since prior progress note. Most recent are: 
Visit Vitals /65 Pulse 85 Temp 97.8 °F (36.6 °C) Resp 17 Ht 5' 11\" (1.803 m) Wt 63.3 kg (139 lb 8.8 oz) SpO2 100% BMI 19.46 kg/m² Intake/Output Summary (Last 24 hours) at 2019 1024 Last data filed at 2019 5966 Gross per 24 hour Intake  Output 1820 ml Net -1820 ml Physical Examination:  
 
 
     
Constitutional:  No acute distress, cooperative, pleasant   
ENT:  Oral mucous moist, oropharynx benign.  Neck supple,   
 Resp: CTA bilaterally. No wheezing/rhonchi/rales. No accessory muscle use CV:  Regular rhythm, normal rate, no murmurs, gallops, rubs GI:  Soft, non distended, non tender. normoactive bowel sounds, no hepatosplenomegaly Musculoskeletal:  No edema, warm, 1+ pulses throughout Neurologic:  Moves all extremities. Not oriented to time/place, recent event, persons Psych:  no insight, not agitated, not anxious Data Review:  
 Review and/or order of clinical lab test 
 
 
Labs:  
 
Recent Labs 02/05/19 0142 02/04/19 
1344 WBC 6.5 10.7 HGB 9.2* 8.9* HCT 28.4* 28.4*  
 257 Recent Labs 02/05/19 0142 02/04/19 
1344  142  
K 4.0 4.0  
* 113* CO2 24 24 BUN 15 17 CREA 1.21 1.16  
GLU 59* 73  
CA 8.4* 7.9*  
MG 2.0  --   
PHOS 3.8  --   
 
Recent Labs 02/05/19 0142 02/04/19 
1344 SGOT 32 32 ALT 23 21  107 TBILI 0.2 0.2 TP 6.0* 5.6* ALB 2.4* 2.3*  
GLOB 3.6 3.3 No results for input(s): INR, PTP, APTT in the last 72 hours. No lab exists for component: INREXT Recent Labs 02/04/19 1935 TIBC 196* PSAT 20 FERR 75 Lab Results Component Value Date/Time Folate 21.9 (H) 02/04/2019 07:35 PM  
  
No results for input(s): PH, PCO2, PO2 in the last 72 hours. Recent Labs 02/05/19 0142 02/04/19 1935 02/04/19 
1344 CPK 82 82 88 CKNDX 5.5* 5.1* 4.4*  
TROIQ 0.22* 0.22* 0.13* Lab Results Component Value Date/Time Cholesterol, total 110 07/18/2017 02:14 AM  
 HDL Cholesterol 47 07/18/2017 02:14 AM  
 LDL, calculated 44 07/18/2017 02:14 AM  
 Triglyceride 95 07/18/2017 02:14 AM  
 CHOL/HDL Ratio 2.3 07/18/2017 02:14 AM  
 
Lab Results Component Value Date/Time Glucose (POC) 99 02/05/2019 06:03 AM  
 Glucose (POC) 167 (H) 02/05/2019 02:10 AM  
 Glucose (POC) 62 (L) 02/05/2019 01:50 AM  
 Glucose (POC) 77 02/05/2019 01:31 AM  
 Glucose (POC) 87 02/04/2019 08:55 PM  
 
Lab Results Component Value Date/Time Color YELLOW/STRAW 02/04/2019 06:10 PM  
 Appearance CLEAR 02/04/2019 06:10 PM  
 Specific gravity 1.012 02/04/2019 06:10 PM  
 Specific gravity 1.010 07/17/2017 04:12 PM  
 pH (UA) 5.0 02/04/2019 06:10 PM  
 Protein NEGATIVE  02/04/2019 06:10 PM  
 Glucose NEGATIVE  02/04/2019 06:10 PM  
 Ketone NEGATIVE  02/04/2019 06:10 PM  
 Bilirubin NEGATIVE  02/04/2019 06:10 PM  
 Urobilinogen 0.2 02/04/2019 06:10 PM  
 Nitrites NEGATIVE  02/04/2019 06:10 PM  
 Leukocyte Esterase NEGATIVE  02/04/2019 06:10 PM  
 Epithelial cells FEW 12/24/2018 09:37 PM  
 Bacteria NEGATIVE  12/24/2018 09:37 PM  
 WBC 0-4 12/24/2018 09:37 PM  
 RBC 0-5 12/24/2018 09:37 PM  
 
 
 
Medications Reviewed:  
 
Current Facility-Administered Medications Medication Dose Route Frequency  glucose chewable tablet 16 g  4 Tab Oral PRN  
 dextrose (D50W) injection syrg 12.5-25 g  12.5-25 g IntraVENous PRN  
 glucagon (GLUCAGEN) injection 1 mg  1 mg IntraMUSCular PRN  
 cyanocobalamin (VITAMIN B12) tablet 100 mcg  100 mcg Oral DAILY  DULoxetine (CYMBALTA) capsule 60 mg  60 mg Oral QHS  levothyroxine (SYNTHROID) tablet 50 mcg  50 mcg Oral ACB  midodrine (PROAMITINE) tablet 5 mg  5 mg Oral DAILY  rosuvastatin (CRESTOR) tablet 5 mg  5 mg Oral DAILY  tamsulosin (FLOMAX) capsule 0.4 mg  0.4 mg Oral DAILY  traMADol (ULTRAM) tablet 25 mg  25 mg Oral Q6H PRN  
 traZODone (DESYREL) tablet 50 mg  50 mg Oral QHS  sodium chloride (NS) flush 5-40 mL  5-40 mL IntraVENous Q8H  
 sodium chloride (NS) flush 5-40 mL  5-40 mL IntraVENous PRN  
 ondansetron (ZOFRAN) injection 4 mg  4 mg IntraVENous Q4H PRN  
 heparin (porcine) injection 5,000 Units  5,000 Units SubCUTAneous Q8H  
 albuterol-ipratropium (DUO-NEB) 2.5 MG-0.5 MG/3 ML  3 mL Nebulization Q4H PRN  
 nicotine (NICODERM CQ) 21 mg/24 hr patch 1 Patch  1 Patch TransDERmal DAILY  aspirin delayed-release tablet 325 mg  325 mg Oral Q12H  arformoterol (BROVANA) neb solution 15 mcg  15 mcg Nebulization BID RT And  
 budesonide (PULMICORT) 500 mcg/2 ml nebulizer suspension  500 mcg Nebulization BID RT  
 QUEtiapine (SEROquel) tablet 50 mg  50 mg Oral Q12H  
 memantine (NAMENDA) tablet 10 mg  10 mg Oral Q12H  
 acetaminophen (TYLENOL) tablet 1,000 mg  1,000 mg Oral TID  acetaminophen (TYLENOL) tablet 325 mg  325 mg Oral Q4H PRN  
 
______________________________________________________________________ EXPECTED LENGTH OF STAY: - - - 
ACTUAL LENGTH OF STAY:          1 Doreen Mahmood MD

## 2019-02-05 NOTE — CONSULTS
[address]    Name:  Ekaterina Torres  MR#:  [id number]  :  1940  ACCOUNT #:  [de-identified]  DATE OF SERVICE:  2019    ATTENDING PHYSICIAN:  Hola Young. Yon Acevedo MD    HISTORY OF PRESENT ILLNESS:  This 67-year-old man is seen  for an elevated troponin. His troponin today coming to the  ER was 0.13. Total CPK was normal at 88. He has not had  chest pain, shortness of breath. He came in for a low blood  sugar of 33. He was last seen 2018. At that time, he  was demonstrated to have significant dementia and felt that  aggressive measures were not appropriate. His troponins at  that time were 2.57, then coming down. No aggressive  measures were taken. His ejection fraction by echo was 40%  to 45%. Palliative care had seen him. At discharge  summary, he was noted to be a very poor historian due to  dementia and today he does not have any idea that he is in a  hospital.  He is unaware of his situation. He is not in any  distress. There has been a history of orthostatic  hypotension. When he was last admitted, his renal function was  somewhat off with a creatinine of 1.63. PAST MEDICAL HISTORY:  1. Urinary incontinence. 2.  Prior episodes of pneumonia. 3.  Myocardial infarction. 4.  Some chronic shortness of breath. 5.  Chronic systolic heart failure. 6.  Diabetes. 7.  COPD. 8.  Coronary artery disease with bypass grafting in . 9. Mohs procedure . 10.  Nasal surgery . MEDICATIONS:  At presentation,  1. Tylenol p.r.n.  2.  Abilify 10 mg daily. 3.  Aspirin 81 mg a day. 4.  Vitamin B12 100 mcg a day. 5.  Voltaren gel. 6.  Cymbalta 60 mg a day. 7.  Lovenox 40 subcutaneously q. 24.  8.  Ergocalciferol 50,000 units every seven days. 9.  Ferrous sulfate 325 daily. 10.  Advair Diskus. 11.  Furosemide 40 mg 1-1/2 pills twice a day. 12.  Glipizide 5 mg a day with sliding scale insulin  coverage. 13.  L-thyroxine 50 mcg a day.   14. Namenda 28 mg daily. 15.  Menthol. 16.  Midodrine 5 mg twice a day. 17.  Omeprazole 20 mg daily. 18.  Oxycodone p.r.n.  19.  Potassium 30 mEq a day. 20.  Lyrica 50 twice daily. 21.  Crestor 5 mg daily. 22.  Senna daily. 23.  Flomax 0.4 daily. 24.  Tramadol 50 q. 6 hours p.r.n.  25.  Desyrel 50 at bedtime. ALLERGIES:  SULFONAMIDE. SOCIAL HISTORY:  Here with extended family. Continues to  smoke. No alcohol. . FAMILY HISTORY:  Diabetes. PHYSICAL EXAMINATION:  GENERAL:  Thin, elderly man, in no acute distress. Eating  some dinner. Disoriented in terms of place and situation. VITAL SIGNS:  Blood pressure 123/46, pulse 72 with ectopics. HEENT:  _____. NECK:  No JVD. Carotids full without bruits. Thyroid  nonpalpable. LUNGS:  Distant breath sounds, otherwise clear. HEART:  Regular rate and rhythm with ectopics. No murmur. No gallop. ABDOMEN:  Soft, nontender. EXTREMITIES:  Without edema. Distal pulses are diminished. LABORATORY DATA:  Chemistries as noted with creatinine down  to 1. 16. BNP is elevated at 31. Hemoglobin 8.9, hematocrit  28.4, white count 10,700. EKG, sinus rhythm with frequent  PACs, not atrial fibrillation as per the computer reading. Nonspecific ST-T wave changes. PROBLEMS:  1. Elevated troponin. Minimally elevated with normal CPK. No evidence of failure, judging by clinical criteria or  chest x-ray. No acute changes on EKG and in overall  setting, not a candidate for aggressive measures. He has  had a recent echocardiogram.  2.  Hypoglycemia. 3.  History of orthostatic hypotension. 4.  COPD.  5.  Significant dementia. PLANS:  Adjust medical regimen per Hospitalist Service. No  further cardiac studies are appropriate. Monitoring on  telemetry is not appropriate. We will see again as needed.       Adriana Rich MD [id number]  D:  02/04/2019 17:44  T:  02/04/2019 23:40  JOB #:  [id number]

## 2019-02-05 NOTE — H&P
[address]  HISTORY AND PHYSICAL Marion Ruiz 
MR#: [id number] : 1940 ACCOUNT #: [de-identified] ADMIT DATE: 2019 PRIMARY CARE PHYSICIAN:   Francisco Infante MD 
 
SOURCE OF INFORMATION:  The patient's daughter who was present at the bedside. CHIEF COMPLAINT:  Change in mental status. HISTORY OF PRESENT ILLNESS:  This is a 81-year-old man with a past medical history significant for type 2 diabetes, chronic diastolic congestive heart failure, atrial fibrillation, COPD, coronary artery disease, status post CABG, depression, who was in his usual state of health until the day of presentation at the emergency room when the patient developed change in mental status. History was obtained from the patient's daughter. The patient was found to have a blood sugar of 33. EMS was called. The patient received oral glucose and the blood sugar improved to 37 and 1 amp of glucagon was given on the way to the emergency room and the blood sugar improved to 52. When the patient arrived at the emergency room, his lab work confirmed the hypoglycemia. The patient's daughter stated that there was a recent adjustment to his medication for diabetes and the patient most likely receiving too much medication for the diabetes. Also, when the patient arrived at the emergency room, he was found to have elevated troponin level as well as elevated BNP level. The emergency room physician consulted cardiologist.  The patient was subsequently referred to the hospitalist service for evaluation for admission. No history of fever; no rigors, no chills. He was last admitted to this hospital from 2018 to 2018. Patient was admitted with shortness of breath attributed to congestive heart failure. Following that hospitalization, patient was discharged to rehab.   While in rehab, the patient fell sustaining a right hip fracture, was taken to [facility] for repair of the hip fracture. Patient was discharged from rehab to home about a week ago. PAST MEDICAL HISTORY:  Type 2 diabetes, coronary artery disease status post CABG, dyslipidemia, hypothyroidism, dementia, COPD, depression, chronic atrial fibrillation, chronic diastolic congestive heart failure. ALLERGIES:  THE PATIENT IS ALLERGIC TO SULFA. MEDICATIONS:  Tylenol 325 mg every 4 hours as needed for pain, Abilify 10 mg daily, aspirin 81 mg daily, Voltaren 1% gel applied to affected area 4 times daily as needed, Cymbalta 60 mg daily,  ferrous sulfate 325 mg daily, Advair 250/50 inhalation every 12 hours, Lasix 40 mg twice daily, Glucotrol 5 mg twice daily, sliding scale with insulin coverage, Synthroid 50 mcg daily, OxyContin, the dosage is not known,  Lyrica 50 mg twice daily, Crestor 5 mg daily, Flomax 0.4 mg daily, tramadol 50 mg every 6 hours as needed for pain,  trazodone 50 mg daily at bedtime. FAMILY HISTORY: This was reviewed. His mother had diabetes. PAST SURGICAL HISTORY:  This is significant for CABG, right hip surgery. SOCIAL HISTORY:  The patient smokes 1-2 cigars daily. No history of alcohol abuse. REVIEW OF SYSTEMS: 
HEAD, EYES, EARS, NOSE AND THROAT:  No headache, no dizziness, no blurring of vision, no photophobia. RESPIRATORY:  No cough, no shortness of breath, no hemoptysis. CARDIOVASCULAR:  No chest pain, no orthopnea, no palpitation. GASTROINTESTINAL SYSTEM:  No nausea or vomiting; no diarrhea, no constipation. GENITOURINARY:  No dysuria, no urgency and no frequency. All other systems are reviewed and they are negative. PHYSICAL EXAMINATION: 
GENERAL APPEARANCE:  Patient appeared ill, in moderate distress. VITAL SIGNS:  On arrival at the emergency room, temperature 97.4, pulse 92, respiratory rate 18, blood pressure 114/51, oxygen saturation 93% on room air. HEAD:  Normocephalic, atraumatic. EYES:  Normal eye movement. No redness, no drainage, no discharge. EARS:  Normal external ears with no obvious drainage. NOSE:  No deformity, no drainage. MOUTH AND THROAT:   No visible oral lesions. NECK:  Supple; no JVD, no thyromegaly. CHEST:  Clear breath sounds; no wheezing, no crackles. HEART:  Normal S1 and S2. Irregularly irregular; no clinically appreciable murmur. ABDOMEN:  Soft, nontender, normal bowel sounds. CENTRAL NERVOUS SYSTEM:  Alert, oriented to person. No gross focal neurological deficit. EXTREMITIES:  No edema. Pulses 2+ bilaterally. MUSCULOSKELETAL:  No obvious joint deformity or swelling. SKIN:  No active skin lesions seen in the exposed part of the body. PSYCHIATRIC:  Unable to assess mood and affect. LYMPHATIC SYSTEM:  No cervical lymphadenopathy. DIAGNOSTIC DATA:  Her EKG shows atrial fibrillation, nonspecific ST and T-wave abnormalities. Chest x-ray:  No acute cardiopulmonary process. LABORATORY DATA:  Hematology:  WBC 10.7, hemoglobin 8.9, hematocrit 28.4, platelet 567. Chemistry:  Sodium 142, potassium 4.0, chloride 113, CO2 24, glucose 73, BUN 17, creatinine 1.16, calcium 7.9, total bilirubin 0.2, ALT 21, AST 32, alkaline phosphatase 107, total protein 5.6, albumin level 2.3, globulin 3.3. Cardiac profile:  Troponin 0.13, CK-MB 3.9. Pro-BNP  4731. ASSESSMENT: 
1. Acute delirium. 2.  Type 2 diabetes with hypoglycemia. 3.  Non-ST elevation myocardial infarction. 4.  Anemia. 5.  Dyslipidemia. 6.  Hypothyroidism. 7.  Dementia. 8.  Chronic obstructive pulmonary disease. 9.  Depression. 10.  Chronic diastolic congestive heart failure. 11.  Persistent atrial fibrillation. 12.  Tobacco abuse. PLAN: 
1. Acute delirium. We will admit the patient for further evaluation and treatment. Will obtain a CT scan of the head to evaluate the patient for acute pathology.   The acute delirium is most likely due to metabolic event, in this case hypoglycemia which will be discussed below. The patient did not return to normal baseline mental status after treatment of the underlying metabolic etiological factors. Patient may require further evaluation such as MRI and a neurology consult. Will check a TSH level. Will check ESR and C-reactive protein level to evaluate the patient for systemic inflammation. 2.  Type 2 diabetes with hypoglycemia. This is the most likely cause of the patient's acute delirium. Will hold oral agent. Will check the patient's blood sugar closely. Will check hemoglobin A1c level. Patient may require a reduction of his medication for diabetes at the time of discharge home. 3.  Non-ST elevation myocardial infarction. This is a  known patient with coronary artery disease. Will trend troponin level. A cardiology consult has been requested. Will await further recommendation from the cardiologist. 
4.  Anemia. This is most likely due to chronic disease. Will carry out anemia workup including checking the stool guaiac to rule out occult gastrointestinal bleed. 5.  Dyslipidemia. Will resume preadmission medications. 6.  Hypothyroidism. Will check a TSH level and continue Synthroid. 7.  Dementia. Will continue with supportive therapy. 8.  Chronic obstructive pulmonary disease. Will place the patient on DuoNeb as needed. We will also resume home medication. 9.  Depression. Continue with her preadmission medication. 10.  Chronic diastolic congestive heart failure. Will continue with her preadmission medication. Will await further recommendation from the cardiologist.  Will check an echocardiogram. 
11. Persistent  Atrial fibrillation. Will continue with preadmission medication for rate control. Patient is not on any anticoagulation. will check a TSH level. Will await further recommendation from the cardiologist. 
12.  Tobacco abuse. Patient advised to quit smoking.   Will place the patient on Nicoderm patch. 13.  Other issues:  CODE STATUS:  FULL CODE. Patient will be placed on a full dose of heparin for treatment of non-ST elevation myocardial infarction. Because of that, there is no need for DVT prophylaxis. MD LISANDRO Spaulding/[place] 
D: 02/04/2019 17:22    
T: 02/04/2019 22:42 JOB #: [id number] CC: Celso Mack MD

## 2019-02-05 NOTE — CDMP QUERY
Devante Kelly Section,  
 
Pt admitted with hypoglycemia and acute delirium. Pt noted to have acute delirium likely due to metabolic event per H&P. If possible, please document in the progress notes and d/c summary if you are evaluating and / or treating any of the following: Metabolic Encephalopathy Toxic encephalopathy Other Encephalopathy Other, please specify Clinically unable to determine The medical record reflects the following: 
   Risk Factors: Chronic dementia, acute delirium Clinical Indicators: Hypoglycemia, BS on arrival 35. Per H&P, \"the acute delirium is most likely due to metabolic event\". Treatment: Glucagon, serial lab monitoring Thank you, Cole Every [facility] 
[phone]

## 2019-02-05 NOTE — PROGRESS NOTES
1840: TRANSFER - IN REPORT: 
 
Verbal report received from Richard Dumas [place] (name) on Joseph Yost  being received from ED (unit) for routine progression of care Report consisted of patients Situation, Background, Assessment and  
Recommendations(SBAR). Information from the following report(s) SBAR, Kardex, ED Summary, MAR, Accordion and Recent Results was reviewed with the receiving nurse. Opportunity for questions and clarification was provided. Assessment completed upon patients arrival to unit and care assumed. 1930: Primary Nurse Familia Peter and Patrick Huggins RN performed a dual skin assessment on this patient No impairment noted Flynn score is 19 
 
1950: Bedside shift change report given to LONNIE Israel (oncoming nurse) by Hui Trent RN (offgoing nurse). Report included the following information SBAR, Kardex, ED Summary, Intake/Output, MAR, Accordion and Recent Results. Bed alarm placed.

## 2019-02-05 NOTE — DIABETES MGMT
DTC Progress Note Recommendations/ Comments: Chart reviewed on [facility] due to extreme hypoglycemia. Pt admitted on 2/4 2/2 to hypoglycemic event. Pt noted with BG of 45 mg/dl yesterday and 62 mg/dl this am.  
 
 If appropriate, please consider: 
Continue to follow hypoglycemic protocol as needed - pt has received 25 g D50 x 2 since admission, consider D5W IVF if hypoglycemia persists Continue acchuchecks ACHS Monitor PO and Encourage good PO intake - consider HS snack as needed Current hospital DM medication: None Chart reviewed on [facility]. Patient is a 69 y.o. male with known DM on Lantus, 15 units and AC Lispro 3 units at home. A1c:  
Lab Results Component Value Date/Time Hemoglobin A1c 7.0 (H) 02/05/2019 01:42 AM  
 Hemoglobin A1c 6.6 (H) 12/25/2018 02:25 AM  
 Hemoglobin A1c, External 11.2 01/27/2016 Recent Glucose Results:  
Lab Results Component Value Date/Time GLU 59 (L) 02/05/2019 01:42 AM  
 GLU 73 02/04/2019 01:44 PM  
 GLUCPOC 99 02/05/2019 06:03 AM  
 GLUCPOC 167 (H) 02/05/2019 02:10 AM  
 GLUCPOC 62 (L) 02/05/2019 01:50 AM  
  
 
Lab Results Component Value Date/Time Creatinine 1.21 02/05/2019 01:42 AM  
 
Estimated Creatinine Clearance: 45.8 mL/min (based on SCr of 1.21 mg/dL). Active Orders Diet DIET DIABETIC CONSISTENT CARB Regular; 2 GM NA (House Low NA) PO intake: No data found. Will continue to follow as needed. Thank you Cecil Holliday RD Time spent: 5 minutes

## 2019-02-05 NOTE — PROGRESS NOTES
Problem: Patient Education: Go to Patient Education Activity Goal: Patient/Family Education Outcome: Progressing Towards Goal 
Mental status prevents appropriate knowledge of fall prevention. Bed alarm in place, turned on, and audible from the nurses station. Frequent purposeful rounding initiated by staff.

## 2019-02-06 ENCOUNTER — NURSE NAVIGATOR (OUTPATIENT)
Dept: FAMILY MEDICINE CLINIC | Age: 79
End: 2019-02-06

## 2019-02-06 VITALS
HEIGHT: 71 IN | OXYGEN SATURATION: 100 % | BODY MASS INDEX: 19.72 KG/M2 | DIASTOLIC BLOOD PRESSURE: 76 MMHG | WEIGHT: 140.87 LBS | RESPIRATION RATE: 16 BRPM | SYSTOLIC BLOOD PRESSURE: 128 MMHG | TEMPERATURE: 97.6 F | HEART RATE: 100 BPM

## 2019-02-06 DIAGNOSIS — I21.4 NSTEMI (NON-ST ELEVATED MYOCARDIAL INFARCTION) (HCC): ICD-10-CM

## 2019-02-06 DIAGNOSIS — E11.65 UNCONTROLLED TYPE 2 DIABETES MELLITUS WITH HYPERGLYCEMIA (HCC): ICD-10-CM

## 2019-02-06 DIAGNOSIS — I25.10 CORONARY ARTERY DISEASE INVOLVING NATIVE CORONARY ARTERY OF NATIVE HEART WITHOUT ANGINA PECTORIS: Primary | ICD-10-CM

## 2019-02-06 DIAGNOSIS — F33.1 MODERATE RECURRENT MAJOR DEPRESSION (HCC): ICD-10-CM

## 2019-02-06 DIAGNOSIS — S72.001S HIP FRACTURE REQUIRING OPERATIVE REPAIR, RIGHT, SEQUELA: ICD-10-CM

## 2019-02-06 DIAGNOSIS — F09 COGNITIVE DISORDER: ICD-10-CM

## 2019-02-06 DIAGNOSIS — J44.9 CHRONIC OBSTRUCTIVE PULMONARY DISEASE, UNSPECIFIED COPD TYPE (HCC): ICD-10-CM

## 2019-02-06 PROBLEM — R41.0 ACUTE DELIRIUM: Status: RESOLVED | Noted: 2019-02-04 | Resolved: 2019-02-06

## 2019-02-06 LAB
CORTIS SERPL-MCNC: 20 UG/DL
GLUCOSE BLD STRIP.AUTO-MCNC: 127 MG/DL (ref 65–100)
GLUCOSE BLD STRIP.AUTO-MCNC: 87 MG/DL (ref 65–100)
SERVICE CMNT-IMP: ABNORMAL
SERVICE CMNT-IMP: NORMAL

## 2019-02-06 PROCEDURE — 74011250636 HC RX REV CODE- 250/636: Performed by: INTERNAL MEDICINE

## 2019-02-06 PROCEDURE — 82962 GLUCOSE BLOOD TEST: CPT

## 2019-02-06 PROCEDURE — 77030029684 HC NEB SM VOL KT MONA -A

## 2019-02-06 PROCEDURE — 82533 TOTAL CORTISOL: CPT

## 2019-02-06 PROCEDURE — 94640 AIRWAY INHALATION TREATMENT: CPT

## 2019-02-06 PROCEDURE — 94664 DEMO&/EVAL PT USE INHALER: CPT

## 2019-02-06 PROCEDURE — 74011000250 HC RX REV CODE- 250: Performed by: INTERNAL MEDICINE

## 2019-02-06 PROCEDURE — 74011250637 HC RX REV CODE- 250/637: Performed by: INTERNAL MEDICINE

## 2019-02-06 PROCEDURE — 36415 COLL VENOUS BLD VENIPUNCTURE: CPT

## 2019-02-06 RX ORDER — INSULIN LISPRO 100 [IU]/ML
INJECTION, SOLUTION INTRAVENOUS; SUBCUTANEOUS
Qty: 1 VIAL | Refills: 0 | Status: SHIPPED
Start: 2019-02-06 | End: 2019-04-24 | Stop reason: SDUPTHER

## 2019-02-06 RX ORDER — INSULIN GLARGINE 100 [IU]/ML
15 INJECTION, SOLUTION SUBCUTANEOUS
Qty: 8 UNITS | Refills: 1 | Status: SHIPPED
Start: 2019-02-06 | End: 2019-02-12 | Stop reason: SDUPTHER

## 2019-02-06 RX ADMIN — HEPARIN SODIUM 5000 UNITS: 5000 INJECTION, SOLUTION INTRAVENOUS; SUBCUTANEOUS at 03:33

## 2019-02-06 RX ADMIN — VITAM B12 100 MCG: 100 TAB at 09:43

## 2019-02-06 RX ADMIN — QUETIAPINE FUMARATE 50 MG: 25 TABLET ORAL at 09:43

## 2019-02-06 RX ADMIN — BUDESONIDE 500 MCG: 0.5 INHALANT RESPIRATORY (INHALATION) at 09:07

## 2019-02-06 RX ADMIN — MEMANTINE 10 MG: 10 TABLET ORAL at 09:43

## 2019-02-06 RX ADMIN — ROSUVASTATIN CALCIUM 5 MG: 10 TABLET, FILM COATED ORAL at 09:44

## 2019-02-06 RX ADMIN — ARFORMOTEROL TARTRATE 15 MCG: 15 SOLUTION RESPIRATORY (INHALATION) at 09:07

## 2019-02-06 RX ADMIN — ASPIRIN 325 MG: 325 TABLET, DELAYED RELEASE ORAL at 09:43

## 2019-02-06 RX ADMIN — TAMSULOSIN HYDROCHLORIDE 0.4 MG: 0.4 CAPSULE ORAL at 09:43

## 2019-02-06 RX ADMIN — ACETAMINOPHEN 1000 MG: 500 TABLET ORAL at 09:43

## 2019-02-06 RX ADMIN — MIDODRINE HYDROCHLORIDE 5 MG: 5 TABLET ORAL at 09:43

## 2019-02-06 RX ADMIN — Medication 10 ML: at 06:47

## 2019-02-06 RX ADMIN — LEVOTHYROXINE SODIUM 50 MCG: 50 TABLET ORAL at 06:47

## 2019-02-06 NOTE — PROGRESS NOTES
Problem: Falls - Risk of 
Goal: *Absence of Falls Document Lucrecia Shilo Fall Risk and appropriate interventions in the flowsheet. Outcome: Progressing Towards Goal 
Fall Risk Interventions: 
Mobility Interventions: Patient to call before getting OOB, Communicate number of staff needed for ambulation/transfer Medication Interventions: Evaluate medications/consider consulting pharmacy, Patient to call before getting OOB Elimination Interventions: Call light in reach, Patient to call for help with toileting needs, Toileting schedule/hourly rounds History of Falls Interventions: Door open when patient unattended, Room close to nurse's station, Bed/chair exit alarm Problem: Diabetes Maintenance:Ongoing Goal: *Blood Glucose 80 to 180 md/dl Outcome: Progressing Towards Goal 
Patient was not hypoglycemic today. Blood sugar remained in the low 200s today. MD ordered lantus at bedtime. 0800: Bedside shift change report given to Tulio Carney RN (oncoming nurse) by Helen Ryan RN (offgoing nurse). Report included the following information MAR, Accordion, Recent results. 1830Bukelsie De Jesus MD notified of STAT endocrinology consulted not being able to be called due to no endocrinologist coming to [facility]. No new orders received. Patient's daughter asked RN about endocrinology consult, RN updated daughter on situation. Daughter asked to speak with MD. Asia Rowan MD notified of this and provided with daughter's contact information. 1945: Bedside shift change report given to Jacinto Sevilla RN (oncoming nurse) by Tulio Carney RN (offgoing nurse). Report included the following information SBAR, Kardex, MAR, Accordion and Recent Results.

## 2019-02-06 NOTE — PROGRESS NOTES
Patient was moved to hallway bed for discharge, IV was removed, reviewed medications with patients son. No further questions at this time.

## 2019-02-06 NOTE — DISCHARGE INSTRUCTIONS
Discharge Instructions       PATIENT ID: Delia Betts  MRN: [id number]   YOB: 1940    DATE OF ADMISSION: 2/4/2019  1:03 PM    DATE OF DISCHARGE: 2/6/2019    PRIMARY CARE PROVIDER: Te Fraire MD     ATTENDING PHYSICIAN: Jovanni Womack MD  DISCHARGING PROVIDER: Gerhard Almeida MD    To contact this individual call [phone] and ask the  to page. If unavailable ask to be transferred the Adult Hospitalist Department. DISCHARGE DIAGNOSES hypoglycemia     CONSULTATIONS: None    PROCEDURES/SURGERIES: * No surgery found *    PENDING TEST RESULTS:   At the time of discharge the following test results are still pending: na    FOLLOW UP APPOINTMENTS:   Follow-up Information     Follow up With Specialties Details Why Contact Info    Te Fraire MD Internal Medicine In 1 week see in one to two weeks as needed. [address] [place] [address]  [phone]             ADDITIONAL CARE RECOMMENDATIONS: na    DIET: Resume previous diet      ACTIVITY: Activity as tolerated    WOUND CARE: na    EQUIPMENT needed: na      DISCHARGE MEDICATIONS:   See Medication Reconciliation Form    · It is important that you take the medication exactly as they are prescribed. · Keep your medication in the bottles provided by the pharmacist and keep a list of the medication names, dosages, and times to be taken in your wallet. · Do not take other medications without consulting your doctor. NOTIFY YOUR PHYSICIAN FOR ANY OF THE FOLLOWING:   Fever over 101 degrees for 24 hours. Chest pain, shortness of breath, fever, chills, nausea, vomiting, diarrhea, change in mentation, falling, weakness, bleeding. Severe pain or pain not relieved by medications. Or, any other signs or symptoms that you may have questions about.       DISPOSITION:    Home With:   OT  PT  HH  RN       SNF/Inpatient Rehab/LTAC   x Independent/assisted living    Hospice    Other:      Signed: Ju Gandhi MD  2/6/2019  9:09 AM

## 2019-02-06 NOTE — PROGRESS NOTES
CM reviewed chart and noted transfer to [address] [facility]. Pt lives at home and has home health services through [facility] and also has medicaid caregivers through [facility] and [facility]. CM received orders to resume home health services, CM forwarded orders through [employer] to [facility]. [facility] has accepted. Family will provide transport home.   
HIPOLITO Webb, ACM

## 2019-02-06 NOTE — PROGRESS NOTES
10:30a - CCL notified family of patient's discharge. Son to  around 11am. Patient displeased with move to garcia bed. Bed alarm in place while patient waits for transport home. IV removed. 11a - Discharge instructions reviewed with patient's son.

## 2019-02-06 NOTE — PROGRESS NOTES
TRANSFER - OUT REPORT: 
 
Verbal report given to [place] (name) on Hunter Gonzalez  being transferred to [facility](unit) for routine progression of care Report consisted of patients Situation, Background, Assessment and  
Recommendations(SBAR). Information from the following report(s) SBAR, Kardex, Intake/Output, MAR, Accordion, Recent Results and Cardiac Rhythm AFib was reviewed with the receiving nurse. Lines:  
Peripheral IV 02/05/19 Forearm (Active) Site Assessment Clean, dry, & intact 2/5/2019 11:01 PM  
Phlebitis Assessment 0 2/5/2019 11:01 PM  
Infiltration Assessment 0 2/5/2019 11:01 PM  
Dressing Status Clean, dry, & intact 2/5/2019 11:01 PM  
Dressing Type Tape 2/5/2019 11:01 PM  
Hub Color/Line Status Blue 2/5/2019 11:01 PM  
Action Taken Dressing reinforced 2/5/2019 11:01 PM  
Alcohol Cap Used Yes 2/5/2019 11:01 PM  
  
 
Opportunity for questions and clarification was provided. Patient transported with: 
 [place]

## 2019-02-06 NOTE — DISCHARGE SUMMARY
Discharge Summary       PATIENT ID: Nani Queen  MRN: [id number]   YOB: 1940    DATE OF ADMISSION: 2/4/2019  1:03 PM    DATE OF DISCHARGE: 2/6/2019    PRIMARY CARE PROVIDER: Ary Walker MD     ATTENDING PHYSICIAN: Lynn Gonzalez MD   DISCHARGING PROVIDER: Lynn Gonzalez MD    To contact this individual call [phone] and ask the  to page. If unavailable ask to be transferred the Adult Hospitalist Department. CONSULTATIONS: None    PROCEDURES/SURGERIES: * No surgery found *    ADMITTING DIAGNOSES & HOSPITAL COURSE:         Admission Summary:   Acute delirium, low blood surgar in fields, chronic dementia      Interval history / Subjective:       2/5/2019 :  No acute distress, no issues. As pt not able to given any useful history, confused to events, persons/place /time. Is pleasant, not agitated not anxious. 2/6/2019 at baseline, insulins adjusted and stable for dc home , case discussed with daughter Brandon Pierre 2/6/2019 am       Assessment & Plan:      Acute delirium resolved  Chronic dementia      Type 2 diabetes with hypoglycemia.   elevated troponin, nl ck, suggest vol/renal issue vs stain contributing, no f/u planned. Anemia chronic. Dyslipidemia. Hypothyroidism. Chronic obstructive pulmonary disease.     Depression. Chronic diastolic congestive heart failure.  No acute issues thereof    Persistent atrial fibrillation, stable rate    Tobacco abuse.           Code status: full   DVT prophylaxis: Heparin      Care Plan discussed with: Consultant card,   Disposition: Home w/Family and TBD                 DISCHARGE DIAGNOSES / PLAN:      1.  as above      ADDITIONAL CARE RECOMMENDATIONS:   na    PENDING TEST RESULTS:   At the time of discharge the following test results are still pending: na    FOLLOW UP APPOINTMENTS:    Follow-up Information     Follow up With Specialties Details Why Contact Info    Ary Walker MD Internal Medicine In 1 week see in one to two weeks as needed. [address] [facility]  [phone]               DIET: Resume previous diet       ACTIVITY: Activity as tolerated    WOUND CARE: na    EQUIPMENT needed: na      DISCHARGE MEDICATIONS:  Current Discharge Medication List      CONTINUE these medications which have CHANGED    Details   insulin glargine (LANTUS,BASAGLAR) 100 unit/mL (3 mL) inpn 15 Units by SubCUTAneous route nightly. Qty: 8 Units, Refills: 1      insulin lispro (HUMALOG U-100 INSULIN) 100 unit/mL injection 2 units sq for bs greater than 200 ;  3  untis sq for bs greater than 250,  4 units for blood sugar greater than 300 ; call me for bs greater than 350  Qty: 1 Vial, Refills: 0         CONTINUE these medications which have NOT CHANGED    Details   acetaminophen (TYLENOL) 500 mg tablet Take 1,000 mg by mouth every six to eight (6-8) hours as needed for Pain. DULoxetine (CYMBALTA) 20 mg capsule Take 60 mg by mouth nightly. fluticasone-salmeterol (ADVAIR DISKUS) 100-50 mcg/dose diskus inhaler Take 1 Puff by inhalation every twelve (12) hours. senna-docusate (PERICOLACE) 8.6-50 mg per tablet Take 2 Tabs by mouth nightly. ondansetron (ZOFRAN ODT) 4 mg disintegrating tablet Take 4 mg by mouth two (2) times daily as needed for Nausea. aspirin delayed-release 325 mg tablet Take 325 mg by mouth every twelve (12) hours. (VTE prophylaxis; started 1/18/19 x 4 weeks)      therapeutic multivitamin (THERA) tablet Take 1 Tab by mouth daily. ascorbic acid, vitamin C, (VITAMIN C) 500 mg tablet Take 500 mg by mouth daily. lidocaine (LIDODERM) 5 % 1 Patch by TransDERmal route every twenty-four (24) hours. Apply patch to the hip for 12 hours a day and remove for 12 hours a day. ferrous sulfate 325 mg (65 mg iron) tablet Take 325 mg by mouth daily.       traMADol (ULTRAM) 50 mg tablet Take 25 mg by mouth every six (6) hours as needed for Pain. (dose = 0.5 x 50 mg tablet) traZODone (DESYREL) 50 mg tablet Take 50 mg by mouth nightly. midodrine (PROAMITINE) 5 mg tablet Take 5 mg by mouth daily. cyanocobalamin (VITAMIN B-12) 100 mcg tablet Take 100 mcg by mouth daily. tamsulosin (FLOMAX) 0.4 mg capsule Take 0.4 mg by mouth daily. omeprazole (PRILOSEC) 20 mg capsule Take 20 mg by mouth Daily (before breakfast). Refills: 11      rosuvastatin (CRESTOR) 5 mg tablet Take 5 mg by mouth daily. levothyroxine (SYNTHROID) 50 mcg tablet Take 50 mcg by mouth Daily (before breakfast). Refills: 1               NOTIFY YOUR PHYSICIAN FOR ANY OF THE FOLLOWING:   Fever over 101 degrees for 24 hours. Chest pain, shortness of breath, fever, chills, nausea, vomiting, diarrhea, change in mentation, falling, weakness, bleeding. Severe pain or pain not relieved by medications. Or, any other signs or symptoms that you may have questions about. DISPOSITION:    Home With:   OT  PT  HH  RN       Long term SNF/Inpatient Rehab   x Independent/assisted living    Hospice    Other:       PATIENT CONDITION AT DISCHARGE:     Functional status    Poor     Deconditioned    x Independent      Cognition     Lucid     Forgetful    x Dementia      Catheters/lines (plus indication)    [place]     PICC     PEG    x None      Code status    x Full code     DNR      PHYSICAL EXAMINATION AT DISCHARGE:   Refer to Progress Note  Visit Vitals  /76 (BP 1 Location: Left arm, BP Patient Position: Sitting)   Pulse 100   Temp 97.6 °F (36.4 °C)   Resp 16   Ht 5' 11\" (1.803 m)   Wt 63.9 kg (140 lb 14 oz)   SpO2 98%   BMI 19.65 kg/m²                                                Constitutional:  No acute distress, cooperative, pleasant    ENT:  Oral mucous moist, oropharynx benign. Neck supple,    Resp:  CTA bilaterally. No wheezing/rhonchi/rales. No accessory muscle use   CV:  Regular rhythm, normal rate, no murmurs, gallops, rubs    GI:  Soft, non distended, non tender.  normoactive bowel sounds, no hepatosplenomegaly     Musculoskeletal:  No edema, warm, 1+ pulses throughout    Neurologic:  Moves all extremities.   Not oriented to time/place, recent event, persons                          Psych:  no insight, not agitated, not anxious           CHRONIC MEDICAL DIAGNOSES:  Problem List as of 2/6/2019 Date Reviewed: 2/4/2019          Codes Class Noted - Resolved    CHF (congestive heart failure) ([facility] ICD-10-CM: I50.9  ICD-9-CM: 428.0  12/25/2018 - Present        NSTEMI (non-ST elevated myocardial infarction) ([facility] ICD-10-CM: I21.4  ICD-9-CM: 410.70  12/25/2018 - Present        Hypokalemia ICD-10-CM: E87.6  ICD-9-CM: 276.8  12/25/2018 - Present        Orthostatic hypotension ICD-10-CM: I95.1  ICD-9-CM: 458.0  7/17/2017 - Present        Uncontrolled diabetes mellitus with hyperglycemia ([facility] ICD-10-CM: E11.65  ICD-9-CM: 250.02  4/11/2017 - Present        Syncope ICD-10-CM: R55  ICD-9-CM: 780.2  4/11/2017 - Present        Volume depletion ICD-10-CM: E86.9  ICD-9-CM: 276.50  4/11/2017 - Present        Hyponatremia ICD-10-CM: E87.1  ICD-9-CM: 276.1  4/11/2017 - Present        Urinary incontinence ICD-10-CM: R32  ICD-9-CM: 788.30  4/11/2017 - Present        CAD (coronary artery disease) ICD-10-CM: I25.10  ICD-9-CM: 414.00  4/11/2017 - Present        COPD (chronic obstructive pulmonary disease) ([facility] ICD-10-CM: J44.9  ICD-9-CM: 496  4/11/2017 - Present        Cognitive disorder ICD-10-CM: F09  ICD-9-CM: 294.9  7/11/2016 - Present    Overview Signed 7/11/2016  2:00 PM by Bertha Da Silva MD     Due to [facility]             Moderate recurrent major depression ([facility] ICD-10-CM: F33.1  ICD-9-CM: 296.32  7/11/2016 - Present        Complicated grief [facility]-52-XA: F43.29, Z63.4  ICD-9-CM: 309.0  7/23/2015 - Present        Non compliance w medication regimen ICD-10-CM: Z91.14  ICD-9-CM: V15.81  7/23/2015 - Present        * (Principal) RESOLVED: Acute delirium ICD-10-CM: R41.0  ICD-9-CM: 780.09  2/4/2019 - 2/6/2019        RESOLVED: Pneumonia ICD-10-CM: J18.9  ICD-9-CM: 320  8/6/2015 - 8/8/2015        RESOLVED: Major psychotic depression, single episode [facility]) ICD-10-CM: F32.3  ICD-9-CM: 296.24  7/23/2015 - 7/11/2016              Greater than 30  minutes were spent with the patient on counseling and coordination of care    Signed:   Swathi Garduno MD  2/6/2019  9:11 AM

## 2019-02-06 NOTE — PROGRESS NOTES
Primary Nurse Ines Bender and Chaz Serrano RN performed a dual skin assessment on this patient No impairment noted Flynn score is 21

## 2019-02-06 NOTE — PROGRESS NOTES
TRANSFER - IN REPORT: 
 
Verbal report received from [address] (name) on Brian Aguirre  being received from [facility] (unit) for routine progression of care Report consisted of patients Situation, Background, Assessment and  
Recommendations(SBAR). Information from the following report(s) SBAR, Kardex and MAR was reviewed with the receiving nurse. Opportunity for questions and clarification was provided. Assessment completed upon patients arrival to unit and care assumed.

## 2019-02-06 NOTE — PROGRESS NOTES
Home Based Primary Care & Supportive Services  (previously: At Home)  [phone] [facility], [address], [address]  1940    We received a HBPC & SS referral for Marie Irizarry from Ginna Spear NP.   Referral received by:  ____ Epic   ____ Fax   ____ Phone    __x__ Other (Connect Care in-basket message)  We appreciate this referral.    The patient's case has been reviewed and   __x__  Meets /   ____ Does Not Meet the following criteria for an initial provider visit:    __x__  Patient's residence is within 20 miles of [address] [place] [address] office address listed above  (3.1 miles)  ____  Patient is non-ambulatory (bedbound or wheelchair bound without ability to self-propel)  __x__  Patient's residence is determined to be a safe environment for the health care team  __x__  Patient has chronic care management health care needs    This patient also _x___  Meets /  ____ Does Not Meet a priority referral for:    __x__  Home Health integration  __x__  Post-Discharge follow up (pt discharged from [facility] 2/6/19)  __x__  High risk health care needs    Nurse called Dr. Christina Chicas office and left message with nurse Heidi Lombardi to inform PCP of HBPC referral.   A letter will be mailed to Dr. Lisbeth Childs after the first [address] [place] [address] visit to inform him of the transition of care to the [address] [place] [address] team.    PATIENT DEMOGRAPHICS:    Marie Grullon., [address]  [phone]  1940    PRIMARY CARE PROVIDER:  Name:  [facility]  Phone Number: [phone]  Address: [address] [place] [address], [address]  [facility], [address]                                                                                     REFERRAL SOURCE CONTACT INFORMATION:  Name: Ginna Spear  Phone Number:   [phone]    DIAGNOSIS:   Dementia with recent delirium, COPD,  chronic diastolic CHF, persistent afib, Type 2 diabetes with hypoglycemia, chronic anemia, hypothyroidism, depression    PRIMARY CARETAKER: Name: Te Soto  Phone Number: [phone]  Address: [address]  Relationship to Patient: daughter    ACP:    DDNR signed on 12/18/18 is on file. No AMD on file. According to Ms. Panda Anand she is the Primary Healthcare Decision Maker:   Te Soto ([phone])    HOME ENVIRONMENT:     Private residence, 6 stairs to enter, no ramp    ADL's:      Medication Management:  Daughter administers medications  Transportation:  Pt has to be carried in and out of his home  Hygiene: Requires some assistance with bathing and dressing      Mobility:    Ambulates short distances with rollator        Falls within the past 6 months? Yes, dtr says pt fell at [facility] in Jan. 2019 and fractured his hip (was treated at [facility])    DME:  Bedside commode, shower chair    HOME SERVICES:   At [facility]. Dtr says she is trying to get personal care aides through [facility]. She says pt has been approved but they did not have any aides available currently. NUTRITION:    Dtr says pt eats \"whatever he wants\". Meats need to be finely chopped. SKIN:    Intact     TOILETING:    Incontinent at times, wears briefs       WHAT WAS PATIENT'S FUNCTIONAL STATUS 6 MONTHS AGO? About the same (frail, weak)    WHAT BARRIERS DO YOU HAVE TO GETTING IN TO MD OFFICE?    Stairs to exit home, no ramp, has to be carried out of the house, minimal ambulation, recent hip fracture    DATE OF MOST RECENT CONTACT WITH A PROVIDER:  2/6/19 in the hospital    DATE OF MOST RECENT PCP OFFICE VISIT:  Not known    DATE OF MOST RECENT SPECIALIST VISIT/UPCOMING SPECIALIST APPTS:  10/10/18  Dr. Coy Ramirez (endocrinology)/no upcoming appts scheduled    RECENT ED [facility]:    2/4/19-2/6/19   [facility] (acute delirium)  Jan. 2019  [facility]  (right hip fracture)  12/24/18-12/29/18  [facility] (CHF, was discharged to [facility])  ED [facility]:  10/12/18   (syncope)  ED [facility]:  10/9/18 (near syncope)    NOTES:  Pt was discharged home from [facility] on 2/6/19. Will need FELICIA visit.     AN Schafer, RN-BC  Referral [address]  Phone:  [phone]  Fax:  [phone]  [email or web address]

## 2019-02-06 NOTE — PROGRESS NOTES
Hospitalist Progress Note Princess Sebastian MD 
Answering service: [phone] OR [phone] from in house phone Date of Service:  2019 NAME:  Yassine Manley :  1940 MRN:  [id number] Admission Summary:  
Acute delirium, low blood surgar in fields, chronic dementia Interval history / Subjective:  
   
2019 : 
No acute distress, no issues. As pt not able to given any useful history, confused to events, persons/place /time. Is pleasant, not agitated not anxious. 2019 : 
Pt seems at base line mental,  From my standpoint OK for dc back to home w usual support,   Insulins down adjusted last pm    Case has discussed dispo plans w family. Assessment & Plan:  
 
Acute delirium resolved Chronic dementia Metabolic Encephalopathy 2n2 low blood sugars resolved. Type 2 diabetes with hypoglycemia. Lab Results Component Value Date/Time Glucose 59 (L) 2019 01:42 AM  
 Glucose (POC) 87 2019 06:49 AM  
  
 elevated troponin, nl ck, suggest vol/renal issue vs stain contributing, no f/u planned. Anemia chronic. Dyslipidemia. Hypothyroidism. Chronic obstructive pulmonary disease. Depression. Chronic diastolic congestive heart failure. No acute issues thereof Persistent atrial fibrillation, stable rate Tobacco abuse. 
  
 
 
Code status: full DVT prophylaxis: Heparin Care Plan discussed with: Consultant card, Disposition: Home w/Family and TBD- anticipate dc today if family aggreable, Hospital Problems  Date Reviewed: 2019 Codes Class Noted POA * (Principal) Acute delirium ICD-10-CM: R41.0 ICD-9-CM: 780.09  2019 Yes Review of Systems:  
Review of systems not obtained due to patient factors. Vital Signs:  
 Last 24hrs VS reviewed since prior progress note. Most recent are: 
Visit Vitals /55 (BP 1 Location: Right arm, BP Patient Position: At rest) Pulse 80 Temp 98.6 °F (37 °C) Resp 16 Ht 5' 11\" (1.803 m) Wt 63.9 kg (140 lb 14 oz) SpO2 98% BMI 19.65 kg/m² Intake/Output Summary (Last 24 hours) at 2/6/2019 8208 Last data filed at 2/5/2019 2100 Gross per 24 hour Intake 480 ml Output 900 ml Net -420 ml Physical Examination:  
 
 
     
Constitutional:  No acute distress, cooperative, pleasant   
ENT:  Oral mucous moist, oropharynx benign. Neck supple, Resp:  CTA bilaterally. No wheezing/rhonchi/rales. No accessory muscle use CV:  Regular rhythm, normal rate, no murmurs, gallops, rubs GI:  Soft, non distended, non tender. normoactive bowel sounds, no hepatosplenomegaly Musculoskeletal:  No edema, warm, 1+ pulses throughout Neurologic:  Moves all extremities. Not oriented to time/place, recent event, persons Psych:  no insight, not agitated, not anxious Data Review:  
 Review and/or order of clinical lab test 
 
 
Labs:  
 
Recent Labs 02/05/19 
0142 02/04/19 
1344 WBC 6.5 10.7 HGB 9.2* 8.9* HCT 28.4* 28.4*  
 257 Recent Labs 02/05/19 
0142 02/04/19 
1344  142  
K 4.0 4.0  
* 113* CO2 24 24 BUN 15 17 CREA 1.21 1.16  
GLU 59* 73  
CA 8.4* 7.9*  
MG 2.0  --   
PHOS 3.8  --   
 
Recent Labs 02/05/19 
0142 02/04/19 
1344 SGOT 32 32 ALT 23 21  107 TBILI 0.2 0.2 TP 6.0* 5.6* ALB 2.4* 2.3*  
GLOB 3.6 3.3 No results for input(s): INR, PTP, APTT in the last 72 hours. No lab exists for component: INREXT, INREXT Recent Labs 02/04/19 
1935 TIBC 196* PSAT 20 FERR 75 Lab Results Component Value Date/Time Folate 21.9 (H) 02/04/2019 07:35 PM  
  
No results for input(s): PH, PCO2, PO2 in the last 72 hours. Recent Labs 02/05/19 
0142 02/04/19 
1935 02/04/19 
1344 CPK 82 82 88 CKNDX 5.5* 5.1* 4.4*  
TROIQ 0.22* 0.22* 0.13* Lab Results Component Value Date/Time Cholesterol, total 110 07/18/2017 02:14 AM  
 HDL Cholesterol 47 07/18/2017 02:14 AM  
 LDL, calculated 44 07/18/2017 02:14 AM  
 Triglyceride 95 07/18/2017 02:14 AM  
 CHOL/HDL Ratio 2.3 07/18/2017 02:14 AM  
 
Lab Results Component Value Date/Time Glucose (POC) 87 02/06/2019 06:49 AM  
 Glucose (POC) 127 (H) 02/06/2019 12:03 AM  
 Glucose (POC) 191 (H) 02/05/2019 09:04 PM  
 Glucose (POC) 228 (H) 02/05/2019 04:01 PM  
 Glucose (POC) 219 (H) 02/05/2019 12:03 PM  
 
Lab Results Component Value Date/Time Color YELLOW/STRAW 02/04/2019 06:10 PM  
 Appearance CLEAR 02/04/2019 06:10 PM  
 Specific gravity 1.012 02/04/2019 06:10 PM  
 Specific gravity 1.010 07/17/2017 04:12 PM  
 pH (UA) 5.0 02/04/2019 06:10 PM  
 Protein NEGATIVE  02/04/2019 06:10 PM  
 Glucose NEGATIVE  02/04/2019 06:10 PM  
 Ketone NEGATIVE  02/04/2019 06:10 PM  
 Bilirubin NEGATIVE  02/04/2019 06:10 PM  
 Urobilinogen 0.2 02/04/2019 06:10 PM  
 Nitrites NEGATIVE  02/04/2019 06:10 PM  
 Leukocyte Esterase NEGATIVE  02/04/2019 06:10 PM  
 Epithelial cells FEW 12/24/2018 09:37 PM  
 Bacteria NEGATIVE  12/24/2018 09:37 PM  
 WBC 0-4 12/24/2018 09:37 PM  
 RBC 0-5 12/24/2018 09:37 PM  
 
 
 
Medications Reviewed:  
 
Current Facility-Administered Medications Medication Dose Route Frequency  influenza vaccine 2018-19 (6 mos+)(PF) (FLUARIX QUAD/FLULAVAL QUAD) injection 0.5 mL  0.5 mL IntraMUSCular PRIOR TO DISCHARGE  glucose chewable tablet 16 g  4 Tab Oral PRN  
 dextrose (D50W) injection syrg 12.5-25 g  12.5-25 g IntraVENous PRN  
 glucagon (GLUCAGEN) injection 1 mg  1 mg IntraMUSCular PRN  
 insulin glargine (LANTUS) injection 8 Units  8 Units SubCUTAneous QHS  cyanocobalamin (VITAMIN B12) tablet 100 mcg  100 mcg Oral DAILY  DULoxetine (CYMBALTA) capsule 60 mg  60 mg Oral QHS  levothyroxine (SYNTHROID) tablet 50 mcg  50 mcg Oral ACB  midodrine (PROAMITINE) tablet 5 mg  5 mg Oral DAILY  rosuvastatin (CRESTOR) tablet 5 mg  5 mg Oral DAILY  tamsulosin (FLOMAX) capsule 0.4 mg  0.4 mg Oral DAILY  traMADol (ULTRAM) tablet 25 mg  25 mg Oral Q6H PRN  
 traZODone (DESYREL) tablet 50 mg  50 mg Oral QHS  sodium chloride (NS) flush 5-40 mL  5-40 mL IntraVENous Q8H  
 sodium chloride (NS) flush 5-40 mL  5-40 mL IntraVENous PRN  
 ondansetron (ZOFRAN) injection 4 mg  4 mg IntraVENous Q4H PRN  
 heparin (porcine) injection 5,000 Units  5,000 Units SubCUTAneous Q8H  
 albuterol-ipratropium (DUO-NEB) 2.5 MG-0.5 MG/3 ML  3 mL Nebulization Q4H PRN  
 nicotine (NICODERM CQ) 21 mg/24 hr patch 1 Patch  1 Patch TransDERmal DAILY  aspirin delayed-release tablet 325 mg  325 mg Oral Q12H  
 arformoterol (BROVANA) neb solution 15 mcg  15 mcg Nebulization BID RT And  
 budesonide (PULMICORT) 500 mcg/2 ml nebulizer suspension  500 mcg Nebulization BID RT  
 QUEtiapine (SEROquel) tablet 50 mg  50 mg Oral Q12H  
 memantine (NAMENDA) tablet 10 mg  10 mg Oral Q12H  
 acetaminophen (TYLENOL) tablet 1,000 mg  1,000 mg Oral TID  acetaminophen (TYLENOL) tablet 325 mg  325 mg Oral Q4H PRN  
 
______________________________________________________________________ EXPECTED LENGTH OF STAY: 2d 21h ACTUAL LENGTH OF STAY:          2 Teodora Dawn MD

## 2019-02-12 ENCOUNTER — OFFICE VISIT (OUTPATIENT)
Dept: FAMILY MEDICINE CLINIC | Age: 79
End: 2019-02-12

## 2019-02-12 ENCOUNTER — DOCUMENTATION ONLY (OUTPATIENT)
Dept: FAMILY MEDICINE CLINIC | Age: 79
End: 2019-02-12

## 2019-02-12 VITALS
RESPIRATION RATE: 16 BRPM | HEART RATE: 58 BPM | TEMPERATURE: 98.7 F | BODY MASS INDEX: 19.67 KG/M2 | DIASTOLIC BLOOD PRESSURE: 77 MMHG | SYSTOLIC BLOOD PRESSURE: 148 MMHG | WEIGHT: 141 LBS | OXYGEN SATURATION: 98 %

## 2019-02-12 DIAGNOSIS — Z79.4 TYPE 2 DIABETES MELLITUS WITH COMPLICATION, WITH LONG-TERM CURRENT USE OF INSULIN (HCC): ICD-10-CM

## 2019-02-12 DIAGNOSIS — E11.8 TYPE 2 DIABETES MELLITUS WITH COMPLICATION, WITH LONG-TERM CURRENT USE OF INSULIN (HCC): ICD-10-CM

## 2019-02-12 DIAGNOSIS — F02.80 EARLY ONSET ALZHEIMER'S DEMENTIA WITHOUT BEHAVIORAL DISTURBANCE (HCC): Primary | ICD-10-CM

## 2019-02-12 DIAGNOSIS — I50.9 CONGESTIVE HEART FAILURE, UNSPECIFIED HF CHRONICITY, UNSPECIFIED HEART FAILURE TYPE (HCC): ICD-10-CM

## 2019-02-12 DIAGNOSIS — J44.9 CHRONIC OBSTRUCTIVE PULMONARY DISEASE, UNSPECIFIED COPD TYPE (HCC): ICD-10-CM

## 2019-02-12 DIAGNOSIS — C61 PROSTATE CANCER (HCC): ICD-10-CM

## 2019-02-12 DIAGNOSIS — S72.001S CLOSED FRACTURE OF RIGHT HIP, SEQUELA: ICD-10-CM

## 2019-02-12 DIAGNOSIS — N18.4 CKD (CHRONIC KIDNEY DISEASE) STAGE 4, GFR 15-29 ML/MIN (HCC): ICD-10-CM
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
## 2019-02-12 DIAGNOSIS — G30.0 EARLY ONSET ALZHEIMER'S DEMENTIA WITHOUT BEHAVIORAL DISTURBANCE (HCC): Primary | ICD-10-CM

## 2019-02-12 RX ORDER — LIDOCAINE 50 MG/G
1 PATCH TOPICAL EVERY 24 HOURS
Qty: 30 EACH | Refills: 11 | Status: SHIPPED | OUTPATIENT
Start: 2019-02-12 | End: 2019-03-14

## 2019-02-12 RX ORDER — AMOXICILLIN 250 MG
2 CAPSULE ORAL 2 TIMES DAILY
Qty: 120 TAB | Refills: 11 | Status: SHIPPED | OUTPATIENT
Start: 2019-02-12 | End: 2019-03-14

## 2019-02-12 RX ORDER — DULOXETIN HYDROCHLORIDE 60 MG/1
60 CAPSULE, DELAYED RELEASE ORAL DAILY
Qty: 30 CAP | Refills: 11
Start: 2019-02-12 | End: 2019-03-14

## 2019-02-12 RX ORDER — OMEPRAZOLE 20 MG/1
20 CAPSULE, DELAYED RELEASE ORAL
Qty: 30 CAP | Refills: 11 | Status: SHIPPED | OUTPATIENT
Start: 2019-02-12 | End: 2020-01-17

## 2019-02-12 RX ORDER — INSULIN GLARGINE 100 [IU]/ML
8 INJECTION, SOLUTION SUBCUTANEOUS
Qty: 8 UNITS | Refills: 5 | Status: SHIPPED | OUTPATIENT
Start: 2019-02-12 | End: 2019-03-14

## 2019-02-12 RX ORDER — GUAIFENESIN 100 MG/5ML
81 LIQUID (ML) ORAL DAILY
Qty: 30 TAB | Refills: 11 | Status: SHIPPED | OUTPATIENT
Start: 2019-02-12 | End: 2021-01-01

## 2019-02-12 RX ORDER — TRAMADOL HYDROCHLORIDE 50 MG/1
50 TABLET ORAL
Qty: 60 TAB | Refills: 2 | Status: SHIPPED | OUTPATIENT
Start: 2019-02-12 | End: 2019-03-14

## 2019-02-12 RX ORDER — LEVOTHYROXINE SODIUM 50 UG/1
50 TABLET ORAL
Qty: 30 TAB | Refills: 5 | Status: SHIPPED | OUTPATIENT
Start: 2019-02-12 | End: 2019-07-21 | Stop reason: SDUPTHER

## 2019-02-12 RX ORDER — QUETIAPINE FUMARATE 50 MG/1
50 TABLET, FILM COATED ORAL
Qty: 30 TAB | Refills: 5 | Status: SHIPPED | OUTPATIENT
Start: 2019-02-12 | End: 2019-02-19 | Stop reason: SDUPTHER

## 2019-02-12 RX ORDER — ONDANSETRON 4 MG/1
4 TABLET, ORALLY DISINTEGRATING ORAL
Qty: 30 TAB | Refills: 1 | Status: ON HOLD | OUTPATIENT
Start: 2019-02-12 | End: 2022-01-01 | Stop reason: ALTCHOICE

## 2019-02-12 NOTE — PATIENT INSTRUCTIONS
Dear Rene Awad , It was a pleasure seeing you at home today with [facility] ([phone]) Your stated goal: To keep your dad at home, not in a nursing home. This is his wish. This is what we talked about: 1. Breathing 
-He was in the hospital for congestive heart failure after a possible heart attack 
-He also has a history of smoking and has COPD 
-He still smokes cigars sometimes 
-He is not using his inhaler but is using his nebulizer solution 
-We are going to have the home health team evaluate his breathing; we called [facility] to let them know 
-I would like to order an overnight oxygen test as his oxygen may be dropping at night, possibly causing some of his behavioral issues at night 2. Diabetes 
-He has diabetes and is on insulin at this time; Lantus 8 units at night and a sliding scale of Humalog  
-You last gave him 4 units for a sugar of greater than 300 
-He is forgetting that he is eating and eating very frequently, gaining weight and causing his sugars to increase 
-His sugars at night have been elevated significantly 
-You had trouble with the meter today; we will have the [facility] team help with this as well 
-We are going to have the pharmacist come and evaluate his diabetes including his medications for diabetes 
-His sugar goal is 120 to 180 but can be as high as 200 
-We would like to try to keep him under 200 because if higher than this it can cause him to urinate frequently, again possibly contributing to behavioral issues 
-We will get this under better control soon 3. Dementia 
-He is on Namenda at this time 
-He has behavioral issues including sundowning 
-He is on Trazodone 50mg, Seroquel 50mg (at night only), and Cymbalta/Duloxetine 60mg  
-These are a lot of centrally acting medications 
-We will evaluate his behaviors and try to streamline his medications 
-We know this has been challenging -First we need to address the medical problems that could be contributing to this 4. Pain 
-He has pain in his hip, legs and sometimes all over 
-He has a history of prostate cancer; we will do his blood work as assess this as well as get records to learn more about this 
-He takes Tylenol on a schedule and has taken Tramadol also 
-I prescribed Tramadol again today 
-We will monitor his pain and adjust his medications to his needs 5. Poly pharmacy 
-He is on a lot of medications at this time 
-We will work with a pharmacist and his condition and try to simplify his medications 
-You had pill packs from a pharmacy deliver as well as pill packs from [facility] 
-We  current medications from medications that have been stopped or ones we recommended stopping today 
-We stopped: vitamins, Flomax, and Crestor at this time 
-He is refusing at times to take medications, sometimes asking why he takes so many 
-I sent some medications to the pharmacy today and rewrote his list 
-The pharmacist will be contacting you soon 6. Caregiving 
-This has been very challenging 
-Your family is committed to keeping him at home 
-However, night time is getting more difficult  
-Your brother is in the hospital and you were with your dad last night and did not sleep at all 
-Becky Corbin our  was with us today to help look at all the options that may be available to support him at home Health Maintenance Health Maintenance Due Topic Date Due  
 MICROALBUMIN Q1  12/11/1950  
 EYE EXAM RETINAL OR DILATED  12/11/1950  
 DTaP/Tdap/Td series (1 - Tdap) 12/11/1962  Shingrix Vaccine Age 50> (1 of 2) 12/11/1991  GLAUCOMA SCREENING Q2Y  12/11/2006  Pneumococcal 65+ Low/Medium Risk (1 of 2 - PCV13) 12/11/2006  LIPID PANEL Q1  07/18/2018  Influenza Age 5 to Adult  08/01/2018  MEDICARE YEARLY EXAM  10/29/2018 Advance Care Planning This is what you have shared with us about Advance Care Planning Primary Decision [facility] Agent):   Primary Decision Jammie Beckett - Bakari - [phone] Relationship to patient: Daughter 
[] Named in a scanned document  
[x] Legal Next of Kin 
[] Guardian ACP documents you current have include: We filled out another Durable DNR form as you did not have one at home for him 
[] Advance Directive or Living Will 
[x] Durable Do Not Resuscitate 
[] Physician Orders for Scope of Treatment (POST) [] Medical Power of  
[] Other Someone from the Home Based Primary Care Team will see you again in 2 weeks; we will do blood work at that time as well as address health maintenance needs. If there are any concerns before that time, such as medication questions, worsening symptoms or a need to see a physician for an urgent or emergent situation; please call [phone]. A physician is also on call after our normal business hours of 8am to 5pm.  
 
In order to serve you better, please allow up to 48 hours for prescription refills to be processed. Certain medications may require more paperwork or a written prescription that you may need to  from the office. We appreciate you letting us know of any refill requests as soon as possible. Sincerely, The Home Based Primary Care Team 
 
Russell MD Rishabh Radford, MILTON Degroot, LONNIE Logan, LONNIE Chiu, [facility] Preventing Falls: Care Instructions Your Care Instructions Getting around your home safely can be a challenge if you have injuries or health problems that make it easy for you to fall. Loose rugs and furniture in walkways are among the dangers for many older people who have problems walking or who have poor eyesight. People who have conditions such as arthritis, osteoporosis, or dementia also have to be careful not to fall. You can make your home safer with a few simple measures. Follow-up care is a key part of your treatment and safety. Be sure to make and go to all appointments, and call your doctor if you are having problems. It's also a good idea to know your test results and keep a list of the medicines you take. How can you care for yourself at home? Taking care of yourself · You may get dizzy if you do not drink enough water. To prevent dehydration, drink plenty of fluids, enough so that your urine is light yellow or clear like water. Choose water and other caffeine-free clear liquids. If you have kidney, heart, or liver disease and have to limit fluids, talk with your doctor before you increase the amount of fluids you drink. · Exercise regularly to improve your strength, muscle tone, and balance. Walk if you can. Swimming may be a good choice if you cannot walk easily. · Have your vision and hearing checked each year or any time you notice a change. If you have trouble seeing and hearing, you might not be able to avoid objects and could lose your balance. · Know the side effects of the medicines you take. Ask your doctor or pharmacist whether the medicines you take can affect your balance. Sleeping pills or sedatives can affect your balance. · Limit the amount of alcohol you drink. Alcohol can impair your balance and other senses. · Ask your doctor whether calluses or corns on your feet need to be removed. If you wear loose-fitting shoes because of calluses or corns, you can lose your balance and fall. · Talk to your doctor if you have numbness in your feet. Preventing falls at home · Remove raised doorway thresholds, throw rugs, and clutter. Repair loose carpet or raised areas in the floor. · Move furniture and electrical cords to keep them out of walking paths. · Use nonskid floor wax, and wipe up spills right away, especially on ceramic tile floors. · If you use a walker or cane, put rubber tips on it.  If you use crutches, clean the bottoms of them regularly with an abrasive pad, such as steel wool. · Keep your house well lit, especially [facility], and outside walkways. Use night-lights in areas such as hallways and bathrooms. Add extra light switches or use remote switches (such as switches that go on or off when you clap your hands) to make it easier to turn lights on if you have to get up during the night. · Install sturdy handrails on stairways. · Move items in your cabinets so that the things you use a lot are on the lower shelves (about waist level). · Keep a cordless phone and a flashlight with new batteries by your bed. If possible, put a phone in each of the main rooms of your house, or carry a cell phone in case you fall and cannot reach a phone. Or, you can wear a device around your neck or wrist. You push a button that sends a signal for help. · Wear low-heeled shoes that fit well and give your feet good support. Use footwear with nonskid soles. Check the heels and soles of your shoes for wear. Repair or replace worn heels or soles. · Do not wear socks without shoes on wood floors. · Walk on the grass when the sidewalks are slippery. If you live in an area that gets snow and ice in the winter, sprinkle salt on slippery steps and sidewalks. Preventing falls in the bath · Install grab bars and nonskid mats inside and outside your shower or tub and near the toilet and sinks. · Use shower chairs and bath benches. · Use a hand-held shower head that will allow you to sit while showering. · Get into a tub or shower by putting the weaker leg in first. Get out of a tub or shower with your strong side first. 
· Repair loose toilet seats and consider installing a raised toilet seat to make getting on and off the toilet easier. · Keep your bathroom door unlocked while you are in the shower. Where can you learn more? Go to http://evelio-rosalinda.info/. Enter [phone] [id number] [phone] in the search box to learn more about \"Preventing Falls: Care Instructions. \" Current as of: March 15, 2018 Content Version: 11.9 © 3337-4595 [employer]. Care instructions adapted under license by [employer] (which disclaims liability or warranty for this information). If you have questions about a medical condition or this instruction, always ask your healthcare professional. [place] [address] any warranty or liability for your use of this information.

## 2019-02-12 NOTE — PROGRESS NOTES
[facility]   [phone]               Initial Social Work Assessment      Reason for Assessment: New patient    Sources of Information: Pt, pt's dtr Christen Kirk    SOCIAL HISTORY  Relationship Status:   [] Single  []   [] Significant Other  []   [x] Other: , spouse  5 years ago (had been  48 years)    Living Circumstances: Lives with son Katrina Weinstein, who is currently in the hospital with what dtr Christen Kirk describes as caregiver-related stress    Current/Type of Housing:  [] Public/subsidized housing  [] Apartment, Is there an elevator:   [] Assisted Living  [x] [employer], How many floors:  1    FINANCIAL/INSURANCE  Income per month: $1027  Source of Income:    [x] Social Security   [] SSI   [] Pension   [] Employment Income   [] Spouse income    Insurance Information:   [x] Medicare   [x] Medicaid    [] [employer]    Are you having trouble paying for things like rent, food, medications? No    Is there an agency or person who pays your bills? If yes, who? Dtr [employer], dev Berm[address]: Children do grocery shopping and general meal preparation, pt able to prepare some meals himself    Problem with bed bugs, odors, pests, or pets? No. 1 dog named Ok N[place]otte present    Working smoke alarm? [x] Yes [] No    Difficulty getting to exits from the home? No, pt is ambulatory at this time    Firearm Assessment:   Are there firearms in the home? [] Yes, Where are they kept? [x] No      SOCIAL SUPPORT ENVIRONMENT  Support System: Limited support outside of pt's 3 children    How often do social supports visit? No friends visit, one of pt's 3 children visit daily (son Katrina Weinstein lives in home with pt)    How do you socialize? Pt does not socialize    Are you involved with any community agencies? Name/Contact: [facility]     Is or has Adult IDALIA Rubio ever been involved?  No    In the last 6 months, have you seen a ? Psychiatrist? If yes, they be contacted by [employer] team? None    Substance Use:   Tobacco? [] Yes [x] No    Alcohol? [] Yes, How many drinks per week: [x] No   Drugs? [] Yes, which drugs:   [x] No    Do you have an Emergency Call Device system? [] Yes, Which brand? [x] No, Are you interesting in obtaining and subscribing to an Emergency Call Device system? No. Per daughter, pt would not use it and he is never left alone     COGNITIVE/EMOTIONAL   Mental Status: Alert, oriented to person/place, poor short term memory    How is your memory? Poor    In the last 6 months, has anyone told you that you are forgetful? Yes    Do you receive help with ADLs? [x] Yes, Who helps you? How many hours/days? Pt had Medicaid waiver aide prior to hospitalization, in process of being reinstated (35 hours/week and respite on weekends)  [] No, Do you need help? TRANSPORTATION NEEDS ASSESSMENT  Can you use public transportation? [] Yes [x] No  Do you use transportation services provided by: Managed Care Organization? Community Service Resource? Medicaid [employer]      ADVANCED CARE PLANNING  Ghislaine Cox informed that she has Durable POA, and pt has DDNR. She will provide a copy of Durable POA to team as soon as possible. Narrative:  SW visited with pt in the home where he has lived for over 50 years. Pt was alert, oriented to self and familiar objects. Short term memory deficit apparent as evidenced by repeating statements without realizing he had expressed those statements only moments prior. Pt was pleasant, appeared to be in good spirits. Dtr Ghislaine Cox presented as fatigued, affect was flat. She said she barely got any sleep last night because pt has sundowning and becomes very confused at night. She said he was awake at 3am cooking himself food, so she had to remain awake because it is dangerous for pt to use the stove without supervision.  She stated that pt also has been wandering at night, and has occassionally opened the front door and wandered outside and been found walking down the street in the middle of the night. The sundowning and nighttime confusion appears to be daughter's primary stressors at this time. [facility] stated that she lives in [place] and typically her brother Alexandra Lindsey is at home with pt when he is not working, however Alexandra Lindsey ended up in the hospital yesterday for what [facility] described as caregiver-related stress. [facility] stated that she took care of their mother who had dementia for 8 years, and she said, \"I'm just tired. \" [facility] appeared to become irritated with pt's confusion and behaviors during today's visit. SW assessed private hire caregiver resources. [facility] informed that pt has Medicaid and he had a waiver aide prior to hospitalization, [facility] home care agency, 35 hours/week + respite hours on weekends. Aide hours were 930am-3:30pm Monday-Saturday, and she said he had \"respite hours\" on Sundays. Her brother Alexandra Lindsey works full time during the day while pt has an aide in the home, but after work Alexandra Lindsey is pt's primary caregiver which has become very stressful because Alexandra Lindsey is not getting enough sleep and then having to work full time as well. [facility] stated that family does not have the financial resources to pay privately for an aide overnight. Pt has meal delivery service through [employer], sponsored through [employer]. [facility] stated that the family's goal for pt, and pt's goal for himself, is to be able to remain at home. He has lived in this home for over 50 years, and they do not want him to have to go a nursing facility. It appears that they will need additional caregiver support in order for pt's needs to be adequately met as well as to avoid further caregiver burnout. Plan: SW will follow up with pt and family within 1 week to continue assessing caregiver burnout, offer support and assess potential resources.      Anayeli Mcgraw, OWEN, LCSW    [facility]  O: [phone]  C: [phone]

## 2019-02-13 ENCOUNTER — DOCUMENTATION ONLY (OUTPATIENT)
Dept: FAMILY MEDICINE CLINIC | Age: 79
End: 2019-02-13

## 2019-02-13 NOTE — ACP (ADVANCE CARE PLANNING)
Advance Care Planning (ACP) Provider Conversation Snapshot    Date of ACP Conversation: 02/12/19  Persons included in Conversation:  patient and POA  Length of ACP Conversation in minutes:  16 minutes    Authorized Decision Maker (if patient is incapable of making informed decisions): This person is:   Healthcare Agent/Medical Power of  under Advance Directive      Primary Decision Maker: [place] Candelaria  Bakari - [phone]        For Patients with Decision Making Capacity: \"In these circumstances, what matters most to you? \"  Care focused more on comfort or quality of life.     Conversation Outcomes / Follow-Up Plan:   Entered DNR order (If yes, complete Durable DNR form)     Family wants to keep him at home  Wants reversible medical problems treated but not aggressive medical interventions that would prolong life without quality of life

## 2019-02-13 NOTE — PROGRESS NOTES
[facility] [id number] Team Members: Dr. Brandi Del Rio, Ilir Benavidez, NP; Rui Rosales, NP; Aakash Sanders, RN; Sean Rees, RN, Philippe Fonseca RN, Sharyle Setter, SW; Brigido Contreras PSR    Yassine Manley  1940 / [id number]  male    Date of Initial Visit (Start of Care): 2/12/19    Diagnosis: Dementia with recent delirium, COPD,  chronic diastolic CHF, persistent afib, Type 2 diabetes with hypoglycemia, chronic anemia, hypothyroidism, depression    Patient status summary: Homebound patient referred by Palliative Medicine Inpatient Team, Mary Salinas NP to our services due to taxing effort to seek primary care needs in the community. Advance Care Planning:   Primary Decision Maker ([address]): Parmjit Cabrera  Relationship to patient: Daughter  Phone [phone]  [] Named in a scanned document   [x] Legal Next of Kin  [] Guardian    ACP documents you current have include:  [] Advance Directive or Living Will  [x] Durable Do Not Resuscitate  [] Physician Orders for Scope of Treatment (POST)  [] Medical Power of   [] Other    DME/Supplies:  Bedside [employer]       Allergies   Allergen Reactions    Sulfa (Sulfonamide Antibiotics) Unknown (comments)       Nutritional Requirements:   Oral with supported meal preparation    Functional/Activity Level:  Limited ambulation with support of wheelchair and Wheelchair with assistance for transfers    Code Status: DNR    Safety Measures:   Fall risk, Self-care deficity and Smoker    Current Outpatient Medications   Medication Sig    DULoxetine (CYMBALTA) 60 mg capsule Take 1 Cap by mouth daily for 30 days.  lidocaine (LIDODERM) 5 % 1 Patch by TransDERmal route every twenty-four (24) hours for 30 days. Apply patch to the hip for 12 hours a day and remove for 12 hours a day.  senna-docusate (PERICOLACE) 8.6-50 mg per tablet Take 2 Tabs by mouth two (2) times a day for 30 days.  QUEtiapine (SEROQUEL) 50 mg tablet Take 1 Tab by mouth nightly for 30 days.  omeprazole (PRILOSEC) 20 mg capsule Take 1 Cap by mouth Daily (before breakfast) for 30 days.  aspirin 81 mg chewable tablet Take 1 Tab by mouth daily.  traMADol (ULTRAM) 50 mg tablet Take 1 Tab by mouth every six (6) hours as needed for Pain for up to 30 days. Max Daily Amount: 200 mg.    ondansetron (ZOFRAN ODT) 4 mg disintegrating tablet Take 1 Tab by mouth every six (6) hours as needed for Nausea.  levothyroxine (SYNTHROID) 50 mcg tablet Take 1 Tab by mouth Daily (before breakfast) for 30 days.  insulin glargine (LANTUS,BASAGLAR) 100 unit/mL (3 mL) inpn 8 Units by SubCUTAneous route nightly for 30 days.  insulin lispro (HUMALOG U-100 INSULIN) 100 unit/mL injection 2 units sq for bs greater than 200 ;  3  untis sq for bs greater than 250,  4 units for blood sugar greater than 300 ; call me for bs greater than 350    acetaminophen (TYLENOL) 500 mg tablet Take 1,000 mg by mouth every six to eight (6-8) hours as needed for Pain.  traZODone (DESYREL) 50 mg tablet Take 50 mg by mouth nightly.  memantine ER (NAMENDA XR) 28 mg capsule Take 28 mg by mouth daily.  midodrine (PROAMITINE) 5 mg tablet Take 5 mg by mouth daily. No current facility-administered medications for this visit. The Plan of Care has been initiated for within 15 days of New Visit  and reviewed and updated by the Interdisciplinary Group (IDG) as frequently as the patient condition warrants. Plan of Care by Discipline:    1. Provider  Identify patient's health care goal(s), Reduction of polypharmacy within benefit/burden framework appropriate to age, function and disease state, Determine need for laboratory assessment based on patient disease status , Assess results of laboratory testing and adjust treatment plan as appropriate and Create and implement disease /symptom specific plan to manage high risk conditions / symptoms    2. Nursing  Introduce Home Based Primary Care and Supportive Services, Review Emergency Preparedness Plan and Review Health Maintenance with patient and provider; update as appropriate    3. Social Work  New patient assessment of psychosocial needs and social determinants of health, Establish therapeutic relationship through in home visits and telephonic touch points, Assess for caregiver burden and intervene as psychosocially indicated and Provide information on available community resources    4. Other    Plan of Care Orders / Action Items:    1. Avoid nephrotoxic drugs  2.   Monitor CHF to avoid rehospitlization      Estimated Visit Frequency:  Weekly Provider Visit

## 2019-02-13 NOTE — PROGRESS NOTES
Home Based [address]  
[phone] Date of Current Visit: 02/12/19 Date of Initial [facility]PC Visit: 2/12/19 Transition Care Management: 
 
Admission date: 2/4/19 Discharge date: 2/6/19 Hospital: [facility] Discharge diagnosis: Hypoglycemia Nurse Navigator note date: 2/6/19 Nurse Navigator note reviewed in detail: yes We reviewed the hospital course and discharge summary / recommendation including the discharge medications. The patient presented with hypoglycemia. These symptoms have improved. The patient is following the discharge plan as directed with the following details noted in the [facility] visit note below. SUMMARY:  
Yassine Manley is a 69 y.o. who was referred by Palliative Medicine Inpatient Team / Mary Salinas NP. They have the following medical problems:  atrial fibrillation (not anti-coagulated). CAD, CHF, COPD, DM with neuropathy, CKD, prostate cancer, orthostatic hypotension, falls. It is a significant taxing effort to leave the home because: recent fall with hip fracture, recent hospital admission for CHF and COPD followed by stay at [facility], progressive dementia with behavioral disturbance when environment changes. Social: former truckdriver, 3 children, , lives at home with son Laine Tucker but caregivers include hired and support from other 2 children Anika Burrows; Laine Tucker; Gt Garcia) 12/24 to 12/29 - [facility]; 12/29 to 1/2 - [place]; 1/2 to 1/9 [facility] for hip fracture s/p fall; 1/9 to 1/29 - discharged with At [facility]; ED visit to [facility] on 2/4/19 for low blood sugar and admitted until 2/6/19. I have reviewed the RN Referral Intake Note Sharon Le) which includes critical information regarding the patient's health, caregiving and social determinants. GOALS OF CARE / TREATMENT PREFERENCES:  
GOALS OF CARE: 
Patient / health care proxy stated goals: See Patient Instructions for Health Care Goal; Updated each visit TREATMENT PREFERENCES:  
 Code Status:  [] Attempt Resuscitation       [x] Do Not Attempt Resuscitation Advance Care Planning: 
Primary Decision [facility] [place], [place] ([address]):   Primary Decision Maker: Marissa Paige Child - [phone] DIAGNOSES:  
 
  ICD-10-CM ICD-9-CM 1. Early onset Alzheimer's dementia without behavioral disturbance G30.0 331.0 DO NOT RESUSCITATE  
 F02.80 294.10 2. Closed fracture of right hip, sequela S72.001S 905.3 traMADol (ULTRAM) 50 mg tablet 3. Fall, sequela W19. XXXS 909.4 E929.3 4. Prostate cancer ([facility]) C61 185 PSA, DIAGNOSTIC (PROSTATE SPECIFIC AG) 5. CKD (chronic kidney disease) stage 4, GFR 15-29 ml/min ([facility]) H34.1 347.7 METABOLIC PANEL, BASIC  
   CBC W/O DIFF 6. Chronic obstructive pulmonary disease, unspecified COPD type ([facility]) J44.9 496 AMB SUPPLY ORDER  
7. Congestive heart failure, unspecified HF chronicity, unspecified heart failure type ([facility]) I50.9 428.0 AMB SUPPLY ORDER  
   LIPID PANEL 8. Type 2 diabetes mellitus with complication, with long-term current use of insulin ([facility]) E11.8 250.90 MICROALBUMIN, UR, RAND W/ MICROALB/CREAT RATIO  
 Z79.4 V58.67 PLAN:  
 
Patient Instructions Dear Nabor Leon , It was a pleasure seeing you at home today with Home Based [facility] ([phone]) Your stated goal: To keep your dad at home, not in a nursing home. This is his wish. This is what we talked about: 1. Breathing 
-He was in the hospital for congestive heart failure after a possible heart attack 
-He also has a history of smoking and has COPD 
-He still smokes cigars sometimes 
-He is not using his inhaler but is using his nebulizer solution 
-We are going to have the home health team evaluate his breathing; we called At [address] to let them know 
-I would like to order an overnight oxygen test as his oxygen may be dropping at night, possibly causing some of his behavioral issues at night 2. Diabetes -He has diabetes and is on insulin at this time; Lantus 8 units at night and a sliding scale of Humalog  
-You last gave him 4 units for a sugar of greater than 300 
-He is forgetting that he is eating and eating very frequently, gaining weight and causing his sugars to increase 
-His sugars at night have been elevated significantly 
-You had trouble with the meter today; we will have the [facility] team help with this as well 
-We are going to have the pharmacist come and evaluate his diabetes including his medications for diabetes 
-His sugar goal is 120 to 180 but can be as high as 200 
-We would like to try to keep him under 200 because if higher than this it can cause him to urinate frequently, again possibly contributing to behavioral issues 
-We will get this under better control soon 3. Dementia 
-He is on Namenda at this time 
-He has behavioral issues including sundowning 
-He is on Trazodone 50mg, Seroquel 50mg (at night only), and Cymbalta/Duloxetine 60mg  
-These are a lot of centrally acting medications 
-We will evaluate his behaviors and try to streamline his medications 
-We know this has been challenging 
-First we need to address the medical problems that could be contributing to this 4. Pain 
-He has pain in his hip, legs and sometimes all over 
-He has a history of prostate cancer; we will do his blood work as assess this as well as get records to learn more about this 
-He takes Tylenol on a schedule and has taken Tramadol also 
-I prescribed Tramadol again today 
-We will monitor his pain and adjust his medications to his needs 5. [facility] 
-He is on a lot of medications at this time 
-We will work with a pharmacist and his condition and try to simplify his medications 
-You had pill packs from a pharmacy deliver as well as pill packs from [facility] 
-We  current medications from medications that have been stopped or ones we recommended stopping today -We stopped: vitamins, Flomax, and Crestor at this time 
-He is refusing at times to take medications, sometimes asking why he takes so many 
-I sent some medications to the pharmacy today and rewrote his list 
-The pharmacist will be contacting you soon 6. Caregiving 
-This has been very challenging 
-Your family is committed to keeping him at home 
-However, night time is getting more difficult  
-Your brother is in the hospital and you were with your dad last night and did not sleep at all 
-Anna Meng our  was with us today to help look at all the options that may be available to support him at home Health Maintenance Health Maintenance Due Topic Date Due  
 MICROALBUMIN Q1  12/11/1950  
 EYE EXAM RETINAL OR DILATED  12/11/1950  
 DTaP/Tdap/Td series (1 - Tdap) 12/11/1962  Shingrix Vaccine Age 50> (1 of 2) 12/11/1991  GLAUCOMA SCREENING Q2Y  12/11/2006  Pneumococcal 65+ Low/Medium Risk (1 of 2 - PCV13) 12/11/2006  LIPID PANEL Q1  07/18/2018  Influenza Age 5 to Adult  08/01/2018  MEDICARE YEARLY EXAM  10/29/2018 Advance Care Planning This is what you have shared with us about Advance Care Planning Primary Decision [facility] Agent):   Primary Decision MakerDoramaximilian Mohit Villegas - [phone] Relationship to patient: Daughter 
[] Named in a scanned document  
[x] Legal Next of Kin 
[] Guardian ACP documents you current have include: We filled out another Durable DNR form as you did not have one at home for him 
[] Advance Directive or Living Will 
[x] Durable Do Not Resuscitate 
[] Physician Orders for Scope of Treatment (POST) [] Medical Power of  
[] Other Someone from the Home Based Primary Care Team will see you again in 2 weeks; we will do blood work at that time as well as address health maintenance needs.   
 
If there are any concerns before that time, such as medication questions, worsening symptoms or a need to see a physician for an urgent or emergent situation; please call [phone]. A physician is also on call after our normal business hours of 8am to 5pm.  
 
In order to serve you better, please allow up to 48 hours for prescription refills to be processed. Certain medications may require more paperwork or a written prescription that you may need to  from the office. We appreciate you letting us know of any refill requests as soon as possible. Sincerely, The Home Based Primary Care Team 
 
MD Leida Pena, MILTON Catalan, MILTON Eldridge, RN Viri Madrigal, LONNIE Fajardo, [facility] Preventing Falls: Care Instructions Your Care Instructions Getting around your home safely can be a challenge if you have injuries or health problems that make it easy for you to fall. Loose rugs and furniture in walkways are among the dangers for many older people who have problems walking or who have poor eyesight. People who have conditions such as arthritis, osteoporosis, or dementia also have to be careful not to fall. You can make your home safer with a few simple measures. Follow-up care is a key part of your treatment and safety. Be sure to make and go to all appointments, and call your doctor if you are having problems. It's also a good idea to know your test results and keep a list of the medicines you take. How can you care for yourself at home? Taking care of yourself · You may get dizzy if you do not drink enough water. To prevent dehydration, drink plenty of fluids, enough so that your urine is light yellow or clear like water. Choose water and other caffeine-free clear liquids. If you have kidney, heart, or liver disease and have to limit fluids, talk with your doctor before you increase the amount of fluids you drink. · Exercise regularly to improve your strength, muscle tone, and balance. Walk if you can. Swimming may be a good choice if you cannot walk easily. · Have your vision and hearing checked each year or any time you notice a change. If you have trouble seeing and hearing, you might not be able to avoid objects and could lose your balance. · Know the side effects of the medicines you take. Ask your doctor or pharmacist whether the medicines you take can affect your balance. Sleeping pills or sedatives can affect your balance. · Limit the amount of alcohol you drink. Alcohol can impair your balance and other senses. · Ask your doctor whether calluses or corns on your feet need to be removed. If you wear loose-fitting shoes because of calluses or corns, you can lose your balance and fall. · Talk to your doctor if you have numbness in your feet. Preventing falls at home · Remove raised doorway thresholds, throw rugs, and clutter. Repair loose carpet or raised areas in the floor. · Move furniture and electrical cords to keep them out of walking paths. · Use nonskid floor wax, and wipe up spills right away, especially on ceramic tile floors. · If you use a walker or cane, put rubber tips on it. If you use crutches, clean the bottoms of them regularly with an abrasive pad, such as steel wool. · Keep your house well lit, especially [facility], and outside walkways. Use night-lights in areas such as hallways and bathrooms. Add extra light switches or use remote switches (such as switches that go on or off when you clap your hands) to make it easier to turn lights on if you have to get up during the night. · Install sturdy handrails on stairways. · Move items in your cabinets so that the things you use a lot are on the lower shelves (about waist level). · Keep a cordless phone and a flashlight with new batteries by your bed. If possible, put a phone in each of the main rooms of your house, or carry a cell phone in case you fall and cannot reach a phone.  Or, you can wear a device around your neck or wrist. You push a button that sends a signal for help. · Wear low-heeled shoes that fit well and give your feet good support. Use footwear with nonskid soles. Check the heels and soles of your shoes for wear. Repair or replace worn heels or soles. · Do not wear socks without shoes on wood floors. · Walk on the grass when the sidewalks are slippery. If you live in an area that gets snow and ice in the winter, sprinkle salt on slippery steps and sidewalks. Preventing falls in the bath · Install grab bars and nonskid mats inside and outside your shower or tub and near the toilet and sinks. · Use shower chairs and bath benches. · Use a hand-held shower head that will allow you to sit while showering. · Get into a tub or shower by putting the weaker leg in first. Get out of a tub or shower with your strong side first. 
· Repair loose toilet seats and consider installing a raised toilet seat to make getting on and off the toilet easier. · Keep your bathroom door unlocked while you are in the shower. Where can you learn more? Go to http://evelio-rosalinda.info/. Enter [phone] in the search box to learn more about \"Preventing Falls: Care Instructions. \" Current as of: March 15, 2018 Content Version: 11.9 © 0414-9128 [employer]. Care instructions adapted under license by [employer] (which disclaims liability or warranty for this information). If you have questions about a medical condition or this instruction, always ask your healthcare professional. [place] [address] any warranty or liability for your use of this information. PRESCRIPTIONS GIVEN:  
 
Medications Ordered Today Medications  DULoxetine (CYMBALTA) 60 mg capsule Sig: Take 1 Cap by mouth daily for 30 days. Dispense:  30 Cap Refill:  11  
 lidocaine (LIDODERM) 5 %   Si Patch by TransDERmal route every twenty-four (24) hours for 30 days. Apply patch to the hip for 12 hours a day and remove for 12 hours a day. Dispense:  30 Each Refill:  11  
 senna-docusate (PERICOLACE) 8.6-50 mg per tablet Sig: Take 2 Tabs by mouth two (2) times a day for 30 days. Dispense:  120 Tab Refill:  11  
 QUEtiapine (SEROQUEL) 50 mg tablet Sig: Take 1 Tab by mouth nightly for 30 days. Dispense:  30 Tab Refill:  5  
 omeprazole (PRILOSEC) 20 mg capsule Sig: Take 1 Cap by mouth Daily (before breakfast) for 30 days. Dispense:  30 Cap Refill:  11  
 aspirin 81 mg chewable tablet Sig: Take 1 Tab by mouth daily. Dispense:  30 Tab Refill:  11  
 traMADol (ULTRAM) 50 mg tablet Sig: Take 1 Tab by mouth every six (6) hours as needed for Pain for up to 30 days. Max Daily Amount: 200 mg. Dispense:  60 Tab Refill:  2  
 ondansetron (ZOFRAN ODT) 4 mg disintegrating tablet Sig: Take 1 Tab by mouth every six (6) hours as needed for Nausea. Dispense:  30 Tab Refill:  1  
 levothyroxine (SYNTHROID) 50 mcg tablet Sig: Take 1 Tab by mouth Daily (before breakfast) for 30 days. Dispense:  30 Tab Refill:  5  
 insulin glargine (LANTUS,BASAGLAR) 100 unit/mL (3 mL) inpn Si Units by SubCUTAneous route nightly for 30 days. Dispense:  8 Units Refill:  5 HISTORY:  
  
CHIEF COMPLAINT:   
Chief Complaint Patient presents with  Dementia HPI/SUBJECTIVE: The patient is: [x] Verbal / [] Nonverbal  
 
Full history reviewed in CC including medical concerns since 19 - 20; repeated hospitalizations and rehabilitation. Patient cannot provide meaningful history. REVIEW OF SYSTEMS:  
 
The following systems were [x] reviewed / [] unable to be reviewed Systems: constitutional, ears/nose/mouth/throat, respiratory, gastrointestinal, genitourinary, musculoskeletal, integumentary, neurologic, psychiatric, endocrine. Positive findings noted below: weak, dizzy with standing, off balance, behavioral issues / sundowning at night, incontinence, elevated blood glucose FUNCTIONAL ASSESSMENT:  
 
Palliative Performance Scale (PPS):  60 PSYCHOSOCIAL/SPIRITUAL SCREENING:  
  
Any spiritual / Confucianist concerns: [] Yes /  [x] No  
 
Caregiver Burnout: [x] Yes /  [] No /  [] No Caregiver Present PHYSICAL EXAM:  
 
Wt Readings from Last 3 Encounters:  
02/12/19 141 lb (64 kg) 02/06/19 140 lb 14 oz (63.9 kg) 12/29/18 144 lb 11.2 oz (65.6 kg) Blood pressure 148/77, pulse (!) 58, temperature 98.7 °F (37.1 °C), resp. rate 16, weight 141 lb (64 kg), SpO2 98 %. Last bowel movement:  Constipation reported per daughter Constitutional: alert but not oriented, repeats phrases, able to physically maneuver around house and able to follow directions (simple) Eyes: pupils equal, anicteric ENMT: no nasal discharge, moist mucous membranes Cardiovascular: irregularly irregular rhythm, distal pulses intact Respiratory: breathing not labored, symmetric, CTA bilaterally Gastrointestinal: +bowel sounds,  soft non-tender, non-distended Musculoskeletal: no deformity, no tenderness to palpation, no edema Skin: warm, dry, pale Neurologic: A/Ox 3, following commands, moving all extremities equally Psychiatric: full affect, no hallucinations Other: uses cane HISTORY:  
 
Past Medical and Surgical History reviewed in [facility] on date of initial visit Patient Active Problem List  
Diagnosis Code  Complicated grief Z92.78, Z63.4  Non compliance w medication regimen Z91.14  
 Cognitive disorder F09  Moderate recurrent major depression ([facility]) F33.1  Uncontrolled diabetes mellitus with hyperglycemia ([facility]) E11.65  Syncope R55  Volume depletion E86.9  Hyponatremia E87.1  Urinary incontinence R32  
 CAD (coronary artery disease) I25.10  COPD (chronic obstructive pulmonary disease) ([facility]) J44.9  Orthostatic hypotension I95.1  CHF (congestive heart failure) ([facility]) I50.9  NSTEMI (non-ST elevated myocardial infarction) ([facility] [address] I21.4  Hypokalemia E87.6  Prostate cancer ([facility] C61 Family History Problem Relation Age of Onset  Diabetes Mother  Diabetes Brother History reviewed, no pertinent family history. Social History Tobacco Use  Smoking status: Current Every Day Smoker  Smokeless tobacco: Never Used  Tobacco comment: 1-2 cigars daily Substance Use Topics  Alcohol use: No  
  Alcohol/week: 0.0 oz Allergies Allergen Reactions  Sulfa (Sulfonamide Antibiotics) Unknown (comments) Current Outpatient Medications Medication Sig  DULoxetine (CYMBALTA) 60 mg capsule Take 1 Cap by mouth daily for 30 days.  lidocaine (LIDODERM) 5 % 1 Patch by TransDERmal route every twenty-four (24) hours for 30 days. Apply patch to the hip for 12 hours a day and remove for 12 hours a day.  senna-docusate (PERICOLACE) 8.6-50 mg per tablet Take 2 Tabs by mouth two (2) times a day for 30 days.  QUEtiapine (SEROQUEL) 50 mg tablet Take 1 Tab by mouth nightly for 30 days.  omeprazole (PRILOSEC) 20 mg capsule Take 1 Cap by mouth Daily (before breakfast) for 30 days.  aspirin 81 mg chewable tablet Take 1 Tab by mouth daily.  traMADol (ULTRAM) 50 mg tablet Take 1 Tab by mouth every six (6) hours as needed for Pain for up to 30 days. Max Daily Amount: 200 mg.  
 ondansetron (ZOFRAN ODT) 4 mg disintegrating tablet Take 1 Tab by mouth every six (6) hours as needed for Nausea.  levothyroxine (SYNTHROID) 50 mcg tablet Take 1 Tab by mouth Daily (before breakfast) for 30 days.  insulin glargine (LANTUS,BASAGLAR) 100 unit/mL (3 mL) inpn 8 Units by SubCUTAneous route nightly for 30 days.   
 insulin lispro (HUMALOG U-100 INSULIN) 100 unit/mL injection 2 units sq for bs greater than 200 ;  3  untis sq for bs greater than 250,  4 units for blood sugar greater than 300 ; call me for bs greater than 350  acetaminophen (TYLENOL) 500 mg tablet Take 1,000 mg by mouth every six to eight (6-8) hours as needed for Pain.  traZODone (DESYREL) 50 mg tablet Take 50 mg by mouth nightly.  memantine ER (NAMENDA XR) 28 mg capsule Take 28 mg by mouth daily.  midodrine (PROAMITINE) 5 mg tablet Take 5 mg by mouth daily. No current facility-administered medications for this visit. LAB DATA REVIEWED:  
 
Lab Results Component Value Date/Time Hemoglobin A1c 7.0 (H) 02/05/2019 01:42 AM  
 Hemoglobin A1c, External 11.2 01/27/2016 No results found for: Margie [facility], MCA2, [address], MCAU, [address] [facility], MCAOCT Lab Results Component Value Date/Time TSH 2.89 12/26/2018 02:49 AM  
 
No results found for: Iftikhar Raman, Gian Mcghee, Jordon Abraham, [id number] Lab Results Component Value Date/Time WBC 6.5 02/05/2019 01:42 AM  
 HGB 9.2 (L) 02/05/2019 01:42 AM  
 PLATELET 619 23/63/0480 01:42 AM  
 
Lab Results Component Value Date/Time Sodium 143 02/05/2019 01:42 AM  
 Potassium 4.0 02/05/2019 01:42 AM  
 Chloride 112 (H) 02/05/2019 01:42 AM  
 CO2 24 02/05/2019 01:42 AM  
 BUN 15 02/05/2019 01:42 AM  
 Creatinine 1.21 02/05/2019 01:42 AM  
 Calcium 8.4 (L) 02/05/2019 01:42 AM  
 Magnesium 2.0 02/05/2019 01:42 AM  
 Phosphorus 3.8 02/05/2019 01:42 AM  
  
Lab Results Component Value Date/Time AST (SGOT) 32 02/05/2019 01:42 AM  
 Alk. phosphatase 104 02/05/2019 01:42 AM  
 Protein, total 6.0 (L) 02/05/2019 01:42 AM  
 Albumin 2.4 (L) 02/05/2019 01:42 AM  
 Globulin 3.6 02/05/2019 01:42 AM  
 
Lab Results Component Value Date/Time Iron 39 02/04/2019 07:35 PM  
 TIBC 196 (L) 02/04/2019 07:35 PM  
 Iron % saturation 20 02/04/2019 07:35 PM  
 Ferritin 75 02/04/2019 07:35 PM  
  
   
 
Total time: 75min Counseling / coordination time: 50min 
> 50% counseling / coordination?: yes re goals and medication management

## 2019-02-14 ENCOUNTER — TELEPHONE (OUTPATIENT)
Dept: FAMILY MEDICINE CLINIC | Age: 79
End: 2019-02-14

## 2019-02-14 NOTE — TELEPHONE ENCOUNTER
Returned call to patient's daughter, Kwasi Cardona and advised per Karthikeyan Slater NP to make the change, she would need to give patient 4 units of Lantus tonight, and 4 units in the morning, and then continue 8 units in the morning there after.   Kwasi Cardona verbalized understanding

## 2019-02-14 NOTE — TELEPHONE ENCOUNTER
Caller asking if pt can have his insulin shot in the morning instead of at night.  Caller stated it is hard to give pt the medicine at night because of his dementia

## 2019-02-18 ENCOUNTER — HOME VISIT (OUTPATIENT)
Dept: FAMILY MEDICINE CLINIC | Age: 79
End: 2019-02-18

## 2019-02-18 VITALS
OXYGEN SATURATION: 97 % | RESPIRATION RATE: 18 BRPM | HEART RATE: 87 BPM | DIASTOLIC BLOOD PRESSURE: 68 MMHG | TEMPERATURE: 98.4 F | SYSTOLIC BLOOD PRESSURE: 108 MMHG

## 2019-02-18 DIAGNOSIS — G30.0 EARLY ONSET ALZHEIMER'S DEMENTIA WITHOUT BEHAVIORAL DISTURBANCE (HCC): ICD-10-CM

## 2019-02-18 DIAGNOSIS — R35.0 URINARY FREQUENCY: Primary | ICD-10-CM

## 2019-02-18 DIAGNOSIS — B96.20 E. COLI UTI (URINARY TRACT INFECTION): ICD-10-CM

## 2019-02-18 DIAGNOSIS — I50.9 CONGESTIVE HEART FAILURE, UNSPECIFIED HF CHRONICITY, UNSPECIFIED HEART FAILURE TYPE (HCC): ICD-10-CM

## 2019-02-18 DIAGNOSIS — N39.0 E. COLI UTI (URINARY TRACT INFECTION): ICD-10-CM

## 2019-02-18 DIAGNOSIS — E11.649 UNCONTROLLED TYPE 2 DIABETES MELLITUS WITH HYPOGLYCEMIA WITHOUT COMA (HCC): ICD-10-CM

## 2019-02-18 DIAGNOSIS — F02.80 EARLY ONSET ALZHEIMER'S DEMENTIA WITHOUT BEHAVIORAL DISTURBANCE (HCC): ICD-10-CM

## 2019-02-19 ENCOUNTER — TELEPHONE (OUTPATIENT)
Dept: FAMILY MEDICINE CLINIC | Age: 79
End: 2019-02-19

## 2019-02-19 LAB
ALBUMIN/CREAT UR: 63.6 MG/G CREAT (ref 0–30)
BUN SERPL-MCNC: 12 MG/DL (ref 8–27)
BUN/CREAT SERPL: 10 (ref 10–24)
CALCIUM SERPL-MCNC: 9.5 MG/DL (ref 8.6–10.2)
CHLORIDE SERPL-SCNC: 106 MMOL/L (ref 96–106)
CHOLEST SERPL-MCNC: 186 MG/DL (ref 100–199)
CO2 SERPL-SCNC: 20 MMOL/L (ref 20–29)
CREAT SERPL-MCNC: 1.16 MG/DL (ref 0.76–1.27)
CREAT UR-MCNC: 22 MG/DL
ERYTHROCYTE [DISTWIDTH] IN BLOOD BY AUTOMATED COUNT: 16.2 % (ref 12.3–15.4)
GLUCOSE SERPL-MCNC: 112 MG/DL (ref 65–99)
HCT VFR BLD AUTO: 35.6 % (ref 37.5–51)
HDLC SERPL-MCNC: 71 MG/DL
HGB BLD-MCNC: 11.4 G/DL (ref 13–17.7)
LDLC SERPL CALC-MCNC: 93 MG/DL (ref 0–99)
MCH RBC QN AUTO: 31.1 PG (ref 26.6–33)
MCHC RBC AUTO-ENTMCNC: 32 G/DL (ref 31.5–35.7)
MCV RBC AUTO: 97 FL (ref 79–97)
MICROALBUMIN UR-MCNC: 14 UG/ML
PLATELET # BLD AUTO: 346 X10E3/UL (ref 150–379)
POTASSIUM SERPL-SCNC: 4.3 MMOL/L (ref 3.5–5.2)
PSA SERPL-MCNC: 0.2 NG/ML (ref 0–4)
RBC # BLD AUTO: 3.67 X10E6/UL (ref 4.14–5.8)
SODIUM SERPL-SCNC: 146 MMOL/L (ref 134–144)
TRIGL SERPL-MCNC: 110 MG/DL (ref 0–149)
VLDLC SERPL CALC-MCNC: 22 MG/DL (ref 5–40)
WBC # BLD AUTO: 5.6 X10E3/UL (ref 3.4–10.8)

## 2019-02-19 RX ORDER — QUETIAPINE FUMARATE 50 MG/1
75 TABLET, FILM COATED ORAL
Qty: 45 TAB | Refills: 5 | Status: SHIPPED | OUTPATIENT
Start: 2019-02-19 | End: 2019-02-21 | Stop reason: DRUGHIGH

## 2019-02-19 NOTE — TELEPHONE ENCOUNTER
SPENCER called and spoke with pt's dtr Louie Joel. SPENCER called to schedule follow up visit and inquire how father has been this past week. Louie Joel informed that she believes he is \"mentlaly getting worse\", sundowning and overnight confusion remains the primary concern since he is not sleeping at night, therefore her brother who lives with pt and was recently hospitalized with high blood pressure is also not sleeping at night (brother works full time during the day as well). Louie Joel said she is now back home in [place], as caregiving had become too much for her and once her brother came back home from the hospital she decided she was able to go back home to [place]. She informed that pt's Medicaid waiver aide started back again today, so he has  coverage while brother is at work, however the nighttime remains the main safety and caregiver burden concern. She informed that they do not have the personal finances to hire an overnight caregiver, even for a few hours a night. SPENCER offered a visit to see dad and meet with brother, but Louie Joel stated he is returning to work tomorrow after being off since last week's hospitalization, and she refuses SW visit at this time. SPENCER asked Louie Joel if SW could call brother to introduce self and offer support, but Louie Joel refused, saying that she would pass a message along to him. She did not wish to give this SW her brother's phone number. SPENCER encouraged her to call SW should she have any questions or concerns.

## 2019-02-19 NOTE — TELEPHONE ENCOUNTER
Kwasi Cardona stated that patient is up at night wondering, he is combative at times. Per [facility] - DAKSHA CHILDRESS, NP  Increase seroquel to 75mg at bedtime.

## 2019-02-19 NOTE — PROGRESS NOTES
Home Based [address]  
[phone] Date of Current Visit: 02/20/19 SUMMARY:  
For full summary of patient's condition, please see Home Based Primary Care initial visit note on 2/12/019. This patient's chronic conditions including atrial fibrillation (not anti-coagulated). CAD, CHF, COPD, DM with neuropathy, CKD, prostate cancer, orthostatic hypotension, falls have resulted in the inability to leave the home to receive primary medical care without a significant taxing effort. Today we are visiting the patient for the following reason f/u diabetes with hypoglycemia, CHF, Dementia with behaviors. GOALS OF CARE / TREATMENT PREFERENCES This patient's goals of care are: To keep your dad at home, not in a nursing home. This is his wish. This patient's resuscitation status is: 
[] Attempt Resuscitation       [x] Do Not Attempt Resuscitation Primary Decision [facility] Agent):   Primary Decision MakeAshley Villegas - [phone] DIAGNOSES & PLAN:  
 
  ICD-10-CM ICD-9-CM 1. Urinary frequency R35.0 788.41 CULTURE, URINE  
2. 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   
  251.2 3. Early onset Alzheimer's dementia without behavioral disturbance G30.0 331.0 F02.80 294.10   
4. Congestive heart failure, unspecified HF chronicity, unspecified heart failure type (HCC) I50.9 428.0 Patient Instructions Dear Radha Ho , It was a pleasure seeing you at home today with Home Based [address] [place] [address] ([phone]) Your stated goal: To keep your dad at home, not in a nursing home. This is his wish. This is what we talked about: 1. Breathing 
-This was stable today 
-Let me know if he develops a cough, fever, swelling in his legs 2. Diabetes - Please take his blood sugars twice a day and I will have my nurse call you to get them so I can review and adjust his medications as needed as his blood sugars have been low lately 3. Dementia 
-He is on Namenda at this time 
-He has behavioral issues including sundowning 
-He is on Trazodone 50mg, Seroquel 50mg (at night only), and Cymbalta/Duloxetine 60mg  
-These are a lot of centrally acting medications 
-We will continue to work on adjustments of these medications while minimizing side effects 4. Labs 
-We martha labs today and collected a urine because he has urinary frequency 
-We will call you with lab results Health Maintenance Health Maintenance Due Topic Date Due  
 MICROALBUMIN Q1  12/11/1950  
 EYE EXAM RETINAL OR DILATED  12/11/1950  
 DTaP/Tdap/Td series (1 - Tdap) 12/11/1962  Shingrix Vaccine Age 50> (1 of 2) 12/11/1991  GLAUCOMA SCREENING Q2Y  12/11/2006  Pneumococcal 65+ High/Highest Risk (1 of 2 - PCV13) 12/11/2006  LIPID PANEL Q1  07/18/2018  Influenza Age 5 to Adult  08/01/2018  MEDICARE YEARLY EXAM  10/29/2018 Advance Care Planning This is what you have shared with us about Advance Care Planning Primary Decision [facility] Agent):   Primary Decision MakerRexene Peto - Child - [phone] Relationship to patient: Daughter 
[] Named in a scanned document  
[x] Legal Next of Kin 
[] Guardian ACP documents you current have include: We filled out another Durable DNR form as you did not have one at home for him 
[] Advance Directive or Living Will 
[x] Durable Do Not Resuscitate 
[] Physician Orders for Scope of Treatment (POST) [] Medical Power of  
[] Other Someone from the Home Based Primary Care Team will see you again in 2 weeks; we will do blood work at that time as well as address health maintenance needs.   
 
If there are any concerns before that time, such as medication questions, worsening symptoms or a need to see a physician for an urgent or emergent situation; please call [phone]. A physician is also on call after our normal business hours of 8am to 5pm.  
 
In order to serve you better, please allow up to 48 hours for prescription refills to be processed. Certain medications may require more paperwork or a written prescription that you may need to  from the office. We appreciate you letting us know of any refill requests as soon as possible. Sincerely, The Home Based Primary Care Team 
 
Randi De Paz MD 
[place] Sensor, NP Lilian Catalan, MILTON Eldridge, LONNIE Madrigal, LONNIE Fajardo, [facility] Learning About Low Blood Sugar (Hypoglycemia) in Diabetes What is low blood sugar (hypoglycemia)? Hypoglycemia means that your blood sugar is low and your body (especially your brain) is not getting enough fuel. If you have diabetes, your blood sugar can go too low if you take too much of some diabetes medicines. It can also go too low if you miss a meal. And it can happen if you exercise too hard without eating enough food. Some medicines used to treat other health problems can cause low blood sugar too. What are the symptoms? Symptoms of low blood sugar can start quickly. It may take just 10 to 15 minutes. If you have had diabetes for many years, you may not realize that your blood sugar is low until it drops very low. · If your blood sugar level drops below 70 (mild low blood sugar), you may feel tired, anxious, dizzy, weak, shaky, or sweaty. You may have a fast heartbeat or blurry vision. · If your blood sugar level continues to drop (usually below 40), your behavior may change. You may feel more irritable. You may find it hard to concentrate or talk. And you may feel unsteady when you stand or walk. You may become too weak or confused to eat something with sugar to raise your blood sugar level.  
· If your blood sugar level drops very low (usually below 20), you may pass out (lose consciousness). Or you may have a seizure or stroke. If you have symptoms of severe low blood sugar, you need to get medical care right away. If you had a low blood sugar level during the night, you may wake up tired or with a headache. Or you may sweat so much during the night that your pajamas or sheets are damp when you wake up. How is low blood sugar treated? You can treat low blood sugar by eating or drinking something that has 15 grams of carbohydrate. These should be quick-sugar foods. Check your blood sugar level again 15 minutes after having a quick-sugar food to make sure your level is getting back to your target range. Here are examples of quick-sugar foods that have 15 grams of carbohydrate: · 3 to 4 glucose tablets · 1 tube of glucose gel · Hard candy (such as 3 Jolly Ranchers or 5 to [employer]) · 1 tablespoon honey · 2 tablespoons of raisins · ½ cup to ¾ cup (4 to 6 ounces) of fruit juice or regular (not diet) soda · 1 tablespoon of sugar · 1 cup of fat-free milk If you have problems with severe low blood sugar, someone else may have to give you a shot of glucagon. This is a hormone that raises blood sugar levels quickly. How can you prevent low blood sugar? You can take steps to prevent low blood sugar. · Follow your treatment plan. Take your insulin or other diabetes medicine exactly as your doctor prescribed it. Talk with your doctor if you're having low blood sugar often. Your medicine may need to be adjusted if it's causing your low blood sugar. · Check your blood sugar levels often. This helps you find early changes before an emergency happens. · Keep a quick-sugar food with you in case your blood sugar level drops low. · Eat small meals more often so that you don't get too hungry between meals. Don't skip meals. · Balance extra exercise with eating more. Check your blood sugar and learn how it changes after exercise.  If your blood sugar stays at a normal level, you may not need to eat after you exercise. · Limit how much alcohol you drink. Alcohol can make low blood sugar go even lower. Don't drink alcohol if you have problems recognizing the early signs of low blood sugar. · Keep a diary of your symptoms. This helps you learn when changes in your body may signal low blood sugar. And keep track of how often you have low blood sugar, including when you last ate and what you ate. This will help you learn what causes your blood sugar to drop. · Learn about diabetes and low blood sugar. Support groups or a diabetes education center can help you understand how medicines, diet, and exercise affect your blood sugar levels. Since low blood sugar levels can quickly become an emergency, be sure to wear medical alert jewelry, such as a medical alert bracelet. This is to let people know you have diabetes so they can get help for you. You can buy this at most drugstores. And make sure your family, friends, and coworkers know the symptoms of low blood sugar. Teach them what to do to get your sugar level up. Follow-up care is a key part of your treatment and safety. Be sure to make and go to all appointments, and call your doctor if you are having problems. It's also a good idea to know your test results and keep a list of the medicines you take. Where can you learn more? Go to http://evelio-rosalinda.info/. Enter I984 in the search box to learn more about \"Learning About Low Blood Sugar (Hypoglycemia) in Diabetes. \" Current as of: July 25, 2018 Content Version: 11.9 © 6946-7395 [employer]. Care instructions adapted under license by [employer] (which disclaims liability or warranty for this information). If you have questions about a medical condition or this instruction, always ask your healthcare professional. [address] any warranty or liability for your use of this information.  
 
 
 
 
 HISTORY:  
 HISTORY OBTAINED FROM:  
 
CHIEF COMPLAINT:  
Chief Complaint Patient presents with  Dementia  Nocturia  Diabetes  Prostate Cancer HPI/SUBJECTIVE:  Patient urinated six times last night. He hs denied burning. He has been afebrile. BG's in low 100's. He is eating well. He is not sleeping at night disrupting the house. No falls. He ambulates with his cane. Recent Fall: [x] No / [] Yes (when):   
Last bowel movement:  Yesterday REVIEW OF SYSTEMS:  
 
The following systems were [x] reviewed / [] unable to be reviewed Systems: constitutional, ears/nose/mouth/throat, respiratory, gastrointestinal, genitourinary, musculoskeletal, integumentary, neurologic, psychiatric, endocrine. Positive findings noted below: very poor historian but denies dysuria. VITAL SIGNS, PHYSICAL EXAM & FUNCTIONAL ASSESSMENT Vital Signs: Wt Readings from Last 3 Encounters:  
02/12/19 141 lb (64 kg) 02/06/19 140 lb 14 oz (63.9 kg) 12/29/18 144 lb 11.2 oz (65.6 kg) Temp Readings from Last 3 Encounters:  
02/18/19 98.4 °F (36.9 °C) (Oral) 02/12/19 98.7 °F (37.1 °C)  
02/06/19 97.6 °F (36.4 °C) BP Readings from Last 3 Encounters:  
02/18/19 108/68  
02/12/19 148/77  
02/06/19 128/76 Pulse Readings from Last 3 Encounters:  
02/18/19 87  
02/12/19 (!) 58  
02/06/19 100 Physical Exam: 
 
Constitutional: alert but not oriented, repeats phrases, able to physically maneuver around house and able to follow directions (simple) Eyes: pupils equal, anicteric ENMT: no nasal discharge, moist mucous membranes Cardiovascular: irregularly irregular rhythm, distal pulses intact Respiratory: breathing not labored, symmetric, CTA bilaterally Gastrointestinal: +bowel sounds,  soft non-tender, non-distended Musculoskeletal: no deformity, no tenderness to palpation, no edema Skin: warm, dry, pale Neurologic: A/Ox 2, following simple commands, moving all extremities equally Psychiatric: restricted affect, no hallucinations Other:   
 
  
 
Functional Assessment (description): 
 
Palliative Performance Scale: 50 Timed Up and Go (TUG): Pt unable to perform Fall Risk Assessment, last 12 mths 2/12/2019 Able to walk? Yes Fall in past 12 months? Yes Fall with injury? Yes  
Number of falls in past 12 months 2 Fall Risk Score 3 LAB DATA REVIEWED:  
 
Lab Results Component Value Date/Time Hemoglobin A1c 7.0 (H) 02/05/2019 01:42 AM  
 Hemoglobin A1c, External 11.2 01/27/2016 Lab Results Component Value Date/Time Microalb/Creat ratio (ug/mg creat.) 63.6 (H) 02/12/2019 08:49 AM  
 
Lab Results Component Value Date/Time TSH 2.89 12/26/2018 02:49 AM  
 
No results found for: Boby Garcia, Thony Vega, Geri Emerson, [id number] Lab Results Component Value Date/Time WBC 5.6 02/12/2019 08:49 AM  
 HGB 11.4 (L) 02/12/2019 08:49 AM  
 PLATELET 164 32/40/9867 08:49 AM  
 
Lab Results Component Value Date/Time Sodium 146 (H) 02/12/2019 08:49 AM  
 Potassium 4.3 02/12/2019 08:49 AM  
 Chloride 106 02/12/2019 08:49 AM  
 CO2 20 02/12/2019 08:49 AM  
 BUN 12 02/12/2019 08:49 AM  
 Creatinine 1.16 02/12/2019 08:49 AM  
 Calcium 9.5 02/12/2019 08:49 AM  
 Magnesium 2.0 02/05/2019 01:42 AM  
 Phosphorus 3.8 02/05/2019 01:42 AM  
  
Lab Results Component Value Date/Time AST (SGOT) 32 02/05/2019 01:42 AM  
 Alk. phosphatase 104 02/05/2019 01:42 AM  
 Protein, total 6.0 (L) 02/05/2019 01:42 AM  
 Albumin 2.4 (L) 02/05/2019 01:42 AM  
 Globulin 3.6 02/05/2019 01:42 AM  
 
Lab Results Component Value Date/Time Iron 39 02/04/2019 07:35 PM  
 TIBC 196 (L) 02/04/2019 07:35 PM  
 Iron % saturation 20 02/04/2019 07:35 PM  
 Ferritin 75 02/04/2019 07:35 PM  
  
 
 MEDICATIONS & PRESCRIPTIONS Allergies Allergen Reactions  Sulfa (Sulfonamide Antibiotics) Unknown (comments) Current Outpatient Medications Medication Sig  
  DULoxetine (CYMBALTA) 60 mg capsule Take 1 Cap by mouth daily for 30 days.  lidocaine (LIDODERM) 5 % 1 Patch by TransDERmal route every twenty-four (24) hours for 30 days. Apply patch to the hip for 12 hours a day and remove for 12 hours a day.  senna-docusate (PERICOLACE) 8.6-50 mg per tablet Take 2 Tabs by mouth two (2) times a day for 30 days.  omeprazole (PRILOSEC) 20 mg capsule Take 1 Cap by mouth Daily (before breakfast) for 30 days.  aspirin 81 mg chewable tablet Take 1 Tab by mouth daily.  levothyroxine (SYNTHROID) 50 mcg tablet Take 1 Tab by mouth Daily (before breakfast) for 30 days.  insulin glargine (LANTUS,BASAGLAR) 100 unit/mL (3 mL) inpn 8 Units by SubCUTAneous route nightly for 30 days.  insulin lispro (HUMALOG U-100 INSULIN) 100 unit/mL injection 2 units sq for bs greater than 200 ;  3  untis sq for bs greater than 250,  4 units for blood sugar greater than 300 ; call me for bs greater than 350  traZODone (DESYREL) 50 mg tablet Take 50 mg by mouth nightly.  memantine ER (NAMENDA XR) 28 mg capsule Take 28 mg by mouth daily.  midodrine (PROAMITINE) 5 mg tablet Take 5 mg by mouth daily.  QUEtiapine (SEROQUEL) 50 mg tablet Take 1.5 Tabs by mouth nightly for 30 days.  traMADol (ULTRAM) 50 mg tablet Take 1 Tab by mouth every six (6) hours as needed for Pain for up to 30 days. Max Daily Amount: 200 mg.  
 ondansetron (ZOFRAN ODT) 4 mg disintegrating tablet Take 1 Tab by mouth every six (6) hours as needed for Nausea.  acetaminophen (TYLENOL) 500 mg tablet Take 1,000 mg by mouth every six to eight (6-8) hours as needed for Pain. No current facility-administered medications for this visit. No orders of the defined types were placed in this encounter. Total time: 45 minutes Counseling / coordination time:15 minutes on diabetes, hypoglycemia 
> 50% counseling / coordination?: No

## 2019-02-19 NOTE — PATIENT INSTRUCTIONS
Dear Nicole Bond , It was a pleasure seeing you at home today with Home Based [facility] ([phone]) Your stated goal: To keep your dad at home, not in a nursing home. This is his wish. This is what we talked about: 1. Breathing 
-This was stable today 
-Let me know if he develops a cough, fever, swelling in his legs 2. Diabetes - Please take his blood sugars twice a day and I will have my nurse call you to get them so I can review and adjust his medications as needed as his blood sugars have been low lately 3. Dementia 
-He is on Namenda at this time 
-He has behavioral issues including sundowning 
-He is on Trazodone 50mg, Seroquel 50mg (at night only), and Cymbalta/Duloxetine 60mg  
-These are a lot of centrally acting medications 
-We will continue to work on adjustments of these medications while minimizing side effects 4. Labs 
-We martha labs today and collected a urine because he has urinary frequency 
-We will call you with lab results Health Maintenance Health Maintenance Due Topic Date Due  
 MICROALBUMIN Q1  12/11/1950  
 EYE EXAM RETINAL OR DILATED  12/11/1950  
 DTaP/Tdap/Td series (1 - Tdap) 12/11/1962  Shingrix Vaccine Age 50> (1 of 2) 12/11/1991  GLAUCOMA SCREENING Q2Y  12/11/2006  Pneumococcal 65+ High/Highest Risk (1 of 2 - PCV13) 12/11/2006  LIPID PANEL Q1  07/18/2018  Influenza Age 5 to Adult  08/01/2018  MEDICARE YEARLY EXAM  10/29/2018 Advance Care Planning This is what you have shared with us about Advance Care Planning Primary Decision [facility] Agent):   Primary Decision MakerArcainPollo Villegas - [phone] Relationship to patient: Daughter 
[] Named in a scanned document  
[x] Legal Next of Kin 
[] Guardian ACP documents you current have include: We filled out another Durable DNR form as you did not have one at home for him 
[] Advance Directive or Living Will 
[x] Durable Do Not Resuscitate [] Physician Orders for Scope of Treatment (POST) [] Medical Power of  
[] Other Someone from the Home Based Primary Care Team will see you again in 2 weeks; we will do blood work at that time as well as address health maintenance needs. If there are any concerns before that time, such as medication questions, worsening symptoms or a need to see a physician for an urgent or emergent situation; please call [phone]. A physician is also on call after our normal business hours of 8am to 5pm.  
 
In order to serve you better, please allow up to 48 hours for prescription refills to be processed. Certain medications may require more paperwork or a written prescription that you may need to  from the office. We appreciate you letting us know of any refill requests as soon as possible. Sincerely, The Home Based Primary Care Team 
 
MD Kennedy Rizzo NP Jamesetta Kallman, NP Hendrick Char, RN Dock Cowden, RN Catherine Penta, [facility] Learning About Low Blood Sugar (Hypoglycemia) in Diabetes What is low blood sugar (hypoglycemia)? Hypoglycemia means that your blood sugar is low and your body (especially your brain) is not getting enough fuel. If you have diabetes, your blood sugar can go too low if you take too much of some diabetes medicines. It can also go too low if you miss a meal. And it can happen if you exercise too hard without eating enough food. Some medicines used to treat other health problems can cause low blood sugar too. What are the symptoms? Symptoms of low blood sugar can start quickly. It may take just 10 to 15 minutes. If you have had diabetes for many years, you may not realize that your blood sugar is low until it drops very low. · If your blood sugar level drops below 70 (mild low blood sugar), you may feel tired, anxious, dizzy, weak, shaky, or sweaty. You may have a fast heartbeat or blurry vision. · If your blood sugar level continues to drop (usually below 40), your behavior may change. You may feel more irritable. You may find it hard to concentrate or talk. And you may feel unsteady when you stand or walk. You may become too weak or confused to eat something with sugar to raise your blood sugar level. · If your blood sugar level drops very low (usually below 20), you may pass out (lose consciousness). Or you may have a seizure or stroke. If you have symptoms of severe low blood sugar, you need to get medical care right away. If you had a low blood sugar level during the night, you may wake up tired or with a headache. Or you may sweat so much during the night that your pajamas or sheets are damp when you wake up. How is low blood sugar treated? You can treat low blood sugar by eating or drinking something that has 15 grams of carbohydrate. These should be quick-sugar foods. Check your blood sugar level again 15 minutes after having a quick-sugar food to make sure your level is getting back to your target range. Here are examples of quick-sugar foods that have 15 grams of carbohydrate: · 3 to 4 glucose tablets · 1 tube of glucose gel · Hard candy (such as 3 Jolly Ranchers or 5 to [employer]) · 1 tablespoon honey · 2 tablespoons of raisins · ½ cup to ¾ cup (4 to 6 ounces) of fruit juice or regular (not diet) soda · 1 tablespoon of sugar · 1 cup of fat-free milk If you have problems with severe low blood sugar, someone else may have to give you a shot of glucagon. This is a hormone that raises blood sugar levels quickly. How can you prevent low blood sugar? You can take steps to prevent low blood sugar. · Follow your treatment plan. Take your insulin or other diabetes medicine exactly as your doctor prescribed it. Talk with your doctor if you're having low blood sugar often. Your medicine may need to be adjusted if it's causing your low blood sugar. · Check your blood sugar levels often. This helps you find early changes before an emergency happens. · Keep a quick-sugar food with you in case your blood sugar level drops low. · Eat small meals more often so that you don't get too hungry between meals. Don't skip meals. · Balance extra exercise with eating more. Check your blood sugar and learn how it changes after exercise. If your blood sugar stays at a normal level, you may not need to eat after you exercise. · Limit how much alcohol you drink. Alcohol can make low blood sugar go even lower. Don't drink alcohol if you have problems recognizing the early signs of low blood sugar. · Keep a diary of your symptoms. This helps you learn when changes in your body may signal low blood sugar. And keep track of how often you have low blood sugar, including when you last ate and what you ate. This will help you learn what causes your blood sugar to drop. · Learn about diabetes and low blood sugar. Support groups or a diabetes education center can help you understand how medicines, diet, and exercise affect your blood sugar levels. Since low blood sugar levels can quickly become an emergency, be sure to wear medical alert jewelry, such as a medical alert bracelet. This is to let people know you have diabetes so they can get help for you. You can buy this at most drugstores. And make sure your family, friends, and coworkers know the symptoms of low blood sugar. Teach them what to do to get your sugar level up. Follow-up care is a key part of your treatment and safety. Be sure to make and go to all appointments, and call your doctor if you are having problems. It's also a good idea to know your test results and keep a list of the medicines you take. Where can you learn more? Go to http://evelio-rosalinda.info/. Enter F991 in the search box to learn more about \"Learning About Low Blood Sugar (Hypoglycemia) in Diabetes. \" 
 Current as of: July 25, 2018 Content Version: 11.9 © 8929-2158 [employer]. Care instructions adapted under license by [employer] (which disclaims liability or warranty for this information). If you have questions about a medical condition or this instruction, always ask your healthcare professional. [address] any warranty or liability for your use of this information.

## 2019-02-20 ENCOUNTER — TELEPHONE (OUTPATIENT)
Dept: FAMILY MEDICINE CLINIC | Age: 79
End: 2019-02-20

## 2019-02-20 NOTE — TELEPHONE ENCOUNTER
Caller stated the seroquel was increased so she will need a prescription sent to the pharmacy for 75mg tablets because 50mg tablets can not be crushed in half

## 2019-02-21 ENCOUNTER — TELEPHONE (OUTPATIENT)
Dept: FAMILY MEDICINE CLINIC | Age: 79
End: 2019-02-21

## 2019-02-21 LAB — BACTERIA UR CULT: ABNORMAL

## 2019-02-21 RX ORDER — AMPICILLIN 500 MG/1
500 CAPSULE ORAL 2 TIMES DAILY
Qty: 20 CAP | Refills: 0 | Status: SHIPPED | OUTPATIENT
Start: 2019-02-21 | End: 2019-02-21 | Stop reason: ALTCHOICE

## 2019-02-21 RX ORDER — NITROFURANTOIN (MACROCRYSTALS) 100 MG/1
100 CAPSULE ORAL 2 TIMES DAILY
Qty: 14 CAP | Refills: 0 | Status: SHIPPED | OUTPATIENT
Start: 2019-02-21 | End: 2019-02-28

## 2019-02-21 RX ORDER — QUETIAPINE FUMARATE 100 MG/1
100 TABLET, FILM COATED ORAL
Qty: 30 TAB | Refills: 0 | OUTPATIENT
Start: 2019-02-21 | End: 2019-02-28

## 2019-02-21 NOTE — TELEPHONE ENCOUNTER
Returned call to Nora Shah and apologized for pharmacy contacting her before we could on the reason for ordering antibiotic for patient. Advised that MD is cancelling Ampicillin, and ordering Mcrodantin for positive urine culture - advised to take 1 tab BID x 7 days and drink plenty of fluids. Out bound call placed to local [facility] pharmacy to cancel Ampicillin, and ensure that they they did receive Macrodantin. Inquired on benefits of increase Seroquel, per Nora Shah se is not sure as yet - will evaluate and let us know. Inquired on his blood sugar readings. Nora Shah reported that patient is taking 8 units of insulin nightly, and 4 units of Humalog if BS is greater than 300. She reported that some days he has had additional insulin coverage, but will have her brother call me back with BS readings.

## 2019-02-21 NOTE — PROGRESS NOTES
Patient has an E. Coli UTI and is symptomatic. I have sent a prescription for ampicillin to their pharmacy for the family to --please call them. Thanks.

## 2019-02-21 NOTE — TELEPHONE ENCOUNTER
Caller requesting to speak with Lidia Verduzco. Caller stated someone called in ampicillin to the pharmacy and she doesn't know why.  No one told her the pt needed to take this

## 2019-02-22 ENCOUNTER — TELEPHONE (OUTPATIENT)
Dept: FAMILY MEDICINE CLINIC | Age: 79
End: 2019-02-22

## 2019-02-22 NOTE — TELEPHONE ENCOUNTER
Patient's son, Leandro Pierre returned call with the following fasting BS readings: He takes 8 units of Lantus every morning, and Humalog if BS greater than 200    2/15/19 214  306 (4 units)  339 (4 units)  2/16/19 178  314 (4 units)  186  2/17/19 156  188   274  2/18/19 164  369 (4 units)  200  2/19/19 248 (2 units) 236 (2 units)  200  2/20/19 120  229 (2 units)  198  2/21/19 149  190   298 (3 units)  2/22/19 144    Leandro Pierre reported that patient is sleeping better with the increase dose of Seroquel. He still gets up at night, but he thinks it's to urinate. He is taking antibiotics as prescribed. He is encouraged to take complete dose of medication, and increase fluid intake. Leandro Pierre verbalized understanding.     Call duration = 10 minutes

## 2019-02-22 NOTE — TELEPHONE ENCOUNTER
MILTON Garcia, LONNIE   Caller: Unspecified (Today,  9:37 AM)             Bg's are okay overall in am but too high later in day.  Will review them again after uti is treated and make adjustments if needed.       Noted

## 2019-02-25 NOTE — TELEPHONE ENCOUNTER
Outgoing call placed to patients son, Harris Doty and he stated that patient is doing a lot better, no diarrhea, n/v, or fever noted. Patient currently sleeping \"which is a good sign\". Oral Lick to ensure patient is taking medication with food, and plenty of water to prevent nausea and/or vomiting. Harris Doty verbalized understanding and will call if symptoms returns or worsens.

## 2019-02-28 ENCOUNTER — PATIENT MESSAGE (OUTPATIENT)
Dept: FAMILY MEDICINE CLINIC | Age: 79
End: 2019-02-28

## 2019-02-28 DIAGNOSIS — F02.818 LATE ONSET ALZHEIMER'S DISEASE WITH BEHAVIORAL DISTURBANCE (HCC): Primary | ICD-10-CM

## 2019-02-28 DIAGNOSIS — G30.1 LATE ONSET ALZHEIMER'S DISEASE WITH BEHAVIORAL DISTURBANCE (HCC): Primary | ICD-10-CM

## 2019-02-28 RX ORDER — QUETIAPINE FUMARATE 25 MG/1
25 TABLET, FILM COATED ORAL
Qty: 90 TAB | Refills: 1 | Status: SHIPPED | OUTPATIENT
Start: 2019-02-28 | End: 2019-03-20

## 2019-02-28 RX ORDER — QUETIAPINE FUMARATE 50 MG/1
50 TABLET, FILM COATED ORAL
Qty: 90 TAB | Refills: 1 | Status: SHIPPED | OUTPATIENT
Start: 2019-02-28 | End: 2019-03-20

## 2019-03-01 NOTE — TELEPHONE ENCOUNTER
From: Reid Sarabia  To: Darnell Contreras MD  Sent: 2/28/2019 4:32 PM EST  Subject: Prescription Question    We need 75 mg of Seroquel instead of 100. I understand it doesn't come in this dose so we will need a prescription for a 50 & a 25 mg. 50 isn't enough and 100 is too much.   Thank you,  Vera Hirsch

## 2019-03-02 ENCOUNTER — TELEPHONE (OUTPATIENT)
Dept: FAMILY MEDICINE CLINIC | Age: 79
End: 2019-03-02

## 2019-03-02 DIAGNOSIS — G30.1 LATE ONSET ALZHEIMER'S DISEASE WITH BEHAVIORAL DISTURBANCE (HCC): Primary | ICD-10-CM

## 2019-03-02 DIAGNOSIS — F02.818 LATE ONSET ALZHEIMER'S DISEASE WITH BEHAVIORAL DISTURBANCE (HCC): Primary | ICD-10-CM

## 2019-03-02 RX ORDER — MEMANTINE HYDROCHLORIDE 28 MG/1
28 CAPSULE, EXTENDED RELEASE ORAL DAILY
Qty: 90 CAP | Refills: 3 | Status: SHIPPED | OUTPATIENT
Start: 2019-03-02 | End: 2020-03-11 | Stop reason: SDUPTHER

## 2019-03-06 ENCOUNTER — TELEPHONE (OUTPATIENT)
Dept: FAMILY MEDICINE CLINIC | Age: 79
End: 2019-03-06

## 2019-03-07 ENCOUNTER — TELEPHONE (OUTPATIENT)
Dept: FAMILY MEDICINE CLINIC | Age: 79
End: 2019-03-07

## 2019-03-07 ENCOUNTER — HOME VISIT (OUTPATIENT)
Dept: FAMILY MEDICINE CLINIC | Age: 79
End: 2019-03-07

## 2019-03-07 DIAGNOSIS — Z13.39 SCREENING FOR ALCOHOLISM: ICD-10-CM

## 2019-03-07 DIAGNOSIS — Z13.31 SCREENING FOR DEPRESSION: ICD-10-CM

## 2019-03-07 DIAGNOSIS — T14.8XXA MULTIPLE SKIN TEARS: ICD-10-CM

## 2019-03-07 DIAGNOSIS — Z00.00 MEDICARE ANNUAL WELLNESS VISIT, SUBSEQUENT: ICD-10-CM
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
## 2019-03-07 DIAGNOSIS — I95.1 ORTHOSTATIC HYPOTENSION: ICD-10-CM

## 2019-03-07 DIAGNOSIS — R30.0 DYSURIA: ICD-10-CM

## 2019-03-07 RX ORDER — CIPROFLOXACIN 500 MG/1
500 TABLET ORAL 2 TIMES DAILY
Qty: 20 TAB | Refills: 0 | Status: SHIPPED | OUTPATIENT
Start: 2019-03-07 | End: 2019-03-17

## 2019-03-07 NOTE — TELEPHONE ENCOUNTER
Caller stated the pt fell last night, he has urine incontinence, he is confused, and his blood pressure is 93/57

## 2019-03-07 NOTE — TELEPHONE ENCOUNTER
Returned call to Cha and he reported that patient fell in the bathroom last night and hit his elbow, knee and side of his face. He denies hitting his head or having any major concerns as he is now up and ambulating with his walker. He reported that patient is having urinary frequency, incontinence and think his UTI is back or wasn't cleared up from the last time. He reported blood pressure sitting this morning = 93/56, unable to get a reading standing. Weight today = 126.8 lbs which is down from yesterday morning = 132.6 lbs. Blood sugar before dinner last night = 305, and this morning before breakfast = 154. Per Cha patient is eating, and drinking but less than he normally does. Advised I will discuss with NP and have her come by today to evaluate patient.   Cha verbalized understanding

## 2019-03-08 ENCOUNTER — HOME HEALTH ADMISSION (OUTPATIENT)
Dept: HOME HEALTH SERVICES | Facility: HOME HEALTH | Age: 79
End: 2019-03-08
Payer: MEDICARE

## 2019-03-08 VITALS
DIASTOLIC BLOOD PRESSURE: 48 MMHG | HEART RATE: 94 BPM | RESPIRATION RATE: 18 BRPM | TEMPERATURE: 98.2 F | SYSTOLIC BLOOD PRESSURE: 102 MMHG | OXYGEN SATURATION: 97 %

## 2019-03-08 NOTE — PATIENT INSTRUCTIONS
Dear Rai Chin ,    It was a pleasure seeing you at home today with [facility] ([phone])    Your stated goal: To keep your dad at home, not in a nursing home. This is his wish. This is what we talked about:     1. Fall with skin tears to right knee and elbow  -No bones are tender or deformed but if he develops pain, let us know  I-I have placed a home health referral for wound care to help the skin tears heal    2. Diabetes  - His blood sugars are lower in the morning and higher in the evening  -Could you take blood sugars before each meal for three days and call them in to [facility] at our office?    -These numbers will help me adjust his insulin    3. Dementia  -He is on Namenda at this time  -He has behavioral issues including sundowning  -He is on Seroquel 75 mg (at night only), and Cymbalta/Duloxetine 60mg   -These are a lot of centrally acting medications  -We will continue to work on adjustments of these medications while minimizing side effects  -This could lead to falls and he is falling    4. Urinary frequency  -I collected a sample for the lab  -I started ciprofloxacin because of his frequency of urination and change in mental status while we await the culture results    5. Orthostatic hypotension--low blood pressure with position changes  -Can use midodrine tid if needed  -Hold for blood pressure above 140  -We reviewed how to take a standing blood pressure    6. Health Maintenance  Health Maintenance Due   Topic Date Due    EYE EXAM RETINAL OR DILATED  12/11/1950    DTaP/Tdap/Td series (1 - Tdap) 12/11/1962    Shingrix Vaccine Age 50> (1 of 2) 12/11/1991    GLAUCOMA SCREENING Q2Y  12/11/2006    Pneumococcal 65+ High/Highest Risk (1 of 2 - PCV13) 12/11/2006    Influenza Age 9 to Adult  08/01/2018    MEDICARE YEARLY EXAM  10/29/2018     7.   Advance Care Planning   This is what you have shared with us about Advance Care Planning  [facility] ([address]): Primary Decision Maker (Active): Caryn Bucio Child - [phone]    Primary Decision Maker: Nilda Cortez - Son  Relationship to patient: Daughter  [] Named in a scanned document   [x] Legal Next of Kin  [] Guardian    ACP documents you current have include: We filled out another Durable DNR form as you did not have one at home for him  [] Advance Directive or Living Will  [x] Durable Do Not Resuscitate  [] Physician Orders for Scope of Treatment (POST)  [] Medical Power of   [] Other    Someone from the [facility] Team will see you again in 2 weeks; we will do blood work at that time as well as address health maintenance needs. If there are any concerns before that time, such as medication questions, worsening symptoms or a need to see a physician for an urgent or emergent situation; please call [phone]. A physician is also on call after our normal business hours of 8am to 5pm.     In order to serve you better, please allow up to 48 hours for prescription refills to be processed. Certain medications may require more paperwork or a written prescription that you may need to  from the office. We appreciate you letting us know of any refill requests as soon as possible. Sincerely,    The [facility]    MD Estephania Harman, MILTON Jernigan, LONNIE Horton, LONNIE Muniz, [facility]             Learning About Low Blood Sugar (Hypoglycemia) in Diabetes  What is low blood sugar (hypoglycemia)? Hypoglycemia means that your blood sugar is low and your body (especially your brain) is not getting enough fuel. If you have diabetes, your blood sugar can go too low if you take too much of some diabetes medicines. It can also go too low if you miss a meal. And it can happen if you exercise too hard without eating enough food. Some medicines used to treat other health problems can cause low blood sugar too. What are the symptoms?   Symptoms of low blood sugar can start quickly. It may take just 10 to 15 minutes. If you have had diabetes for many years, you may not realize that your blood sugar is low until it drops very low. · If your blood sugar level drops below 70 (mild low blood sugar), you may feel tired, anxious, dizzy, weak, shaky, or sweaty. You may have a fast heartbeat or blurry vision. · If your blood sugar level continues to drop (usually below 40), your behavior may change. You may feel more irritable. You may find it hard to concentrate or talk. And you may feel unsteady when you stand or walk. You may become too weak or confused to eat something with sugar to raise your blood sugar level. · If your blood sugar level drops very low (usually below 20), you may pass out (lose consciousness). Or you may have a seizure or stroke. If you have symptoms of severe low blood sugar, you need to get medical care right away. If you had a low blood sugar level during the night, you may wake up tired or with a headache. Or you may sweat so much during the night that your pajamas or sheets are damp when you wake up. How is low blood sugar treated? You can treat low blood sugar by eating or drinking something that has 15 grams of carbohydrate. These should be quick-sugar foods. Check your blood sugar level again 15 minutes after having a quick-sugar food to make sure your level is getting back to your target range. Here are examples of quick-sugar foods that have 15 grams of carbohydrate:  · 3 to 4 glucose tablets  · 1 tube of glucose gel  · Hard candy (such as 3 Jolly Ranchers or 5 to 7 Life Savers)  · 1 tablespoon honey  · 2 tablespoons of raisins  · ½ cup to ¾ cup (4 to 6 ounces) of fruit juice or regular (not diet) soda  · 1 tablespoon of sugar  · 1 cup of fat-free milk  If you have problems with severe low blood sugar, someone else may have to give you a shot of glucagon. This is a hormone that raises blood sugar levels quickly.   How can you prevent low blood sugar? You can take steps to prevent low blood sugar. · Follow your treatment plan. Take your insulin or other diabetes medicine exactly as your doctor prescribed it. Talk with your doctor if you're having low blood sugar often. Your medicine may need to be adjusted if it's causing your low blood sugar. · Check your blood sugar levels often. This helps you find early changes before an emergency happens. · Keep a quick-sugar food with you in case your blood sugar level drops low. · Eat small meals more often so that you don't get too hungry between meals. Don't skip meals. · Balance extra exercise with eating more. Check your blood sugar and learn how it changes after exercise. If your blood sugar stays at a normal level, you may not need to eat after you exercise. · Limit how much alcohol you drink. Alcohol can make low blood sugar go even lower. Don't drink alcohol if you have problems recognizing the early signs of low blood sugar. · Keep a diary of your symptoms. This helps you learn when changes in your body may signal low blood sugar. And keep track of how often you have low blood sugar, including when you last ate and what you ate. This will help you learn what causes your blood sugar to drop. · Learn about diabetes and low blood sugar. Support groups or a diabetes education center can help you understand how medicines, diet, and exercise affect your blood sugar levels. Since low blood sugar levels can quickly become an emergency, be sure to wear medical alert jewelry, such as a medical alert bracelet. This is to let people know you have diabetes so they can get help for you. You can buy this at most drugstores. And make sure your family, friends, and coworkers know the symptoms of low blood sugar. Teach them what to do to get your sugar level up. Follow-up care is a key part of your treatment and safety.  Be sure to make and go to all appointments, and call your doctor if you are having problems. It's also a good idea to know your test results and keep a list of the medicines you take. Where can you learn more? Go to http://evelio-rosalinda.info/. Enter K403 in the search box to learn more about \"Learning About Low Blood Sugar (Hypoglycemia) in Diabetes. \"  Current as of: July 25, 2018  Content Version: 11.9  © 2399-8900 [employer]. Care instructions adapted under license by [employer] (which disclaims liability or warranty for this information). If you have questions about a medical condition or this instruction, always ask your healthcare professional. [place] [address] any warranty or liability for your use of this information. Medicare Wellness Visit, Male    The best way to live healthy is to have a lifestyle where you eat a well-balanced diet, exercise regularly, limit alcohol use, and quit all forms of tobacco/nicotine, if applicable. Regular preventive services are another way to keep healthy. Preventive services (vaccines, screening tests, monitoring & exams) can help personalize your care plan, which helps you manage your own care. Screening tests can find health problems at the earliest stages, when they are easiest to treat. [employer] follows the current, evidence-based guidelines published by the [place] (USPSTF) when recommending preventive services for our patients. Because we follow these guidelines, sometimes recommendations change over time as research supports it. (For example, a prostate screening blood test is no longer routinely recommended for men with no symptoms.)  Of course, you and your doctor may decide to screen more often for some diseases, based on your risk and co-morbidities (chronic disease you are already diagnosed with).    Preventive services for you include:  - Medicare offers their members a free annual wellness visit, which is time for you and your primary care provider to discuss and plan for your preventive service needs. Take advantage of this benefit every year!  -All adults over age 72 should receive the recommended pneumonia vaccines. Current USPSTF guidelines recommend a series of two vaccines for the best pneumonia protection.   -All adults should have a flu vaccine yearly and an ECG. All adults age 61 and older should receive a shingles vaccine once in their lifetime.    -All adults age 38-68 who are overweight should have a diabetes screening test once every three years.   -Other screening tests & preventive services for persons with diabetes include: an eye exam to screen for diabetic retinopathy, a kidney function test, a foot exam, and stricter control over your cholesterol.   -Cardiovascular screening for adults with routine risk involves an electrocardiogram (ECG) at intervals determined by the provider.   -Colorectal cancer screening should be done for adults age 54-65 with no increased risk factors for colorectal cancer. There are a number of acceptable methods of screening for this type of cancer. Each test has its own benefits and drawbacks. Discuss with your provider what is most appropriate for you during your annual wellness visit. The different tests include: colonoscopy (considered the best screening method), a fecal occult blood test, a fecal DNA test, and sigmoidoscopy.  -All adults born between [facility] should be screened once for Hepatitis C.  -An Abdominal Aortic Aneurysm (AAA) Screening is recommended for men age 73-68 who has ever smoked in their lifetime. Here is a list of your current Health Maintenance items (your personalized list of preventive services) with a due date: There are no preventive care reminders to display for this patient.

## 2019-03-08 NOTE — PROGRESS NOTES
Home Based [address] [facility]   [id number]      Date of Current Visit: 03/07/19     SUMMARY:   For full summary of patient's condition, please see Home Based Primary Care initial visit note on 2/12/019. This patient's chronic conditions including atrial fibrillation (not anti-coagulated). CAD, CHF, COPD, DM with neuropathy, CKD, prostate cancer, orthostatic hypotension, falls have resulted in the inability to leave the home to receive primary medical care without a significant taxing effort. Today we are visiting the patient for the following reason fall with skin tears, urinary frequency, orthostatic hypotension      GOALS OF CARE / TREATMENT PREFERENCES     This patient's goals of care are: To keep your dad at home, not in a nursing home. This is his wish. This patient's resuscitation status is:  [] Attempt Resuscitation       [x] Do Not Attempt Resuscitation    Primary Decision [facility] ([address]):   Primary Decision Maker (Active): Kelsi Kendrick - [phone]    Primary Decision Maker: Mary Beth Second - Son     DIAGNOSES & PLAN:       ICD-10-CM ICD-9-CM    1. Fall, initial encounter [address] [facility] B546.0 REFERRAL TO HOME HEALTH   2. Multiple skin tears T14. 8XXA 879.8 [address]   3. Dysuria R30.0 788. 1 CULTURE, URINE   4. Medicare annual wellness visit, subsequent Z00.00 V70.0 AR ANNUAL ALCOHOL SCREEN [address]   5. Screening for alcoholism Z13.39 V79.1 AR ANNUAL ALCOHOL SCREEN 15 MIN   6. Screening for depression Z13.31 V79.0 DEPRESSION SCREEN ANNUAL       Patient Instructions     Dear Yassine Manley ,    It was a pleasure seeing you at home today with Home Based Primary Care ([phone])    Your stated goal: To keep your dad at home, not in a nursing home. This is his wish. This is what we talked about:     1.  Fall with skin tears to right knee and elbow  -No bones are tender or deformed but if he develops pain, let us know  I-I have placed a home health referral for wound care to help the skin tears heal    2. Diabetes  - His blood sugars are lower in the morning and higher in the evening  -Could you take blood sugars before each meal for three days and call them in to [address] at our office?    -These numbers will help me adjust his insulin    3. Dementia  -He is on Namenda at this time  -He has behavioral issues including sundowning  -He is on Seroquel 75 mg (at night only), and Cymbalta/Duloxetine 60mg   -These are a lot of centrally acting medications  -We will continue to work on adjustments of these medications while minimizing side effects  -This could lead to falls and he is falling    4. Urinary frequency  -I collected a sample for the lab  -I started ciprofloxacin because of his frequency of urination and change in mental status while we await the culture results       Health Maintenance  Health Maintenance Due   Topic Date Due    EYE EXAM RETINAL OR DILATED  12/11/1950    DTaP/Tdap/Td series (1 - Tdap) 12/11/1962    Shingrix Vaccine Age 50> (1 of 2) 12/11/1991    GLAUCOMA SCREENING Q2Y  12/11/2006    Pneumococcal 65+ High/Highest Risk (1 of 2 - PCV13) 12/11/2006    Influenza Age 9 to Adult  08/01/2018    [address] [place] [address]  10/29/2018     Advance Care Planning   This is what you have shared with us about Advance Care Planning  Primary Decision [facility] ([address]):   Primary Decision Maker (Active): Zahra Owens Child - [phone]    Primary Decision Maker: Magdalena Can - Son  Relationship to patient: Daughter  [] Named in a scanned document   [x] Legal Next of Kin  [] Guardian    ACP documents you current have include:  We filled out another Durable DNR form as you did not have one at home for him  [] Advance Directive or Living Will  [x] Durable Do Not Resuscitate  [] Physician Orders for Scope of Treatment (POST)  [] Medical Power of   [] Other    Someone from the Home Based Primary Care Team will see you again in 2 weeks; we will do blood work at that time as well as address health maintenance needs. If there are any concerns before that time, such as medication questions, worsening symptoms or a need to see a physician for an urgent or emergent situation; please call [phone]. A physician is also on call after our normal business hours of 8am to 5pm.     In order to serve you better, please allow up to 48 hours for prescription refills to be processed. Certain medications may require more paperwork or a written prescription that you may need to  from the office. We appreciate you letting us know of any refill requests as soon as possible. Sincerely,    The Home Based Primary Care Team    Dalton Fall, MD Weyman Alexander, NP Orpah Magyar, NP Claudette Hare, LONNIE Thoams, LONNIE Morales, [facility]             Learning About Low Blood Sugar (Hypoglycemia) in Diabetes  What is low blood sugar (hypoglycemia)? Hypoglycemia means that your blood sugar is low and your body (especially your brain) is not getting enough fuel. If you have diabetes, your blood sugar can go too low if you take too much of some diabetes medicines. It can also go too low if you miss a meal. And it can happen if you exercise too hard without eating enough food. Some medicines used to treat other health problems can cause low blood sugar too. What are the symptoms? Symptoms of low blood sugar can start quickly. It may take just 10 to 15 minutes. If you have had diabetes for many years, you may not realize that your blood sugar is low until it drops very low. · If your blood sugar level drops below 70 (mild low blood sugar), you may feel tired, anxious, dizzy, weak, shaky, or sweaty. You may have a fast heartbeat or blurry vision. · If your blood sugar level continues to drop (usually below 40), your behavior may change. You may feel more irritable. You may find it hard to concentrate or talk.  And you may feel unsteady when you stand or walk. You may become too weak or confused to eat something with sugar to raise your blood sugar level. · If your blood sugar level drops very low (usually below 20), you may pass out (lose consciousness). Or you may have a seizure or stroke. If you have symptoms of severe low blood sugar, you need to get medical care right away. If you had a low blood sugar level during the night, you may wake up tired or with a headache. Or you may sweat so much during the night that your pajamas or sheets are damp when you wake up. How is low blood sugar treated? You can treat low blood sugar by eating or drinking something that has 15 grams of carbohydrate. These should be quick-sugar foods. Check your blood sugar level again 15 minutes after having a quick-sugar food to make sure your level is getting back to your target range. Here are examples of quick-sugar foods that have 15 grams of carbohydrate:  · 3 to 4 glucose tablets  · 1 tube of glucose gel  · Hard candy (such as 3 Jolly Ranchers or 5 to 7 Life Savers)  · 1 tablespoon honey  · 2 tablespoons of raisins  · ½ cup to ¾ cup (4 to 6 ounces) of fruit juice or regular (not diet) soda  · 1 tablespoon of sugar  · 1 cup of fat-free milk  If you have problems with severe low blood sugar, someone else may have to give you a shot of glucagon. This is a hormone that raises blood sugar levels quickly. How can you prevent low blood sugar? You can take steps to prevent low blood sugar. · Follow your treatment plan. Take your insulin or other diabetes medicine exactly as your doctor prescribed it. Talk with your doctor if you're having low blood sugar often. Your medicine may need to be adjusted if it's causing your low blood sugar. · Check your blood sugar levels often. This helps you find early changes before an emergency happens. · Keep a quick-sugar food with you in case your blood sugar level drops low.   · Eat small meals more often so that you don't get too hungry between meals. Don't skip meals. · Balance extra exercise with eating more. Check your blood sugar and learn how it changes after exercise. If your blood sugar stays at a normal level, you may not need to eat after you exercise. · Limit how much alcohol you drink. Alcohol can make low blood sugar go even lower. Don't drink alcohol if you have problems recognizing the early signs of low blood sugar. · Keep a diary of your symptoms. This helps you learn when changes in your body may signal low blood sugar. And keep track of how often you have low blood sugar, including when you last ate and what you ate. This will help you learn what causes your blood sugar to drop. · Learn about diabetes and low blood sugar. Support groups or a diabetes education center can help you understand how medicines, diet, and exercise affect your blood sugar levels. Since low blood sugar levels can quickly become an emergency, be sure to wear medical alert jewelry, such as a medical alert bracelet. This is to let people know you have diabetes so they can get help for you. You can buy this at most drugstores. And make sure your family, friends, and coworkers know the symptoms of low blood sugar. Teach them what to do to get your sugar level up. Follow-up care is a key part of your treatment and safety. Be sure to make and go to all appointments, and call your doctor if you are having problems. It's also a good idea to know your test results and keep a list of the medicines you take. Where can you learn more? Go to http://evelio-rosalinda.info/. Enter X228 in the search box to learn more about \"Learning About Low Blood Sugar (Hypoglycemia) in Diabetes. \"  Current as of: July 25, 2018  Content Version: 11.9  © 8352-2164 [employer], [employer]. Care instructions adapted under license by [employer] (which disclaims liability or warranty for this information).  If you have questions about a medical condition or this instruction, always ask your healthcare professional. [address] any warranty or liability for your use of this information. Medicare Wellness Visit, Male    The best way to live healthy is to have a lifestyle where you eat a well-balanced diet, exercise regularly, limit alcohol use, and quit all forms of tobacco/nicotine, if applicable. Regular preventive services are another way to keep healthy. Preventive services (vaccines, screening tests, monitoring & exams) can help personalize your care plan, which helps you manage your own care. Screening tests can find health problems at the earliest stages, when they are easiest to treat. [address] [facility] follows the current, evidence-based guidelines published by the [facility] [place] ([facility]) when recommending preventive services for our patients. Because we follow these guidelines, sometimes recommendations change over time as research supports it. (For example, a prostate screening blood test is no longer routinely recommended for men with no symptoms.)  Of course, you and your doctor may decide to screen more often for some diseases, based on your risk and co-morbidities (chronic disease you are already diagnosed with). Preventive services for you include:  - Medicare offers their members a free annual wellness visit, which is time for you and your primary care provider to discuss and plan for your preventive service needs. Take advantage of this benefit every year!  -All adults over age 72 should receive the recommended pneumonia vaccines. Current USPSTF guidelines recommend a series of two vaccines for the best pneumonia protection.   -All adults should have a flu vaccine yearly and an ECG.  All adults age 61 and older should receive a shingles vaccine once in their lifetime.    -All adults age 38-68 who are overweight should have a diabetes screening test once every three years.   -Other screening tests & preventive services for persons with diabetes include: an eye exam to screen for diabetic retinopathy, a kidney function test, a foot exam, and stricter control over your cholesterol.   -Cardiovascular screening for adults with routine risk involves an electrocardiogram (ECG) at intervals determined by the provider.   -Colorectal cancer screening should be done for adults age 54-65 with no increased risk factors for colorectal cancer. There are a number of acceptable methods of screening for this type of cancer. Each test has its own benefits and drawbacks. Discuss with your provider what is most appropriate for you during your annual wellness visit. The different tests include: colonoscopy (considered the best screening method), a fecal occult blood test, a fecal DNA test, and sigmoidoscopy.  -All adults born between [facility] should be screened once for Hepatitis C.  -An Abdominal Aortic Aneurysm (AAA) Screening is recommended for men age 73-68 who has ever smoked in their lifetime. Here is a list of your current Health Maintenance items (your personalized list of preventive services) with a due date: There are no preventive care reminders to display for this patient. HISTORY:   HISTORY OBTAINED FROM: Son    CHIEF COMPLAINT:   Chief Complaint   Patient presents with    Fall    Dysuria    Abrasion       HPI/SUBJECTIVE:  Patient fell again last night. He was up urinating multiple times. This had stopped after he was treated for a UTI recently when his urine culture grew out E Coli. He skinned his right knee, elbow and face. He did not hit his head. No LOC. Is ambulating at baseline with his cane today. He is only taking midodrine once a day prn. He used to take it three times a day prn. He is on multiple medications that lower blood pressure. His son is going to go ahead and stop trazodone.     Recent Fall: [x] No / [] Yes (when):    Last bowel movement:  Yesterday    REVIEW OF SYSTEMS:     The following systems were [x] reviewed / [] unable to be reviewed  Systems: constitutional, ears/nose/mouth/throat, respiratory, gastrointestinal, genitourinary, musculoskeletal, integumentary, neurologic, psychiatric, endocrine. Positive findings noted below: very poor historian but denies dysuria. VITAL SIGNS, PHYSICAL EXAM & FUNCTIONAL ASSESSMENT     Vital Signs: Wt Readings from Last 3 Encounters:   02/12/19 141 lb (64 kg)   02/06/19 140 lb 14 oz (63.9 kg)   12/29/18 144 lb 11.2 oz (65.6 kg)     Temp Readings from Last 3 Encounters:   03/07/19 98.2 °F (36.8 °C) (Oral)   02/18/19 98.4 °F (36.9 °C) (Oral)   02/12/19 98.7 °F (37.1 °C)     BP Readings from Last 3 Encounters:   03/07/19 102/48   02/18/19 108/68   02/12/19 148/77     Pulse Readings from Last 3 Encounters:   03/07/19 94   02/18/19 87   02/12/19 (!) 58       Physical Exam:    Constitutional: alert but not oriented, repeats phrases, able to physically maneuver around house with cane and able to follow directions (simple); NAD  Eyes: pupils equal, anicteric, conjunctiva pink  ENMT: no nasal discharge, moist mucous membranes  Cardiovascular: irregularly irregular rhythm, distal pulses intact  Respiratory: breathing not labored, symmetric, CTA bilaterally  Gastrointestinal: +bowel sounds,  soft non-tender, non-distended  Musculoskeletal: no deformity, no tenderness to palpation, no edema. Complete bony survey negative for TTP and deformity. Skin: warm, dry, pale. Right knee with 2X 3 cm skin tear, right elbow with 3 X 3 cm skin tear. Superficial abrasions on right cheek.   Neurologic: A/Ox 2, following simple commands, moving all extremities equally  Psychiatric: restricted affect, no hallucinations  Other:           Functional Assessment (description):    Palliative Performance Scale: 50  Timed Up and Go (TUG): Pt unable to perform  Fall Risk Assessment, last 12 mths 2/12/2019   Able to walk? Yes   Fall in past 12 months? Yes   Fall with injury? Yes   Number of falls in past 12 months 2   Fall Risk Score 3        LAB DATA REVIEWED:     Lab Results   Component Value Date/Time    Hemoglobin A1c 7.0 (H) 02/05/2019 01:42 AM    Hemoglobin A1c, External 11.2 01/27/2016     Lab Results   Component Value Date/Time    Microalb/Creat ratio (ug/mg creat.) 63.6 (H) 02/12/2019 08:49 AM     Lab Results   Component Value Date/Time    TSH 2.89 12/26/2018 02:49 AM     No results found for: Desmond Dos Santos [id number]      Lab Results   Component Value Date/Time    WBC 5.6 02/12/2019 08:49 AM    HGB 11.4 (L) 02/12/2019 08:49 AM    PLATELET 717 88/93/6027 08:49 AM     Lab Results   Component Value Date/Time    Sodium 146 (H) 02/12/2019 08:49 AM    Potassium 4.3 02/12/2019 08:49 AM    Chloride 106 02/12/2019 08:49 AM    CO2 20 02/12/2019 08:49 AM    BUN 12 02/12/2019 08:49 AM    Creatinine 1.16 02/12/2019 08:49 AM    Calcium 9.5 02/12/2019 08:49 AM    Magnesium 2.0 02/05/2019 01:42 AM    Phosphorus 3.8 02/05/2019 01:42 AM      Lab Results   Component Value Date/Time    AST (SGOT) 32 02/05/2019 01:42 AM    Alk. phosphatase 104 02/05/2019 01:42 AM    Protein, total 6.0 (L) 02/05/2019 01:42 AM    Albumin 2.4 (L) 02/05/2019 01:42 AM    Globulin 3.6 02/05/2019 01:42 AM     Lab Results   Component Value Date/Time    Iron 39 02/04/2019 07:35 PM    TIBC 196 (L) 02/04/2019 07:35 PM    Iron % saturation 20 02/04/2019 07:35 PM    Ferritin 75 02/04/2019 07:35 PM         MEDICATIONS & PRESCRIPTIONS     Allergies   Allergen Reactions    Sulfa (Sulfonamide Antibiotics) Unknown (comments)      Current Outpatient Medications   Medication Sig    ciprofloxacin HCl (CIPRO) 500 mg tablet Take 1 Tab by mouth two (2) times a day for 10 days.  memantine ER (NAMENDA XR) 28 mg capsule Take 1 Cap by mouth daily.  QUEtiapine (SEROQUEL) 25 mg tablet Take 1 Tab by mouth nightly. Take 1 tab at bedtime along with 50 mg dose.     QUEtiapine (SEROQUEL) 50 mg tablet Take 1 Tab by mouth nightly. 1 po at bedtime. Give with 25 mg pill    DULoxetine (CYMBALTA) 60 mg capsule Take 1 Cap by mouth daily for 30 days.  lidocaine (LIDODERM) 5 % 1 Patch by TransDERmal route every twenty-four (24) hours for 30 days. Apply patch to the hip for 12 hours a day and remove for 12 hours a day.  senna-docusate (PERICOLACE) 8.6-50 mg per tablet Take 2 Tabs by mouth two (2) times a day for 30 days.  omeprazole (PRILOSEC) 20 mg capsule Take 1 Cap by mouth Daily (before breakfast) for 30 days.  aspirin 81 mg chewable tablet Take 1 Tab by mouth daily.  levothyroxine (SYNTHROID) 50 mcg tablet Take 1 Tab by mouth Daily (before breakfast) for 30 days.  insulin glargine (LANTUS,BASAGLAR) 100 unit/mL (3 mL) inpn 8 Units by SubCUTAneous route nightly for 30 days.  insulin lispro (HUMALOG U-100 INSULIN) 100 unit/mL injection 2 units sq for bs greater than 200 ;  3  untis sq for bs greater than 250,  4 units for blood sugar greater than 300 ; call me for bs greater than 350    midodrine (PROAMITINE) 5 mg tablet Take 5 mg by mouth three (3) times daily as needed.  traMADol (ULTRAM) 50 mg tablet Take 1 Tab by mouth every six (6) hours as needed for Pain for up to 30 days. Max Daily Amount: 200 mg.    ondansetron (ZOFRAN ODT) 4 mg disintegrating tablet Take 1 Tab by mouth every six (6) hours as needed for Nausea.  acetaminophen (TYLENOL) 500 mg tablet Take 1,000 mg by mouth every six to eight (6-8) hours as needed for Pain. No current facility-administered medications for this visit. Medications Ordered Today   Medications    ciprofloxacin HCl (CIPRO) 500 mg tablet     Sig: Take 1 Tab by mouth two (2) times a day for 10 days. Dispense:  20 Tab     Refill:  0    At today's encounter I ordered Home Care for this patient.     Primary Diagnosis for Home Care: skin tears  Patient reason for decreased mobility:  Dementia and debility  Patient requires what device(s) for mobility:cane  Home Care Needed:  [x]  Skilled Nursing  []  Physical Therapy  []  Occupational Therapy  []  Other  Other Home Care information (brief summary): Fall with skin tears. Need wound  for healing. Total time: 45 minutes  Counseling / coordination time:15 minutes on diabetes, hfalls, orthostatic hypotension, how to take standing BP  > 50% counseling / coordination?: No    This is the Subsequent Medicare Annual Wellness Exam, performed 12 months or more after the Initial AWV or the last Subsequent AWV    I have reviewed the patient's medical history in detail and updated the computerized patient record. History     Past Medical History:   Diagnosis Date    CAD (coronary artery disease)     [facility] [address]     Diabetes ([facility] [address]     1970a    Heart failure (HCC)     Ill-defined condition     SOB    MI (myocardial infarction) ([facility] [address] 2019    Pneumonia     Urinary incontinence       Past Surgical History:   Procedure Laterality Date    CARDIAC SURG PROCEDURE UNLIST      open heart    HX HEENT      nasal surgery    HX MOHS PROCEDURES      left     Current Outpatient Medications   Medication Sig Dispense Refill    ciprofloxacin HCl (CIPRO) 500 mg tablet Take 1 Tab by mouth two (2) times a day for 10 days. 20 Tab 0    memantine ER (NAMENDA XR) 28 mg capsule Take 1 Cap by mouth daily. 90 Cap 3    QUEtiapine (SEROQUEL) 25 mg tablet Take 1 Tab by mouth nightly. Take 1 tab at bedtime along with 50 mg dose. 90 Tab 1    QUEtiapine (SEROQUEL) 50 mg tablet Take 1 Tab by mouth nightly. 1 po at bedtime. Give with 25 mg pill 90 Tab 1    DULoxetine (CYMBALTA) 60 mg capsule Take 1 Cap by mouth daily for 30 days. 30 Cap 11    lidocaine (LIDODERM) 5 % 1 Patch by TransDERmal route every twenty-four (24) hours for 30 days.  Apply patch to the hip for 12 hours a day and remove for 12 hours a day. 30 Each 11    senna-docusate (PERICOLACE) 8.6-50 mg per tablet Take 2 Tabs by mouth two (2) times a day for 30 days. 120 Tab 11    omeprazole (PRILOSEC) 20 mg capsule Take 1 Cap by mouth Daily (before breakfast) for 30 days. 30 Cap 11    aspirin 81 mg chewable tablet Take 1 Tab by mouth daily. 30 Tab 11    levothyroxine (SYNTHROID) 50 mcg tablet Take 1 Tab by mouth Daily (before breakfast) for 30 days. 30 Tab 5    insulin glargine (LANTUS,BASAGLAR) 100 unit/mL (3 mL) inpn 8 Units by SubCUTAneous route nightly for 30 days. 8 Units 5    insulin lispro (HUMALOG U-100 INSULIN) 100 unit/mL injection 2 units sq for bs greater than 200 ;  3  untis sq for bs greater than 250,  4 units for blood sugar greater than 300 ; call me for bs greater than 350 1 Vial 0    midodrine (PROAMITINE) 5 mg tablet Take 5 mg by mouth three (3) times daily as needed.  traMADol (ULTRAM) 50 mg tablet Take 1 Tab by mouth every six (6) hours as needed for Pain for up to 30 days. Max Daily Amount: 200 mg. 60 Tab 2    ondansetron (ZOFRAN ODT) 4 mg disintegrating tablet Take 1 Tab by mouth every six (6) hours as needed for Nausea. 30 Tab 1    acetaminophen (TYLENOL) 500 mg tablet Take 1,000 mg by mouth every six to eight (6-8) hours as needed for Pain.        Allergies   Allergen Reactions    Sulfa (Sulfonamide Antibiotics) Unknown (comments)     Family History   Problem Relation Age of Onset    Diabetes Mother     Diabetes Brother      Social History     Tobacco Use    Smoking status: Current Every Day Smoker    Smokeless tobacco: Never Used    Tobacco comment: 1-2 cigars daily   Substance Use Topics    Alcohol use: No     Alcohol/week: 0.0 oz     Patient Active Problem List   Diagnosis Code    Complicated grief P54.65, Z63.4    Non compliance w medication regimen Z91.14    Cognitive disorder F09    Moderate recurrent major depression ([facility] [address] F33.1    Uncontrolled diabetes mellitus with hyperglycemia ([facility] [address] E11.65  Syncope R55    Volume depletion E86.9    Hyponatremia E87.1    Urinary incontinence R32    CAD (coronary artery disease) I25.10    COPD (chronic obstructive pulmonary disease) ([facility]) J44.9    Orthostatic hypotension I95.1    CHF (congestive heart failure) ([facility]) I50.9    NSTEMI (non-ST elevated myocardial infarction) ([facility]) I21.4    Hypokalemia E87.6    Prostate cancer ([facility] [address] C61       Depression Risk Factor Screening:   No flowsheet data found. Alcohol Risk Factor Screening: You do not drink alcohol or very rarely. Functional Ability and Level of Safety:   Hearing Loss  Hearing is good. Activities of Daily Living  The home contains: handrails and grab bars  Patient needs help with:  phone, transportation, shopping, preparing meals, laundry, housework, managing medications, managing money, dressing and bathing    Fall Risk  Fall Risk Assessment, last 12 mths 2/12/2019   Able to walk? Yes   Fall in past 12 months? Yes   Fall with injury? Yes   Number of falls in past 12 months 2   Fall Risk Score 3       Abuse Screen  Patient is not abused    Cognitive Screening   Evaluation of Cognitive Function:  Has your family/caregiver stated any concerns about your memory: yes  Abnormal    Patient Care Team   Patient Care Team:  Guero Trejo NP as PCP - General (Nurse Practitioner)  Genet Ndiaye MD (Neurology)  Anthony Lyons MD as Consulting Provider (Endocrinology)  Radha Menendez MD as Physician (Palliative Medicine)  Vinicius Victor, RN as Nurse Navigator    Assessment/Plan   Education and counseling provided:  Are appropriate based on today's review and evaluation    Diagnoses and all orders for this visit:    1. Fall, initial encounter  -     [facility]    2. Multiple skin tears  -     REFERRAL TO [address]    3. Dysuria  -     CULTURE, URINE    4. Medicare annual wellness visit, subsequent  -     [place] AN[place] SCREEN [facility]    5. Screening for alcoholism  -     [place] ANNU[place] ALCOH[place] SCREEN 15 MIN    6. Screening for depression  -     DEPRESSION SCREEN ANNUAL    Other orders  -     ciprofloxacin HCl (CIPRO) 500 mg tablet; Take 1 Tab by mouth two (2) times a day for 10 days. There are no preventive care reminders to display for this patient.

## 2019-03-10 ENCOUNTER — HOME CARE VISIT (OUTPATIENT)
Dept: SCHEDULING | Facility: HOME HEALTH | Age: 79
End: 2019-03-10
Payer: MEDICARE

## 2019-03-10 VITALS
DIASTOLIC BLOOD PRESSURE: 60 MMHG | BODY MASS INDEX: 18.48 KG/M2 | TEMPERATURE: 97.8 F | WEIGHT: 132 LBS | HEART RATE: 80 BPM | SYSTOLIC BLOOD PRESSURE: 102 MMHG | OXYGEN SATURATION: 98 % | RESPIRATION RATE: 18 BRPM | HEIGHT: 71 IN

## 2019-03-10 PROCEDURE — 3331090001 HH PPS REVENUE CREDIT

## 2019-03-10 PROCEDURE — 3331090002 HH PPS REVENUE DEBIT

## 2019-03-10 PROCEDURE — 400013 HH SOC

## 2019-03-10 PROCEDURE — G0299 HHS/HOSPICE OF RN EA 15 MIN: HCPCS

## 2019-03-11 LAB — BACTERIA UR CULT: ABNORMAL

## 2019-03-11 PROCEDURE — 3331090001 HH PPS REVENUE CREDIT

## 2019-03-11 PROCEDURE — 3331090002 HH PPS REVENUE DEBIT

## 2019-03-11 NOTE — PROGRESS NOTES
He has same infection again. He is on the correct antibiotic. This time I want to see if it is clearing. Need an appt 2-3 days after the antibiotic finishes to recheck a urine.

## 2019-03-12 PROCEDURE — A4216 STERILE WATER/SALINE, 10 ML: HCPCS

## 2019-03-12 PROCEDURE — A4452 WATERPROOF TAPE: HCPCS

## 2019-03-12 PROCEDURE — A6212 FOAM DRG <=16 SQ IN W/BORDER: HCPCS

## 2019-03-12 PROCEDURE — 3331090002 HH PPS REVENUE DEBIT

## 2019-03-12 PROCEDURE — 3331090001 HH PPS REVENUE CREDIT

## 2019-03-13 ENCOUNTER — HOME CARE VISIT (OUTPATIENT)
Dept: SCHEDULING | Facility: HOME HEALTH | Age: 79
End: 2019-03-13
Payer: MEDICARE

## 2019-03-13 PROCEDURE — 3331090002 HH PPS REVENUE DEBIT

## 2019-03-13 PROCEDURE — G0300 HHS/HOSPICE OF LPN EA 15 MIN: HCPCS

## 2019-03-13 PROCEDURE — 3331090001 HH PPS REVENUE CREDIT

## 2019-03-13 NOTE — PROGRESS NOTES
Spoke with patient's son, Gurpreet Oreilly and informed him of lab results and instruction per NP. Appointment rescheduled from Friday, 3/15 to Wednesday, 3/20 and urine sample will obtained.   Gurpreet Oreilly verbalized understanding

## 2019-03-14 VITALS
RESPIRATION RATE: 18 BRPM | HEART RATE: 64 BPM | DIASTOLIC BLOOD PRESSURE: 60 MMHG | OXYGEN SATURATION: 98 % | TEMPERATURE: 98.6 F | SYSTOLIC BLOOD PRESSURE: 118 MMHG

## 2019-03-14 PROCEDURE — 3331090002 HH PPS REVENUE DEBIT

## 2019-03-14 PROCEDURE — 3331090001 HH PPS REVENUE CREDIT

## 2019-03-15 PROCEDURE — 3331090002 HH PPS REVENUE DEBIT

## 2019-03-15 PROCEDURE — 3331090001 HH PPS REVENUE CREDIT

## 2019-03-16 PROCEDURE — 3331090001 HH PPS REVENUE CREDIT

## 2019-03-16 PROCEDURE — 3331090002 HH PPS REVENUE DEBIT

## 2019-03-17 PROCEDURE — 3331090001 HH PPS REVENUE CREDIT

## 2019-03-17 PROCEDURE — 3331090002 HH PPS REVENUE DEBIT

## 2019-03-18 ENCOUNTER — TELEPHONE (OUTPATIENT)
Dept: FAMILY MEDICINE CLINIC | Age: 79
End: 2019-03-18

## 2019-03-18 ENCOUNTER — HOME CARE VISIT (OUTPATIENT)
Dept: SCHEDULING | Facility: HOME HEALTH | Age: 79
End: 2019-03-18
Payer: MEDICARE

## 2019-03-18 VITALS
RESPIRATION RATE: 18 BRPM | SYSTOLIC BLOOD PRESSURE: 132 MMHG | OXYGEN SATURATION: 98 % | WEIGHT: 134 LBS | TEMPERATURE: 97.2 F | BODY MASS INDEX: 18.69 KG/M2 | DIASTOLIC BLOOD PRESSURE: 58 MMHG | HEART RATE: 70 BPM

## 2019-03-18 PROCEDURE — 3331090001 HH PPS REVENUE CREDIT

## 2019-03-18 PROCEDURE — 3331090002 HH PPS REVENUE DEBIT

## 2019-03-18 PROCEDURE — G0300 HHS/HOSPICE OF LPN EA 15 MIN: HCPCS

## 2019-03-19 PROCEDURE — 3331090002 HH PPS REVENUE DEBIT

## 2019-03-19 PROCEDURE — 3331090001 HH PPS REVENUE CREDIT

## 2019-03-20 ENCOUNTER — HOME VISIT (OUTPATIENT)
Dept: FAMILY MEDICINE CLINIC | Age: 79
End: 2019-03-20

## 2019-03-20 DIAGNOSIS — E11.65 UNCONTROLLED TYPE 2 DIABETES MELLITUS WITH HYPERGLYCEMIA (HCC): ICD-10-CM

## 2019-03-20 DIAGNOSIS — F02.80 EARLY ONSET ALZHEIMER'S DEMENTIA WITHOUT BEHAVIORAL DISTURBANCE (HCC): ICD-10-CM

## 2019-03-20 DIAGNOSIS — I95.1 ORTHOSTATIC HYPOTENSION: ICD-10-CM

## 2019-03-20 DIAGNOSIS — G89.29 OTHER CHRONIC PAIN: ICD-10-CM

## 2019-03-20 DIAGNOSIS — G30.0 EARLY ONSET ALZHEIMER'S DEMENTIA WITHOUT BEHAVIORAL DISTURBANCE (HCC): ICD-10-CM

## 2019-03-20 DIAGNOSIS — N39.0 RECURRENT UTI: Primary | ICD-10-CM

## 2019-03-20 PROCEDURE — 3331090002 HH PPS REVENUE DEBIT

## 2019-03-20 PROCEDURE — 3331090001 HH PPS REVENUE CREDIT

## 2019-03-20 RX ORDER — QUETIAPINE FUMARATE 50 MG/1
50 TABLET, FILM COATED ORAL
COMMUNITY
End: 2020-09-08 | Stop reason: SDUPTHER

## 2019-03-20 RX ORDER — FLUTICASONE PROPIONATE AND SALMETEROL 100; 50 UG/1; UG/1
1 POWDER RESPIRATORY (INHALATION) EVERY 12 HOURS
Qty: 1 EACH | Status: SHIPPED | OUTPATIENT
Start: 2019-03-20 | End: 2019-04-18

## 2019-03-21 ENCOUNTER — HOME CARE VISIT (OUTPATIENT)
Dept: SCHEDULING | Facility: HOME HEALTH | Age: 79
End: 2019-03-21
Payer: MEDICARE

## 2019-03-21 ENCOUNTER — DOCUMENTATION ONLY (OUTPATIENT)
Dept: FAMILY MEDICINE CLINIC | Age: 79
End: 2019-03-21

## 2019-03-21 DIAGNOSIS — G89.29 OTHER CHRONIC PAIN: ICD-10-CM

## 2019-03-21 PROCEDURE — 3331090002 HH PPS REVENUE DEBIT

## 2019-03-21 PROCEDURE — 3331090001 HH PPS REVENUE CREDIT

## 2019-03-21 PROCEDURE — G0299 HHS/HOSPICE OF RN EA 15 MIN: HCPCS

## 2019-03-21 RX ORDER — INSULIN GLARGINE 100 [IU]/ML
INJECTION, SOLUTION SUBCUTANEOUS
Qty: 1 PEN | Refills: 5
Start: 2019-03-21 | End: 2019-07-17 | Stop reason: SDUPTHER

## 2019-03-22 VITALS
DIASTOLIC BLOOD PRESSURE: 62 MMHG | OXYGEN SATURATION: 99 % | TEMPERATURE: 98.6 F | SYSTOLIC BLOOD PRESSURE: 121 MMHG | HEART RATE: 74 BPM

## 2019-03-22 VITALS
HEART RATE: 68 BPM | DIASTOLIC BLOOD PRESSURE: 80 MMHG | SYSTOLIC BLOOD PRESSURE: 138 MMHG | RESPIRATION RATE: 16 BRPM | OXYGEN SATURATION: 97 % | TEMPERATURE: 97.8 F

## 2019-03-22 LAB — BACTERIA UR CULT: NO GROWTH

## 2019-03-22 PROCEDURE — 3331090002 HH PPS REVENUE DEBIT

## 2019-03-22 PROCEDURE — 3331090001 HH PPS REVENUE CREDIT

## 2019-03-22 RX ORDER — HYDROCODONE BITARTRATE AND ACETAMINOPHEN 5; 325 MG/1; MG/1
1 TABLET ORAL
Qty: 30 TAB | Refills: 0
Start: 2019-03-22 | End: 2019-04-08

## 2019-03-22 NOTE — PROGRESS NOTES
Home Based [address] [facility]  
[phone] Date of Current Visit: 03/20/19 SUMMARY:  
For full summary of patient's condition, please see Home Based Primary Care initial visit note on 2/12/019. This patient's chronic conditions including atrial fibrillation (not anti-coagulated). CAD, CHF, COPD, DM with neuropathy, CKD, prostate cancer, orthostatic hypotension, falls have resulted in the inability to leave the home to receive primary medical care without a significant taxing effort. Today we are visiting the patient for the following reason chronic pain, DM, Dementia, orthostatic hypotension, recurrent UTI's 
 
 GOALS OF CARE / TREATMENT PREFERENCES This patient's goals of care are: To keep your dad at home, not in a nursing home. This is his wish. This patient's resuscitation status is: 
[] Attempt Resuscitation       [x] Do Not Attempt Resuscitation Primary Decision [facility] Agent):   Primary Decision Maker (Active): [facility] - [phone] Primary Decision Maker: Darcie Maloney - Son DIAGNOSES & PLAN:  
 
  ICD-10-CM ICD-9-CM 1. Recurrent UTI N39.0 599.0 CULTURE, URINE 2. Other chronic pain G89.29 338.29 HYDROcodone-acetaminophen (NORCO) 5-325 mg per tablet 3. Orthostatic hypotension I95.1 458.0   
4. Uncontrolled type 2 diabetes mellitus with hyperglycemia (HCC) E11.65 250.02   
5. 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 This is his wish. This is what we talked about: 1. Recurrent UTI's 
-I collected a urine sample today to see if the infections are clearing with antibiotics and we will call you with the results 2. Orthostatic hypotension -He has had no falls since we increased the midodrine to 3 times a day and his blood pressures are not going high 3. Diabetes 
-Blood sugars ae improved 
-Let us know if he is having readings of 90 or below so we can adjust his insulin 4. Chronic pain 
-You have requested a refill of hydrocodone for him 
-He does not take it very often 
-I gave you a prescription for 30 pills 5. Dementia 
-He is on Namenda at this time 
-He has behavioral issues including sundowning 
-He is on Seroquel 50 mg (at night only), and Cymbalta/Duloxetine 60mg  
-These are a lot of centrally acting medications 
-We will continue to work on adjustments of these medications while minimizing side effects 
-He has had no falls since the midodrine was increased 6. Health Maintenance There are no preventive care reminders to display for this patient. 7.  Advance Care Planning This is what you have shared with us about Advance Care Planning Primary Decision [facility] Agent):   Primary Decision Maker (Active): Linn Villegas  [phone] Primary Decision Maker: Donavon Cintron - Son Relationship to patient: Daughter 
[] Named in a scanned document  
[x] Legal Next of Kin 
[] Guardian ACP documents you current have include: We filled out another Durable DNR form as you did not have one at home for him 
[] Advance Directive or Living Will 
[x] Durable Do Not Resuscitate 
[] Physician Orders for Scope of Treatment (POST) [] Medical Power of  
[] Other Someone from the Home Based Primary Care Team will see you again in 4 weeks. If there are any concerns before that time, such as medication questions, worsening symptoms or a need to see a physician for an urgent or emergent situation; please call [phone].  A physician is also on call after our normal business hours of 8am to 5pm.  
 
In order to serve you better, please allow up to 48 hours for prescription refills to be processed. Certain medications may require more paperwork or a written prescription that you may need to  from the office. We appreciate you letting us know of any refill requests as soon as possible. Sincerely, The Home Based Primary Care Team 
 
MD Nikki Higuera, NP Bang Mckenzie, NP Georgina Baker, LONNIE Hester, RN Maricruz So, [facility] HISTORY:  
HISTORY OBTAINED FROM: Son and daughter CHIEF COMPLAINT:  
Chief Complaint Patient presents with  Bladder Infection  
  recurrent infections  Pain (Chronic) HPI/SUBJECTIVE:  Patient has not fallen since midodrine increased to tid. Not even at night. He is eating well. Behaviors are adequately controlled on seroquel overall. His daughter is requesting a refill on his hydrocodone. He usually uses 30 pills in two months. They are sparing with the medication. He is not c/o urinary symptoms. He had two e coli uti's back-to-back and completed 10-day courses of appropriate antibiotics. BG's have been between 100 and 250 without hypoglycemia. His BP' range from 247 to 572 systolic on the midodrine tid. Recent Fall: [x] No / [] Yes (when):   
Last bowel movement:  Yesterday REVIEW OF SYSTEMS:  
 
The following systems were [x] reviewed / [] unable to be reviewed Systems: constitutional, ears/nose/mouth/throat, respiratory, gastrointestinal, genitourinary, musculoskeletal, integumentary, neurologic, psychiatric, endocrine. Positive findings noted below: very poor historian but denies dysuria. VITAL SIGNS, PHYSICAL EXAM & FUNCTIONAL ASSESSMENT Vital Signs: Wt Readings from Last 3 Encounters:  
03/18/19 134 lb (60.8 kg) 03/10/19 132 lb (59.9 kg) 02/12/19 141 lb (64 kg) Temp Readings from Last 3 Encounters:  
03/21/19 98.6 °F (37 °C) (Temporal) 03/20/19 97.8 °F (36.6 °C) (Oral) 03/18/19 97.2 °F (36.2 °C) (Temporal) BP Readings from Last 3 Encounters: 03/21/19 121/62  
03/20/19 138/80  
03/18/19 132/58 Pulse Readings from Last 3 Encounters:  
03/21/19 74  
03/20/19 68  
03/18/19 70 Physical Exam: 
 
Constitutional: alert but not oriented, repeats phrases, able to physically maneuver around house with cane and able to follow directions (simple); NAD Eyes: pupils equal, anicteric, conjunctiva pink ENMT: no nasal discharge, moist mucous membranes Cardiovascular: irregularly irregular rhythm, distal pulses intact Respiratory: breathing not labored, symmetric, CTA bilaterally Gastrointestinal: +bowel sounds,  soft non-tender, non-distended Musculoskeletal: no deformity, no tenderness to palpation, no edema. Complete bony survey negative for TTP and deformity. Skin: warm, dry, pale. Neurologic: A/Ox 2, following simple commands, moving all extremities equally Psychiatric: restricted affect, no hallucinations Other:   
 
  
 
Functional Assessment (description): 
 
Palliative Performance Scale: 50 Timed Up and Go (TUG): Pt unable to perform Fall Risk Assessment, last 12 mths 2/12/2019 Able to walk? Yes Fall in past 12 months? Yes Fall with injury? Yes  
Number of falls in past 12 months 2 Fall Risk Score 3 LAB DATA REVIEWED:  
 
Lab Results Component Value Date/Time Hemoglobin A1c 7.0 (H) 02/05/2019 01:42 AM  
 Hemoglobin A1c, External 11.2 01/27/2016 Lab Results Component Value Date/Time Microalb/Creat ratio (ug/mg creat.) 63.6 (H) 02/12/2019 08:49 AM  
 
Lab Results Component Value Date/Time TSH 2.89 12/26/2018 02:49 AM  
 
No results found for: Kristian Fisher Dore Garter, [id number] Lab Results Component Value Date/Time WBC 5.6 02/12/2019 08:49 AM  
 HGB 11.4 (L) 02/12/2019 08:49 AM  
 PLATELET 358 50/30/9622 08:49 AM  
 
Lab Results Component Value Date/Time  Sodium 146 (H) 02/12/2019 08:49 AM  
 Potassium 4.3 02/12/2019 08:49 AM  
 Chloride 106 02/12/2019 08:49 AM  
 CO2 20 02/12/2019 08:49 AM  
 BUN 12 02/12/2019 08:49 AM  
 Creatinine 1.16 02/12/2019 08:49 AM  
 Calcium 9.5 02/12/2019 08:49 AM  
 Magnesium 2.0 02/05/2019 01:42 AM  
 Phosphorus 3.8 02/05/2019 01:42 AM  
  
Lab Results Component Value Date/Time AST (SGOT) 32 02/05/2019 01:42 AM  
 Alk. phosphatase 104 02/05/2019 01:42 AM  
 Protein, total 6.0 (L) 02/05/2019 01:42 AM  
 Albumin 2.4 (L) 02/05/2019 01:42 AM  
 Globulin 3.6 02/05/2019 01:42 AM  
 
Lab Results Component Value Date/Time Iron 39 02/04/2019 07:35 PM  
 TIBC 196 (L) 02/04/2019 07:35 PM  
 Iron % saturation 20 02/04/2019 07:35 PM  
 Ferritin 75 02/04/2019 07:35 PM  
  
 
 MEDICATIONS & PRESCRIPTIONS Allergies Allergen Reactions  Sulfa (Sulfonamide Antibiotics) Unknown (comments) Current Outpatient Medications Medication Sig  
 HYDROcodone-acetaminophen (NORCO) 5-325 mg per tablet Take 1 Tab by mouth every six (6) hours as needed for Pain for up to 30 days. Max Daily Amount: 4 Tabs.  insulin glargine (LANTUS,BASAGLAR) 100 unit/mL (3 mL) inpn Inject 8 units every morning.  fluticasone propion-salmeterol (ADVAIR) 100-50 mcg/dose diskus inhaler Take 1 Puff by inhalation every twelve (12) hours.  QUEtiapine (SEROQUEL) 50 mg tablet Take 50 mg by mouth nightly.  midodrine (PROAMITINE) 5 mg tablet Take 5 mg by mouth three (3) times daily (with meals).  memantine ER (NAMENDA XR) 28 mg capsule Take 1 Cap by mouth daily.  aspirin 81 mg chewable tablet Take 1 Tab by mouth daily.  insulin lispro (HUMALOG U-100 INSULIN) 100 unit/mL injection 2 units sq for bs greater than 200 ;  3  untis sq for bs greater than 250,  4 units for blood sugar greater than 300 ; call me for bs greater than 350  ondansetron (ZOFRAN ODT) 4 mg disintegrating tablet Take 1 Tab by mouth every six (6) hours as needed for Nausea.  acetaminophen (TYLENOL) 500 mg tablet Take 1,000 mg by mouth every six to eight (6-8) hours as needed for Pain. No current facility-administered medications for this visit. Medications Ordered Today Medications  fluticasone propion-salmeterol (ADVAIR) 100-50 mcg/dose diskus inhaler Sig: Take 1 Puff by inhalation every twelve (12) hours. Dispense:  1 Each Refill:  prn  
 HYDROcodone-acetaminophen (NORCO) 5-325 mg per tablet Sig: Take 1 Tab by mouth every six (6) hours as needed for Pain for up to 30 days. Max Daily Amount: 4 Tabs. Dispense:  30 Tab Refill:  0 Total time: 45 minutes Counseling / coordination time:30 minutes on diabetes, falls, orthostatic hypotension, UTI's, resuming lupon, speaking with Dr. Orta his urologist 
> 50% counseling / coordination?: Yes

## 2019-03-22 NOTE — PROGRESS NOTES
Spoke with patient's daughter and informed her of urine results per NP.   Candelario Manifold appreciative of phone call

## 2019-03-22 NOTE — PROGRESS NOTES
Home Visit for Medication Review as referred by Dr. Jennifer Oliver / Yocasta Hernandez, [address] [place] [address] providers. Pt's son Jesus Shay was the primary caregiver in the home at the time of the visit, and manages the medications. Pt was present, although not interactive during visit. Jesus Shay stays with the patient, and an aid comes while he's at work. They have a good system down for checking his weight, blood pressure, and blood sugar three times daily, and giving the medications. Pill box is used and has meds in the AM, noon, PM slots. History and Medications reviewed in detail with son. SMBGs reviewed as well. Discussed previous medications the patient has been taking that are now stopped. Re reviewed what each medication is for. Added medications to list that patient is currently taking that are not on his list:  -levothyroxine 50 mcg daily  -omeprazole 20 mg daily  -duloxetine 60 mg daily  -senna 2 tablets twice daily unless loose stools  -lidoderm patch     Will consult with [facility] team and patient's PCP regarding medications, and follow up with patient / his family as desired. Will make a detailed medication list for family. Son verbalized understanding of information presented. Answered all of son's questions.      Mauro Condon, PHARMD, CDE

## 2019-03-22 NOTE — PATIENT INSTRUCTIONS
Dear Brian Aguirre , It was a pleasure seeing you at home today with Home Based [facility] ([phone]) Your stated goal: To keep your dad at home, not in a nursing home. This is his wish. This is what we talked about: 1. Recurrent UTI's 
-I collected a urine sample today to see if the infections are clearing with antibiotics and we will call you with the results 2. Orthostatic hypotension 
-He has had no falls since we increased the midodrine to 3 times a day and his blood pressures are not going high 3. Diabetes 
-Blood sugars ae improved 
-Let us know if he is having readings of 90 or below so we can adjust his insulin 4. Chronic pain 
-You have requested a refill of hydrocodone for him 
-He does not take it very often 
-I gave you a prescription for 30 pills 5. Dementia 
-He is on Namenda at this time 
-He has behavioral issues including sundowning 
-He is on Seroquel 50 mg (at night only), and Cymbalta/Duloxetine 60mg  
-These are a lot of centrally acting medications 
-We will continue to work on adjustments of these medications while minimizing side effects 
-He has had no falls since the midodrine was increased 6. Health Maintenance There are no preventive care reminders to display for this patient. 7.  Advance Care Planning This is what you have shared with us about Advance Care Planning Primary Decision [facility] Agent):   Primary Decision Maker (Active): Linn Villegas - [phone] Primary Decision Maker: Tapan Nubiaandrés - Son Relationship to patient: Daughter 
[] Named in a scanned document  
[x] Legal Next of Kin 
[] Guardian ACP documents you current have include: We filled out another Durable DNR form as you did not have one at home for him 
[] Advance Directive or Living Will 
[x] Durable Do Not Resuscitate 
[] Physician Orders for Scope of Treatment (POST) [] Medical Power of  
[] Other Someone from the Home Based Primary Care Team will see you again in 4 weeks. If there are any concerns before that time, such as medication questions, worsening symptoms or a need to see a physician for an urgent or emergent situation; please call [phone]. A physician is also on call after our normal business hours of 8am to 5pm.  
 
In order to serve you better, please allow up to 48 hours for prescription refills to be processed. Certain medications may require more paperwork or a written prescription that you may need to  from the office. We appreciate you letting us know of any refill requests as soon as possible. Sincerely, The Home Based Primary Care Team 
 
Louise Monaco, MD Tonie Kayser, MILTON Yost, MILTON Licona, LONNIE Vidal, LONNIE Anne, [facility] Learning About Diabetes and Your Teeth How does diabetes affect your teeth and gums? When you have diabetes, managing blood sugar levels and taking good care of your teeth and gums are both important. When blood sugar levels are high, there's a greater risk for: · Gum (periodontal) disease. · Tooth decay. · Fungal infections in the mouth, like thrush. · Dry mouth, or xerostomia (say \"zee-ruh-STO-jimmy-\"). The mouth needs saliva to neutralize the acids in your mouth. These acids can lead to gum disease and tooth decay. Keeping your blood sugar levels in your target range can help prevent problems with the teeth and gums. If you have any problems with your teeth or gums, see your dentist. 
How do you care for your teeth and gums when you have diabetes? · Brush your teeth twice a day. · Floss daily. Make sure to press the floss against your teeth and not your gums. · Check each day for areas where your gums might be red or painful. Be sure to let your dentist know of any sores in your mouth.  
· See your dentist regularly for professional cleaning of your teeth and to look for gum problems. Many dentists recommend getting checkups twice a year. Remind your dentist that you have diabetes before any work is done. · Don't smoke or use smokeless tobacco. Tobacco use with diabetes can lead to a greater risk of severe gum disease. If you need help quitting, talk to your doctor about stop-smoking programs and medicines. These can increase your chances of quitting for good. Follow-up care is a key part of your treatment and safety. Be sure to make and go to all appointments, and call your doctor if you are having problems. It's also a good idea to know your test results and keep a list of the medicines you take. Where can you learn more? Go to http://evelio-rosalinda.info/. Enter M974 in the search box to learn more about \"Learning About Diabetes and Your Teeth. \" 
Current as of: July 25, 2018 Content Version: 11.9 © 9804-3204 [employer]. Care instructions adapted under license by [employer] (which disclaims liability or warranty for this information). If you have questions about a medical condition or this instruction, always ask your healthcare professional. [address] any warranty or liability for your use of this information.

## 2019-03-22 NOTE — PROGRESS NOTES
Spoke with Dr. Dimitri Riedel nurse by phone--he is patient's urologist.  They strongly recommend that Mr. Keena Latham continue his lupon injections. We discussed his recurrent UTI's and they would like to collect a urine when he comes in for the injection and do special testing on it.

## 2019-03-22 NOTE — PROGRESS NOTES
Outgoing call placed to patient's daughter, Kizzy Fonseca - advised per Audrey Valle she spoke with urologist office and MD recommends patient continues to receive Lupron injections and will collect urine sample at next visit to do a detailed evaluation on frequent UTI's. Per Kizzy Fonseca she is not sure she can get patient to the office due to transportation. Advised that I can have our Sarah Yadav contact her to assist in determine if patient has transportation benefits and can refer transportation companies if he does. Kizzy Fonseca verbalized understanding and appreciative of call and SW contacting her.

## 2019-03-23 PROCEDURE — 3331090002 HH PPS REVENUE DEBIT

## 2019-03-23 PROCEDURE — 3331090001 HH PPS REVENUE CREDIT

## 2019-03-24 PROCEDURE — 3331090001 HH PPS REVENUE CREDIT

## 2019-03-24 PROCEDURE — 3331090002 HH PPS REVENUE DEBIT

## 2019-03-25 ENCOUNTER — HOME CARE VISIT (OUTPATIENT)
Dept: SCHEDULING | Facility: HOME HEALTH | Age: 79
End: 2019-03-25
Payer: MEDICARE

## 2019-03-25 ENCOUNTER — TELEPHONE (OUTPATIENT)
Dept: FAMILY MEDICINE CLINIC | Age: 79
End: 2019-03-25

## 2019-03-25 VITALS
HEART RATE: 76 BPM | DIASTOLIC BLOOD PRESSURE: 68 MMHG | OXYGEN SATURATION: 98 % | SYSTOLIC BLOOD PRESSURE: 148 MMHG | RESPIRATION RATE: 12 BRPM | TEMPERATURE: 98.9 F

## 2019-03-25 PROCEDURE — 3331090001 HH PPS REVENUE CREDIT

## 2019-03-25 PROCEDURE — 3331090002 HH PPS REVENUE DEBIT

## 2019-03-25 PROCEDURE — G0299 HHS/HOSPICE OF RN EA 15 MIN: HCPCS

## 2019-03-27 ENCOUNTER — TELEPHONE (OUTPATIENT)
Dept: FAMILY MEDICINE CLINIC | Age: 79
End: 2019-03-27

## 2019-03-27 NOTE — TELEPHONE ENCOUNTER
SW called pt's dtr to offer assistance with arranging transportation for dad to go to urologist for Lupon injections, via his Medicaid [facility] ([facility]). Venkatesh Castaneda stated that she does not need help from  saying, \"I've already got help for that. \" SW encouraged Venkatesh Castaneda to call should she have any other questions or concerns that SW may be able to assist with. This SW is not needed to assist with transportation arrangements at this time.

## 2019-03-28 ENCOUNTER — DOCUMENTATION ONLY (OUTPATIENT)
Dept: FAMILY MEDICINE CLINIC | Age: 79
End: 2019-03-28

## 2019-03-28 NOTE — PROGRESS NOTES
[facility]   (previously: At Home)  [phone]    Interdisciplinary Note    HB Team Members: Dr. Viv Arshad, Heath Vincent NP; Jassi De La Garza NP; Peri Capellan, LONNIE; Vanessa Monroe, LONNIE,  SPENCER Long    Diagnosis: Dementia with recent delirium, COPD,  chronic diastolic CHF, persistent afib, Type 2 diabetes with hypoglycemia, chronic anemia, hypothyroidism, depression    Current status summary:     Medication Management: N/A    Last admission/ED visit: 2/6/19    Last Home Based Primary Care visit: 3/22/19    Action Items:  1. Consult with Joann Negrete, about anticholinergic drug (Namenda, Midodrine and Seroquel) are either heavily impactful  2. Side effects of Lupron with burden/benefits of     Advance Care Planning:    Primary Decision Maker (Active): Leonor Licona Child - [phone]    Primary Decision Maker: Kenneth Padgett  Advance Care Planning 2/21/2019   Patient's Healthcare Decision Maker is: Legal Next of Kin   Confirm Advance Directive None   Patient Would Like to Complete Advance Directive Unable   Does the patient have other document types Do Not Resuscitate       Health Maintenance: There are no preventive care reminders to display for this patient.     Follow up visit - 4/19/19

## 2019-04-03 RX ORDER — FLUDROCORTISONE ACETATE 0.1 MG/1
TABLET ORAL
Qty: 180 TAB | Refills: 3 | Status: SHIPPED | OUTPATIENT
Start: 2019-04-03 | End: 2019-05-09

## 2019-04-08 RX ORDER — LIDOCAINE 50 MG/G
PATCH TOPICAL
Qty: 1 PACKAGE | Refills: 0 | Status: ON HOLD
Start: 2019-04-08 | End: 2022-01-01 | Stop reason: ALTCHOICE

## 2019-04-08 RX ORDER — CETIRIZINE HYDROCHLORIDE, PSEUDOEPHEDRINE HYDROCHLORIDE 5; 120 MG/1; MG/1
TABLET, FILM COATED, EXTENDED RELEASE ORAL
Refills: 11 | Status: ON HOLD | COMMUNITY
Start: 2019-03-16 | End: 2022-01-01

## 2019-04-08 RX ORDER — DULOXETIN HYDROCHLORIDE 60 MG/1
CAPSULE, DELAYED RELEASE ORAL
Refills: 3 | COMMUNITY
Start: 2019-03-15 | End: 2022-01-01 | Stop reason: SDUPTHER

## 2019-04-08 RX ORDER — LEVOTHYROXINE SODIUM 50 UG/1
50 TABLET ORAL
Qty: 30 TAB | Refills: 3
Start: 2019-04-08 | End: 2019-08-06 | Stop reason: SDUPTHER

## 2019-04-08 RX ORDER — PHENOL/SODIUM PHENOLATE
20 AEROSOL, SPRAY (ML) MUCOUS MEMBRANE DAILY
Qty: 30 TAB | Refills: 3
Start: 2019-04-08 | End: 2020-01-17 | Stop reason: SDUPTHER

## 2019-04-17 ENCOUNTER — TELEPHONE (OUTPATIENT)
Dept: FAMILY MEDICINE CLINIC | Age: 79
End: 2019-04-17

## 2019-04-17 ENCOUNTER — TELEPHONE (OUTPATIENT)
Dept: PALLATIVE CARE | Age: 79
End: 2019-04-17

## 2019-04-17 ENCOUNTER — PATIENT MESSAGE (OUTPATIENT)
Dept: FAMILY MEDICINE CLINIC | Age: 79
End: 2019-04-17

## 2019-04-17 NOTE — TELEPHONE ENCOUNTER
Returned call to patient's son and advised that patient was scheduled to be seen on Friday, but his sister cancelled that visit. Per Elizabeth Licea patient is napping during the day and not sleeping at night. Advised that we recommend that caregiver keep patient engaged during the day so he can sleep at night. Informed him per Seda Pugh should not be keeping patient awake - recommend that he gives 50 mg nightly and alternating an extra 1/2 tablet every other night -Elizabeth Licea verbalized understanding and reported that he still have some 25 mg tablets in the home which he will give every other night along with the 50 mg. Advised that next visit is scheduled for 5/3/19 and we are requesting for both Elizabeth Licea and sister, Frannie be present for the visit.   Elizabeth Licea verbalized understanding and will add appointment to his calendar

## 2019-04-17 NOTE — TELEPHONE ENCOUNTER
Barbara Myers , patient's son is calling to advise that patient is not sleeping at night and he would like to know if provider can come by to see patient. Please call Barbara Myers or gretel to discuss.

## 2019-04-17 NOTE — TELEPHONE ENCOUNTER
I spoke with Bertha Ryan to advise of appt for 4/19/19, Bertha Ryan declined stated she doesn't want us to come because she will not be there.  Bertha Ryan mentioned the [employer] she sent asking for an appt 5/1/19-5/3/19, I advised her we did see that request but the pt was already scheduled for a follow up 4/19/19 and she advised she would rather us come 5/1/19-5/3/19

## 2019-04-18 DIAGNOSIS — J45.40 MODERATE PERSISTENT ASTHMA WITHOUT COMPLICATION: Primary | ICD-10-CM

## 2019-04-18 RX ORDER — BUDESONIDE AND FORMOTEROL FUMARATE DIHYDRATE 80; 4.5 UG/1; UG/1
2 AEROSOL RESPIRATORY (INHALATION) 2 TIMES DAILY
Qty: 1 INHALER | Refills: 5 | Status: SHIPPED | OUTPATIENT
Start: 2019-04-18 | End: 2019-05-18

## 2019-04-23 ENCOUNTER — TELEPHONE (OUTPATIENT)
Dept: FAMILY MEDICINE CLINIC | Age: 79
End: 2019-04-23

## 2019-04-23 NOTE — TELEPHONE ENCOUNTER
Nidia Stearns reports back that patient blood sugar is 403 after 8 units an hour ago. Per Garcia Journey,   Give 6 unitis, recheck in 1 hour and call back with results to beverly directly. Patient ate potatoes, rice, and gravy for lunch.

## 2019-04-24 ENCOUNTER — TELEPHONE (OUTPATIENT)
Dept: PALLATIVE CARE | Age: 79
End: 2019-04-24

## 2019-04-24 RX ORDER — INSULIN LISPRO 100 [IU]/ML
INJECTION, SOLUTION INTRAVENOUS; SUBCUTANEOUS
Qty: 1 VIAL | Refills: 0
Start: 2019-04-24 | End: 2020-08-24 | Stop reason: SDUPTHER

## 2019-04-24 NOTE — TELEPHONE ENCOUNTER
Per Health system, NP    Change ss to:  <200 4 units  <250 5 units  <300 6 units    Call if over 400    For now  Give 10 units, recheck and call if it is over Emma SALAZAR 935 will see patient next week. Birgit Gamino verbalized understanding and agreed to plan of care.

## 2019-04-30 ENCOUNTER — PATIENT MESSAGE (OUTPATIENT)
Dept: FAMILY MEDICINE CLINIC | Age: 79
End: 2019-04-30

## 2019-04-30 ENCOUNTER — TELEPHONE (OUTPATIENT)
Dept: FAMILY MEDICINE CLINIC | Age: 79
End: 2019-04-30

## 2019-04-30 NOTE — TELEPHONE ENCOUNTER
Outgoing call placed to patient son to obtain blood sugar readings, He reported the followin/29/19 [phone]  19 [phone]  19 [phone]  19 [phone]  19 [phone]  19 [phone]    Patient is given Lantus 11 units every morning, and sliding scale Humalog given. Advised I will forward blood sugar readings to NP and call back with any medication adjustments.   Merle Vogel verbalized understanding

## 2019-05-02 NOTE — TELEPHONE ENCOUNTER
Spoke with patient son and informed him per Rickey Gustafson NP to give patient 2 units of Lantus with lunch, plus sliding scale when he eats - advised not to give if he is not eating.   Nik Junior verbalized understanding

## 2019-05-02 NOTE — TELEPHONE ENCOUNTER
Have patient take 2 units of rapid acting with lunch plus the sliding scale when he eats--don't give if doesn't eat.

## 2019-05-07 ENCOUNTER — DOCUMENTATION ONLY (OUTPATIENT)
Dept: FAMILY MEDICINE CLINIC | Age: 79
End: 2019-05-07

## 2019-05-07 NOTE — PROGRESS NOTES
Spoke with patient's daughter Leigha Bonilla. I discussed that adding another centrally acting agent like Lyrica would be too much for her Dad and she agreed. She can come to the appt we are setting up on Thursday May 9th.

## 2019-05-09 ENCOUNTER — HOME VISIT (OUTPATIENT)
Dept: FAMILY MEDICINE CLINIC | Age: 79
End: 2019-05-09

## 2019-05-09 VITALS
SYSTOLIC BLOOD PRESSURE: 138 MMHG | TEMPERATURE: 98 F | RESPIRATION RATE: 16 BRPM | HEART RATE: 78 BPM | OXYGEN SATURATION: 96 % | DIASTOLIC BLOOD PRESSURE: 74 MMHG

## 2019-05-09 DIAGNOSIS — N18.4 CKD (CHRONIC KIDNEY DISEASE) STAGE 4, GFR 15-29 ML/MIN (HCC): ICD-10-CM

## 2019-05-09 DIAGNOSIS — G30.1 LATE ONSET ALZHEIMER'S DISEASE WITH BEHAVIORAL DISTURBANCE (HCC): ICD-10-CM

## 2019-05-09 DIAGNOSIS — F02.818 LATE ONSET ALZHEIMER'S DISEASE WITH BEHAVIORAL DISTURBANCE (HCC): ICD-10-CM

## 2019-05-09 DIAGNOSIS — E11.65 UNCONTROLLED TYPE 2 DIABETES MELLITUS WITH HYPERGLYCEMIA (HCC): ICD-10-CM

## 2019-05-09 DIAGNOSIS — R52 PAIN: Primary | ICD-10-CM

## 2019-05-09 DIAGNOSIS — I95.1 ORTHOSTATIC HYPOTENSION: ICD-10-CM

## 2019-05-09 DIAGNOSIS — I25.10 CORONARY ARTERY DISEASE INVOLVING NATIVE CORONARY ARTERY OF NATIVE HEART WITHOUT ANGINA PECTORIS: ICD-10-CM

## 2019-05-09 DIAGNOSIS — R35.0 URINARY FREQUENCY: ICD-10-CM

## 2019-05-09 DIAGNOSIS — C61 PROSTATE CANCER (HCC): ICD-10-CM

## 2019-05-09 DIAGNOSIS — J44.9 CHRONIC OBSTRUCTIVE PULMONARY DISEASE, UNSPECIFIED COPD TYPE (HCC): ICD-10-CM

## 2019-05-09 RX ORDER — HYDROCODONE BITARTRATE AND ACETAMINOPHEN 5; 325 MG/1; MG/1
1 TABLET ORAL
Qty: 60 TAB | Refills: 0
Start: 2019-05-09 | End: 2019-05-12

## 2019-05-09 NOTE — PROGRESS NOTES
Home Based Primary Care [facility] FOR [facility]) & Supportive Services   
[phone] NOTES: Home Based Primary Care is a PROVIDER (MD/NP) based interdisciplinary practice (RN, LCSW, Pharmacy, Dietician) for patient's who cannot access (or have a taxing effort) primary / speciality medical care in an office setting. [facility] is NOT [employer] but works with [employer] [address] [employer] when there is a skilled need. Our MD/NPs are integral in Care Transitions; PLEASE CALL [phone] if this patient arrives in the Emergency Department or Hospital.  
 
Date of Current Visit: 05/09/19 Patient/Family present for Current Visit: Patient, son Sarah Martinez, and daughter Goals of Care as stated by the patient/family is: For the patient to be as comfortable as possible, and to die in his own home Total time: 75 minutes Counseling / coordination time: 60 minutes on adjustment of insulin, treatment of pain, adjustment of melatonin, frequent urination and Flomax, skin care, decision making for patient, signs and symptoms of cellulitis, and orthostatic hypotension 
> 50% counseling / coordination?: Yes ASSESSMENT & PLAN Diagnoses and all orders for this visit: 1. Pain 
-     HYDROcodone-acetaminophen (NORCO) 5-325 mg per tablet; Take 1 Tab by mouth two (2) times daily as needed for Pain for up to 3 days. Max Daily Amount: 2 Tabs. 0.5 to 1 tab po bid prn pain 
 -The patient already has a prescription and takes it 1 or 2 times a week 
 -He constantly complains of burning pain in his feet and legs and lower back pain and neck pain 
 -Lidocaine patch and scheduled Tylenol in no way diminished his pain or made a better 
 -The family will try scheduling 1/2 pill of hydrocodone in the afternoons when he has an aide who can watch him and see if he tolerates this without falls or low blood pressure 
 -This can be increased if needed and family will contact me to discuss this if it needs to occur -Nonsteroidals are not an option due to kidney disease 2. Orthostatic hypotension 
 -Much improved off of trazodone 
 -Patient's son is only giving midodrine if his blood pressure is below 95 systolic and he is only had to use it once or twice in the last few weeks 3. Uncontrolled type 2 diabetes mellitus with hyperglycemia ([facility] -Blood glucoses range from 88 and 97 in the mornings to upper 300s at bedtime 
 -There is a lot of variability in his blood sugars due to timing of eating and what he eats 
 -Family reports the patient has never been compliant with his diabetes care plan 
 -We will add 4 units of Humalog standing at lunchtime daily in addition to sliding scale to bring down his highest time of the day which is in the evenings 4. Late onset Alzheimer's disease with behavioral disturbance 
 -Seroquel at bedtime has improved this to where family can work with him better 
 -He is dependent for some ADLs and is refusing skin care and showers most of the time 
 -Family to continue supportive care 
 -There is a lot of caregiver stress in the home and they have discussed placing him at a facility 5. Urinary frequency 
 -We will restart patient's Flomax 
 -He is getting up every 2 hours to urinate 24 hours a day 
 -Patient's family will give this a 1 month trial and if his urination pattern lessens in frequency, we will continue the medication 6. Prostate cancer ([facility] [address] -Patient's daughter will be taking the patient to see his urologist and get his hormone injection for prostate cancer 
 -I have asked his daughter to discuss if he could have metastatic disease secondary to his back pain 7. Coronary artery disease involving native coronary artery of native heart without angina pectoris 
 -This is been stable without symptoms 
 -Continue aspirin 81 mg p.o. daily 8. Chronic obstructive pulmonary disease, unspecified COPD type ([address] -This has been stable and the patient is not smoking 9. CKD (chronic kidney disease) stage 4, GFR 15-29 ml/min ([facility]) -Avoid nephrotoxic drugs and dose all medications according to renal function 10. Picking at skin 
 -Hydrocortisone over-the-counter 
 -Family will try this to see if patient has pruritus and that is why he is picking but did discuss if he is a  ,especially with his dementia, it will be difficult to stop it Follow-up and Dispositions · Return in about 2 months (around 7/9/2019). I have left a SUMMARY / INSTRUCTIONS from today's visit with the patient/family in the home HEALTH MAINTENANCE There are no preventive care reminders to display for this patient. CC & SUBJECTIVE including ROS & FUNCTION Chief Complaint Patient presents with  Follow Up Chronic Condition DM, dementia back pain, peripheral neuropathy Derek Mckeon is a 69 y.o. with chronic medical conditions as listed in the patient's updated Problem List (see below) Their Subjective Information provided as today's visit includes: The patient is more frequently complaining of back pain neck pain and a burning pain in his legs and feet. Scheduled Tylenol did not help this pain at all. The family found an over-the-counter medicine for leg cramps that he bought about 10 years ago and had found that the back removed was taking and they have thrown this out. They do not remember the name of the medication. He continues to go to bed late and sleep late. He is very hard to work with in the mornings and does not want to allow any care or take his medications. He is getting showers more frequently but has been oppositional with family in terms of skin care and cleanliness. He currently has no skin breakdown and home health is not following him for any decubitus ulcers. He is eating well and has a soft bowel movement most days.   When he gets more constipated his son increases his senna and then when he starts having frequent stools he decreases it. He has been off Flomax for several months now and is urinating every 2 hours day and night. Most of his falls occur at night when he gets up to use the bathroom. He continues to pick at his skin, particularly his scalp, causing it to bleed. He has had cellulitis in the past with this and the family wished me to review signs and symptoms of cellulitis today so they could call when they see them. His orthostatic hypotension is improved off the trazodone and he is only getting Midrin as needed 1-2 doses a week. His diabetes control. Wheezing has not been a problem. He denies any chest pain. His daughter is going to schedule him with his urologist for his home hormone injection for prostate cancer. Because the patient is up at night wandering the house, the family would like to try something else for sleep. The daughter gets good relief with melatonin 10 mg and bought some pills for her father. We discussed only using half of the pill at first. 
 
Met with patient's son Michelle Hazel who lives in the home with him and the daughter who lives 35 minutes away his daughter has medical power of  for him. She relates that she took care of her mother for 6+ years in the end of her life and started to care for her father and her home and could not continue to do so. So her brother sold his home and moved him with his dad and there is another brother who lives several houses down who helps with care. Michelle Hazel it gets very overwhelmed with care and stressed and has been hospitalized several times recently for coronary artery disease and other health conditions. His sister relates he often makes decisions based on stress and is not good in understanding his father. His daughter has told the 2 sons if they continue to be so stressed that they cannot care for their father that he will need to be placed in a facility. Positive ROS findings: Unable to obtain from patient secondary to advanced dementia The following systems were [] reviewed / [x] unable to be reviewed Systems: constitutional, ears/nose/mouth/throat, respiratory, gastrointestinal, genitourinary, musculoskeletal, integumentary, neurologic, psychiatric, endocrine. PPS: 30 
Karnofsky:  
Timed Up and Go (TUG): Fall Risk Assessment, last 12 mths 2/12/2019 Able to walk? Yes Fall in past 12 months? Yes Fall with injury? Yes  
Number of falls in past 12 months 2 Fall Risk Score 3 ADVANCE CARE PLANNING The following information was updated I/ reviewed as accurate in Orders and the Advance Care Planning Navigator: 
@TAC@ Primary Decision Maker (Active): [facility] - [phone] Primary Decision Maker: Rose Padgett Advance Care Planning 2/21/2019 Patient's Healthcare Decision Maker is: Legal Next of Kin Confirm Advance Directive None Patient Would Like to Complete Advance Directive Unable Does the patient have other document types Do Not Resuscitate VITAL SIGNS & PHYSICAL EXAM  
 
Median Arm Circumference (inches): Wt Readings from Last 3 Encounters:  
03/18/19 134 lb (60.8 kg) 03/10/19 132 lb (59.9 kg) 02/12/19 141 lb (64 kg) Temp Readings from Last 3 Encounters:  
05/09/19 98 °F (36.7 °C)  
03/25/19 98.9 °F (37.2 °C) (Temporal) 03/21/19 98.6 °F (37 °C) (Temporal) BP Readings from Last 3 Encounters:  
05/09/19 138/74  
03/25/19 148/68  
03/21/19 121/62 Pulse Readings from Last 3 Encounters:  
05/09/19 78  
03/25/19 76  
03/21/19 74 (Constitutional) Patient Physical Status: Frail elderly  male who is angry and does not want to be bothered today in no acute distress Pacemaker: 
ICD: 
Feeding Tube: 
Urine Catheter: 
Other: 
 
Eyes: pupils equal, anicteric, conjunctiva pink ENMT: no nasal discharge, moist mucous membranes, pharynx clear Neck: No JVD thyromegaly or LAD Cardiovascular: regular rhythm, no M/R/G, distal pulses intact, no peripheral edema Respiratory: breathing not labored, symmetric, CTA bilat. Gastrointestinal: + bowel sounds, soft, non-tender, non-distended Musculoskeletal: no deformity, no tenderness to palpation, weakness in all extremities +4 in upper +3 in lower Skin: warm, dry Neurologic: A/Ox3, following simple commands commands, moving all extremities equally Psychiatric: Restricted and angry affect, no hallucinations Other: PROBLEM LIST Patient Active Problem List  
Diagnosis Code  Complicated grief V10.97, Z63.4  Non compliance w medication regimen Z91.14  
 Cognitive disorder F09  Moderate recurrent major depression ([facility]) F33.1  Uncontrolled diabetes mellitus with hyperglycemia ([facility]) E11.65  Syncope R55  Volume depletion E86.9  Hyponatremia E87.1  Urinary incontinence R32  
 CAD (coronary artery disease) I25.10  COPD (chronic obstructive pulmonary disease) ([facility]) J44.9  Orthostatic hypotension I95.1  CHF (congestive heart failure) ([facility]) I50.9  NSTEMI (non-ST elevated myocardial infarction) ([facility] [address] I21.4  Hypokalemia E87.6  Pro[facility] C61 MEDICATIONS & PRESCRIPTIONS Allergies Allergen Reactions  Sulfa (Sulfonamide Antibiotics) Unknown (comments) Current Outpatient Medications Medication Sig  
 HYDROcodone-acetaminophen (NORCO) 5-325 mg per tablet Take 1 Tab by mouth two (2) times daily as needed for Pain for up to 3 days. Max Daily Amount: 2 Tabs. 0.5 to 1 tab po bid prn pain  DULoxetine (CYMBALTA) 60 mg capsule TAKE ONE CAPSULE BY MOUTH EVERY DAY  levothyroxine (SYNTHROID) 50 mcg tablet Take 1 Tab by mouth Daily (before breakfast).  Omeprazole delayed release (PRILOSEC D/R) 20 mg tablet Take 1 Tab by mouth daily.   
 insulin glargine (LANTUS,BASAGLAR) 100 unit/mL (3 mL) inpn Inject 8 units every morning. (Patient taking differently: 13 Units daily. Inject 8 units every morning. Indications: type 2 diabetes mellitus)  QUEtiapine (SEROQUEL) 50 mg tablet Take 50 mg by mouth nightly.  memantine ER (NAMENDA XR) 28 mg capsule Take 1 Cap by mouth daily.  aspirin 81 mg chewable tablet Take 1 Tab by mouth daily.  insulin lispro (HUMALOG U-100 INSULIN) 100 unit/mL injection 3 units sq for bs greater than 200 ; 5  untis sq for bs greater than 250,  6units for blood sugar greater than 300 ; call me for bs greater than 400 (Patient taking differently: 3 units sq for bs greater than 200 ; 5  untis sq for bs greater than 250,  6units for blood sugar greater than 300 ; call me for bs greater than 400  Indications: 4 units with lunch in addition to sliding scale)  budesonide-formoterol (SYMBICORT) 80-4.5 mcg/actuation HFAA Take 2 Puffs by inhalation two (2) times a day for 30 days.  SENNA PLUS 8.6-50 mg per tablet TAKE 2 TABS BY MOUTH TWO (2) TIMES A DAY  lidocaine (LIDODERM) 5 % Apply patch to the affected area for 12 hours a day and remove for 12 hours a day.  midodrine (PROAMITINE) 5 mg tablet Take 5 mg by mouth three (3) times daily (with meals).  ondansetron (ZOFRAN ODT) 4 mg disintegrating tablet Take 1 Tab by mouth every six (6) hours as needed for Nausea.  acetaminophen (TYLENOL) 500 mg tablet Take 1,000 mg by mouth every six to eight (6-8) hours as needed for Pain. No current facility-administered medications for this visit. Medications Ordered Today Medications  HYDROcodone-acetaminophen (NORCO) 5-325 mg per tablet Sig: Take 1 Tab by mouth two (2) times daily as needed for Pain for up to 3 days. Max Daily Amount: 2 Tabs. 0.5 to 1 tab po bid prn pain Dispense:  60 Tab Refill:  0  
  
 
 TEST DATA REVIEWED:  
 
Lab Results Component Value Date/Time Hemoglobin A1c 7.0 (H) 02/05/2019 01:42 AM  
 Hemoglobin A1c, External 11.2 01/27/2016 Lab Results Component Value Date/Time Microalb/Creat ratio (ug/mg creat.) 63.6 (H) 02/12/2019 08:49 AM  
 
Lab Results Component Value Date/Time TSH 2.89 12/26/2018 02:49 AM  
 
No results found for: Shinevaleria Thapa, Ghislaine Soria, Lucindafabi Bernal, [id number] Lab Results Component Value Date/Time WBC 5.6 02/12/2019 08:49 AM  
 HGB 11.4 (L) 02/12/2019 08:49 AM  
 PLATELET 772 05/95/2566 08:49 AM  
 
Lab Results Component Value Date/Time Sodium 146 (H) 02/12/2019 08:49 AM  
 Potassium 4.3 02/12/2019 08:49 AM  
 Chloride 106 02/12/2019 08:49 AM  
 CO2 20 02/12/2019 08:49 AM  
 BUN 12 02/12/2019 08:49 AM  
 Creatinine 1.16 02/12/2019 08:49 AM  
 Calcium 9.5 02/12/2019 08:49 AM  
 Magnesium 2.0 02/05/2019 01:42 AM  
 Phosphorus 3.8 02/05/2019 01:42 AM  
  
Lab Results Component Value Date/Time AST (SGOT) 32 02/05/2019 01:42 AM  
 Alk. phosphatase 104 02/05/2019 01:42 AM  
 Protein, total 6.0 (L) 02/05/2019 01:42 AM  
 Albumin 2.4 (L) 02/05/2019 01:42 AM  
 Globulin 3.6 02/05/2019 01:42 AM  
 
Lab Results Component Value Date/Time  Iron 39 02/04/2019 07:35 PM  
 TIBC 196 (L) 02/04/2019 07:35 PM  
 Iron % saturation 20 02/04/2019 07:35 PM  
 Ferritin 75 02/04/2019 07:35 PM

## 2019-05-09 NOTE — PATIENT INSTRUCTIONS
Learning About Prescribed Opioid Medicine for Chronic Pain Introduction Opioids are medicines used to relieve moderate to severe pain. Examples include fentanyl, hydrocodone, morphine, and oxycodone. Heroin is an example of an illegal opioid. They may be used for pain that may go on for a long time, such as for cancer or arthritis. Opioids relieve pain by changing the way your body feels pain and the way you feel about pain. They don't cure a health problem. But they do help you manage the pain. Your doctor will talk with you about how they fit into your overall treatment plan. Doctors follow a strict set of rules for giving opioids to their patients who have long-term (chronic) pain. This is because these are powerful medicines. They can cause problems if they are not used correctly. They can also be misused. Getting a prescription for an opioid may seem hard to do. You may feel like your doctor doesn't trust you. Your doctor understands this. But the rules are the same for everyone. They help make sure that the medicine will be used safely. What happens before you get a prescription? Your doctor may talk with you about your pain history. He or she may ask about your family history. You may have a physical exam. You may also see a pain specialist. This helps your doctor decide if an opioid is right for you. Your doctor will also review your history of opioid use. Most states have a program that tracks prescriptions. If your state does, your doctor will check it to see if you've had prescriptions for opioids before. You may have a urine test to check for opioids in your system. If you're a woman, you may have a pregnancy test. Opioids are not safe to take if you are pregnant. When your doctor prescribes the medicine, he or she will discuss your treatment plan with you. That includes the risks and benefits of opioid medicines.  Your doctor will tell you how much pain relief and improved ability to function you can expect. The goal is to take the lowest dose that improves your pain for the shortest amount of time. Your doctor will also talk with you about other things you can do to help relieve pain. Your doctor may suggest different medicines or other options, such as steroid injections, ice or heat, physical therapy, or ways to reduce stress. It's always a good idea to ask questions before you get a new prescription. This is especially true when you get a prescription for an opioid. Some questions to ask your doctor include: · Why do I need this medicine? Is it right for me? · How long should I take this medicine? · What can I do to help with side effects, like constipation? · How should I store this medicine? What should I do with leftover or unused opioid medicine? · Do I need a prescription for naloxone? You and your doctor will talk about your responsibilities. You may sign a written agreement. You will agree to take your medicine exactly as your doctor tells you to. Karime Michael also agree to be careful with it and not to share it with others. What happens after you start to use your medicine? Regular follow-up visits to your doctor will help you and your doctor make sure that the medicine is the right treatment for your pain. At every visit, your doctor will check these things: · Is your pain being controlled? · Are you better able to function and be active? · Are you having any side effects, like constipation, nausea, or being too sleepy? · Are the non-opioid pain control measures working? Are there other things you can try? · Are there any signs that you are misusing your medicine? While you are taking an opioid, you may have drug tests and prescription history checks from time to time. Follow-up care is a key part of your treatment and safety.  Be sure to make and go to all appointments, and call your doctor if you are having problems. It's also a good idea to know your test results and keep a list of the medicines you take. Where can you learn more? Go to http://evelio-rosalinda.info/. Enter D374 in the search box to learn more about \"Learning About Prescribed Opioid Medicine for Chronic Pain. \" Current as of: Katia 3, 2018 Content Version: 11.9 © 3569-0917 [employer]. Care instructions adapted under license by [employer] (which disclaims liability or warranty for this information). If you have questions about a medical condition or this instruction, always ask your healthcare professional. [place] [address] any warranty or liability for your use of this information.

## 2019-06-09 ENCOUNTER — TELEPHONE (OUTPATIENT)
Dept: PALLATIVE CARE | Age: 79
End: 2019-06-09

## 2019-06-09 NOTE — TELEPHONE ENCOUNTER
KAMERON on call note:    [facility]. He was called to the home of Mr. Cobos to evaluate for potential UTI in setting of increased urinary frequency and some increased confusion. UA is not consistent for infection therefore abx are not being prescribed. He will send for definitive culture. He shared daughter expressed some frustration at having to provide insurance information to Roberto Iglesias (she was not in the home as she lives 40 min away). Anticipate he will need a visit this week by [address] [place] [address] team to assess.

## 2019-06-28 ENCOUNTER — TELEPHONE (OUTPATIENT)
Dept: PALLATIVE CARE | Age: 79
End: 2019-06-28

## 2019-06-28 NOTE — TELEPHONE ENCOUNTER
Returned call to patient's daughter, Tarun Fleming and confirmed appointment for Tuesday, 7/2/19 at 9:30 am.  Advised that appointment time is typically not scheduled until a couple days prior to appointment, and cannot always guarantee 9:30 arrival.  Tarun Fleming verbalized understanding

## 2019-06-28 NOTE — TELEPHONE ENCOUNTER
Ms. Adriana Iqbal calling stating that message was left from nurse for a . Tom Grimes regarding appt for 12:30pm.  She would like a call from nurse to advise of time for Mr. Cobos's appt. Advised nurse would call her back.

## 2019-07-02 ENCOUNTER — HOME VISIT (OUTPATIENT)
Dept: FAMILY MEDICINE CLINIC | Age: 79
End: 2019-07-02

## 2019-07-02 VITALS
DIASTOLIC BLOOD PRESSURE: 70 MMHG | TEMPERATURE: 97.6 F | HEART RATE: 78 BPM | SYSTOLIC BLOOD PRESSURE: 128 MMHG | OXYGEN SATURATION: 97 % | RESPIRATION RATE: 18 BRPM

## 2019-07-02 DIAGNOSIS — E11.65 UNCONTROLLED TYPE 2 DIABETES MELLITUS WITH HYPERGLYCEMIA (HCC): ICD-10-CM

## 2019-07-02 DIAGNOSIS — C61 PROSTATE CANCER (HCC): ICD-10-CM

## 2019-07-02 DIAGNOSIS — I95.1 ORTHOSTATIC HYPOTENSION: ICD-10-CM

## 2019-07-02 DIAGNOSIS — J44.9 CHRONIC OBSTRUCTIVE PULMONARY DISEASE, UNSPECIFIED COPD TYPE (HCC): ICD-10-CM

## 2019-07-02 DIAGNOSIS — G89.29 OTHER CHRONIC PAIN: Primary | ICD-10-CM

## 2019-07-02 DIAGNOSIS — G30.1 LATE ONSET ALZHEIMER'S DISEASE WITH BEHAVIORAL DISTURBANCE (HCC): ICD-10-CM

## 2019-07-02 DIAGNOSIS — F02.818 LATE ONSET ALZHEIMER'S DISEASE WITH BEHAVIORAL DISTURBANCE (HCC): ICD-10-CM

## 2019-07-02 DIAGNOSIS — F33.1 MODERATE RECURRENT MAJOR DEPRESSION (HCC): ICD-10-CM

## 2019-07-02 DIAGNOSIS — N18.4 CKD (CHRONIC KIDNEY DISEASE) STAGE 4, GFR 15-29 ML/MIN (HCC): ICD-10-CM

## 2019-07-02 RX ORDER — TAMSULOSIN HYDROCHLORIDE 0.4 MG/1
0.4 CAPSULE ORAL DAILY
COMMUNITY
End: 2019-08-13 | Stop reason: SDUPTHER

## 2019-07-02 RX ORDER — HYDROCODONE BITARTRATE AND ACETAMINOPHEN 5; 325 MG/1; MG/1
1 TABLET ORAL
Qty: 60 TAB | Refills: 0
Start: 2019-07-02 | End: 2019-07-05

## 2019-07-02 NOTE — PATIENT INSTRUCTIONS
Prostate-Specific Antigen (PSA) Test: About This Test 
What is it? A prostate-specific antigen (PSA) test measures the amount of PSA in your blood. PSA is released by a man's prostate gland into his blood. A high PSA level may mean that you have an enlargement, infection, or cancer of the prostate. Why is this test done? You may have this test to: · Check for prostate cancer. · Watch prostate cancer and see if treatment is working. How can you prepare for the test? 
Do not ejaculate during the 2 days before your PSA blood test, either during sex or masturbation. What happens before the test? 
Tell your doctor if you have had a: 
· Test to look at your bladder (cystoscopy) in the past several weeks. · Prostate biopsy in the past several weeks. · Prostate infection or urinary tract infection that has not gone away. · Tube (catheter) inserted into your bladder to drain urine recently. What happens during the test? 
A health professional takes a sample of your blood. What happens after the test? 
You can go back to your usual activities right away. When should you call for help? Watch closely for changes in your health, and be sure to contact your doctor if you have any questions about this test. 
Follow-up care is a key part of your treatment and safety. Be sure to make and go to all appointments, and call your doctor if you are having problems. It's also a good idea to keep a list of the medicines you take. Ask your doctor when you can expect to have your test results. Where can you learn more? Go to http://evelio-rosalinda.info/. Enter L336 in the search box to learn more about \"Prostate-Specific Antigen (PSA) Test: About This Test.\" Current as of: March 27, 2018 Content Version: 11.9 © 1937-0781 [employer].  Care instructions adapted under license by [employer] (which disclaims liability or warranty for this information). If you have questions about a medical condition or this instruction, always ask your healthcare professional. [place] [address] any warranty or liability for your use of this information.

## 2019-07-02 NOTE — PROGRESS NOTES
Home Based Primary Care [facility]) & Supportive Services    [phone]      NOTE: Home Based Primary Care is a PROVIDER (MD/NP) based interdisciplinary practice (RN, LCSW, Pharmacy, Dietician) for patient's who cannot access (or have a taxing effort) primary / speciality medical care in an office setting. [facility] is NOT [employer] but works with [address] when there is a skilled need. Our MD/NPs are integral in Care Transitions; PLEASE CALL [phone] if this patient arrives in the Emergency Department or Hospital.          Date of Current Visit: 07/02/19    Patient/Family present for Current Visit: Patient, daughter and care aide    Goals of Care as stated by the patient/family is: to be as happy and comfortable as possible    Preferences for hospitalization if that were to be necessary:  [] Patient DOES NOT WANT hospitalization; focus all treatments at home  [x] Patient WOULD WANT hospitalization for potentially reversible causes  Patient prefers hospitalization at: [facility]      Is this encounter billed on time:No    Total time: 45 min  Counseling / coordination time: 10 minutes on PSA, dementia and behaviors  > 50% counseling / coordination?: No    ASSESSMENT & PLAN       Diagnoses and all orders for this visit:    1. Orthostatic hypotension   -well controlled on midodrine with no falls   -Continue midodrine  2. Prostate cancer ([facility] [address]  -     PSA, DIAGNOSTIC (PROSTATE SPECIFIC AG)   -Family requests a PSA   -Urologist is requesting patient come in for hormone injections as he didn't   Complete them   -Is PSA is not rising rapidly, family is not going to pursue injections because he becomes  so upset when he leaves home and has behaviors  3. Moderate recurrent major depression (HCC)   -Control is good per family   -Continue duloxetine  4.  Uncontrolled type 2 diabetes mellitus with hyperglycemia (HCC)  -     HEMOGLOBIN A1C WITH EAG   -Patients BG's are 150 range in am and 300's in pm   -Family does not want me to tighten control due to his alzheimer's and h/o   Severe hypogycemia with hospitalization  5. Late onset Alzheimer's disease with behavioral disturbance   -Seroquel is working well at bedtime   -Patient is difficult to work with but does like his new aide  6. Chronic obstructive pulmonary disease, unspecified COPD type ([facility] [address]    7. CKD (chronic kidney disease) stage 4, GFR 15-29 ml/min ([facility])  -     METABOLIC PANEL, COMPREHENSIVE  -     CBC WITH AUTOMATED DIFF   -avoid nephrotoxic drugs  8. Other chronic pain   -Refilled hydrocodone   -Pain is well controlled with hydrocodone and lidocaine patches       Follow-up and Dispositions    · Return in about 2 months (around 9/2/2019). I have left a SUMMARY / INSTRUCTIONS from today's visit with the patient/family in the home    HEALTH MAINTENANCE   There are no preventive care reminders to display for this patient. CC & SUBJECTIVE including ROS & FUNCTION     Chief Complaint   Patient presents with    Follow Up Chronic Condition     Dementia, prostate cancer,         Nikki Shock is a 69 y.o. with chronic medical conditions as listed in the patient's updated Problem List (see below)    Their Subjective Information provided as today's visit includes: Patient is difficult to direct anymore. He has been more even tempered with his new aide. They do not feel they can take him to urology for hormone injections secondary to behaviors. They would like a PSA test and if rising rapidly, they would make the effort to get him there. They had to pay him to let me draw blood today. His BG's are in the 150 range in the am's and 300's in the evening. The patient's family does not want tighter glucose control because of difficulty of working with him and his hospitalizations for hypoglycemia  He is eating well.   He has urinary frequency and urgency and has been incontinent more--family does not want to add a new medication because of concern about side effects. Positive ROS findings: Patient refused to answer questions    The following systems were [] reviewed / [x] unable to be reviewed  Systems: constitutional, ears/nose/mouth/throat, respiratory, gastrointestinal, genitourinary, musculoskeletal, integumentary, neurologic, psychiatric, endocrine. PPS:40  Karnofsky:   Timed Up and Go (TUG):   Fall Risk Assessment, last 12 mths 2/12/2019   Able to walk? Yes   Fall in past 12 months? Yes   Fall with injury? Yes   Number of falls in past 12 months 2   Fall Risk Score 3       Current Durable Medical Equipment in Home:  [] Hospital Bed  [] Bedside Commode  [] Wheelchair  [] Edward Becket  [] Han Older    Has a cane  [] Respiratory Support:    ADVANCE CARE PLANNING                                 The following information was updated I/ reviewed as accurate in Orders and the Advance Care Planning Navigator:  @[email or web address]@     Primary Decision Maker (Active): Virginia  Bakari - [phone]    Primary Decision Maker: Rosalio Robles - Son  Advance Care Planning 2/21/2019   Patient's Healthcare Decision Maker is: Legal Next of Kin   Confirm Advance Directive None   Patient Would Like to Complete Advance Directive Unable   Does the patient have other document types Do Not Resuscitate        VITAL SIGNS & PHYSICAL EXAM     Median Arm Circumference (inches):     Wt Readings from Last 3 Encounters:   03/18/19 134 lb (60.8 kg)   03/10/19 132 lb (59.9 kg)   02/12/19 141 lb (64 kg)     Temp Readings from Last 3 Encounters:   07/02/19 97.6 °F (36.4 °C) (Oral)   05/09/19 98 °F (36.7 °C)   03/25/19 98.9 °F (37.2 °C) (Temporal)     BP Readings from Last 3 Encounters:   07/02/19 128/70   05/09/19 138/74   03/25/19 148/68     Pulse Readings from Last 3 Encounters:   07/02/19 78   05/09/19 78   03/25/19 76       (Constitutional) Patient Physical Status: WDWN male in NAD sitting at table feeding himself breakfast    Pacemaker:  ICD:  Feeding Tube:  Urine Catheter:  Other:     Physical Exam Constitutional: He is well-developed, well-nourished, and in no distress. No distress. HENT:   Head: Normocephalic and atraumatic. Mouth/Throat: Oropharynx is clear and moist.   Eyes: Conjunctivae are normal. Right eye exhibits no discharge. Left eye exhibits no discharge. No scleral icterus. Neck: No JVD present. No tracheal deviation present. No thyromegaly present. Cardiovascular: Normal rate, regular rhythm, normal heart sounds and intact distal pulses. Exam reveals no gallop and no friction rub. No murmur heard. Pulmonary/Chest: Effort normal and breath sounds normal. No respiratory distress. He has no wheezes. He has no rales. He exhibits no tenderness. Abdominal: Soft. Bowel sounds are normal. He exhibits no distension and no mass. There is no tenderness. There is no rebound and no guarding. Musculoskeletal: He exhibits deformity. He exhibits no edema. Lymphadenopathy:     He has cervical adenopathy. Neurological: He is alert. Oriented X 1   Skin: Skin is warm and dry. No rash noted. He is not diaphoretic. No erythema. No pallor. Psychiatric:   Agitates easily, does not want to interact   Vitals reviewed.          PROBLEM LIST / PAST MEDICAL / SOCIAL HX     Patient Active Problem List   Diagnosis Code    Complicated grief A13.90, Z63.4    Non compliance w medication regimen Z91.14    Cognitive disorder F09    Moderate recurrent major depression ([facility] [address] F33.1    Uncontrolled diabetes mellitus with hyperglycemia ([facility]) E11.65    Syncope R55    Volume depletion E86.9    Hyponatremia E87.1    Urinary incontinence R32    CAD (coronary artery disease) I25.10    COPD (chronic obstructive pulmonary disease) ([facility]) J44.9    Orthostatic hypotension I95.1    CHF (congestive heart failure) ([facility]) I50.9    NSTEMI (non-ST elevated myocardial infarction) ([facility]) I21.4    Hypokalemia E87.6    Prostate cancer ([facility] [address] C61      Family History   Problem Relation Age of Onset    Diabetes Mother  Diabetes Brother      Social History     Socioeconomic History    Marital status:      Spouse name: Not on file    Number of children: Not on file    Years of education: Not on file    Highest education level: Not on file   Tobacco Use    Smoking status: Current Every Day Smoker    Smokeless tobacco: Never Used    Tobacco comment: 1-2 cigars daily   Substance and Sexual Activity    Alcohol use: No     Alcohol/week: 0.0 oz    Drug use: No        MEDICATIONS & PRESCRIPTIONS     Allergies   Allergen Reactions    Sulfa (Sulfonamide Antibiotics) Unknown (comments)      Current Outpatient Medications   Medication Sig    tamsulosin (FLOMAX) 0.4 mg capsule Take 0.4 mg by mouth daily.  HYDROcodone-acetaminophen (NORCO) 5-325 mg per tablet Take 1 Tab by mouth two (2) times daily as needed for Pain for up to 3 days. Max Daily Amount: 2 Tabs.  ONETOUCH ULTRA BLUE TEST STRIP strip USE TO CHECK BLOOD SUGAR 3 TIMES DAILY. DIAGNOSIS CODE: E11.9    insulin lispro (HUMALOG U-100 INSULIN) 100 unit/mL injection 3 units sq for bs greater than 200 ; 5  untis sq for bs greater than 250,  6units for blood sugar greater than 300 ; call me for bs greater than 400 (Patient taking differently: 3 units sq for bs greater than 200 ; 5  untis sq for bs greater than 250,  6units for blood sugar greater than 300 ; call me for bs greater than 400  Indications: 4 units with lunch in addition to sliding scale)    DULoxetine (CYMBALTA) 60 mg capsule TAKE ONE CAPSULE BY MOUTH EVERY DAY    SENNA PLUS 8.6-50 mg per tablet TAKE 2 TABS BY MOUTH TWO (2) TIMES A DAY    levothyroxine (SYNTHROID) 50 mcg tablet Take 1 Tab by mouth Daily (before breakfast).  Omeprazole delayed release (PRILOSEC D/R) 20 mg tablet Take 1 Tab by mouth daily.  lidocaine (LIDODERM) 5 % Apply patch to the affected area for 12 hours a day and remove for 12 hours a day.     insulin glargine (LANTUS,BASAGLAR) 100 unit/mL (3 mL) inpn Inject 8 units every morning. (Patient taking differently: 13 Units daily. Inject 8 units every morning. Indications: type 2 diabetes mellitus)    QUEtiapine (SEROQUEL) 50 mg tablet Take 50 mg by mouth nightly.  midodrine (PROAMITINE) 5 mg tablet Take 5 mg by mouth three (3) times daily (with meals).  memantine ER (NAMENDA XR) 28 mg capsule Take 1 Cap by mouth daily.  aspirin 81 mg chewable tablet Take 1 Tab by mouth daily.  acetaminophen (TYLENOL) 500 mg tablet Take 1,000 mg by mouth every six to eight (6-8) hours as needed for Pain.  ondansetron (ZOFRAN ODT) 4 mg disintegrating tablet Take 1 Tab by mouth every six (6) hours as needed for Nausea. No current facility-administered medications for this visit. Medications Ordered Today   Medications    HYDROcodone-acetaminophen (NORCO) 5-325 mg per tablet     Sig: Take 1 Tab by mouth two (2) times daily as needed for Pain for up to 3 days. Max Daily Amount: 2 Tabs.      Dispense:  60 Tab     Refill:  0         TEST DATA REVIEWED:     Lab Results   Component Value Date/Time    Hemoglobin A1c 7.0 (H) 02/05/2019 01:42 AM    Hemoglobin A1c, External 11.2 01/27/2016     Lab Results   Component Value Date/Time    Microalb/Creat ratio (ug/mg creat.) 63.6 (H) 02/12/2019 08:49 AM     Lab Results   Component Value Date/Time    TSH 2.89 12/26/2018 02:49 AM     No results found for: Thu Menendez [id number]      Lab Results   Component Value Date/Time    WBC 5.6 02/12/2019 08:49 AM    HGB 11.4 (L) 02/12/2019 08:49 AM    PLATELET 333 85/63/5634 08:49 AM     Lab Results   Component Value Date/Time    Sodium 146 (H) 02/12/2019 08:49 AM    Potassium 4.3 02/12/2019 08:49 AM    Chloride 106 02/12/2019 08:49 AM    CO2 20 02/12/2019 08:49 AM    BUN 12 02/12/2019 08:49 AM    Creatinine 1.16 02/12/2019 08:49 AM    Calcium 9.5 02/12/2019 08:49 AM    Magnesium 2.0 02/05/2019 01:42 AM    Phosphorus 3.8 02/05/2019 01:42 AM      Lab Results   Component Value Date/Time AST (SGOT) 32 02/05/2019 01:42 AM    Alk.  phosphatase 104 02/05/2019 01:42 AM    Protein, total 6.0 (L) 02/05/2019 01:42 AM    Albumin 2.4 (L) 02/05/2019 01:42 AM    Globulin 3.6 02/05/2019 01:42 AM     Lab Results   Component Value Date/Time    Iron 39 02/04/2019 07:35 PM    TIBC 196 (L) 02/04/2019 07:35 PM    Iron % saturation 20 02/04/2019 07:35 PM    Ferritin 75 02/04/2019 07:35 PM

## 2019-07-03 LAB
ALBUMIN SERPL-MCNC: 3.8 G/DL (ref 3.5–4.8)
ALBUMIN/GLOB SERPL: 1.4 {RATIO} (ref 1.2–2.2)
ALP SERPL-CCNC: 103 IU/L (ref 39–117)
ALT SERPL-CCNC: 16 IU/L (ref 0–44)
AST SERPL-CCNC: 27 IU/L (ref 0–40)
BASOPHILS # BLD AUTO: 0 X10E3/UL (ref 0–0.2)
BASOPHILS NFR BLD AUTO: 1 %
BILIRUB SERPL-MCNC: 0.3 MG/DL (ref 0–1.2)
BUN SERPL-MCNC: 11 MG/DL (ref 8–27)
BUN/CREAT SERPL: 8 (ref 10–24)
CALCIUM SERPL-MCNC: 8.9 MG/DL (ref 8.6–10.2)
CHLORIDE SERPL-SCNC: 103 MMOL/L (ref 96–106)
CO2 SERPL-SCNC: 21 MMOL/L (ref 20–29)
CREAT SERPL-MCNC: 1.41 MG/DL (ref 0.76–1.27)
EOSINOPHIL # BLD AUTO: 0.4 X10E3/UL (ref 0–0.4)
EOSINOPHIL NFR BLD AUTO: 6 %
ERYTHROCYTE [DISTWIDTH] IN BLOOD BY AUTOMATED COUNT: 15.2 % (ref 12.3–15.4)
EST. AVERAGE GLUCOSE BLD GHB EST-MCNC: 171 MG/DL
GLOBULIN SER CALC-MCNC: 2.7 G/DL (ref 1.5–4.5)
GLUCOSE SERPL-MCNC: 147 MG/DL (ref 65–99)
HBA1C MFR BLD: 7.6 % (ref 4.8–5.6)
HCT VFR BLD AUTO: 37.3 % (ref 37.5–51)
HGB BLD-MCNC: 11.5 G/DL (ref 13–17.7)
IMM GRANULOCYTES # BLD AUTO: 0 X10E3/UL (ref 0–0.1)
IMM GRANULOCYTES NFR BLD AUTO: 0 %
LYMPHOCYTES # BLD AUTO: 2 X10E3/UL (ref 0.7–3.1)
LYMPHOCYTES NFR BLD AUTO: 32 %
MCH RBC QN AUTO: 29.8 PG (ref 26.6–33)
MCHC RBC AUTO-ENTMCNC: 30.8 G/DL (ref 31.5–35.7)
MCV RBC AUTO: 97 FL (ref 79–97)
MONOCYTES # BLD AUTO: 0.6 X10E3/UL (ref 0.1–0.9)
MONOCYTES NFR BLD AUTO: 10 %
NEUTROPHILS # BLD AUTO: 3.3 X10E3/UL (ref 1.4–7)
NEUTROPHILS NFR BLD AUTO: 51 %
PLATELET # BLD AUTO: 296 X10E3/UL (ref 150–450)
POTASSIUM SERPL-SCNC: 4.6 MMOL/L (ref 3.5–5.2)
PROT SERPL-MCNC: 6.5 G/DL (ref 6–8.5)
PSA SERPL-MCNC: 1.7 NG/ML (ref 0–4)
RBC # BLD AUTO: 3.86 X10E6/UL (ref 4.14–5.8)
SODIUM SERPL-SCNC: 142 MMOL/L (ref 134–144)
WBC # BLD AUTO: 6.4 X10E3/UL (ref 3.4–10.8)

## 2019-07-03 NOTE — PROGRESS NOTES
Spoke with patients daughter and informed her of lab results per NP - daughter inquired on PSA range (0.0-04) and verbalized understanding

## 2019-07-03 NOTE — PROGRESS NOTES
Let daughter know that PSA has gone from 0.2 to 1.7 so without the hormone, it is climbing. As we discussed, they can chose not to treat his prostate cancer because of how difficult he is to work with and because of his dementia. His diabetes control is good overall by A1C. Everything else is unchanged.

## 2019-07-16 ENCOUNTER — PATIENT MESSAGE (OUTPATIENT)
Dept: FAMILY MEDICINE CLINIC | Age: 79
End: 2019-07-16

## 2019-07-17 RX ORDER — INSULIN GLARGINE 100 [IU]/ML
INJECTION, SOLUTION SUBCUTANEOUS
Qty: 3 PEN | Refills: 5 | Status: SHIPPED | OUTPATIENT
Start: 2019-07-17 | End: 2020-08-26 | Stop reason: SDUPTHER

## 2019-07-19 ENCOUNTER — DOCUMENTATION ONLY (OUTPATIENT)
Dept: FAMILY MEDICINE CLINIC | Age: 79
End: 2019-07-19

## 2019-07-19 NOTE — PROGRESS NOTES
Home Based Primary Care & Supportive Services   (previously: At Home)  [phone]    Interdisciplinary Note    [facility] Team Members: Dr. Michael Cruz, Magdalena Natarajan, NP; Kristi Bonilla NP; Maisha Patten, LONNIE; Shelby Rabago, RN,  SPENCER Sy    Diagnosis: Dementia with recent delirium, COPD,  chronic diastolic CHF, persistent afib, Type 2 diabetes with hypoglycemia, chronic anemia, hypothyroidism, depression    Current status summary:     Medication Management: N/A    Last admission/ED visit: 2/6/19    Last Home Based Primary Care visit: 3/22/19    Action Items:  1. Consult with Patric Kuo, about anticholinergic drug (Namenda, Midodrine and Seroquel) are either heavily impactful  2. Side effects of Lupron with burden/benefits of     Advance Care Planning:    Primary Decision Maker (Active): Mame Milady [facility] - [phone]    Primary Decision Maker: [place] Jack - Son  Advance Care Planning 2/21/2019   Patient's Healthcare Decision Maker is: Legal Next of Kin   Confirm Advance Directive None   Patient Would Like to Complete Advance Directive Unable   Does the patient have other document types Do Not Resuscitate       Health Maintenance: There are no preventive care reminders to display for this patient.     Follow up visit - 4/19/19

## 2019-07-19 NOTE — PROGRESS NOTES
[facility] [id number] Team Members: Dr. Gilda Michael, Justice Dan, MILTON; Jordi Huynh, MILTON; Jeronimo Lomax, RN; Gary Richards, RN, Obinna Hammond RN, SPENCER Hopper; Brigido Contreras PSR    Opal Fischeria  1940 / [id number]  male    Date of Initial Visit (Start of Care): 2/12/19  Date of last visit: 7/2/19    Diagnosis: Dementia with recent delirium, COPD,  chronic diastolic CHF, persistent afib, Type 2 diabetes with hypoglycemia, chronic anemia, hypothyroidism, depression    Patient status summary: Patient and POC discussed in IDT meeting for 90 day update. Homebound patient referred by Palliative Medicine Inpatient Team, Citlali Dominguez NP to our services due to taxing effort to seek primary care needs in the community. Advance Care Planning:   Primary Decision Maker ([address]): Jc Ramírez  Relationship to patient: Daughter  Phone [phone]  [] Named in a scanned document   [x] Legal Next of Kin  [] Guardian    ACP documents you current have include:  [] Advance Directive or Living Will  [x] Durable Do Not Resuscitate  [] Physician Orders for Scope of Treatment (POST)  [] Medical Power of   [] Other    DME/Supplies:  Bedside Commode       Allergies   Allergen Reactions    Sulfa (Sulfonamide Antibiotics) Unknown (comments)       Nutritional Requirements:   Oral with supported meal preparation    Functional/Activity Level:  Limited ambulation with support of wheelchair and Wheelchair with assistance for transfers    Code Status: DNR    Safety Measures:   Fall risk, Self-care deficity and Smoker    Current Outpatient Medications   Medication Sig    insulin glargine (LANTUS,BASAGLAR) 100 unit/mL (3 mL) inpn Inject 8 units every morning.  tamsulosin (FLOMAX) 0.4 mg capsule Take 0.4 mg by mouth daily.  ONETOUCH ULTRA BLUE TEST STRIP strip USE TO CHECK BLOOD SUGAR 3 TIMES DAILY.  DIAGNOSIS CODE: E11.9    insulin lispro (HUMALOG U-100 INSULIN) 100 unit/mL injection 3 units sq for bs greater than 200 ; 5  untis sq for bs greater than 250,  6units for blood sugar greater than 300 ; call me for bs greater than 400 (Patient taking differently: 3 units sq for bs greater than 200 ; 5  untis sq for bs greater than 250,  6units for blood sugar greater than 300 ; call me for bs greater than 400  Indications: 4 units with lunch in addition to sliding scale)    DULoxetine (CYMBALTA) 60 mg capsule TAKE ONE CAPSULE BY MOUTH EVERY DAY    SENNA PLUS 8.6-50 mg per tablet TAKE 2 TABS BY MOUTH TWO (2) TIMES A DAY    levothyroxine (SYNTHROID) 50 mcg tablet Take 1 Tab by mouth Daily (before breakfast).  Omeprazole delayed release (PRILOSEC D/R) 20 mg tablet Take 1 Tab by mouth daily.  lidocaine (LIDODERM) 5 % Apply patch to the affected area for 12 hours a day and remove for 12 hours a day.  QUEtiapine (SEROQUEL) 50 mg tablet Take 50 mg by mouth nightly.  midodrine (PROAMITINE) 5 mg tablet Take 5 mg by mouth three (3) times daily (with meals).  memantine ER (NAMENDA XR) 28 mg capsule Take 1 Cap by mouth daily.  aspirin 81 mg chewable tablet Take 1 Tab by mouth daily.  ondansetron (ZOFRAN ODT) 4 mg disintegrating tablet Take 1 Tab by mouth every six (6) hours as needed for Nausea.  acetaminophen (TYLENOL) 500 mg tablet Take 1,000 mg by mouth every six to eight (6-8) hours as needed for Pain. No current facility-administered medications for this visit. The Plan of Care has been reviewed and updated by the Interdisciplinary Group (IDG) as frequently as the patient condition warrants. Plan of Care by Discipline:    1.  Provider  Identify patient's health care goal(s), Reduction of polypharmacy within benefit/burden framework appropriate to age, function and disease state, Determine need for laboratory assessment based on patient disease status , Assess results of laboratory testing and adjust treatment plan as appropriate and Create and implement disease /symptom specific plan to manage high risk conditions / symptoms    2. Nursing  Review Health Maintenance with patient and provider; update as appropriate, Education for safety; falls and Skin assessment in patient's with limited mobility    3. Social Work  Review Emergency Preparedness Plan, Establish therapeutic relationship through in home visits and telephonic touch points and Assess for caregiver burden and intervene as psychosocially indicated    Plan of Care Orders / Action Items:    1. Avoid nephrotoxic drugs  2.   Monitor CHF to avoid rehospitlization      Estimated Visit Frequency: Monthly provider visits

## 2019-08-06 RX ORDER — LEVOTHYROXINE SODIUM 50 UG/1
TABLET ORAL
Qty: 90 TAB | Refills: 1 | Status: SHIPPED | OUTPATIENT
Start: 2019-08-06 | End: 2020-02-06

## 2019-08-13 ENCOUNTER — TELEPHONE (OUTPATIENT)
Dept: FAMILY MEDICINE CLINIC | Age: 79
End: 2019-08-13

## 2019-08-13 DIAGNOSIS — R35.0 BENIGN PROSTATIC HYPERPLASIA WITH URINARY FREQUENCY: Primary | ICD-10-CM

## 2019-08-13 DIAGNOSIS — N40.1 BENIGN PROSTATIC HYPERPLASIA WITH URINARY FREQUENCY: Primary | ICD-10-CM

## 2019-08-13 RX ORDER — TAMSULOSIN HYDROCHLORIDE 0.4 MG/1
0.4 CAPSULE ORAL DAILY
Qty: 90 CAP | Refills: 3 | Status: SHIPPED | OUTPATIENT
Start: 2019-08-13 | End: 2020-06-10 | Stop reason: SDUPTHER

## 2019-08-20 DIAGNOSIS — G30.1 LATE ONSET ALZHEIMER'S DISEASE WITH BEHAVIORAL DISTURBANCE (HCC): ICD-10-CM

## 2019-08-20 DIAGNOSIS — F02.818 LATE ONSET ALZHEIMER'S DISEASE WITH BEHAVIORAL DISTURBANCE (HCC): ICD-10-CM

## 2019-08-20 RX ORDER — QUETIAPINE FUMARATE 25 MG/1
25 TABLET, FILM COATED ORAL
Qty: 90 TAB | Refills: 1 | Status: SHIPPED | OUTPATIENT
Start: 2019-08-20 | End: 2020-04-05

## 2019-09-03 ENCOUNTER — HOME VISIT (OUTPATIENT)
Dept: FAMILY MEDICINE CLINIC | Age: 79
End: 2019-09-03

## 2019-09-03 VITALS
RESPIRATION RATE: 18 BRPM | OXYGEN SATURATION: 97 % | DIASTOLIC BLOOD PRESSURE: 60 MMHG | SYSTOLIC BLOOD PRESSURE: 130 MMHG | TEMPERATURE: 98 F | HEART RATE: 68 BPM

## 2019-09-03 DIAGNOSIS — G30.1 LATE ONSET ALZHEIMER'S DISEASE WITH BEHAVIORAL DISTURBANCE (HCC): ICD-10-CM

## 2019-09-03 DIAGNOSIS — E11.65 UNCONTROLLED TYPE 2 DIABETES MELLITUS WITH HYPERGLYCEMIA (HCC): ICD-10-CM

## 2019-09-03 DIAGNOSIS — N18.4 CKD (CHRONIC KIDNEY DISEASE) STAGE 4, GFR 15-29 ML/MIN (HCC): ICD-10-CM

## 2019-09-03 DIAGNOSIS — B35.3 TINEA PEDIS OF BOTH FEET: ICD-10-CM

## 2019-09-03 DIAGNOSIS — J44.9 CHRONIC OBSTRUCTIVE PULMONARY DISEASE, UNSPECIFIED COPD TYPE (HCC): ICD-10-CM

## 2019-09-03 DIAGNOSIS — F02.818 LATE ONSET ALZHEIMER'S DISEASE WITH BEHAVIORAL DISTURBANCE (HCC): ICD-10-CM

## 2019-09-03 DIAGNOSIS — I50.9 CONGESTIVE HEART FAILURE, UNSPECIFIED HF CHRONICITY, UNSPECIFIED HEART FAILURE TYPE (HCC): ICD-10-CM

## 2019-09-03 DIAGNOSIS — F11.90 OPIOID USE: Primary | ICD-10-CM

## 2019-09-03 RX ORDER — KETOCONAZOLE 20 MG/G
CREAM TOPICAL DAILY
Qty: 60 G | Refills: 2 | Status: SHIPPED | OUTPATIENT
Start: 2019-09-03 | End: 2020-01-17

## 2019-09-03 NOTE — PROGRESS NOTES
Home Based Primary Care [facility]) & Supportive Services    [phone]      NOTE: Home Based Primary Care is a PROVIDER (MD/NP) based interdisciplinary practice (RN, LCSW, Pharmacy, Dietician) for patient's who cannot access (or have a taxing effort) primary / speciality medical care in an office setting. [facility] is NOT [employer] but works with [address] when there is a skilled need. Our MD/NPs are integral in Care Transitions; PLEASE CALL [phone] if this patient arrives in the Emergency Department or Hospital.          Date of Current Visit: 09/03/19    Patient/Family present for Current Visit: Patient, daughter and care aide    Goals of Care as stated by the patient/family is: to be as happy and comfortable as possible    Preferences for hospitalization if that were to be necessary:  [] Patient DOES NOT WANT hospitalization; focus all treatments at home  [x] Patient WOULD WANT hospitalization for potentially reversible causes  Patient prefers hospitalization at: [facility]      Is this encounter billed on time:No    Total time: 45 min  Counseling / coordination time: 20 minutes on tinea pedis, opioids, behaviors, diabetes  > 50% counseling / coordination?: No    ASSESSMENT & PLAN       Diagnoses and all orders for this visit:       Encounter Diagnoses     ICD-10-CM ICD-9-CM   1. Opioid use F11.90 305.50   2. Tinea pedis of both feet B35.3 110.4   3. Late onset Alzheimer's disease with behavioral disturbance G30.1 331.0    F02.81 294.11   4. Uncontrolled type 2 diabetes mellitus with hyperglycemia ([facility]) E11.65 250.02   5. CKD (chronic kidney disease) stage 4, GFR 15-29 ml/min ([facility]) N18.4 585.4   6. Congestive heart failure, unspecified HF chronicity, unspecified heart failure type ([facility]) I50.9 428.0   7. Chronic obstructive pulmonary disease, unspecified COPD type ([facility] J44.9 496   1. Pain agreement signed and uds collected. Refilled hydrocodone for two months.   Patient's neuropathic pain in his feet is increasing. Gabapentin did not work in the past.  Continue hydrocodone. It does not always keep pain in the moderate to low range. 2.  Ketoconazole cream.  Education. 3.  Continue seroquel and namenda. Disease progressing as expected. 4.  Patient eats erratically and has had lows causing hospitalization in the past.  Prefer to err on th high side. Continue to follow. Does not always get SLS with meals  5. Continue to avoid nephrotoxic drugs. 6.  CHF--well compensated. Not on meds. 7.  Copd--well compensated. Only uses prn meds with flares/illness. Not smoking. Follow-up and Dispositions    · Return in about 2 months (around 11/3/2019). I have left a SUMMARY / Leamon Edu from today's visit with the patient/family in the home    ManuelDoug Marcie 97 Maintenance Due   Topic Date Due    Influenza Age 5 to Adult  08/01/2019        CC & SUBJECTIVE including ROS & FUNCTION     Chief Complaint   Patient presents with    Follow Up Chronic Condition     dementia, peripheral neuropathy, OH, DM        Joanne Castillo is a 69 y.o. with chronic medical conditions as listed in the patient's updated Problem List (see below)    Their Subjective Information provided as today's visit includes:  Patient is not trying to leave the home any longer. He is more confused. He is eating well. No constipation. Sleeps much of the day. More alert in the evenings. He does have combative behaviors at night. BG's are in the 100-150 in the am and anywhere from 150-400 in the pm's. He has not had any falls and BP's have been stable without using midodrine. Positive ROS findings: Patient refused to answer questions    The following systems were [] reviewed / [x] unable to be reviewed  Systems: constitutional, ears/nose/mouth/throat, respiratory, gastrointestinal, genitourinary, musculoskeletal, integumentary, neurologic, psychiatric, endocrine.      PPS:40  Karnofsky:   Timed Up and Go (TUG):   Fall Risk Assessment, last 12 mths 2/12/2019   Able to walk? Yes   Fall in past 12 months? Yes   Fall with injury? Yes   Number of falls in past 12 months 2   Fall Risk Score 3       Current Durable Medical Equipment in Home:  [] Hospital Bed  [] Bedside Commode  [] Wheelchair  [] Dayami Heller  [] Julissa Méndez    Has a cane  [] Respiratory Support:    ADVANCE CARE PLANNING                                 The following information was updated I/ reviewed as accurate in Orders and the Advance Care Planning Navigator:  @GCS@     Primary Decision Maker (Active): Linn Villegas  [phone]    Primary Decision Maker: Seven Padgett  Advance Care Planning 2/21/2019   Patient's Healthcare Decision Maker is: Legal Next of Kin   Confirm Advance Directive None   Patient Would Like to Complete Advance Directive Unable   Does the patient have other document types Do Not Resuscitate        VITAL SIGNS & PHYSICAL EXAM     Median Arm Circumference (inches): Wt Readings from Last 3 Encounters:   03/18/19 134 lb (60.8 kg)   03/10/19 132 lb (59.9 kg)   02/12/19 141 lb (64 kg)     Temp Readings from Last 3 Encounters:   09/03/19 98 °F (36.7 °C)   07/02/19 97.6 °F (36.4 °C) (Oral)   05/09/19 98 °F (36.7 °C)     BP Readings from Last 3 Encounters:   09/03/19 130/60   07/02/19 128/70   05/09/19 138/74     Pulse Readings from Last 3 Encounters:   09/03/19 68   07/02/19 78   05/09/19 78            Pacemaker:  ICD:  Feeding Tube:  Urine Catheter:  Other:     Physical Exam   Constitutional: No distress. Frail, thiin   HENT:   Head: Normocephalic and atraumatic. Right Ear: External ear normal.   Left Ear: External ear normal.   Mouth/Throat: Oropharynx is clear and moist.   Eyes: Conjunctivae are normal. Right eye exhibits no discharge. Left eye exhibits no discharge. No scleral icterus. Neck: No JVD present. No tracheal deviation present. No thyromegaly present.    Cardiovascular: Normal rate, regular rhythm, normal heart sounds and intact distal pulses. Exam reveals no gallop and no friction rub. No murmur heard. Pulmonary/Chest: Effort normal and breath sounds normal. No respiratory distress. He has no wheezes. He has no rales. He exhibits no tenderness. Abdominal: Soft. Bowel sounds are normal. He exhibits no distension and no mass. There is no tenderness. There is no rebound and no guarding. Musculoskeletal: He exhibits no edema or deformity. Lymphadenopathy:     He has no cervical adenopathy. Neurological: He is alert. Oriented X 1   Skin: Skin is warm and dry. Rash noted. He is not diaphoretic. No erythema. No pallor. Peeling skin on feet, between toes   Psychiatric:   Agitates easily, does not want to interact   Vitals reviewed.          PROBLEM LIST / PAST MEDICAL / SOCIAL HX     Patient Active Problem List   Diagnosis Code    Complicated grief D97.13, Z63.4    Non compliance w medication regimen Z91.14    Cognitive disorder F09    Moderate recurrent major depression ([facility] F33.1    Uncontrolled diabetes mellitus with hyperglycemia ([facility]) E11.65    Syncope R55    Volume depletion E86.9    Hyponatremia E87.1    Urinary incontinence R32    CAD (coronary artery disease) I25.10    COPD (chronic obstructive pulmonary disease) ([facility]) J44.9    Orthostatic hypotension I95.1    CHF (congestive heart failure) ([facility]) I50.9    NSTEMI (non-ST elevated myocardial infarction) ([facility]) I21.4    Hypokalemia E87.6    Prostate cancer ([facility] C61      Family History   Problem Relation Age of Onset    Diabetes Mother     Diabetes Brother      Social History     Socioeconomic History    Marital status:      Spouse name: Not on file    Number of children: Not on file    Years of education: Not on file    Highest education level: Not on file   Tobacco Use    Smoking status: Current Every Day Smoker    Smokeless tobacco: Never Used    Tobacco comment: 1-2 cigars daily   Substance and Sexual Activity    Alcohol use: No     Alcohol/week: 0.0 standard drinks    Drug use: No        MEDICATIONS & PRESCRIPTIONS     Allergies   Allergen Reactions    Sulfa (Sulfonamide Antibiotics) Unknown (comments)      Current Outpatient Medications   Medication Sig    ketoconazole (NIZORAL) 2 % topical cream Apply  to affected area daily. Rub into feet, between toes and nail beds    QUEtiapine (SEROQUEL) 25 mg tablet TAKE 1 TAB BY MOUTH NIGHTLY. TAKE 1 TAB AT BEDTIME ALONG WITH 50 MG DOSE.  tamsulosin (FLOMAX) 0.4 mg capsule Take 1 Cap by mouth daily.  levothyroxine (SYNTHROID) 50 mcg tablet TAKE 1 TAB BY MOUTH DAILY (BEFORE BREAKFAST)    insulin glargine (LANTUS,BASAGLAR) 100 unit/mL (3 mL) inpn Inject 8 units every morning.  ONETOUCH ULTRA BLUE TEST STRIP strip USE TO CHECK BLOOD SUGAR 3 TIMES DAILY. DIAGNOSIS CODE: E11.9    insulin lispro (HUMALOG U-100 INSULIN) 100 unit/mL injection 3 units sq for bs greater than 200 ; 5  untis sq for bs greater than 250,  6units for blood sugar greater than 300 ; call me for bs greater than 400 (Patient taking differently: 3 units sq for bs greater than 200 ; 5  untis sq for bs greater than 250,  6units for blood sugar greater than 300 ; call me for bs greater than 400  Indications: 4 units with lunch in addition to sliding scale)    DULoxetine (CYMBALTA) 60 mg capsule TAKE ONE CAPSULE BY MOUTH EVERY DAY    SENNA PLUS 8.6-50 mg per tablet TAKE 2 TABS BY MOUTH TWO (2) TIMES A DAY    Omeprazole delayed release (PRILOSEC D/R) 20 mg tablet Take 1 Tab by mouth daily.  QUEtiapine (SEROQUEL) 50 mg tablet Take 50 mg by mouth nightly.  memantine ER (NAMENDA XR) 28 mg capsule Take 1 Cap by mouth daily.  aspirin 81 mg chewable tablet Take 1 Tab by mouth daily.  lidocaine (LIDODERM) 5 % Apply patch to the affected area for 12 hours a day and remove for 12 hours a day.  midodrine (PROAMITINE) 5 mg tablet Take 5 mg by mouth three (3) times daily (with meals).     ondansetron (ZOFRAN ODT) 4 mg disintegrating tablet Take 1 Tab by mouth every six (6) hours as needed for Nausea.  acetaminophen (TYLENOL) 500 mg tablet Take 1,000 mg by mouth every six to eight (6-8) hours as needed for Pain. No current facility-administered medications for this visit. Medications Ordered Today   Medications    ketoconazole (NIZORAL) 2 % topical cream     Sig: Apply  to affected area daily. Rub into feet, between toes and nail beds     Dispense:  60 g     Refill:  2         TEST DATA REVIEWED:     Lab Results   Component Value Date/Time    Hemoglobin A1c 7.6 (H) 07/02/2019 10:36 AM    Hemoglobin A1c, External 11.2 01/27/2016     Lab Results   Component Value Date/Time    Microalb/Creat ratio (ug/mg creat.) 63.6 (H) 02/12/2019 08:49 AM     Lab Results   Component Value Date/Time    TSH 2.89 12/26/2018 02:49 AM     No results found for: Joe Rangel [id number]      Lab Results   Component Value Date/Time    WBC 6.4 07/02/2019 10:36 AM    HGB 11.5 (L) 07/02/2019 10:36 AM    PLATELET 642 50/77/5286 10:36 AM     Lab Results   Component Value Date/Time    Sodium 142 07/02/2019 10:36 AM    Potassium 4.6 07/02/2019 10:36 AM    Chloride 103 07/02/2019 10:36 AM    CO2 21 07/02/2019 10:36 AM    BUN 11 07/02/2019 10:36 AM    Creatinine 1.41 (H) 07/02/2019 10:36 AM    Calcium 8.9 07/02/2019 10:36 AM    Magnesium 2.0 02/05/2019 01:42 AM    Phosphorus 3.8 02/05/2019 01:42 AM      Lab Results   Component Value Date/Time    AST (SGOT) 27 07/02/2019 10:36 AM    Alk.  phosphatase 103 07/02/2019 10:36 AM    Protein, total 6.5 07/02/2019 10:36 AM    Albumin 3.8 07/02/2019 10:36 AM    Globulin 3.6 02/05/2019 01:42 AM     Lab Results   Component Value Date/Time    Iron 39 02/04/2019 07:35 PM    TIBC 196 (L) 02/04/2019 07:35 PM    Iron % saturation 20 02/04/2019 07:35 PM    Ferritin 75 02/04/2019 07:35 PM

## 2019-09-03 NOTE — PATIENT INSTRUCTIONS
Athlete's Foot: Care Instructions  Your Care Instructions    Athlete's foot is an itchy rash on the foot caused by an infection with a fungus. You can get it by going barefoot in wet public areas, such as swimming pools or locker rooms. Many times there is no clear reason why you get athlete's foot. You can easily treat athlete's foot by putting medicine on your feet for 1 to 6 weeks. In some cases, a doctor may prescribe pills to kill the fungus. Follow-up care is a key part of your treatment and safety. Be sure to make and go to all appointments, and call your doctor if you are having problems. It's also a good idea to know your test results and keep a list of the medicines you take. How can you care for yourself at home? · Your doctor may suggest an over-the counter lotion or spray or may prescribe a medicine. Take your medicines exactly as prescribed. Call your doctor if you think you are having a problem with your medicine. · Keep your feet clean and dry. · When you get dressed, put your socks on before your underwear. This can prevent the fungus from spreading from your feet to your groin. To prevent athlete's foot  · Wear flip-flops or other shower sandals in public locker rooms and showers and by the pool. · Dry between your toes after swimming or bathing. · Wear leather shoes or sandals, which let air get to your feet. · Change your socks as needed so your feet stay as dry as possible. · Use antifungal powder on your feet. When should you call for help? Watch closely for changes in your health, and be sure to contact your doctor if:    · You do not get better as expected. Where can you learn more? Go to http://evelio-rosalinda.info/. Enter M498 in the search box to learn more about \"Athlete's Foot: Care Instructions. \"  Current as of: April 1, 2019  Content Version: 12.1  © 0198-1033 [employer].  Care instructions adapted under license by [employer] (which disclaims liability or warranty for this information). If you have questions about a medical condition or this instruction, always ask your healthcare professional. [address] any warranty or liability for your use of this information.

## 2019-09-07 ENCOUNTER — APPOINTMENT (OUTPATIENT)
Dept: GENERAL RADIOLOGY | Age: 79
End: 2019-09-07
Attending: EMERGENCY MEDICINE
Payer: MEDICARE

## 2019-09-07 ENCOUNTER — HOSPITAL ENCOUNTER (EMERGENCY)
Age: 79
Discharge: HOME | End: 2019-09-07
Attending: EMERGENCY MEDICINE
Payer: MEDICARE

## 2019-09-07 ENCOUNTER — APPOINTMENT (OUTPATIENT)
Dept: CT IMAGING | Age: 79
End: 2019-09-07
Attending: EMERGENCY MEDICINE
Payer: MEDICARE

## 2019-09-07 VITALS
OXYGEN SATURATION: 99 % | BODY MASS INDEX: 18.76 KG/M2 | WEIGHT: 134 LBS | SYSTOLIC BLOOD PRESSURE: 95 MMHG | HEART RATE: 83 BPM | DIASTOLIC BLOOD PRESSURE: 47 MMHG | TEMPERATURE: 97.6 F | HEIGHT: 71 IN | RESPIRATION RATE: 19 BRPM

## 2019-09-07 DIAGNOSIS — T14.8XXA MULTIPLE SKIN TEARS: ICD-10-CM

## 2019-09-07 DIAGNOSIS — S01.81XA FACIAL LACERATION, INITIAL ENCOUNTER: ICD-10-CM
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
## 2019-09-07 DIAGNOSIS — R42 DIZZY SPELLS: ICD-10-CM

## 2019-09-07 LAB
ALBUMIN SERPL-MCNC: 3.2 G/DL (ref 3.5–5)
ALBUMIN/GLOB SERPL: 0.9 {RATIO} (ref 1.1–2.2)
ALP SERPL-CCNC: 102 U/L (ref 45–117)
ALT SERPL-CCNC: 19 U/L (ref 12–78)
ANION GAP SERPL CALC-SCNC: 8 MMOL/L (ref 5–15)
AST SERPL-CCNC: 17 U/L (ref 15–37)
ATRIAL RATE: 83 BPM
BASOPHILS # BLD: 0.1 K/UL (ref 0–0.1)
BASOPHILS NFR BLD: 1 % (ref 0–1)
BILIRUB SERPL-MCNC: 0.3 MG/DL (ref 0.2–1)
BUN SERPL-MCNC: 15 MG/DL (ref 6–20)
BUN/CREAT SERPL: 8 (ref 12–20)
CALCIUM SERPL-MCNC: 8.8 MG/DL (ref 8.5–10.1)
CALCULATED R AXIS, ECG10: 49 DEGREES
CALCULATED T AXIS, ECG11: 88 DEGREES
CHLORIDE SERPL-SCNC: 107 MMOL/L (ref 97–108)
CO2 SERPL-SCNC: 27 MMOL/L (ref 21–32)
COMMENT, HOLDF: NORMAL
CREAT SERPL-MCNC: 1.78 MG/DL (ref 0.7–1.3)
DIAGNOSIS, 93000: NORMAL
DIFFERENTIAL METHOD BLD: ABNORMAL
DRUGS UR: NORMAL
EOSINOPHIL # BLD: 0.4 K/UL (ref 0–0.4)
EOSINOPHIL NFR BLD: 5 % (ref 0–7)
ERYTHROCYTE [DISTWIDTH] IN BLOOD BY AUTOMATED COUNT: 14.9 % (ref 11.5–14.5)
GLOBULIN SER CALC-MCNC: 3.4 G/DL (ref 2–4)
GLUCOSE BLD STRIP.AUTO-MCNC: 129 MG/DL (ref 65–100)
GLUCOSE SERPL-MCNC: 70 MG/DL (ref 65–100)
HCT VFR BLD AUTO: 36.5 % (ref 36.6–50.3)
HGB BLD-MCNC: 11.4 G/DL (ref 12.1–17)
IMM GRANULOCYTES # BLD AUTO: 0 K/UL (ref 0–0.04)
IMM GRANULOCYTES NFR BLD AUTO: 1 % (ref 0–0.5)
LYMPHOCYTES # BLD: 2.4 K/UL (ref 0.8–3.5)
LYMPHOCYTES NFR BLD: 30 % (ref 12–49)
MCH RBC QN AUTO: 30.1 PG (ref 26–34)
MCHC RBC AUTO-ENTMCNC: 31.2 G/DL (ref 30–36.5)
MCV RBC AUTO: 96.3 FL (ref 80–99)
MONOCYTES # BLD: 1 K/UL (ref 0–1)
MONOCYTES NFR BLD: 12 % (ref 5–13)
NEUTS SEG # BLD: 4.3 K/UL (ref 1.8–8)
NEUTS SEG NFR BLD: 51 % (ref 32–75)
NRBC # BLD: 0 K/UL (ref 0–0.01)
NRBC BLD-RTO: 0 PER 100 WBC
P-R INTERVAL, ECG05: 152 MS
PLATELET # BLD AUTO: 266 K/UL (ref 150–400)
PMV BLD AUTO: 12.2 FL (ref 8.9–12.9)
POTASSIUM SERPL-SCNC: 4 MMOL/L (ref 3.5–5.1)
PROT SERPL-MCNC: 6.6 G/DL (ref 6.4–8.2)
Q-T INTERVAL, ECG07: 384 MS
QRS DURATION, ECG06: 80 MS
QTC CALCULATION (BEZET), ECG08: 451 MS
RBC # BLD AUTO: 3.79 M/UL (ref 4.1–5.7)
SAMPLES BEING HELD,HOLD: NORMAL
SERVICE CMNT-IMP: ABNORMAL
SODIUM SERPL-SCNC: 142 MMOL/L (ref 136–145)
TROPONIN I SERPL-MCNC: <0.05 NG/ML
VENTRICULAR RATE, ECG03: 83 BPM
WBC # BLD AUTO: 8.2 K/UL (ref 4.1–11.1)

## 2019-09-07 PROCEDURE — 73030 X-RAY EXAM OF SHOULDER: CPT

## 2019-09-07 PROCEDURE — 74011250637 HC RX REV CODE- 250/637: Performed by: EMERGENCY MEDICINE

## 2019-09-07 PROCEDURE — 80053 COMPREHEN METABOLIC PANEL: CPT

## 2019-09-07 PROCEDURE — 90715 TDAP VACCINE 7 YRS/> IM: CPT | Performed by: EMERGENCY MEDICINE

## 2019-09-07 PROCEDURE — 77030002986 HC SUT PROL J&J -A

## 2019-09-07 PROCEDURE — 74011250636 HC RX REV CODE- 250/636: Performed by: EMERGENCY MEDICINE

## 2019-09-07 PROCEDURE — 75810000293 HC SIMP/SUPERF WND  RPR

## 2019-09-07 PROCEDURE — 74011000250 HC RX REV CODE- 250: Performed by: EMERGENCY MEDICINE

## 2019-09-07 PROCEDURE — 82962 GLUCOSE BLOOD TEST: CPT

## 2019-09-07 PROCEDURE — 36415 COLL VENOUS BLD VENIPUNCTURE: CPT

## 2019-09-07 PROCEDURE — 77030002916 HC SUT ETHLN J&J -A

## 2019-09-07 PROCEDURE — 84484 ASSAY OF TROPONIN QUANT: CPT

## 2019-09-07 PROCEDURE — 72125 CT NECK SPINE W/O DYE: CPT

## 2019-09-07 PROCEDURE — 71046 X-RAY EXAM CHEST 2 VIEWS: CPT

## 2019-09-07 PROCEDURE — 70450 CT HEAD/BRAIN W/O DYE: CPT

## 2019-09-07 PROCEDURE — 85025 COMPLETE CBC W/AUTO DIFF WBC: CPT

## 2019-09-07 PROCEDURE — 90471 IMMUNIZATION ADMIN: CPT

## 2019-09-07 PROCEDURE — 99285 EMERGENCY DEPT VISIT HI MDM: CPT

## 2019-09-07 PROCEDURE — 93005 ELECTROCARDIOGRAM TRACING: CPT

## 2019-09-07 RX ORDER — MIDODRINE HYDROCHLORIDE 5 MG/1
5 TABLET ORAL ONCE
Status: COMPLETED | OUTPATIENT
Start: 2019-09-07 | End: 2019-09-07

## 2019-09-07 RX ORDER — ACETAMINOPHEN 325 MG/1
650 TABLET ORAL
Status: COMPLETED | OUTPATIENT
Start: 2019-09-07 | End: 2019-09-07

## 2019-09-07 RX ORDER — LIDOCAINE HYDROCHLORIDE AND EPINEPHRINE 10; 10 MG/ML; UG/ML
10 INJECTION, SOLUTION INFILTRATION; PERINEURAL
Status: COMPLETED | OUTPATIENT
Start: 2019-09-07 | End: 2019-09-07

## 2019-09-07 RX ADMIN — TETANUS TOXOID, REDUCED DIPHTHERIA TOXOID AND ACELLULAR PERTUSSIS VACCINE, ADSORBED 0.5 ML: 5; 2.5; 8; 8; 2.5 SUSPENSION INTRAMUSCULAR at 09:58

## 2019-09-07 RX ADMIN — LIDOCAINE HYDROCHLORIDE,EPINEPHRINE BITARTRATE 100 MG: 10; .01 INJECTION, SOLUTION INFILTRATION; PERINEURAL at 09:59

## 2019-09-07 RX ADMIN — ACETAMINOPHEN 650 MG: 325 TABLET, FILM COATED ORAL at 11:08

## 2019-09-07 RX ADMIN — MIDODRINE HYDROCHLORIDE 5 MG: 5 TABLET ORAL at 11:19

## 2019-09-07 RX ADMIN — POLYMYXIN B SULFATE, BACITRACIN ZINC, NEOMYCIN SULFATE: 5000; 3.5; 4 OINTMENT TOPICAL at 11:08

## 2019-09-07 NOTE — ED TRIAGE NOTES
Patient arrives via EMS from home with c/o dizziness that caused him to fall. +laceration above left eyebrow and left shoulder pain. Patient is confused and a&ox1 at baseline. EMS BS 88. +skin tears to upper and lower extremities.

## 2019-09-07 NOTE — ED PROVIDER NOTES
69 y.o. male with past medical history significant for DM, CAD,COPD, MI, A Fib, Heart failure s/p open heart surgery who presents via EMS to the ED for evaluation after fall. Per EMS, patient was at home today when he began to feel dizzy and fell. He struck his head En route, EMS noted that patient had A fib and a heart rate of 88 bpm. EMS also notes that patient takes a baby ASA every morning, and is confused at baseline. Patient states that he does not know when he fell, and is unsure if he has had any similar previous episodes. He also is unsure of when he last had his tetanus shot. Per family, patient woke up this morning, feeling fine, took his insulin and ate half of his breakfast. Afterwards, family states that patient became confused and fell, no LOC. Family reports that patient has had previous episodes of falling when his sugar becomes too low. Currently, patient reports pain in left shoulder that is exacerbated with movement and palpation. He also notes that he has back pain at baseline, unchanged today. Patient denies chest pain, shortness of breath, and abdominal pain. Full history, physical exam, and ROS unable to be obtained due to:  confusion. Social Hx: yes Tobacco use (1-2 cigar daily), no EtOH use, no Illicit Drug use  PCP: Anna Quiroz NP    Note written by Lazara Aviles, as dictated by Jose Luis Cobb DO 8:46 AM      The history is provided by the patient, the EMS personnel and a relative. History limited by: patient is confused.         Past Medical History:   Diagnosis Date    Atrial fibrillation ([address]     CAD (coronary artery disease)     COPD (chronic obstructive pulmonary disease) (HCC)     Diabetes ([address]     1970a    Heart failure (HCC)     Ill-defined condition     SOB    MI (myocardial infarction) ([address] 01/2019    Pneumonia     Urinary incontinence        Past Surgical History:   Procedure Laterality Date    CARDIAC SURG PROCEDURE UNLIST  2000    open heart    HX HEENT  1975    nasal surgery    HX MOHS PROCEDURES  2013    left         Family History:   Problem Relation Age of Onset    Diabetes Mother     Diabetes Brother        Social History     Socioeconomic History    Marital status:      Spouse name: Not on file    Number of children: Not on file    Years of education: Not on file    Highest education level: Not on file   Occupational History    Not on file   Social Needs    Financial resource strain: Not on file    Food insecurity:     Worry: Not on file     Inability: Not on file    Transportation needs:     Medical: Not on file     Non-medical: Not on file   Tobacco Use    Smoking status: Current Every Day Smoker    Smokeless tobacco: Never Used    Tobacco comment: 1-2 cigars daily   Substance and Sexual Activity    Alcohol use: No     Alcohol/week: 0.0 standard drinks    Drug use: No    Sexual activity: Not on file   Lifestyle    Physical activity:     Days per week: Not on file     Minutes per session: Not on file    Stress: Not on file   Relationships    Social connections:     Talks on phone: Not on file     Gets together: Not on file     Attends Mosque service: Not on file     Active member of club or organization: Not on file     Attends meetings of clubs or organizations: Not on file     Relationship status: Not on file    Intimate partner violence:     Fear of current or ex partner: Not on file     Emotionally abused: Not on file     Physically abused: Not on file     Forced sexual activity: Not on file   Other Topics Concern    Not on file   Social History Narrative    Not on file         ALLERGIES: Sulfa (sulfonamide antibiotics)    Review of Systems   Unable to perform ROS: Other (confusion)   Family later notes that there have been no acute complaints or concerns.      Vitals:    09/07/19 0842   BP: 124/53   Pulse: 82   Resp: 22   Temp: 97.6 °F (36.4 °C)   SpO2: 100%   Weight: 60.8 kg (134 lb)   Height: 5' 11\" (1.803 m)            Physical Exam   Constitutional: He appears well-developed and well-nourished. No distress. Mildly confused   HENT:   Head: Normocephalic. Mouth/Throat: Oropharynx is clear and moist.   Eyes: Pupils are equal, round, and reactive to light. Conjunctivae and EOM are normal.   Neck: Neck supple. No JVD present. No spinous process tenderness and no muscular tenderness present. No tracheal deviation present. Cardiovascular: Normal rate and regular rhythm. Exam reveals no friction rub. No murmur heard. Pulmonary/Chest: Effort normal and breath sounds normal. No respiratory distress. Abdominal: Soft. He exhibits no distension. There is no tenderness. Musculoskeletal: He exhibits no edema, tenderness or deformity. Cervical back: Normal.        Thoracic back: Normal.        Lumbar back: Normal.   Good strength   Neurological: He is alert. Alert to person and place only   Skin: Skin is warm and dry. Capillary refill takes less than 2 seconds. No rash noted. stellate laceration above the left eyebrow about 3 cm, skin tear to left hand and left elbow, abrasion to left knee   Psychiatric: His behavior is normal.     Note written by Lazara Ramirez, as dictated by Alice Burns DO 8:46 AM    [facility]  ED Course as of Sep 07 1335   Sat Sep 07, 2019   1114 Patient became hypotensive when we tried to ambulate him. Pressure normalized when sitting on toilet. Patient is due for his medication, so that has been ordered. Will recheck ambulation status once provided. [GG]      ED Course User Index  [GG] Alice Burns DO   Mr Severino Keith comes in as above. Labs and imaging are as above. Here, the patient is described as at his base line according to family. They note that events like this have happened before when his sugar gets low. Immediately after the patient fell they gave him some juice and crackers and noted that he began to become more awake.  Here, the patient is described as back to his baseline. He has no acute complaints. We did attempt to walk the patient but he became orthostatic once he tried to get up. He was provided his home dose of medications and rested for a while. On multiple re evaluations, the patient was feeling better. He was able to get up and ambulate throughout the ER. He was described as back to his baseline by family. The patients family notes that similar events have happened in the past when his blood sugar drops. After they provided him with food/drink he seemed to get better and remained that way throughtout his ER course. Suture removal return information provided. At d/c the patient was stable and agreeable to all return and follow up instructions. Wound Repair  Date/Time: 9/7/2019 10:30 AM  Performed by: attendingPreparation: skin prepped with Shur-Clens  Pre-procedure re-eval: Immediately prior to the procedure, the patient was reevaluated and found suitable for the planned procedure and any planned medications. Location details: scalp  Wound length:2.6 - 7.5 cm    Anesthesia:  Local Anesthetic: lidocaine 1% with epinephrine  Anesthetic total: 5 mL  Foreign bodies: no foreign bodies  Irrigation solution: saline  Irrigation method: syringe  Debridement: none  Skin closure: 5-0 nylon  Number of sutures: 9  Technique: simple and interrupted  Approximation: close  Dressing: antibiotic ointment  Patient tolerance: Patient tolerated the procedure well with no immediate complications  My total time at bedside, performing this procedure was 16-30 minutes. .ED EKG interpretation:  Rhythm: normal sinus rhythm; and regular . Rate (approx.): 83 bpm; Axis: normal; No acute ischemic changes compared to EKG from 2/4/19. No signs of A fib. Note written by Lazara Cordova, as dictated by Nivia Mcgovern DO 8:50 AM    PROGRESS NOTE:  9:16 AM  Son reports that patient is acting normal and is not more confused than at baseline.

## 2019-09-17 ENCOUNTER — HOME VISIT (OUTPATIENT)
Dept: FAMILY MEDICINE CLINIC | Age: 79
End: 2019-09-17

## 2019-09-17 DIAGNOSIS — G30.1 LATE ONSET ALZHEIMER'S DISEASE WITH BEHAVIORAL DISTURBANCE (HCC): ICD-10-CM

## 2019-09-17 DIAGNOSIS — M54.6 ACUTE MIDLINE THORACIC BACK PAIN: ICD-10-CM

## 2019-09-17 DIAGNOSIS — S21.209A OPEN WOUND OF BACK, UNSPECIFIED LATERALITY, INITIAL ENCOUNTER: Primary | ICD-10-CM

## 2019-09-17 DIAGNOSIS — F02.818 LATE ONSET ALZHEIMER'S DISEASE WITH BEHAVIORAL DISTURBANCE (HCC): ICD-10-CM

## 2019-09-18 ENCOUNTER — TELEPHONE (OUTPATIENT)
Dept: PALLATIVE CARE | Age: 79
End: 2019-09-18

## 2019-09-19 ENCOUNTER — HOME HEALTH ADMISSION (OUTPATIENT)
Dept: HOME HEALTH SERVICES | Facility: HOME HEALTH | Age: 79
End: 2019-09-19
Payer: MEDICARE

## 2019-09-19 VITALS
OXYGEN SATURATION: 97 % | SYSTOLIC BLOOD PRESSURE: 118 MMHG | TEMPERATURE: 98.7 F | RESPIRATION RATE: 18 BRPM | DIASTOLIC BLOOD PRESSURE: 72 MMHG | HEART RATE: 82 BPM

## 2019-09-20 NOTE — PATIENT INSTRUCTIONS
Wound Care: After Your Visit Your Care Instructions Taking good care of your wound at home will help it heal quickly and reduce your chance of infection. The doctor has checked you carefully, but problems can develop later. If you notice any problems or new symptoms, get medical treatment right away. Follow-up care is a key part of your treatment and safety. Be sure to make and go to all appointments, and call your doctor if you are having problems. It's also a good idea to know your test results and keep a list of the medicines you take. How can you care for yourself at home? · Clean the area with soap and water 2 times a day unless your doctor gives you different instructions. Don't use hydrogen peroxide or alcohol, which can slow healing. ¨ You may cover the wound with a thin layer of antibiotic ointment, such as bacitracin, and a nonstick bandage. ¨ Apply more ointment and replace the bandage as needed. · Take pain medicines exactly as directed. Some pain is normal with a wound, but do not ignore pain that is getting worse instead of better. You could have an infection. ¨ If the doctor gave you a prescription medicine for pain, take it as prescribed. ¨ If you are not taking a prescription pain medicine, ask your doctor if you can take an over-the-counter medicine. · Your doctor may have closed your wound with stitches (sutures), staples, or skin glue. ¨ If you have stitches, your doctor may remove them after several days to 2 weeks. Or you may have stitches that dissolve on their own. ¨ If you have staples, your doctor may remove them after 7 to 10 days. ¨ If your wound was closed with skin glue, the glue will wear off in a few days to 2 weeks. When should you call for help? Call your doctor now or seek immediate medical care if: 
· You have signs of infection, such as: 
¨ Increased pain, swelling, warmth, or redness near the wound. ¨ Red streaks leading from the wound. ¨ Pus draining from the wound. ¨ A fever. · You bleed so much from your incision that you soak one or more bandages over 2 to 4 hours. Watch closely for changes in your health, and be sure to contact your doctor if: · The wound is not getting better each day. Where can you learn more? Go to [employer] Enter W348 in the search box to learn more about \"Wound Care: After Your Visit. \"  
© 4731-9620 [employer]. Care instructions adapted under license by [employer] (which disclaims liability or warranty for this information). This care instruction is for use with your licensed healthcare professional. If you have questions about a medical condition or this instruction, always ask your healthcare professional. [address] any warranty or liability for your use of this information. Content Version: 51.8.246809; Last Revised: April 23, 2012

## 2019-09-20 NOTE — PROGRESS NOTES
Home Based Primary Care [facility]) & Supportive Services    [phone]      NOTE: Home Based Primary Care is a PROVIDER (MD/NP) based interdisciplinary practice (RN, LCSW, Pharmacy, Dietician) for patient's who cannot access (or have a taxing effort) primary / speciality medical care in an office setting. [facility] is NOT [facility] but works with [employer] when there is a skilled need. Our MD/NPs are integral in Care Transitions; PLEASE CALL [phone] if this patient arrives in the Emergency Department or Hospital.          Date of Current Visit: 09/17/19    Patient/Family present for Current Visit: Patient, daughter and care aide    Goals of Care as stated by the patient/family is: to be as happy and comfortable as possible    Preferences for hospitalization if that were to be necessary:  [] Patient DOES NOT WANT hospitalization; focus all treatments at home  [x] Patient WOULD WANT hospitalization for potentially reversible causes  Patient prefers hospitalization at: [facility]      Is this encounter billed on time:No    Total time: 15 min  Counseling / coordination time:  5 minutes on wound care, HH  > 50% counseling / coordination?: No    ASSESSMENT & PLAN       Diagnoses and all orders for this visit:       Encounter Diagnoses     ICD-10-CM ICD-9-CM   1. Open wound of back, unspecified laterality, initial encounter S21.209A 876.0   2. Acute midline thoracic back pain M54.6 724.1   3. Late onset Alzheimer's disease with behavioral disturbance G30.1 331.0    F02.81 294.11   1. Area with necrotic tissue. Wound care ordered for healing. No evidence of infection  2. Pain secondary to wound. Foam dressing with help. Tylenol prn  3. Continue supportive care.          I have left a SUMMARY / INSTRUCTIONS from today's visit with the patient/family in the home    [address] Maintenance Due   Topic Date Due    FOOT EXAM Q1  10/10/2019        CC & SUBJECTIVE including ROS & FUNCTION     Chief Complaint   Patient presents with   Kenyatta Mcwilliams is a 69 y.o. with chronic medical conditions as listed in the patient's updated Problem List (see below)    Their Subjective Information provided as today's visit includes:  Patient had a fall and was seen in the ER several weeks ago. He had an abrasion over his T-spine and it has not healed, become larger, and has yellow tissue. The area is tender when he lies on it. No discharge, fever, chills. Positive ROS findings: Patient refused to answer questions    The following systems were [] reviewed / [x] unable to be reviewed  Systems: constitutional, ears/nose/mouth/throat, respiratory, gastrointestinal, genitourinary, musculoskeletal, integumentary, neurologic, psychiatric, endocrine. PPS:40  Karnofsky:   Timed Up and Go (TUG):   Fall Risk Assessment, last 12 mths 2/12/2019   Able to walk? Yes   Fall in past 12 months? Yes   Fall with injury? Yes   Number of falls in past 12 months 2   Fall Risk Score 3       Current Durable Medical Equipment in Home:  [] Hospital Bed  [] Bedside Commode  [] Wheelchair  [] Clay Earnest  [] [place] Jorge    Has a cane  [] Respiratory Support:    ADVANCE CARE PLANNING                                 The following information was updated I/ reviewed as accurate in Orders and the Advance Care Planning Navigator:  [unfilled]     Primary Decision Maker (Active): Linn Villegas - [phone]    Primary Decision Maker: Johnny Wells - Son  Advance Care Planning 2/21/2019   Patient's Healthcare Decision Maker is: Legal Next of Kin   Confirm Advance Directive None   Patient Would Like to Complete Advance Directive Unable   Does the patient have other document types Do Not Resuscitate        VITAL SIGNS & PHYSICAL EXAM     Median Arm Circumference (inches):     Wt Readings from Last 3 Encounters:   09/07/19 134 lb (60.8 kg)   03/18/19 134 lb (60.8 kg)   03/10/19 132 lb (59.9 kg)     Temp Readings from Last 3 Encounters:   09/19/19 98.7 °F (37.1 °C) (Oral)   09/07/19 97.6 °F (36.4 °C)   09/03/19 98 °F (36.7 °C)     BP Readings from Last 3 Encounters:   09/19/19 118/72   09/07/19 95/47   09/03/19 130/60     Pulse Readings from Last 3 Encounters:   09/19/19 82   09/07/19 83   09/03/19 68            Pacemaker:  ICD:  Feeding Tube:  Urine Catheter:  Other:     Physical Exam   Constitutional: No distress. Frail, thiin   HENT:   Head: Normocephalic and atraumatic. Right Ear: External ear normal.   Left Ear: External ear normal.   Mouth/Throat: Oropharynx is clear and moist.   Eyes: Conjunctivae are normal. Right eye exhibits no discharge. Left eye exhibits no discharge. No scleral icterus. Neck: No JVD present. No tracheal deviation present. No thyromegaly present. Cardiovascular: Normal rate, regular rhythm, normal heart sounds and intact distal pulses. Exam reveals no gallop and no friction rub. No murmur heard. Pulmonary/Chest: Effort normal and breath sounds normal. No respiratory distress. He has no wheezes. He has no rales. He exhibits no tenderness. Abdominal: Soft. Bowel sounds are normal. He exhibits no distension and no mass. There is no tenderness. There is no rebound and no guarding. Musculoskeletal: He exhibits no edema or deformity. Lymphadenopathy:     He has no cervical adenopathy. Neurological: He is alert. Oriented X 1   Skin: Skin is warm and dry. No rash noted. He is not diaphoretic. No erythema. No pallor. Mid thoracic spine with 4 X 5 cm abrasion with yellow slough. No erythema or discharge. No odor. Psychiatric:   Agitates easily, does not want to interact   Vitals reviewed.          PROBLEM LIST / PAST MEDICAL / SOCIAL HX     Patient Active Problem List   Diagnosis Code    Complicated grief M04.01, Z63.4    Non compliance w medication regimen Z91.14    Cognitive disorder F09    Moderate recurrent major depression ([facility] [address] F33.1    Uncontrolled diabetes mellitus with hyperglycemia ([facility]) E11.65    Syncope R55    Volume depletion E86.9    Hyponatremia E87.1    Urinary incontinence R32    CAD (coronary artery disease) I25.10    COPD (chronic obstructive pulmonary disease) ([facility]) J44.9    Orthostatic hypotension I95.1    CHF (congestive heart failure) ([facility]) I50.9    NSTEMI (non-ST elevated myocardial infarction) ([facility]) I21.4    Hypokalemia E87.6    Prostate cancer ([facility] [address] C61      Family History   Problem Relation Age of Onset    Diabetes Mother     Diabetes Brother      Social History     Socioeconomic History    Marital status:      Spouse name: Not on file    Number of children: Not on file    Years of education: Not on file    Highest education level: Not on file   Tobacco Use    Smoking status: Current Every Day Smoker    Smokeless tobacco: Never Used    Tobacco comment: 1-2 cigars daily   Substance and Sexual Activity    Alcohol use: No     Alcohol/week: 0.0 standard drinks    Drug use: No        MEDICATIONS & PRESCRIPTIONS     Allergies   Allergen Reactions    Sulfa (Sulfonamide Antibiotics) Unknown (comments)      Current Outpatient Medications   Medication Sig    QUEtiapine (SEROQUEL) 25 mg tablet TAKE 1 TAB BY MOUTH NIGHTLY. TAKE 1 TAB AT BEDTIME ALONG WITH 50 MG DOSE.  tamsulosin (FLOMAX) 0.4 mg capsule Take 1 Cap by mouth daily.  levothyroxine (SYNTHROID) 50 mcg tablet TAKE 1 TAB BY MOUTH DAILY (BEFORE BREAKFAST)    insulin glargine (LANTUS,BASAGLAR) 100 unit/mL (3 mL) inpn Inject 8 units every morning.  ONETOUCH ULTRA BLUE TEST STRIP strip USE TO CHECK BLOOD SUGAR 3 TIMES DAILY.  DIAGNOSIS CODE: E11.9    insulin lispro (HUMALOG U-100 INSULIN) 100 unit/mL injection 3 units sq for bs greater than 200 ; 5  untis sq for bs greater than 250,  6units for blood sugar greater than 300 ; call me for bs greater than 400 (Patient taking differently: 3 units sq for bs greater than 200 ; 5  untis sq for bs greater than 250,  6units for blood sugar greater than 300 ; call me for bs greater than 400  Indications: 4 units with lunch in addition to sliding scale)    DULoxetine (CYMBALTA) 60 mg capsule TAKE ONE CAPSULE BY MOUTH EVERY DAY    SENNA PLUS 8.6-50 mg per tablet TAKE 2 TABS BY MOUTH TWO (2) TIMES A DAY    Omeprazole delayed release (PRILOSEC D/R) 20 mg tablet Take 1 Tab by mouth daily.  lidocaine (LIDODERM) 5 % Apply patch to the affected area for 12 hours a day and remove for 12 hours a day.  QUEtiapine (SEROQUEL) 50 mg tablet Take 50 mg by mouth nightly.  midodrine (PROAMITINE) 5 mg tablet Take 5 mg by mouth three (3) times daily (with meals).  memantine ER (NAMENDA XR) 28 mg capsule Take 1 Cap by mouth daily.  aspirin 81 mg chewable tablet Take 1 Tab by mouth daily.  ketoconazole (NIZORAL) 2 % topical cream Apply  to affected area daily. Rub into feet, between toes and nail beds    ondansetron (ZOFRAN ODT) 4 mg disintegrating tablet Take 1 Tab by mouth every six (6) hours as needed for Nausea.  acetaminophen (TYLENOL) 500 mg tablet Take 1,000 mg by mouth every six to eight (6-8) hours as needed for Pain. No current facility-administered medications for this visit. No orders of the defined types were placed in this encounter.         TEST DATA REVIEWED:     Lab Results   Component Value Date/Time    Hemoglobin A1c 7.6 (H) 07/02/2019 10:36 AM    Hemoglobin A1c, External 11.2 01/27/2016     Lab Results   Component Value Date/Time    Microalb/Creat ratio (ug/mg creat.) 63.6 (H) 02/12/2019 08:49 AM     Lab Results   Component Value Date/Time    TSH 2.89 12/26/2018 02:49 AM     No results found for: Virgin Kitrafael, [id number]      Lab Results   Component Value Date/Time    WBC 8.2 09/07/2019 08:44 AM    HGB 11.4 (L) 09/07/2019 08:44 AM    PLATELET 212 40/71/8957 08:44 AM     Lab Results   Component Value Date/Time    Sodium 142 09/07/2019 08:44 AM    Potassium 4.0 09/07/2019 08:44 AM    Chloride 107 09/07/2019 08:44 AM    CO2 27 09/07/2019 08:44 AM    BUN 15 09/07/2019 08:44 AM    Creatinine 1.78 (H) 09/07/2019 08:44 AM    Calcium 8.8 09/07/2019 08:44 AM    Magnesium 2.0 02/05/2019 01:42 AM    Phosphorus 3.8 02/05/2019 01:42 AM      Lab Results   Component Value Date/Time    AST (SGOT) 17 09/07/2019 08:44 AM    Alk.  phosphatase 102 09/07/2019 08:44 AM    Protein, total 6.6 09/07/2019 08:44 AM    Albumin 3.2 (L) 09/07/2019 08:44 AM    Globulin 3.4 09/07/2019 08:44 AM     Lab Results   Component Value Date/Time    Iron 39 02/04/2019 07:35 PM    TIBC 196 (L) 02/04/2019 07:35 PM    Iron % saturation 20 02/04/2019 07:35 PM    Ferritin 75 02/04/2019 07:35 PM

## 2019-09-21 ENCOUNTER — HOME CARE VISIT (OUTPATIENT)
Dept: SCHEDULING | Facility: HOME HEALTH | Age: 79
End: 2019-09-21
Payer: MEDICARE

## 2019-09-21 VITALS
DIASTOLIC BLOOD PRESSURE: 60 MMHG | WEIGHT: 152 LBS | TEMPERATURE: 98.2 F | RESPIRATION RATE: 16 BRPM | BODY MASS INDEX: 22.51 KG/M2 | SYSTOLIC BLOOD PRESSURE: 122 MMHG | HEIGHT: 69 IN | HEART RATE: 72 BPM

## 2019-09-21 PROCEDURE — 3331090002 HH PPS REVENUE DEBIT

## 2019-09-21 PROCEDURE — 3331090001 HH PPS REVENUE CREDIT

## 2019-09-21 PROCEDURE — G0299 HHS/HOSPICE OF RN EA 15 MIN: HCPCS

## 2019-09-21 PROCEDURE — 400013 HH SOC

## 2019-09-22 PROCEDURE — 3331090001 HH PPS REVENUE CREDIT

## 2019-09-22 PROCEDURE — 3331090002 HH PPS REVENUE DEBIT

## 2019-09-23 ENCOUNTER — HOME CARE VISIT (OUTPATIENT)
Dept: SCHEDULING | Facility: HOME HEALTH | Age: 79
End: 2019-09-23
Payer: MEDICARE

## 2019-09-23 VITALS
DIASTOLIC BLOOD PRESSURE: 80 MMHG | HEART RATE: 103 BPM | OXYGEN SATURATION: 96 % | SYSTOLIC BLOOD PRESSURE: 130 MMHG | TEMPERATURE: 97.8 F

## 2019-09-23 PROCEDURE — 3331090002 HH PPS REVENUE DEBIT

## 2019-09-23 PROCEDURE — G0300 HHS/HOSPICE OF LPN EA 15 MIN: HCPCS

## 2019-09-23 PROCEDURE — 3331090001 HH PPS REVENUE CREDIT

## 2019-09-24 PROCEDURE — 3331090002 HH PPS REVENUE DEBIT

## 2019-09-24 PROCEDURE — 3331090001 HH PPS REVENUE CREDIT

## 2019-09-25 ENCOUNTER — HOME CARE VISIT (OUTPATIENT)
Dept: SCHEDULING | Facility: HOME HEALTH | Age: 79
End: 2019-09-25
Payer: MEDICARE

## 2019-09-25 VITALS
DIASTOLIC BLOOD PRESSURE: 78 MMHG | TEMPERATURE: 98.7 F | HEART RATE: 74 BPM | SYSTOLIC BLOOD PRESSURE: 122 MMHG | OXYGEN SATURATION: 98 % | RESPIRATION RATE: 16 BRPM

## 2019-09-25 PROCEDURE — 3331090001 HH PPS REVENUE CREDIT

## 2019-09-25 PROCEDURE — 3331090002 HH PPS REVENUE DEBIT

## 2019-09-25 PROCEDURE — G0300 HHS/HOSPICE OF LPN EA 15 MIN: HCPCS

## 2019-09-26 PROCEDURE — 3331090001 HH PPS REVENUE CREDIT

## 2019-09-26 PROCEDURE — 3331090002 HH PPS REVENUE DEBIT

## 2019-09-27 ENCOUNTER — HOME CARE VISIT (OUTPATIENT)
Dept: SCHEDULING | Facility: HOME HEALTH | Age: 79
End: 2019-09-27
Payer: MEDICARE

## 2019-09-27 VITALS
OXYGEN SATURATION: 97 % | SYSTOLIC BLOOD PRESSURE: 144 MMHG | RESPIRATION RATE: 16 BRPM | TEMPERATURE: 98.4 F | HEART RATE: 100 BPM | DIASTOLIC BLOOD PRESSURE: 70 MMHG

## 2019-09-27 PROCEDURE — 3331090002 HH PPS REVENUE DEBIT

## 2019-09-27 PROCEDURE — 3331090001 HH PPS REVENUE CREDIT

## 2019-09-27 PROCEDURE — G0299 HHS/HOSPICE OF RN EA 15 MIN: HCPCS

## 2019-09-28 PROCEDURE — 3331090001 HH PPS REVENUE CREDIT

## 2019-09-28 PROCEDURE — 3331090002 HH PPS REVENUE DEBIT

## 2019-09-29 ENCOUNTER — HOME CARE VISIT (OUTPATIENT)
Dept: SCHEDULING | Facility: HOME HEALTH | Age: 79
End: 2019-09-29
Payer: MEDICARE

## 2019-09-29 PROCEDURE — 3331090002 HH PPS REVENUE DEBIT

## 2019-09-29 PROCEDURE — G0299 HHS/HOSPICE OF RN EA 15 MIN: HCPCS

## 2019-09-29 PROCEDURE — 3331090001 HH PPS REVENUE CREDIT

## 2019-09-30 PROCEDURE — 3331090002 HH PPS REVENUE DEBIT

## 2019-09-30 PROCEDURE — 3331090001 HH PPS REVENUE CREDIT

## 2019-10-01 ENCOUNTER — HOME CARE VISIT (OUTPATIENT)
Dept: SCHEDULING | Facility: HOME HEALTH | Age: 79
End: 2019-10-01
Payer: MEDICARE

## 2019-10-01 VITALS
TEMPERATURE: 98.8 F | SYSTOLIC BLOOD PRESSURE: 124 MMHG | DIASTOLIC BLOOD PRESSURE: 68 MMHG | OXYGEN SATURATION: 97 % | RESPIRATION RATE: 16 BRPM | HEART RATE: 78 BPM

## 2019-10-01 PROCEDURE — 3331090002 HH PPS REVENUE DEBIT

## 2019-10-01 PROCEDURE — G0299 HHS/HOSPICE OF RN EA 15 MIN: HCPCS

## 2019-10-01 PROCEDURE — 3331090001 HH PPS REVENUE CREDIT

## 2019-10-02 PROCEDURE — 3331090001 HH PPS REVENUE CREDIT

## 2019-10-02 PROCEDURE — 3331090002 HH PPS REVENUE DEBIT

## 2019-10-03 ENCOUNTER — HOME CARE VISIT (OUTPATIENT)
Dept: SCHEDULING | Facility: HOME HEALTH | Age: 79
End: 2019-10-03
Payer: MEDICARE

## 2019-10-03 VITALS
SYSTOLIC BLOOD PRESSURE: 120 MMHG | OXYGEN SATURATION: 98 % | HEART RATE: 77 BPM | DIASTOLIC BLOOD PRESSURE: 60 MMHG | RESPIRATION RATE: 18 BRPM | TEMPERATURE: 99.1 F

## 2019-10-03 PROCEDURE — 3331090002 HH PPS REVENUE DEBIT

## 2019-10-03 PROCEDURE — G0300 HHS/HOSPICE OF LPN EA 15 MIN: HCPCS

## 2019-10-03 PROCEDURE — 3331090001 HH PPS REVENUE CREDIT

## 2019-10-04 PROCEDURE — 3331090002 HH PPS REVENUE DEBIT

## 2019-10-04 PROCEDURE — 3331090001 HH PPS REVENUE CREDIT

## 2019-10-05 ENCOUNTER — HOME CARE VISIT (OUTPATIENT)
Dept: SCHEDULING | Facility: HOME HEALTH | Age: 79
End: 2019-10-05
Payer: MEDICARE

## 2019-10-05 PROCEDURE — 3331090002 HH PPS REVENUE DEBIT

## 2019-10-05 PROCEDURE — 3331090001 HH PPS REVENUE CREDIT

## 2019-10-05 PROCEDURE — G0300 HHS/HOSPICE OF LPN EA 15 MIN: HCPCS

## 2019-10-06 ENCOUNTER — HOME CARE VISIT (OUTPATIENT)
Dept: SCHEDULING | Facility: HOME HEALTH | Age: 79
End: 2019-10-06
Payer: MEDICARE

## 2019-10-06 PROCEDURE — 3331090001 HH PPS REVENUE CREDIT

## 2019-10-06 PROCEDURE — 3331090002 HH PPS REVENUE DEBIT

## 2019-10-06 PROCEDURE — G0300 HHS/HOSPICE OF LPN EA 15 MIN: HCPCS

## 2019-10-07 PROCEDURE — 3331090002 HH PPS REVENUE DEBIT

## 2019-10-07 PROCEDURE — 3331090001 HH PPS REVENUE CREDIT

## 2019-10-08 ENCOUNTER — HOME CARE VISIT (OUTPATIENT)
Dept: SCHEDULING | Facility: HOME HEALTH | Age: 79
End: 2019-10-08
Payer: MEDICARE

## 2019-10-08 PROCEDURE — 3331090002 HH PPS REVENUE DEBIT

## 2019-10-08 PROCEDURE — 3331090001 HH PPS REVENUE CREDIT

## 2019-10-08 PROCEDURE — G0300 HHS/HOSPICE OF LPN EA 15 MIN: HCPCS

## 2019-10-09 VITALS
RESPIRATION RATE: 18 BRPM | SYSTOLIC BLOOD PRESSURE: 159 MMHG | HEART RATE: 74 BPM | OXYGEN SATURATION: 97 % | DIASTOLIC BLOOD PRESSURE: 78 MMHG | BODY MASS INDEX: 22.59 KG/M2 | WEIGHT: 153 LBS | TEMPERATURE: 98.1 F

## 2019-10-09 VITALS
SYSTOLIC BLOOD PRESSURE: 118 MMHG | SYSTOLIC BLOOD PRESSURE: 122 MMHG | RESPIRATION RATE: 18 BRPM | HEART RATE: 72 BPM | HEART RATE: 78 BPM | TEMPERATURE: 98.1 F | OXYGEN SATURATION: 95 % | DIASTOLIC BLOOD PRESSURE: 70 MMHG | RESPIRATION RATE: 18 BRPM | TEMPERATURE: 98.1 F | DIASTOLIC BLOOD PRESSURE: 64 MMHG | OXYGEN SATURATION: 98 %

## 2019-10-09 PROCEDURE — 3331090001 HH PPS REVENUE CREDIT

## 2019-10-09 PROCEDURE — 3331090002 HH PPS REVENUE DEBIT

## 2019-10-10 ENCOUNTER — HOME CARE VISIT (OUTPATIENT)
Dept: SCHEDULING | Facility: HOME HEALTH | Age: 79
End: 2019-10-10
Payer: MEDICARE

## 2019-10-10 PROCEDURE — 3331090002 HH PPS REVENUE DEBIT

## 2019-10-10 PROCEDURE — 3331090001 HH PPS REVENUE CREDIT

## 2019-10-10 PROCEDURE — G0300 HHS/HOSPICE OF LPN EA 15 MIN: HCPCS

## 2019-10-11 ENCOUNTER — HOME CARE VISIT (OUTPATIENT)
Dept: SCHEDULING | Facility: HOME HEALTH | Age: 79
End: 2019-10-11
Payer: MEDICARE

## 2019-10-11 VITALS
OXYGEN SATURATION: 98 % | HEART RATE: 72 BPM | RESPIRATION RATE: 18 BRPM | SYSTOLIC BLOOD PRESSURE: 110 MMHG | DIASTOLIC BLOOD PRESSURE: 60 MMHG | TEMPERATURE: 97.2 F

## 2019-10-11 PROCEDURE — 3331090001 HH PPS REVENUE CREDIT

## 2019-10-11 PROCEDURE — 3331090002 HH PPS REVENUE DEBIT

## 2019-10-12 PROCEDURE — 3331090001 HH PPS REVENUE CREDIT

## 2019-10-12 PROCEDURE — 3331090002 HH PPS REVENUE DEBIT

## 2019-10-13 PROCEDURE — 3331090002 HH PPS REVENUE DEBIT

## 2019-10-13 PROCEDURE — 3331090001 HH PPS REVENUE CREDIT

## 2019-10-14 PROCEDURE — 3331090002 HH PPS REVENUE DEBIT

## 2019-10-14 PROCEDURE — 3331090001 HH PPS REVENUE CREDIT

## 2019-10-15 ENCOUNTER — HOME CARE VISIT (OUTPATIENT)
Dept: SCHEDULING | Facility: HOME HEALTH | Age: 79
End: 2019-10-15
Payer: MEDICARE

## 2019-10-15 VITALS
TEMPERATURE: 98.5 F | RESPIRATION RATE: 18 BRPM | HEART RATE: 77 BPM | BODY MASS INDEX: 22.77 KG/M2 | SYSTOLIC BLOOD PRESSURE: 130 MMHG | OXYGEN SATURATION: 96 % | WEIGHT: 154.2 LBS | DIASTOLIC BLOOD PRESSURE: 64 MMHG

## 2019-10-15 PROCEDURE — 3331090001 HH PPS REVENUE CREDIT

## 2019-10-15 PROCEDURE — G0300 HHS/HOSPICE OF LPN EA 15 MIN: HCPCS

## 2019-10-15 PROCEDURE — 3331090002 HH PPS REVENUE DEBIT

## 2019-10-16 PROCEDURE — 3331090001 HH PPS REVENUE CREDIT

## 2019-10-16 PROCEDURE — 3331090002 HH PPS REVENUE DEBIT

## 2019-10-17 PROCEDURE — 3331090001 HH PPS REVENUE CREDIT

## 2019-10-17 PROCEDURE — 3331090002 HH PPS REVENUE DEBIT

## 2019-10-18 PROCEDURE — 3331090001 HH PPS REVENUE CREDIT

## 2019-10-18 PROCEDURE — 3331090002 HH PPS REVENUE DEBIT

## 2019-10-19 PROCEDURE — 3331090001 HH PPS REVENUE CREDIT

## 2019-10-19 PROCEDURE — 3331090002 HH PPS REVENUE DEBIT

## 2019-10-20 PROCEDURE — 3331090001 HH PPS REVENUE CREDIT

## 2019-10-20 PROCEDURE — 3331090002 HH PPS REVENUE DEBIT

## 2019-10-21 ENCOUNTER — TELEPHONE (OUTPATIENT)
Dept: FAMILY MEDICINE CLINIC | Age: 79
End: 2019-10-21

## 2019-10-21 ENCOUNTER — PATIENT MESSAGE (OUTPATIENT)
Dept: FAMILY MEDICINE CLINIC | Age: 79
End: 2019-10-21

## 2019-10-21 PROCEDURE — 3331090001 HH PPS REVENUE CREDIT

## 2019-10-21 PROCEDURE — 3331090002 HH PPS REVENUE DEBIT

## 2019-10-21 NOTE — TELEPHONE ENCOUNTER
Jax Bacon called to state that patient's blood pressure was 194/100. Jax Bacon prefers to discuss with Thedora Seen.

## 2019-10-21 NOTE — TELEPHONE ENCOUNTER
Called patient's daughter. Patient has BP's in the low 90's and is symptomatic and takes a midodrine and then it goes up to 190's. Discussed with daughter. Will have them cut midodrine pill in half and try a half dose. She will let us know if this kicks the BP up above 170.

## 2019-10-22 ENCOUNTER — HOME CARE VISIT (OUTPATIENT)
Dept: SCHEDULING | Facility: HOME HEALTH | Age: 79
End: 2019-10-22
Payer: MEDICARE

## 2019-10-22 PROCEDURE — 3331090001 HH PPS REVENUE CREDIT

## 2019-10-22 PROCEDURE — 3331090002 HH PPS REVENUE DEBIT

## 2019-10-22 PROCEDURE — G0300 HHS/HOSPICE OF LPN EA 15 MIN: HCPCS

## 2019-10-23 VITALS
OXYGEN SATURATION: 98 % | TEMPERATURE: 98.1 F | DIASTOLIC BLOOD PRESSURE: 70 MMHG | RESPIRATION RATE: 18 BRPM | WEIGHT: 155 LBS | SYSTOLIC BLOOD PRESSURE: 144 MMHG | HEART RATE: 87 BPM | BODY MASS INDEX: 22.89 KG/M2

## 2019-10-23 PROCEDURE — 3331090001 HH PPS REVENUE CREDIT

## 2019-10-23 PROCEDURE — 3331090002 HH PPS REVENUE DEBIT

## 2019-10-24 PROCEDURE — 3331090001 HH PPS REVENUE CREDIT

## 2019-10-24 PROCEDURE — 3331090002 HH PPS REVENUE DEBIT

## 2019-10-25 ENCOUNTER — TELEPHONE (OUTPATIENT)
Dept: PALLATIVE CARE | Age: 79
End: 2019-10-25

## 2019-10-25 PROCEDURE — 3331090002 HH PPS REVENUE DEBIT

## 2019-10-25 PROCEDURE — 3331090001 HH PPS REVENUE CREDIT

## 2019-10-25 NOTE — TELEPHONE ENCOUNTER
Received call from [facility] from [facility]. She is calling with regards to the CMN that was sent on 10/24/19, regarding the incontinence supplies. The form was not filled out completely. The phone # [phone], fax # [phone]. I informed Ev Guero will send a message to [facility].

## 2019-10-26 PROCEDURE — 3331090001 HH PPS REVENUE CREDIT

## 2019-10-26 PROCEDURE — 3331090002 HH PPS REVENUE DEBIT

## 2019-10-27 PROCEDURE — 3331090001 HH PPS REVENUE CREDIT

## 2019-10-27 PROCEDURE — 3331090002 HH PPS REVENUE DEBIT

## 2019-10-28 PROCEDURE — 3331090001 HH PPS REVENUE CREDIT

## 2019-10-28 PROCEDURE — 3331090002 HH PPS REVENUE DEBIT

## 2019-10-29 ENCOUNTER — HOME CARE VISIT (OUTPATIENT)
Dept: SCHEDULING | Facility: HOME HEALTH | Age: 79
End: 2019-10-29
Payer: MEDICARE

## 2019-10-29 VITALS
BODY MASS INDEX: 23.01 KG/M2 | TEMPERATURE: 98.7 F | DIASTOLIC BLOOD PRESSURE: 64 MMHG | HEART RATE: 77 BPM | WEIGHT: 155.8 LBS | RESPIRATION RATE: 18 BRPM | OXYGEN SATURATION: 98 % | SYSTOLIC BLOOD PRESSURE: 120 MMHG

## 2019-10-29 PROCEDURE — 3331090002 HH PPS REVENUE DEBIT

## 2019-10-29 PROCEDURE — 3331090001 HH PPS REVENUE CREDIT

## 2019-10-29 PROCEDURE — G0300 HHS/HOSPICE OF LPN EA 15 MIN: HCPCS

## 2019-10-30 PROCEDURE — 3331090002 HH PPS REVENUE DEBIT

## 2019-10-30 PROCEDURE — 3331090001 HH PPS REVENUE CREDIT

## 2019-10-30 RX ORDER — PEN NEEDLE, DIABETIC 31 GX3/16"
NEEDLE, DISPOSABLE MISCELLANEOUS
Qty: 100 PEN NEEDLE | Refills: 1 | Status: SHIPPED | OUTPATIENT
Start: 2019-10-30 | End: 2020-01-15

## 2019-10-31 PROCEDURE — 3331090001 HH PPS REVENUE CREDIT

## 2019-10-31 PROCEDURE — 3331090002 HH PPS REVENUE DEBIT

## 2019-11-01 PROCEDURE — 3331090002 HH PPS REVENUE DEBIT

## 2019-11-01 PROCEDURE — 3331090001 HH PPS REVENUE CREDIT

## 2019-11-02 PROCEDURE — 3331090002 HH PPS REVENUE DEBIT

## 2019-11-02 PROCEDURE — 3331090001 HH PPS REVENUE CREDIT

## 2019-11-03 PROCEDURE — 3331090002 HH PPS REVENUE DEBIT

## 2019-11-03 PROCEDURE — 3331090001 HH PPS REVENUE CREDIT

## 2019-11-04 PROCEDURE — 3331090001 HH PPS REVENUE CREDIT

## 2019-11-04 PROCEDURE — 3331090002 HH PPS REVENUE DEBIT

## 2019-11-05 ENCOUNTER — HOME CARE VISIT (OUTPATIENT)
Dept: SCHEDULING | Facility: HOME HEALTH | Age: 79
End: 2019-11-05
Payer: MEDICARE

## 2019-11-05 PROCEDURE — 3331090002 HH PPS REVENUE DEBIT

## 2019-11-05 PROCEDURE — G0300 HHS/HOSPICE OF LPN EA 15 MIN: HCPCS

## 2019-11-05 PROCEDURE — 3331090001 HH PPS REVENUE CREDIT

## 2019-11-06 ENCOUNTER — TELEPHONE (OUTPATIENT)
Dept: FAMILY MEDICINE CLINIC | Age: 79
End: 2019-11-06

## 2019-11-06 VITALS
DIASTOLIC BLOOD PRESSURE: 70 MMHG | HEART RATE: 95 BPM | TEMPERATURE: 98.9 F | WEIGHT: 156.4 LBS | BODY MASS INDEX: 23.1 KG/M2 | OXYGEN SATURATION: 97 % | SYSTOLIC BLOOD PRESSURE: 130 MMHG | RESPIRATION RATE: 18 BRPM

## 2019-11-06 PROCEDURE — 3331090001 HH PPS REVENUE CREDIT

## 2019-11-06 PROCEDURE — 3331090002 HH PPS REVENUE DEBIT

## 2019-11-06 RX ORDER — MULTIVIT WITH MINERALS/HERBS
1 TABLET ORAL DAILY
Qty: 90 TAB | Refills: 2 | Status: ON HOLD | OUTPATIENT
Start: 2019-11-06 | End: 2022-01-01

## 2019-11-06 NOTE — TELEPHONE ENCOUNTER
\"Dad is only eating mashed potatoes. Nothing else! Can we get a prescription for a good multi vitamin & B complex?     Spoke with Daughter. He has been eating only mashed potatoes with butter for about 2 weeks. He has not exhibited any signs or symptoms of anything GI related. He has not had any other issues. Just no appetite for anything else. She would like a vitamin supplement. He will not drink any of the ensure type drinks. Also thinking about maybe a dietary consult. Please advise.

## 2019-11-07 PROCEDURE — 3331090001 HH PPS REVENUE CREDIT

## 2019-11-07 PROCEDURE — 3331090002 HH PPS REVENUE DEBIT

## 2019-11-08 PROCEDURE — 3331090001 HH PPS REVENUE CREDIT

## 2019-11-08 PROCEDURE — 3331090002 HH PPS REVENUE DEBIT

## 2019-11-09 PROCEDURE — 3331090002 HH PPS REVENUE DEBIT

## 2019-11-09 PROCEDURE — 3331090001 HH PPS REVENUE CREDIT

## 2019-11-10 PROCEDURE — 3331090001 HH PPS REVENUE CREDIT

## 2019-11-10 PROCEDURE — 3331090002 HH PPS REVENUE DEBIT

## 2019-11-11 PROCEDURE — 3331090002 HH PPS REVENUE DEBIT

## 2019-11-11 PROCEDURE — 3331090001 HH PPS REVENUE CREDIT

## 2019-11-12 ENCOUNTER — HOME CARE VISIT (OUTPATIENT)
Dept: SCHEDULING | Facility: HOME HEALTH | Age: 79
End: 2019-11-12
Payer: MEDICARE

## 2019-11-12 VITALS
SYSTOLIC BLOOD PRESSURE: 124 MMHG | RESPIRATION RATE: 16 BRPM | HEART RATE: 90 BPM | DIASTOLIC BLOOD PRESSURE: 70 MMHG | OXYGEN SATURATION: 96 % | TEMPERATURE: 98.6 F

## 2019-11-12 PROCEDURE — G0299 HHS/HOSPICE OF RN EA 15 MIN: HCPCS

## 2019-11-12 PROCEDURE — 3331090001 HH PPS REVENUE CREDIT

## 2019-11-12 PROCEDURE — 3331090002 HH PPS REVENUE DEBIT

## 2019-11-20 ENCOUNTER — HOME VISIT (OUTPATIENT)
Dept: FAMILY MEDICINE CLINIC | Age: 79
End: 2019-11-20

## 2019-11-20 VITALS
WEIGHT: 159.1 LBS | OXYGEN SATURATION: 97 % | RESPIRATION RATE: 17 BRPM | HEART RATE: 97 BPM | TEMPERATURE: 97.4 F | HEIGHT: 69 IN | DIASTOLIC BLOOD PRESSURE: 64 MMHG | SYSTOLIC BLOOD PRESSURE: 102 MMHG | BODY MASS INDEX: 23.57 KG/M2

## 2019-11-20 DIAGNOSIS — N18.4 CKD (CHRONIC KIDNEY DISEASE) STAGE 4, GFR 15-29 ML/MIN (HCC): ICD-10-CM

## 2019-11-20 DIAGNOSIS — J44.9 CHRONIC OBSTRUCTIVE PULMONARY DISEASE, UNSPECIFIED COPD TYPE (HCC): ICD-10-CM

## 2019-11-20 DIAGNOSIS — F02.818 LATE ONSET ALZHEIMER'S DISEASE WITH BEHAVIORAL DISTURBANCE (HCC): ICD-10-CM

## 2019-11-20 DIAGNOSIS — G89.29 OTHER CHRONIC PAIN: Primary | ICD-10-CM

## 2019-11-20 DIAGNOSIS — G30.1 LATE ONSET ALZHEIMER'S DISEASE WITH BEHAVIORAL DISTURBANCE (HCC): ICD-10-CM

## 2019-11-20 DIAGNOSIS — E11.65 UNCONTROLLED TYPE 2 DIABETES MELLITUS WITH HYPERGLYCEMIA (HCC): ICD-10-CM

## 2019-11-20 DIAGNOSIS — I95.1 ORTHOSTATIC HYPOTENSION: ICD-10-CM

## 2019-11-20 DIAGNOSIS — E63.1 DIETARY IMBALANCE: ICD-10-CM

## 2019-11-20 RX ORDER — HYDROCODONE BITARTRATE AND ACETAMINOPHEN 5; 325 MG/1; MG/1
1 TABLET ORAL 2 TIMES DAILY
Qty: 60 TAB | Refills: 0 | Status: SHIPPED | OUTPATIENT
Start: 2019-11-20 | End: 2019-12-20

## 2019-11-20 RX ORDER — HYDROCODONE BITARTRATE AND ACETAMINOPHEN 5; 325 MG/1; MG/1
TABLET ORAL
Refills: 0 | COMMUNITY
Start: 2019-10-21 | End: 2019-11-20 | Stop reason: SDUPTHER

## 2019-11-20 NOTE — PROGRESS NOTES
Home Based Primary Care [facility]) & Supportive Services    [phone]      NOTE: Home Based Primary Care is a PROVIDER (MD/NP) based interdisciplinary practice (RN, LCSW, Pharmacy, Dietician) for patient's who cannot access (or have a taxing effort) primary / speciality medical care in an office setting. [facility] is NOT [employer] but works with [address] [email or web address] when there is a skilled need. Our MD/NPs are integral in Care Transitions; PLEASE CALL [phone] if this patient arrives in the Emergency Department or Hospital.          Date of Current Visit: 11/20/19    Patient/Family present for Current Visit: Patient, daughter and care aide    Goals of Care as stated by the patient/family is: to be as happy and comfortable as possible    Preferences for hospitalization if that were to be necessary:  [] Patient DOES NOT WANT hospitalization; focus all treatments at home  [x] Patient WOULD WANT hospitalization for potentially reversible causes  Patient prefers hospitalization at: [facility]      Is this encounter billed on time:No    Total time: 40 min  Counseling / coordination time:  10 minutes on goals of care for diabetes, blood pressures  > 50% counseling / coordination?: No    ASSESSMENT & PLAN       Diagnoses and all orders for this visit:       Encounter Diagnoses     ICD-10-CM ICD-9-CM   1. Other chronic pain G89.29 338.29   2. Late onset Alzheimer's disease with behavioral disturbance ([facility] G30.1 331.0    F02.81 294.11   3. Uncontrolled type 2 diabetes mellitus with hyperglycemia ([facility]) E11.65 250.02   4. CKD (chronic kidney disease) stage 4, GFR 15-29 ml/min ([facility]) N18.4 585.4   5. Chronic obstructive pulmonary disease, unspecified COPD type ([facility] J44.9 496   6. Orthostatic hypotension I95.1 458.0   7. Dietary imbalance E63.1 269.8   1. Well controlled on hydrocodone 1 to 2 tabs a day. Checked . Drug screen and pain agreement UTD. Patient has no constipation, not sedated.   Pain is neuropathic and arthritic. 2.  Only eating potatoes and rice currently and has gained weight. MVI was added per family request.  Still with behaviors and difficult to work with. 3.  BG's are 100 to 300's. Have been liberal with BG's because of dementia and patient not liking to have frequent BG checks. If consistently in 300's, family will call for me to adjust insulin. 4.  Avoid dehydration and nephrotoxic drugs. 5.  Currently stable, not smoking. Has not had an exacerbation in several years per family. 6.  Takes midodrine when his BP is 90 and it usually rebounds to 170. Discussed trying to keep BP between 780 and 027 systolic. He has had no falls. 7. MVI added per family request.      Follow-up and Dispositions    · Return in about 2 months (around 1/20/2020). I have left a SUMMARY / INSTRUCTIONS from today's visit with the patient/family in the home    HEALTH MAINTENANCE     There are no preventive care reminders to display for this patient. CC & SUBJECTIVE including ROS & FUNCTION     Chief Complaint   Patient presents with    Follow Up Chronic Condition     dementia, copd, t2dm, chronic pain        Wagner Ansari is a 69 y.o. with chronic medical conditions as listed in the patient's updated Problem List (see below)    Patient's pain well controlled on hydrocodone without falls or sedation. He has neuropathic pain in his feet and arthritic pain. He is only eating rice and potatoes for several weeks and refuses to eat anything else. His BG's are 100's to 300's. He is always in lower 100's in the am.  Has been refusing BG checks frequently so doesn't get SLS insulin all the time. Midodrine is used 2-3 times a week when his BP is 90 and it often rebounds to 170. He is sleeping well at night. No falls. No constipation. No smoking, no wheezing. Continues to be difficult to direct.         Positive ROS findings: Patient refused to answer questions    The following systems were [] reviewed / [x] unable to be reviewed  Systems: constitutional, ears/nose/mouth/throat, respiratory, gastrointestinal, genitourinary, musculoskeletal, integumentary, neurologic, psychiatric, endocrine. PPS:40  Karnofsky:   Timed Up and Go (TUG):   Fall Risk Assessment, last 12 mths 2/12/2019   Able to walk? Yes   Fall in past 12 months? Yes   Fall with injury? Yes   Number of falls in past 12 months 2   Fall Risk Score 3       Current Durable Medical Equipment in Home:  [] Hospital Bed  [] Bedside Commode  [] Wheelchair  [] Mikala Rich  [] Nicolette Trevizo    Has a cane  [] Respiratory Support:    ADVANCE CARE PLANNING                                 The following information was updated I/ reviewed as accurate in Orders and the Advance Care Planning Navigator:  [unfilled]     Primary Decision Maker (Active): [place] [facility] [phone]    Primary Decision Maker: Diego Padgett  Advance Care Planning 2/21/2019   Patient's Healthcare Decision Maker is: Legal Next of Kin   Confirm Advance Directive None   Patient Would Like to Complete Advance Directive Unable   Does the patient have other document types Do Not Resuscitate        VITAL SIGNS & PHYSICAL EXAM     Median Arm Circumference (inches): Wt Readings from Last 3 Encounters:   11/20/19 159 lb 1.6 oz (72.2 kg)   11/05/19 156 lb 6.4 oz (70.9 kg)   10/29/19 155 lb 12.8 oz (70.7 kg)     Temp Readings from Last 3 Encounters:   11/20/19 97.4 °F (36.3 °C) (Axillary)   11/12/19 98.6 °F (37 °C) (Temporal)   11/05/19 98.9 °F (37.2 °C) (Temporal)     BP Readings from Last 3 Encounters:   11/20/19 102/64   11/12/19 124/70   11/05/19 130/70     Pulse Readings from Last 3 Encounters:   11/20/19 97   11/12/19 90   11/05/19 95            Pacemaker:  ICD:  Feeding Tube:  Urine Catheter:  Other:     Physical Exam   Constitutional: No distress. Frail, thiin   HENT:   Head: Normocephalic and atraumatic.    Right Ear: External ear normal.   Left Ear: External ear normal.   Mouth/Throat: Oropharynx is clear and moist.   Eyes: Conjunctivae are normal. Right eye exhibits no discharge. Left eye exhibits no discharge. No scleral icterus. Neck: No JVD present. No tracheal deviation present. No thyromegaly present. Cardiovascular: Normal rate, regular rhythm, normal heart sounds and intact distal pulses. Exam reveals no gallop and no friction rub. No murmur heard. Pulmonary/Chest: Effort normal and breath sounds normal. No respiratory distress. He has no wheezes. He has no rales. He exhibits no tenderness. Abdominal: Soft. Bowel sounds are normal. He exhibits no distension and no mass. There is no tenderness. There is no rebound and no guarding. Musculoskeletal:         General: No deformity or edema. Lymphadenopathy:     He has no cervical adenopathy. Neurological: He is alert. Oriented X 1   Skin: Skin is warm and dry. No rash noted. He is not diaphoretic. No erythema. No pallor. Psychiatric:   Agitates easily, does not want to interact   Vitals reviewed.          PROBLEM LIST / PAST MEDICAL / SOCIAL HX     Patient Active Problem List   Diagnosis Code    Complicated grief Y59.08, Z63.4    Non compliance w medication regimen Z91.14    Cognitive disorder F09    Moderate recurrent major depression ([facility] F33.1    Uncontrolled diabetes mellitus with hyperglycemia ([facility]) E11.65    Syncope R55    Volume depletion E86.9    Hyponatremia E87.1    Urinary incontinence R32    CAD (coronary artery disease) I25.10    COPD (chronic obstructive pulmonary disease) ([facility]) J44.9    Orthostatic hypotension I95.1    CHF (congestive heart failure) ([facility]) I50.9    NSTEMI (non-ST elevated myocardial infarction) ([facility]) I21.4    Hypokalemia E87.6    Prostate cancer ([facility] C61      Family History   Problem Relation Age of Onset    Diabetes Mother     Diabetes Brother      Social History     Socioeconomic History    Marital status:      Spouse name: Not on file    Number of children: Not on file    Years of education: Not on file    Highest education level: Not on file   Tobacco Use    Smoking status: Current Every Day Smoker    Smokeless tobacco: Never Used    Tobacco comment: 1-2 cigars daily   Substance and Sexual Activity    Alcohol use: No     Alcohol/week: 0.0 standard drinks    Drug use: No        MEDICATIONS & PRESCRIPTIONS     Allergies   Allergen Reactions    Sulfa (Sulfonamide Antibiotics) Unknown (comments)      Current Outpatient Medications   Medication Sig    HYDROcodone-acetaminophen (NORCO) 5-325 mg per tablet Take 1 Tab by mouth two (2) times a day for 30 days. Max Daily Amount: 2 Tabs.  b complex vitamins (SUPER B-50 COMPLEX PLUS) tablet Take 1 Tab by mouth daily.  glucose blood VI test strips (ONETOUCH ULTRA BLUE TEST STRIP) strip USE TO CHECK BLOOD SUGAR 3 TIMES DAILY. DIAGNOSIS CODE: E11.9    QUEtiapine (SEROQUEL) 25 mg tablet TAKE 1 TAB BY MOUTH NIGHTLY. TAKE 1 TAB AT BEDTIME ALONG WITH 50 MG DOSE.  tamsulosin (FLOMAX) 0.4 mg capsule Take 1 Cap by mouth daily.  levothyroxine (SYNTHROID) 50 mcg tablet TAKE 1 TAB BY MOUTH DAILY (BEFORE BREAKFAST)    insulin glargine (LANTUS,BASAGLAR) 100 unit/mL (3 mL) inpn Inject 8 units every morning.  (Patient taking differently: Inject 8 units SQ every morning.)    insulin lispro (HUMALOG U-100 INSULIN) 100 unit/mL injection 3 units sq for bs greater than 200 ; 5  untis sq for bs greater than 250,  6units for blood sugar greater than 300 ; call me for bs greater than 400 (Patient taking differently: 3 units sq for bs greater than 200 ; 5  untis sq for bs greater than 250,  6units for blood sugar greater than 300 ; call me for bs greater than 400  Indications: 4 units with lunch in addition to sliding scale)    DULoxetine (CYMBALTA) 60 mg capsule TAKE ONE CAPSULE BY MOUTH EVERY DAY    SENNA PLUS 8.6-50 mg per tablet TAKE 2 TABS BY MOUTH TWO (2) TIMES A DAY    Omeprazole delayed release (PRILOSEC D/R) 20 mg tablet Take 1 Tab by mouth daily.  QUEtiapine (SEROQUEL) 50 mg tablet Take 50 mg by mouth nightly.  midodrine (PROAMITINE) 5 mg tablet Take 5 mg by mouth three (3) times daily (with meals).  memantine ER (NAMENDA XR) 28 mg capsule Take 1 Cap by mouth daily.  aspirin 81 mg chewable tablet Take 1 Tab by mouth daily.  Insulin Needles, Disposable, (BD ULTRA-FINE MINI PEN NEEDLE) 31 gauge x 3/16\" ndle Use with syringes for insulin injections E11.9    ketoconazole (NIZORAL) 2 % topical cream Apply  to affected area daily. Rub into feet, between toes and nail beds    lidocaine (LIDODERM) 5 % Apply patch to the affected area for 12 hours a day and remove for 12 hours a day.  ondansetron (ZOFRAN ODT) 4 mg disintegrating tablet Take 1 Tab by mouth every six (6) hours as needed for Nausea.  acetaminophen (TYLENOL) 500 mg tablet Take 1,000 mg by mouth every six to eight (6-8) hours as needed for Pain. No current facility-administered medications for this visit. Medications Ordered Today   Medications    HYDROcodone-acetaminophen (NORCO) 5-325 mg per tablet     Sig: Take 1 Tab by mouth two (2) times a day for 30 days. Max Daily Amount: 2 Tabs.      Dispense:  60 Tab     Refill:  0         TEST DATA REVIEWED:     Lab Results   Component Value Date/Time    Hemoglobin A1c 7.6 (H) 07/02/2019 10:36 AM    Hemoglobin A1c, External 11.2 01/27/2016     Lab Results   Component Value Date/Time    Microalb/Creat ratio (ug/mg creat.) 63.6 (H) 02/12/2019 08:49 AM     Lab Results   Component Value Date/Time    TSH 2.89 12/26/2018 02:49 AM     No results found for: TIM Krishna      Lab Results   Component Value Date/Time    WBC 8.2 09/07/2019 08:44 AM    HGB 11.4 (L) 09/07/2019 08:44 AM    PLATELET 485 71/17/6532 08:44 AM     Lab Results   Component Value Date/Time    Sodium 142 09/07/2019 08:44 AM    Potassium 4.0 09/07/2019 08:44 AM    Chloride 107 09/07/2019 08:44 AM    CO2 27 09/07/2019 08:44 AM    BUN 15 09/07/2019 08:44 AM    Creatinine 1.78 (H) 09/07/2019 08:44 AM    Calcium 8.8 09/07/2019 08:44 AM    Magnesium 2.0 02/05/2019 01:42 AM    Phosphorus 3.8 02/05/2019 01:42 AM      Lab Results   Component Value Date/Time    AST (SGOT) 17 09/07/2019 08:44 AM    Alk.  phosphatase 102 09/07/2019 08:44 AM    Protein, total 6.6 09/07/2019 08:44 AM    Albumin 3.2 (L) 09/07/2019 08:44 AM    Globulin 3.4 09/07/2019 08:44 AM     Lab Results   Component Value Date/Time    Iron 39 02/04/2019 07:35 PM    TIBC 196 (L) 02/04/2019 07:35 PM    Iron % saturation 20 02/04/2019 07:35 PM    Ferritin 75 02/04/2019 07:35 PM

## 2019-11-20 NOTE — PATIENT INSTRUCTIONS
Orthostatic Hypotension: Care Instructions Your Care Instructions Orthostatic hypotension is a quick drop in blood pressure. It happens when you get up from sitting or lying down. You may feel faint, lightheaded, or dizzy. When a person sits up or stands up, the body changes the way it pumps blood. This can slow the flow of blood to the brain for a very short time. And that can make you feel lightheaded. Many medicines can cause this problem, especially in older people. Lack of fluids (dehydration) or illnesses such as diabetes or heart disease also can cause it. Follow-up care is a key part of your treatment and safety. Be sure to make and go to all appointments, and call your doctor if you are having problems. It's also a good idea to know your test results and keep a list of the medicines you take. How can you care for yourself at home? · Tell your doctor about any problems you have with your medicines. · If your doctor prescribes medicine to help prevent a low blood pressure problem, take it exactly as prescribed. Call your doctor if you think you are having a problem with your medicine. · Drink plenty of fluids, enough so that your urine is light yellow or clear like water. Choose water and other caffeine-free clear liquids. If you have kidney, heart, or liver disease and have to limit fluids, talk with your doctor before you increase the amount of fluids you drink. · Limit or avoid alcohol and caffeine. · Get up slowly from bed or after sitting for a long time. If you are in bed, roll to your side and swing your legs over the edge of the bed and onto the floor. Push your body up to a sitting position. Wait for a while before you slowly stand up. If you are dizzy or lightheaded, sit or lie down. When should you call for help? Call 911 anytime you think you may need emergency care. For example, call if: 
  · You passed out (lost consciousness).  Watch closely for changes in your health, and be sure to contact your doctor if: 
  · You do not get better as expected. Where can you learn more? Go to http://evelio-rosalinda.info/. Enter Y007 in the search box to learn more about \"Orthostatic Hypotension: Care Instructions. \" Current as of: April 9, 2019 Content Version: 12.2 © 2011-8367 [employer]. Care instructions adapted under license by [employer] (which disclaims liability or warranty for this information). If you have questions about a medical condition or this instruction, always ask your healthcare professional. [place] [address] any warranty or liability for your use of this information.

## 2019-11-21 ENCOUNTER — TELEPHONE (OUTPATIENT)
Dept: PALLATIVE CARE | Age: 79
End: 2019-11-21

## 2019-11-21 NOTE — TELEPHONE ENCOUNTER
Received message that patient's daughter would like to discuss a complaint. Attempted to contact her Don Hebert) at [phone] and left a message for her to call back.

## 2019-12-16 ENCOUNTER — DOCUMENTATION ONLY (OUTPATIENT)
Dept: FAMILY MEDICINE CLINIC | Age: 79
End: 2019-12-16

## 2019-12-16 NOTE — PROGRESS NOTES
[facility] [id number] Team Members: Dr. Rhona Kelley, Joseph Damon, MILTON; Griffin Amin, MILTON; Chester Alvarez, LONNIE; Kaushal Horton, LONNIE, Lis Reyes RN, SPENCER Castro  1940 / [id number]  male    Date of Initial Visit (Start of Care): 2/12/19  Date of last visit: 11/20/19    Diagnosis: Dementia with recent delirium, COPD,  chronic diastolic CHF, persistent afib, Type 2 diabetes with hypoglycemia, chronic anemia, hypothyroidism, depression    Patient status summary: Patient and POC discussed in IDT meeting for 60 day update. Homebound patient referred by Palliative Medicine Inpatient Team, John Paul Haddad NP to our services due to taxing effort to seek primary care needs in the community. DME/Supplies:  Bedside Commode       Primary Decision Maker (Active): [place]  Bakari - [phone]    Primary Decision Maker: Gena Cassidy - Son      Allergies   Allergen Reactions    Sulfa (Sulfonamide Antibiotics) Unknown (comments)       Nutritional Requirements:   Oral with supported meal preparation    Functional/Activity Level:  Limited ambulation with support of wheelchair and Wheelchair with assistance for transfers    Code Status: DNR    Safety Measures:   Fall risk, Self-care deficity and Smoker    Current Outpatient Medications   Medication Sig    HYDROcodone-acetaminophen (NORCO) 5-325 mg per tablet Take 1 Tab by mouth two (2) times a day for 30 days. Max Daily Amount: 2 Tabs.  b complex vitamins (SUPER B-50 COMPLEX PLUS) tablet Take 1 Tab by mouth daily.  glucose blood VI test strips (ONETOUCH ULTRA BLUE TEST STRIP) strip USE TO CHECK BLOOD SUGAR 3 TIMES DAILY. DIAGNOSIS CODE: E11.9    Insulin Needles, Disposable, (BD ULTRA-FINE MINI PEN NEEDLE) 31 gauge x 3/16\" ndle Use with syringes for insulin injections E11.9    ketoconazole (NIZORAL) 2 % topical cream Apply  to affected area daily.  Rub into feet, between toes and nail beds    QUEtiapine (SEROQUEL) 25 mg tablet TAKE 1 TAB BY MOUTH NIGHTLY. TAKE 1 TAB AT BEDTIME ALONG WITH 50 MG DOSE.  tamsulosin (FLOMAX) 0.4 mg capsule Take 1 Cap by mouth daily.  levothyroxine (SYNTHROID) 50 mcg tablet TAKE 1 TAB BY MOUTH DAILY (BEFORE BREAKFAST)    insulin glargine (LANTUS,BASAGLAR) 100 unit/mL (3 mL) inpn Inject 8 units every morning. (Patient taking differently: Inject 8 units SQ every morning.)    insulin lispro (HUMALOG U-100 INSULIN) 100 unit/mL injection 3 units sq for bs greater than 200 ; 5  untis sq for bs greater than 250,  6units for blood sugar greater than 300 ; call me for bs greater than 400 (Patient taking differently: 3 units sq for bs greater than 200 ; 5  untis sq for bs greater than 250,  6units for blood sugar greater than 300 ; call me for bs greater than 400  Indications: 4 units with lunch in addition to sliding scale)    DULoxetine (CYMBALTA) 60 mg capsule TAKE ONE CAPSULE BY MOUTH EVERY DAY    SENNA PLUS 8.6-50 mg per tablet TAKE 2 TABS BY MOUTH TWO (2) TIMES A DAY    Omeprazole delayed release (PRILOSEC D/R) 20 mg tablet Take 1 Tab by mouth daily.  lidocaine (LIDODERM) 5 % Apply patch to the affected area for 12 hours a day and remove for 12 hours a day.  QUEtiapine (SEROQUEL) 50 mg tablet Take 50 mg by mouth nightly.  midodrine (PROAMITINE) 5 mg tablet Take 5 mg by mouth three (3) times daily (with meals).  memantine ER (NAMENDA XR) 28 mg capsule Take 1 Cap by mouth daily.  aspirin 81 mg chewable tablet Take 1 Tab by mouth daily.  ondansetron (ZOFRAN ODT) 4 mg disintegrating tablet Take 1 Tab by mouth every six (6) hours as needed for Nausea.  acetaminophen (TYLENOL) 500 mg tablet Take 1,000 mg by mouth every six to eight (6-8) hours as needed for Pain. No current facility-administered medications for this visit.         The Plan of Care has been reviewed and updated by the Interdisciplinary Group (IDG) as frequently as the patient condition warrants. Plan of Care by Discipline:    1. Provider  Identify patient's health care goal(s), Reduction of polypharmacy within benefit/burden framework appropriate to age, function and disease state, Determine need for laboratory assessment based on patient disease status , Assess results of laboratory testing and adjust treatment plan as appropriate and Create and implement disease /symptom specific plan to manage high risk conditions / symptoms    2. Nursing  Review Health Maintenance with patient and provider; update as appropriate, Education for safety; falls and Skin assessment in patient's with limited mobility    3. Social Work  Review Emergency Preparedness Plan, Establish therapeutic relationship through in home visits and telephonic touch points and Assess for caregiver burden and intervene as psychosocially indicated    Plan of Care Orders / Action Items:    1. Chronic Pain - Controlled on current regimen  2. DM Type 2 - BG ranges between 100-300's - continue on current insulin  3. Orthostatic Hypertension - stable on midodrine    Health Maintenance   Topic Date Due    HEMOGLOBIN A1C Q6M  01/02/2020    FOOT EXAM Q1  01/31/2020 (Originally 10/10/2019)   [address]  03/08/2020 (Originally 12/11/2006)    EYE EXAM RETINAL OR DILATED  03[birth year]08/2020 (Originally 12/11/1950)    Pneumococcal 65+ years (1 of 1 - PPSV23) 11/20/2020 (Originally 12/11/2006)    Shingrix Vaccine Age 50> (1 of 2) 02/15/2025 (Originally 12/11/1991)    MICROALBUMIN Q1  02/12/2020    LIPID PANEL Q1  02/12/2020    MEDICARE YEARLY EXAM  03/08/2020    DTaP/Tdap/Td series (2 - Td) 09/07/2029    Influenza Age 9 to Adult  Completed         Estimated Visit Frequency: Monthly provider visits    The physician has reviewed and discussed the plan of care with the interdisciplinary group on 12/16/19 and agrees.

## 2020-01-02 ENCOUNTER — PATIENT MESSAGE (OUTPATIENT)
Dept: FAMILY MEDICINE CLINIC | Age: 80
End: 2020-01-02

## 2020-01-03 NOTE — TELEPHONE ENCOUNTER
From: Daquan Lao  To: Heidi Rodriguez MD  Sent: 1/2/2020 7:13 PM EST  Subject: Non-Urgent Medical Question    Claricesarah HolguinSravani. I thought we had scheduled an appointment for tomorrow at 11:00 but I don't see anything out there & I haven't received a phone call to verify the time. Can you have the  call me at [phone] so we can get an 11:00 appointment?   Thanks  Rob Meek

## 2020-01-15 ENCOUNTER — HOME VISIT (OUTPATIENT)
Dept: FAMILY MEDICINE CLINIC | Age: 80
End: 2020-01-15

## 2020-01-15 DIAGNOSIS — N18.4 CKD (CHRONIC KIDNEY DISEASE) STAGE 4, GFR 15-29 ML/MIN (HCC): ICD-10-CM

## 2020-01-15 DIAGNOSIS — E11.65 UNCONTROLLED TYPE 2 DIABETES MELLITUS WITH HYPERGLYCEMIA (HCC): ICD-10-CM

## 2020-01-15 DIAGNOSIS — F33.1 MODERATE RECURRENT MAJOR DEPRESSION (HCC): ICD-10-CM

## 2020-01-15 DIAGNOSIS — J44.9 CHRONIC OBSTRUCTIVE PULMONARY DISEASE, UNSPECIFIED COPD TYPE (HCC): ICD-10-CM

## 2020-01-15 DIAGNOSIS — G30.1 LATE ONSET ALZHEIMER'S DISEASE WITH BEHAVIORAL DISTURBANCE (HCC): ICD-10-CM

## 2020-01-15 DIAGNOSIS — I50.9 CONGESTIVE HEART FAILURE, UNSPECIFIED HF CHRONICITY, UNSPECIFIED HEART FAILURE TYPE (HCC): ICD-10-CM

## 2020-01-15 DIAGNOSIS — G89.4 CHRONIC PAIN SYNDROME: Primary | ICD-10-CM

## 2020-01-15 DIAGNOSIS — C61 PROSTATE CANCER (HCC): ICD-10-CM

## 2020-01-15 DIAGNOSIS — F02.818 LATE ONSET ALZHEIMER'S DISEASE WITH BEHAVIORAL DISTURBANCE (HCC): ICD-10-CM

## 2020-01-15 RX ORDER — PEN NEEDLE, DIABETIC 31 GX3/16"
NEEDLE, DISPOSABLE MISCELLANEOUS
Qty: 100 PEN NEEDLE | Refills: 1 | Status: SHIPPED | OUTPATIENT
Start: 2020-01-15 | End: 2020-07-15 | Stop reason: SDUPTHER

## 2020-01-15 RX ORDER — HYDROCODONE BITARTRATE AND ACETAMINOPHEN 5; 325 MG/1; MG/1
1 TABLET ORAL 2 TIMES DAILY
Qty: 60 TAB | Refills: 0 | Status: SHIPPED | OUTPATIENT
Start: 2020-01-15 | End: 2020-01-18

## 2020-01-17 VITALS
SYSTOLIC BLOOD PRESSURE: 132 MMHG | TEMPERATURE: 98.5 F | RESPIRATION RATE: 16 BRPM | HEART RATE: 70 BPM | OXYGEN SATURATION: 95 % | DIASTOLIC BLOOD PRESSURE: 76 MMHG

## 2020-01-17 RX ORDER — OMEPRAZOLE 20 MG/1
CAPSULE, DELAYED RELEASE ORAL
Qty: 90 CAP | Refills: 3 | Status: SHIPPED | OUTPATIENT
Start: 2020-01-17 | End: 2020-04-24

## 2020-01-18 NOTE — PROGRESS NOTES
Home Based Primary Care [facility]) & Supportive Services    [id number]      NOTE: Home Based Primary Care is a PROVIDER (MD/NP) based interdisciplinary practice (RN, LCSW, Pharmacy, Dietician) for patient's who cannot access (or have a taxing effort) primary / speciality medical care in an office setting. [facility] is NOT [employer] but works with [address] when there is a skilled need. Our MD/NPs are integral in Care Transitions; PLEASE CALL [phone] if this patient arrives in the Emergency Department or Hospital.          Date of Current Visit: 1/15/2020    Patient/Family present for Current Visit: Patient, daughter, son and care aide    Goals of Care as stated by the patient/family is: to be as happy and comfortable as possible    Preferences for hospitalization if that were to be necessary:  [] Patient DOES NOT WANT hospitalization; focus all treatments at home  [x] Patient WOULD WANT hospitalization for potentially reversible causes  Patient prefers hospitalization at: [facility]      Is this encounter billed on time:No    Total time: 40 min  Counseling / coordination time:  10 minutes on MVI to supplement poor food choices and options for diabetes  > 50% counseling / coordination?: No    ASSESSMENT & PLAN       Diagnoses and all orders for this visit:       Encounter Diagnoses     ICD-10-CM ICD-9-CM   1. Chronic pain syndrome G89.4 338.4   2. Late onset Alzheimer's disease with behavioral disturbance ([facility] [address] G30.1 331.0    F02.81 294.11   3. Chronic obstructive pulmonary disease, unspecified COPD type ([facility] J44.9 496   4. Congestive heart failure, unspecified HF chronicity, unspecified heart failure type (HCC) I50.9 428.0   5. Moderate recurrent major depression ([facility]) F33.1 296.32   6. Uncontrolled type 2 diabetes mellitus with hyperglycemia ([facility]) E11.65 250.02   7. CKD (chronic kidney disease) stage 4, GFR 15-29 ml/min ([facility]) N18.4 585.4   8. Prostate cancer ([facility] C61 185   1.   Well controlled on hydrocodone 1 to 2 tabs a day. Checked . Drug screen and pain agreement UTD. Patient has no constipation, not sedated. Pain is neuropathic and arthritic. Refilled lortab. 2.  Only eating potatoes and rice currently and has gained weight. MVI was added per family request.  Still with behaviors and difficult to work with. 3.  Currently stable, not smoking. Has not had an exacerbation in several years per family. 4.  No recent exacerbations. No meds. 5.  Family sees no indication of depression on cymbalta--will continue. 6.  BG's are 100 to 300's. Have been liberal with BG's because of dementia and patient not liking to have frequent BG checks. If consistently in 400's, family will call for me to adjust insulin. 7.  Avoid dehydration and nephrotoxic drugs. 8.  Family has elected not to return for Lupron injections or further evaluation and tx. Follow-up and Dispositions    · Return in about 2 months (around 3/15/2020). I have left a SUMMARY / Bernardo Duty from today's visit with the patient/family in the home    [address] Maintenance Due   Topic Date Due    HEMOGLOBIN A1C Q6M  01/02/2020    MICROALBUMIN Q1  02/12/2020    LIPID PANEL Q1  02/12/2020        CC & SUBJECTIVE including ROS & FUNCTION     Chief Complaint   Patient presents with    Follow Up Chronic Condition     dementia with behaviors, diabetes,         Tali Haley is a 67 y.o. with chronic medical conditions as listed in the patient's updated Problem List (see below)    Patient's pain well controlled on hydrocodone without falls or sedation. He has neuropathic pain in his feet and arthritic pain. He is only eating rice and potatoes for several months and refuses to eat anything else. His BG's are 100's to 300's. He is always in lower 100's in the am.  Has been refusing BG checks frequently so doesn't get SLS insulin all the time.   Midodrine is used 2-3 times a week when his BP is 90 and it often rebounds to 170. He is sleeping well at night. No falls. No constipation. No smoking, no wheezing. Continues to be difficult to direct and doesn't like to be bothered. His daugher-in-law comes and gives him a shower once a week--no one else can accomplish this. BP's are ranging from 944 to 270 systolic. No constipation. Incontinent of stool and urine now      Positive ROS findings: Patient refused to answer questions    The following systems were [] reviewed / [x] unable to be reviewed  Systems: constitutional, ears/nose/mouth/throat, respiratory, gastrointestinal, genitourinary, musculoskeletal, integumentary, neurologic, psychiatric, endocrine. PPS:40  Karnofsky:   Timed Up and Go (TUG):   Fall Risk Assessment, last 12 mths 2/12/2019   Able to walk? Yes   Fall in past 12 months? Yes   Fall with injury? Yes   Number of falls in past 12 months 2   Fall Risk Score 3       Current Durable Medical Equipment in Home:  [] Hospital Bed  [] Bedside Commode  [] Wheelchair  [] N[place]  [] [address]    Has a cane  [] Respiratory Support:    ADVANCE CARE PLANNING                                 The following information was updated I/ reviewed as accurate in Orders and the Advance Care Planning Navigator:  @SGE@     Primary Decision Maker (Active): [place] - Child - [phone]    Primary Decision Maker: Julio César Stroud - Son    Secondary Decision Maker: Benjamin Lawson - Other Relative - [phone]  Advance Care Planning 2/21/2019   Patient's Healthcare Decision Maker is: Legal Next of Kin   Confirm Advance Directive None   Patient Would Like to Complete Advance Directive Unable   Does the patient have other document types Do Not Resuscitate        VITAL SIGNS & PHYSICAL EXAM     Median Arm Circumference (inches):     Wt Readings from Last 3 Encounters:   11/20/19 159 lb 1.6 oz (72.2 kg)   11/05/19 156 lb 6.4 oz (70.9 kg)   10/29/19 155 lb 12.8 oz (70.7 kg)     Temp Readings from Last 3 Encounters: 11/20/19 97.4 °F (36.3 °C) (Axillary)   11/12/19 98.6 °F (37 °C) (Temporal)   11/05/19 98.9 °F (37.2 °C) (Temporal)     BP Readings from Last 3 Encounters:   11/20/19 102/64   11/12/19 124/70   11/05/19 130/70     Pulse Readings from Last 3 Encounters:   11/20/19 97   11/12/19 90   11/05/19 95            Pacemaker:  ICD:  Feeding Tube:  Urine Catheter:  Other:     Physical Exam   Constitutional: No distress. Frail, thiin   HENT:   Head: Normocephalic and atraumatic. Right Ear: External ear normal.   Left Ear: External ear normal.   Mouth/Throat: Oropharynx is clear and moist.   Eyes: Conjunctivae are normal. Right eye exhibits no discharge. Left eye exhibits no discharge. No scleral icterus. Neck: No JVD present. No tracheal deviation present. No thyromegaly present. Cardiovascular: Normal rate, regular rhythm, normal heart sounds and intact distal pulses. Exam reveals no gallop and no friction rub. No murmur heard. Pulmonary/Chest: Effort normal and breath sounds normal. No respiratory distress. He has no wheezes. He has no rales. He exhibits no tenderness. Abdominal: Soft. Bowel sounds are normal. He exhibits no distension and no mass. There is no tenderness. There is no rebound and no guarding. Musculoskeletal:         General: No deformity or edema. Lymphadenopathy:     He has no cervical adenopathy. Neurological: He is alert. Oriented X 1   Skin: Skin is warm and dry. No rash noted. He is not diaphoretic. No erythema. No pallor. Psychiatric:   Agitates easily, does not want to interact   Vitals reviewed.          PROBLEM LIST / PAST MEDICAL / SOCIAL HX     Patient Active Problem List   Diagnosis Code    Complicated grief S96.37, Z63.4    Non compliance w medication regimen Z91.14    Cognitive disorder F09    Moderate recurrent major depression ([facility] [address] [facility] F33.1    Uncontrolled diabetes mellitus with hyperglycemia (HCC) E11.65    Syncope R55    Volume depletion E86.9    Hyponatremia E87.1    Urinary incontinence R32    CAD (coronary artery disease) I25.10    COPD (chronic obstructive pulmonary disease) ([facility]) J44.9    Orthostatic hypotension I95.1    CHF (congestive heart failure) ([facility]) I50.9    NSTEMI (non-ST elevated myocardial infarction) ([facility]) I21.4    Hypokalemia E87.6    Prostate cancer ([facility] C61      Family History   Problem Relation Age of Onset    Diabetes Mother     Diabetes Brother      Social History     Socioeconomic History    Marital status:      Spouse name: Not on file    Number of children: Not on file    Years of education: Not on file    Highest education level: Not on file   Tobacco Use    Smoking status: Former Smoker    Smokeless tobacco: Never Used    Tobacco comment: 1-2 cigars daily   Substance and Sexual Activity    Alcohol use: No     Alcohol/week: 0.0 standard drinks    Drug use: No        MEDICATIONS & PRESCRIPTIONS     Allergies   Allergen Reactions    Sulfa (Sulfonamide Antibiotics) Unknown (comments)      Current Outpatient Medications   Medication Sig    HYDROcodone-acetaminophen (NORCO) 5-325 mg per tablet Take 1 Tab by mouth two (2) times a day for 3 days. Max Daily Amount: 2 Tabs.  omeprazole (PRILOSEC) 20 mg capsule TAKE 1 CAP BY MOUTH DAILY (BEFORE BREAKFAST)    Insulin Needles, Disposable, 31 gauge x 3/16\" ndle Use with lantus 8 units every am and lispro tid with meals per sliding scale    b complex vitamins (SUPER B-50 COMPLEX PLUS) tablet Take 1 Tab by mouth daily.  glucose blood VI test strips (ONETOUCH ULTRA BLUE TEST STRIP) strip USE TO CHECK BLOOD SUGAR 3 TIMES DAILY. DIAGNOSIS CODE: E11.9    ketoconazole (NIZORAL) 2 % topical cream Apply  to affected area daily. Rub into feet, between toes and nail beds    QUEtiapine (SEROQUEL) 25 mg tablet TAKE 1 TAB BY MOUTH NIGHTLY. TAKE 1 TAB AT BEDTIME ALONG WITH 50 MG DOSE.  tamsulosin (FLOMAX) 0.4 mg capsule Take 1 Cap by mouth daily.     levothyroxine (SYNTHROID) 50 mcg tablet TAKE 1 TAB BY MOUTH DAILY (BEFORE BREAKFAST)    insulin glargine (LANTUS,BASAGLAR) 100 unit/mL (3 mL) inpn Inject 8 units every morning. (Patient taking differently: Inject 8 units SQ every morning.)    insulin lispro (HUMALOG U-100 INSULIN) 100 unit/mL injection 3 units sq for bs greater than 200 ; 5  untis sq for bs greater than 250,  6units for blood sugar greater than 300 ; call me for bs greater than 400 (Patient taking differently: 3 units sq for bs greater than 200 ; 5  untis sq for bs greater than 250,  6units for blood sugar greater than 300 ; call me for bs greater than 400  Indications: 4 units with lunch in addition to sliding scale)    DULoxetine (CYMBALTA) 60 mg capsule TAKE ONE CAPSULE BY MOUTH EVERY DAY    SENNA PLUS 8.6-50 mg per tablet TAKE 2 TABS BY MOUTH TWO (2) TIMES A DAY    lidocaine (LIDODERM) 5 % Apply patch to the affected area for 12 hours a day and remove for 12 hours a day.  QUEtiapine (SEROQUEL) 50 mg tablet Take 50 mg by mouth nightly.  midodrine (PROAMITINE) 5 mg tablet Take 5 mg by mouth three (3) times daily (with meals).  memantine ER (NAMENDA XR) 28 mg capsule Take 1 Cap by mouth daily.  aspirin 81 mg chewable tablet Take 1 Tab by mouth daily.  ondansetron (ZOFRAN ODT) 4 mg disintegrating tablet Take 1 Tab by mouth every six (6) hours as needed for Nausea.  acetaminophen (TYLENOL) 500 mg tablet Take 1,000 mg by mouth every six to eight (6-8) hours as needed for Pain. No current facility-administered medications for this visit. Medications Ordered Today   Medications    HYDROcodone-acetaminophen (NORCO) 5-325 mg per tablet     Sig: Take 1 Tab by mouth two (2) times a day for 3 days. Max Daily Amount: 2 Tabs.      Dispense:  60 Tab     Refill:  0         TEST DATA REVIEWED:     Lab Results   Component Value Date/Time    Hemoglobin A1c 7.6 (H) 07/02/2019 10:36 AM    Hemoglobin A1c, External 11.2 01/27/2016     Lab Results   Component Value Date/Time    Microalb/Creat ratio (ug/mg creat.) 63.6 (H) 02/12/2019 08:49 AM     Lab Results   Component Value Date/Time    TSH 2.89 12/26/2018 02:49 AM     No results found for: Katharine Cartagena [id number]      Lab Results   Component Value Date/Time    WBC 8.2 09/07/2019 08:44 AM    HGB 11.4 (L) 09/07/2019 08:44 AM    PLATELET 698 91/05/1510 08:44 AM     Lab Results   Component Value Date/Time    Sodium 142 09/07/2019 08:44 AM    Potassium 4.0 09/07/2019 08:44 AM    Chloride 107 09/07/2019 08:44 AM    CO2 27 09/07/2019 08:44 AM    BUN 15 09/07/2019 08:44 AM    Creatinine 1.78 (H) 09/07/2019 08:44 AM    Calcium 8.8 09/07/2019 08:44 AM    Magnesium 2.0 02/05/2019 01:42 AM    Phosphorus 3.8 02/05/2019 01:42 AM      Lab Results   Component Value Date/Time    AST (SGOT) 17 09/07/2019 08:44 AM    Alk.  phosphatase 102 09/07/2019 08:44 AM    Protein, total 6.6 09/07/2019 08:44 AM    Albumin 3.2 (L) 09/07/2019 08:44 AM    Globulin 3.4 09/07/2019 08:44 AM     Lab Results   Component Value Date/Time    Iron 39 02/04/2019 07:35 PM    TIBC 196 (L) 02/04/2019 07:35 PM    Iron % saturation 20 02/04/2019 07:35 PM    Ferritin 75 02/04/2019 07:35 PM

## 2020-01-18 NOTE — PATIENT INSTRUCTIONS
Helping a Person With Alzheimer's Disease: Care Instructions Your Care Instructions Alzheimer's disease is a type of dementia. It affects memory, intelligence, judgment, language, and behavior. It is not clear what causes this disease. But it is the most common form of dementia in older adults. It may take many years to develop. Alzheimer's disease is different than mild memory loss that occurs with aging. Family members usually notice symptoms first. But the person also may realize that something is wrong. Follow-up care is a key part of your loved one's treatment and safety. Be sure to make and go to all appointments, and call your doctor if your loved one is having problems. It's also a good idea to know your loved one's test results and keep a list of the medicines he or she takes. How can you care for your loved one at home? · Develop a routine. The person will feel less frustrated or confused with a clear, simple daily plan. Remind him or her about important facts and events. · Be patient. It may take longer for the person to complete a task than it used to. · Help the person eat a balanced diet. Serve plenty of whole grains, fruits, and vegetables every day. If the person is not eating well at mealtimes, give snacks at midmorning and in the afternoon. Offer drinks such as Boost, Ensure, or Sustacal if he or she is losing weight. · Encourage exercise. Walking and other activity may slow the decline of mental ability. Help the person keep an active mind. Encourage hobbies such as reading and crossword puzzles. · Take steps to help if the person is sundowning. This is the restless behavior and trouble with sleeping that may occur in late afternoon and at night. Try not to let the person nap during the day. Offer a glass of warm milk or caffeine-free tea before bedtime. · Ask family members and friends for help. You may need breaks where others can help care for the person. · Talk to the person's doctor about what resources are available for help in your area. · Review all of the person's medicines with his or her doctor. · For as long as the person is able, allow him or her to make decisions about activities, food, clothing, and other choices. Let the person be independent, even if tasks take more time or are not done perfectly. Tailor tasks to the person's abilities. For example, if cooking is no longer safe, ask for other help. He or she can help set the table or make simple dishes such as a salad. When the person needs help, offer it gently. Keeping safe · Make your home (or the person's home) safe. Tack down rugs, and put no-slip tape in the tub. Install handrails, and put safety switches on stoves and appliances. Keep rooms free of clutter. Make sure walkways around furniture are clear. Do not move furniture around, because the person may become confused. · Use locks on doors and cupboards. Lock up knives, scissors, medicines, cleaning supplies, and other dangerous things. · Do not let the person drive or cook if he or she cannot do it safely. A person with Alzheimer's should not drive unless he or she is able to pass an on-road driving test. Your state 's license bureau can do a driving test if there is any question. · Get medical alert jewelry for the person so you can be contacted if he or she wanders away. If possible, provide a safe place for wandering, such as an enclosed yard or garden. When should you call for help? Call 911 anytime you think you may need emergency care.  For example, call if: 
  · A person who has Alzheimer's disease has disappeared.  
  · A person who has Alzheimer's disease is seriously injured.  
[facility] your doctor now or seek immediate medical care if: 
  · The person you are caring for suddenly sees or hears things that are not there (hallucinates).  
  · The person you are caring for has a sudden, drastic change in his or her behavior.  
 Watch closely for changes in your loved one's health, and be sure to contact the doctor if: 
  · A person who has Alzheimer's disease gradually gets worse or has symptoms that could cause injury.  
  · You need help caring for a person with Alzheimer's disease.  
  · The person has problems with his or her medicine. Where can you learn more? Go to http://evelio-rosalinda.info/. Enter F260 in the search box to learn more about \"Helping a Person With Alzheimer's Disease: Care Instructions. \" Current as of: May 28, 2019 Content Version: 12.2 © 5146-9589 [employer]. Care instructions adapted under license by [employer] (which disclaims liability or warranty for this information). If you have questions about a medical condition or this instruction, always ask your healthcare professional. [address] any warranty or liability for your use of this information.

## 2020-02-06 RX ORDER — LEVOTHYROXINE SODIUM 50 UG/1
TABLET ORAL
Qty: 90 TAB | Refills: 1 | Status: SHIPPED | OUTPATIENT
Start: 2020-02-06 | End: 2020-07-31

## 2020-03-11 ENCOUNTER — HOME VISIT (OUTPATIENT)
Dept: FAMILY MEDICINE CLINIC | Age: 80
End: 2020-03-11

## 2020-03-11 VITALS — OXYGEN SATURATION: 97 % | RESPIRATION RATE: 20 BRPM | HEART RATE: 75 BPM

## 2020-03-11 DIAGNOSIS — G30.1 LATE ONSET ALZHEIMER'S DISEASE WITH BEHAVIORAL DISTURBANCE (HCC): ICD-10-CM

## 2020-03-11 DIAGNOSIS — C61 PROSTATE CANCER (HCC): ICD-10-CM

## 2020-03-11 DIAGNOSIS — E11.65 UNCONTROLLED TYPE 2 DIABETES MELLITUS WITH HYPERGLYCEMIA (HCC): ICD-10-CM

## 2020-03-11 DIAGNOSIS — I50.9 CONGESTIVE HEART FAILURE, UNSPECIFIED HF CHRONICITY, UNSPECIFIED HEART FAILURE TYPE (HCC): ICD-10-CM

## 2020-03-11 DIAGNOSIS — F02.818 LATE ONSET ALZHEIMER'S DISEASE WITH BEHAVIORAL DISTURBANCE (HCC): ICD-10-CM

## 2020-03-11 DIAGNOSIS — Z00.00 MEDICARE ANNUAL WELLNESS VISIT, SUBSEQUENT: ICD-10-CM

## 2020-03-11 DIAGNOSIS — G89.29 OTHER CHRONIC PAIN: Primary | ICD-10-CM

## 2020-03-11 DIAGNOSIS — N18.4 CKD (CHRONIC KIDNEY DISEASE) STAGE 4, GFR 15-29 ML/MIN (HCC): ICD-10-CM

## 2020-03-11 DIAGNOSIS — Z13.39 SCREENING FOR ALCOHOLISM: ICD-10-CM

## 2020-03-11 DIAGNOSIS — J44.9 CHRONIC OBSTRUCTIVE PULMONARY DISEASE, UNSPECIFIED COPD TYPE (HCC): ICD-10-CM

## 2020-03-11 DIAGNOSIS — I95.1 ORTHOSTATIC HYPOTENSION: ICD-10-CM

## 2020-03-11 DIAGNOSIS — R41.82 ALTERED MENTAL STATUS, UNSPECIFIED ALTERED MENTAL STATUS TYPE: ICD-10-CM

## 2020-03-11 RX ORDER — MEMANTINE HYDROCHLORIDE 28 MG/1
28 CAPSULE, EXTENDED RELEASE ORAL DAILY
Qty: 90 CAP | Refills: 3 | COMMUNITY
Start: 2020-03-11 | End: 2020-03-13

## 2020-03-11 RX ORDER — HYDROCODONE BITARTRATE AND ACETAMINOPHEN 5; 325 MG/1; MG/1
1 TABLET ORAL 2 TIMES DAILY
Qty: 60 TAB | Refills: 0 | Status: SHIPPED | OUTPATIENT
Start: 2020-03-11 | End: 2020-04-10

## 2020-03-11 RX ORDER — CIPROFLOXACIN 250 MG/1
250 TABLET, FILM COATED ORAL EVERY 12 HOURS
Qty: 20 TAB | Refills: 0 | Status: SHIPPED | OUTPATIENT
Start: 2020-03-11 | End: 2020-03-21

## 2020-03-11 NOTE — PATIENT INSTRUCTIONS
Altered Mental Status: Care Instructions Your Care Instructions Altered mental status is a change in how well your brain is working. As a result, you may be confused, be less alert than usual, or act in odd ways. This may include seeing or hearing things that aren't really there (hallucinations). A mental status change has many possible causes. For example, it may be the result of an infection, an imbalance of chemicals in the body, or a chronic disease such as diabetes or COPD. It can also be caused by things such as a head injury, taking certain medicines, or using alcohol or drugs. The doctor may do tests to look for the cause. These tests may include urine tests, blood tests, and imaging tests such as a CT scan. Sometimes a clear cause isn't found. But tests can help the doctor rule out a serious cause of your symptoms. A change in mental status can be scary. But mental status will often return to normal when the cause is treated. So it is important to get any follow-up testing or treatment the doctor has suggested. The doctor has checked you carefully, but problems can develop later. If you notice any problems or new symptoms, get medical treatment right away. Follow-up care is a key part of your treatment and safety. Be sure to make and go to all appointments, and call your doctor if you are having problems. It's also a good idea to know your test results and keep a list of the medicines you take. How can you care for yourself at home? · Be safe with medicines. Take your medicines exactly as prescribed. Call your doctor if you think you are having a problem with your medicine. · Have another adult stay with you until you are better. This can help keep you safe. Ask that person to watch for signs that your mental status is getting worse. When should you call for help? Call 911 anytime you think you may need emergency care. For example, call if: 
  · You passed out (lost consciousness).  Call your doctor now or seek immediate medical care if: 
  · Your mental status is getting worse.  
  · You have new symptoms, such as a fever, chills, or shortness of breath.  
  · You do not feel safe.  
 Watch closely for changes in your health, and be sure to contact your doctor if: 
  · You do not get better as expected. Where can you learn more? Go to http://evelio-rosalinda.info/. Enter G962 in the search box to learn more about \"Altered Mental Status: Care Instructions. \" Current as of: March 28, 2019 Content Version: 12.2 © 5067-0028 [employer]. Care instructions adapted under license by [employer] (which disclaims liability or warranty for this information). If you have questions about a medical condition or this instruction, always ask your healthcare professional. [address] any warranty or liability for your use of this information. Medicare Wellness Visit, Male The best way to live healthy is to have a lifestyle where you eat a well-balanced diet, exercise regularly, limit alcohol use, and quit all forms of tobacco/nicotine, if applicable. Regular preventive services are another way to keep healthy. Preventive services (vaccines, screening tests, monitoring & exams) can help personalize your care plan, which helps you manage your own care. Screening tests can find health problems at the earliest stages, when they are easiest to treat. [facility] follows the current, evidence-based guidelines published by the [facility] [place] (USPSTF) when recommending preventive services for our patients. Because we follow these guidelines, sometimes recommendations change over time as research supports it. (For example, a prostate screening blood test is no longer routinely recommended for men with no symptoms).   Of course, you and your doctor may decide to screen more often for some diseases, based on your risk and co-morbidities (chronic disease you are already diagnosed with). Preventive services for you include: - Medicare offers their members a free annual wellness visit, which is time for you and your primary care provider to discuss and plan for your preventive service needs. Take advantage of this benefit every year! 
-All adults over age 72 should receive the recommended pneumonia vaccines. Current USPSTF guidelines recommend a series of two vaccines for the best pneumonia protection.  
-All adults should have a flu vaccine yearly and tetanus vaccine every 10 years. 
-All adults age 48 and older should receive the shingles vaccines (series of two vaccines). -All adults age 38-68 who are overweight should have a diabetes screening test once every three years.  
-Other screening tests & preventive services for persons with diabetes include: an eye exam to screen for diabetic retinopathy, a kidney function test, a foot exam, and stricter control over your cholesterol.  
-Cardiovascular screening for adults with routine risk involves an electrocardiogram (ECG) at intervals determined by the provider.  
-Colorectal cancer screening should be done for adults age 54-65 with no increased risk factors for colorectal cancer. There are a number of acceptable methods of screening for this type of cancer. Each test has its own benefits and drawbacks. Discuss with your provider what is most appropriate for you during your annual wellness visit. The different tests include: colonoscopy (considered the best screening method), a fecal occult blood test, a fecal DNA test, and sigmoidoscopy. 
-All adults born between [facility] should be screened once for Hepatitis C. 
-An Abdominal Aortic Aneurysm (AAA) Screening is recommended for men age 73-68 who has ever smoked in their lifetime. Here is a list of your current Health Maintenance items (your personalized list of preventive services) with a due date: 
Health Maintenance Due Topic Date Due Юлия Bradshaw Eye Exam  12/11/1950  Glaucoma Screening   12/11/2006 Юлия Bradshaw Diabetic Foot Care  10/10/2019  Albumin Urine Test  02/12/2020 Юлия Bradshaw Annual Well Visit  03/08/2020
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
## 2020-03-13 ENCOUNTER — PATIENT MESSAGE (OUTPATIENT)
Dept: FAMILY MEDICINE CLINIC | Age: 80
End: 2020-03-13

## 2020-03-13 DIAGNOSIS — F02.818 LATE ONSET ALZHEIMER'S DISEASE WITH BEHAVIORAL DISTURBANCE (HCC): ICD-10-CM

## 2020-03-13 DIAGNOSIS — G30.1 LATE ONSET ALZHEIMER'S DISEASE WITH BEHAVIORAL DISTURBANCE (HCC): ICD-10-CM

## 2020-03-13 RX ORDER — MEMANTINE HYDROCHLORIDE 28 MG/1
CAPSULE, EXTENDED RELEASE ORAL
Qty: 90 CAP | Refills: 3 | Status: SHIPPED | OUTPATIENT
Start: 2020-03-13 | End: 2021-02-22

## 2020-03-16 ENCOUNTER — TELEPHONE (OUTPATIENT)
Dept: PALLATIVE CARE | Age: 80
End: 2020-03-16

## 2020-03-16 NOTE — TELEPHONE ENCOUNTER
Spoke to Dorys Tracey to review her request for Vitamin D and iron for . Luz Martino from [employer] last visit. Informed her I would review this with Abhay Hanson today and get back with .

## 2020-03-16 NOTE — TELEPHONE ENCOUNTER
Vandana Xie called and stated that she has sent several messages through the [id number] system and none have been answered. She would like to speak directly to [facility] regarding her father.

## 2020-04-04 ENCOUNTER — DOCUMENTATION ONLY (OUTPATIENT)
Dept: PALLATIVE CARE | Age: 80
End: 2020-04-04

## 2020-04-04 NOTE — PROGRESS NOTES
On call note. I was paged by patient daughter to refill Insulin lantus 8 units every morning . Called in 3 pens # 90 days supply to [facility] pharmacy [phone].

## 2020-04-05 DIAGNOSIS — F02.818 LATE ONSET ALZHEIMER'S DISEASE WITH BEHAVIORAL DISTURBANCE (HCC): ICD-10-CM

## 2020-04-05 DIAGNOSIS — G30.1 LATE ONSET ALZHEIMER'S DISEASE WITH BEHAVIORAL DISTURBANCE (HCC): ICD-10-CM

## 2020-04-05 RX ORDER — QUETIAPINE FUMARATE 25 MG/1
25 TABLET, FILM COATED ORAL
Qty: 90 TAB | Refills: 1 | Status: SHIPPED | OUTPATIENT
Start: 2020-04-05 | End: 2021-02-22

## 2020-04-24 DIAGNOSIS — K21.9 GASTROESOPHAGEAL REFLUX DISEASE WITHOUT ESOPHAGITIS: Primary | ICD-10-CM

## 2020-04-24 RX ORDER — OMEPRAZOLE 20 MG/1
20 CAPSULE, DELAYED RELEASE ORAL DAILY
Qty: 90 CAP | Refills: 3 | Status: SHIPPED | OUTPATIENT
Start: 2020-04-24 | End: 2020-04-24 | Stop reason: SDUPTHER

## 2020-04-24 RX ORDER — OMEPRAZOLE 20 MG/1
20 CAPSULE, DELAYED RELEASE ORAL DAILY
Qty: 90 CAP | Refills: 3 | Status: SHIPPED | OUTPATIENT
Start: 2020-04-24 | End: 2021-01-01 | Stop reason: SDUPTHER

## 2020-05-06 ENCOUNTER — DOCUMENTATION ONLY (OUTPATIENT)
Dept: FAMILY MEDICINE CLINIC | Age: 80
End: 2020-05-06
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
## 2020-05-13 ENCOUNTER — TELEPHONE (OUTPATIENT)
Dept: PALLATIVE CARE | Age: 80
End: 2020-05-13

## 2020-05-13 ENCOUNTER — NURSE NAVIGATOR (OUTPATIENT)
Dept: FAMILY MEDICINE CLINIC | Age: 80
End: 2020-05-13

## 2020-05-13 DIAGNOSIS — N39.0 RECURRENT UTI: Primary | ICD-10-CM

## 2020-05-13 RX ORDER — LEVOFLOXACIN 250 MG/1
250 TABLET ORAL DAILY
Qty: 10 TAB | Refills: 0 | Status: SHIPPED | OUTPATIENT
Start: 2020-05-13 | End: 2020-05-23

## 2020-05-13 NOTE — TELEPHONE ENCOUNTER
Returned call for Deshawn Zaragoza re: Deshawn Zaragoza wanting a script for Mr Papito's UTI. No answer on phone left a voicemail requesting a call back for more information about symptoms the patient may be having so I can share the information with the provider. Yahir 53 returned call stating Mr Giuliano Driver is confused and acting \"out of it\"  His urine smells very strong. She also wanted to remind [facility] DAKSHA CHILDRESS the last antibiotic ordered upset his stomach and had to be stopped. Informed her nurse would share this information with [facility] Chela CHILDRESS and would have medication called to pharmacy later today.

## 2020-05-14 ENCOUNTER — TELEPHONE (OUTPATIENT)
Dept: FAMILY MEDICINE CLINIC | Age: 80
End: 2020-05-14

## 2020-05-14 NOTE — TELEPHONE ENCOUNTER
Pt's daughter called again this morning asking for an antibiotic to be called in to pt's pharmacy [facility]. She said she called this morning and nothing was there for him.

## 2020-05-14 NOTE — TELEPHONE ENCOUNTER
Returned call from pts dtr Alexis Caballero letting her know antibiotic Levaquin was called in yesterday for Mr Carmelita Mahmood at the requested pharmacy.

## 2020-05-18 ENCOUNTER — TELEPHONE (OUTPATIENT)
Dept: FAMILY MEDICINE CLINIC | Age: 80
End: 2020-05-18

## 2020-05-18 ENCOUNTER — HOME VISIT (OUTPATIENT)
Dept: FAMILY MEDICINE CLINIC | Age: 80
End: 2020-05-18

## 2020-05-18 VITALS
DIASTOLIC BLOOD PRESSURE: 70 MMHG | TEMPERATURE: 98 F | SYSTOLIC BLOOD PRESSURE: 126 MMHG | RESPIRATION RATE: 17 BRPM | HEART RATE: 72 BPM

## 2020-05-18 DIAGNOSIS — E11.65 UNCONTROLLED TYPE 2 DIABETES MELLITUS WITH HYPERGLYCEMIA (HCC): ICD-10-CM

## 2020-05-18 DIAGNOSIS — C61 PROSTATE CANCER (HCC): ICD-10-CM

## 2020-05-18 DIAGNOSIS — J44.9 CHRONIC OBSTRUCTIVE PULMONARY DISEASE, UNSPECIFIED COPD TYPE (HCC): ICD-10-CM

## 2020-05-18 DIAGNOSIS — I50.9 CONGESTIVE HEART FAILURE, UNSPECIFIED HF CHRONICITY, UNSPECIFIED HEART FAILURE TYPE (HCC): ICD-10-CM

## 2020-05-18 DIAGNOSIS — G89.29 OTHER CHRONIC PAIN: ICD-10-CM

## 2020-05-18 DIAGNOSIS — G30.1 LATE ONSET ALZHEIMER'S DISEASE WITH BEHAVIORAL DISTURBANCE (HCC): ICD-10-CM

## 2020-05-18 DIAGNOSIS — Z71.89 ACP (ADVANCE CARE PLANNING): ICD-10-CM

## 2020-05-18 DIAGNOSIS — I95.1 ORTHOSTATIC HYPOTENSION: ICD-10-CM

## 2020-05-18 DIAGNOSIS — G89.4 CHRONIC PAIN SYNDROME: Primary | ICD-10-CM

## 2020-05-18 DIAGNOSIS — F02.818 LATE ONSET ALZHEIMER'S DISEASE WITH BEHAVIORAL DISTURBANCE (HCC): ICD-10-CM

## 2020-05-18 RX ORDER — HYDROCODONE BITARTRATE AND ACETAMINOPHEN 5; 325 MG/1; MG/1
1 TABLET ORAL
Qty: 60 TAB | Refills: 0 | Status: SHIPPED | OUTPATIENT
Start: 2020-05-18 | End: 2020-05-21

## 2020-05-18 NOTE — TELEPHONE ENCOUNTER
Returned [employer], Cherelle Nelson has sent in electronically the pain medicine for Mr. Obinna Cheekving and should be able to be picked up this afternoon.

## 2020-05-19 NOTE — PATIENT INSTRUCTIONS
Learning About Being High-Risk for COVID-19 Who is at high risk? COVID-19 causes a mild illness in many people who have it. But certain things may increase your risk for more serious illness. These include: · Being age 72 or older. · Smoking. · Living in a long-term care facility. · Having ongoing serious health issues. Some examples are: 
? Chronic lung disease or asthma. ? Heart problems. ? A weakened immune system. ? Cancer treatment. ? Diabetes. This is not a complete list. If you have a chronic health problem, ask your doctor if you should take extra precautions during the outbreak. If you are pregnant, it's safest to consider yourself at higher risk. It's not known yet whether COVID-19 is dangerous for you or your baby. But pregnancy increases the risk for serious illness from viruses similar to COVID-19. How do you stay safe? · Stay home. ? Stay home as much as you can. This may be the easiest way to avoid exposure, as long as no one else in your household has the virus. ? If there are a lot of COVID-19 cases in your community, do not leave your home except to seek medical care. ? Limit visitors right now. It's especially important to avoid contact with anyone who is sick or who might have been exposed. Remember that people may have been exposed without knowing it or having any symptoms. ? Have enough food, medicines, and other supplies on hand so that you don't have to go out. Try some of these options if you don't have what you need. ? Use delivery and takeout services for groceries and meals. ? Have a healthy family member, friend, or neighbor shop for you. ? Ask your doctor for extra prescription medicine. ? Routinely clean and disinfect high-touch surfaces. These include countertops, faucets, door handles, doorknobs, and phones. ? Do not travel. · Wash your hands often and well. ? Wash your hands often, especially after you cough or sneeze.  Use soap and water, and scrub for at least 20 seconds. If soap and water aren't available, use an alcohol-based hand . · Be extra careful if you have to go out. ? Avoid crowds and crowded places. Try to keep 6 feet of space between yourself and others. ? Don't use public transportation, ride-shares, or taxis unless you have no choice. ? Try not to touch things that many other people have touched. Door handles, elevator buttons, shopping cart handles, and handrails on escalators get a lot of touches. ? Carry tissues or paper towels with you. If you must touch something, you'll be able to protect your hands. ? Don't shake hands with anyone. Try a friendly wave instead. ? Don't touch your face, and wash your hands often. ? Wash your hands again as soon as you get home. · Know when to call your doctor. ? Call a doctor if you have symptoms of COVID-19 (fever, cough, shortness of breath). If you are told to get testing or care and must go out, wear a cloth face cover. Current as of: April 15, 2020               Content Version: 12.4 © 6756-4268 [employer]. Care instructions adapted under license by your healthcare professional. If you have questions about a medical condition or this instruction, always ask your healthcare professional. [address] any warranty or liability for your use of this information.

## 2020-05-19 NOTE — PROGRESS NOTES
Home Based Primary Care [facility]) [facility]    [phone]      NOTE: Home Based Primary Care is a PROVIDER (MD/NP) based interdisciplinary practice (RN, LCSW, Pharmacy, Dietician) for patient's who cannot access (or have a taxing effort) primary / speciality medical care in an office setting. [facility] is NOT [employer] but works with [address] when there is a skilled need. Our MD/NPs are integral in Care Transitions; PLEASE CALL [phone] if this patient arrives in the Emergency Department or Hospital.          Date of Current Visit: 5/18/2020    Patient/Family present for Current Visit: Patient, daughter, and care aide    Goals of Care as stated by the patient/family is: to be as happy and comfortable as possible    Preferences for hospitalization if that were to be necessary:  [] Patient DOES NOT WANT hospitalization; focus all treatments at home  [x] Patient WOULD WANT hospitalization for potentially reversible causes  Patient prefers hospitalization at: [facility]      Is this encounter billed on time:No    Total time: 40 min  Counseling / coordination time:   10 minutes on incontinence  > 50% counseling / coordination?: No    ASSESSMENT & PLAN       Diagnoses and all orders for this visit:       Encounter Diagnoses     ICD-10-CM ICD-9-CM   1. Chronic pain syndrome G89.4 338.4   2. Late onset Alzheimer's disease with behavioral disturbance ([facility] G30.1 331.0    F02.81 294.11   3. Other chronic pain G89.29 338.29   4. Uncontrolled type 2 diabetes mellitus with hyperglycemia ([facility]) E11.65 250.02   5. Orthostatic hypotension I95.1 458.0   6. Chronic obstructive pulmonary disease, unspecified COPD type ([facility] J44.9 496   7. Congestive heart failure, unspecified HF chronicity, unspecified heart failure type ([facility]) I50.9 428.0   8. Prostate cancer ([facility] C61 185   1, 3. Well controlled on hydrocodone 1 to 2 tabs a day. Checked . Patient has no constipation, not sedated.   Pain is neuropathic and arthritic. Refilled lortab. 2.  Advanced. Patient has a lot of behaviors. Continue seroqul, memantine. It is almost impossible to do exams on the patient or get vital signs or draw blood    4.     BG's are 100 to 300's. Have been liberal with BG's because of dementia and patient not liking to have frequent BG checks. If consistently in 400's, family will call for me to adjust insulin. 5.  Patient receives midodrine when systolic BP below 329. No recent falls or synncope. 6.  No wheezing recently. No nebs in six months. Continues to smoke cigars on occasion. 7.  No exacerbations in several years. No betablocker or ace/arb secondary to [facility] or diuretics. 8.  Patient has elected not to return to urology for lupron injections secondary to behaviors and advanced dementia. Follow-up and Dispositions    · Return in about 2 months (around 7/18/2020). I have left a SUMMARY / INSTRUCTIONS from today's visit with the patient/family in the home    HEALTH MAINTENANCE     There are no preventive care reminders to display for this patient. CC & SUBJECTIVE including ROS & FUNCTION     Chief Complaint   Patient presents with    Follow Up Chronic Condition     dementia, dm, OH, CHF        Hilton Butt is a 67 y.o. with chronic medical conditions as listed in the patient's updated Problem List (see below)        Patient's pain well controlled on hydrocodone without falls or sedation. He has neuropathic pain in his feet and arthritic pain. He is only eating rice and potatoes for six months now and refuses to eat anything else. His BG's are 100's to 300's. He is always in lower 100's in the am.  Has been refusing BG checks frequently so doesn't get SLS insulin all the time. Midodrine is used 2-3 times a week when his BP is 110 or less and it often rebounds to 170. He is sleeping well at night. No falls. No constipation. No smoking, no wheezing.   Continues to be difficult to direct and doesn't like to be bothered. His daugher-in-law comes and gives him a shower once a week--no one else can accomplish this. BP's are ranging from 177 to 209 systolic. No constipation. Has days where no one can approach him. Positive ROS findings: Patient refused to answer questions    The following systems were [] reviewed / [x] unable to be reviewed  Systems: constitutional, ears/nose/mouth/throat, respiratory, gastrointestinal, genitourinary, musculoskeletal, integumentary, neurologic, psychiatric, endocrine. PPS:40  Karn[place]sky:   Timed Up and Go (TUG):   Fall Risk Assessment, last 12 mths 2/12/2019   Able to walk? Yes   Fall in past 12 months? Yes   Fall with injury? Yes   Number of falls in past 12 months 2   Fall Risk Score 3       Current Durable Medical Equipment in Home:  [] Hospital Bed  [] Bedside Commode  [] Wheelchair  [] Roma Elise  [] [address]    Has a cane  [] Respiratory Support:    ADVANCE CARE PLANNING                                 The following information was updated I/ reviewed as accurate in Orders and the Advance Care Planning Navigator:  [unfilled]     Primary Decision Maker (Active): Virginia  Bakari - [phone]    Primary Decision Maker: Ghislaine Carney - Son    Secondary Decision Maker: Ting Nickie - Other Relative - [phone]  Advance Care Planning 2/21/2019   Patient's Healthcare Decision Maker is: Legal Next of Kin   Confirm Advance Directive None   Patient Would Like to Complete Advance Directive Unable   Does the patient have other document types Do Not Resuscitate        VITAL SIGNS & PHYSICAL EXAM     Median Arm Circumference (inches):     Wt Readings from Last 3 Encounters:   11/20/19 159 lb 1.6 oz (72.2 kg)   11/05/19 156 lb 6.4 oz (70.9 kg)   10/29/19 155 lb 12.8 oz (70.7 kg)     Temp Readings from Last 3 Encounters:   05/18/20 98 °F (36.7 °C) (Oral)   01/17/20 98.5 °F (36.9 °C) (Oral)   11/20/19 97.4 °F (36.3 °C) (Axillary)     BP Readings from Last 3 Encounters:   05/18/20 126/70   01/17/20 132/76   11/20/19 102/64     Pulse Readings from Last 3 Encounters:   05/18/20 72   03/11/20 75   01/17/20 70            Pacemaker:  ICD:  Feeding Tube:  Urine Catheter:  Other:     Physical Exam   Constitutional: He is well-developed, well-nourished, and in no distress. No distress. Frail, thin. Will not allow a complete exam.     HENT:   Head: Normocephalic and atraumatic. Right Ear: External ear normal.   Eyes: Conjunctivae are normal. Right eye exhibits no discharge. Left eye exhibits no discharge. No scleral icterus. Conjunctiva white   Cardiovascular: Normal rate, regular rhythm, normal heart sounds and intact distal pulses. Exam reveals no gallop and no friction rub. No murmur heard. Pulmonary/Chest: Effort normal and breath sounds normal. No respiratory distress. He has no wheezes. He has no rales. He exhibits no tenderness. Abdominal: Soft. Bowel sounds are normal.   Musculoskeletal:         General: No deformity or edema. Neurological: He is alert. Oriented X 1   Skin: Skin is warm and dry. No rash noted. He is not diaphoretic. No erythema. No pallor. Psychiatric:   Agitates easily, does not want to interact. Vitals reviewed.          PROBLEM LIST / PAST MEDICAL / SOCIAL HX     Patient Active Problem List   Diagnosis Code    Complicated grief B55.55    Non compliance w medication regimen Z91.14    Cognitive disorder F09    Moderate recurrent major depression ([facility] F33.1    Uncontrolled diabetes mellitus with hyperglycemia ([facility]) E11.65    Syncope R55    Volume depletion E86.9    Hyponatremia E87.1    Urinary incontinence R32    CAD (coronary artery disease) I25.10    COPD (chronic obstructive pulmonary disease) ([facility]) J44.9    Orthostatic hypotension I95.1    CHF (congestive heart failure) ([facility]) I50.9    NSTEMI (non-ST elevated myocardial infarction) ([facility] I21.4    Hypokalemia E87.6    Prostate cancer ([facility] C61      Family History   Problem Relation Age of Onset    Diabetes Mother     Diabetes Brother      Social History     Socioeconomic History    Marital status:      Spouse name: Not on file    Number of children: Not on file    Years of education: Not on file    Highest education level: Not on file   Tobacco Use    Smoking status: Former Smoker    Smokeless tobacco: Never Used    Tobacco comment: 1-2 cigars daily   Substance and Sexual Activity    Alcohol use: No     Alcohol/week: 0.0 standard drinks    Drug use: No        MEDICATIONS & PRESCRIPTIONS     Allergies   Allergen Reactions    Sulfa (Sulfonamide Antibiotics) Unknown (comments)      Current Outpatient Medications   Medication Sig    HYDROcodone-acetaminophen (NORCO) 5-325 mg per tablet Take 1 Tab by mouth two (2) times daily as needed for Pain for up to 3 days. Max Daily Amount: 2 Tabs.  omeprazole (PRILOSEC) 20 mg capsule Take 1 Cap by mouth daily. Before breakfast    QUEtiapine (SEROquel) 25 mg tablet TAKE 1 TAB BY MOUTH NIGHTLY. TAKE 1 TAB AT BEDTIME ALONG WITH 50 MG DOSE.  memantine ER (NAMENDA XR) 28 mg capsule TAKE 1 CAPSULE BY MOUTH EVERY DAY    levothyroxine (SYNTHROID) 50 mcg tablet TAKE 1 TABLET BY MOUTH EVERY DAY BEFORE BREAKFAST    Insulin Needles, Disposable, 31 gauge x 3/16\" ndle Use with lantus 8 units every am and lispro tid with meals per sliding scale    b complex vitamins (SUPER B-50 COMPLEX PLUS) tablet Take 1 Tab by mouth daily.  glucose blood VI test strips (ONETOUCH ULTRA BLUE TEST STRIP) strip USE TO CHECK BLOOD SUGAR 3 TIMES DAILY. DIAGNOSIS CODE: E11.9    tamsulosin (FLOMAX) 0.4 mg capsule Take 1 Cap by mouth daily.  insulin glargine (LANTUS,BASAGLAR) 100 unit/mL (3 mL) inpn Inject 8 units every morning.  (Patient taking differently: Inject 8 units SQ every morning.)    insulin lispro (HUMALOG U-100 INSULIN) 100 unit/mL injection 3 units sq for bs greater than 200 ; 5  untis sq for bs greater than 250, 6units for blood sugar greater than 300 ; call me for bs greater than 400 (Patient taking differently: 3 units sq for bs greater than 200 ; 5  untis sq for bs greater than 250,  6units for blood sugar greater than 300 ; call me for bs greater than 400  Indications: 4 units with lunch in addition to sliding scale)    DULoxetine (CYMBALTA) 60 mg capsule TAKE ONE CAPSULE BY MOUTH EVERY DAY    SENNA PLUS 8.6-50 mg per tablet TAKE 2 TABS BY MOUTH TWO (2) TIMES A DAY    lidocaine (LIDODERM) 5 % Apply patch to the affected area for 12 hours a day and remove for 12 hours a day.  QUEtiapine (SEROQUEL) 50 mg tablet Take 50 mg by mouth nightly.  midodrine (PROAMITINE) 5 mg tablet Take 5 mg by mouth three (3) times daily (with meals).  aspirin 81 mg chewable tablet Take 1 Tab by mouth daily.  levoFLOXacin (LEVAQUIN) 250 mg tablet Take 1 Tab by mouth daily for 10 days.  ondansetron (ZOFRAN ODT) 4 mg disintegrating tablet Take 1 Tab by mouth every six (6) hours as needed for Nausea.  acetaminophen (TYLENOL) 500 mg tablet Take 1,000 mg by mouth every six to eight (6-8) hours as needed for Pain. No current facility-administered medications for this visit. Medications Ordered Today   Medications    HYDROcodone-acetaminophen (NORCO) 5-325 mg per tablet     Sig: Take 1 Tab by mouth two (2) times daily as needed for Pain for up to 3 days. Max Daily Amount: 2 Tabs.      Dispense:  60 Tab     Refill:  0         TEST DATA REVIEWED:     Lab Results   Component Value Date/Time    Hemoglobin A1c 7.6 (H) 07/02/2019 10:36 AM    Hemoglobin A1c, External 11.2 01/27/2016     Lab Results   Component Value Date/Time    Microalb/Creat ratio (ug/mg creat.) 63.6 (H) 02/12/2019 08:49 AM     Lab Results   Component Value Date/Time    TSH 2.89 12/26/2018 02:49 AM     No results found for: Joon Farris [id number]      Lab Results   Component Value Date/Time    WBC 8.2 09/07/2019 08:44 AM    HGB 11.4 (L) 09/07/2019 08:44 AM    PLATELET 326 84/50/3421 08:44 AM     Lab Results   Component Value Date/Time    Sodium 142 09/07/2019 08:44 AM    Potassium 4.0 09/07/2019 08:44 AM    Chloride 107 09/07/2019 08:44 AM    CO2 27 09/07/2019 08:44 AM    BUN 15 09/07/2019 08:44 AM    Creatinine 1.78 (H) 09/07/2019 08:44 AM    Calcium 8.8 09/07/2019 08:44 AM    Magnesium 2.0 02/05/2019 01:42 AM    Phosphorus 3.8 02/05/2019 01:42 AM      Lab Results   Component Value Date/Time    AST (SGOT) 17 09/07/2019 08:44 AM    Alk. phosphatase 102 09/07/2019 08:44 AM    Protein, total 6.6 09/07/2019 08:44 AM    Albumin 3.2 (L) 09/07/2019 08:44 AM    Globulin 3.4 09/07/2019 08:44 AM     Lab Results   Component Value Date/Time    Iron 39 02/04/2019 07:35 PM    TIBC 196 (L) 02/04/2019 07:35 PM    Iron % saturation 20 02/04/2019 07:35 PM    Ferritin 75 02/04/2019 07:35 PM            This is the Subsequent Medicare Annual Wellness Exam, performed 12 months or more after the Initial AWV or the last Subsequent AWV    I have reviewed the patient's medical history in detail and updated the computerized patient record.      History     Patient Active Problem List   Diagnosis Code    Complicated grief M88.86    Non compliance w medication regimen Z91.14    Cognitive disorder F09    Moderate recurrent [facility] depression ([facility] F33.1    Uncontrolled diabetes mellitus with hyperglycemia ([facility] E11.65    Syncope R55    Volume depletion E86.9    Hyponatremia E87.1    Urinary incontinence R32    CAD (coronary artery disease) I25.10    COPD (chronic obstructive pulmonary disease) ([facility]) J44.9    Orthostatic hypotension I95.1    CHF (congestive heart failure) ([facility]) I50.9    NSTEMI (non-ST elevated myocardial infarction) ([facility]) I21.4    Hypokalemia E87.6    Prostate cancer ([facility] C61     Past Medical History:   Diagnosis Date    Atrial fibrillation ([facility]     CAD (coronary artery disease)     COPD (chronic obstructive pulmonary disease) ([facility]     Diabetes ([facility]     1970a    Heart failure ([facility]     Ill-defined condition     SOB    MI (myocardial infarction) ([facility] 01/2019    Pneumonia     Urinary incontinence       Past Surgical History:   Procedure Laterality Date    CARDIAC SURG PROCEDURE UNLIST  2000    open heart    HX HEENT  1975    nasal surgery    HX MOHS PROCEDURES  2013    left     Current Outpatient Medications   Medication Sig Dispense Refill    HYDROcodone-acetaminophen (NORCO) 5-325 mg per tablet Take 1 Tab by mouth two (2) times daily as needed for Pain for up to 3 days. Max Daily Amount: 2 Tabs. 60 Tab 0    omeprazole (PRILOSEC) 20 mg capsule Take 1 Cap by mouth daily. Before breakfast 90 Cap 3    QUEtiapine (SEROquel) 25 mg tablet TAKE 1 TAB BY MOUTH NIGHTLY. TAKE 1 TAB AT BEDTIME ALONG WITH 50 MG DOSE. 90 Tab 1    memantine ER (NAMENDA XR) 28 mg capsule TAKE 1 CAPSULE BY MOUTH EVERY DAY 90 Cap 3    levothyroxine (SYNTHROID) 50 mcg tablet TAKE 1 TABLET BY MOUTH EVERY DAY BEFORE BREAKFAST 90 Tab 1    Insulin Needles, Disposable, 31 gauge x 3/16\" ndle Use with lantus 8 units every am and lispro tid with meals per sliding scale 100 Pen Needle 1    b complex vitamins (SUPER B-50 COMPLEX PLUS) tablet Take 1 Tab by mouth daily. 90 Tab 2    glucose blood VI test strips (ONETOUCH ULTRA BLUE TEST STRIP) strip USE TO CHECK BLOOD SUGAR 3 TIMES DAILY. DIAGNOSIS CODE: E11.9 100 Strip 5    tamsulosin (FLOMAX) 0.4 mg capsule Take 1 Cap by mouth daily. 90 Cap 3    insulin glargine (LANTUS,BASAGLAR) 100 unit/mL (3 mL) inpn Inject 8 units every morning.  (Patient taking differently: Inject 8 units SQ every morning.) 3 Pen 5    insulin lispro (HUMALOG U-100 INSULIN) 100 unit/mL injection 3 units sq for bs greater than 200 ; 5  untis sq for bs greater than 250,  6units for blood sugar greater than 300 ; call me for bs greater than 400 (Patient taking differently: 3 units sq for bs greater than 200 ; 5  untis sq for bs greater than 250,  6units for blood sugar greater than 300 ; call me for bs greater than 400  Indications: 4 units with lunch in addition to sliding scale) 1 Vial 0    DULoxetine (CYMBALTA) 60 mg capsule TAKE ONE CAPSULE BY MOUTH EVERY DAY  3    SENNA PLUS 8.6-50 mg per tablet TAKE 2 TABS BY MOUTH TWO (2) TIMES A DAY  11    lidocaine (LIDODERM) 5 % Apply patch to the affected area for 12 hours a day and remove for 12 hours a day. 1 Package 0    QUEtiapine (SEROQUEL) 50 mg tablet Take 50 mg by mouth nightly.  midodrine (PROAMITINE) 5 mg tablet Take 5 mg by mouth three (3) times daily (with meals).  aspirin 81 mg chewable tablet Take 1 Tab by mouth daily. 30 Tab 11    levoFLOXacin (LEVAQUIN) 250 mg tablet Take 1 Tab by mouth daily for 10 days. 10 Tab 0    ondansetron (ZOFRAN ODT) 4 mg disintegrating tablet Take 1 Tab by mouth every six (6) hours as needed for Nausea. 30 Tab 1    acetaminophen (TYLENOL) 500 mg tablet Take 1,000 mg by mouth every six to eight (6-8) hours as needed for Pain. Allergies   Allergen Reactions    Sulfa (Sulfonamide Antibiotics) Unknown (comments)       Family History   Problem Relation Age of Onset    Diabetes Mother     Diabetes Brother      Social History     Tobacco Use    Smoking status: Former Smoker    Smokeless tobacco: Never Used    Tobacco comment: 1-2 cigars daily   Substance Use Topics    Alcohol use: No     Alcohol/week: 0.0 standard drinks       Depression Risk Factor Screening:     3 most recent PHQ Screens 3/11/2020   PHQ Not Done Medical Reason (indicate in comments)       Alcohol Risk Factor Screening (MALE > 65): Do you average more 1 drink per night or more than 7 drinks a week: No    In the past three months have you have had more than 4 drinks containing alcohol on one occasion: No      Functional Ability and Level of Safety:   Hearing: The patient needs further evaluation. Cannot do hearing test or aides because of advanced dementia and agitation.     Activities of Daily Living: The home contains: grab bars  Patient needs help with:  phone, transportation, shopping, preparing meals, laundry, housework, managing medications, managing money, dressing, bathing, hygiene and bathroom needs    Ambulation: with difficulty, uses a cane and walker    Fall Risk:  Fall Risk Assessment, last 12 mths 2/12/2019   Able to walk? Yes   Fall in past 12 months? Yes   Fall with injury? Yes   Number of falls in past 12 months 2   Fall Risk Score 3       Abuse Screen:  Patient is not abused    Cognitive Screening   Has your family/caregiver stated any concerns about your memory: yes - has a diagnosis of advanced dementia  Cognitive Screening: not done secondary to advanced dementia and agitation    Patient Care Team   Patient Care Team:  Corrina Mclaughlin NP as PCP - General (Nurse Practitioner)  Roberta Womack MD (Neurology)  Franki Crigler, MD as Consulting Provider (Endocrinology)  Rosemary Schroeder MD (Urology)    Assessment/Plan   Education and counseling provided:  Are appropriate based on today's review and evaluation    Diagnoses and all orders for this visit:    1. Chronic pain syndrome  -     HYDROcodone-acetaminophen (NORCO) 5-325 mg per tablet; Take 1 Tab by mouth two (2) times daily as needed for Pain for up to 3 days. Max Daily Amount: 2 Tabs. 2. Late onset Alzheimer's disease with behavioral disturbance ([address]    3. Other chronic pain    4. Uncontrolled type 2 diabetes mellitus with hyperglycemia ([address]    5. Orthostatic hypotension    6. Chronic obstructive pulmonary disease, unspecified COPD type ([address]    7. Congestive heart failure, unspecified HF chronicity, unspecified heart failure type ([address]    8. Prostate cancer ([address]        There are no preventive care reminders to display for this patient.

## 2020-06-10 DIAGNOSIS — N40.1 BENIGN PROSTATIC HYPERPLASIA WITH URINARY FREQUENCY: ICD-10-CM

## 2020-06-10 DIAGNOSIS — R35.0 BENIGN PROSTATIC HYPERPLASIA WITH URINARY FREQUENCY: ICD-10-CM

## 2020-06-10 RX ORDER — TAMSULOSIN HYDROCHLORIDE 0.4 MG/1
0.4 CAPSULE ORAL DAILY
Qty: 90 CAP | Refills: 3 | Status: ON HOLD | OUTPATIENT
Start: 2020-06-10 | End: 2022-01-01

## 2020-06-11 ENCOUNTER — TELEPHONE (OUTPATIENT)
Dept: ENDOCRINOLOGY | Age: 80
End: 2020-06-11

## 2020-06-11 NOTE — TELEPHONE ENCOUNTER
Received a fax from [facility] for a refill of florinef 0.1 mg tabs to take 2 tabs daily. Patient was previously seen by Dr. Jakub Velasquez and last visit was in 10/18 but Dr. Jakub Velasquez left our practice in Jan 2020. Patient appears to be followed by Trisha Ferrell and I don't see that this med is currently on his med list so will defer to her as to whether he is supposed to be on this med or not.

## 2020-06-16 NOTE — TELEPHONE ENCOUNTER
Patient has not taken medicaition since 5/2019 per [facility].  Asked them to inactivate it so more refill requests would not be sent.

## 2020-07-06 ENCOUNTER — HOME VISIT (OUTPATIENT)
Dept: FAMILY MEDICINE CLINIC | Age: 80
End: 2020-07-06

## 2020-07-06 DIAGNOSIS — G89.4 CHRONIC PAIN SYNDROME: Primary | ICD-10-CM

## 2020-07-06 RX ORDER — HYDROCODONE BITARTRATE AND ACETAMINOPHEN 5; 325 MG/1; MG/1
1 TABLET ORAL
Qty: 60 TAB | Refills: 0 | Status: SHIPPED | OUTPATIENT
Start: 2020-07-06 | End: 2020-07-09

## 2020-07-07 NOTE — PROGRESS NOTES
Patient is in bed and has advanced dementia and refuses visit today and is yelling at me to go away.

## 2020-07-15 DIAGNOSIS — E11.65 UNCONTROLLED TYPE 2 DIABETES MELLITUS WITH HYPERGLYCEMIA (HCC): Primary | ICD-10-CM

## 2020-07-15 RX ORDER — PEN NEEDLE, DIABETIC 31 GX3/16"
NEEDLE, DISPOSABLE MISCELLANEOUS
Qty: 100 PEN NEEDLE | Refills: 1 | Status: SHIPPED | OUTPATIENT
Start: 2020-07-15 | End: 2020-09-06

## 2020-07-27 DIAGNOSIS — E03.9 HYPOTHYROIDISM, UNSPECIFIED TYPE: Primary | ICD-10-CM

## 2020-07-31 DIAGNOSIS — E11.49 TYPE II OR UNSPECIFIED TYPE DIABETES MELLITUS WITH NEUROLOGICAL MANIFESTATIONS, UNCONTROLLED(250.62) (HCC): ICD-10-CM

## 2020-07-31 RX ORDER — LEVOTHYROXINE SODIUM 50 UG/1
TABLET ORAL
Qty: 90 TAB | Refills: 3 | Status: SHIPPED | OUTPATIENT
Start: 2020-07-31 | End: 2022-01-01 | Stop reason: SDUPTHER

## 2020-08-05 ENCOUNTER — TELEPHONE (OUTPATIENT)
Dept: FAMILY MEDICINE CLINIC | Age: 80
End: 2020-08-05

## 2020-08-05 NOTE — TELEPHONE ENCOUNTER
Left a voicemail for pt's Dtr about her concerns with his elevated blood pressures at times, and should he be on medication. Asked if she could call us to discuss if he is having any symptoms when his bp is elevated.  Left call back number

## 2020-08-24 DIAGNOSIS — E11.49 TYPE II OR UNSPECIFIED TYPE DIABETES MELLITUS WITH NEUROLOGICAL MANIFESTATIONS, UNCONTROLLED(250.62) (HCC): Primary | ICD-10-CM

## 2020-08-24 RX ORDER — INSULIN LISPRO 100 [IU]/ML
INJECTION, SOLUTION INTRAVENOUS; SUBCUTANEOUS
Qty: 1 VIAL | Refills: 0
Start: 2020-08-24 | End: 2020-08-24 | Stop reason: SDUPTHER

## 2020-08-24 RX ORDER — INSULIN LISPRO 100 [IU]/ML
INJECTION, SOLUTION INTRAVENOUS; SUBCUTANEOUS
Qty: 1 VIAL | Refills: 3 | Status: SHIPPED | OUTPATIENT
Start: 2020-08-24 | End: 2020-11-10 | Stop reason: SDUPTHER

## 2020-08-26 ENCOUNTER — DOCUMENTATION ONLY (OUTPATIENT)
Dept: FAMILY MEDICINE CLINIC | Age: 80
End: 2020-08-26

## 2020-08-26 DIAGNOSIS — E11.49 TYPE II OR UNSPECIFIED TYPE DIABETES MELLITUS WITH NEUROLOGICAL MANIFESTATIONS, UNCONTROLLED(250.62) (HCC): ICD-10-CM

## 2020-08-26 DIAGNOSIS — E11.65 UNCONTROLLED TYPE 2 DIABETES MELLITUS WITH HYPERGLYCEMIA (HCC): Primary | ICD-10-CM

## 2020-08-26 RX ORDER — INSULIN LISPRO 100 [IU]/ML
INJECTION, SOLUTION INTRAVENOUS; SUBCUTANEOUS
Qty: 3 PEN | Refills: 5 | Status: ON HOLD | OUTPATIENT
Start: 2020-08-26 | End: 2022-01-01

## 2020-08-26 RX ORDER — INSULIN GLARGINE 100 [IU]/ML
INJECTION, SOLUTION SUBCUTANEOUS
Qty: 3 PEN | Refills: 5 | Status: SHIPPED | OUTPATIENT
Start: 2020-08-26 | End: 2021-01-01 | Stop reason: SDUPTHER

## 2020-08-27 NOTE — PROGRESS NOTES
Home Based Primary Care & Supportive Services  Plan of Care    [facility] Team Members: Dr. Nelli Posey, Haley Angeles, MILTON; Harris Estevez NP; Yovani Jones; Fabrizio Jo, RN, Riley Joel RN, SPENCER Lucas,     Huang Liter  1940 / [id number]  male    The physician has reviewed and discussed the plan of care with the interdisciplinary group on 08/27/20 and agrees. Date of Initial Visit (Start of Care): 2/12/19  Date of last visit: 5/18/20,  7/6/20 ( pt refused once provider present)       Diagnosis: Dementia with recent delirium, COPD,  chronic diastolic CHF, persistent afib, Type 2 diabetes with hypoglycemia, chronic anemia, hypothyroidism, depression    Patient status summary: Patient and POC discussed in IDT meeting for 60 day update. Homebound patient referred by Palliative Medicine Inpatient Team, Shakira Sanchez NP to our services due to taxing effort to seek primary care needs in the community. DME/Supplies:  Bedside Commode       Primary Decision Maker (Active): Virginia  Bakari  [phone]    Primary Decision Maker: Erik Gricelfilomena - Dayron    Secondary Decision Maker: Malu Vergara - Aisha Relative - [phone]      Allergies   Allergen Reactions    Sulfa (Sulfonamide Antibiotics) Unknown (comments)       Nutritional Requirements:   Oral with supported meal preparation    Functional/Activity Level:  Limited ambulation with support of wheelchair and Wheelchair with assistance for transfers    Code Status: DNR    Safety Measures:   Fall risk, Self-care deficity and Smoker    Current Outpatient Medications   Medication Sig    insulin glargine (LANTUS,BASAGLAR) 100 unit/mL (3 mL) inpn Inject 8 units every morning.     insulin lispro (HUMALOG) 100 unit/mL kwikpen 3 units sq for bs greater than 200 ; 5  untis sq for bs greater than 250,  6units for blood sugar greater than 300 ; call me for bs greater than 400    levothyroxine (SYNTHROID) 50 mcg tablet TAKE 1 TABLET BY MOUTH EVERY DAY BEFORE BREAKFAST    glucose blood VI test strips (OneTouch Ultra Blue Test Strip) strip USE TO CHECK BLOOD SUGAR 3 TIMES DAILY. DIAGNOSIS CODE: E11.9    Insulin Needles, Disposable, 31 gauge x 3/16\" ndle Use with lantus 8 units every am and lispro tid with meals per sliding scale    tamsulosin (Flomax) 0.4 mg capsule Take 1 Cap by mouth daily.  omeprazole (PRILOSEC) 20 mg capsule Take 1 Cap by mouth daily. Before breakfast    QUEtiapine (SEROquel) 25 mg tablet TAKE 1 TAB BY MOUTH NIGHTLY. TAKE 1 TAB AT BEDTIME ALONG WITH 50 MG DOSE.  memantine ER (NAMENDA XR) 28 mg capsule TAKE 1 CAPSULE BY MOUTH EVERY DAY    b complex vitamins (SUPER B-50 COMPLEX PLUS) tablet Take 1 Tab by mouth daily.  DULoxetine (CYMBALTA) 60 mg capsule TAKE ONE CAPSULE BY MOUTH EVERY DAY    SENNA PLUS 8.6-50 mg per tablet TAKE 2 TABS BY MOUTH TWO (2) TIMES A DAY    lidocaine (LIDODERM) 5 % Apply patch to the affected area for 12 hours a day and remove for 12 hours a day.  QUEtiapine (SEROQUEL) 50 mg tablet Take 50 mg by mouth nightly.  midodrine (PROAMITINE) 5 mg tablet Take 5 mg by mouth three (3) times daily (with meals).  aspirin 81 mg chewable tablet Take 1 Tab by mouth daily.  ondansetron (ZOFRAN ODT) 4 mg disintegrating tablet Take 1 Tab by mouth every six (6) hours as needed for Nausea.  acetaminophen (TYLENOL) 500 mg tablet Take 1,000 mg by mouth every six to eight (6-8) hours as needed for Pain. No current facility-administered medications for this visit. The Plan of Care has been initiated for during discussion at interdisciplinary group meeting  and reviewed and updated by the Interdisciplinary Group (IDG) as frequently as the patient condition warrants. Plan of Care by Discipline    1.  Provider  Ongoing evaluation of treatment interventions for specific disease state, Assessment of medication adverse effects, Determine need for laboratory assessment based on patient disease status  and Create and implement disease /symptom specific plan to manage high risk conditions / symptoms    2. Nursing  Review Health Maintenance with patient and provider; update as appropriate and Education for safety; falls    3. Social Work  Establish therapeutic relationship through in home visits and telephonic touch points        Plan of Care Orders / Action Items:     ICD-10-CM ICD-9-CM    1. Chronic pain syndrome G89.4 338.4   2. Late onset Alzheimer's disease with behavioral disturbance (HCC) G30.1 331.0     F02.81 294.11   3. Other chronic pain G89.29 338.29   4. Uncontrolled type 2 diabetes mellitus with hyperglycemia (HCC) E11.65 250.02   5. Orthostatic hypotension I95.1 458.0   6. Chronic obstructive pulmonary disease, unspecified COPD type ([facility] J44.9 496   7. Congestive heart failure, unspecified HF chronicity, unspecified heart failure type ([facility]) I50.9 428.0   8. Prostate cancer ([facility] C61 185     1, 3. Well controlled on hydrocodone 1 to 2 tabs a day. Checked . Patient has no constipation, not sedated. Pain is neuropathic and arthritic. Refilled lortab. 2.  Advanced. Patient has a lot of behaviors. Continue seroqul, memantine. It is almost impossible to do exams on the patient or get vital signs or draw blood    4.     BG's are 100 to 300's. Have been liberal with BG's because of dementia and patient not liking to have frequent BG checks. If consistently in 400's, family will call for me to adjust insulin. 5.  Patient receives midodrine when systolic BP below 638. No recent falls or synncope. 6.  No wheezing recently. No nebs in six months. Continues to smoke cigars on occasion. 7.  No exacerbations in several years. No betablocker or ace/arb secondary to [place] or diuretics. 8.  Patient has elected not to return to urology for lupron injections secondary to behaviors and advanced dementia.           Health Maintenance   Topic Date Due    Pneumococcal 65+ yrs at Risk Vaccine (1 of 2 - PCV13) 12/11/2006    Foot Exam Q1  10/10/2019    Pneumococcal 65+ years (1 of 1 - PPSV23) 11/20/2020 (Originally 12/11/2006)    MICROALBUMIN Q1  01/25/2023 (Originally 2/12/2020)    GLAUCOMA SCREENING Q2Y  03/11/2023 (Originally 12/11/2006)    Eye Exam Retinal or Dilated  03/11/2023 (Originally 12/11/1950)    Shingrix Vaccine Age 50> (1 of 2) 02/15/2025 (Originally 12/11/1991)    Medicare Yearly Exam  03/12/2021    DTaP/Tdap/Td series (2 - Td) 09/07/2029         Estimated Visit Frequency:  Every 2 month Provider Visit

## 2020-09-04 ENCOUNTER — TELEPHONE (OUTPATIENT)
Dept: PALLATIVE CARE | Age: 80
End: 2020-09-04

## 2020-09-06 DIAGNOSIS — E11.65 UNCONTROLLED TYPE 2 DIABETES MELLITUS WITH HYPERGLYCEMIA (HCC): ICD-10-CM

## 2020-09-06 RX ORDER — PEN NEEDLE, DIABETIC 31 GX3/16"
NEEDLE, DISPOSABLE MISCELLANEOUS
Qty: 100 PEN NEEDLE | Refills: 1 | Status: SHIPPED | OUTPATIENT
Start: 2020-09-06 | End: 2021-01-01 | Stop reason: SDUPTHER

## 2020-09-08 ENCOUNTER — HOME VISIT (OUTPATIENT)
Dept: FAMILY MEDICINE CLINIC | Age: 80
End: 2020-09-08
Payer: MEDICARE

## 2020-09-08 ENCOUNTER — HOSPITAL ENCOUNTER (OUTPATIENT)
Dept: LAB | Age: 80
Discharge: HOME | End: 2020-09-08
Payer: MEDICARE

## 2020-09-08 ENCOUNTER — TELEPHONE (OUTPATIENT)
Dept: FAMILY MEDICINE CLINIC | Age: 80
End: 2020-09-08

## 2020-09-08 DIAGNOSIS — E03.9 HYPOTHYROIDISM, UNSPECIFIED TYPE: ICD-10-CM

## 2020-09-08 DIAGNOSIS — E78.5 HYPERLIPIDEMIA, UNSPECIFIED HYPERLIPIDEMIA TYPE: ICD-10-CM

## 2020-09-08 DIAGNOSIS — I50.9 CONGESTIVE HEART FAILURE, UNSPECIFIED HF CHRONICITY, UNSPECIFIED HEART FAILURE TYPE (HCC): ICD-10-CM

## 2020-09-08 DIAGNOSIS — G30.1 LATE ONSET ALZHEIMER'S DISEASE WITH BEHAVIORAL DISTURBANCE (HCC): ICD-10-CM

## 2020-09-08 DIAGNOSIS — F02.818 LATE ONSET ALZHEIMER'S DISEASE WITH BEHAVIORAL DISTURBANCE (HCC): ICD-10-CM

## 2020-09-08 DIAGNOSIS — G89.4 CHRONIC PAIN SYNDROME: ICD-10-CM

## 2020-09-08 DIAGNOSIS — I95.1 ORTHOSTATIC HYPOTENSION: ICD-10-CM

## 2020-09-08 DIAGNOSIS — N18.4 CKD (CHRONIC KIDNEY DISEASE) STAGE 4, GFR 15-29 ML/MIN (HCC): ICD-10-CM

## 2020-09-08 DIAGNOSIS — E11.49 TYPE II OR UNSPECIFIED TYPE DIABETES MELLITUS WITH NEUROLOGICAL MANIFESTATIONS, UNCONTROLLED(250.62) (HCC): Primary | ICD-10-CM

## 2020-09-08 DIAGNOSIS — C61 PROSTATE CANCER (HCC): ICD-10-CM

## 2020-09-08 PROCEDURE — G8536 NO DOC ELDER MAL SCRN: HCPCS | Performed by: NURSE PRACTITIONER

## 2020-09-08 PROCEDURE — 83036 HEMOGLOBIN GLYCOSYLATED A1C: CPT

## 2020-09-08 PROCEDURE — 99349 HOME/RES VST EST MOD MDM 40: CPT | Performed by: NURSE PRACTITIONER

## 2020-09-08 PROCEDURE — 85025 COMPLETE CBC W/AUTO DIFF WBC: CPT

## 2020-09-08 PROCEDURE — 80053 COMPREHEN METABOLIC PANEL: CPT

## 2020-09-08 PROCEDURE — 84153 ASSAY OF PSA TOTAL: CPT

## 2020-09-08 PROCEDURE — 80061 LIPID PANEL: CPT

## 2020-09-08 PROCEDURE — 1101F PT FALLS ASSESS-DOCD LE1/YR: CPT | Performed by: NURSE PRACTITIONER

## 2020-09-08 PROCEDURE — G8428 CUR MEDS NOT DOCUMENT: HCPCS | Performed by: NURSE PRACTITIONER

## 2020-09-08 PROCEDURE — 84443 ASSAY THYROID STIM HORMONE: CPT

## 2020-09-08 RX ORDER — HYDROCODONE BITARTRATE AND ACETAMINOPHEN 5; 325 MG/1; MG/1
1 TABLET ORAL
Qty: 60 TAB | Refills: 0 | Status: SHIPPED | OUTPATIENT
Start: 2020-09-08 | End: 2020-10-08

## 2020-09-08 RX ORDER — QUETIAPINE FUMARATE 25 MG/1
25 TABLET, FILM COATED ORAL DAILY
Qty: 60 TAB | Refills: 5 | Status: SHIPPED | OUTPATIENT
Start: 2020-09-08 | End: 2020-10-02 | Stop reason: SDUPTHER

## 2020-09-08 RX ORDER — QUETIAPINE FUMARATE 50 MG/1
50 TABLET, FILM COATED ORAL
Qty: 30 TAB | Refills: 5 | Status: SHIPPED | OUTPATIENT
Start: 2020-09-08 | End: 2020-10-02 | Stop reason: SDUPTHER

## 2020-09-08 NOTE — TELEPHONE ENCOUNTER
Covid screen questions for in home appts. Communication details: Via [facility] [place] Telephone Screening (Prior to ALL home visits):       Screening completed with Pts dtr    Has patient or anyone that has close contact with the patient traveled outside of [place] within the past 14 days?: No    If yes, where? Have you taken your temperature today?: No  If yes, what is your temperature? If no, please do so now No not at pts home    Has the patient or anyone in close contact with the patient tested positive for Covid -19? (if yes, when)  No    Did this result in a hospitalized? (if yes, when)      Has the patient or anyone in close contact with the patient experienced any of the following symptoms in the last 14 days? No    Fever: No    Shortness of Breathing or difficulty breathing:     Cough: No    Chills:No     Repeated shaking with chills:No      Muscle pain: no    Headache: No    Sore throat: No    New loss of taste or smell: No    Does the patient use nebulizer treatment? No    Are they scheduled or as needed? scheduled    NOTE: If answer YES to any of the above screening questions, contact your immediate Clinical Supervisor or the  on Call MELVIN SPIVEY for implementation of the appropriate patient care protocol. Supervisor/AOC will verify above assessment for implementation of the appropriate patient care protocol. If patient is symptomatic and does NOT have a thermometer in the home:     Contact immediate Clinical Supervisor/AOC and schedule the home visit     Sharon Her procedural mask prior to entering the home     Provide only ONE procedural mask to the patient to don prior to entering the home and for use for all future home visits.      Route this information to the care team in the \"Directions Box\"     Obtain temperature and follow-up with Clinical Supervisor if patient has a fever for implementation of the appropriate patient care protocol. Universal masking: Request patient and essential caregivers in the home to wear a homemade mask throughout the Home Health visit.

## 2020-09-09 LAB
ALBUMIN SERPL-MCNC: 3.3 G/DL (ref 3.7–4.7)
ALBUMIN/GLOB SERPL: 1.2 {RATIO} (ref 1.2–2.2)
ALP SERPL-CCNC: 164 IU/L (ref 39–117)
ALT SERPL-CCNC: 23 IU/L (ref 0–44)
AST SERPL-CCNC: 27 IU/L (ref 0–40)
BASOPHILS # BLD AUTO: 0.1 X10E3/UL (ref 0–0.2)
BASOPHILS NFR BLD AUTO: 1 %
BILIRUB SERPL-MCNC: 0.2 MG/DL (ref 0–1.2)
BUN SERPL-MCNC: 10 MG/DL (ref 8–27)
BUN/CREAT SERPL: 8 (ref 10–24)
CALCIUM SERPL-MCNC: 8.2 MG/DL (ref 8.6–10.2)
CHLORIDE SERPL-SCNC: 101 MMOL/L (ref 96–106)
CHOLEST SERPL-MCNC: 170 MG/DL (ref 100–199)
CO2 SERPL-SCNC: 22 MMOL/L (ref 20–29)
CREAT SERPL-MCNC: 1.33 MG/DL (ref 0.76–1.27)
EOSINOPHIL # BLD AUTO: 0.2 X10E3/UL (ref 0–0.4)
EOSINOPHIL NFR BLD AUTO: 3 %
ERYTHROCYTE [DISTWIDTH] IN BLOOD BY AUTOMATED COUNT: 13 % (ref 11.6–15.4)
EST. AVERAGE GLUCOSE BLD GHB EST-MCNC: 223 MG/DL
GLOBULIN SER CALC-MCNC: 2.7 G/DL (ref 1.5–4.5)
GLUCOSE SERPL-MCNC: ABNORMAL MG/DL
HBA1C MFR BLD: 9.4 % (ref 4.8–5.6)
HCT VFR BLD AUTO: 36.9 % (ref 37.5–51)
HDLC SERPL-MCNC: 51 MG/DL
HGB BLD-MCNC: 12.3 G/DL (ref 13–17.7)
IMM GRANULOCYTES # BLD AUTO: 0 X10E3/UL (ref 0–0.1)
IMM GRANULOCYTES NFR BLD AUTO: 0 %
LDLC SERPL CALC-MCNC: 103 MG/DL (ref 0–99)
LYMPHOCYTES # BLD AUTO: 1.7 X10E3/UL (ref 0.7–3.1)
LYMPHOCYTES NFR BLD AUTO: 23 %
MCH RBC QN AUTO: 30.8 PG (ref 26.6–33)
MCHC RBC AUTO-ENTMCNC: 33.3 G/DL (ref 31.5–35.7)
MCV RBC AUTO: 93 FL (ref 79–97)
MONOCYTES # BLD AUTO: 1 X10E3/UL (ref 0.1–0.9)
MONOCYTES NFR BLD AUTO: 13 %
NEUTROPHILS # BLD AUTO: 4.3 X10E3/UL (ref 1.4–7)
NEUTROPHILS NFR BLD AUTO: 60 %
PLATELET # BLD AUTO: 292 X10E3/UL (ref 150–450)
POTASSIUM SERPL-SCNC: ABNORMAL MMOL/L
PROT SERPL-MCNC: 6 G/DL (ref 6–8.5)
PSA SERPL-MCNC: 18.5 NG/ML (ref 0–4)
RBC # BLD AUTO: 3.99 X10E6/UL (ref 4.14–5.8)
SODIUM SERPL-SCNC: 139 MMOL/L (ref 134–144)
TRIGL SERPL-MCNC: 89 MG/DL (ref 0–149)
TSH SERPL DL<=0.005 MIU/L-ACNC: 3.23 UIU/ML (ref 0.45–4.5)
VLDLC SERPL CALC-MCNC: 16 MG/DL (ref 5–40)
WBC # BLD AUTO: 7.2 X10E3/UL (ref 3.4–10.8)

## 2020-09-09 NOTE — PROGRESS NOTES
Home Based Primary Care [facility]) & Supportive Services    [phone]      NOTE: Home Based Primary Care is a PROVIDER (MD/NP) based interdisciplinary practice (RN, LCSW, Pharmacy, Dietician) for patient's who cannot access (or have a taxing effort) primary / speciality medical care in an office setting. [facility] is NOT [employer] but works with [employer]. when there is a skilled need. Our MD/NPs are integral in Care Transitions; PLEASE CALL [phone] if this patient arrives in the Emergency Department or Hospital.          Date of Current Visit: 9/08/2020    Patient/Family present for Current Visit: Patient, daughter, and son    Goals of Care as stated by the patient/family is: to be as happy and comfortable as possible    Preferences for hospitalization if that were to be necessary:  [] Patient DOES NOT WANT hospitalization; focus all treatments at home  [x] Patient WOULD WANT hospitalization for potentially reversible causes  Patient prefers hospitalization at: [facility]      Is this encounter billed on time:No    Total time: 40 min  Counseling / coordination time:   15 minutes on ilabs, treating dementia with behaviors, lab draw  > 50% counseling / coordination?: No    ASSESSMENT & PLAN       Diagnoses and all orders for this visit:       Encounter Diagnoses     ICD-10-CM ICD-9-CM   1. Type II or unspecified type diabetes mellitus with neurological manifestations, uncontrolled(250.62) ([facility])  E11.49 250.62   2. Hypothyroidism, unspecified type  E03.9 244.9   3. Orthostatic hypotension  I95.1 458.0   4. Congestive heart failure, unspecified HF chronicity, unspecified heart failure type ([facility])  I50.9 428.0   5. CKD (chronic kidney disease) stage 4, GFR 15-29 ml/min ([facility])  N18.4 585.4   6. Hyperlipidemia, unspecified hyperlipidemia type  E78.5 272.4   7. Late onset Alzheimer's disease with behavioral disturbance ([facility])  G30.1 331.0    F02.81 294.11   8. Chronic pain syndrome  G89.4 338.4   9. Prostate cancer ([facility]  C61 185   1. BG's are 100 to 300's. Have been liberal with BG's because of dementia and patient not liking to have frequent BG checks. If consistently in 400's, family will call for me to adjust insulin. A1C drawn today. 2.  Check TSh. Continue levothyroxine  3. Patient receives midodrine when systolic BP below 662. No recent falls or synncope. 4.  No exacerbations in several years. No betablocker or ace/arb secondary to [facility] or diuretics. 5.  Check CMP, CBC. Avoid nephrotoxic drugs and dehydration. 6.  Check lipids  7. Advanced. Patient has a lot of behaviors. Continue seroqul, memantine. It is almost impossible to do exams on the patient or get vital signs or draw blood. Seroquel adjusted to 50 at hs and 25-50 in am depending on behaviors for the day. 8.  Well controlled on hydrocodone 1 to 2 tabs a day. Checked . Patient has no constipation, not sedated. Pain is neuropathic and arthritic. Refilled lortab. 9.   Patient has elected not to return to urology for lupron injections secondary to behaviors and advanced dementia. .  Check PSA per family request.           I have left a SUMMARY / John Aubrie from today's visit with the patient/family in the home    HEALTH MAINTENANCE     There are no preventive care reminders to display for this patient. CC & SUBJECTIVE including ROS & FUNCTION     Chief Complaint   Patient presents with    Follow Up Chronic Condition     dementia, T2DM        Nilda Lopez is a 67 y.o. with chronic medical conditions as listed in the patient's updated Problem List (see below)        Patient's pain well controlled on hydrocodone without falls or sedation. He has neuropathic pain in his feet and arthritic pain. He is only eating rice and potatoes for six months now and refuses to eat anything else. His BG's are 100's to 300's. Has been refusing BG checks frequently so doesn't get SLS insulin all the time.   Midodrine is used 2-3 times a week when his BP is 110 or less and it often rebounds to 170. He is sleeping poorly at night but sleeps well during the morning. No falls. No constipation. He refuses all showers now and urinates all over himself. .  BP's are ranging from 568 to 226 systolic. No constipation. Has days where no one can approach him. Positive ROS findings: Patient refused to answer questions    The following systems were [] reviewed / [x] unable to be reviewed  Systems: constitutional, ears/nose/mouth/throat, respiratory, gastrointestinal, genitourinary, musculoskeletal, integumentary, neurologic, psychiatric, endocrine. PPS:40  Karnofsky:   Timed Up and Go (TUG):   Fall Risk Assessment, last 12 mths 2/12/2019   Able to walk? Yes   Fall in past 12 months? Yes   Fall with injury? Yes   Number of falls in past 12 months 2   Fall Risk Score 3       Current Durable Medical Equipment in Home:  [] Hospital Bed  [] Bedside Commode  [] Wheelchair  [] Gary Mullen  [] Chip Ordonez    Has a cane  [] Respiratory Support:    ADVANCE CARE PLANNING                                 The following information was updated I/ reviewed as accurate in Orders and the Advance Care Planning Navigator:  @[facility]@     Primary Decision Maker (Active): Linn Villegas - [phone]    Primary Decision Maker: Bess Florez - Son    Secondary Decision Maker: Yara Luis - Other Relative - [phone]  Advance Care Planning 2/21/2019   Patient's Healthcare Decision Maker is: Legal Next of Kin   Confirm Advance Directive None   Patient Would Like to Complete Advance Directive Unable   Does the patient have other document types Do Not Resuscitate        VITAL SIGNS & PHYSICAL EXAM     Median Arm Circumference (inches):     Wt Readings from Last 3 Encounters:   11/20/19 159 lb 1.6 oz (72.2 kg)   11/05/19 156 lb 6.4 oz (70.9 kg)   10/29/19 155 lb 12.8 oz (70.7 kg)     Temp Readings from Last 3 Encounters:   05/18/20 98 °F (36.7 °C) (Oral) 01/17/20 98.5 °F (36.9 °C) (Oral)   11/20/19 97.4 °F (36.3 °C) (Axillary)     BP Readings from Last 3 Encounters:   05/18/20 126/70   01/17/20 132/76   11/20/19 102/64     Pulse Readings from Last 3 Encounters:   05/18/20 72   03/11/20 75   01/17/20 70            Pacemaker:  ICD:  Feeding Tube:  Urine Catheter:  Other:     Physical Exam   Constitutional: He is well-developed, well-nourished, and in no distress. No distress. Frail, thin. Will not allow a complete exam.     HENT:   Head: Normocephalic and atraumatic. Right Ear: External ear normal.   Eyes: Conjunctivae are normal. Right eye exhibits no discharge. Left eye exhibits no discharge. No scleral icterus. Conjunctiva white   Cardiovascular: Normal rate, regular rhythm, normal heart sounds and intact distal pulses. Exam reveals no gallop and no friction rub. No murmur heard. Pulmonary/Chest: Effort normal and breath sounds normal. No respiratory distress. He has no wheezes. He has no rales. He exhibits no tenderness. Abdominal: Soft. Bowel sounds are normal.   Musculoskeletal:         General: No deformity or edema. Neurological: He is alert. Oriented X 1   Skin: Skin is warm and dry. No rash noted. He is not diaphoretic. No erythema. No pallor. Psychiatric:   Agitates easily, does not want to interact. Vitals reviewed.     Diabetic foot exam:     Left Foot:   Visual Exam: normal    Pulse DP: 1+ (weak)   Filament test: reduced sensation    Vibratory sensation: diminished      Right Foot:   Visual Exam: normal    Pulse DP: 1+ (weak)   Filament test: reduced sensation    Vibratory sensation: diminished          PROBLEM LIST / PAST MEDICAL / SOCIAL HX     Patient Active Problem List   Diagnosis Code    Complicated grief M38.27    Non compliance w medication regimen Z91.14    Cognitive disorder F09    Moderate recurrent major depression ([facility] F33.1    Uncontrolled diabetes mellitus with hyperglycemia ([facility] E11.65    Syncope R55    Volume depletion E86.9    Hyponatremia E87.1    Urinary incontinence R32    CAD (coronary artery disease) I25.10    COPD (chronic obstructive pulmonary disease) ([facility]) J44.9    Orthostatic hypotension I95.1    CHF (congestive heart failure) ([facility]) I50.9    NSTEMI (non-ST elevated myocardial infarction) ([facility]) I21.4    Hypokalemia E87.6    Prostate cancer ([facility] C61      Family History   Problem Relation Age of Onset    Diabetes Mother     Diabetes Brother      Social History     Socioeconomic History    Marital status:      Spouse name: Not on file    Number of children: Not on file    Years of education: Not on file    Highest education level: Not on file   Tobacco Use    Smoking status: Former Smoker    Smokeless tobacco: Never Used    Tobacco comment: 1-2 cigars daily   Substance and Sexual Activity    Alcohol use: No     Alcohol/week: 0.0 standard drinks    Drug use: No        MEDICATIONS & PRESCRIPTIONS     Allergies   Allergen Reactions    Sulfa (Sulfonamide Antibiotics) Unknown (comments)      Current Outpatient Medications   Medication Sig    QUEtiapine (SEROqueL) 50 mg tablet Take 1 Tab by mouth nightly.  QUEtiapine (SEROquel) 25 mg tablet Take 1 Tab by mouth daily. 1 - 2 tabs po every am dependent on behavivor    HYDROcodone-acetaminophen (NORCO) 5-325 mg per tablet Take 1 Tab by mouth two (2) times daily as needed for Pain for up to 30 days. Max Daily Amount: 2 Tabs.  Insulin Needles, Disposable, (BD Ultra-Fine Mini Pen Needle) 31 gauge x 3/16\" ndle USE TO INJECT INSULIN 4 TIMES A DAY    insulin glargine (LANTUS,BASAGLAR) 100 unit/mL (3 mL) inpn Inject 8 units every morning.     insulin lispro (HUMALOG) 100 unit/mL kwikpen 3 units sq for bs greater than 200 ; 5  untis sq for bs greater than 250,  6units for blood sugar greater than 300 ; call me for bs greater than 400    levothyroxine (SYNTHROID) 50 mcg tablet TAKE 1 TABLET BY MOUTH EVERY DAY BEFORE BREAKFAST    glucose blood VI test strips (OneTouch Ultra Blue Test Strip) strip USE TO CHECK BLOOD SUGAR 3 TIMES DAILY. DIAGNOSIS CODE: E11.9    tamsulosin (Flomax) 0.4 mg capsule Take 1 Cap by mouth daily.  omeprazole (PRILOSEC) 20 mg capsule Take 1 Cap by mouth daily. Before breakfast    QUEtiapine (SEROquel) 25 mg tablet TAKE 1 TAB BY MOUTH NIGHTLY. TAKE 1 TAB AT BEDTIME ALONG WITH 50 MG DOSE.  memantine ER (NAMENDA XR) 28 mg capsule TAKE 1 CAPSULE BY MOUTH EVERY DAY    b complex vitamins (SUPER B-50 COMPLEX PLUS) tablet Take 1 Tab by mouth daily.  DULoxetine (CYMBALTA) 60 mg capsule TAKE ONE CAPSULE BY MOUTH EVERY DAY    SENNA PLUS 8.6-50 mg per tablet TAKE 2 TABS BY MOUTH TWO (2) TIMES A DAY    lidocaine (LIDODERM) 5 % Apply patch to the affected area for 12 hours a day and remove for 12 hours a day.  midodrine (PROAMITINE) 5 mg tablet Take 5 mg by mouth three (3) times daily (with meals).  aspirin 81 mg chewable tablet Take 1 Tab by mouth daily.  ondansetron (ZOFRAN ODT) 4 mg disintegrating tablet Take 1 Tab by mouth every six (6) hours as needed for Nausea.  acetaminophen (TYLENOL) 500 mg tablet Take 1,000 mg by mouth every six to eight (6-8) hours as needed for Pain. No current facility-administered medications for this visit. Medications Ordered Today   Medications    QUEtiapine (SEROqueL) 50 mg tablet     Sig: Take 1 Tab by mouth nightly. Dispense:  30 Tab     Refill:  5    QUEtiapine (SEROquel) 25 mg tablet     Sig: Take 1 Tab by mouth daily. 1 - 2 tabs po every am dependent on behavivor     Dispense:  60 Tab     Refill:  5    HYDROcodone-acetaminophen (NORCO) 5-325 mg per tablet     Sig: Take 1 Tab by mouth two (2) times daily as needed for Pain for up to 30 days. Max Daily Amount: 2 Tabs.      Dispense:  60 Tab     Refill:  0         TEST DATA REVIEWED:     Lab Results   Component Value Date/Time    Hemoglobin A1c 7.6 (H) 07/02/2019 10:36 AM    Hemoglobin A1c, External 11.2 01/27/2016     Lab Results   Component Value Date/Time    Microalb/Creat ratio (ug/mg creat.) 63.6 (H) 02/12/2019 08:49 AM     Lab Results   Component Value Date/Time    TSH 2.89 12/26/2018 02:49 AM     No results found for: Meera Maddox, [id number]      Lab Results   Component Value Date/Time    WBC 8.2 09/07/2019 08:44 AM    HGB 11.4 (L) 09/07/2019 08:44 AM    PLATELET 047 53/09/9983 08:44 AM     Lab Results   Component Value Date/Time    Sodium 142 09/07/2019 08:44 AM    Potassium 4.0 09/07/2019 08:44 AM    Chloride 107 09/07/2019 08:44 AM    CO2 27 09/07/2019 08:44 AM    BUN 15 09/07/2019 08:44 AM    Creatinine 1.78 (H) 09/07/2019 08:44 AM    Calcium 8.8 09/07/2019 08:44 AM    Magnesium 2.0 02/05/2019 01:42 AM    Phosphorus 3.8 02/05/2019 01:42 AM      Lab Results   Component Value Date/Time    Alk.  phosphatase 102 09/07/2019 08:44 AM    Protein, total 6.6 09/07/2019 08:44 AM    Albumin 3.2 (L) 09/07/2019 08:44 AM    Globulin 3.4 09/07/2019 08:44 AM     Lab Results   Component Value Date/Time    Iron 39 02/04/2019 07:35 PM    TIBC 196 (L) 02/04/2019 07:35 PM    Iron % saturation 20 02/04/2019 07:35 PM    Ferritin 75 02/04/2019 07:35 PM

## 2020-09-09 NOTE — PROGRESS NOTES
Reviewed results with daughter. I am reaching out to his urologist about the increased PSA. Dr. Rodgers Fairly will call me back. BG less well controlled but for a dementia patient who won't allow testing much of the time and is on insulin. TSH well controlled. Continue levothyroxine.

## 2020-09-29 ENCOUNTER — DOCUMENTATION ONLY (OUTPATIENT)
Dept: FAMILY MEDICINE CLINIC | Age: 80
End: 2020-09-29

## 2020-09-29 NOTE — PROGRESS NOTES
Home Based Primary Care & Supportive Services  Plan of Care    [facility] Team Members: Dr. Lysle Lombard, Susannah Amato NP; Ezequiel Mathis NP; Yaneth Garibay; Gina Champion RN, Nichelle Gomez RN, Leslie Dakins, SW    To Mail  1940 / [id number]  male    The physician has reviewed and discussed the plan of care with the interdisciplinary group on 11/04/20 and agrees. Date of Initial Visit (Start of Care): 2/12/19  Date of last visit: 3/11/2020  Last MD visit: needs visit     Diagnosis: 67 y.o. with dementia, COPD,  chronic diastolic CHF, persistent afib, Type 2 diabetes with hypoglycemia, chronic anemia, hypothyroidism, depression    Patient status summary: Patient and POC discussed in IDT meeting for 60 day update. Homebound patient referred by Palliative Medicine Inpatient Team, Isrrael Chacon NP to our services due to taxing effort to seek primary care needs in the community. DME/Supplies:  Bedside Commode       Primary Decision Maker (Active): Linn Villegas - [phone]    Primary Decision Maker: Hugo Padgett    Secondary Decision Maker: Blayne Blocker - Other Relative - [phone]      Allergies   Allergen Reactions    Sulfa (Sulfonamide Antibiotics) Unknown (comments)       Nutritional Requirements:   Oral with supported meal preparation    Functional/Activity Level:  Limited ambulation with support of wheelchair and Wheelchair with assistance for transfers    Code Status: DNR    Safety Measures:   Fall risk, Self-care deficity and Smoker    Current Outpatient Medications   Medication Sig    QUEtiapine (SEROqueL) 50 mg tablet Take 1 Tab by mouth nightly.  QUEtiapine (SEROquel) 25 mg tablet Take 1 Tab by mouth daily. 1 - 2 tabs po every am dependent on behavivor    HYDROcodone-acetaminophen (NORCO) 5-325 mg per tablet Take 1 Tab by mouth two (2) times daily as needed for Pain for up to 30 days. Max Daily Amount: 2 Tabs.     Insulin Needles, Disposable, (BD Ultra-Fine Mini Pen Needle) 31 gauge x 3/16\" ndle USE TO INJECT INSULIN 4 TIMES A DAY    insulin glargine (LANTUS,BASAGLAR) 100 unit/mL (3 mL) inpn Inject 8 units every morning.  insulin lispro (HUMALOG) 100 unit/mL kwikpen 3 units sq for bs greater than 200 ; 5  untis sq for bs greater than 250,  6units for blood sugar greater than 300 ; call me for bs greater than 400    levothyroxine (SYNTHROID) 50 mcg tablet TAKE 1 TABLET BY MOUTH EVERY DAY BEFORE BREAKFAST    glucose blood VI test strips (OneTouch Ultra Blue Test Strip) strip USE TO CHECK BLOOD SUGAR 3 TIMES DAILY. DIAGNOSIS CODE: E11.9    tamsulosin (Flomax) 0.4 mg capsule Take 1 Cap by mouth daily.  omeprazole (PRILOSEC) 20 mg capsule Take 1 Cap by mouth daily. Before breakfast    QUEtiapine (SEROquel) 25 mg tablet TAKE 1 TAB BY MOUTH NIGHTLY. TAKE 1 TAB AT BEDTIME ALONG WITH 50 MG DOSE.  memantine ER (NAMENDA XR) 28 mg capsule TAKE 1 CAPSULE BY MOUTH EVERY DAY    b complex vitamins (SUPER B-50 COMPLEX PLUS) tablet Take 1 Tab by mouth daily.  DULoxetine (CYMBALTA) 60 mg capsule TAKE ONE CAPSULE BY MOUTH EVERY DAY    SENNA PLUS 8.6-50 mg per tablet TAKE 2 TABS BY MOUTH TWO (2) TIMES A DAY    lidocaine (LIDODERM) 5 % Apply patch to the affected area for 12 hours a day and remove for 12 hours a day.  midodrine (PROAMITINE) 5 mg tablet Take 5 mg by mouth three (3) times daily (with meals).  aspirin 81 mg chewable tablet Take 1 Tab by mouth daily.  ondansetron (ZOFRAN ODT) 4 mg disintegrating tablet Take 1 Tab by mouth every six (6) hours as needed for Nausea.  acetaminophen (TYLENOL) 500 mg tablet Take 1,000 mg by mouth every six to eight (6-8) hours as needed for Pain. No current facility-administered medications for this visit. The Plan of Care has been initiated for during discussion at interdisciplinary group meeting  and reviewed and updated by the Interdisciplinary Group (IDG) as frequently as the patient condition warrants. Plan of Care by Discipline:    1. Provider  Ongoing evaluation of treatment interventions for specific disease state, Reduction of polypharmacy within benefit/burden framework appropriate to age, function and disease state, Determine need for laboratory assessment based on patient disease status , Assess results of laboratory testing and adjust treatment plan as appropriate and Create and implement disease /symptom specific plan to manage high risk conditions / symptoms    2. Nursing  Review Health Maintenance with patient and provider; update as appropriate and Provider caregiver / family support for patient's current and changing condition    3. Social Work  Establish therapeutic relationship through in home visits and telephonic touch points    4. Other    Plan of Care Orders / Action Items:       ICD-10-CM ICD-9-CM   1. Type II or unspecified type diabetes mellitus with neurological manifestations, uncontrolled(250.62) ([facility])  E11.49 250.62   2. Hypothyroidism, unspecified type  E03.9 244.9   3. Orthostatic hypotension  I95.1 458.0   4. Congestive heart failure, unspecified HF chronicity, unspecified heart failure type ([facility])  I50.9 428.0   5. CKD (chronic kidney disease) stage 4, GFR 15-29 ml/min ([facility])  N18.4 585.4   6. Hyperlipidemia, unspecified hyperlipidemia type  E78.5 272.4   7. Late onset Alzheimer's disease with behavioral disturbance ([facility])  G30.1 331.0     F02.81 294.11   8. Chronic pain syndrome  G89.4 338.4   9. [facility] cancer ([facility]  C61 185   1. BG's are 100 to 300's. Have been liberal with BG's because of dementia and patient not liking to have frequent BG checks. If consistently in 400's, family will call for me to adjust insulin. A1C drawn today. 2.  Check TSh. Continue levothyroxine  3. Patient receives midodrine when systolic BP below 885. No recent falls or synncope. 4.  No exacerbations in several years. No betablocker or ace/arb secondary to [facility] or diuretics. 5.  Check CMP, CBC. Avoid nephrotoxic drugs and dehydration. 6.  Check lipids  7. Advanced. Patient has a lot of behaviors. Continue seroqul, memantine. It is almost impossible to do exams on the patient or get vital signs or draw blood. Seroquel adjusted to 50 at hs and 25-50 in am depending on behaviors for the day. 8.  Well controlled on hydrocodone 1 to 2 tabs a day. Checked . Patient has no constipation, not sedated. Pain is neuropathic and arthritic. Refilled lortab. 9.   Patient has elected not to return to urology for lupron injections secondary to behaviors and advanced dementia. .  Check PSA per family request.        Health Maintenance   Topic Date Due    Statin Therapy  1940    Flu Vaccine (1) 10/30/2020 (Originally 9/1/2020)    Pneumococcal 65+ years (1 of 1 - PPSV23) 11/20/2020 (Originally 12/11/2006)    Pneumococcal 65+ yrs at Risk Vaccine (1 of 2 - PCV13) 10/30/2022 (Originally 12/11/2006)    MICROALBUMIN Q1  01/25/2023 (Originally 2/12/2020)    GLAUCOMA SCREENING Q2Y  03/11/2023 (Originally 12/11/2006)    Eye Exam Retinal or Dilated  03/11/2023 (Originally 12/11/1950)    Shingrix Vaccine Age 50> (1 of 2) 02/15/2025 (Originally 12/11/1991)    Medicare Yearly Exam  03/12/2021    Foot Exam Q1  09/08/2021    DTaP/Tdap/Td series (2 - Td) 09/07/2029         Estimated Visit Frequency:  Every 3 months

## 2020-10-02 DIAGNOSIS — G30.1 LATE ONSET ALZHEIMER'S DISEASE WITH BEHAVIORAL DISTURBANCE (HCC): ICD-10-CM

## 2020-10-02 DIAGNOSIS — F02.818 LATE ONSET ALZHEIMER'S DISEASE WITH BEHAVIORAL DISTURBANCE (HCC): ICD-10-CM

## 2020-10-02 RX ORDER — QUETIAPINE FUMARATE 25 MG/1
25 TABLET, FILM COATED ORAL DAILY
Qty: 60 TAB | Refills: 5 | Status: SHIPPED | OUTPATIENT
Start: 2020-10-02 | End: 2021-04-20

## 2020-10-02 RX ORDER — QUETIAPINE FUMARATE 50 MG/1
50 TABLET, FILM COATED ORAL
Qty: 30 TAB | Refills: 5 | Status: SHIPPED | OUTPATIENT
Start: 2020-10-02 | End: 2021-02-22

## 2020-11-01 DIAGNOSIS — G89.4 CHRONIC PAIN SYNDROME: Primary | ICD-10-CM

## 2020-11-01 RX ORDER — HYDROCODONE BITARTRATE AND ACETAMINOPHEN 5; 325 MG/1; MG/1
1 TABLET ORAL
Qty: 60 TAB | Refills: 0 | Status: SHIPPED | OUTPATIENT
Start: 2020-11-01 | End: 2020-12-01

## 2020-11-10 DIAGNOSIS — E11.49 TYPE II OR UNSPECIFIED TYPE DIABETES MELLITUS WITH NEUROLOGICAL MANIFESTATIONS, UNCONTROLLED(250.62) (HCC): ICD-10-CM

## 2020-11-10 RX ORDER — INSULIN LISPRO 100 [IU]/ML
INJECTION, SOLUTION INTRAVENOUS; SUBCUTANEOUS
Qty: 30 ML | Refills: 1 | Status: SHIPPED | OUTPATIENT
Start: 2020-11-10

## 2020-11-30 ENCOUNTER — DOCUMENTATION ONLY (OUTPATIENT)
Dept: FAMILY MEDICINE CLINIC | Age: 80
End: 2020-11-30

## 2020-12-01 NOTE — PROGRESS NOTES
Home Based Primary Care & Supportive Services  Plan of Care    [facility] [place] Team Members: Dr. Savannah De León, Helio Gonzalez, NP; Tc Aquino, MILTON; Bharath Cox RN, SPENCER [place] Jhon  1940 / [id number]  male    The physician has reviewed and discussed the plan of care with the interdisciplinary group on 12/02/2020 and agrees. Date of Initial Visit (Start of Care): 2/12/19  Date of last visit: 9/8/20  Last MD visit: needs visit     Diagnosis: 67 y.o. male with dementia, COPD,  chronic diastolic CHF, persistent afib, Type 2 diabetes with hypoglycemia, chronic anemia, hypothyroidism, depression    Patient status summary: Patient and POC discussed in IDT meeting for 60 day update. Homebound patient referred by Palliative Medicine Inpatient Team, Nidhi Hogan NP to our services due to taxing effort to seek primary care needs in the community. DME/Supplies:  Bedside Commode       Primary Decision Maker (Active): [place]  Bakari - [phone]    Primary Decision Maker: Araceli Queen - Son    Secondary Decision Maker: Bianca Pedro - Other Relative - [phone]      Allergies   Allergen Reactions    Sulfa (Sulfonamide Antibiotics) Unknown (comments)       Nutritional Requirements:   Oral with supported meal preparation    Functional/Activity Level:  Limited ambulation with support of wheelchair and Wheelchair with assistance for transfers    Code Status: DNR    Safety Measures:   Fall risk, Self-care deficity and Smoker    Current Outpatient Medications   Medication Sig    insulin lispro (HUMALOG) 100 unit/mL injection INJECT 3 UNITS BENEATH THE SKIN FOR BS>200, 5 UNITS FOR BS>250, 6 UNITS FOR BS>300. CALL MD FOR BS >400    HYDROcodone-acetaminophen (NORCO) 5-325 mg per tablet Take 1 Tab by mouth two (2) times daily as needed for Pain for up to 30 days. Max Daily Amount: 2 Tabs.  QUEtiapine (SEROqueL) 50 mg tablet Take 1 Tab by mouth nightly.     QUEtiapine (SEROquel) 25 mg tablet Take 1 Tab by mouth daily. 1 - 2 tabs po every am dependent on behavivor    Insulin Needles, Disposable, (BD Ultra-Fine Mini Pen Needle) 31 gauge x 3/16\" ndle USE TO INJECT INSULIN 4 TIMES A DAY    insulin glargine (LANTUS,BASAGLAR) 100 unit/mL (3 mL) inpn Inject 8 units every morning.  insulin lispro (HUMALOG) 100 unit/mL kwikpen 3 units sq for bs greater than 200 ; 5  untis sq for bs greater than 250,  6units for blood sugar greater than 300 ; call me for bs greater than 400    levothyroxine (SYNTHROID) 50 mcg tablet TAKE 1 TABLET BY MOUTH EVERY DAY BEFORE BREAKFAST    glucose blood VI test strips (OneTouch Ultra Blue Test Strip) strip USE TO CHECK BLOOD SUGAR 3 TIMES DAILY. DIAGNOSIS CODE: E11.9    tamsulosin (Flomax) 0.4 mg capsule Take 1 Cap by mouth daily.  omeprazole (PRILOSEC) 20 mg capsule Take 1 Cap by mouth daily. Before breakfast    QUEtiapine (SEROquel) 25 mg tablet TAKE 1 TAB BY MOUTH NIGHTLY. TAKE 1 TAB AT BEDTIME ALONG WITH 50 MG DOSE.  memantine ER (NAMENDA XR) 28 mg capsule TAKE 1 CAPSULE BY MOUTH EVERY DAY    b complex vitamins (SUPER B-50 COMPLEX PLUS) tablet Take 1 Tab by mouth daily.  DULoxetine (CYMBALTA) 60 mg capsule TAKE ONE CAPSULE BY MOUTH EVERY DAY    SENNA PLUS 8.6-50 mg per tablet TAKE 2 TABS BY MOUTH TWO (2) TIMES A DAY    lidocaine (LIDODERM) 5 % Apply patch to the affected area for 12 hours a day and remove for 12 hours a day.  midodrine (PROAMITINE) 5 mg tablet Take 5 mg by mouth three (3) times daily (with meals).  aspirin 81 mg chewable tablet Take 1 Tab by mouth daily.  ondansetron (ZOFRAN ODT) 4 mg disintegrating tablet Take 1 Tab by mouth every six (6) hours as needed for Nausea.  acetaminophen (TYLENOL) 500 mg tablet Take 1,000 mg by mouth every six to eight (6-8) hours as needed for Pain. No current facility-administered medications for this visit.         The Plan of Care has been initiated for during discussion at interdisciplinary group meeting  and reviewed and updated by the Interdisciplinary Group (IDG) as frequently as the patient condition warrants. Plan of Care by Discipline:    1. Provider  Ongoing evaluation of treatment interventions for specific disease state, Reduction of polypharmacy within benefit/burden framework appropriate to age, function and disease state, Determine need for laboratory assessment based on patient disease status  and Create and implement disease /symptom specific plan to manage high risk conditions / symptoms    2. Nursing  Review Health Maintenance with patient and provider; update as appropriate and Provider caregiver / family support for patient's current and changing condition    3. Social Work  Establish therapeutic relationship through in home visits and telephonic touch points      Plan of Care Orders / Action     1, Type II or unspecified type II diabetes mellitus with neurological  Manifestations, uncontrolled   2. Hypothyroidism, unspecified type  3. Orthostatic  Hypotension  4. Congestive heart failure, unspecified HF chronicity, unspecified heart failure type  5. CKD (chronic kidney disease) stage 4 GFR 15-29 ml/min  6. Hyperlipidemia, unspecified hyp      1. BG's are 100 to 300's. Have been liberal with BG's because of dementia and patient not liking to have frequent BG checks. If consistently in 400's, family will call for me to adjust insulin. A1C drawn today. 2.  Check TSh. Continue levothyroxine  3. Patient receives midodrine when systolic BP below 310. No recent falls or synncope. 4.  No exacerbations in several years. No betablocker or ace/arb secondary to [facility] or diuretics. 5.  Check CMP, CBC. Avoid nephrotoxic drugs and dehydration. 6.  Check lipids  7. Advanced. Patient has a lot of behaviors. Continue seroqul, memantine. It is almost impossible to do exams on the patient or get vital signs or draw blood.   Seroquel adjusted to 50 at hs and 25-50 in am depending on behaviors for the day.  8.  Well controlled on hydrocodone 1 to 2 tabs a day. Checked . Patient has no constipation, not sedated. Pain is neuropathic and arthritic. Refilled lortab. 9.   Patient has elected not to return to urology for lupron injections secondary to behaviors and advanced dementia. .  Check PSA per family request.         Health Maintenance   Topic Date Due    Pneumococcal 65+ years (1 of 1 - PPSV23) 12/11/2006    Flu Vaccine (1) 09/01/2020    Pneumococcal 65+ yrs at Risk Vaccine (1 of 2 - PCV13) 10/30/2022 (Originally 12/11/2006)    MICROALBUMIN Q1  01/25/2023 (Originally 2/12/2020)    GLAUCOMA SCREENING Q2Y  03/11/2023 (Originally 12/11/2006)    Eye Exam Retinal or Dilated  03/11/2023 (Originally [birth year]/11/1951)    Shingrix Vaccine Age 50> (1 of 2) 02/15/2025 (Originally 12/11/1991)    Medicare Yearly Exam  03/12/2021    Foot Exam Q1  09/08/2021    DTaP/Tdap/Td series (2 - Td) 09/07/2029         Estimated Visit Frequency:  Every 6 months

## 2020-12-29 DIAGNOSIS — G89.4 CHRONIC PAIN SYNDROME: Primary | ICD-10-CM

## 2020-12-29 RX ORDER — HYDROCODONE BITARTRATE AND ACETAMINOPHEN 5; 325 MG/1; MG/1
1 TABLET ORAL
Qty: 60 TAB | Refills: 0 | Status: SHIPPED | OUTPATIENT
Start: 2020-12-29 | End: 2021-01-28

## 2021-01-01 ENCOUNTER — HOME CARE VISIT (OUTPATIENT)
Dept: SCHEDULING | Facility: HOME HEALTH | Age: 81
End: 2021-01-01
Payer: MEDICARE

## 2021-01-01 ENCOUNTER — PATIENT MESSAGE (OUTPATIENT)
Dept: FAMILY MEDICINE CLINIC | Age: 81
End: 2021-01-01

## 2021-01-01 ENCOUNTER — DOCUMENTATION ONLY (OUTPATIENT)
Dept: FAMILY MEDICINE CLINIC | Age: 81
End: 2021-01-01

## 2021-01-01 ENCOUNTER — HOSPITAL ENCOUNTER (INPATIENT)
Age: 81
LOS: 7 days | Discharge: HOME HEALTH | DRG: 480 | End: 2021-10-01
Attending: EMERGENCY MEDICINE | Admitting: HOSPITALIST
Payer: MEDICARE

## 2021-01-01 ENCOUNTER — HOME CARE VISIT (OUTPATIENT)
Dept: HOME HEALTH SERVICES | Facility: HOME HEALTH | Age: 81
End: 2021-01-01
Payer: MEDICARE

## 2021-01-01 ENCOUNTER — TELEPHONE (OUTPATIENT)
Dept: FAMILY MEDICINE CLINIC | Age: 81
End: 2021-01-01

## 2021-01-01 ENCOUNTER — HOME HEALTH ADMISSION (OUTPATIENT)
Dept: HOME HEALTH SERVICES | Facility: HOME HEALTH | Age: 81
End: 2021-01-01
Payer: MEDICARE

## 2021-01-01 ENCOUNTER — HOME VISIT (OUTPATIENT)
Dept: FAMILY MEDICINE CLINIC | Age: 81
End: 2021-01-01
Payer: MEDICARE

## 2021-01-01 ENCOUNTER — TELEPHONE (OUTPATIENT)
Dept: PALLATIVE CARE | Age: 81
End: 2021-01-01

## 2021-01-01 ENCOUNTER — ANESTHESIA (OUTPATIENT)
Dept: SURGERY | Age: 81
DRG: 480 | End: 2021-01-01
Payer: MEDICARE

## 2021-01-01 ENCOUNTER — HOSPITAL ENCOUNTER (EMERGENCY)
Age: 81
Discharge: HOME | End: 2021-11-26
Attending: EMERGENCY MEDICINE
Payer: MEDICARE

## 2021-01-01 ENCOUNTER — APPOINTMENT (OUTPATIENT)
Dept: GENERAL RADIOLOGY | Age: 81
DRG: 480 | End: 2021-01-01
Attending: INTERNAL MEDICINE
Payer: MEDICARE

## 2021-01-01 ENCOUNTER — ANESTHESIA EVENT (OUTPATIENT)
Dept: SURGERY | Age: 81
DRG: 480 | End: 2021-01-01
Payer: MEDICARE

## 2021-01-01 ENCOUNTER — APPOINTMENT (OUTPATIENT)
Dept: CT IMAGING | Age: 81
DRG: 480 | End: 2021-01-01
Attending: INTERNAL MEDICINE
Payer: MEDICARE

## 2021-01-01 ENCOUNTER — APPOINTMENT (OUTPATIENT)
Dept: GENERAL RADIOLOGY | Age: 81
DRG: 480 | End: 2021-01-01
Attending: HOSPITALIST
Payer: MEDICARE

## 2021-01-01 ENCOUNTER — ANESTHESIA EVENT (OUTPATIENT)
Dept: ENDOSCOPY | Age: 81
DRG: 480 | End: 2021-01-01
Payer: MEDICARE

## 2021-01-01 ENCOUNTER — APPOINTMENT (OUTPATIENT)
Dept: CT IMAGING | Age: 81
End: 2021-01-01
Attending: EMERGENCY MEDICINE
Payer: MEDICARE

## 2021-01-01 ENCOUNTER — APPOINTMENT (OUTPATIENT)
Dept: CT IMAGING | Age: 81
DRG: 480 | End: 2021-01-01
Attending: EMERGENCY MEDICINE
Payer: MEDICARE

## 2021-01-01 ENCOUNTER — ANESTHESIA (OUTPATIENT)
Dept: ENDOSCOPY | Age: 81
DRG: 480 | End: 2021-01-01
Payer: MEDICARE

## 2021-01-01 ENCOUNTER — APPOINTMENT (OUTPATIENT)
Dept: GENERAL RADIOLOGY | Age: 81
DRG: 480 | End: 2021-01-01
Attending: EMERGENCY MEDICINE
Payer: MEDICARE

## 2021-01-01 VITALS
OXYGEN SATURATION: 97 % | HEART RATE: 70 BPM | SYSTOLIC BLOOD PRESSURE: 159 MMHG | TEMPERATURE: 98.1 F | RESPIRATION RATE: 18 BRPM | DIASTOLIC BLOOD PRESSURE: 70 MMHG

## 2021-01-01 VITALS
TEMPERATURE: 98.1 F | SYSTOLIC BLOOD PRESSURE: 138 MMHG | RESPIRATION RATE: 14 BRPM | HEART RATE: 86 BPM | DIASTOLIC BLOOD PRESSURE: 70 MMHG | OXYGEN SATURATION: 95 %

## 2021-01-01 VITALS
OXYGEN SATURATION: 97 % | TEMPERATURE: 98.1 F | RESPIRATION RATE: 16 BRPM | HEART RATE: 68 BPM | DIASTOLIC BLOOD PRESSURE: 74 MMHG | SYSTOLIC BLOOD PRESSURE: 136 MMHG

## 2021-01-01 VITALS
TEMPERATURE: 98.1 F | DIASTOLIC BLOOD PRESSURE: 80 MMHG | SYSTOLIC BLOOD PRESSURE: 168 MMHG | OXYGEN SATURATION: 97 % | HEART RATE: 70 BPM | RESPIRATION RATE: 18 BRPM

## 2021-01-01 VITALS
RESPIRATION RATE: 18 BRPM | SYSTOLIC BLOOD PRESSURE: 132 MMHG | OXYGEN SATURATION: 98 % | TEMPERATURE: 98.1 F | DIASTOLIC BLOOD PRESSURE: 74 MMHG | HEART RATE: 80 BPM

## 2021-01-01 VITALS
HEART RATE: 81 BPM | OXYGEN SATURATION: 96 % | SYSTOLIC BLOOD PRESSURE: 186 MMHG | DIASTOLIC BLOOD PRESSURE: 68 MMHG | TEMPERATURE: 98.4 F

## 2021-01-01 VITALS
RESPIRATION RATE: 14 BRPM | HEART RATE: 60 BPM | OXYGEN SATURATION: 99 % | TEMPERATURE: 97.6 F | DIASTOLIC BLOOD PRESSURE: 60 MMHG | SYSTOLIC BLOOD PRESSURE: 134 MMHG

## 2021-01-01 VITALS
RESPIRATION RATE: 16 BRPM | OXYGEN SATURATION: 95 % | SYSTOLIC BLOOD PRESSURE: 136 MMHG | TEMPERATURE: 98.1 F | DIASTOLIC BLOOD PRESSURE: 72 MMHG | HEART RATE: 72 BPM

## 2021-01-01 VITALS
DIASTOLIC BLOOD PRESSURE: 64 MMHG | OXYGEN SATURATION: 95 % | TEMPERATURE: 99 F | SYSTOLIC BLOOD PRESSURE: 128 MMHG | RESPIRATION RATE: 14 BRPM | HEART RATE: 66 BPM

## 2021-01-01 VITALS
HEART RATE: 71 BPM | TEMPERATURE: 98.5 F | SYSTOLIC BLOOD PRESSURE: 134 MMHG | RESPIRATION RATE: 14 BRPM | DIASTOLIC BLOOD PRESSURE: 72 MMHG | OXYGEN SATURATION: 97 %

## 2021-01-01 VITALS
RESPIRATION RATE: 19 BRPM | SYSTOLIC BLOOD PRESSURE: 142 MMHG | DIASTOLIC BLOOD PRESSURE: 70 MMHG | OXYGEN SATURATION: 98 % | TEMPERATURE: 97.3 F

## 2021-01-01 VITALS
TEMPERATURE: 97.1 F | DIASTOLIC BLOOD PRESSURE: 68 MMHG | OXYGEN SATURATION: 95 % | HEART RATE: 57 BPM | SYSTOLIC BLOOD PRESSURE: 140 MMHG | RESPIRATION RATE: 14 BRPM

## 2021-01-01 VITALS
RESPIRATION RATE: 18 BRPM | TEMPERATURE: 98.1 F | SYSTOLIC BLOOD PRESSURE: 172 MMHG | HEART RATE: 70 BPM | DIASTOLIC BLOOD PRESSURE: 70 MMHG | OXYGEN SATURATION: 97 %

## 2021-01-01 VITALS
RESPIRATION RATE: 14 BRPM | SYSTOLIC BLOOD PRESSURE: 138 MMHG | DIASTOLIC BLOOD PRESSURE: 70 MMHG | OXYGEN SATURATION: 95 % | TEMPERATURE: 97.7 F | HEART RATE: 86 BPM

## 2021-01-01 VITALS
RESPIRATION RATE: 16 BRPM | DIASTOLIC BLOOD PRESSURE: 66 MMHG | HEART RATE: 91 BPM | OXYGEN SATURATION: 97 % | TEMPERATURE: 97.8 F | SYSTOLIC BLOOD PRESSURE: 182 MMHG

## 2021-01-01 VITALS
HEART RATE: 89 BPM | OXYGEN SATURATION: 94 % | SYSTOLIC BLOOD PRESSURE: 148 MMHG | DIASTOLIC BLOOD PRESSURE: 80 MMHG | TEMPERATURE: 98.1 F | RESPIRATION RATE: 17 BRPM

## 2021-01-01 VITALS
OXYGEN SATURATION: 96 % | DIASTOLIC BLOOD PRESSURE: 52 MMHG | TEMPERATURE: 97.8 F | RESPIRATION RATE: 18 BRPM | HEART RATE: 83 BPM | SYSTOLIC BLOOD PRESSURE: 110 MMHG

## 2021-01-01 VITALS
RESPIRATION RATE: 16 BRPM | SYSTOLIC BLOOD PRESSURE: 161 MMHG | OXYGEN SATURATION: 100 % | HEIGHT: 64 IN | TEMPERATURE: 97.9 F | WEIGHT: 149.91 LBS | DIASTOLIC BLOOD PRESSURE: 78 MMHG | HEART RATE: 89 BPM | BODY MASS INDEX: 25.59 KG/M2

## 2021-01-01 VITALS
OXYGEN SATURATION: 97 % | DIASTOLIC BLOOD PRESSURE: 60 MMHG | RESPIRATION RATE: 16 BRPM | SYSTOLIC BLOOD PRESSURE: 118 MMHG | TEMPERATURE: 98.2 F | HEART RATE: 78 BPM

## 2021-01-01 VITALS
OXYGEN SATURATION: 94 % | DIASTOLIC BLOOD PRESSURE: 102 MMHG | TEMPERATURE: 97.7 F | HEART RATE: 84 BPM | RESPIRATION RATE: 16 BRPM | SYSTOLIC BLOOD PRESSURE: 191 MMHG

## 2021-01-01 VITALS
RESPIRATION RATE: 18 BRPM | SYSTOLIC BLOOD PRESSURE: 150 MMHG | HEART RATE: 70 BPM | OXYGEN SATURATION: 95 % | TEMPERATURE: 98.7 F | DIASTOLIC BLOOD PRESSURE: 74 MMHG

## 2021-01-01 VITALS
SYSTOLIC BLOOD PRESSURE: 148 MMHG | OXYGEN SATURATION: 98 % | DIASTOLIC BLOOD PRESSURE: 72 MMHG | HEART RATE: 70 BPM | TEMPERATURE: 98.1 F | RESPIRATION RATE: 18 BRPM

## 2021-01-01 VITALS
SYSTOLIC BLOOD PRESSURE: 138 MMHG | OXYGEN SATURATION: 98 % | HEART RATE: 78 BPM | RESPIRATION RATE: 17 BRPM | TEMPERATURE: 98.1 F | DIASTOLIC BLOOD PRESSURE: 78 MMHG

## 2021-01-01 VITALS
DIASTOLIC BLOOD PRESSURE: 83 MMHG | OXYGEN SATURATION: 97 % | TEMPERATURE: 97.9 F | SYSTOLIC BLOOD PRESSURE: 146 MMHG | HEART RATE: 76 BPM | RESPIRATION RATE: 18 BRPM

## 2021-01-01 VITALS
DIASTOLIC BLOOD PRESSURE: 64 MMHG | TEMPERATURE: 98.2 F | SYSTOLIC BLOOD PRESSURE: 140 MMHG | HEART RATE: 88 BPM | OXYGEN SATURATION: 97 % | DIASTOLIC BLOOD PRESSURE: 68 MMHG | HEART RATE: 82 BPM | TEMPERATURE: 98.1 F | OXYGEN SATURATION: 96 % | SYSTOLIC BLOOD PRESSURE: 136 MMHG | RESPIRATION RATE: 14 BRPM | RESPIRATION RATE: 18 BRPM

## 2021-01-01 VITALS
HEART RATE: 76 BPM | OXYGEN SATURATION: 95 % | TEMPERATURE: 98.4 F | DIASTOLIC BLOOD PRESSURE: 70 MMHG | SYSTOLIC BLOOD PRESSURE: 142 MMHG

## 2021-01-01 VITALS
SYSTOLIC BLOOD PRESSURE: 142 MMHG | TEMPERATURE: 98.4 F | RESPIRATION RATE: 18 BRPM | OXYGEN SATURATION: 97 % | DIASTOLIC BLOOD PRESSURE: 66 MMHG | HEART RATE: 74 BPM

## 2021-01-01 VITALS
HEART RATE: 43 BPM | DIASTOLIC BLOOD PRESSURE: 60 MMHG | SYSTOLIC BLOOD PRESSURE: 144 MMHG | OXYGEN SATURATION: 93 % | TEMPERATURE: 98.7 F

## 2021-01-01 VITALS
DIASTOLIC BLOOD PRESSURE: 66 MMHG | RESPIRATION RATE: 18 BRPM | SYSTOLIC BLOOD PRESSURE: 124 MMHG | OXYGEN SATURATION: 97 % | HEART RATE: 76 BPM | TEMPERATURE: 98.2 F

## 2021-01-01 VITALS
RESPIRATION RATE: 16 BRPM | SYSTOLIC BLOOD PRESSURE: 168 MMHG | HEART RATE: 76 BPM | TEMPERATURE: 98.4 F | DIASTOLIC BLOOD PRESSURE: 82 MMHG

## 2021-01-01 VITALS
RESPIRATION RATE: 16 BRPM | TEMPERATURE: 97.8 F | HEART RATE: 78 BPM | DIASTOLIC BLOOD PRESSURE: 70 MMHG | SYSTOLIC BLOOD PRESSURE: 170 MMHG | OXYGEN SATURATION: 97 %

## 2021-01-01 VITALS
HEART RATE: 72 BPM | TEMPERATURE: 98.1 F | HEIGHT: 64 IN | WEIGHT: 144.18 LBS | DIASTOLIC BLOOD PRESSURE: 67 MMHG | RESPIRATION RATE: 23 BRPM | BODY MASS INDEX: 24.62 KG/M2 | OXYGEN SATURATION: 96 % | SYSTOLIC BLOOD PRESSURE: 165 MMHG

## 2021-01-01 VITALS — RESPIRATION RATE: 14 BRPM

## 2021-01-01 DIAGNOSIS — S91.309A NONHEALING WOUND OF HEEL: Primary | ICD-10-CM

## 2021-01-01 DIAGNOSIS — N18.32 STAGE 3B CHRONIC KIDNEY DISEASE (HCC): ICD-10-CM

## 2021-01-01 DIAGNOSIS — I10 ESSENTIAL HYPERTENSION: Primary | ICD-10-CM

## 2021-01-01 DIAGNOSIS — E11.65 UNCONTROLLED TYPE 2 DIABETES MELLITUS WITH HYPERGLYCEMIA (HCC): ICD-10-CM

## 2021-01-01 DIAGNOSIS — G89.29 OTHER CHRONIC PAIN: Primary | ICD-10-CM

## 2021-01-01 DIAGNOSIS — F02.818 LATE ONSET ALZHEIMER'S DISEASE WITH BEHAVIORAL DISTURBANCE (HCC): ICD-10-CM

## 2021-01-01 DIAGNOSIS — F33.1 MODERATE RECURRENT MAJOR DEPRESSION (HCC): ICD-10-CM

## 2021-01-01 DIAGNOSIS — G30.1 LATE ONSET ALZHEIMER'S DISEASE WITH BEHAVIORAL DISTURBANCE (HCC): ICD-10-CM

## 2021-01-01 DIAGNOSIS — R53.81 DEBILITY: ICD-10-CM

## 2021-01-01 DIAGNOSIS — Z00.00 MEDICARE ANNUAL WELLNESS VISIT, SUBSEQUENT: ICD-10-CM

## 2021-01-01 DIAGNOSIS — I10 HYPERTENSION, UNSPECIFIED TYPE: Primary | ICD-10-CM

## 2021-01-01 DIAGNOSIS — K21.9 GASTROESOPHAGEAL REFLUX DISEASE WITHOUT ESOPHAGITIS: ICD-10-CM

## 2021-01-01 DIAGNOSIS — J44.9 CHRONIC OBSTRUCTIVE PULMONARY DISEASE, UNSPECIFIED COPD TYPE (HCC): ICD-10-CM

## 2021-01-01 DIAGNOSIS — E87.1 HYPONATREMIA: Primary | ICD-10-CM

## 2021-01-01 DIAGNOSIS — E03.9 ACQUIRED HYPOTHYROIDISM: ICD-10-CM

## 2021-01-01 DIAGNOSIS — S72.002A CLOSED FRACTURE OF LEFT HIP, INITIAL ENCOUNTER (HCC): Primary | ICD-10-CM

## 2021-01-01 DIAGNOSIS — G89.29 OTHER CHRONIC PAIN: ICD-10-CM

## 2021-01-01 DIAGNOSIS — S72.002D CLOSED FRACTURE OF NECK OF LEFT FEMUR WITH ROUTINE HEALING, SUBSEQUENT ENCOUNTER: Primary | ICD-10-CM

## 2021-01-01 DIAGNOSIS — Z87.19 HISTORY OF GI BLEED: ICD-10-CM

## 2021-01-01 DIAGNOSIS — E11.65 UNCONTROLLED TYPE 2 DIABETES MELLITUS WITH HYPERGLYCEMIA (HCC): Primary | ICD-10-CM

## 2021-01-01 DIAGNOSIS — S72.002S CLOSED FRACTURE OF LEFT HIP, SEQUELA: Primary | ICD-10-CM

## 2021-01-01 DIAGNOSIS — I10 HYPERTENSION, UNSPECIFIED TYPE: ICD-10-CM

## 2021-01-01 DIAGNOSIS — Z91.81 HISTORY OF RECENT FALL: ICD-10-CM

## 2021-01-01 DIAGNOSIS — K29.71 GASTRITIS WITH HEMORRHAGE, UNSPECIFIED CHRONICITY, UNSPECIFIED GASTRITIS TYPE: ICD-10-CM

## 2021-01-01 DIAGNOSIS — C61 PROSTATE CANCER (HCC): ICD-10-CM

## 2021-01-01 DIAGNOSIS — I10 ESSENTIAL HYPERTENSION: ICD-10-CM

## 2021-01-01 LAB
ABO + RH BLD: NORMAL
ALBUMIN SERPL-MCNC: 3 G/DL (ref 3.5–5)
ALBUMIN SERPL-MCNC: 3.1 G/DL (ref 3.5–5)
ALBUMIN/GLOB SERPL: 0.7 {RATIO} (ref 1.1–2.2)
ALBUMIN/GLOB SERPL: 0.7 {RATIO} (ref 1.1–2.2)
ALP SERPL-CCNC: 100 U/L (ref 45–117)
ALP SERPL-CCNC: 103 U/L (ref 45–117)
ALT SERPL-CCNC: 17 U/L (ref 12–78)
ALT SERPL-CCNC: 20 U/L (ref 12–78)
ANION GAP SERPL CALC-SCNC: 1 MMOL/L (ref 5–15)
ANION GAP SERPL CALC-SCNC: 12 MMOL/L (ref 5–15)
ANION GAP SERPL CALC-SCNC: 4 MMOL/L (ref 5–15)
ANION GAP SERPL CALC-SCNC: 5 MMOL/L (ref 5–15)
ANION GAP SERPL CALC-SCNC: 7 MMOL/L (ref 5–15)
ANION GAP SERPL CALC-SCNC: 7 MMOL/L (ref 5–15)
APPEARANCE UR: CLEAR
APPEARANCE UR: CLEAR
AST SERPL-CCNC: 23 U/L (ref 15–37)
AST SERPL-CCNC: 27 U/L (ref 15–37)
ATRIAL RATE: 163 BPM
BACTERIA URNS QL MICRO: NEGATIVE /HPF
BACTERIA URNS QL MICRO: NEGATIVE /HPF
BASOPHILS # BLD: 0.1 K/UL (ref 0–0.1)
BASOPHILS # BLD: 0.1 K/UL (ref 0–0.1)
BASOPHILS NFR BLD: 1 % (ref 0–1)
BASOPHILS NFR BLD: 1 % (ref 0–1)
BILIRUB SERPL-MCNC: 0.3 MG/DL (ref 0.2–1)
BILIRUB SERPL-MCNC: 0.5 MG/DL (ref 0.2–1)
BILIRUB UR QL: NEGATIVE
BILIRUB UR QL: NEGATIVE
BLD PROD TYP BPU: NORMAL
BLOOD GROUP ANTIBODIES SERPL: NORMAL
BNP SERPL-MCNC: ABNORMAL PG/ML
BPU ID: NORMAL
BUN SERPL-MCNC: 16 MG/DL (ref 6–20)
BUN SERPL-MCNC: 18 MG/DL (ref 6–20)
BUN SERPL-MCNC: 19 MG/DL (ref 6–20)
BUN SERPL-MCNC: 19 MG/DL (ref 6–20)
BUN SERPL-MCNC: 22 MG/DL (ref 6–20)
BUN SERPL-MCNC: 29 MG/DL (ref 6–20)
BUN SERPL-MCNC: 32 MG/DL (ref 6–20)
BUN SERPL-MCNC: 32 MG/DL (ref 6–20)
BUN/CREAT SERPL: 10 (ref 12–20)
BUN/CREAT SERPL: 12 (ref 12–20)
BUN/CREAT SERPL: 12 (ref 12–20)
BUN/CREAT SERPL: 13 (ref 12–20)
BUN/CREAT SERPL: 16 (ref 12–20)
BUN/CREAT SERPL: 18 (ref 12–20)
BUN/CREAT SERPL: 18 (ref 12–20)
BUN/CREAT SERPL: 9 (ref 12–20)
CALCIUM SERPL-MCNC: 7.9 MG/DL (ref 8.5–10.1)
CALCIUM SERPL-MCNC: 8.2 MG/DL (ref 8.5–10.1)
CALCIUM SERPL-MCNC: 8.3 MG/DL (ref 8.5–10.1)
CALCIUM SERPL-MCNC: 8.6 MG/DL (ref 8.5–10.1)
CALCULATED R AXIS, ECG10: 54 DEGREES
CALCULATED R AXIS, ECG10: 73 DEGREES
CALCULATED R AXIS, ECG10: 77 DEGREES
CALCULATED T AXIS, ECG11: 104 DEGREES
CALCULATED T AXIS, ECG11: 175 DEGREES
CALCULATED T AXIS, ECG11: 77 DEGREES
CHLORIDE SERPL-SCNC: 102 MMOL/L (ref 97–108)
CHLORIDE SERPL-SCNC: 104 MMOL/L (ref 97–108)
CHLORIDE SERPL-SCNC: 104 MMOL/L (ref 97–108)
CHLORIDE SERPL-SCNC: 106 MMOL/L (ref 97–108)
CHLORIDE SERPL-SCNC: 107 MMOL/L (ref 97–108)
CHLORIDE SERPL-SCNC: 108 MMOL/L (ref 97–108)
CHLORIDE SERPL-SCNC: 109 MMOL/L (ref 97–108)
CHLORIDE SERPL-SCNC: 110 MMOL/L (ref 97–108)
CO2 SERPL-SCNC: 20 MMOL/L (ref 21–32)
CO2 SERPL-SCNC: 21 MMOL/L (ref 21–32)
CO2 SERPL-SCNC: 22 MMOL/L (ref 21–32)
CO2 SERPL-SCNC: 22 MMOL/L (ref 21–32)
CO2 SERPL-SCNC: 24 MMOL/L (ref 21–32)
CO2 SERPL-SCNC: 26 MMOL/L (ref 21–32)
COLOR UR: ABNORMAL
COLOR UR: ABNORMAL
COMMENT, HOLDF: NORMAL
COMMENT, HOLDF: NORMAL
COVID-19 RAPID TEST, COVR: NOT DETECTED
CREAT SERPL-MCNC: 1.47 MG/DL (ref 0.7–1.3)
CREAT SERPL-MCNC: 1.63 MG/DL (ref 0.7–1.3)
CREAT SERPL-MCNC: 1.68 MG/DL (ref 0.7–1.3)
CREAT SERPL-MCNC: 1.78 MG/DL (ref 0.7–1.3)
CREAT SERPL-MCNC: 1.79 MG/DL (ref 0.7–1.3)
CREAT SERPL-MCNC: 1.81 MG/DL (ref 0.7–1.3)
CREAT SERPL-MCNC: 1.82 MG/DL (ref 0.7–1.3)
CREAT SERPL-MCNC: 1.86 MG/DL (ref 0.7–1.3)
CROSSMATCH RESULT,%XM: NORMAL
DIAGNOSIS, 93000: NORMAL
DIFFERENTIAL METHOD BLD: ABNORMAL
DIFFERENTIAL METHOD BLD: ABNORMAL
EOSINOPHIL # BLD: 0 K/UL (ref 0–0.4)
EOSINOPHIL # BLD: 0.1 K/UL (ref 0–0.4)
EOSINOPHIL NFR BLD: 0 % (ref 0–7)
EOSINOPHIL NFR BLD: 1 % (ref 0–7)
EPITH CASTS URNS QL MICRO: ABNORMAL /LPF
EPITH CASTS URNS QL MICRO: ABNORMAL /LPF
ERYTHROCYTE [DISTWIDTH] IN BLOOD BY AUTOMATED COUNT: 14.2 % (ref 11.5–14.5)
ERYTHROCYTE [DISTWIDTH] IN BLOOD BY AUTOMATED COUNT: 14.3 % (ref 11.5–14.5)
ERYTHROCYTE [DISTWIDTH] IN BLOOD BY AUTOMATED COUNT: 14.9 % (ref 11.5–14.5)
ERYTHROCYTE [DISTWIDTH] IN BLOOD BY AUTOMATED COUNT: 14.9 % (ref 11.5–14.5)
ERYTHROCYTE [DISTWIDTH] IN BLOOD BY AUTOMATED COUNT: 16.7 % (ref 11.5–14.5)
GASTROCULT GAST QL: POSITIVE
GLOBULIN SER CALC-MCNC: 4.3 G/DL (ref 2–4)
GLOBULIN SER CALC-MCNC: 4.7 G/DL (ref 2–4)
GLUCOSE BLD STRIP.AUTO-MCNC: 104 MG/DL (ref 65–117)
GLUCOSE BLD STRIP.AUTO-MCNC: 110 MG/DL (ref 65–117)
GLUCOSE BLD STRIP.AUTO-MCNC: 110 MG/DL (ref 65–117)
GLUCOSE BLD STRIP.AUTO-MCNC: 126 MG/DL (ref 65–117)
GLUCOSE BLD STRIP.AUTO-MCNC: 136 MG/DL (ref 65–117)
GLUCOSE BLD STRIP.AUTO-MCNC: 139 MG/DL (ref 65–117)
GLUCOSE BLD STRIP.AUTO-MCNC: 143 MG/DL (ref 65–117)
GLUCOSE BLD STRIP.AUTO-MCNC: 155 MG/DL (ref 65–117)
GLUCOSE BLD STRIP.AUTO-MCNC: 164 MG/DL (ref 65–117)
GLUCOSE BLD STRIP.AUTO-MCNC: 168 MG/DL (ref 65–117)
GLUCOSE BLD STRIP.AUTO-MCNC: 178 MG/DL (ref 65–117)
GLUCOSE BLD STRIP.AUTO-MCNC: 180 MG/DL (ref 65–117)
GLUCOSE BLD STRIP.AUTO-MCNC: 182 MG/DL (ref 65–117)
GLUCOSE BLD STRIP.AUTO-MCNC: 183 MG/DL (ref 65–117)
GLUCOSE BLD STRIP.AUTO-MCNC: 189 MG/DL (ref 65–117)
GLUCOSE BLD STRIP.AUTO-MCNC: 194 MG/DL (ref 65–117)
GLUCOSE BLD STRIP.AUTO-MCNC: 207 MG/DL (ref 65–117)
GLUCOSE BLD STRIP.AUTO-MCNC: 208 MG/DL (ref 65–117)
GLUCOSE BLD STRIP.AUTO-MCNC: 210 MG/DL (ref 65–117)
GLUCOSE BLD STRIP.AUTO-MCNC: 214 MG/DL (ref 65–117)
GLUCOSE BLD STRIP.AUTO-MCNC: 228 MG/DL (ref 65–117)
GLUCOSE BLD STRIP.AUTO-MCNC: 229 MG/DL (ref 65–117)
GLUCOSE BLD STRIP.AUTO-MCNC: 241 MG/DL (ref 65–117)
GLUCOSE BLD STRIP.AUTO-MCNC: 71 MG/DL (ref 65–117)
GLUCOSE BLD STRIP.AUTO-MCNC: 95 MG/DL (ref 65–117)
GLUCOSE BLD STRIP.AUTO-MCNC: 96 MG/DL (ref 65–117)
GLUCOSE SERPL-MCNC: 112 MG/DL (ref 65–100)
GLUCOSE SERPL-MCNC: 116 MG/DL (ref 65–100)
GLUCOSE SERPL-MCNC: 174 MG/DL (ref 65–100)
GLUCOSE SERPL-MCNC: 237 MG/DL (ref 65–100)
GLUCOSE SERPL-MCNC: 258 MG/DL (ref 65–100)
GLUCOSE SERPL-MCNC: 267 MG/DL (ref 65–100)
GLUCOSE SERPL-MCNC: 318 MG/DL (ref 65–100)
GLUCOSE SERPL-MCNC: 77 MG/DL (ref 65–100)
GLUCOSE UR STRIP.AUTO-MCNC: 250 MG/DL
GLUCOSE UR STRIP.AUTO-MCNC: >1000 MG/DL
HCT VFR BLD AUTO: 20.3 % (ref 36.6–50.3)
HCT VFR BLD AUTO: 27.6 % (ref 36.6–50.3)
HCT VFR BLD AUTO: 27.9 % (ref 36.6–50.3)
HCT VFR BLD AUTO: 28.4 % (ref 36.6–50.3)
HCT VFR BLD AUTO: 30.1 % (ref 36.6–50.3)
HCT VFR BLD AUTO: 30.8 % (ref 36.6–50.3)
HCT VFR BLD AUTO: 35.7 % (ref 36.6–50.3)
HCT VFR BLD AUTO: 37.3 % (ref 36.6–50.3)
HGB BLD-MCNC: 10 G/DL (ref 12.1–17)
HGB BLD-MCNC: 10.2 G/DL (ref 12.1–17)
HGB BLD-MCNC: 11.7 G/DL (ref 12.1–17)
HGB BLD-MCNC: 11.8 G/DL (ref 12.1–17)
HGB BLD-MCNC: 6.7 G/DL (ref 12.1–17)
HGB BLD-MCNC: 9.1 G/DL (ref 12.1–17)
HGB BLD-MCNC: 9.2 G/DL (ref 12.1–17)
HGB BLD-MCNC: 9.6 G/DL (ref 12.1–17)
HGB UR QL STRIP: ABNORMAL
HGB UR QL STRIP: NEGATIVE
HISTORY CHECKED?,CKHIST: NORMAL
HYALINE CASTS URNS QL MICRO: ABNORMAL /LPF (ref 0–5)
IMM GRANULOCYTES # BLD AUTO: 0 K/UL (ref 0–0.04)
IMM GRANULOCYTES # BLD AUTO: 0.1 K/UL (ref 0–0.04)
IMM GRANULOCYTES NFR BLD AUTO: 0 % (ref 0–0.5)
IMM GRANULOCYTES NFR BLD AUTO: 0 % (ref 0–0.5)
INR PPP: 1 (ref 0.9–1.1)
KETONES UR QL STRIP.AUTO: ABNORMAL MG/DL
KETONES UR QL STRIP.AUTO: NEGATIVE MG/DL
LACTATE SERPL-SCNC: 1.6 MMOL/L (ref 0.4–2)
LEUKOCYTE ESTERASE UR QL STRIP.AUTO: NEGATIVE
LEUKOCYTE ESTERASE UR QL STRIP.AUTO: NEGATIVE
LYMPHOCYTES # BLD: 0.5 K/UL (ref 0.8–3.5)
LYMPHOCYTES # BLD: 1.3 K/UL (ref 0.8–3.5)
LYMPHOCYTES NFR BLD: 10 % (ref 12–49)
LYMPHOCYTES NFR BLD: 6 % (ref 12–49)
MAGNESIUM SERPL-MCNC: 1.7 MG/DL (ref 1.6–2.4)
MCH RBC QN AUTO: 28.5 PG (ref 26–34)
MCH RBC QN AUTO: 28.6 PG (ref 26–34)
MCH RBC QN AUTO: 29.2 PG (ref 26–34)
MCH RBC QN AUTO: 29.5 PG (ref 26–34)
MCH RBC QN AUTO: 29.5 PG (ref 26–34)
MCHC RBC AUTO-ENTMCNC: 31.4 G/DL (ref 30–36.5)
MCHC RBC AUTO-ENTMCNC: 32.6 G/DL (ref 30–36.5)
MCHC RBC AUTO-ENTMCNC: 33 G/DL (ref 30–36.5)
MCHC RBC AUTO-ENTMCNC: 33.1 G/DL (ref 30–36.5)
MCHC RBC AUTO-ENTMCNC: 33.3 G/DL (ref 30–36.5)
MCV RBC AUTO: 87.6 FL (ref 80–99)
MCV RBC AUTO: 87.7 FL (ref 80–99)
MCV RBC AUTO: 89.3 FL (ref 80–99)
MCV RBC AUTO: 89.4 FL (ref 80–99)
MCV RBC AUTO: 90.8 FL (ref 80–99)
MONOCYTES # BLD: 0.4 K/UL (ref 0–1)
MONOCYTES # BLD: 0.9 K/UL (ref 0–1)
MONOCYTES NFR BLD: 5 % (ref 5–13)
MONOCYTES NFR BLD: 7 % (ref 5–13)
NEUTS SEG # BLD: 10.4 K/UL (ref 1.8–8)
NEUTS SEG # BLD: 7.3 K/UL (ref 1.8–8)
NEUTS SEG NFR BLD: 81 % (ref 32–75)
NEUTS SEG NFR BLD: 88 % (ref 32–75)
NITRITE UR QL STRIP.AUTO: NEGATIVE
NITRITE UR QL STRIP.AUTO: NEGATIVE
NRBC # BLD: 0 K/UL (ref 0–0.01)
NRBC BLD-RTO: 0 PER 100 WBC
PH GAST: ABNORMAL [PH] (ref 1.5–3.5)
PH UR STRIP: 5.5 [PH] (ref 5–8)
PH UR STRIP: 6.5 [PH] (ref 5–8)
PLATELET # BLD AUTO: 243 K/UL (ref 150–400)
PLATELET # BLD AUTO: 280 K/UL (ref 150–400)
PLATELET # BLD AUTO: 316 K/UL (ref 150–400)
PLATELET # BLD AUTO: 322 K/UL (ref 150–400)
PLATELET # BLD AUTO: 326 K/UL (ref 150–400)
PMV BLD AUTO: 11.2 FL (ref 8.9–12.9)
PMV BLD AUTO: 11.7 FL (ref 8.9–12.9)
PMV BLD AUTO: 12 FL (ref 8.9–12.9)
PMV BLD AUTO: 12.4 FL (ref 8.9–12.9)
PMV BLD AUTO: 12.5 FL (ref 8.9–12.9)
POTASSIUM SERPL-SCNC: 3.4 MMOL/L (ref 3.5–5.1)
POTASSIUM SERPL-SCNC: 3.9 MMOL/L (ref 3.5–5.1)
POTASSIUM SERPL-SCNC: 4.2 MMOL/L (ref 3.5–5.1)
POTASSIUM SERPL-SCNC: 4.3 MMOL/L (ref 3.5–5.1)
POTASSIUM SERPL-SCNC: 4.5 MMOL/L (ref 3.5–5.1)
POTASSIUM SERPL-SCNC: 4.6 MMOL/L (ref 3.5–5.1)
POTASSIUM SERPL-SCNC: 5 MMOL/L (ref 3.5–5.1)
POTASSIUM SERPL-SCNC: 5.3 MMOL/L (ref 3.5–5.1)
PROT SERPL-MCNC: 7.3 G/DL (ref 6.4–8.2)
PROT SERPL-MCNC: 7.8 G/DL (ref 6.4–8.2)
PROT UR STRIP-MCNC: 30 MG/DL
PROT UR STRIP-MCNC: 30 MG/DL
PROTHROMBIN TIME: 10.7 SEC (ref 9–11.1)
Q-T INTERVAL, ECG07: 312 MS
Q-T INTERVAL, ECG07: 328 MS
Q-T INTERVAL, ECG07: 384 MS
QRS DURATION, ECG06: 72 MS
QRS DURATION, ECG06: 76 MS
QRS DURATION, ECG06: 86 MS
QTC CALCULATION (BEZET), ECG08: 416 MS
QTC CALCULATION (BEZET), ECG08: 448 MS
QTC CALCULATION (BEZET), ECG08: 462 MS
RBC # BLD AUTO: 2.27 M/UL (ref 4.1–5.7)
RBC # BLD AUTO: 3.15 M/UL (ref 4.1–5.7)
RBC # BLD AUTO: 3.18 M/UL (ref 4.1–5.7)
RBC # BLD AUTO: 4 M/UL (ref 4.1–5.7)
RBC # BLD AUTO: 4.11 M/UL (ref 4.1–5.7)
RBC #/AREA URNS HPF: ABNORMAL /HPF (ref 0–5)
RBC #/AREA URNS HPF: ABNORMAL /HPF (ref 0–5)
RBC MORPH BLD: ABNORMAL
SAMPLES BEING HELD,HOLD: NORMAL
SAMPLES BEING HELD,HOLD: NORMAL
SERVICE CMNT-IMP: ABNORMAL
SERVICE CMNT-IMP: NORMAL
SODIUM SERPL-SCNC: 130 MMOL/L (ref 136–145)
SODIUM SERPL-SCNC: 133 MMOL/L (ref 136–145)
SODIUM SERPL-SCNC: 135 MMOL/L (ref 136–145)
SODIUM SERPL-SCNC: 135 MMOL/L (ref 136–145)
SODIUM SERPL-SCNC: 136 MMOL/L (ref 136–145)
SODIUM SERPL-SCNC: 137 MMOL/L (ref 136–145)
SODIUM SERPL-SCNC: 138 MMOL/L (ref 136–145)
SODIUM SERPL-SCNC: 139 MMOL/L (ref 136–145)
SOURCE, COVRS: NORMAL
SP GR UR REFRACTOMETRY: 1.01 (ref 1–1.03)
SP GR UR REFRACTOMETRY: 1.02 (ref 1–1.03)
SPECIMEN EXP DATE BLD: NORMAL
STATUS OF UNIT,%ST: NORMAL
TROPONIN-HIGH SENSITIVITY: 24 NG/L (ref 0–76)
TROPONIN-HIGH SENSITIVITY: 25 NG/L (ref 0–76)
UA: UC IF INDICATED,UAUC: ABNORMAL
UNIT DIVISION, %UDIV: 0
UR CULT HOLD, URHOLD: NORMAL
UROBILINOGEN UR QL STRIP.AUTO: 0.2 EU/DL (ref 0.2–1)
UROBILINOGEN UR QL STRIP.AUTO: 0.2 EU/DL (ref 0.2–1)
VENTRICULAR RATE, ECG03: 124 BPM
VENTRICULAR RATE, ECG03: 87 BPM
VENTRICULAR RATE, ECG03: 97 BPM
WBC # BLD AUTO: 11.9 K/UL (ref 4.1–11.1)
WBC # BLD AUTO: 12.8 K/UL (ref 4.1–11.1)
WBC # BLD AUTO: 5.6 K/UL (ref 4.1–11.1)
WBC # BLD AUTO: 7.8 K/UL (ref 4.1–11.1)
WBC # BLD AUTO: 8.3 K/UL (ref 4.1–11.1)
WBC URNS QL MICRO: ABNORMAL /HPF (ref 0–4)
WBC URNS QL MICRO: ABNORMAL /HPF (ref 0–4)

## 2021-01-01 PROCEDURE — 74011250636 HC RX REV CODE- 250/636: Performed by: INTERNAL MEDICINE

## 2021-01-01 PROCEDURE — 84484 ASSAY OF TROPONIN QUANT: CPT

## 2021-01-01 PROCEDURE — 3331090001 HH PPS REVENUE CREDIT

## 2021-01-01 PROCEDURE — 82962 GLUCOSE BLOOD TEST: CPT

## 2021-01-01 PROCEDURE — 3331090002 HH PPS REVENUE DEBIT

## 2021-01-01 PROCEDURE — 81001 URINALYSIS AUTO W/SCOPE: CPT

## 2021-01-01 PROCEDURE — 36415 COLL VENOUS BLD VENIPUNCTURE: CPT

## 2021-01-01 PROCEDURE — 70450 CT HEAD/BRAIN W/O DYE: CPT

## 2021-01-01 PROCEDURE — 74011000636 HC RX REV CODE- 636: Performed by: RADIOLOGY

## 2021-01-01 PROCEDURE — 74011250636 HC RX REV CODE- 250/636: Performed by: NURSE ANESTHETIST, CERTIFIED REGISTERED

## 2021-01-01 PROCEDURE — 72192 CT PELVIS W/O DYE: CPT

## 2021-01-01 PROCEDURE — 87635 SARS-COV-2 COVID-19 AMP PRB: CPT

## 2021-01-01 PROCEDURE — 93005 ELECTROCARDIOGRAM TRACING: CPT

## 2021-01-01 PROCEDURE — 72128 CT CHEST SPINE W/O DYE: CPT

## 2021-01-01 PROCEDURE — 74011636637 HC RX REV CODE- 636/637: Performed by: HOSPITALIST

## 2021-01-01 PROCEDURE — 51798 US URINE CAPACITY MEASURE: CPT

## 2021-01-01 PROCEDURE — 77030042556 HC PNCL CAUT -B: Performed by: ORTHOPAEDIC SURGERY

## 2021-01-01 PROCEDURE — 80048 BASIC METABOLIC PNL TOTAL CA: CPT

## 2021-01-01 PROCEDURE — 85018 HEMOGLOBIN: CPT

## 2021-01-01 PROCEDURE — G8427 DOCREV CUR MEDS BY ELIG CLIN: HCPCS | Performed by: FAMILY MEDICINE

## 2021-01-01 PROCEDURE — 76040000019: Performed by: INTERNAL MEDICINE

## 2021-01-01 PROCEDURE — 74011250637 HC RX REV CODE- 250/637: Performed by: HOSPITALIST

## 2021-01-01 PROCEDURE — 74011250637 HC RX REV CODE- 250/637: Performed by: INTERNAL MEDICINE

## 2021-01-01 PROCEDURE — 74011250636 HC RX REV CODE- 250/636: Performed by: FAMILY MEDICINE

## 2021-01-01 PROCEDURE — 74011000250 HC RX REV CODE- 250: Performed by: PHYSICIAN ASSISTANT

## 2021-01-01 PROCEDURE — 76210000006 HC OR PH I REC 0.5 TO 1 HR: Performed by: ORTHOPAEDIC SURGERY

## 2021-01-01 PROCEDURE — 74011000250 HC RX REV CODE- 250: Performed by: NURSE ANESTHETIST, CERTIFIED REGISTERED

## 2021-01-01 PROCEDURE — 400018 HH-NO PAY CLAIM PROCEDURE

## 2021-01-01 PROCEDURE — G0152 HHCP-SERV OF OT,EA 15 MIN: HCPCS

## 2021-01-01 PROCEDURE — G0300 HHS/HOSPICE OF LPN EA 15 MIN: HCPCS

## 2021-01-01 PROCEDURE — 74011250637 HC RX REV CODE- 250/637: Performed by: STUDENT IN AN ORGANIZED HEALTH CARE EDUCATION/TRAINING PROGRAM

## 2021-01-01 PROCEDURE — 74011000250 HC RX REV CODE- 250: Performed by: INTERNAL MEDICINE

## 2021-01-01 PROCEDURE — 65660000000 HC RM CCU STEPDOWN

## 2021-01-01 PROCEDURE — 0QS706Z REPOSITION LEFT UPPER FEMUR WITH INTRAMEDULLARY INTERNAL FIXATION DEVICE, OPEN APPROACH: ICD-10-PCS | Performed by: ORTHOPAEDIC SURGERY

## 2021-01-01 PROCEDURE — 74018 RADEX ABDOMEN 1 VIEW: CPT

## 2021-01-01 PROCEDURE — 85027 COMPLETE CBC AUTOMATED: CPT

## 2021-01-01 PROCEDURE — 1101F PT FALLS ASSESS-DOCD LE1/YR: CPT | Performed by: FAMILY MEDICINE

## 2021-01-01 PROCEDURE — 97161 PT EVAL LOW COMPLEX 20 MIN: CPT

## 2021-01-01 PROCEDURE — 83605 ASSAY OF LACTIC ACID: CPT

## 2021-01-01 PROCEDURE — 74011250636 HC RX REV CODE- 250/636: Performed by: PHYSICIAN ASSISTANT

## 2021-01-01 PROCEDURE — 97165 OT EVAL LOW COMPLEX 30 MIN: CPT

## 2021-01-01 PROCEDURE — P9016 RBC LEUKOCYTES REDUCED: HCPCS

## 2021-01-01 PROCEDURE — G0157 HHC PT ASSISTANT EA 15: HCPCS

## 2021-01-01 PROCEDURE — 97530 THERAPEUTIC ACTIVITIES: CPT

## 2021-01-01 PROCEDURE — 91303 HC RX REV CODE- 250/636: CPT | Performed by: FAMILY MEDICINE

## 2021-01-01 PROCEDURE — 80053 COMPREHEN METABOLIC PANEL: CPT

## 2021-01-01 PROCEDURE — 83735 ASSAY OF MAGNESIUM: CPT

## 2021-01-01 PROCEDURE — 74178 CT ABD&PLV WO CNTR FLWD CNTR: CPT

## 2021-01-01 PROCEDURE — 76060000033 HC ANESTHESIA 1 TO 1.5 HR: Performed by: ORTHOPAEDIC SURGERY

## 2021-01-01 PROCEDURE — 74011250636 HC RX REV CODE- 250/636: Performed by: ANESTHESIOLOGY

## 2021-01-01 PROCEDURE — 400013 HH SOC

## 2021-01-01 PROCEDURE — P9045 ALBUMIN (HUMAN), 5%, 250 ML: HCPCS | Performed by: NURSE ANESTHETIST, CERTIFIED REGISTERED

## 2021-01-01 PROCEDURE — 82271 OCCULT BLOOD OTHER SOURCES: CPT

## 2021-01-01 PROCEDURE — G0299 HHS/HOSPICE OF RN EA 15 MIN: HCPCS

## 2021-01-01 PROCEDURE — 85014 HEMATOCRIT: CPT

## 2021-01-01 PROCEDURE — C1769 GUIDE WIRE: HCPCS | Performed by: ORTHOPAEDIC SURGERY

## 2021-01-01 PROCEDURE — G0151 HHCP-SERV OF PT,EA 15 MIN: HCPCS

## 2021-01-01 PROCEDURE — 77030008462 HC STPLR SKN PROX J&J -A: Performed by: ORTHOPAEDIC SURGERY

## 2021-01-01 PROCEDURE — 72125 CT NECK SPINE W/O DYE: CPT

## 2021-01-01 PROCEDURE — 77030038552 HC DRN WND MDII -A: Performed by: ORTHOPAEDIC SURGERY

## 2021-01-01 PROCEDURE — 36430 TRANSFUSION BLD/BLD COMPNT: CPT

## 2021-01-01 PROCEDURE — 86901 BLOOD TYPING SEROLOGIC RH(D): CPT

## 2021-01-01 PROCEDURE — 74011250636 HC RX REV CODE- 250/636: Performed by: HOSPITALIST

## 2021-01-01 PROCEDURE — 76060000031 HC ANESTHESIA FIRST 0.5 HR: Performed by: INTERNAL MEDICINE

## 2021-01-01 PROCEDURE — 86923 COMPATIBILITY TEST ELECTRIC: CPT

## 2021-01-01 PROCEDURE — 74011250636 HC RX REV CODE- 250/636: Performed by: EMERGENCY MEDICINE

## 2021-01-01 PROCEDURE — 51702 INSERT TEMP BLADDER CATH: CPT

## 2021-01-01 PROCEDURE — 2709999900 HC NON-CHARGEABLE SUPPLY: Performed by: INTERNAL MEDICINE

## 2021-01-01 PROCEDURE — 30233N1 TRANSFUSION OF NONAUTOLOGOUS RED BLOOD CELLS INTO PERIPHERAL VEIN, PERCUTANEOUS APPROACH: ICD-10-PCS | Performed by: INTERNAL MEDICINE

## 2021-01-01 PROCEDURE — 77030016451 HC BIT DRL AO3 STRY -C: Performed by: ORTHOPAEDIC SURGERY

## 2021-01-01 PROCEDURE — 72131 CT LUMBAR SPINE W/O DYE: CPT

## 2021-01-01 PROCEDURE — 77030021593 HC FCPS BIOP ENDOSC BSC -A: Performed by: INTERNAL MEDICINE

## 2021-01-01 PROCEDURE — C1713 ANCHOR/SCREW BN/BN,TIS/BN: HCPCS | Performed by: ORTHOPAEDIC SURGERY

## 2021-01-01 PROCEDURE — 88305 TISSUE EXAM BY PATHOLOGIST: CPT

## 2021-01-01 PROCEDURE — 88342 IMHCHEM/IMCYTCHM 1ST ANTB: CPT

## 2021-01-01 PROCEDURE — 99285 EMERGENCY DEPT VISIT HI MDM: CPT

## 2021-01-01 PROCEDURE — 99495 TRANSJ CARE MGMT MOD F2F 14D: CPT | Performed by: FAMILY MEDICINE

## 2021-01-01 PROCEDURE — 77030008846 HC WRE K STRY -C: Performed by: ORTHOPAEDIC SURGERY

## 2021-01-01 PROCEDURE — 77030008684 HC TU ET CUF COVD -B: Performed by: ANESTHESIOLOGY

## 2021-01-01 PROCEDURE — 85025 COMPLETE CBC W/AUTO DIFF WBC: CPT

## 2021-01-01 PROCEDURE — C9113 INJ PANTOPRAZOLE SODIUM, VIA: HCPCS | Performed by: INTERNAL MEDICINE

## 2021-01-01 PROCEDURE — 96360 HYDRATION IV INFUSION INIT: CPT

## 2021-01-01 PROCEDURE — 73552 X-RAY EXAM OF FEMUR 2/>: CPT

## 2021-01-01 PROCEDURE — 77030020788: Performed by: ORTHOPAEDIC SURGERY

## 2021-01-01 PROCEDURE — 85610 PROTHROMBIN TIME: CPT

## 2021-01-01 PROCEDURE — 0DB78ZX EXCISION OF STOMACH, PYLORUS, VIA NATURAL OR ARTIFICIAL OPENING ENDOSCOPIC, DIAGNOSTIC: ICD-10-PCS | Performed by: INTERNAL MEDICINE

## 2021-01-01 PROCEDURE — 73501 X-RAY EXAM HIP UNI 1 VIEW: CPT

## 2021-01-01 PROCEDURE — 76010000149 HC OR TIME 1 TO 1.5 HR: Performed by: ORTHOPAEDIC SURGERY

## 2021-01-01 PROCEDURE — 71045 X-RAY EXAM CHEST 1 VIEW: CPT

## 2021-01-01 PROCEDURE — 77030013079 HC BLNKT BAIR HGGR 3M -A: Performed by: ANESTHESIOLOGY

## 2021-01-01 PROCEDURE — 74011250637 HC RX REV CODE- 250/637: Performed by: FAMILY MEDICINE

## 2021-01-01 PROCEDURE — 99349 HOME/RES VST EST MOD MDM 40: CPT | Performed by: FAMILY MEDICINE

## 2021-01-01 PROCEDURE — 77030031139 HC SUT VCRL2 J&J -A: Performed by: ORTHOPAEDIC SURGERY

## 2021-01-01 PROCEDURE — 83880 ASSAY OF NATRIURETIC PEPTIDE: CPT

## 2021-01-01 PROCEDURE — 2709999900 HC NON-CHARGEABLE SUPPLY: Performed by: ORTHOPAEDIC SURGERY

## 2021-01-01 PROCEDURE — G8536 NO DOC ELDER MAL SCRN: HCPCS | Performed by: FAMILY MEDICINE

## 2021-01-01 PROCEDURE — 74176 CT ABD & PELVIS W/O CONTRAST: CPT

## 2021-01-01 PROCEDURE — 3331090003 HH PPS REVENUE ADJ

## 2021-01-01 PROCEDURE — 96374 THER/PROPH/DIAG INJ IV PUSH: CPT

## 2021-01-01 PROCEDURE — 65270000029 HC RM PRIVATE

## 2021-01-01 DEVICE — TROCHANTERIC NAIL KIT, TI
Type: IMPLANTABLE DEVICE | Site: HIP | Status: FUNCTIONAL
Brand: GAMMA

## 2021-01-01 DEVICE — LOCKING SCREW, FULLY THREADED: Type: IMPLANTABLE DEVICE | Site: HIP | Status: FUNCTIONAL

## 2021-01-01 DEVICE — LAG SCREW, TI
Type: IMPLANTABLE DEVICE | Site: HIP | Status: FUNCTIONAL
Brand: GAMMA

## 2021-01-01 RX ORDER — SODIUM CHLORIDE, SODIUM LACTATE, POTASSIUM CHLORIDE, CALCIUM CHLORIDE 600; 310; 30; 20 MG/100ML; MG/100ML; MG/100ML; MG/100ML
125 INJECTION, SOLUTION INTRAVENOUS CONTINUOUS
Status: DISCONTINUED | OUTPATIENT
Start: 2021-01-01 | End: 2021-01-01 | Stop reason: HOSPADM

## 2021-01-01 RX ORDER — LIDOCAINE HYDROCHLORIDE 20 MG/ML
INJECTION, SOLUTION EPIDURAL; INFILTRATION; INTRACAUDAL; PERINEURAL AS NEEDED
Status: DISCONTINUED | OUTPATIENT
Start: 2021-01-01 | End: 2021-01-01 | Stop reason: HOSPADM

## 2021-01-01 RX ORDER — SODIUM CHLORIDE 0.9 % (FLUSH) 0.9 %
5-40 SYRINGE (ML) INJECTION EVERY 8 HOURS
Status: DISCONTINUED | OUTPATIENT
Start: 2021-01-01 | End: 2021-01-01 | Stop reason: HOSPADM

## 2021-01-01 RX ORDER — FENTANYL CITRATE 50 UG/ML
25-200 INJECTION, SOLUTION INTRAMUSCULAR; INTRAVENOUS
Status: CANCELLED | OUTPATIENT
Start: 2021-01-01 | End: 2021-01-01

## 2021-01-01 RX ORDER — FENTANYL CITRATE 50 UG/ML
50 INJECTION, SOLUTION INTRAMUSCULAR; INTRAVENOUS AS NEEDED
Status: DISCONTINUED | OUTPATIENT
Start: 2021-01-01 | End: 2021-01-01 | Stop reason: HOSPADM

## 2021-01-01 RX ORDER — VALPROIC ACID 250 MG/5ML
250 SOLUTION ORAL
Status: DISCONTINUED | OUTPATIENT
Start: 2021-01-01 | End: 2021-01-01 | Stop reason: HOSPADM

## 2021-01-01 RX ORDER — ONDANSETRON 2 MG/ML
INJECTION INTRAMUSCULAR; INTRAVENOUS AS NEEDED
Status: DISCONTINUED | OUTPATIENT
Start: 2021-01-01 | End: 2021-01-01 | Stop reason: HOSPADM

## 2021-01-01 RX ORDER — HYDROCODONE BITARTRATE AND ACETAMINOPHEN 5; 325 MG/1; MG/1
1 TABLET ORAL
Qty: 30 TABLET | Refills: 0 | Status: SHIPPED | OUTPATIENT
Start: 2021-01-01 | End: 2021-01-01

## 2021-01-01 RX ORDER — ROCURONIUM BROMIDE 10 MG/ML
INJECTION, SOLUTION INTRAVENOUS AS NEEDED
Status: DISCONTINUED | OUTPATIENT
Start: 2021-01-01 | End: 2021-01-01 | Stop reason: HOSPADM

## 2021-01-01 RX ORDER — GUAIFENESIN 100 MG/5ML
81 LIQUID (ML) ORAL DAILY
Status: DISCONTINUED | OUTPATIENT
Start: 2021-01-01 | End: 2021-01-01

## 2021-01-01 RX ORDER — AMLODIPINE BESYLATE 5 MG/1
5 TABLET ORAL DAILY
Qty: 30 TABLET | Refills: 2 | Status: SHIPPED | OUTPATIENT
Start: 2021-01-01 | End: 2021-01-01 | Stop reason: DRUGHIGH

## 2021-01-01 RX ORDER — MIDAZOLAM HYDROCHLORIDE 1 MG/ML
1 INJECTION, SOLUTION INTRAMUSCULAR; INTRAVENOUS AS NEEDED
Status: DISCONTINUED | OUTPATIENT
Start: 2021-01-01 | End: 2021-01-01 | Stop reason: HOSPADM

## 2021-01-01 RX ORDER — PROPOFOL 10 MG/ML
INJECTION, EMULSION INTRAVENOUS AS NEEDED
Status: DISCONTINUED | OUTPATIENT
Start: 2021-01-01 | End: 2021-01-01 | Stop reason: HOSPADM

## 2021-01-01 RX ORDER — ACETAMINOPHEN 325 MG/1
650 TABLET ORAL EVERY 6 HOURS
Qty: 60 TABLET | Refills: 0 | Status: ON HOLD | OUTPATIENT
Start: 2021-01-01 | End: 2022-01-01

## 2021-01-01 RX ORDER — SODIUM CHLORIDE 0.9 % (FLUSH) 0.9 %
5-40 SYRINGE (ML) INJECTION AS NEEDED
Status: DISCONTINUED | OUTPATIENT
Start: 2021-01-01 | End: 2021-01-01 | Stop reason: HOSPADM

## 2021-01-01 RX ORDER — POLYETHYLENE GLYCOL 3350 17 G/17G
17 POWDER, FOR SOLUTION ORAL DAILY PRN
Status: DISCONTINUED | OUTPATIENT
Start: 2021-01-01 | End: 2021-01-01 | Stop reason: HOSPADM

## 2021-01-01 RX ORDER — HYDROCODONE BITARTRATE AND ACETAMINOPHEN 5; 325 MG/1; MG/1
1 TABLET ORAL
Qty: 30 TABLET | Refills: 0 | Status: SHIPPED | OUTPATIENT
Start: 2021-01-01 | End: 2021-01-01 | Stop reason: SDUPTHER

## 2021-01-01 RX ORDER — AMLODIPINE BESYLATE 5 MG/1
10 TABLET ORAL DAILY
Qty: 60 TABLET | Refills: 0
Start: 2021-01-01 | End: 2021-01-01 | Stop reason: SDUPTHER

## 2021-01-01 RX ORDER — AMLODIPINE BESYLATE 10 MG/1
10 TABLET ORAL DAILY
Qty: 90 TABLET | Refills: 1 | Status: SHIPPED | OUTPATIENT
Start: 2021-01-01

## 2021-01-01 RX ORDER — DEXTROSE 50 % IN WATER (D50W) INTRAVENOUS SYRINGE
12.5-25 AS NEEDED
Status: DISCONTINUED | OUTPATIENT
Start: 2021-01-01 | End: 2021-01-01 | Stop reason: HOSPADM

## 2021-01-01 RX ORDER — SODIUM CHLORIDE, SODIUM LACTATE, POTASSIUM CHLORIDE, CALCIUM CHLORIDE 600; 310; 30; 20 MG/100ML; MG/100ML; MG/100ML; MG/100ML
INJECTION, SOLUTION INTRAVENOUS
Status: DISCONTINUED | OUTPATIENT
Start: 2021-01-01 | End: 2021-01-01 | Stop reason: HOSPADM

## 2021-01-01 RX ORDER — HYDROCODONE BITARTRATE AND ACETAMINOPHEN 5; 325 MG/1; MG/1
1 TABLET ORAL
Qty: 60 TABLET | Refills: 0 | Status: SHIPPED | OUTPATIENT
Start: 2021-01-01 | End: 2021-01-01 | Stop reason: SDUPTHER

## 2021-01-01 RX ORDER — FLUMAZENIL 0.1 MG/ML
0.2 INJECTION INTRAVENOUS
Status: CANCELLED | OUTPATIENT
Start: 2021-01-01 | End: 2021-01-01

## 2021-01-01 RX ORDER — ONDANSETRON 2 MG/ML
4 INJECTION INTRAMUSCULAR; INTRAVENOUS
Status: DISCONTINUED | OUTPATIENT
Start: 2021-01-01 | End: 2021-01-01 | Stop reason: HOSPADM

## 2021-01-01 RX ORDER — HYDROMORPHONE HYDROCHLORIDE 1 MG/ML
0.2 INJECTION, SOLUTION INTRAMUSCULAR; INTRAVENOUS; SUBCUTANEOUS
Status: DISCONTINUED | OUTPATIENT
Start: 2021-01-01 | End: 2021-01-01 | Stop reason: HOSPADM

## 2021-01-01 RX ORDER — LEVOTHYROXINE SODIUM 25 UG/1
50 TABLET ORAL
Status: DISCONTINUED | OUTPATIENT
Start: 2021-01-01 | End: 2021-01-01 | Stop reason: HOSPADM

## 2021-01-01 RX ORDER — SUCCINYLCHOLINE CHLORIDE 20 MG/ML
INJECTION INTRAMUSCULAR; INTRAVENOUS AS NEEDED
Status: DISCONTINUED | OUTPATIENT
Start: 2021-01-01 | End: 2021-01-01 | Stop reason: HOSPADM

## 2021-01-01 RX ORDER — ACETAMINOPHEN 325 MG/1
650 TABLET ORAL
Status: DISCONTINUED | OUTPATIENT
Start: 2021-01-01 | End: 2021-01-01 | Stop reason: HOSPADM

## 2021-01-01 RX ORDER — AMLODIPINE BESYLATE 5 MG/1
5 TABLET ORAL DAILY
Status: DISCONTINUED | OUTPATIENT
Start: 2021-01-01 | End: 2021-01-01

## 2021-01-01 RX ORDER — OMEPRAZOLE 20 MG/1
20 CAPSULE, DELAYED RELEASE ORAL DAILY
Qty: 90 CAPSULE | Refills: 1 | Status: SHIPPED | OUTPATIENT
Start: 2021-01-01 | End: 2021-01-01 | Stop reason: SDUPTHER

## 2021-01-01 RX ORDER — QUETIAPINE FUMARATE 25 MG/1
50 TABLET, FILM COATED ORAL 2 TIMES DAILY
Qty: 180 TABLET | Refills: 1 | Status: SHIPPED | OUTPATIENT
Start: 2021-01-01 | End: 2021-01-01 | Stop reason: SDUPTHER

## 2021-01-01 RX ORDER — QUETIAPINE FUMARATE 25 MG/1
50 TABLET, FILM COATED ORAL
Status: DISCONTINUED | OUTPATIENT
Start: 2021-01-01 | End: 2021-01-01 | Stop reason: HOSPADM

## 2021-01-01 RX ORDER — MORPHINE SULFATE 2 MG/ML
2 INJECTION, SOLUTION INTRAMUSCULAR; INTRAVENOUS
Status: DISCONTINUED | OUTPATIENT
Start: 2021-01-01 | End: 2021-01-01 | Stop reason: HOSPADM

## 2021-01-01 RX ORDER — SODIUM CHLORIDE 9 MG/ML
INJECTION, SOLUTION INTRAVENOUS
Status: DISCONTINUED | OUTPATIENT
Start: 2021-01-01 | End: 2021-01-01 | Stop reason: HOSPADM

## 2021-01-01 RX ORDER — QUETIAPINE FUMARATE 50 MG/1
TABLET, FILM COATED ORAL
Qty: 180 TABLET | Refills: 1 | Status: SHIPPED | OUTPATIENT
Start: 2021-01-01 | End: 2021-01-01 | Stop reason: SDUPTHER

## 2021-01-01 RX ORDER — DIPHENHYDRAMINE HYDROCHLORIDE 50 MG/ML
12.5 INJECTION, SOLUTION INTRAMUSCULAR; INTRAVENOUS AS NEEDED
Status: DISCONTINUED | OUTPATIENT
Start: 2021-01-01 | End: 2021-01-01 | Stop reason: HOSPADM

## 2021-01-01 RX ORDER — SODIUM CHLORIDE 9 MG/ML
50 INJECTION, SOLUTION INTRAVENOUS CONTINUOUS
Status: CANCELLED | OUTPATIENT
Start: 2021-01-01 | End: 2021-01-01

## 2021-01-01 RX ORDER — FENTANYL CITRATE 50 UG/ML
50 INJECTION, SOLUTION INTRAMUSCULAR; INTRAVENOUS ONCE
Status: COMPLETED | OUTPATIENT
Start: 2021-01-01 | End: 2021-01-01

## 2021-01-01 RX ORDER — INSULIN LISPRO 100 [IU]/ML
INJECTION, SOLUTION INTRAVENOUS; SUBCUTANEOUS
Status: DISCONTINUED | OUTPATIENT
Start: 2021-01-01 | End: 2021-01-01 | Stop reason: HOSPADM

## 2021-01-01 RX ORDER — QUETIAPINE FUMARATE 25 MG/1
25 TABLET, FILM COATED ORAL
Status: DISCONTINUED | OUTPATIENT
Start: 2021-01-01 | End: 2021-01-01 | Stop reason: HOSPADM

## 2021-01-01 RX ORDER — PHENOL/SODIUM PHENOLATE
20 AEROSOL, SPRAY (ML) MUCOUS MEMBRANE DAILY
Qty: 30 TABLET | Refills: 0 | Status: CANCELLED | OUTPATIENT
Start: 2021-01-01 | End: 2021-01-01

## 2021-01-01 RX ORDER — GUAIFENESIN 100 MG/5ML
81 LIQUID (ML) ORAL 2 TIMES DAILY
Qty: 30 TABLET | Refills: 1 | Status: ON HOLD
Start: 2021-01-01 | End: 2022-01-01

## 2021-01-01 RX ORDER — VALPROIC ACID 250 MG/5ML
SOLUTION ORAL
Qty: 120 ML | Refills: 1 | Status: ON HOLD | OUTPATIENT
Start: 2021-01-01 | End: 2022-01-01 | Stop reason: ALTCHOICE

## 2021-01-01 RX ORDER — MAGNESIUM SULFATE 100 %
4 CRYSTALS MISCELLANEOUS AS NEEDED
Status: DISCONTINUED | OUTPATIENT
Start: 2021-01-01 | End: 2021-01-01 | Stop reason: HOSPADM

## 2021-01-01 RX ORDER — FENTANYL CITRATE 50 UG/ML
INJECTION, SOLUTION INTRAMUSCULAR; INTRAVENOUS AS NEEDED
Status: DISCONTINUED | OUTPATIENT
Start: 2021-01-01 | End: 2021-01-01 | Stop reason: HOSPADM

## 2021-01-01 RX ORDER — PEN NEEDLE, DIABETIC 31 GX3/16"
NEEDLE, DISPOSABLE MISCELLANEOUS
Qty: 100 PEN NEEDLE | Refills: 1 | OUTPATIENT
Start: 2021-01-01

## 2021-01-01 RX ORDER — QUETIAPINE FUMARATE 25 MG/1
25 TABLET, FILM COATED ORAL EVERY EVENING
Qty: 90 TABLET | Refills: 1 | Status: CANCELLED | OUTPATIENT
Start: 2021-01-01 | End: 2022-06-06

## 2021-01-01 RX ORDER — SODIUM CHLORIDE 0.9 % (FLUSH) 0.9 %
5-40 SYRINGE (ML) INJECTION AS NEEDED
Status: CANCELLED | OUTPATIENT
Start: 2021-01-01

## 2021-01-01 RX ORDER — NEOSTIGMINE METHYLSULFATE 1 MG/ML
INJECTION, SOLUTION INTRAVENOUS AS NEEDED
Status: DISCONTINUED | OUTPATIENT
Start: 2021-01-01 | End: 2021-01-01 | Stop reason: HOSPADM

## 2021-01-01 RX ORDER — HYDROMORPHONE HYDROCHLORIDE 2 MG/ML
INJECTION, SOLUTION INTRAMUSCULAR; INTRAVENOUS; SUBCUTANEOUS AS NEEDED
Status: DISCONTINUED | OUTPATIENT
Start: 2021-01-01 | End: 2021-01-01 | Stop reason: HOSPADM

## 2021-01-01 RX ORDER — SODIUM CHLORIDE 0.9 % (FLUSH) 0.9 %
5-40 SYRINGE (ML) INJECTION EVERY 8 HOURS
Status: CANCELLED | OUTPATIENT
Start: 2021-01-01

## 2021-01-01 RX ORDER — PEN NEEDLE, DIABETIC 31 GX3/16"
NEEDLE, DISPOSABLE MISCELLANEOUS
Qty: 100 PEN NEEDLE | Refills: 1 | Status: SHIPPED | OUTPATIENT
Start: 2021-01-01 | End: 2021-01-01 | Stop reason: SDUPTHER

## 2021-01-01 RX ORDER — MIDODRINE HYDROCHLORIDE 5 MG/1
5 TABLET ORAL
Status: DISCONTINUED | OUTPATIENT
Start: 2021-01-01 | End: 2021-01-01

## 2021-01-01 RX ORDER — VALPROIC ACID 250 MG/5ML
250 SOLUTION ORAL
Qty: 120 ML | Refills: 1 | Status: SHIPPED | OUTPATIENT
Start: 2021-01-01 | End: 2021-01-01

## 2021-01-01 RX ORDER — ACETAMINOPHEN 325 MG/1
650 TABLET ORAL EVERY 6 HOURS
Status: DISCONTINUED | OUTPATIENT
Start: 2021-01-01 | End: 2021-01-01 | Stop reason: HOSPADM

## 2021-01-01 RX ORDER — QUETIAPINE FUMARATE 25 MG/1
50 TABLET, FILM COATED ORAL DAILY
Status: DISCONTINUED | OUTPATIENT
Start: 2021-01-01 | End: 2021-01-01 | Stop reason: HOSPADM

## 2021-01-01 RX ORDER — DULOXETIN HYDROCHLORIDE 60 MG/1
60 CAPSULE, DELAYED RELEASE ORAL DAILY
Status: DISCONTINUED | OUTPATIENT
Start: 2021-01-01 | End: 2021-01-01

## 2021-01-01 RX ORDER — MIDAZOLAM HYDROCHLORIDE 1 MG/ML
0.5 INJECTION, SOLUTION INTRAMUSCULAR; INTRAVENOUS
Status: DISCONTINUED | OUTPATIENT
Start: 2021-01-01 | End: 2021-01-01 | Stop reason: HOSPADM

## 2021-01-01 RX ORDER — DEXTROMETHORPHAN/PSEUDOEPHED 2.5-7.5/.8
1.2 DROPS ORAL
Status: CANCELLED | OUTPATIENT
Start: 2021-01-01

## 2021-01-01 RX ORDER — LIDOCAINE HYDROCHLORIDE 10 MG/ML
0.1 INJECTION, SOLUTION EPIDURAL; INFILTRATION; INTRACAUDAL; PERINEURAL AS NEEDED
Status: DISCONTINUED | OUTPATIENT
Start: 2021-01-01 | End: 2021-01-01 | Stop reason: HOSPADM

## 2021-01-01 RX ORDER — INSULIN GLARGINE 100 [IU]/ML
INJECTION, SOLUTION SUBCUTANEOUS
Qty: 3 PEN | Refills: 5 | Status: ON HOLD | OUTPATIENT
Start: 2021-01-01 | End: 2022-01-01 | Stop reason: SDUPTHER

## 2021-01-01 RX ORDER — SODIUM CHLORIDE 9 MG/ML
250 INJECTION, SOLUTION INTRAVENOUS AS NEEDED
Status: DISCONTINUED | OUTPATIENT
Start: 2021-01-01 | End: 2021-01-01 | Stop reason: HOSPADM

## 2021-01-01 RX ORDER — ACETAMINOPHEN 650 MG/1
650 SUPPOSITORY RECTAL
Status: DISCONTINUED | OUTPATIENT
Start: 2021-01-01 | End: 2021-01-01 | Stop reason: HOSPADM

## 2021-01-01 RX ORDER — SODIUM CHLORIDE 9 MG/ML
50 INJECTION, SOLUTION INTRAVENOUS CONTINUOUS
Status: DISCONTINUED | OUTPATIENT
Start: 2021-01-01 | End: 2021-01-01 | Stop reason: HOSPADM

## 2021-01-01 RX ORDER — ALBUMIN HUMAN 50 G/1000ML
SOLUTION INTRAVENOUS AS NEEDED
Status: DISCONTINUED | OUTPATIENT
Start: 2021-01-01 | End: 2021-01-01 | Stop reason: HOSPADM

## 2021-01-01 RX ORDER — TAMSULOSIN HYDROCHLORIDE 0.4 MG/1
0.4 CAPSULE ORAL DAILY
Status: DISCONTINUED | OUTPATIENT
Start: 2021-01-01 | End: 2021-01-01

## 2021-01-01 RX ORDER — QUETIAPINE FUMARATE 50 MG/1
50 TABLET, FILM COATED ORAL 2 TIMES DAILY
Qty: 180 TABLET | Refills: 1 | Status: ON HOLD | OUTPATIENT
Start: 2021-01-01 | End: 2022-01-01

## 2021-01-01 RX ORDER — HYDROCODONE BITARTRATE AND ACETAMINOPHEN 5; 325 MG/1; MG/1
1 TABLET ORAL AS NEEDED
Status: DISCONTINUED | OUTPATIENT
Start: 2021-01-01 | End: 2021-01-01 | Stop reason: HOSPADM

## 2021-01-01 RX ORDER — INSULIN GLARGINE 100 [IU]/ML
10 INJECTION, SOLUTION SUBCUTANEOUS DAILY
Status: DISCONTINUED | OUTPATIENT
Start: 2021-01-01 | End: 2021-01-01 | Stop reason: HOSPADM

## 2021-01-01 RX ORDER — ONDANSETRON 2 MG/ML
4 INJECTION INTRAMUSCULAR; INTRAVENOUS AS NEEDED
Status: DISCONTINUED | OUTPATIENT
Start: 2021-01-01 | End: 2021-01-01 | Stop reason: HOSPADM

## 2021-01-01 RX ORDER — BALSAM PERU/CASTOR OIL
OINTMENT (GRAM) TOPICAL EVERY 8 HOURS
Qty: 1 G | Refills: 0 | Status: SHIPPED | OUTPATIENT
Start: 2021-01-01 | End: 2021-01-01 | Stop reason: SDUPTHER

## 2021-01-01 RX ORDER — PHENYLEPHRINE HCL IN 0.9% NACL 0.4MG/10ML
SYRINGE (ML) INTRAVENOUS
Status: DISCONTINUED | OUTPATIENT
Start: 2021-01-01 | End: 2021-01-01 | Stop reason: HOSPADM

## 2021-01-01 RX ORDER — PEN NEEDLE, DIABETIC 31 GX3/16"
NEEDLE, DISPOSABLE MISCELLANEOUS
Qty: 100 PEN NEEDLE | Refills: 1 | Status: SHIPPED | OUTPATIENT
Start: 2021-01-01 | End: 2021-01-01

## 2021-01-01 RX ORDER — HYDRALAZINE HYDROCHLORIDE 20 MG/ML
20 INJECTION INTRAMUSCULAR; INTRAVENOUS
Status: DISCONTINUED | OUTPATIENT
Start: 2021-01-01 | End: 2021-01-01 | Stop reason: HOSPADM

## 2021-01-01 RX ORDER — ATROPINE SULFATE 0.1 MG/ML
0.5 INJECTION INTRAVENOUS
Status: CANCELLED | OUTPATIENT
Start: 2021-01-01 | End: 2021-01-01

## 2021-01-01 RX ORDER — QUETIAPINE FUMARATE 25 MG/1
TABLET, FILM COATED ORAL
Qty: 180 TABLET | Refills: 1 | Status: SHIPPED | OUTPATIENT
Start: 2021-01-01 | End: 2021-01-01 | Stop reason: SDUPTHER

## 2021-01-01 RX ORDER — MIDAZOLAM HYDROCHLORIDE 1 MG/ML
.25-5 INJECTION, SOLUTION INTRAMUSCULAR; INTRAVENOUS
Status: CANCELLED | OUTPATIENT
Start: 2021-01-01 | End: 2021-01-01

## 2021-01-01 RX ORDER — BALSAM PERU/CASTOR OIL
OINTMENT (GRAM) TOPICAL EVERY 8 HOURS
Status: DISCONTINUED | OUTPATIENT
Start: 2021-01-01 | End: 2021-01-01 | Stop reason: HOSPADM

## 2021-01-01 RX ORDER — BALSAM PERU/CASTOR OIL
OINTMENT (GRAM) TOPICAL EVERY 8 HOURS
Qty: 1 G | Refills: 0 | Status: SHIPPED | OUTPATIENT
Start: 2021-01-01

## 2021-01-01 RX ORDER — HYDROCODONE BITARTRATE AND ACETAMINOPHEN 5; 325 MG/1; MG/1
1 TABLET ORAL
Qty: 30 TABLET | Refills: 0 | Status: SHIPPED | OUTPATIENT
Start: 2021-01-01 | End: 2022-01-01 | Stop reason: SDUPTHER

## 2021-01-01 RX ORDER — FENTANYL CITRATE 50 UG/ML
25 INJECTION, SOLUTION INTRAMUSCULAR; INTRAVENOUS
Status: DISCONTINUED | OUTPATIENT
Start: 2021-01-01 | End: 2021-01-01 | Stop reason: HOSPADM

## 2021-01-01 RX ORDER — GUAIFENESIN 100 MG/5ML
81 LIQUID (ML) ORAL 2 TIMES DAILY
Status: DISCONTINUED | OUTPATIENT
Start: 2021-01-01 | End: 2021-01-01 | Stop reason: HOSPADM

## 2021-01-01 RX ORDER — DIVALPROEX SODIUM 250 MG/1
TABLET, DELAYED RELEASE ORAL
Qty: 60 TABLET | Refills: 0 | Status: SHIPPED | OUTPATIENT
Start: 2021-01-01 | End: 2021-01-01 | Stop reason: CLARIF

## 2021-01-01 RX ORDER — SODIUM CHLORIDE 9 MG/ML
75 INJECTION, SOLUTION INTRAVENOUS CONTINUOUS
Status: DISCONTINUED | OUTPATIENT
Start: 2021-01-01 | End: 2021-01-01

## 2021-01-01 RX ORDER — KETAMINE HYDROCHLORIDE 10 MG/ML
INJECTION, SOLUTION INTRAMUSCULAR; INTRAVENOUS AS NEEDED
Status: DISCONTINUED | OUTPATIENT
Start: 2021-01-01 | End: 2021-01-01 | Stop reason: HOSPADM

## 2021-01-01 RX ORDER — MORPHINE SULFATE 4 MG/ML
4 INJECTION INTRAVENOUS
Status: COMPLETED | OUTPATIENT
Start: 2021-01-01 | End: 2021-01-01

## 2021-01-01 RX ORDER — GLYCOPYRROLATE 0.2 MG/ML
INJECTION INTRAMUSCULAR; INTRAVENOUS AS NEEDED
Status: DISCONTINUED | OUTPATIENT
Start: 2021-01-01 | End: 2021-01-01 | Stop reason: HOSPADM

## 2021-01-01 RX ORDER — SODIUM CHLORIDE, SODIUM LACTATE, POTASSIUM CHLORIDE, CALCIUM CHLORIDE 600; 310; 30; 20 MG/100ML; MG/100ML; MG/100ML; MG/100ML
75 INJECTION, SOLUTION INTRAVENOUS CONTINUOUS
Status: DISCONTINUED | OUTPATIENT
Start: 2021-01-01 | End: 2021-01-01 | Stop reason: HOSPADM

## 2021-01-01 RX ORDER — QUETIAPINE FUMARATE 25 MG/1
25 TABLET, FILM COATED ORAL EVERY EVENING
Qty: 90 TABLET | Refills: 1 | Status: ON HOLD | OUTPATIENT
Start: 2021-01-01 | End: 2022-01-01

## 2021-01-01 RX ORDER — EPINEPHRINE 0.1 MG/ML
1 INJECTION INTRACARDIAC; INTRAVENOUS
Status: CANCELLED | OUTPATIENT
Start: 2021-01-01 | End: 2021-01-01

## 2021-01-01 RX ORDER — ONDANSETRON 4 MG/1
4 TABLET, ORALLY DISINTEGRATING ORAL
Status: DISCONTINUED | OUTPATIENT
Start: 2021-01-01 | End: 2021-01-01 | Stop reason: HOSPADM

## 2021-01-01 RX ORDER — NALOXONE HYDROCHLORIDE 0.4 MG/ML
0.4 INJECTION, SOLUTION INTRAMUSCULAR; INTRAVENOUS; SUBCUTANEOUS
Status: CANCELLED | OUTPATIENT
Start: 2021-01-01 | End: 2021-01-01

## 2021-01-01 RX ORDER — PHENYLEPHRINE HCL IN 0.9% NACL 0.4MG/10ML
SYRINGE (ML) INTRAVENOUS AS NEEDED
Status: DISCONTINUED | OUTPATIENT
Start: 2021-01-01 | End: 2021-01-01 | Stop reason: HOSPADM

## 2021-01-01 RX ORDER — PEN NEEDLE, DIABETIC 31 GX3/16"
NEEDLE, DISPOSABLE MISCELLANEOUS
Qty: 100 PEN NEEDLE | Refills: 2 | Status: SHIPPED | OUTPATIENT
Start: 2021-01-01 | End: 2022-01-01

## 2021-01-01 RX ORDER — MIDAZOLAM HYDROCHLORIDE 1 MG/ML
INJECTION, SOLUTION INTRAMUSCULAR; INTRAVENOUS AS NEEDED
Status: DISCONTINUED | OUTPATIENT
Start: 2021-01-01 | End: 2021-01-01 | Stop reason: HOSPADM

## 2021-01-01 RX ORDER — CEFAZOLIN SODIUM 1 G/3ML
INJECTION, POWDER, FOR SOLUTION INTRAMUSCULAR; INTRAVENOUS AS NEEDED
Status: DISCONTINUED | OUTPATIENT
Start: 2021-01-01 | End: 2021-01-01 | Stop reason: HOSPADM

## 2021-01-01 RX ORDER — SODIUM CHLORIDE 9 MG/ML
25 INJECTION, SOLUTION INTRAVENOUS CONTINUOUS
Status: DISCONTINUED | OUTPATIENT
Start: 2021-01-01 | End: 2021-01-01 | Stop reason: HOSPADM

## 2021-01-01 RX ORDER — OMEPRAZOLE 20 MG/1
CAPSULE, DELAYED RELEASE ORAL
Qty: 90 CAPSULE | Refills: 3 | OUTPATIENT
Start: 2021-01-01

## 2021-01-01 RX ORDER — OMEPRAZOLE 20 MG/1
20 CAPSULE, DELAYED RELEASE ORAL DAILY
Qty: 90 CAPSULE | Refills: 1 | Status: SHIPPED | OUTPATIENT
Start: 2021-01-01 | End: 2021-01-01

## 2021-01-01 RX ADMIN — Medication 10 ML: at 21:47

## 2021-01-01 RX ADMIN — INSULIN LISPRO 2 UNITS: 100 INJECTION, SOLUTION INTRAVENOUS; SUBCUTANEOUS at 18:41

## 2021-01-01 RX ADMIN — Medication 10 ML: at 14:00

## 2021-01-01 RX ADMIN — LEVOTHYROXINE SODIUM 50 MCG: 0.1 TABLET ORAL at 07:40

## 2021-01-01 RX ADMIN — QUETIAPINE FUMARATE 50 MG: 25 TABLET ORAL at 21:35

## 2021-01-01 RX ADMIN — SODIUM CHLORIDE 75 ML/HR: 9 INJECTION, SOLUTION INTRAVENOUS at 00:26

## 2021-01-01 RX ADMIN — LEVOTHYROXINE SODIUM 50 MCG: 0.1 TABLET ORAL at 07:09

## 2021-01-01 RX ADMIN — LIDOCAINE HYDROCHLORIDE 60 MG: 20 INJECTION, SOLUTION EPIDURAL; INFILTRATION; INTRACAUDAL; PERINEURAL at 13:22

## 2021-01-01 RX ADMIN — INSULIN LISPRO 2 UNITS: 100 INJECTION, SOLUTION INTRAVENOUS; SUBCUTANEOUS at 06:53

## 2021-01-01 RX ADMIN — ACETAMINOPHEN 650 MG: 325 TABLET ORAL at 21:56

## 2021-01-01 RX ADMIN — NEOSTIGMINE METHYLSULFATE 3 MG: 1 INJECTION, SOLUTION INTRAVENOUS at 14:13

## 2021-01-01 RX ADMIN — ACETAMINOPHEN 650 MG: 325 TABLET ORAL at 05:25

## 2021-01-01 RX ADMIN — SODIUM CHLORIDE 40 MG: 9 INJECTION, SOLUTION INTRAMUSCULAR; INTRAVENOUS; SUBCUTANEOUS at 21:47

## 2021-01-01 RX ADMIN — QUETIAPINE FUMARATE 50 MG: 25 TABLET ORAL at 09:51

## 2021-01-01 RX ADMIN — ACETAMINOPHEN 650 MG: 325 TABLET ORAL at 17:30

## 2021-01-01 RX ADMIN — ACETAMINOPHEN 650 MG: 325 TABLET ORAL at 01:10

## 2021-01-01 RX ADMIN — FENTANYL CITRATE 50 MCG: 50 INJECTION, SOLUTION INTRAMUSCULAR; INTRAVENOUS at 13:22

## 2021-01-01 RX ADMIN — CEFAZOLIN SODIUM 2 G: 1 INJECTION, POWDER, FOR SOLUTION INTRAMUSCULAR; INTRAVENOUS at 04:54

## 2021-01-01 RX ADMIN — LEVOTHYROXINE SODIUM 50 MCG: 0.1 TABLET ORAL at 07:08

## 2021-01-01 RX ADMIN — INSULIN LISPRO 3 UNITS: 100 INJECTION, SOLUTION INTRAVENOUS; SUBCUTANEOUS at 07:48

## 2021-01-01 RX ADMIN — ACETAMINOPHEN 650 MG: 325 TABLET ORAL at 17:23

## 2021-01-01 RX ADMIN — POTASSIUM BICARBONATE 40 MEQ: 391 TABLET, EFFERVESCENT ORAL at 11:09

## 2021-01-01 RX ADMIN — QUETIAPINE FUMARATE 50 MG: 25 TABLET ORAL at 21:23

## 2021-01-01 RX ADMIN — ACETAMINOPHEN 650 MG: 325 TABLET ORAL at 13:38

## 2021-01-01 RX ADMIN — QUETIAPINE FUMARATE 50 MG: 25 TABLET ORAL at 10:30

## 2021-01-01 RX ADMIN — VALPROIC ACID 250 MG: 250 SOLUTION ORAL at 21:43

## 2021-01-01 RX ADMIN — Medication 10 ML: at 21:23

## 2021-01-01 RX ADMIN — QUETIAPINE FUMARATE 50 MG: 25 TABLET ORAL at 10:26

## 2021-01-01 RX ADMIN — QUETIAPINE FUMARATE 50 MG: 25 TABLET ORAL at 09:01

## 2021-01-01 RX ADMIN — QUETIAPINE FUMARATE 50 MG: 25 TABLET ORAL at 11:09

## 2021-01-01 RX ADMIN — ASPIRIN 81 MG: 81 TABLET, CHEWABLE ORAL at 10:26

## 2021-01-01 RX ADMIN — QUETIAPINE FUMARATE 25 MG: 25 TABLET ORAL at 18:49

## 2021-01-01 RX ADMIN — ACETAMINOPHEN 650 MG: 325 TABLET ORAL at 22:48

## 2021-01-01 RX ADMIN — INSULIN LISPRO 2 UNITS: 100 INJECTION, SOLUTION INTRAVENOUS; SUBCUTANEOUS at 17:41

## 2021-01-01 RX ADMIN — CEFAZOLIN SODIUM 2 G: 1 INJECTION, POWDER, FOR SOLUTION INTRAMUSCULAR; INTRAVENOUS at 21:33

## 2021-01-01 RX ADMIN — CASTOR OIL AND BALSAM, PERU: 788; 87 OINTMENT TOPICAL at 22:06

## 2021-01-01 RX ADMIN — QUETIAPINE FUMARATE 50 MG: 25 TABLET ORAL at 22:05

## 2021-01-01 RX ADMIN — Medication 120 MCG: at 10:05

## 2021-01-01 RX ADMIN — ACETAMINOPHEN 650 MG: 325 TABLET ORAL at 17:49

## 2021-01-01 RX ADMIN — FENTANYL CITRATE 50 MCG: 50 INJECTION INTRAMUSCULAR; INTRAVENOUS at 23:11

## 2021-01-01 RX ADMIN — SODIUM CHLORIDE 40 MG: 9 INJECTION, SOLUTION INTRAMUSCULAR; INTRAVENOUS; SUBCUTANEOUS at 11:09

## 2021-01-01 RX ADMIN — QUETIAPINE FUMARATE 50 MG: 25 TABLET ORAL at 22:04

## 2021-01-01 RX ADMIN — LEVOTHYROXINE SODIUM 50 MCG: 0.1 TABLET ORAL at 07:03

## 2021-01-01 RX ADMIN — LANSOPRAZOLE 30 MG: KIT at 18:15

## 2021-01-01 RX ADMIN — SODIUM CHLORIDE 40 MG: 9 INJECTION, SOLUTION INTRAMUSCULAR; INTRAVENOUS; SUBCUTANEOUS at 21:43

## 2021-01-01 RX ADMIN — ASPIRIN 81 MG CHEWABLE TABLET 81 MG: 81 TABLET CHEWABLE at 17:49

## 2021-01-01 RX ADMIN — LANSOPRAZOLE 30 MG: KIT at 09:02

## 2021-01-01 RX ADMIN — INSULIN GLARGINE 10 UNITS: 100 INJECTION, SOLUTION SUBCUTANEOUS at 17:41

## 2021-01-01 RX ADMIN — INSULIN GLARGINE 10 UNITS: 100 INJECTION, SOLUTION SUBCUTANEOUS at 09:51

## 2021-01-01 RX ADMIN — INSULIN LISPRO 3 UNITS: 100 INJECTION, SOLUTION INTRAVENOUS; SUBCUTANEOUS at 07:06

## 2021-01-01 RX ADMIN — Medication 80 MCG/MIN: at 13:25

## 2021-01-01 RX ADMIN — INSULIN LISPRO 2 UNITS: 100 INJECTION, SOLUTION INTRAVENOUS; SUBCUTANEOUS at 17:33

## 2021-01-01 RX ADMIN — ACETAMINOPHEN 650 MG: 325 TABLET ORAL at 18:49

## 2021-01-01 RX ADMIN — Medication 10 ML: at 07:38

## 2021-01-01 RX ADMIN — SODIUM CHLORIDE 75 ML/HR: 9 INJECTION, SOLUTION INTRAVENOUS at 14:53

## 2021-01-01 RX ADMIN — ASPIRIN 81 MG CHEWABLE TABLET 81 MG: 81 TABLET CHEWABLE at 10:18

## 2021-01-01 RX ADMIN — Medication 10 ML: at 06:00

## 2021-01-01 RX ADMIN — Medication 10 ML: at 21:35

## 2021-01-01 RX ADMIN — ACETAMINOPHEN 650 MG: 325 TABLET ORAL at 05:42

## 2021-01-01 RX ADMIN — HYDROMORPHONE HYDROCHLORIDE 0.2 MG: 2 INJECTION, SOLUTION INTRAMUSCULAR; INTRAVENOUS; SUBCUTANEOUS at 13:53

## 2021-01-01 RX ADMIN — ACETAMINOPHEN 650 MG: 325 TABLET ORAL at 13:09

## 2021-01-01 RX ADMIN — ROCURONIUM BROMIDE 10 MG: 10 SOLUTION INTRAVENOUS at 13:27

## 2021-01-01 RX ADMIN — ACETAMINOPHEN 650 MG: 325 TABLET ORAL at 07:37

## 2021-01-01 RX ADMIN — INSULIN LISPRO 3 UNITS: 100 INJECTION, SOLUTION INTRAVENOUS; SUBCUTANEOUS at 17:31

## 2021-01-01 RX ADMIN — ACETAMINOPHEN 650 MG: 325 TABLET ORAL at 13:46

## 2021-01-01 RX ADMIN — VALPROIC ACID 250 MG: 250 SOLUTION ORAL at 21:46

## 2021-01-01 RX ADMIN — INSULIN LISPRO 2 UNITS: 100 INJECTION, SOLUTION INTRAVENOUS; SUBCUTANEOUS at 11:20

## 2021-01-01 RX ADMIN — SODIUM CHLORIDE 1000 ML: 9 INJECTION, SOLUTION INTRAVENOUS at 01:11

## 2021-01-01 RX ADMIN — MORPHINE SULFATE 4 MG: 4 INJECTION INTRAVENOUS at 19:21

## 2021-01-01 RX ADMIN — CEFAZOLIN 2 G: 330 INJECTION, POWDER, FOR SOLUTION INTRAMUSCULAR; INTRAVENOUS at 13:37

## 2021-01-01 RX ADMIN — CASTOR OIL AND BALSAM, PERU: 788; 87 OINTMENT TOPICAL at 14:20

## 2021-01-01 RX ADMIN — Medication 10 ML: at 05:42

## 2021-01-01 RX ADMIN — Medication 10 ML: at 22:15

## 2021-01-01 RX ADMIN — Medication 10 ML: at 13:09

## 2021-01-01 RX ADMIN — HYDRALAZINE HYDROCHLORIDE 20 MG: 20 INJECTION INTRAMUSCULAR; INTRAVENOUS at 09:52

## 2021-01-01 RX ADMIN — SODIUM CHLORIDE: 900 INJECTION, SOLUTION INTRAVENOUS at 09:30

## 2021-01-01 RX ADMIN — QUETIAPINE FUMARATE 50 MG: 25 TABLET ORAL at 21:34

## 2021-01-01 RX ADMIN — SODIUM CHLORIDE, POTASSIUM CHLORIDE, SODIUM LACTATE AND CALCIUM CHLORIDE: 600; 310; 30; 20 INJECTION, SOLUTION INTRAVENOUS at 12:46

## 2021-01-01 RX ADMIN — INSULIN LISPRO 2 UNITS: 100 INJECTION, SOLUTION INTRAVENOUS; SUBCUTANEOUS at 14:30

## 2021-01-01 RX ADMIN — INSULIN LISPRO 2 UNITS: 100 INJECTION, SOLUTION INTRAVENOUS; SUBCUTANEOUS at 07:08

## 2021-01-01 RX ADMIN — CASTOR OIL AND BALSAM, PERU: 788; 87 OINTMENT TOPICAL at 07:42

## 2021-01-01 RX ADMIN — ACETAMINOPHEN 650 MG: 325 TABLET ORAL at 00:00

## 2021-01-01 RX ADMIN — SODIUM CHLORIDE 75 ML/HR: 9 INJECTION, SOLUTION INTRAVENOUS at 02:15

## 2021-01-01 RX ADMIN — AMLODIPINE BESYLATE 5 MG: 5 TABLET ORAL at 09:51

## 2021-01-01 RX ADMIN — INSULIN LISPRO 2 UNITS: 100 INJECTION, SOLUTION INTRAVENOUS; SUBCUTANEOUS at 17:49

## 2021-01-01 RX ADMIN — QUETIAPINE FUMARATE 50 MG: 25 TABLET ORAL at 21:45

## 2021-01-01 RX ADMIN — ACETAMINOPHEN 650 MG: 325 TABLET ORAL at 18:09

## 2021-01-01 RX ADMIN — ACETAMINOPHEN 650 MG: 325 TABLET ORAL at 12:00

## 2021-01-01 RX ADMIN — ONDANSETRON HYDROCHLORIDE 4 MG: 2 INJECTION, SOLUTION INTRAMUSCULAR; INTRAVENOUS at 14:02

## 2021-01-01 RX ADMIN — MIDAZOLAM 1 MG: 1 INJECTION INTRAMUSCULAR; INTRAVENOUS at 13:14

## 2021-01-01 RX ADMIN — INSULIN GLARGINE 10 UNITS: 100 INJECTION, SOLUTION SUBCUTANEOUS at 10:18

## 2021-01-01 RX ADMIN — Medication 10 ML: at 13:46

## 2021-01-01 RX ADMIN — IOPAMIDOL 100 ML: 755 INJECTION, SOLUTION INTRAVENOUS at 22:23

## 2021-01-01 RX ADMIN — ROCURONIUM BROMIDE 5 MG: 10 SOLUTION INTRAVENOUS at 13:22

## 2021-01-01 RX ADMIN — INSULIN GLARGINE 10 UNITS: 100 INJECTION, SOLUTION SUBCUTANEOUS at 10:26

## 2021-01-01 RX ADMIN — KETAMINE HYDROCHLORIDE 20 MG: 10 INJECTION, SOLUTION INTRAMUSCULAR; INTRAVENOUS at 13:14

## 2021-01-01 RX ADMIN — HUMAN INSULIN 5 UNITS: 100 INJECTION, SUSPENSION SUBCUTANEOUS at 00:17

## 2021-01-01 RX ADMIN — INSULIN LISPRO 3 UNITS: 100 INJECTION, SOLUTION INTRAVENOUS; SUBCUTANEOUS at 12:45

## 2021-01-01 RX ADMIN — GLYCOPYRROLATE 0.4 MG: 0.2 INJECTION, SOLUTION INTRAMUSCULAR; INTRAVENOUS at 14:13

## 2021-01-01 RX ADMIN — LEVOTHYROXINE SODIUM 50 MCG: 0.1 TABLET ORAL at 07:38

## 2021-01-01 RX ADMIN — ACETAMINOPHEN 650 MG: 325 TABLET ORAL at 07:09

## 2021-01-01 RX ADMIN — INSULIN LISPRO 2 UNITS: 100 INJECTION, SOLUTION INTRAVENOUS; SUBCUTANEOUS at 08:49

## 2021-01-01 RX ADMIN — PROPOFOL 50 MG: 10 INJECTION, EMULSION INTRAVENOUS at 10:01

## 2021-01-01 RX ADMIN — AMLODIPINE BESYLATE 5 MG: 5 TABLET ORAL at 10:18

## 2021-01-01 RX ADMIN — INSULIN LISPRO 2 UNITS: 100 INJECTION, SOLUTION INTRAVENOUS; SUBCUTANEOUS at 22:05

## 2021-01-01 RX ADMIN — ASPIRIN 81 MG CHEWABLE TABLET 81 MG: 81 TABLET CHEWABLE at 18:09

## 2021-01-01 RX ADMIN — HYDROMORPHONE HYDROCHLORIDE 0.2 MG: 2 INJECTION, SOLUTION INTRAMUSCULAR; INTRAVENOUS; SUBCUTANEOUS at 14:27

## 2021-01-01 RX ADMIN — QUETIAPINE FUMARATE 50 MG: 25 TABLET ORAL at 10:18

## 2021-01-01 RX ADMIN — HYDRALAZINE HYDROCHLORIDE 20 MG: 20 INJECTION INTRAMUSCULAR; INTRAVENOUS at 10:45

## 2021-01-01 RX ADMIN — SODIUM CHLORIDE 40 MG: 9 INJECTION, SOLUTION INTRAMUSCULAR; INTRAVENOUS; SUBCUTANEOUS at 12:45

## 2021-01-01 RX ADMIN — JNJ-78436735 1 DOSE: 50000000000 SUSPENSION INTRAMUSCULAR at 13:57

## 2021-01-01 RX ADMIN — PROPOFOL 90 MG: 10 INJECTION, EMULSION INTRAVENOUS at 13:22

## 2021-01-01 RX ADMIN — FENTANYL CITRATE 25 MCG: 50 INJECTION INTRAMUSCULAR; INTRAVENOUS at 14:47

## 2021-01-01 RX ADMIN — ALBUMIN (HUMAN) 250 ML: 12.5 INJECTION, SOLUTION INTRAVENOUS at 13:39

## 2021-01-01 RX ADMIN — LEVOTHYROXINE SODIUM 50 MCG: 0.1 TABLET ORAL at 06:55

## 2021-01-01 RX ADMIN — ASPIRIN 81 MG CHEWABLE TABLET 81 MG: 81 TABLET CHEWABLE at 09:02

## 2021-01-01 RX ADMIN — LEVOTHYROXINE SODIUM 50 MCG: 0.1 TABLET ORAL at 06:32

## 2021-01-01 RX ADMIN — HYDRALAZINE HYDROCHLORIDE 20 MG: 20 INJECTION INTRAMUSCULAR; INTRAVENOUS at 02:14

## 2021-01-01 RX ADMIN — INSULIN LISPRO 2 UNITS: 100 INJECTION, SOLUTION INTRAVENOUS; SUBCUTANEOUS at 21:47

## 2021-01-01 RX ADMIN — ASPIRIN 81 MG CHEWABLE TABLET 81 MG: 81 TABLET CHEWABLE at 17:31

## 2021-01-01 RX ADMIN — SUCCINYLCHOLINE CHLORIDE 120 MG: 20 INJECTION, SOLUTION INTRAMUSCULAR; INTRAVENOUS at 13:22

## 2021-01-01 RX ADMIN — LIDOCAINE HYDROCHLORIDE 60 MG: 20 INJECTION, SOLUTION EPIDURAL; INFILTRATION; INTRACAUDAL; PERINEURAL at 10:01

## 2021-01-01 RX ADMIN — ASPIRIN 81 MG CHEWABLE TABLET 81 MG: 81 TABLET CHEWABLE at 17:23

## 2021-02-15 ENCOUNTER — HOME VISIT (OUTPATIENT)
Dept: FAMILY MEDICINE CLINIC | Age: 81
End: 2021-02-15
Payer: MEDICARE

## 2021-02-15 DIAGNOSIS — N18.31 STAGE 3A CHRONIC KIDNEY DISEASE (HCC): ICD-10-CM

## 2021-02-15 DIAGNOSIS — E11.65 UNCONTROLLED TYPE 2 DIABETES MELLITUS WITH HYPERGLYCEMIA (HCC): ICD-10-CM

## 2021-02-15 DIAGNOSIS — I95.1 ORTHOSTATIC HYPOTENSION: ICD-10-CM

## 2021-02-15 DIAGNOSIS — F02.818 LATE ONSET ALZHEIMER'S DISEASE WITH BEHAVIORAL DISTURBANCE (HCC): Primary | ICD-10-CM

## 2021-02-15 DIAGNOSIS — E03.9 ACQUIRED HYPOTHYROIDISM: ICD-10-CM

## 2021-02-15 DIAGNOSIS — G89.29 OTHER CHRONIC PAIN: ICD-10-CM

## 2021-02-15 DIAGNOSIS — I50.9 CONGESTIVE HEART FAILURE, UNSPECIFIED HF CHRONICITY, UNSPECIFIED HEART FAILURE TYPE (HCC): ICD-10-CM

## 2021-02-15 DIAGNOSIS — G30.1 LATE ONSET ALZHEIMER'S DISEASE WITH BEHAVIORAL DISTURBANCE (HCC): Primary | ICD-10-CM

## 2021-02-15 PROCEDURE — 99349 HOME/RES VST EST MOD MDM 40: CPT | Performed by: FAMILY MEDICINE

## 2021-02-15 PROCEDURE — 1101F PT FALLS ASSESS-DOCD LE1/YR: CPT | Performed by: FAMILY MEDICINE

## 2021-02-15 PROCEDURE — G8536 NO DOC ELDER MAL SCRN: HCPCS | Performed by: FAMILY MEDICINE

## 2021-02-15 PROCEDURE — G8427 DOCREV CUR MEDS BY ELIG CLIN: HCPCS | Performed by: FAMILY MEDICINE

## 2021-02-15 NOTE — Clinical Note
Sukhdeep Monroe, will you please schedule him for a home visit in 3 months and let the daughter know the date? Thank you, AH.

## 2021-02-22 PROBLEM — G89.29 OTHER CHRONIC PAIN: Status: ACTIVE | Noted: 2021-02-22

## 2021-02-22 PROBLEM — I21.4 NSTEMI (NON-ST ELEVATED MYOCARDIAL INFARCTION) (HCC): Status: RESOLVED | Noted: 2018-12-25 | Resolved: 2021-02-22

## 2021-02-22 PROBLEM — E03.9 ACQUIRED HYPOTHYROIDISM: Status: ACTIVE | Noted: 2021-02-22

## 2021-02-22 PROBLEM — E87.6 HYPOKALEMIA: Status: RESOLVED | Noted: 2018-12-25 | Resolved: 2021-02-22

## 2021-02-22 PROBLEM — E86.9 VOLUME DEPLETION: Status: RESOLVED | Noted: 2017-04-11 | Resolved: 2021-02-22

## 2021-02-22 PROBLEM — E87.1 HYPONATREMIA: Status: RESOLVED | Noted: 2017-04-11 | Resolved: 2021-02-22

## 2021-02-22 PROBLEM — R55 SYNCOPE: Status: RESOLVED | Noted: 2017-04-11 | Resolved: 2021-02-22

## 2021-02-22 PROBLEM — N18.31 STAGE 3A CHRONIC KIDNEY DISEASE (HCC): Status: ACTIVE | Noted: 2021-02-22

## 2021-02-23 ENCOUNTER — DOCUMENTATION ONLY (OUTPATIENT)
Dept: FAMILY MEDICINE CLINIC | Age: 81
End: 2021-02-23

## 2021-02-23 NOTE — PROGRESS NOTES
Home Based Primary Care & Supportive Services  Plan of Care    [facility] [place] Team Members: Dr. Mitch Benton, Juan A Garcia, NP; Chuck Julio NP; Denis Carmona RN, SPENCER Hobson Sivandonte  1940 / [id number]  male    The physician has reviewed and discussed the plan of care with the interdisciplinary group on 02/24/21 and agrees. Date of Initial Visit (Start of Care): 2/12/19  Date of last visit: 02/15/2021     Diagnosis: 79 y.o. male with dementia, COPD,  chronic diastolic CHF, persistent afib, Type 2 diabetes with hypoglycemia, chronic anemia, hypothyroidism, depression    Patient status summary: Patient and POC discussed in IDT meeting for 60 day update. Homebound patient referred by Palliative Medicine Inpatient Team, Gerda Jernigan NP to our services due to taxing effort to seek primary care needs in the community. DME/Supplies:  Bedside Commode     Advance Care Planning:  Code status: DNR  DDNR singed on 12/28/2018 is on file      Primary Decision Maker (Active): [place] - Child - [phone]    Primary Decision Maker: Tanja Martínez - Son    Secondary Decision Maker: Philippe Merritt - Other Relative - [phone]      Allergies   Allergen Reactions    Sulfa (Sulfonamide Antibiotics) Unknown (comments)     Nutritional Requirements:   Oral with supported meal preparation    Functional/Activity Level:  Limited ambulation with support of wheelchair and Wheelchair with assistance for transfers    Safety Measures:   Fall risk, Self-care deficity and Smoker    Current Outpatient Medications   Medication Sig    insulin lispro (HUMALOG) 100 unit/mL injection INJECT 3 UNITS BENEATH THE SKIN FOR BS>200, 5 UNITS FOR BS>250, 6 UNITS FOR BS>300. CALL MD FOR BS >400    QUEtiapine (SEROquel) 25 mg tablet Take 1 Tab by mouth daily. 1 - 2 tabs po every am dependent on behavivor (Patient taking differently: Take 12.5 mg by mouth daily.  1 - 2 tabs po every am dependent on behavivor)    Insulin Needles, Disposable, (BD Ultra-Fine Mini Pen Needle) 31 gauge x 3/16\" ndle USE TO INJECT INSULIN 4 TIMES A DAY    insulin glargine (LANTUS,BASAGLAR) 100 unit/mL (3 mL) inpn Inject 8 units every morning.  insulin lispro (HUMALOG) 100 unit/mL kwikpen 3 units sq for bs greater than 200 ; 5  untis sq for bs greater than 250,  6units for blood sugar greater than 300 ; call me for bs greater than 400    levothyroxine (SYNTHROID) 50 mcg tablet TAKE 1 TABLET BY MOUTH EVERY DAY BEFORE BREAKFAST    glucose blood VI test strips (OneTouch Ultra Blue Test Strip) strip USE TO CHECK BLOOD SUGAR 3 TIMES DAILY. DIAGNOSIS CODE: E11.9    tamsulosin (Flomax) 0.4 mg capsule Take 1 Cap by mouth daily.  omeprazole (PRILOSEC) 20 mg capsule Take 1 Cap by mouth daily. Before breakfast    b complex vitamins (SUPER B-50 COMPLEX PLUS) tablet Take 1 Tab by mouth daily.  DULoxetine (CYMBALTA) 60 mg capsule TAKE ONE CAPSULE BY MOUTH EVERY DAY    SENNA PLUS 8.6-50 mg per tablet TAKE 2 TABS BY MOUTH TWO (2) TIMES A DAY    lidocaine (LIDODERM) 5 % Apply patch to the affected area for 12 hours a day and remove for 12 hours a day.  midodrine (PROAMITINE) 5 mg tablet Take 5 mg by mouth three (3) times daily (with meals).  aspirin 81 mg chewable tablet Take 1 Tab by mouth daily.  ondansetron (ZOFRAN ODT) 4 mg disintegrating tablet Take 1 Tab by mouth every six (6) hours as needed for Nausea.  acetaminophen (TYLENOL) 500 mg tablet Take 1,000 mg by mouth every six to eight (6-8) hours as needed for Pain. No current facility-administered medications for this visit. The Plan of Care has been initiated for during discussion at interdisciplinary group meeting  and reviewed and updated by the Interdisciplinary Group (IDG) as frequently as the patient condition warrants. Plan of Care by Discipline:    1.  Provider  Ongoing evaluation of treatment interventions for specific disease state, Reduction of polypharmacy within benefit/burden framework appropriate to age, function and disease state, Determine need for laboratory assessment based on patient disease status  and Create and implement disease /symptom specific plan to manage high risk conditions / symptoms    2. Nursing  Review Health Maintenance with patient and provider; update as appropriate and Provider caregiver / family support for patient's current and changing condition    3. Social Work  Establish therapeutic relationship through in home visits and telephonic touch points      Plan of Care Orders / Action     -Dementia: continue supportive care. Memantine previously discontinued. -T2DM: family using SSI. Have been liberal with BGs because of dementia and patient not liking to have frequent BG checks. Family will let us know if blood sugars are consistently high.  -Hypothyroidism: TSH wnl in 09/2020. Continue synthroid.  -Orthostatic hypotension: midodrine available as needed (not needed recently). Family continues regular blood pressure checks. -CHF: No exacerbations in several years. No betablocker or ace/arb secondary to [facility] or diuretics. -CKD: avoid nephrotoxic medications.  -Chronic pain: Takes hydrocodone-acetaminophen 5-325 mg one time daily for pain. Last refill 12/29/20 per . Needs follow-up every 3 months.  -Prostate cancer: PSA elevated on last check. Per previous NP Hien Quinonez, family decided to not pursue further treatment as patient is not able to travel out of the home d/t behaviors.  Urology was notified.       Health Maintenance   Topic Date Due    Hepatitis C Screening  1940    COVID-19 Vaccine (1 of 2) 12/11/1957    Pneumococcal 65+ years (1 of 1 - PPSV23) 12/11/2006    Flu Vaccine (1) 09/01/2020    Medicare Yearly Exam  03/12/2021    Pneumococcal 65+ yrs at Risk Vaccine (1 of 2 - PCV13) 10/30/2022 (Originally 12/11/2006)    MICROALBUMIN Q1  01/25/2023 (Originally 2/12/2020)    GLAUCOMA SCREENING Q2Y  03/11/2023 (Originally 12/11/2006)  Eye Exam Retinal or Dilated  03/11/2023 (Originally 12/11/1951)    Shingrix Vaccine Age 50> (1 of 2) 02/15/2025 (Originally 12/11/1991)    Foot Exam Q1  09/08/2021    DTaP/Tdap/Td series (2 - Td) 09/07/2029         Estimated Visit Frequency:  Every 3 month provider visit  Last MD visit: 02/15/2021  SW visit prn

## 2021-02-23 NOTE — PROGRESS NOTES
Home Based Primary Care [facility]) & Supportive Services    [phone]      NOTE: Home Based Primary Care is a PROVIDER (MD/NP) based interdisciplinary practice (RN, LCSW, Pharmacy, Dietician) for patient's who cannot access (or have a taxing effort) primary / speciality medical care in an office setting. [facility] is NOT [employer] but works with [address] [employer]. when there is a skilled need. Our MD/NPs are integral in Care Transitions; PLEASE CALL [phone] if this patient arrives in the Emergency Department or Hospital.          Date of Current Visit: 02/15/2021    Patient/Family present for Current Visit: Patient, daughter, and son    Goals of Care as stated by the patient/family is: to be as happy and comfortable as possible    Preferences for hospitalization if that were to be necessary:  [] Patient DOES NOT WANT hospitalization; focus all treatments at home  [x] Patient WOULD WANT hospitalization for potentially reversible causes  Patient prefers hospitalization at: [facility]      Is this encounter billed on time: No    Total time:   Counseling / coordination time:     > 50% counseling / coordination?:    ASSESSMENT & PLAN       ICD-10-CM ICD-9-CM    1. Late onset Alzheimer's disease with behavioral disturbance (HCC)  G30.1 331.0     F02.81 294.11    2. Uncontrolled type 2 diabetes mellitus with hyperglycemia (HCC)  E11.65 250.02    3. Acquired hypothyroidism  E03.9 244.9    4. Orthostatic hypotension  I95.1 458.0    5. Congestive heart failure, unspecified HF chronicity, unspecified heart failure type (HCC)  I50.9 428.0    6. Stage 3a chronic kidney disease  N18.31 585.3    7. Other chronic pain  G89.29 338.29      -Dementia: continue supportive care. Memantine previously discontinued. -T2DM: family using SSI. Have been liberal with BGs because of dementia and patient not liking to have frequent BG checks.   Family will let us know if blood sugars are consistently high.  -Hypothyroidism: TSH wnl in 09/2020. Continue synthroid.  -Orthostatic hypotension: midodrine available as needed (not needed recently). Family continues regular blood pressure checks. -CHF: No exacerbations in several years. No betablocker or ace/arb secondary to [facility] or diuretics. -CKD: avoid nephrotoxic medications.  -Chronic pain: Takes hydrocodoneacetaminophen 5-325 mg one time daily for pain. Last refill 12/29/20 per . Needs follow-up every 3 months.  -Will follow up with previous NP to see what urology's recommendations were regarding elevated PSA. -Follow-up in 3 months, sooner as needed. Will be due for Medicare yearly visit. I have left a SUMMARY / Marcela Carreno from today's visit with the patient/family in the home          Huma Jack [address] Maintenance Due   Topic Date Due    COVID-19 Vaccine (1 of 2) 12/11/1956    Pneumococcal 65+ years (1 of 1 - PPSV23) 12/11/2006    Flu Vaccine (1) 09/01/2020    Medicare Yearly Exam  03/12/2021        CC & SUBJECTIVE including ROS & FUNCTION     Chief Complaint   Patient presents with    Dementia        Daryle Round is a 79 y.o. with chronic medical conditions as listed in the patient's updated Problem List (see below)     He is seen in follow-up of chronic medical conditions. Most history is per daughter. He is at his relative baseline. His children did decide to stop the memory medications as they did not see that they were helping him. He does take Seroquel 12.5 mg in the evening. Was taking melatonin previously, but family felt this was contributing to bad dreams, so they stopped the melatonin. He does sleep a lot during the day and tends to wander at night. He maybe sleeps 2 to 3 hours at a time. His appetite is good. His daughter notes that he will often forget that he has already eaten and will request to eat again. He is using sliding scale insulin for his diabetes.   His family says that based on his blood sugars he does not really need much sliding scale insulin. His sugar log is below. His blood pressure has been relatively good without needing to use midodrine. Comes into the room near the end of my visit he is able to say some basic words and phrases but is unable to accurately answer questions or participate in complex conversation. He denies any discomfort at this time. Per his daughter, he does use 1 tab of hydrocodone-acetaminophen daily for pain--this is chronic for him. Positive ROS findings: Patient unable to answer questions due to baseline cognitive issues. PPS:40  Karnofsky:   Timed Up and Go (TUG):   Fall Risk Assessment, last 12 mths 2/12/2019   Able to walk? Yes   Fall in past 12 months? Yes   Number of falls in past 12 months 2   Fall with injury? 1       Current Durable Medical Equipment in Home:  [] Hospital Bed  [] Bedside Commode  [] Wheelchair  [] Dixie Aguayo  [] Gavino Estrada    Has a cane  [] Respiratory Support:    ADVANCE CARE PLANNING                                 The following information was updated I/ reviewed as accurate in Orders and the Advance Care Planning Navigator:    DNR      Primary Decision Maker (Active): Linn Villegas - [phone]    Primary Decision Maker: Marcia Reddy - Son    Secondary Decision Maker: Deborah Fierro - Other Relative - [phone]  Advance Care Planning 2/21/2019   Patient's Healthcare Decision Maker is: Legal Next of Kin   Confirm Advance Directive None   Patient Would Like to Complete Advance Directive Unable   Does the patient have other document types Do Not Resuscitate        VITAL SIGNS & PHYSICAL EXAM     Median Arm Circumference (inches):     Wt Readings from Last 3 Encounters:   11/20/19 159 lb 1.6 oz (72.2 kg)   11/05/19 156 lb 6.4 oz (70.9 kg)   10/29/19 155 lb 12.8 oz (70.7 kg)     Temp Readings from Last 3 Encounters:   05/18/20 98 °F (36.7 °C) (Oral)   01/17/20 98.5 °F (36.9 °C) (Oral)   11/20/19 97.4 °F (36.3 °C) (Axillary)     BP Readings from Last 3 Encounters:   05/18/20 126/70   01/17/20 132/76   11/20/19 102/64     Pulse Readings from Last 3 Encounters:   05/18/20 72   03/11/20 75   01/17/20 70            Pacemaker:  ICD:  Feeding Tube:  Urine Catheter:  Other:     Physical Exam   Constitutional: He is well-developed, well-nourished, and in no distress. No distress. Frail, thin. Will not allow a complete exam.     HENT:   Head: Normocephalic and atraumatic. Right Ear: External ear normal.   Left Ear: External ear normal.   Eyes: Conjunctivae and EOM are normal. Right eye exhibits no discharge. Left eye exhibits no discharge. No scleral icterus. Conjunctiva white   Neck: Normal range of motion. Pulmonary/Chest: Effort normal. No respiratory distress. Musculoskeletal:         General: No deformity or edema. Comments: Able to walk unassisted   Neurological: He is alert. Oriented X 1   Skin: Skin is warm. He is not diaphoretic. No pallor. Psychiatric:   He is restless and wanders in the house   Vitals reviewed.        PROBLEM LIST / PAST MEDICAL / SOCIAL HX     Patient Active Problem List   Diagnosis Code    Non compliance w medication regimen Z91.14    Late onset Alzheimer's disease with behavioral disturbance ([facility]) G30.1, F02.81    Moderate recurrent major depression ([facility] F33.1    Uncontrolled diabetes mellitus with hyperglycemia ([facility]) E11.65    Urinary incontinence R32    CAD (coronary artery disease) I25.10    COPD (chronic obstructive pulmonary disease) ([facility]) J44.9    Orthostatic hypotension I95.1    CHF (congestive heart failure) ([facility]) I50.9    Prostate cancer ([facility] [address] C61    Acquired hypothyroidism E03.9    Stage 3a chronic kidney disease N18.31    Other chronic pain G89.29      Family History   Problem Relation Age of Onset    Diabetes Mother     Diabetes Brother      Social History     Socioeconomic History    Marital status:      Spouse name: Not on file    Number of children: Not on file    Years of education: Not on file    Highest education level: Not on file   Tobacco Use    Smoking status: Former Smoker    Smokeless tobacco: Never Used    Tobacco comment: 1-2 cigars daily   Substance and Sexual Activity    Alcohol use: No     Alcohol/week: 0.0 standard drinks    Drug use: No        MEDICATIONS & PRESCRIPTIONS     Allergies   Allergen Reactions    Sulfa (Sulfonamide Antibiotics) Unknown (comments)      Current Outpatient Medications   Medication Sig    insulin lispro (HUMALOG) 100 unit/mL injection INJECT 3 UNITS BENEATH THE SKIN FOR BS>200, 5 UNITS FOR BS>250, 6 UNITS FOR BS>300. CALL MD FOR BS >400    levothyroxine (SYNTHROID) 50 mcg tablet TAKE 1 TABLET BY MOUTH EVERY DAY BEFORE BREAKFAST    tamsulosin (Flomax) 0.4 mg capsule Take 1 Cap by mouth daily.  omeprazole (PRILOSEC) 20 mg capsule Take 1 Cap by mouth daily. Before breakfast    DULoxetine (CYMBALTA) 60 mg capsule TAKE ONE CAPSULE BY MOUTH EVERY DAY    QUEtiapine (SEROquel) 25 mg tablet Take 1 Tab by mouth daily. 1 - 2 tabs po every am dependent on behavivor (Patient taking differently: Take 12.5 mg by mouth daily. 1 - 2 tabs po every am dependent on behavivor)    Insulin Needles, Disposable, (BD Ultra-Fine Mini Pen Needle) 31 gauge x 3/16\" ndle USE TO INJECT INSULIN 4 TIMES A DAY    insulin glargine (LANTUS,BASAGLAR) 100 unit/mL (3 mL) inpn Inject 8 units every morning.  insulin lispro (HUMALOG) 100 unit/mL kwikpen 3 units sq for bs greater than 200 ; 5  untis sq for bs greater than 250,  6units for blood sugar greater than 300 ; call me for bs greater than 400    glucose blood VI test strips (OneTouch Ultra Blue Test Strip) strip USE TO CHECK BLOOD SUGAR 3 TIMES DAILY. DIAGNOSIS CODE: E11.9    b complex vitamins (SUPER B-50 COMPLEX PLUS) tablet Take 1 Tab by mouth daily.     SENNA PLUS 8.6-50 mg per tablet TAKE 2 TABS BY MOUTH TWO (2) TIMES A DAY    lidocaine (LIDODERM) 5 % Apply patch to the affected area for 12 hours a day and remove for 12 hours a day.  midodrine (PROAMITINE) 5 mg tablet Take 5 mg by mouth three (3) times daily (with meals).  aspirin 81 mg chewable tablet Take 1 Tab by mouth daily.  ondansetron (ZOFRAN ODT) 4 mg disintegrating tablet Take 1 Tab by mouth every six (6) hours as needed for Nausea.  acetaminophen (TYLENOL) 500 mg tablet Take 1,000 mg by mouth every six to eight (6-8) hours as needed for Pain. No current facility-administered medications for this visit. No orders of the defined types were placed in this encounter. TEST DATA REVIEWED:     Lab Results   Component Value Date/Time    Hemoglobin A1c 9.4 (H) 09/08/2020 04:30 PM    Hemoglobin A1c, External 11.2 01/27/2016     Lab Results   Component Value Date/Time    Microalb/Creat ratio (ug/mg creat.) 63.6 (H) 02/12/2019 08:49 AM     Lab Results   Component Value Date/Time    TSH 3.230 09/08/2020 04:30 PM     No results found for: Haven Pacheco [id number]      Lab Results   Component Value Date/Time    WBC 7.2 09/08/2020 04:30 PM    HGB 12.3 (L) 09/08/2020 04:30 PM    PLATELET 380 02/93/3258 04:30 PM     Lab Results   Component Value Date/Time    Sodium 139 09/08/2020 04:30 PM    Potassium CANCELED 09/08/2020 04:30 PM    Chloride 101 09/08/2020 04:30 PM    CO2 22 09/08/2020 04:30 PM    BUN 10 09/08/2020 04:30 PM    Creatinine 1.33 (H) 09/08/2020 04:30 PM    Calcium 8.2 (L) 09/08/2020 04:30 PM    Magnesium 2.0 02/05/2019 01:42 AM    Phosphorus 3.8 02/05/2019 01:42 AM      Lab Results   Component Value Date/Time    Alk.  phosphatase 164 (H) 09/08/2020 04:30 PM    Protein, total 6.0 09/08/2020 04:30 PM    Albumin 3.3 (L) 09/08/2020 04:30 PM    Globulin 3.4 09/07/2019 08:44 AM     Lab Results   Component Value Date/Time    Iron 39 02/04/2019 07:35 PM    TIBC 196 (L) 02/04/2019 07:35 PM    Iron % saturation 20 02/04/2019 07:35 PM    Ferritin 75 02/04/2019 07:35 PM      Promise Lugo MD

## 2021-02-23 NOTE — PATIENT INSTRUCTIONS
Helping A Person With Dementia: Care Instructions Your Care Instructions Dementia is a loss of mental skills that affects daily life. It is different from mild memory loss that occurs with aging. Dementia can cause problems with memory, thinking clearly, and planning. It is different for everyone. But it usually gets worse slowly. Some people who have dementia can function well for a long time. But at some point it may become hard for the person to care for himself or herself. It can be upsetting to learn that a loved one has this condition. You may be afraid and worried about what will happen. You may wonder how you will care for the person. There is no cure for dementia. But medicine may be able to slow memory loss and improve thinking for a while. Other medicines may help with sleep, depression, and behavior changes. Dementia is different for everyone. In some cases, people can function well for a long time. You can help your loved one by making his or her home life easier and safer. You also need to take care of yourself. Caregiving can be stressful. But support is available to help you and give you a break when you need it. The Alzheimer's Association offers good information and support. If you are caring for someone with dementia, you can help make life safer and more comfortable. You can also help your loved one make decisions about future care. You may also want to bring up legal and financial issues. These are hard but important conversations to have. Follow-up care is a key part of your loved one's treatment and safety. Be sure to make and go to all appointments, and call your doctor if your loved one is having problems. It's also a good idea to know your loved one's test results and keep a list of the medicines he or she takes. How can you care for your loved one at home? Taking care of the person · If the person takes medicine for dementia, help him or her take it exactly as prescribed. Call the doctor if you notice any problems with the medicine. · Make a list of the person's medicines. Review it with all of his or her doctors. · Help the person eat a balanced diet. Serve plenty of whole grains, fruits, and vegetables every day. If the person is not hungry at mealtimes, give snacks at midmorning and in the afternoon. Offer drinks such as Boost, Ensure, or Sustacal if the person is losing weight. · Encourage exercise. Walking and other activities may slow the decline of mental ability. Help the person stay active mentally with reading, crossword puzzles, or other hobbies. · Talk openly with the doctor about any behavior changes. Many people who have dementia become easily upset or agitated or feel worried. There are many things that can cause this, such as medicine side effects, confusion, and pain. It may be helpful to: 
? Keep distractions to a minimum. It may also help to keep noise levels low and voices quiet. ? Develop simple daily routines for bathing, dressing, and other activities. And remind your loved one often about upcoming changes to the daily routine, such as trips or appointments. ? Ask what is upsetting him or her. Keep in mind that people who have dementia don't always know why they are upset. · Take steps to help if the person is sundowning. This is the restless behavior and trouble with sleeping that may occur in late afternoon and at night. Try not to let the person nap during the day. Offer a glass of warm milk or caffeine-free tea before bedtime. · Be patient. A task may take the person longer than it used to. · For as long as he or she is able, allow your loved one to make decisions about activities, food, clothing, and other choices. Let him or her be independent, even if tasks take more time or are not done perfectly. Tailor tasks to the person's abilities. For example, if cooking is no longer safe, ask for other help. Your loved one can help set the table, or make simple dishes such as a salad. When the person needs help, offer it gently. Staying safe · Make your home (or your loved one's home) safe. Tack down rugs, and put no-slip tape in the tub. Install handrails, and put safety switches on stoves and appliances. Keep rooms free of clutter. Make sure walkways around furniture are clear. Do not move furniture around, because the person may become confused. · Use locks on doors and cupboards. Lock up knives, scissors, medicines, cleaning supplies, and other dangerous things. · Do not let the person drive or cook if he or she can't do it safely. A person with dementia should not drive unless he or she is able to pass an on-road driving test. Your state 's license bureau can do a driving test if there is any question. · Get medical alert jewelry for the person so that you can be contacted if he or she wanders away. If possible, provide a safe place for wandering, such as an enclosed yard or garden. Taking care of yourself · Ask your doctor about support groups and other resources in your area. · Take care of your health. Be sure to eat healthy foods and get enough rest and exercise. · Take time for yourself. Respite services provide someone to stay with the person for a short time while you get out of the house for a few hours. · Make time for an activity that you enjoy. Read, listen to music, paint, do crafts, or play an instrument, even if it's only for a few minutes a day. · Spend time with family, friends, and others in your support system. When should you call for help? Call 911 anytime you think the person may need emergency care. For example, call if: 
  · The person who has dementia wanders away and you can't find him or her.  
  · The person who has dementia is seriously injured. Call the doctor now or seek immediate medical care if: 
  · The person suddenly sees things that are not there (hallucinates).  
  · The person has a sudden change in his or her behavior. Watch closely for changes in the person's health, and be sure to contact the doctor if: 
  · The person has symptoms that could cause injury.  
  · The person has problems with his or her medicine.  
  · You need more information to care for a person with dementia.  
  · You need respite care so you can take a break. Where can you learn more? Go to http://evelio-rosalinda.info/ Enter X197 in the search box to learn more about \"Helping A Person With Dementia: Care Instructions. \" Current as of: January 31, 2020               Content Version: 12.6 © 2006-2020 [employer]. Care instructions adapted under license by [employer] (which disclaims liability or warranty for this information). If you have questions about a medical condition or this instruction, always ask your healthcare professional. [place] [address] any warranty or liability for your use of this information.

## 2021-02-25 ENCOUNTER — TELEPHONE (OUTPATIENT)
Dept: FAMILY MEDICINE CLINIC | Age: 81
End: 2021-02-25

## 2021-02-25 ENCOUNTER — DOCUMENTATION ONLY (OUTPATIENT)
Dept: FAMILY MEDICINE CLINIC | Age: 81
End: 2021-02-25

## 2021-02-25 DIAGNOSIS — G89.29 OTHER CHRONIC PAIN: Primary | ICD-10-CM

## 2021-02-25 RX ORDER — HYDROCODONE BITARTRATE AND ACETAMINOPHEN 5; 325 MG/1; MG/1
1 TABLET ORAL
Qty: 60 TAB | Refills: 0 | Status: SHIPPED | OUTPATIENT
Start: 2021-02-25 | End: 2021-03-27

## 2021-02-25 NOTE — TELEPHONE ENCOUNTER
Patient message received requesting refill of chronic opioid medication.  reviewed. Last refill of hydrocodone-acetaminophen 5-325 mg occurred on 12/29/20. Refill ordered and sent to [facility] on file. Patient last seen by me on 2/15/21 and to be seen at least every 3 months.  
 
Fabiola Cee MD

## 2021-03-08 ENCOUNTER — TELEPHONE (OUTPATIENT)
Dept: FAMILY MEDICINE CLINIC | Age: 81
End: 2021-03-08

## 2021-03-08 NOTE — TELEPHONE ENCOUNTER
[facility]) & Supportive Services  Social Work Telephone Encounter  Main Office: [phone]    Patient name: Jeaneth Nicholson     Date of telephone encounter: 3/8/2021    Session today completed with: Celena Connolly    Relationship to patient: Daughter    Purpose of call: Reintroduce social work services, assess current psychosocial concerns, questions, needs. Conversation narrative: LCSW called pt's mPOA/dtr Alexis Caballero to reintroduce self as aprt of hospitals team, as LCSW has not spoken to Alexis Caballero since 3/27/3019, and at the time she denied needing social work services or assistance. She also denied needing social work assistance with resources, support at this time. LCSW provided contact information and encouraged her to reach out in the future should she have any questions or concerns within the social work scope of practice. Assessment and Plan: No action required at this time. Plan for follow up: No plan for follow up at this time. Dtr denied questions, need for assistance, or resources at this time. LCSW to remain available for support and resources as needed.        OWEN Min, [facility]    [place] Based [address] [place] [address]  (x) [phone]

## 2021-03-20 DIAGNOSIS — E11.65 UNCONTROLLED TYPE 2 DIABETES MELLITUS WITH HYPERGLYCEMIA (HCC): ICD-10-CM

## 2021-03-21 RX ORDER — PEN NEEDLE, DIABETIC 31 GX3/16"
NEEDLE, DISPOSABLE MISCELLANEOUS
Refills: 1 | OUTPATIENT
Start: 2021-03-21

## 2021-03-23 ENCOUNTER — TELEPHONE (OUTPATIENT)
Dept: FAMILY MEDICINE CLINIC | Age: 81
End: 2021-03-23

## 2021-03-23 NOTE — TELEPHONE ENCOUNTER
I called patient's daughter Leonor Velez on 3/23 to provide number for scheduling home COVID-19 vaccination. She declined at this time as she believes he will not cooperate with vaccination. Also reminded her of his next appointment with me on 5/17.

## 2021-04-21 ENCOUNTER — PATIENT MESSAGE (OUTPATIENT)
Dept: FAMILY MEDICINE CLINIC | Age: 81
End: 2021-04-21

## 2021-04-22 NOTE — TELEPHONE ENCOUNTER
Ms. Delfino Bettye is calling to speak to nurse regarding the my chart message regarding medication. Advised nurse would call her back to  Discuss.

## 2021-04-23 ENCOUNTER — TELEPHONE (OUTPATIENT)
Dept: FAMILY MEDICINE CLINIC | Age: 81
End: 2021-04-23

## 2021-04-23 DIAGNOSIS — G89.29 OTHER CHRONIC PAIN: Primary | ICD-10-CM

## 2021-04-23 NOTE — TELEPHONE ENCOUNTER
Home Based Primary Care & Supportive Services   Phone:  [phone]      Fax:  [phone] [place] [address]    Name:  Kang Marie  YOB: 1940    Spoke with patient's daughter Susannah Dykes. She reports that pt's behavior has changed, he has become more agitated and aggressive. Seroquel does not seem to be helping. She would like to speak with Dr. Darline Thorpe today. This nurse will relayed above information to Dr. Darline Thorpe who states she will try to call pt's daughter back by the end of the day. This nurse called Ms. Zahra López back and informed her of this. She states she thinks pt's behavior is a result of his dementia progressing. Pt is not constipated. Pt does not have symptoms of a UTI and if he did, dtr reports that he would be unable to give a urine specimen.     ELVI DuN, RN-BC, [facility]  Referral Navigator, Home Based Primary Care & Supportive Services

## 2021-04-23 NOTE — TELEPHONE ENCOUNTER
Returning daughter's call regarding patient's behavior. His behaviors related to dementia seem to be worsening around 3 pm. On chart review, he was previously taking seroquel 50 mg nightly and 25-50 mg in the morning depending on his behavior. Now he is taking seroquel 25 mg in the morning (8-9 am) and 25 mg in the evening (8-9 pm). Discussed options include increasing his morning and/or evening dose or adding a 1-2 pm dose of the 25 mg to help improve afternoon/evening behaviors. We will start with adding the 1-2 pm dose and see if he has improvement in the next 5-7 days. If there is not improvement, then we may need to titrate his morning/evening doses further. She also notes he is due for opioid refill next week. She asks the best way to reach me. Discussed that it may take me a couple days to return a [employer] message since I do not always see those messages right away and since I am out in the field seeing patients. Discussed that calling the office and talking to a nurse would allow for faster response as the nurse will inform me if a patient calls and I need to follow up on something.  
 
04/23/21 
5:53 PM 
Radha Mascorro MD

## 2021-04-26 ENCOUNTER — DOCUMENTATION ONLY (OUTPATIENT)
Dept: FAMILY MEDICINE CLINIC | Age: 81
End: 2021-04-26

## 2021-04-26 RX ORDER — HYDROCODONE BITARTRATE AND ACETAMINOPHEN 5; 325 MG/1; MG/1
1 TABLET ORAL
Qty: 90 TAB | Refills: 0 | Status: SHIPPED | OUTPATIENT
Start: 2021-04-26 | End: 2021-04-27 | Stop reason: SDUPTHER

## 2021-04-26 NOTE — PROGRESS NOTES
Home Based Primary Care & Supportive Services  Plan of Care    [facility] Team Members: Dr. Daniel Potts, Marlen Rouse, NP; Niya Myrick RN, SPENCER Villanuevaelishajessie Khan  1940 / [id number]  male    The physician has reviewed and discussed the plan of care with the interdisciplinary group on 04/28/21 and agrees. Date of Initial Visit (Start of Care): 2/12/19     Diagnosis: 79 y.o. male with dementia, COPD,  chronic diastolic CHF, persistent afib, Type 2 diabetes with hypoglycemia, chronic anemia, hypothyroidism, depression    Patient status summary: Patient and POC discussed in IDT meeting for 60 day update. Homebound patient referred by Palliative Medicine Inpatient Team, Santos Carrillo NP to our services due to taxing effort to seek primary care needs in the community. DME/Supplies:  Bedside Commode     Advance Care Planning:  Code status: DNR  DDNR singed on 12/28/2018 is on file      Primary Decision Maker (Active): Linn Villegas - [phone]    Primary Decision Maker: Alessandra Martinez - [phone]    Secondary Decision Maker: Jessica Peterson - Other Relative - [phone]      Allergies   Allergen Reactions    Sulfa (Sulfonamide Antibiotics) Unknown (comments)     Nutritional Requirements:   Oral with supported meal preparation    Functional/Activity Level:  Limited ambulation with support of wheelchair and Wheelchair with assistance for transfers    Safety Measures:   Fall risk, Self-care deficity and Smoker    Current Outpatient Medications   Medication Sig    HYDROcodone-acetaminophen (NORCO) 5-325 mg per tablet Take 1 Tab by mouth daily as needed for Pain for up to 90 days.  QUEtiapine (SEROquel) 25 mg tablet Take 0.5 Tabs by mouth nightly.  insulin lispro (HUMALOG) 100 unit/mL injection INJECT 3 UNITS BENEATH THE SKIN FOR BS>200, 5 UNITS FOR BS>250, 6 UNITS FOR BS>300.  CALL MD FOR BS >400    Insulin Needles, Disposable, (BD Ultra-Fine Mini Pen Needle) 31 gauge x 3/16\" ndle USE TO INJECT INSULIN 4 TIMES A DAY    insulin glargine (LANTUS,BASAGLAR) 100 unit/mL (3 mL) inpn Inject 8 units every morning.  insulin lispro (HUMALOG) 100 unit/mL kwikpen 3 units sq for bs greater than 200 ; 5  untis sq for bs greater than 250,  6units for blood sugar greater than 300 ; call me for bs greater than 400    levothyroxine (SYNTHROID) 50 mcg tablet TAKE 1 TABLET BY MOUTH EVERY DAY BEFORE BREAKFAST    glucose blood VI test strips (OneTouch Ultra Blue Test Strip) strip USE TO CHECK BLOOD SUGAR 3 TIMES DAILY. DIAGNOSIS CODE: E11.9    tamsulosin (Flomax) 0.4 mg capsule Take 1 Cap by mouth daily.  omeprazole (PRILOSEC) 20 mg capsule Take 1 Cap by mouth daily. Before breakfast    b complex vitamins (SUPER B-50 COMPLEX PLUS) tablet Take 1 Tab by mouth daily.  DULoxetine (CYMBALTA) 60 mg capsule TAKE ONE CAPSULE BY MOUTH EVERY DAY    SENNA PLUS 8.6-50 mg per tablet TAKE 2 TABS BY MOUTH TWO (2) TIMES A DAY    lidocaine (LIDODERM) 5 % Apply patch to the affected area for 12 hours a day and remove for 12 hours a day.  midodrine (PROAMITINE) 5 mg tablet Take 5 mg by mouth three (3) times daily (with meals).  aspirin 81 mg chewable tablet Take 1 Tab by mouth daily.  ondansetron (ZOFRAN ODT) 4 mg disintegrating tablet Take 1 Tab by mouth every six (6) hours as needed for Nausea.  acetaminophen (TYLENOL) 500 mg tablet Take 1,000 mg by mouth every six to eight (6-8) hours as needed for Pain. No current facility-administered medications for this visit. The Plan of Care has been initiated for during discussion at interdisciplinary group meeting  and reviewed and updated by the Interdisciplinary Group (IDG) as frequently as the patient condition warrants. Plan of Care by Discipline:    1.  Provider  Ongoing evaluation of treatment interventions for specific disease state, Reduction of polypharmacy within benefit/burden framework appropriate to age, function and disease state, Determine need for laboratory assessment based on patient disease status  and Create and implement disease /symptom specific plan to manage high risk conditions / symptoms    2. Nursing  Review Health Maintenance with patient and provider; update as appropriate and Provider caregiver / family support for patient's current and changing condition    3. Social Work  Establish therapeutic relationship through in home visits and telephonic touch points      Plan of Care Orders / Action     -Dementia: continue supportive care. Memantine previously discontinued. On 4/23 we adjusted his seroquel to to increased/worsening behaviors. Will monitor for need to adjust further. -T2DM: family using SSI. Have been liberal with BGs because of dementia and patient not liking to have frequent BG checks. Family will let us know if blood sugars are consistently high.  -Hypothyroidism: TSH wnl in 09/2020. Continue synthroid.  -Orthostatic hypotension: midodrine available as needed (not needed recently). Family continues regular blood pressure checks. -CHF: No exacerbations in several years. No betablocker or ace/arb secondary to [place] or diuretics. -CKD: avoid nephrotoxic medications.  -Chronic pain: Takes hydrocodone-acetaminophen 5-325 mg one time daily for pain. Will need to discuss toxassure vs UDS. Lab collection is difficult in this patient due to behavior. Also will need to have his mPOA complete a controlled substance agreement (I do not see a signed one on file). -Prostate cancer: PSA elevated on last check. Per previous NP Jerzy Flores, family decided to not pursue further treatment as patient is not able to travel out of the home d/t behaviors.  Urology was notified.       Health Maintenance   Topic Date Due    Hepatitis C Screening  Never done    COVID-19 Vaccine (1) Never done    Pneumococcal 65+ years (1 of 1 - PPSV23) Never done    Medicare Yearly Exam  03/12/2021    Pneumococcal 65+ yrs at Risk Vaccine (1 of 2 - PCV13) 10/30/2022 (Originally 12/11/2006)    MICROALBUMIN Q1  01/25/2023 (Originally 2/12/2020)    Eye Exam Retinal or Dilated  03/11/2023 (Originally 12/11/1950)    Shingrix Vaccine Age 50> (1 of 2) 02/15/2025 (Originally 12/11/1991)    Flu Vaccine (Season Ended) 09/01/2021    Foot Exam Q1  09/08/2021    DTaP/Tdap/Td series (2 - Td) 09/07/2029         Estimated Visit Frequency:  Every 3 month provider visit  Last MD visit: 02/15/2021  SW visits prn

## 2021-04-26 NOTE — TELEPHONE ENCOUNTER
reviewed. Last prescription provided on 2/25/2021 for 60 tabs. Per my review of my note from his 2/15/21 encounter, he takes hydrocodoneacetaminophen 5-325 mg one time daily for pain. Last toxassure screen was 9/03/2019. I know obtaining labs can be a challenge due to his behavior. A Controlled Substance Agreement was printed on 4/20/20 but I do not see a signed copy on file. Will need to follow up on this. For now will refill this chronic medication. Next visit with me is planned for 5/17/21.

## 2021-04-27 ENCOUNTER — TELEPHONE (OUTPATIENT)
Dept: PALLATIVE CARE | Age: 81
End: 2021-04-27

## 2021-04-27 DIAGNOSIS — G89.29 OTHER CHRONIC PAIN: ICD-10-CM

## 2021-04-27 RX ORDER — HYDROCODONE BITARTRATE AND ACETAMINOPHEN 5; 325 MG/1; MG/1
1 TABLET ORAL
Qty: 30 TAB | Refills: 0 | Status: SHIPPED | OUTPATIENT
Start: 2021-04-27 | End: 2021-05-27

## 2021-04-27 RX ORDER — LEVOFLOXACIN 250 MG/1
250 TABLET ORAL DAILY
Qty: 5 TAB | Refills: 0 | Status: SHIPPED | OUTPATIENT
Start: 2021-04-27 | End: 2021-05-02

## 2021-04-27 NOTE — TELEPHONE ENCOUNTER
Ms. Donaldo Pate is calling to speak to Dr. Donya Fritz or nurse. STates patient has become very violent. Wants to know if it could be the medication or if he could have a UTI. Advised MD or nurse would call her back to discuss.

## 2021-04-27 NOTE — TELEPHONE ENCOUNTER
Returned [employer] call. Discussed worsening behavior since our last call despite addition of afternoon seroquel dose. She is concerned that he may have a UTI which we discussed is possible. Obtaining a urinary sample would be difficult with his current behavior. We discussed empiric treatment vs hospitalization for further evaluation. She hopes to avoid the hospital as this agitates him and he requires restraints. On chart review I see he has received levaquin and cipro in the past for previous concern for UTI. Daughter thinks his symptoms improved at those times. Potential SEs such as low blood sugar and tendon complications reviewed. She is willing to consider the medication as he has tolerated it before. Also once daily dosing is ideal as he doesn't like taking medications. Will send levaquin 250 mg once daily x 5 days to the pharmacy. Expect it make take 24-48 hours for his symptoms improve if related to UTI. Encouraged her to call us back if his symptoms worsen or fail to improve. Also will change his opioid prescription to a 30-day supply as this is what his insurance will cover (they would not cover the 90-day supply initially prescribed).     04/27/21  5:37 PM

## 2021-04-27 NOTE — TELEPHONE ENCOUNTER
Ms. Donaldo Pate is calling back to be sure someone will call her today.;  Advised that both nurse and MD are in meeting and one of them will call her back once the meeting ends.

## 2021-05-03 ENCOUNTER — TELEPHONE (OUTPATIENT)
Dept: FAMILY MEDICINE CLINIC | Age: 81
End: 2021-05-03

## 2021-05-03 NOTE — TELEPHONE ENCOUNTER
Incoming patient message from pt's daughter Juan Arias who states her father fell over the weekend & hurt his knee (no broken bones). He would like to try physical therapy to possibly increase his range of motion. This nurse called Ms. Jossue De at  [phone], no answer, left message that this nurse will discuss with Dr. Samm Brunson and call Ms. Henderson back tomorrow morning.

## 2021-05-07 ENCOUNTER — HOME VISIT (OUTPATIENT)
Dept: FAMILY MEDICINE CLINIC | Age: 81
End: 2021-05-07
Payer: MEDICARE

## 2021-05-07 DIAGNOSIS — F02.818 LATE ONSET ALZHEIMER'S DISEASE WITH BEHAVIORAL DISTURBANCE (HCC): Primary | ICD-10-CM

## 2021-05-07 DIAGNOSIS — S81.812A SKIN TEAR OF LEFT LOWER LEG WITHOUT COMPLICATION, INITIAL ENCOUNTER: ICD-10-CM

## 2021-05-07 DIAGNOSIS — G30.1 LATE ONSET ALZHEIMER'S DISEASE WITH BEHAVIORAL DISTURBANCE (HCC): Primary | ICD-10-CM

## 2021-05-07 DIAGNOSIS — Z74.09 IMPAIRED MOBILITY: ICD-10-CM

## 2021-05-07 DIAGNOSIS — M25.562 ACUTE PAIN OF LEFT KNEE: ICD-10-CM

## 2021-05-07 DIAGNOSIS — Z91.81 HISTORY OF RECENT FALL: ICD-10-CM

## 2021-05-07 PROCEDURE — 99349 HOME/RES VST EST MOD MDM 40: CPT | Performed by: FAMILY MEDICINE

## 2021-05-07 PROCEDURE — G8427 DOCREV CUR MEDS BY ELIG CLIN: HCPCS | Performed by: FAMILY MEDICINE

## 2021-05-07 PROCEDURE — 1101F PT FALLS ASSESS-DOCD LE1/YR: CPT | Performed by: FAMILY MEDICINE

## 2021-05-07 PROCEDURE — G8536 NO DOC ELDER MAL SCRN: HCPCS | Performed by: FAMILY MEDICINE

## 2021-05-07 RX ORDER — DIVALPROEX SODIUM 250 MG/1
250 TABLET, DELAYED RELEASE ORAL 2 TIMES DAILY
Qty: 60 TAB | Refills: 0 | Status: SHIPPED | OUTPATIENT
Start: 2021-05-07 | End: 2021-01-01

## 2021-05-10 ENCOUNTER — HOME HEALTH ADMISSION (OUTPATIENT)
Dept: HOME HEALTH SERVICES | Facility: HOME HEALTH | Age: 81
End: 2021-05-10
Payer: MEDICARE

## 2021-05-10 VITALS — RESPIRATION RATE: 16 BRPM

## 2021-05-10 NOTE — PROGRESS NOTES
Home Based Primary Care [facility]) & Supportive Services    [phone]      NOTE: Home Based Primary Care is a PROVIDER (MD/NP) based interdisciplinary practice (RN, LCSW, Pharmacy, Dietician) for patient's who cannot access (or have a taxing effort) primary / speciality medical care in an office setting. [facility] is NOT [employer] but works with [address] [employer] when there is a skilled need. Our MD/NPs are integral in Care Transitions; PLEASE CALL [phone] if this patient arrives in the Emergency Department or Hospital.        Date of Current Visit: 05/07/2021    Patient/Family present for Current Visit: Patient, daughter    Yuliana Duncan ICD-9-CM    1. Late onset Alzheimer's disease with behavioral disturbance (HCC)  G30.1 331.0     F02.81 294.11    2. Skin tear of left lower leg without complication, initial encounter  S81.812A 891.0    3. Acute pain of left knee  M25.562 719.46 REFERRAL TO [place] [address]   4. Impaired mobility  Z74.09 799.89 REFERRAL TO HOME HEALTH   5. History of recent fall  Z91.81 V15.88      -Behaviors related to dementia are increasing. Progressing verbal and physical aggression. Discussed medication adjustment is reasonable at this time given his risk for accidentally harming himself or others. We will titrate seroquel to 75 mg twice daily. Will also order depakote 250 mg twice daily to try if the seroquel is not making enough difference within a few days. Daughter says melatonin did not help him in the past, so we have not started this for now. -Reviewed importance of keeping site of wound clean and dry. I reviewed [facility] records from 5/1 visit and subsequent x-ray results.  -I consulted [facility] PT to help with mobility after fall. I requested our team nurse call [facility] to let them know about patient's behavior issues.  -Will plan phone follow up in 2 weeks.     I have left a SUMMARY / INSTRUCTIONS from today's visit with the patient/family in the home.      Huma Jack [address] Maintenance Due   Topic Date Due    Hepatitis C Screening  Never done    COVID-19 Vaccine (1) Never done    Pneumococcal 65+ years (1 of 1 - PPSV23) Never done    Medicare Yearly Exam  03/12/2021      CC & SUBJECTIVE including ROS & FUNCTION     Chief Complaint   Patient presents with    Dementia      Letha Levy is a 79 y.o. with chronic medical conditions as listed in the patient's updated Problem List (see below). Patient seen following daughter's call that patient's behaviors are increasing and that he had a fall with a skin tear of his left knee. She requested PT. They lost their home care aide d/t his behavior. Now his son is staying home to care for him. He can be verbally and physically aggressive. Can yell, push, hit. She does not think adding an afternoon dose of serquel 25 mg recently helped. He is still taking seroquel 50 mg bid in addition to this afternoon dose. He also recently had a fall and is walking less because of this. [facility] came previously and ordered an xray of his knee. No fracture was seen. Patient is resistant to physical exam saying, \"Well why would you do that? \" and saying no when I ask if I can do physical exam. With coaxing he will let me check his knee. Daughter denies that he is having issues with urinary retention or constipation. His appetite is good. While I am there his daughter fixes him some mashed potatoes and he starts eating them while sitting up on the side of his bed. Positive ROS findings: Patient unable to answer questions due to baseline cognitive issues. PPS:40  Karn[place]sky:   Timed Up and Go (TUG):   Fall Risk Assessment, last 12 mths 2/12/2019   Able to walk? Yes   Fall in past 12 months? Yes   Number of falls in past 12 months 2   Fall with injury?  1     Current Durable Medical Equipment in Home:  [] Hospital Bed  [] Bedside Commode  [] Wheelchair  [] Abimael Flower  [] Ericka Faulkner    Has a cane  [] Respiratory Support:    ADVANCE CARE PLANNING                                 The following information was updated I/ reviewed as accurate in Orders and the Advance Care Planning Navigator:    DNR      Primary Decision Maker (Active): Kirby Villegas - [phone]    Primary Decision Maker: Panchito Smith - [phone]    Secondary Decision Maker: Blayne Zhu - Other Relative - [phone]  Advance Care Planning 2/21/2019   Patient's Healthcare Decision Maker is: Legal Next of Kin   Confirm Advance Directive None   Patient Would Like to Complete Advance Directive Unable   Does the patient have other document types Do Not Resuscitate      VITAL SIGNS & PHYSICAL EXAM     Median Arm Circumference (inches): Wt Readings from Last 3 Encounters:   11/20/19 159 lb 1.6 oz (72.2 kg)   11/05/19 156 lb 6.4 oz (70.9 kg)   10/29/19 155 lb 12.8 oz (70.7 kg)     Temp Readings from Last 3 Encounters:   05/18/20 98 °F (36.7 °C) (Oral)   01/17/20 98.5 °F (36.9 °C) (Oral)   11/20/19 97.4 °F (36.3 °C) (Axillary)     BP Readings from Last 3 Encounters:   05/18/20 126/70   01/17/20 132/76   11/20/19 102/64     Pulse Readings from Last 3 Encounters:   05/18/20 72   03/11/20 75   01/17/20 70     Pacemaker:  ICD:  Feeding Tube:  Urine Catheter:  Other:    *He refuses to let me check vitals*     Physical Exam   Constitutional: He is well-developed, well-nourished, and in no distress. No distress. Frail, thin. Will not allow a complete exam.     HENT:   Head: Normocephalic and atraumatic. Right Ear: External ear normal.   Left Ear: External ear normal.   Eyes: Conjunctivae and EOM are normal. Right eye exhibits no discharge. Left eye exhibits no discharge. No scleral icterus. Conjunctiva white   Neck: Normal range of motion. Pulmonary/Chest: Effort normal. No respiratory distress. Musculoskeletal:         General: No deformity or edema. Comments: Able to sit up on side of bed unassisted   Neurological: He is alert. Oriented X 1   Skin: Skin is warm. He is not diaphoretic. No pallor. Superficial skin tear of left knee. No c/o pain as I palpate area. Psychiatric:   He has agitation but is not physically aggressive at this time      PROBLEM LIST / PAST MEDICAL / SOCIAL HX     Patient Active Problem List   Diagnosis Code    Non compliance w medication regimen Z91.14    Late onset Alzheimer's disease with behavioral disturbance ([facility]) G30.1, F02.81    Moderate recurrent major depression ([facility] [address] F33.1    Uncontrolled diabetes mellitus with hyperglycemia ([facility]) E11.65    Urinary incontinence R32    CAD (coronary artery disease) I25.10    COPD (chronic obstructive pulmonary disease) ([facility]) J44.9    Orthostatic hypotension I95.1    CHF (congestive heart failure) ([facility]) I50.9    Prostate cancer ([facility] C61    Acquired hypothyroidism E03.9    Stage 3a chronic kidney disease ([facility]) N18.31    Other chronic pain G89.29      Family History   Problem Relation Age of Onset    Diabetes Mother     Diabetes Brother      Social History     Socioeconomic History    Marital status:      Spouse name: Not on file    Number of children: Not on file    Years of education: Not on file    Highest education level: Not on file   Tobacco Use    Smoking status: Former Smoker    Smokeless tobacco: Never Used    Tobacco comment: 1-2 cigars daily   Substance and Sexual Activity    Alcohol use: No     Alcohol/week: 0.0 standard drinks    Drug use: No        MEDICATIONS & PRESCRIPTIONS     Allergies   Allergen Reactions    Sulfa (Sulfonamide Antibiotics) Unknown (comments)      Current Outpatient Medications   Medication Sig    divalproex DR (DEPAKOTE) 250 mg tablet Take 1 Tab by mouth two (2) times a day.  HYDROcodone-acetaminophen (NORCO) 5-325 mg per tablet Take 1 Tab by mouth daily as needed for Pain for up to 30 days.  QUEtiapine (SEROquel) 25 mg tablet Take 0.5 Tabs by mouth nightly.     insulin lispro (HUMALOG) 100 unit/mL injection INJECT 3 UNITS BENEATH THE SKIN FOR BS>200, 5 UNITS FOR BS>250, 6 UNITS FOR BS>300. CALL MD FOR BS >400    Insulin Needles, Disposable, (BD Ultra-Fine Mini Pen Needle) 31 gauge x 3/16\" ndle USE TO INJECT INSULIN 4 TIMES A DAY    insulin glargine (LANTUS,BASAGLAR) 100 unit/mL (3 mL) inpn Inject 8 units every morning.  insulin lispro (HUMALOG) 100 unit/mL kwikpen 3 units sq for bs greater than 200 ; 5  untis sq for bs greater than 250,  6units for blood sugar greater than 300 ; call me for bs greater than 400    levothyroxine (SYNTHROID) 50 mcg tablet TAKE 1 TABLET BY MOUTH EVERY DAY BEFORE BREAKFAST    glucose blood VI test strips (OneTouch Ultra Blue Test Strip) strip USE TO CHECK BLOOD SUGAR 3 TIMES DAILY. DIAGNOSIS CODE: E11.9    tamsulosin (Flomax) 0.4 mg capsule Take 1 Cap by mouth daily.  omeprazole (PRILOSEC) 20 mg capsule Take 1 Cap by mouth daily. Before breakfast    b complex vitamins (SUPER B-50 COMPLEX PLUS) tablet Take 1 Tab by mouth daily.  DULoxetine (CYMBALTA) 60 mg capsule TAKE ONE CAPSULE BY MOUTH EVERY DAY    SENNA PLUS 8.6-50 mg per tablet TAKE 2 TABS BY MOUTH TWO (2) TIMES A DAY    lidocaine (LIDODERM) 5 % Apply patch to the affected area for 12 hours a day and remove for 12 hours a day.  midodrine (PROAMITINE) 5 mg tablet Take 5 mg by mouth three (3) times daily (with meals).  aspirin 81 mg chewable tablet Take 1 Tab by mouth daily.  ondansetron (ZOFRAN ODT) 4 mg disintegrating tablet Take 1 Tab by mouth every six (6) hours as needed for Nausea.  acetaminophen (TYLENOL) 500 mg tablet Take 1,000 mg by mouth every six to eight (6-8) hours as needed for Pain. No current facility-administered medications for this visit. Medications Ordered Today   Medications    divalproex DR (DEPAKOTE) 250 mg tablet     Sig: Take 1 Tab by mouth two (2) times a day.      Dispense:  60 Tab     Refill:  0         TEST DATA REVIEWED:     Lab Results   Component Value Date/Time    Hemoglobin A1c 9.4 (H) 09/08/2020 04:30 PM    Hemoglobin A1c, External 11.2 01/27/2016     Lab Results   Component Value Date/Time    Microalb/Creat ratio (ug/mg creat.) 63.6 (H) 02/12/2019 08:49 AM     Lab Results   Component Value Date/Time    TSH 3.230 09/08/2020 04:30 PM     No results found for: Calista Mow, [id number]      Lab Results   Component Value Date/Time    WBC 7.2 09/08/2020 04:30 PM    HGB 12.3 (L) 09/08/2020 04:30 PM    PLATELET 625 57/29/1945 04:30 PM     Lab Results   Component Value Date/Time    Sodium 139 09/08/2020 04:30 PM    Potassium CANCELED 09/08/2020 04:30 PM    Chloride 101 09/08/2020 04:30 PM    CO2 22 09/08/2020 04:30 PM    BUN 10 09/08/2020 04:30 PM    Creatinine 1.33 (H) 09/08/2020 04:30 PM    Calcium 8.2 (L) 09/08/2020 04:30 PM    Magnesium 2.0 02/05/2019 01:42 AM    Phosphorus 3.8 02/05/2019 01:42 AM      Lab Results   Component Value Date/Time    Alk.  phosphatase 164 (H) 09/08/2020 04:30 PM    Protein, total 6.0 09/08/2020 04:30 PM    Albumin 3.3 (L) 09/08/2020 04:30 PM    Globulin 3.4 09/07/2019 08:44 AM     Lab Results   Component Value Date/Time    Iron 39 02/04/2019 07:35 PM    TIBC 196 (L) 02/04/2019 07:35 PM    Iron % saturation 20 02/04/2019 07:35 PM    Ferritin 75 02/04/2019 07:35 PM      Kami Gary MD

## 2021-05-10 NOTE — PATIENT INSTRUCTIONS
Divalproex (By mouth) Divalproex Sodium (dye-MOLLY-proe-ex CATALINA-claudio-um) Treats seizures. Also treats bipolar disorder and helps prevent migraine headaches. Brand Name(s): Depakote, Depakote ER, Depakote Sprinkles There may be other brand names for this medicine. When This Medicine Should Not Be Used: This medicine is not right for everyone. Do not use it if you had an allergic reaction to divalproex, valproate sodium, valproic acid, if you are pregnant, or if you have certain genetic disorders (such as urea cycle disorder or mitochondrial disorders). How to Use This Medicine:  
Delayed Release Capsule, Delayed Release Tablet, Coated Tablet, Long Acting Tablet · Take your medicine as directed. Your dose may need to be changed several times to find what works best for you. · You may take this medicine with food to decrease stomach upset. · Capsule, tablet, or extended-release tablet: Swallow the medicine whole. Do not crush, break, or chew it. · Sprinkle capsule: You may open the capsule and pour the medicine into a small amount of soft food such as pudding or applesauce. Stir this mixture well and swallow it without chewing. · This medicine should come with a Medication Guide. Ask your pharmacist for a copy if you do not have one. · Missed dose: Take a dose as soon as you remember. If it is almost time for your next dose, wait until then and take a regular dose. Do not take extra medicine to make up for a missed dose. If you miss 2 or more doses, call your doctor. · Store the medicine in a closed container at room temperature, away from heat, moisture, and direct light. Drugs and Foods to Avoid: Ask your doctor or pharmacist before using any other medicine, including over-the-counter medicines, vitamins, and herbal products. · Some medicines can affect how divalproex sodium works. Tell your doctor if you are using any of the following: ¨ Amitriptyline, aspirin, clonazepam, diazepam, nortriptyline, propofol, rifampin, ritonavir, rufinamide, tolbutamide, or zidovudine ¨ Birth control pill ¨ Blood thinner (including warfarin) ¨ Carbapenem antibiotic (including ertapenem, imipenem, meropenem) ¨ Other seizure medicines (including carbamazepine, ethosuximide, felbamate, lamotrigine, phenobarbital, phenytoin, primidone, topiramate) · Alcohol, narcotic pain relievers, or sleeping pills may cause you to feel more lightheaded, dizzy, or faint when used with this medicine. Warnings While Using This Medicine: · It is not safe to take this medicine during pregnancy. It could harm an unborn baby. Tell your doctor right away if you become pregnant. · Tell your doctor if you are breastfeeding, or if you have kidney disease, liver disease, a blood disease, or pancreas problems, or a history of depression or mental health problems. · This medicine may cause the following problems: 
¨ Liver problems ¨ Pancreatitis ¨ Hyperammonemic encephalopathy (too much ammonia in your blood) ¨ Depression or thoughts of suicide ¨ Thrombocytopenia (decrease in blood cells that affect clotting) ¨ Hypothermia (low body temperature) ¨ Drug reaction with eosinophilia and systemic symptoms (DRESS), which may damage organs such as the liver, kidney, or heart · This medicine may make you dizzy or drowsy. Do not drive or do anything that could be dangerous until you know how this medicine affects you. · Do not stop using this medicine suddenly. Your doctor will need to slowly decrease your dose before you stop it completely. · Tell any doctor or dentist who treats you that you are using this medicine. This medicine may affect certain medical test results. · Your doctor will check your progress and the effects of this medicine at regular visits. Keep all appointments. · Keep all medicine out of the reach of children. Never share your medicine with anyone.  
Possible Side Effects While Using This Medicine:  
Call your doctor right away if you notice any of these side effects: · Allergic reaction: Itching or hives, swelling in your face or hands, swelling or tingling in your mouth or throat, chest tightness, trouble breathing · Blistering, peeling, red skin rash · Confusion, problems with memory, unusual drowsiness, clumsiness · Dark urine or pale stools, loss of appetite, stomach pain, yellow skin or eyes · Fever, rash, swollen glands in the neck, armpit, or groin · Sudden and severe stomach pain, nausea, vomiting, lightheadedness · Thoughts of hurting yourself, depression, unusual changes in behavior or moods · Unusual bleeding, bruising, or weakness If you notice these less serious side effects, talk with your doctor: · Diarrhea, stomach upset · Hair loss · Tiredness, sleepiness · Trouble sleeping, tremor · Vision changes, dizziness, headache If you notice other side effects that you think are caused by this medicine, tell your doctor. Call your doctor for medical advice about side effects. You may report side effects to FDA at 4-045-FDA-7055 © 2017 [facility] Information is for End User's use only and may not be sold, redistributed or otherwise used for commercial purposes. The above information is an  only. It is not intended as medical advice for individual conditions or treatments. Talk to your doctor, nurse or pharmacist before following any medical regimen to see if it is safe and effective for you.

## 2021-05-11 ENCOUNTER — HOME CARE VISIT (OUTPATIENT)
Dept: SCHEDULING | Facility: HOME HEALTH | Age: 81
End: 2021-05-11
Payer: MEDICARE

## 2021-05-11 VITALS
OXYGEN SATURATION: 94 % | TEMPERATURE: 98.2 F | SYSTOLIC BLOOD PRESSURE: 132 MMHG | RESPIRATION RATE: 16 BRPM | DIASTOLIC BLOOD PRESSURE: 74 MMHG | HEART RATE: 73 BPM

## 2021-05-11 PROCEDURE — 400018 HH-NO PAY CLAIM PROCEDURE

## 2021-05-11 PROCEDURE — 400013 HH SOC

## 2021-05-11 PROCEDURE — 3331090003 HH PPS REVENUE ADJ

## 2021-05-11 PROCEDURE — 3331090001 HH PPS REVENUE CREDIT

## 2021-05-11 PROCEDURE — G0151 HHCP-SERV OF PT,EA 15 MIN: HCPCS

## 2021-05-11 PROCEDURE — 3331090002 HH PPS REVENUE DEBIT

## 2021-05-12 PROCEDURE — 3331090002 HH PPS REVENUE DEBIT

## 2021-05-12 PROCEDURE — 3331090001 HH PPS REVENUE CREDIT

## 2021-05-13 PROCEDURE — 3331090002 HH PPS REVENUE DEBIT

## 2021-05-13 PROCEDURE — 3331090001 HH PPS REVENUE CREDIT

## 2021-05-14 PROCEDURE — 3331090002 HH PPS REVENUE DEBIT

## 2021-05-14 PROCEDURE — 3331090001 HH PPS REVENUE CREDIT

## 2021-05-15 PROCEDURE — 3331090001 HH PPS REVENUE CREDIT

## 2021-05-15 PROCEDURE — 3331090002 HH PPS REVENUE DEBIT

## 2021-05-16 PROCEDURE — 3331090002 HH PPS REVENUE DEBIT

## 2021-05-16 PROCEDURE — 3331090001 HH PPS REVENUE CREDIT

## 2021-05-17 PROCEDURE — 3331090001 HH PPS REVENUE CREDIT

## 2021-05-17 PROCEDURE — 3331090002 HH PPS REVENUE DEBIT

## 2021-05-18 PROCEDURE — 3331090002 HH PPS REVENUE DEBIT

## 2021-05-18 PROCEDURE — 3331090001 HH PPS REVENUE CREDIT

## 2021-05-19 PROCEDURE — 3331090002 HH PPS REVENUE DEBIT

## 2021-05-19 PROCEDURE — 3331090001 HH PPS REVENUE CREDIT

## 2021-05-20 PROCEDURE — 3331090002 HH PPS REVENUE DEBIT

## 2021-05-20 PROCEDURE — 3331090001 HH PPS REVENUE CREDIT

## 2021-05-30 DIAGNOSIS — G30.1 LATE ONSET ALZHEIMER'S DISEASE WITH BEHAVIORAL DISTURBANCE (HCC): ICD-10-CM

## 2021-05-30 DIAGNOSIS — E11.65 UNCONTROLLED TYPE 2 DIABETES MELLITUS WITH HYPERGLYCEMIA (HCC): ICD-10-CM

## 2021-05-30 DIAGNOSIS — F02.818 LATE ONSET ALZHEIMER'S DISEASE WITH BEHAVIORAL DISTURBANCE (HCC): ICD-10-CM

## 2021-06-09 NOTE — TELEPHONE ENCOUNTER
I called Leonides Meckel to discuss how her dad is doing with his medications for mood. Overall she feels his mood is relatively stable. She is giving seroquel 50 mg in the morning and 50 mg at night. If he has a bad episode, then they will give the depakote (about 2-3 times a week). Discussed the option of continuing this regimen versus scheduling the depakote consistently every day. At this time she prefers to not schedule the depakote every day due to concern of side effects and overmedication. Discussed options for follow up. I will plan to see in August, sooner as needed.     06/09/21  3:41 PM

## 2021-06-25 NOTE — TELEPHONE ENCOUNTER
reviewed. Last script filled on 5/23 for 30-day supply. Will refill. I called Austin Carter to let her know I will refill the medication and will need to see her father soon for follow up as he should have follow up of pain management at least once every 3 months.

## 2021-07-21 NOTE — TELEPHONE ENCOUNTER
[facility]   Phone:  [phone]      Fax:  [phone] [facility], [address] [place] [address], [address] [place] [address]    Name:  Russell Smart  YOB: 1940    Outgoing call to patient's daughter Tameka Plaza to remind her of [facility] appt tomorrow July 22. Dr. Santos Bone will arrive between 9am and 1pm.      No answer, left message requesting Ms. Alondra John call our office back today to acknowledge receipt of reminder call and to answer COVID screening questions.       Celi Cerrato, BSN, RN-BC, [facility]  Referral Navigator, [facility]

## 2021-07-21 NOTE — TELEPHONE ENCOUNTER
Called pt/family to confirm upcoming appointment with Dr. Shaka Gonzalez on 7/22/21 with arrival time between 9-11am.

## 2021-07-25 PROBLEM — R53.81 DEBILITY: Status: ACTIVE | Noted: 2021-01-01

## 2021-07-25 PROBLEM — I50.22 CHRONIC SYSTOLIC CONGESTIVE HEART FAILURE (HCC): Status: ACTIVE | Noted: 2018-12-25

## 2021-07-25 NOTE — PATIENT INSTRUCTIONS
Learning About Skin Care for Immobile Adults  As we get older, our skin gets thinner and drier, so it is easier to damage. The chance of skin damage is higher for people who can't move much, or who spend most of their time in bed or in a wheelchair. The skin can develop rashes and sores, especially pressure injuries (also called pressure sores). These injuries are caused by constant pressure, which can limit the blood supply to the skin. Skin also can be damaged by sweat, feces, or urine, making pressure injuries more likely and harder to heal.  You can help protect the skin of the person you're caring for by checking it every day and by being careful when cleaning it. How do you watch for problems? Thin skin in older adults   1. As people age, their skin becomes thinner. Rash areas on the upper body   1. Rashes can develop in folds of skin on the torso, and in the creases of the armpits, elbows, and groin. Rash areas on the lower body   1. Rashes can develop in the areas around the knees, ankles, and toes. Rashes in the groin and anal areas   1. Rashes can develop in the groin and anal areas. What can you do to help prevent pressure injuries? You can help prevent pressure injuries by carefully turning the person every 2 hours. It relieves the pressure that can be placed on an area of the body when a person doesn't move for a long time. Let the person's doctor know if you see pressure injures. The doctor or nurse may give you some advice about how to treat minor sores at home. Serious sores need more medical treatment. You may be able to use devices that help prevent pressure injuries. These include special cushions and mattresses. But they don't take the place of turning the person. What can you do to keep skin healthy? Keeping the person's skin clean and moisturized can help keep their skin healthy. · Help them bathe as often as needed to be clean and comfortable.  When helping someone bathe:  ? Use gentle soap. ? Use warm (not hot) water. ? Wash gently with a washcloth. ? Pat the skin dry rather than rubbing. You also can offer the person a randal cloth robe. Randal cloth is a type of fabric often used for towels. It can help gently dry the skin. · Use moisturizing creams or lotions to keep the skin soft. If the skin is very dry, use a protective barrier cream, lotion, or ointment. These include over-the-counter lotions such as CeraVe and TriCeram. Some lotions are available by prescription. · Don't put moisturizers in creases and folds, such as those under the breasts and in the groin or on the stomach. These areas are already moist. More moisture can lead to rashes and infections. · A humidifier may help prevent dry skin. Make sure to clean the humidifier as directed. This can prevent mold, fungus, or bacteria from forming in the machine. · Good nutrition and plenty of fluids can also help the skin stay healthy and heal if it's damaged. Provide a healthy diet, with lots of protein and fruits and vegetables. Offer the person plenty of water. · When washing clothing and sheets, use mild detergents. Don't use fabric softeners. And try to have the person wear clothing made with soft fabrics, such as cotton (rather than wool). Where can you learn more? Go to http://www.gray.com/  Enter O361 in the search box to learn more about \"Learning About Skin Care for Immobile Adults. \"  Current as of: July 17, 2020               Content Version: 12.8  © 9244-7292 [employer], [employer]. Care instructions adapted under license by [employer] (which disclaims liability or warranty for this information). If you have questions about a medical condition or this instruction, always ask your healthcare professional. [address] any warranty or liability for your use of this information.

## 2021-07-25 NOTE — PROGRESS NOTES
Home Based Primary Care [facility]) & Supportive Services    [phone]      NOTE: Home Based Primary Care is a PROVIDER (MD/NP) based interdisciplinary practice (RN, LCSW, Pharmacy, Dietician) for patient's who cannot access (or have a taxing effort) primary / speciality medical care in an office setting. [facility] is NOT [email or web address] but works with [facility]. when there is a skilled need. Our MD/NPs are integral in Care Transitions; PLEASE CALL [phone] if this patient arrives in the Emergency Department or Hospital.        Date of Current Visit: 07/22/2021    Patient/Family present for Current Visit: Patient, daughter    Yuliana Duncan ICD-9-CM    1. Other chronic pain  G89.29 338.29 HYDROcodone-acetaminophen (NORCO) 5-325 mg per tablet   2. Late onset Alzheimer's disease with behavioral disturbance (HCC)  G30.1 331.0 QUEtiapine (SEROquel) 50 mg tablet    F02.81 294.11    3. Uncontrolled type 2 diabetes mellitus with hyperglycemia (HCC)  E11.65 250.02    4. Acquired hypothyroidism  E03.9 244.9    5. Debility  R53.81 799.3    6. Medicare annual wellness visit, subsequent  Z00.00 V70.0      -Chronic pain: Taking Norco once daily which is a chronic medication for him.  reviewed. I will refill today. Holding off on urine drug or serum drug screen for now as any attempt to examine patient agitates him significantly. We need to update his controlled substance agreement as I am having trouble finding one on file. -Dementia: continue supportive care and current Seroquel regimenrefill provided today. Memantine previously discontinued. -T2DM: family did not typically need SSI. Have been liberal with BGs because of dementia and patient not liking to have frequent BG checks. Family will let us know if blood sugars are consistently high.  -Hypothyroidism: TSH wnl in 09/2020. Continue synthroid.  -Orthostatic hypotension: midodrine available as needed (not needed recently). Family continues regular blood pressure checks. -CHF: No exacerbations in several years. No betablocker or ace/arb secondary to [facility] or diuretics. -CKD: avoid nephrotoxic medications.  -Follow up: will plan follow up in 2-3 months, sooner as needed. HEALTH MAINTENANCE     Health Maintenance Due   Topic Date Due    Hepatitis C Screening  Never done    COVID-19 Vaccine (1) Never done    Medicare Yearly Exam  03/12/2021      CC & SUBJECTIVE including ROS & FUNCTION     Chief Complaint   Patient presents with    Dementia    Other     chronic pain      Lidia Gay is a 79 y.o. with chronic medical conditions as listed in the patient's updated Problem List (see below). Patient is seen at home due to severe dementia preventing him from being able to leave the home. History as per his daughter as he is unable to give history. Currently family is giving him Seroquel 50 mg in the morning, Seroquel 25 mg in the afternoon, Seroquel 75 mg in the evening. This is helping the patient though he does continue to have fluctuating restlessness and agitation. His family only uses the Depakote when his agitation is really severe because it seems to cause him diarrhea. Overall the patient's mentation is relatively stable. He does not walk around the house as much anymore, however. Blood sugars are typically less than 200, and his blood pressure also appears well controlled for his age. Takes hydrocodoneacetaminophen 5-325 mg once daily for chronic pain. This has been a chronic medication for him. ROS: Patient unable to answer questions due to baseline cognitive issues. PPS:40  Karnofsky:   Timed Up and Go (TUG):   Fall Risk Assessment, last 12 mths 2/12/2019   Able to walk? Yes   Fall in past 12 months? Yes   Number of falls in past 12 months 2   Fall with injury?  1     Current Durable Medical Equipment in Home:  [] Hospital Bed  [] Bedside Commode  [] Wheelchair  [] Ann Gibson  [] Gaby George    Has a cane  [] Respiratory Support:     ADVANCE CARE PLANNING                                 The following information was updated I/ reviewed as accurate in Orders and the Advance Care Planning Navigator:    DNR      Primary Decision Maker (Active): Nicky Greer Child - [phone]    Primary Decision Maker: Marlen Alanis - [phone]    Secondary Decision Maker: Chantelle Barger - Other Relative - [phone]  Advance Care Planning 2/21/2019   Patient's Healthcare Decision Maker is: Legal Next of Kin   Confirm Advance Directive None   Patient Would Like to Complete Advance Directive Unable   Does the patient have other document types Do Not Resuscitate      VITAL SIGNS & PHYSICAL EXAM     Median Arm Circumference (inches): Wt Readings from Last 3 Encounters:   11/20/19 159 lb 1.6 oz (72.2 kg)   11/05/19 156 lb 6.4 oz (70.9 kg)   10/29/19 155 lb 12.8 oz (70.7 kg)     Temp Readings from Last 3 Encounters:   05/11/21 98.2 °F (36.8 °C)   05/18/20 98 °F (36.7 °C) (Oral)   01/17/20 98.5 °F (36.9 °C) (Oral)     BP Readings from Last 3 Encounters:   05/11/21 132/74   05/18/20 126/70   01/17/20 132/76     Pulse Readings from Last 3 Encounters:   05/11/21 73   05/18/20 72   03/11/20 75     Pacemaker:  ICD:  Feeding Tube:  Urine Catheter:  Other:    Visit Vitals  Resp 14     *Patient is sleeping and high risk for agitation, so I did not try to wake him or complete exam components which would wake him. *     Physical Exam  Constitutional:       General: He is not in acute distress. Appearance: He is not diaphoretic. Comments: Chronically frail and thin appearing. HENT:      Head: Normocephalic and atraumatic. Right Ear: External ear normal.      Left Ear: External ear normal.      Nose: Nose normal.   Eyes:      Comments: Conjunctiva white   Pulmonary:      Effort: Pulmonary effort is normal. No respiratory distress. Skin:     General: Skin is dry. Neurological:      Mental Status: He is alert. Comments: Sleeping. Psychiatric:      Comments: Not restless or agitated. PROBLEM LIST / PAST MEDICAL / SOCIAL HX     Patient Active Problem List   Diagnosis Code    Late onset Alzheimer's disease with behavioral disturbance ([facility]) G30.1, F02.81    Moderate recurrent major depression ([facility] [address] F33.1    Uncontrolled type 2 diabetes mellitus with hyperglycemia ([facility]) E11.65    Urinary incontinence R32    CAD (coronary artery disease) I25.10    COPD (chronic obstructive pulmonary disease) ([facility]) J44.9    Orthostatic hypotension I95.1    Chronic systolic congestive heart failure ([facility]) I50.22    Prostate cancer ([facility]) C61    Acquired hypothyroidism E03.9    Stage 3a chronic kidney disease ([facility]) N18.31    Other chronic pain G89.29    Debility R53.81      Family History   Problem Relation Age of Onset    Diabetes Mother     Diabetes Brother      Social History     Socioeconomic History    Marital status:      Spouse name: Not on file    Number of children: Not on file    Years of education: Not on file    Highest education level: Not on file   Tobacco Use    Smoking status: Former Smoker    Smokeless tobacco: Never Used    Tobacco comment: 1-2 cigars daily   Substance and Sexual Activity    Alcohol use: No     Alcohol/week: 0.0 standard drinks    Drug use: No     Social Determinants of Health     Financial Resource Strain:     Difficulty of Paying Living Expenses:    Food Insecurity:     Worried About Running Out of Food in the Last Year:     Ran Out of Food in the Last Year:    Transportation Needs:     Lack of Transportation (Medical):      Lack of Transportation (Non-Medical):    Physical Activity:     Days of Exercise per Week:     Minutes of Exercise per Session:    Stress:     Feeling of Stress :    Social Connections:     Frequency of Communication with Friends and Family:     Frequency of Social Gatherings with Friends and Family:     Attends Judaism Services:     Active Member of Clubs or Organizations:     Attends Club or Organization Meetings:     Marital Status:       MEDICATIONS & PRESCRIPTIONS     Allergies   Allergen Reactions    Sulfa (Sulfonamide Antibiotics) Unknown (comments)      Current Outpatient Medications   Medication Sig    HYDROcodone-acetaminophen (NORCO) 5-325 mg per tablet Take 1 Tablet by mouth daily as needed for Pain for up to 30 days. Indications: Chronic pain    omeprazole (PRILOSEC) 20 mg capsule Take 1 Capsule by mouth daily. Before breakfast    QUEtiapine (SEROquel) 50 mg tablet Take 50 mg in the morning and 75 mg in the evening.  QUEtiapine (SEROquel) 25 mg tablet Take 25 mg in the afternoon.  valproate (Depakene) 250 mg/5 mL syrup Take 5 mL by mouth two (2) times daily as needed (agitation).  insulin glargine (LANTUS,BASAGLAR) 100 unit/mL (3 mL) inpn Inject 14 units every morning.  levothyroxine (SYNTHROID) 50 mcg tablet TAKE 1 TABLET BY MOUTH EVERY DAY BEFORE BREAKFAST    tamsulosin (Flomax) 0.4 mg capsule Take 1 Cap by mouth daily.  DULoxetine (CYMBALTA) 60 mg capsule TAKE ONE CAPSULE BY MOUTH EVERY DAY    aspirin 81 mg chewable tablet Take 1 Tab by mouth daily.  Insulin Needles, Disposable, (BD Ultra-Fine Mini Pen Needle) 31 gauge x 3/16\" ndle USE TO INJECT INSULIN 4 TIMES A DAY    insulin lispro (HUMALOG) 100 unit/mL injection INJECT 3 UNITS BENEATH THE SKIN FOR BS>200, 5 UNITS FOR BS>250, 6 UNITS FOR BS>300. CALL MD FOR BS >400    insulin lispro (HUMALOG) 100 unit/mL kwikpen 3 units sq for bs greater than 200 ; 5  untis sq for bs greater than 250,  6units for blood sugar greater than 300 ; call me for bs greater than 400    glucose blood VI test strips (OneTouch Ultra Blue Test Strip) strip USE TO CHECK BLOOD SUGAR 3 TIMES DAILY. DIAGNOSIS CODE: E11.9    b complex vitamins (SUPER B-50 COMPLEX PLUS) tablet Take 1 Tab by mouth daily.     SENNA PLUS 8.6-50 mg per tablet TAKE 2 TABS BY MOUTH TWO (2) TIMES A DAY    lidocaine (LIDODERM) 5 % Apply patch to the affected area for 12 hours a day and remove for 12 hours a day. (Patient taking differently: 1 Patch by TransDERmal route every twenty-four (24) hours. Apply patch to the affected area for 12 hours a day and remove for 12 hours a day.)    midodrine (PROAMITINE) 5 mg tablet Take 5 mg by mouth daily as needed for Other (if blood pressure is below 90/50). take 5 mg daily PRN - if blood pressure is below 90/50    ondansetron (ZOFRAN ODT) 4 mg disintegrating tablet Take 1 Tab by mouth every six (6) hours as needed for Nausea.  acetaminophen (TYLENOL) 500 mg tablet Take 1,000 mg by mouth every six to eight (6-8) hours as needed for Pain. No current facility-administered medications for this visit. Medications Ordered Today   Medications    QUEtiapine (SEROquel) 50 mg tablet     Sig: Take 50 mg in the morning and 75 mg in the evening. Dispense:  180 Tablet     Refill:  1    QUEtiapine (SEROquel) 25 mg tablet     Sig: Take 25 mg in the afternoon. Dispense:  180 Tablet     Refill:  1    HYDROcodone-acetaminophen (NORCO) 5-325 mg per tablet     Sig: Take 1 Tablet by mouth daily as needed for Pain for up to 30 days.  Indications: Chronic pain     Dispense:  30 Tablet     Refill:  0       TEST DATA REVIEWED:     Lab Results   Component Value Date/Time    Hemoglobin A1c 9.4 (H) 09/08/2020 04:30 PM    Hemoglobin A1c, External 11.2 01/27/2016 12:00 AM     Lab Results   Component Value Date/Time    Microalb/Creat ratio (ug/mg creat.) 63.6 (H) 02/12/2019 08:49 AM     Lab Results   Component Value Date/Time    TSH 3.230 09/08/2020 04:30 PM     No results found for: Adriana Puente [id number]      Lab Results   Component Value Date/Time    WBC 7.2 09/08/2020 04:30 PM    HGB 12.3 (L) 09/08/2020 04:30 PM    PLATELET 839 89/96/8467 04:30 PM     Lab Results   Component Value Date/Time    Sodium 139 09/08/2020 04:30 PM    Potassium CANCELED 09/08/2020 04:30 PM Chloride 101 09/08/2020 04:30 PM    CO2 22 09/08/2020 04:30 PM    BUN 10 09/08/2020 04:30 PM    Creatinine 1.33 (H) 09/08/2020 04:30 PM    Calcium 8.2 (L) 09/08/2020 04:30 PM    Magnesium 2.0 02/05/2019 01:42 AM    Phosphorus 3.8 02/05/2019 01:42 AM      Lab Results   Component Value Date/Time    Alk.  phosphatase 164 (H) 09/08/2020 04:30 PM    Protein, total 6.0 09/08/2020 04:30 PM    Albumin 3.3 (L) 09/08/2020 04:30 PM    Globulin 3.4 09/07/2019 08:44 AM     Lab Results   Component Value Date/Time    Iron 39 02/04/2019 07:35 PM    TIBC 196 (L) 02/04/2019 07:35 PM    Iron % saturation 20 02/04/2019 07:35 PM    Ferritin 75 02/04/2019 07:35 PM          Leticia Zacarias MD

## 2021-08-11 NOTE — PROGRESS NOTES
Home Based [address] [place] [address] Team Members: Mary Brown MD; Lidia Heredia NP; Komal Dewitt RN; Caro Cota RN; SPENCER Freedman    Brielle Bernalmary  1940 / [id number]  male    The physician has reviewed and discussed the plan of care with the interdisciplinary group on 08/18/21 and agrees. Date of Initial Visit (Start of Care): 2/12/19     Diagnosis: 79 y.o. male with dementia, COPD,  chronic diastolic CHF, persistent afib, Type 2 diabetes with hypoglycemia, chronic anemia, hypothyroidism, depression    Patient status summary: Patient and POC discussed in IDT meeting for 60 day update. Homebound patient referred by Palliative Medicine Inpatient Team, Anne-Marie Munguia NP to our services due to taxing effort to seek primary care needs in the community. DME/Supplies:  Bedside [employer]     Advance Care Planning:  Code status: DNR  DDNR singed on 12/28/2018 is on file      Primary Decision Maker (Active): Linn Villegas - [phone]    Primary Decision Maker: Maximiliano Junior  [phone]    Secondary Decision Maker: Li Lao - Other Relative - [phone]      Allergies   Allergen Reactions    Sulfa (Sulfonamide Antibiotics) Unknown (comments)     Nutritional Requirements:   Oral with supported meal preparation    Functional/Activity Level:  Limited ambulation with support of wheelchair and Wheelchair with assistance for transfers    Safety Measures:   Fall risk, Self-care deficity and Smoker    Current Outpatient Medications   Medication Sig    HYDROcodone-acetaminophen (NORCO) 5-325 mg per tablet Take 1 Tablet by mouth daily as needed for Pain for up to 30 days. Indications: Chronic pain    omeprazole (PRILOSEC) 20 mg capsule Take 1 Capsule by mouth daily. Before breakfast    QUEtiapine (SEROquel) 50 mg tablet Take 50 mg in the morning and 75 mg in the evening.  QUEtiapine (SEROquel) 25 mg tablet Take 25 mg in the afternoon.     valproate (Depakene) 250 mg/5 mL syrup Take 5 mL by mouth two (2) times daily as needed (agitation).  insulin glargine (LANTUS,BASAGLAR) 100 unit/mL (3 mL) inpn Inject 14 units every morning.  Insulin Needles, Disposable, (BD Ultra-Fine Mini Pen Needle) 31 gauge x 3/16\" ndle USE TO INJECT INSULIN 4 TIMES A DAY    insulin lispro (HUMALOG) 100 unit/mL injection INJECT 3 UNITS BENEATH THE SKIN FOR BS>200, 5 UNITS FOR BS>250, 6 UNITS FOR BS>300. CALL MD FOR BS >400    insulin lispro (HUMALOG) 100 unit/mL kwikpen 3 units sq for bs greater than 200 ; 5  untis sq for bs greater than 250,  6units for blood sugar greater than 300 ; call me for bs greater than 400    levothyroxine (SYNTHROID) 50 mcg tablet TAKE 1 TABLET BY MOUTH EVERY DAY BEFORE BREAKFAST    glucose blood VI test strips (OneTouch Ultra Blue Test Strip) strip USE TO CHECK BLOOD SUGAR 3 TIMES DAILY. DIAGNOSIS CODE: E11.9    tamsulosin (Flomax) 0.4 mg capsule Take 1 Cap by mouth daily.  b complex vitamins (SUPER B-50 COMPLEX PLUS) tablet Take 1 Tab by mouth daily.  DULoxetine (CYMBALTA) 60 mg capsule TAKE ONE CAPSULE BY MOUTH EVERY DAY    SENNA PLUS 8.6-50 mg per tablet TAKE 2 TABS BY MOUTH TWO (2) TIMES A DAY    lidocaine (LIDODERM) 5 % Apply patch to the affected area for 12 hours a day and remove for 12 hours a day. (Patient taking differently: 1 Patch by TransDERmal route every twenty-four (24) hours. Apply patch to the affected area for 12 hours a day and remove for 12 hours a day.)    midodrine (PROAMITINE) 5 mg tablet Take 5 mg by mouth daily as needed for Other (if blood pressure is below 90/50). take 5 mg daily PRN - if blood pressure is below 90/50    aspirin 81 mg chewable tablet Take 1 Tab by mouth daily.  ondansetron (ZOFRAN ODT) 4 mg disintegrating tablet Take 1 Tab by mouth every six (6) hours as needed for Nausea.  acetaminophen (TYLENOL) 500 mg tablet Take 1,000 mg by mouth every six to eight (6-8) hours as needed for Pain. No current facility-administered medications for this visit. The Plan of Care has been initiated for during discussion at interdisciplinary group meeting  and reviewed and updated by the Interdisciplinary Group (IDG) as frequently as the patient condition warrants. Plan of Care by Discipline:    1. Provider  Ongoing evaluation of treatment interventions for specific disease state, Reduction of polypharmacy within benefit/burden framework appropriate to age, function and disease state, Determine need for laboratory assessment based on patient disease status  and Create and implement disease /symptom specific plan to manage high risk conditions / symptoms    2. Nursing  Review Health Maintenance with patient and provider; update as appropriate and Provider caregiver / family support for patient's current and changing condition    3. Social Work  Establish therapeutic relationship through in home visits and telephonic touch points      Plan of Care Orders / Action   -Chronic pain: Taking Norco once daily which is a chronic medication for him.  reviewed. Holding off on urine drug or serum drug screen for now as any attempt to examine patient agitates him significantly. We need to update his controlled substance agreement as I am having trouble finding one on file. -Dementia: continue supportive care and current Seroquel regimen. Memantine previously discontinued. -T2DM: family did not typically need SSI.  Have been liberal with BGs because of dementia and patient not liking to have frequent BG checks.  Family will let us know if blood sugars are consistently high.  -Hypothyroidism: TSH wnl in 09/2020. Continue synthroid.  -Orthostatic hypotension: midodrine available as needed (not needed recently).   Family continues regular blood pressure checks. -CHF: No exacerbations in several years.  No betablocker or ace/arb secondary to [facility] or diuretics.    -CKD: avoid nephrotoxic medications.  -Follow up: will plan follow up in 2-3 months, sooner as needed.       Health Maintenance   Topic Date Due    Hepatitis C Screening  Never done    COVID-19 Vaccine (1) Never done    Medicare Yearly Exam  03/12/2021    Foot Exam Q1  09/08/2021    Pneumococcal 65+ yrs at Risk Vaccine (1 of 2 - PCV13) 10/30/2022 (Originally 12/11/2006)    MICROALBUMIN Q1  01/25/2023 (Originally 2/12/2020)    Eye Exam Retinal or Dilated  03/11/2023 (Originally 12/11/1950)    Shingrix Vaccine Age 50> (1 of 2) 02/15/2025 (Originally 12/11/1991)    Flu Vaccine (1) 09/01/2021    DTaP/Tdap/Td series (2 - Td or Tdap) 09/07/2029         Estimated Visit Frequency:  Every 2-3 month provider visit  Last MD visit: 07/22/2021  SW visits prn

## 2021-08-18 NOTE — ED PROVIDER NOTES
HPI Comments: 77 y.o. male with past medical history significant for DM, CAD, COPD, and pneumonia who presents to the ED with chief complaint of hypotension. Patient's family report the patient has been feeling dizzy with low blood pressures with onset ~3 days ago. They report the patient has had multiple falls during this time; report the patient hit his head during one of his falls. They report the patient has a history of orthostatic hypotension. They report the patient lives at home with one of his sons; report his other son and daughter help set up his medication box and make sure the patient does not forget to take them. They report the patient is compliant with his medications. Patient denies chest pain, SOB, nausea, vomiting, abdominal pain, and back pain. There are no other acute medical concerns at this time. Old Chart Review: Patient was seen in the ED for similar symptoms in July of 2017; it was found that the patient had been forgetting to take his medications. Patient had an echocardiogram in April of 2017 that showed mild to moderate mitral valve regurgitation and stenosis. PCP: Inez Nuno MD    Note written by Lazara Martinez, as dictated by Arlyn Madrid MD 6:44 PM     The history is provided by the patient and a relative.        Past Medical History:   Diagnosis Date    CAD (coronary artery disease)     [facility]     Diabetes ([facility]     1970a    Ill-defined condition     SOB    Pneumonia     Urinary incontinence        Past Surgical History:   Procedure Laterality Date    CARDIAC SURG PROCEDURE UNLIST  2000    open heart    HX HEENT  1975    nasal surgery    HX MOHS PROCEDURES  2013    left         Family History:   Problem Relation Age of Onset    Diabetes Mother     Diabetes Brother        Social History     Social History    Marital status:      Spouse name: N/A    Number of children: N/A    Years of education: N/A     Occupational History    Not on file. Social History Main Topics    Smoking status: Current Every Day Smoker    Smokeless tobacco: Never Used      Comment: 1-2 cigars daily    Alcohol use No    Drug use: No    Sexual activity: Not on file     Other Topics Concern    Not on file     Social History Narrative         ALLERGIES: Review of patient's allergies indicates no known allergies. Review of Systems   Constitutional: Negative for chills, diaphoresis and fever. HENT: Negative for congestion, postnasal drip, rhinorrhea and sore throat. Eyes: Negative for photophobia, discharge, redness and visual disturbance. Respiratory: Negative for cough, chest tightness, shortness of breath and wheezing. Cardiovascular: Negative for chest pain, palpitations and leg swelling. Gastrointestinal: Negative for abdominal distention, abdominal pain, blood in stool, constipation, diarrhea, nausea and vomiting. Genitourinary: Negative for difficulty urinating, dysuria, frequency, hematuria and urgency. Musculoskeletal: Negative for arthralgias, back pain, joint swelling and myalgias. Skin: Negative for color change and rash. Neurological: Positive for dizziness. Negative for speech difficulty, weakness, light-headedness and numbness. Psychiatric/Behavioral: Negative for confusion. The patient is not nervous/anxious. All other systems reviewed and are negative. Vitals:    10/14/17 1802 10/14/17 1843   BP: 100/60 134/60   Pulse: 89 75   Resp: 16 14   Temp: 98.2 °F (36.8 °C)    SpO2: 99% 100%   Weight: 68.9 kg (152 lb)    Height: 5' 11\" (1.803 m)             Physical Exam   Constitutional: He is oriented to person, place, and time. He appears well-developed and well-nourished. No distress. HENT:   Head: Normocephalic and atraumatic.    Right Ear: External ear normal.   Left Ear: External ear normal.   Nose: Nose normal.   Mouth/Throat: Oropharynx is clear and moist.   Eyes: Conjunctivae and EOM are normal. Pupils are equal, round, and reactive to light. No scleral icterus. Neck: Normal range of motion. Neck supple. No JVD present. No tracheal deviation present. No thyromegaly present. Cardiovascular: Normal rate and regular rhythm. Exam reveals no gallop and no friction rub. Murmur heard. 3/6 systolic murmur over the lower left sternal border that radiates into the mitral valve area; 2/6 murmur over the aortic area. Pulmonary/Chest: Effort normal and breath sounds normal. No respiratory distress. He has no wheezes. He has no rales. He exhibits no tenderness. Clear lungs, regular rate and rhythm. Abdominal: Soft. Bowel sounds are normal. He exhibits no distension and no mass. There is no tenderness. There is no rebound and no guarding. Musculoskeletal: Normal range of motion. He exhibits no edema or tenderness. Lymphadenopathy:     He has no cervical adenopathy. Neurological: He is alert and oriented to person, place, and time. He has normal strength. He displays no atrophy and no tremor. No cranial nerve deficit. He exhibits normal muscle tone. Coordination and gait normal.   Skin: Skin is warm and dry. No rash noted. He is not diaphoretic. No erythema. Psychiatric: He has a normal mood and affect. His behavior is normal. Judgment and thought content normal.   Nursing note and vitals reviewed. Note written by Lazara Aguila, as dictated by King Gael MD 6:53 PM      MDM  Number of Diagnoses or Management Options  Diagnosis management comments: CROW  Impression: 80-year-old male with a history of orthostatic hypotension on medication presents to the emergency department with an exacerbation and a history of frequent falls. Per the family are now regulating his medications and he's been compliant with his medicines.  No loss of consciousness with the falls, he has a healing abrasion/laceration to the right occipital parietal area.    Differential includes exacerbation of his orthostatic hypotension, consider electrolyte abnormalities, dehydration, pneumonia or urinary tract infection. Doubt this represents acute cardiac event. In addition I reviewed his echocardiogram which reveals normal left ventricular function with mild mitral regurg as well as aortic stenosis which no doubt next is orthostatic hypotension worse. Plan of care we baseline labs, urinalysis, chest x-ray and IV fluids and we'll treat accordingly. ED Course       Procedures    ED EKG interpretation:  Rhythm: normal sinus rhythm with arrhythmia; and regular .  Rate (approx.): 85 bpm; Axis: normal; ST/T wave: normal.   Note written by Lazara Gonzalez, as dictated by Quan Vargas MD 6:34 PM    ROAD TEST without difficulty at time of d/c Statement Selected

## 2021-08-20 NOTE — TELEPHONE ENCOUNTER
Name:  Ankit Cao  YOB: 1940    Triage for Controlled Substance Refill Request    Pain Diagnosis: Chronic Pain    Last Outpatient Visit:  7/22/21    Next Outpatient Visit:  10/22/21    Pharmacy: _    Medication: Norco 5/325  Dose and directions: take 1 tab by mouth daily PRN  Number dispensed:  Date filled ( or Pharmacy):  # left:     reviewed: _    Date of Urine Drug Screen:  9/03/2019    Opioid Safety Handout given:   Yes    Appropriate for refill:  Yes    Action:  Refill    Manuel Broderick RN

## 2021-09-16 PROBLEM — Z28.21 COVID-19 VACCINE DOSE DECLINED: Status: ACTIVE | Noted: 2021-01-01

## 2021-09-22 NOTE — TELEPHONE ENCOUNTER
Name:  Dawson Harris  YOB: 1940    Triage for Controlled Substance Refill Request    Pain Diagnosis:  Chronic Pain    Last Outpatient Visit: 8/5/21    Medication: Hydrocodone  Dose and directions: Take 1 Tablet by mouth daily as needed for Pain for up to 30 days. Indications: Chronic pain  Number dispensed: 30  Date filled ( or Pharmacy): 8/21       reviewed: Yes      Appropriate for refill:   Yes    Tyshawn Simms RN

## 2021-09-24 PROBLEM — S72.009A HIP FRACTURE (HCC): Status: ACTIVE | Noted: 2021-01-01

## 2021-09-24 NOTE — ED PROVIDER NOTES
81-year-old male with a history of CAD, COPD, diabetes, CAD, dementia presents with a chief complaint of left hip pain. Patient is not able to provide history or review of systems due to dementia. According to EMS, the patient fell out of bed. Family was able to help the patient back into bed. He complained of left hip pain. Apparently he did hit his head but it is not clear whether he lost consciousness.            Past Medical History:   Diagnosis Date    Atrial fibrillation ([facility]     CAD (coronary artery disease)     Complicated grief 6/51/7686    COPD (chronic obstructive pulmonary disease) ([facility]     Diabetes ([facility]     1970a    [facility]     Hypokalemia 12/25/2018    Hyponatremia 4/11/2017    Ill-defined condition     SOB    MI (myocardial infarction) ([facility] 01/2019    NSTEMI (non-ST elevated myocardial infarction) ([facility] 12/25/2018    Pneumonia     Syncope 4/11/2017    Urinary incontinence     Volume depletion 4/11/2017       Past Surgical History:   Procedure Laterality Date    HX HEENT  1975    nasal surgery    HX MOHS PROCEDURES  2013    left    CO CARDIAC SURG PROCEDURE UNLIST  2000    open heart         Family History:   Problem Relation Age of Onset    Diabetes Mother     Diabetes Brother        Social History     Socioeconomic History    Marital status:      Spouse name: Not on file    Number of children: Not on file    Years of education: Not on file    Highest education level: Not on file   Occupational History    Not on file   Tobacco Use    Smoking status: Former Smoker    Smokeless tobacco: Never Used    Tobacco comment: 1-2 cigars daily   Substance and Sexual Activity    Alcohol use: No     Alcohol/week: 0.0 standard drinks    Drug use: No    Sexual activity: Not on file   Other Topics Concern    Not on file   Social History Narrative    Not on file     Social Determinants of Health     Financial Resource Strain:     Difficulty of Paying Living Expenses:    Food Insecurity:     Worried About Running Out of Food in the Last Year:     [address] in the Last Year:    Transportation Needs:     Lack of Transportation (Medical):  Lack of Transportation (Non-Medical):    Physical Activity:     Days of Exercise per Week:     Minutes of Exercise per Session:    Stress:     Feeling of Stress :    Social Connections:     Frequency of Communication with Friends and Family:     Frequency of Social Gatherings with Friends and Family:     Attends Taoism Services:     Active Member of Clubs or Organizations:     Attends Club or Organization Meetings:     Marital Status:    Intimate Partner Violence:     Fear of Current or Ex-Partner:     Emotionally Abused:     Physically Abused:     Sexually Abused: ALLERGIES: Sulfa (sulfonamide antibiotics)    Review of Systems   Unable to perform ROS: Dementia       There were no vitals filed for this visit. Physical Exam  Vitals and nursing note reviewed. Constitutional:       General: He is not in acute distress. Appearance: Normal appearance. He is not ill-appearing, toxic-appearing or diaphoretic. HENT:      Head: Normocephalic. Eyes:      Extraocular Movements: Extraocular movements intact. Cardiovascular:      Rate and Rhythm: Normal rate. Pulses: Normal pulses. Comments: Strong bilateral pedal pulses  Pulmonary:      Effort: Pulmonary effort is normal. No respiratory distress. Abdominal:      General: There is no distension. Musculoskeletal:         General: Normal range of motion. Cervical back: Normal range of motion. Comments: Left hip tenderness   Skin:     General: Skin is dry. Capillary Refill: Capillary refill takes less than 2 seconds. Neurological:      Mental Status: He is alert. He is disoriented.    Psychiatric:         Mood and Affect: Mood normal.          MDM  Number of Diagnoses or Management Options  Closed fracture of left hip, initial encounter [facility])  Diagnosis management comments: Patient presents with hip pain after a fall. Multiple CT and plain film images were obtained. Imaging reveals left hip fracture. Orthopedic surgery consulted. Patient will be admitted to hospital medicine. EKG shows sinus rhythm at a rate of 97, first-degree AV block, otherwise normal intervals, normal axis, no ischemic changes. Perfect Serve Consult for Admission  9:39 PM      ED Room Number: OP18/92  Patient Name and age:   Beryle Castellani 79 y.o.  male  Working Diagnosis: Closed fracture of left hip, initial encounter ([address]  (primary encounter diagnosis)    COVID-19 Suspicion:  no  Sepsis present:  no  Reassessment needed: no  Code Status:  Full Code  Readmission: no  Isolation Requirements:  no  Recommended Level of Care:  telemetry  Department:[facility] Adult ED - 21   Other:  Ortho on consult         Amount and/or Complexity of Data Reviewed  Clinical lab tests: ordered and reviewed  Tests in the radiology section of CPT®: ordered and reviewed           Procedures

## 2021-09-24 NOTE — ED NOTES
Pt arrived to ED via EMS with c/o of a GLF affecting pts left hip. Pt lives with family who witnessed the fall and helped pt up back into bed. Pt has hx of severe dementia. Per EMS. Pt blood glucose was in the 300's en route.

## 2021-09-25 NOTE — CONSULTS
ORTHO CONSULT NOTE     Date of Consultation:  September 24, 2021  Referring Physician:  Kofi Dixon MD  CC: L hip pain     HPI:  Zacarias Oseguera is a 79 y.o. male who fell out of bed at home and began complaining of L hip pain to family; he was brought in by EMS; the fall was unwitnessed; pt unable to answer any questions due to dementia. Per daughter that are not acute medical issues; last saw cardiologist several years ago; no recent complaints to family of cp, sob, unexplained extremity swelling, dizziness, cough, ha, n/v.     Current Anticoagulant Medications: ASA       Past Medical History:   Diagnosis Date    Atrial fibrillation ([facility]      CAD (coronary artery disease)      Complicated grief 6/88/7563    COPD (chronic obstructive pulmonary disease) ([facility]      Diabetes ([facility]       1970a    Heart failure ([facility]      Hypokalemia 12/25/2018    Hyponatremia 4/11/2017    Ill-defined condition       SOB    MI (myocardial infarction) ([facility] 01/2019    NSTEMI (non-ST elevated myocardial infarction) ([facility] 12/25/2018    Pneumonia      Syncope 4/11/2017    Urinary incontinence      Volume depletion 4/11/2017            Past Surgical History:   Procedure Laterality Date    HX HEENT   1975     nasal surgery    HX MOHS PROCEDURES   2013     left    [place] CARDIAC SURG PROCEDURE UNLIST   2000     open heart            Family History   Problem Relation Age of Onset    Diabetes Mother      Diabetes Brother        Social History            Tobacco Use    Smoking status: Former Smoker    Smokeless tobacco: Never Used    Tobacco comment: 1-2 cigars daily   Substance Use Topics    Alcohol use:  No       Alcohol/week: 0.0 standard drinks           Allergies   Allergen Reactions    Sulfa (Sulfonamide Antibiotics) Unknown (comments)         Review of Systems:  Per HPI.     Objective:      Patient Vitals for the past 8 hrs:    BP Temp Pulse Resp SpO2   09/24/21 1805 (!) 175/82 97.6 °F (36.4 °C) 83 18 97 %      Temp (24hrs), Av.6 °F (36.4 °C), Min:97.6 °F (36.4 °C), Max:97.6 °F (36.4 °C)        EXAM:   NAD, non-labored respirations; unable to answer questions. Moves BUE spontaneously with NTTP long bones and joints. RLE no pain PROM, NTTP long bones and joints. Moves foot OK with SILT and CR toes < 2 secs. LLE normal in alignment; TTP hip and distal femur; Hip skin intact and soft compartments. Knee, ankle and foot NTTP. Moves foot OK with SILT and CR toes < 2 secs. Bilat calf soft and NTTP.     Imaging Review:   Results from Hospital Encounter encounter on 21     XR FEMUR LT 2 V     Narrative  EXAM: XR FEMUR LT 2 V     INDICATION: fall.     COMPARISON: None.     FINDINGS: Two views of the left femur demonstrate a nondisplaced curvilinear  fracture through the intertrochanteric left proximal femur. The bones are  osteopenic. There is left hip DJD. There are extensive vascular calcifications. The knee is intact. Partially visualized is a right total hip arthroplasty.     Impression  Nondisplaced fracture through the intertrochanteric left proximal  femur.     Results from Hospital Encounter encounter on 21     CT PELV WO CONT     Narrative  INDICATION:  Fall with left hip pain     COMPARISON:  None     TECHNIQUE: Without IV contrast, 2.5 mm axial images were obtained through the  osseous pelvis. Coronal and sagittal reconstructions were generated.     CT dose reduction was achieved through use of a standardized protocol tailored  for this examination and automatic exposure control for dose modulation.     FINDINGS:     There is an acute nondisplaced intertrochanteric left proximal femoral fracture. There is an associated nondisplaced avulsion of the left greater trochanter.     There is an intact right total hip arthroplasty. The bones are osteopenic.     Impression  1. Acute nondisplaced intertrochanteric left proximal femoral fracture.   2. Associated acute nondisplaced avulsion fracture of the left greater  trochanter. 3. Intact right total hip arthroplasty.        Labs:   Recent Results          Recent Results (from the past 24 hour(s))   SAMPLES BEING HELD     Collection Time: 09/24/21  7:35 PM   Result Value Ref Range     SAMPLES BEING HELD 1red       COMMENT           Add-on orders for these samples will be processed based on acceptable specimen integrity and analyte stability, which may vary by analyte. TYPE & SCREEN     Collection Time: 09/24/21  7:38 PM   Result Value Ref Range     Crossmatch Expiration 09/27/2021,2359       ABO/Rh(D) O POSITIVE       Antibody screen NEG     PROTHROMBIN TIME + INR     Collection Time: 09/24/21  7:45 PM   Result Value Ref Range     INR 1.0 0.9 - 1.1       Prothrombin time 10.7 9.0 - 11.1 sec   MAGNESIUM     Collection Time: 09/24/21  7:45 PM   Result Value Ref Range     Magnesium 1.7 1.6 - 2.4 mg/dL   METABOLIC PANEL, COMPREHENSIVE     Collection Time: 09/24/21  7:45 PM   Result Value Ref Range     Sodium 135 (L) 136 - 145 mmol/L     Potassium 5.0 3.5 - 5.1 mmol/L     Chloride 108 97 - 108 mmol/L     CO2 26 21 - 32 mmol/L     Anion gap 1 (L) 5 - 15 mmol/L     Glucose 318 (H) 65 - 100 mg/dL     BUN 16 6 - 20 MG/DL     Creatinine 1.79 (H) 0.70 - 1.30 MG/DL     BUN/Creatinine ratio 9 (L) 12 - 20       GFR est AA 45 (L) >60 ml/min/1.73m2     GFR est non-AA 37 (L) >60 ml/min/1.73m2     Calcium 8.6 8.5 - 10.1 MG/DL     Bilirubin, total 0.3 0.2 - 1.0 MG/DL     ALT (SGPT) 20 12 - 78 U/L     AST (SGOT) 23 15 - 37 U/L     Alk.  phosphatase 103 45 - 117 U/L     Protein, total 7.3 6.4 - 8.2 g/dL     Albumin 3.0 (L) 3.5 - 5.0 g/dL     Globulin 4.3 (H) 2.0 - 4.0 g/dL     A-G Ratio 0.7 (L) 1.1 - 2.2     CBC WITH AUTOMATED DIFF     Collection Time: 09/24/21  7:45 PM   Result Value Ref Range     WBC 12.8 (H) 4.1 - 11.1 K/uL     RBC 4.00 (L) 4.10 - 5.70 M/uL     HGB 11.8 (L) 12.1 - 17.0 g/dL     HCT 35.7 (L) 36.6 - 50.3 %     MCV 89.3 80.0 - 99.0 FL     MCH 29.5 26.0 - 34.0 PG     MCHC 33.1 30.0 - 36.5 g/dL     RDW 14.9 (H) 11.5 - 14.5 %     PLATELET 438 936 - 094 K/uL     MPV 12.0 8.9 - 12.9 FL     NRBC 0.0 0  WBC     ABSOLUTE NRBC 0.00 0.00 - 0.01 K/uL     NEUTROPHILS 81 (H) 32 - 75 %     LYMPHOCYTES 10 (L) 12 - 49 %     MONOCYTES 7 5 - 13 %     EOSINOPHILS 1 0 - 7 %     BASOPHILS 1 0 - 1 %     IMMATURE GRANULOCYTES 0 0.0 - 0.5 %     ABS. NEUTROPHILS 10.4 (H) 1.8 - 8.0 K/UL     ABS. LYMPHOCYTES 1.3 0.8 - 3.5 K/UL     ABS. MONOCYTES 0.9 0.0 - 1.0 K/UL     ABS. EOSINOPHILS 0.1 0.0 - 0.4 K/UL     ABS. BASOPHILS 0.1 0.0 - 0.1 K/UL     ABS. IMM. GRANS. 0.1 (H) 0.00 - 0.04 K/UL     DF AUTOMATED     EKG, 12 LEAD, INITIAL     Collection Time: 09/24/21  8:10 PM   Result Value Ref Range     Ventricular Rate 97 BPM     QRS Duration 72 ms     Q-T Interval 328 ms     QTC Calculation (Bezet) 416 ms     Calculated R Axis 54 degrees     Calculated T Axis 77 degrees     Diagnosis           Accelerated Junctional rhythm  Cannot rule out Inferior infarct , age undetermined  Abnormal ECG  When compared with ECG of 07-SEP-2019 08:44,  Junctional rhythm has replaced Sinus rhythm               Impression:      Left Femur Intertrochanteric fracture     Plan:   I explained the nature of the injury and discussed the [facility] with [facility] I discussed potential risks/benefits/alternatives of surgery as well as expected hospital course. [facility]. Plan for Left Hip Nail on 9/25 at 0800 with Dr. Xin Galvez evaluation/clearance pending. Please advise if Orthopedic PA or Surgeon if surgery needs to be delayed for medical optimization. Bedrest.  NPO p MN. Ice. SCDs OK.  Hold Chem DVT ppx.

## 2021-09-25 NOTE — PROGRESS NOTES
TRANSFER - IN REPORT:    Verbal report received from Antonieta(name) on Hilton Butt  being received from (unit) for routine progression of care      Report consisted of patients Situation, Background, Assessment and   Recommendations(SBAR). Information from the following report(s) SBAR, Kardex and ED Summary was reviewed with the receiving nurse. Opportunity for questions and clarification was provided. Assessment completed upon patients arrival to unit and care assumed.

## 2021-09-25 NOTE — PROGRESS NOTES
Brief Ortho Note:   - No acute issues overnight per nursing; pt comfortable in bed  - Cardiology Consult placed overnight; will await their preop evaluation prior to proceeding with surgery. - Continue to keep patient NPO, Hold chem DVT ppx for anticipated surgery this afternoon.      Signed By: BRYAN Vasquez     September 25, 2021

## 2021-09-25 NOTE — ANESTHESIA POSTPROCEDURE EVALUATION
Procedure(s):  Left Hip IM Nail, [place] table/ C-arm  Gamma Nail ([employer] rep aware). general    <BSHSIANPOST>    INITIAL Post-op Vital signs:   Vitals Value Taken Time   /67 09/25/21 1440   Temp 36.7 °C (98 °F) 09/25/21 1427   Pulse 121 09/25/21 1445   Resp 21 09/25/21 1445   SpO2 93 % 09/25/21 1445   Vitals shown include unvalidated device data.

## 2021-09-25 NOTE — ANESTHESIA PREPROCEDURE EVALUATION
Relevant Problems   RESPIRATORY SYSTEM   (+) COPD (chronic obstructive pulmonary disease) (HCC)      NEUROLOGY   (+) Late onset Alzheimer's disease with behavioral disturbance (HCC)   (+) Moderate recurrent major depression (HCC)      CARDIOVASCULAR   (+) CAD (coronary artery disease)   (+) Chronic systolic congestive heart failure (HCC)      RENAL FAILURE   (+) Stage 3a chronic kidney disease (HCC)      ENDOCRINE   (+) Acquired hypothyroidism      PERSONAL HX & FAMILY HX OF CANCER   (+) Prostate cancer (HCC)       Anesthetic History   No history of anesthetic complications            Review of Systems / Medical History  Patient summary reviewed, nursing notes reviewed and pertinent labs reviewed    Pulmonary  Within defined limits  COPD               Neuro/Psych   Within defined limits           Cardiovascular  Within defined limits          Dysrhythmias   CAD      Comments: Cardiology consult noted. GI/Hepatic/Renal  Within defined limits              Endo/Other  Within defined limits  Diabetes         Other Findings            Physical Exam    Airway  Mallampati: II  TM Distance: > 6 cm  Neck ROM: normal range of motion   Mouth opening: Normal     Cardiovascular  Regular rate and rhythm,  S1 and S2 normal,  no murmur, click, rub, or gallop             Dental  No notable dental hx       Pulmonary  Breath sounds clear to auscultation               Abdominal  GI exam deferred       Other Findings            Anesthetic Plan    ASA: 3  Anesthesia type: general            Anesthetic plan and risks discussed with: Healthcare power of     Daughter Ruba Caputo via phone.

## 2021-09-25 NOTE — ROUTINE PROCESS
TRANSFER - OUT REPORT:    Verbal report given to Felix Falcon RN (name) on Esteban Ek  being transferred to Holding(unit) for ordered procedure       Report consisted of patients Situation, Background, Assessment and   Recommendations(SBAR). Information from the following report(s) SBAR was reviewed with the receiving nurse. Lines:   Peripheral IV 09/24/21 Right Antecubital (Active)   Site Assessment Clean, dry, & intact 09/24/21 1929   Phlebitis Assessment 0 09/24/21 1929   Infiltration Assessment 0 09/24/21 1929   Dressing Status Clean, dry, & intact 09/24/21 1929   Hub Color/Line Status Pink 09/24/21 1929   Action Taken Blood drawn 09/24/21 1929        Opportunity for questions and clarification was provided.       Patient transported with:   Registered Nurse

## 2021-09-25 NOTE — PERIOP NOTES
TRANSFER - OUT REPORT:    Verbal report given to KATERINA R [facility] (name) on Martin Byrd  being transferred to [address] [place] [address] (unit) for routine post - op       Report consisted of patients Situation, Background, Assessment and   Recommendations(SBAR). Time Pre op antibiotic given:1337  Anesthesia Stop time: 5380  Maldonado Present on Transfer to floor:y  Order for Maldonado on Chart:y  Discharge Prescriptions with Chart:n    Information from the following report(s) SBAR, OR Summary, Intake/Output, MAR and Accordion was reviewed with the receiving nurse. Opportunity for questions and clarification was provided. Is the patient on 02? NO       L/Min        Other     Is the patient on a monitor? NO    Is the nurse transporting with the patient? NO    Surgical Waiting Area notified of patient's transfer from PACU?  NO      The following personal items collected during your admission accompanied patient upon transfer:   Dental Appliance: Dental Appliances: None  Vision:    Hearing Aid:    Jewelry:    Clothing:    Other Valuables:    Valuables sent to safe:

## 2021-09-25 NOTE — BRIEF OP NOTE
Brief Postoperative Note    Patient: Esteban Ring  YOB: 1940  MRN: [id number]    Date of Procedure: 9/25/2021     Pre-Op Diagnosis: Fractured Left Femur    Post-Op Diagnosis: Nondisplaced left intertrochanteric femur fx      Procedure(s):  Left Hip IM Nail, [place] table/ C-arm  Gamma Nail ([employer] rep aware)    Surgeon(s):  Nik Ngo MD    Surgical Assistant: None    Anesthesia: General     Estimated Blood Loss (mL): less than 694     Complications: None    Specimens: * No specimens in log *     Implants:   Implant Name Type Inv.  Item Serial No.  Lot No. LRB No. Used Action   NAIL TROCH REINA 3 125D [id number] -- STRL - SNA  NAIL TROCH REINA 3 125D [id number] -- STRL NA [employer] [facility] [id number] Left 1 Implanted   SCR BNE LAG GAMMA 3 [id number] -- TI STRL - SNA  SCR BNE LAG GAMMA 3 [id number] -- TI STRL NA [employer] [facility] [id number] Left 1 Implanted   SCR BNE LCK T2 FT [id number] TI -- STRL - SNA  SCR BNE LCK T2 FT [id number] TI -- STRL NA [employer] [facility] [id number] Left 1 Implanted       Drains: * No LDAs found *    Findings: as above    Electronically Signed by Maddi Eli MD on 9/25/2021 at 2:28 PM

## 2021-09-25 NOTE — PROGRESS NOTES
Patient 's News 3 because heart rate was 118. Pt c/ o of pain on lt hip. Tylenol were given and will continue to monitor patient heart rate.

## 2021-09-25 NOTE — PROGRESS NOTES
Bedside and Verbal shift change report given to Natalie Rivera RN (oncoming nurse) by Wilver Pedersen RN (offgoing nurse). Report included the following information SBAR, Kardex and Recent Results.

## 2021-09-25 NOTE — CONSULTS
[address] [facility] Cardiology Consultation    Date of Consult:  09/25/21  Date of Admission: 9/24/2021  Primary Cardiologist: Dr. Roselyn Samayoa [facility])  Physician Requesting consult: Raiza Tompkins MD    Chief Complaint / Reason for Consult:   Pre-operative cardiac risk assessment; left hip fracture    History of Present Illness: Valerie Gonzalez is a 79 y.o. male with the below listed medical history who was admitted with left hip fracture. He fell out of bed and broke his left hip. Imaging demonstrates an acute nondisplaced intertrochanteric left proximal femoral fracture with associated acute nondisplaced avulsion fracture of the left greater trochanter. History otherwise notable for dementia, COPD, DM2, CAD s/p CABG in 2000, ICM with reported EF of 35%, CKD. ECG shows baseline artifact with sinus rhythm, FDAB, possible IMI, age indeterminate. Denies chest pain or dyspnea. Not able to provide much history. Past Medical History:   Diagnosis Date    Atrial fibrillation ([address]     [facility]     Diabetes ([facility]     1970a    Heart failure ([facility]     Hypokalemia 12/25/2018    Hyponatremia 4/11/2017    MI (myocardial infarction) ([address] 01/2019    NSTEMI (non-ST elevated myocardial infarction) ([facility] 12/25/2018    Syncope 4/11/2017    Urinary incontinence        Prior to Admission medications    Medication Sig Start Date End Date Taking? Authorizing Provider   HYDROcodone-acetaminophen (NORCO) 5-325 mg per tablet Take 1 Tablet by mouth daily as needed for Pain for up to 30 days. 9/22/21 10/22/21  Yoni Helton MD   valproate (DEPAKENE) 250 mg/5 mL syrup TAKE 5ML BY MOUTH TWICE A DAY AS NEEDED FOR AGITATION 9/10/21   Yoni Helton MD   omeprazole (PRILOSEC) 20 mg capsule Take 1 Capsule by mouth daily.  Before breakfast 8/20/21   Yoni Helton MD   QUEtiapine (SEROquel) 50 mg tablet Take 50 mg in the morning and 75 mg in the evening. Patient taking differently: Take 50 mg in the morning and 50 mg in the evening. 7/23/21   Carter Garland MD   QUEtiapine (SEROquel) 25 mg tablet Take 25 mg in the afternoon. Patient taking differently: 25 mg nightly as needed. 7/23/21   Carter Garland MD   insulin glargine (LANTUS,BASAGLAR) 100 unit/mL (3 mL) inpn Inject 14 units every morning. 6/1/21   Carter Garland MD   Insulin Needles, Disposable, (BD Ultra-Fine Mini Pen Needle) 31 gauge x 3/16\" ndle USE TO INJECT INSULIN 4 TIMES A DAY 6/1/21   Carter Garland MD   insulin lispro (HUMALOG) 100 unit/mL injection INJECT 3 UNITS BENEATH THE SKIN FOR BS>200, 5 UNITS FOR BS>250, 6 UNITS FOR BS>300. CALL MD FOR BS >400 11/10/20   Cricket Mclaughlin NP   insulin lispro (HUMALOG) 100 unit/mL kwikpen 3 units sq for bs greater than 200 ; 5  untis sq for bs greater than 250,  6units for blood sugar greater than 300 ; call me for bs greater than 400 8/26/20   Cricket Mclaughlin NP   levothyroxine (SYNTHROID) 50 mcg tablet TAKE 1 TABLET BY MOUTH EVERY DAY BEFORE BREAKFAST 7/31/20   Santiago Smalls MD   glucose blood VI test strips (OneTouch Ultra Blue Test Strip) strip USE TO CHECK BLOOD SUGAR 3 TIMES DAILY. DIAGNOSIS CODE: E11.9 7/31/20   John Araujo NP   tamsulosin (Flomax) 0.4 mg capsule Take 1 Cap by mouth daily. 6/10/20   Cricket Mclaughlin NP   b complex vitamins (SUPER B-50 COMPLEX PLUS) tablet Take 1 Tab by mouth daily. 11/6/19   Cricket Mclaughlin NP   DULoxetine (CYMBALTA) 60 mg capsule TAKE ONE CAPSULE BY MOUTH EVERY DAY 3/15/19   Provider, Historical   SENNA PLUS 8.6-50 mg per tablet TAKE 2 TABS BY MOUTH TWO (2) TIMES A DAY 3/16/19   Provider, Historical   lidocaine (LIDODERM) 5 % Apply patch to the affected area for 12 hours a day and remove for 12 hours a day. Patient taking differently: 1 Patch by TransDERmal route every twenty-four (24) hours.  Apply patch to the affected area for 12 hours a day and remove for 12 hours a day. 4/8/19   Shelby Mclaughlin NP   midodrine (PROAMITINE) 5 mg tablet Take 5 mg by mouth daily as needed for Other (if blood pressure is below 90/50). take 5 mg daily PRN - if blood pressure is below 90/50    Provider, Historical   aspirin 81 mg chewable tablet Take 1 Tab by mouth daily. 2/12/19   Silva Perales MD   ondansetron (ZOFRAN ODT) 4 mg disintegrating tablet Take 1 Tab by mouth every six (6) hours as needed for Nausea. 2/12/19   Silva Perales MD   acetaminophen (TYLENOL) 500 mg tablet Take 1,000 mg by mouth every six to eight (6-8) hours as needed for Pain. Provider, Historical       Current Facility-Administered Medications   Medication Dose Route Frequency    sodium chloride (NS) flush 5-40 mL  5-40 mL IntraVENous Q8H    sodium chloride (NS) flush 5-40 mL  5-40 mL IntraVENous PRN    acetaminophen (TYLENOL) tablet 650 mg  650 mg Oral Q6H PRN    Or    acetaminophen (TYLENOL) suppository 650 mg  650 mg Rectal Q6H PRN    polyethylene glycol (MIRALAX) packet 17 g  17 g Oral DAILY PRN    ondansetron (ZOFRAN ODT) tablet 4 mg  4 mg Oral Q8H PRN    Or    ondansetron (ZOFRAN) injection 4 mg  4 mg IntraVENous Q6H PRN    aspirin chewable tablet 81 mg  81 mg Oral DAILY    DULoxetine (CYMBALTA) capsule 60 mg  60 mg Oral DAILY    . PHARMACY TO SUBSTITUTE PER PROTOCOL (Reordered from: insulin glargine (LANTUS,BASAGLAR) 100 unit/mL (3 mL) inpn)    Per Protocol    levothyroxine (SYNTHROID) tablet 50 mcg  50 mcg Oral ACB    midodrine (PROAMATINE) tablet 5 mg  5 mg Oral DAILY PRN    tamsulosin (FLOMAX) capsule 0.4 mg  0.4 mg Oral DAILY    . PHARMACY TO SUBSTITUTE PER PROTOCOL (Reordered from: valproate (DEPAKENE) 250 mg/5 mL syrup)    Per Protocol    QUEtiapine (SEROquel) tablet 50 mg  50 mg Oral DAILY    QUEtiapine (SEROquel) tablet 25 mg  25 mg Oral QHS PRN    QUEtiapine (SEROquel) tablet 50 mg  50 mg Oral QHS    insulin lispro (HUMALOG) injection   SubCUTAneous AC&HS    glucose chewable tablet 16 g  4 Tablet Oral PRN    dextrose (D50W) injection syrg 12.5-25 g  12.5-25 g IntraVENous PRN    glucagon (GLUCAGEN) injection 1 mg  1 mg IntraMUSCular PRN    0.9% sodium chloride infusion  75 mL/hr IntraVENous CONTINUOUS       Family History   Problem Relation Age of Onset    Diabetes Mother     Diabetes Brother        Social History     Socioeconomic History    Marital status:      Spouse name: Not on file    Number of children: Not on file    Years of education: Not on file    Highest education level: Not on file   Occupational History    Not on file   Tobacco Use    Smoking status: Former Smoker    Smokeless tobacco: Never Used    Tobacco comment: 1-2 cigars daily   Substance and Sexual Activity    Alcohol use: No     Alcohol/week: 0.0 standard drinks    Drug use: No    Sexual activity: Not on file   Other Topics Concern    Not on file   Social History Narrative    Not on file     Social Determinants of Health     Financial Resource Strain:     Difficulty of Paying Living Expenses:    Food Insecurity:     Worried About Running Out of Food in the Last Year:     Ran Out of Food in the Last Year:    Transportation Needs:     Lack of Transportation (Medical):      Lack of Transportation (Non-Medical):    Physical Activity:     Days of Exercise per Week:     Minutes of Exercise per Session:    Stress:     Feeling of Stress :    Social Connections:     Frequency of Communication with Friends and Family:     Frequency of Social Gatherings with Friends and Family:     Attends Sabianism Services:     Active Member of Clubs or Organizations:     Attends Club or Organization Meetings:     Marital Status:    Intimate Partner Violence:     Fear of Current or Ex-Partner:     Emotionally Abused:     Physically Abused:     Sexually Abused:        Review of Systems   Unable to perform ROS: Dementia       Visit Vitals  BP (!) 197/90   Pulse 96   Temp 98.5 °F (36.9 °C)   Resp 18   Ht 5' 4\" (1.626 m)   Wt 69.1 kg (152 lb 6.4 oz)   SpO2 95%   BMI 26.16 kg/m²       No intake or output data in the 24 hours ending 09/25/21 1035     Physical Exam  GEN: NAD, appears stated age  [de-identified]: EOMI, normal JVP    *Patient would not allow me to use my stethoscope and kept pulling it away saying \"get that off\"    ABD: NABS, soft, NT/ND  EXT: No edema   NEURO: Not oriented    Lab Review:  BMP:   Lab Results   Component Value Date/Time     (L) 09/24/2021 07:45 PM    K 5.0 09/24/2021 07:45 PM     09/24/2021 07:45 PM    CO2 26 09/24/2021 07:45 PM    AGAP 1 (L) 09/24/2021 07:45 PM     (H) 09/24/2021 07:45 PM    BUN 16 09/24/2021 07:45 PM    CREA 1.79 (H) 09/24/2021 07:45 PM    GFRAA 45 (L) 09/24/2021 07:45 PM    GFRNA 37 (L) 09/24/2021 07:45 PM        CBC:  Lab Results   Component Value Date/Time    WBC 12.8 (H) 09/24/2021 07:45 PM    HGB 11.8 (L) 09/24/2021 07:45 PM    HCT 35.7 (L) 09/24/2021 07:45 PM    PLATELET 510 66/46/7654 07:45 PM    MCV 89.3 09/24/2021 07:45 PM       All Cardiac Markers in the last 24 hours:  No results found for: CPK, CK, CKMMB, CKMB, RCK3, CKMBT, CKMBPOC, CKNDX, CKND1, MONROE, TROPT, TROIQ, RADHA, TROPT, TNIPOC, BNP, BNPP, BNPNT    Lab Results   Component Value Date/Time    Cholesterol, total 170 09/08/2020 04:30 PM    HDL Cholesterol 51 09/08/2020 04:30 PM    LDL, calculated 103 (H) 09/08/2020 04:30 PM    LDL, calculated 93 02/12/2019 08:49 AM    VLDL, calculated 16 09/08/2020 04:30 PM    VLDL, calculated 22 02/12/2019 08:49 AM    Triglyceride 89 09/08/2020 04:30 PM    CHOL/HDL Ratio 2.3 07/18/2017 02:14 AM        Data Review:  ECG tracing personally reviewed:   As above    Echocardiogram:  TTE 12/26/18:  SUMMARY:  Left ventricle: Systolic function was mildly reduced. Ejection fraction as estimated in the range of 40 % to 45 %. There was severe hypokinesis of the basal-mid anteroseptal wall(s).  There was mild hypokinesis of the mid-apical anterior and apical septal wall(s). Other imaging: As above. Assessment:    1. Left hip fracture  2. CAD s/p CABG in 2000  3. ICM with reported EF of 35%, NYHA class III  4. CKD  5. DM2 with other circulatory complications  6. CKD stage 3b  7. COPD  8. Dementia    Recommendations / Plan:  - No evidence for decompensated cardiovascular conditions at the present time, however, evaluation was limited due to patient not cooperating with exam  - High risk for brittney-operative cardiac complications, however, his risks appear to be optimized at the present time  - Poor prognosis given his advanced dementia  - Continue outpatient CV medications  - TTE ordered; if EF depressed would start ARB and BB  - Follow-up with [facility] on discharge    Thank you for the opportunity to participate in the care of Giovani Lester and please do not hesitate to contact us should you have any questions. Signed:  Ryan Palomino.  Any Helms, [address] [place] [address] [facility]  09/25/21

## 2021-09-25 NOTE — PROGRESS NOTES
TRANSFER - IN REPORT:    Verbal report received from Amara Page, [facility] (name) on Reid Segundo  being received from PACU (unit) for ordered procedure      Report consisted of patients Situation, Background, Assessment and   Recommendations(SBAR). Information from the following report(s) SBAR was reviewed with the receiving nurse. Opportunity for questions and clarification was provided. Assessment completed upon patients arrival to unit and care assumed.

## 2021-09-25 NOTE — H&P
HISTORY AND PHYSICAL      PCP: Javier Hayes MD  History source: ER report, the patient is a poor historian      CC: left hip pain      HPI: 79 y.o man w/ CAD, CHF, DM, MI, dementia, who fell out of bed and developed left him pain. He is sleeping and screams in pain when awakened. He was found to have a left hip fracture in the ED. PMH/PSH:  Past Medical History:   Diagnosis Date    Atrial fibrillation ([address]     CAD (coronary artery disease)     Complicated grief 1/80/7759    COPD (chronic obstructive pulmonary disease) ([address]     Diabetes ([address]     1970a    [facility]     Hypokalemia 12/25/2018    Hyponatremia 4/11/2017    Ill-defined condition     SOB    MI (myocardial infarction) ([address] 01/2019    NSTEMI (non-ST elevated myocardial infarction) ([address] 12/25/2018    Pneumonia     Syncope 4/11/2017    Urinary incontinence     Volume depletion 4/11/2017     Past Surgical History:   Procedure Laterality Date    HX HEENT  1975    nasal surgery    HX MOHS PROCEDURES  2013    left    NE CARDIAC SURG PROCEDURE UNLIST  2000    open heart       Home meds:   Prior to Admission medications    Medication Sig Start Date End Date Taking? Authorizing Provider   HYDROcodone-acetaminophen (NORCO) 5-325 mg per tablet Take 1 Tablet by mouth daily as needed for Pain for up to 30 days. 9/22/21 10/22/21  Javier Hayes MD   valproate (DEPAKENE) 250 mg/5 mL syrup TAKE 5ML BY MOUTH TWICE A DAY AS NEEDED FOR AGITATION 9/10/21   Javier Hayes MD   omeprazole (PRILOSEC) 20 mg capsule Take 1 Capsule by mouth daily. Before breakfast 8/20/21   Javier Hayes MD   QUEtiapine (SEROquel) 50 mg tablet Take 50 mg in the morning and 75 mg in the evening. 7/23/21   Javier Hayes MD   QUEtiapine (SEROquel) 25 mg tablet Take 25 mg in the afternoon. 7/23/21   Javier Hayes MD   insulin glargine (LANTUS,BASAGLAR) 100 unit/mL (3 mL) inpn Inject 14 units every morning.  6/1/21 Caroline Helms MD   Insulin Needles, Disposable, (BD Ultra-Fine Mini Pen Needle) 31 gauge x 3/16\" ndle USE TO INJECT INSULIN 4 TIMES A DAY 6/1/21   Caroline Helms MD   insulin lispro (HUMALOG) 100 unit/mL injection INJECT 3 UNITS BENEATH THE SKIN FOR BS>200, 5 UNITS FOR BS>250, 6 UNITS FOR BS>300. CALL MD FOR BS >400 11/10/20   Bernard Mclaughlin NP   insulin lispro (HUMALOG) 100 unit/mL kwikpen 3 units sq for bs greater than 200 ; 5  untis sq for bs greater than 250,  6units for blood sugar greater than 300 ; call me for bs greater than 400 8/26/20   Bernard Mclaughlin NP   levothyroxine (SYNTHROID) 50 mcg tablet TAKE 1 TABLET BY MOUTH EVERY DAY BEFORE BREAKFAST 7/31/20   Zoila Fung MD   glucose blood VI test strips (OneTouch Ultra Blue Test Strip) strip USE TO CHECK BLOOD SUGAR 3 TIMES DAILY. DIAGNOSIS CODE: E11.9 7/31/20   Noah Francisco NP   tamsulosin (Flomax) 0.4 mg capsule Take 1 Cap by mouth daily. 6/10/20   Bernard Mclaughlin NP   b complex vitamins (SUPER B-50 COMPLEX PLUS) tablet Take 1 Tab by mouth daily. 11/6/19   Bernard Mclaughlin NP   DULoxetine (CYMBALTA) 60 mg capsule TAKE ONE CAPSULE BY MOUTH EVERY DAY 3/15/19   Provider, Historical   SENNA PLUS 8.6-50 mg per tablet TAKE 2 TABS BY MOUTH TWO (2) TIMES A DAY 3/16/19   Provider, Historical   lidocaine (LIDODERM) 5 % Apply patch to the affected area for 12 hours a day and remove for 12 hours a day. Patient taking differently: 1 Patch by TransDERmal route every twenty-four (24) hours. Apply patch to the affected area for 12 hours a day and remove for 12 hours a day. 4/8/19   Bernard Mclaughlin NP   midodrine (PROAMITINE) 5 mg tablet Take 5 mg by mouth daily as needed for Other (if blood pressure is below 90/50). take 5 mg daily PRN - if blood pressure is below 90/50    Provider, Historical   aspirin 81 mg chewable tablet Take 1 Tab by mouth daily.  2/12/19   Zoila Fung MD   ondansetron (ZOFRAN ODT) 4 mg disintegrating tablet Take 1 Tab by mouth every six (6) hours as needed for Nausea. 2/12/19   Lm Garibay MD   acetaminophen (TYLENOL) 500 mg tablet Take 1,000 mg by mouth every six to eight (6-8) hours as needed for Pain. Provider, Historical       Allergies: Allergies   Allergen Reactions    Sulfa (Sulfonamide Antibiotics) Unknown (comments)       FH:  Family History   Problem Relation Age of Onset    Diabetes Mother     Diabetes Brother        SH:  Social History     Tobacco Use    Smoking status: Former Smoker    Smokeless tobacco: Never Used    Tobacco comment: 1-2 cigars daily   Substance Use Topics    Alcohol use: No     Alcohol/week: 0.0 standard drinks       ROS: A comprehensive review of systems was negative except for that written in the HPI. PHYSICAL EXAM:  Visit Vitals  BP (!) 170/73   Pulse 83   Temp 97.6 °F (36.4 °C)   Resp 18   SpO2 96%       Gen: NAD, non-toxic  HEENT: anicteric sclerae  Neck: supple,  Heart: RRR, 3/6 systolic murmur, no JVD, no peripheral edema  Lungs: CTA b/l anteriorly, non-labored respirations  Abd: soft, NT, ND, BS+  Extr: warm  Skin: dry, no rash  Neuro: grossly non-focal  Psych: intermittently agitated      Labs/Imaging:  Recent Results (from the past 24 hour(s))   SAMPLES BEING HELD    Collection Time: 09/24/21  7:35 PM   Result Value Ref Range    SAMPLES BEING HELD 1red     COMMENT        Add-on orders for these samples will be processed based on acceptable specimen integrity and analyte stability, which may vary by analyte.    TYPE & SCREEN    Collection Time: 09/24/21  7:38 PM   Result Value Ref Range    Crossmatch Expiration 09/27/2021,2359     ABO/Rh(D) [place] POSITIVE     Antibody screen NEG    PROTHROMBIN TIME + INR    Collection Time: 09/24/21  7:45 PM   Result Value Ref Range    INR 1.0 0.9 - 1.1      Prothrombin time 10.7 9.0 - 11.1 sec   MAGNESIUM    Collection Time: 09/24/21  7:45 PM   Result Value Ref Range    Magnesium 1.7 1.6 - 2.4 mg/dL   METABOLIC PANEL, COMPREHENSIVE Collection Time: 09/24/21  7:45 PM   Result Value Ref Range    Sodium 135 (L) 136 - 145 mmol/L    Potassium 5.0 3.5 - 5.1 mmol/L    Chloride 108 97 - 108 mmol/L    CO2 26 21 - 32 mmol/L    Anion gap 1 (L) 5 - 15 mmol/L    Glucose 318 (H) 65 - 100 mg/dL    BUN 16 6 - 20 MG/DL    Creatinine 1.79 (H) 0.70 - 1.30 MG/DL    BUN/Creatinine ratio 9 (L) 12 - 20      GFR est AA 45 (L) >60 ml/min/1.73m2    GFR est non-AA 37 (L) >60 ml/min/1.73m2    Calcium 8.6 8.5 - 10.1 MG/DL    Bilirubin, total 0.3 0.2 - 1.0 MG/DL    ALT (SGPT) 20 12 - 78 U/L    AST (SGOT) 23 15 - 37 U/L    Alk. phosphatase 103 45 - 117 U/L    Protein, total 7.3 6.4 - 8.2 g/dL    Albumin 3.0 (L) 3.5 - 5.0 g/dL    Globulin 4.3 (H) 2.0 - 4.0 g/dL    A-G Ratio 0.7 (L) 1.1 - 2.2     CBC WITH AUTOMATED DIFF    Collection Time: 09/24/21  7:45 PM   Result Value Ref Range    WBC 12.8 (H) 4.1 - 11.1 K/uL    RBC 4.00 (L) 4.10 - 5.70 M/uL    HGB 11.8 (L) 12.1 - 17.0 g/dL    HCT 35.7 (L) 36.6 - 50.3 %    MCV 89.3 80.0 - 99.0 FL    MCH 29.5 26.0 - 34.0 PG    MCHC 33.1 30.0 - 36.5 g/dL    RDW 14.9 (H) 11.5 - 14.5 %    PLATELET 955 226 - 935 K/uL    MPV 12.0 8.9 - 12.9 FL    NRBC 0.0 0  WBC    ABSOLUTE NRBC 0.00 0.00 - 0.01 K/uL    NEUTROPHILS 81 (H) 32 - 75 %    LYMPHOCYTES 10 (L) 12 - 49 %    MONOCYTES 7 5 - 13 %    EOSINOPHILS 1 0 - 7 %    BASOPHILS 1 0 - 1 %    IMMATURE GRANULOCYTES 0 0.0 - 0.5 %    ABS. NEUTROPHILS 10.4 (H) 1.8 - 8.0 K/UL    ABS. LYMPHOCYTES 1.3 0.8 - 3.5 K/UL    ABS. MONOCYTES 0.9 0.0 - 1.0 K/UL    ABS. EOSINOPHILS 0.1 0.0 - 0.4 K/UL    ABS. BASOPHILS 0.1 0.0 - 0.1 K/UL    ABS. IMM.  GRANS. 0.1 (H) 0.00 - 0.04 K/UL    DF AUTOMATED     EKG, 12 LEAD, INITIAL    Collection Time: 09/24/21  8:10 PM   Result Value Ref Range    Ventricular Rate 97 BPM    QRS Duration 72 ms    Q-T Interval 328 ms    QTC Calculation (Bezet) 416 ms    Calculated R Axis 54 degrees    Calculated T Axis 77 degrees    Diagnosis       Accelerated Junctional rhythm  Cannot rule out Inferior infarct , age undetermined  Abnormal ECG  When compared with ECG of 07-SEP-2019 08:44,  Junctional rhythm has replaced Sinus rhythm         Recent Labs     09/24/21 1945   WBC 12.8*   HGB 11.8*   HCT 35.7*        Recent Labs     09/24/21 1945   *   K 5.0      CO2 26   BUN 16   CREA 1.79*   *   CA 8.6   MG 1.7     Recent Labs     09/24/21 1945   ALT 20      TBILI 0.3   TP 7.3   ALB 3.0*   GLOB 4.3*       No results for input(s): CPK, CKNDX, TROIQ in the last 72 hours. No lab exists for component: CPKMB    Recent Labs     09/24/21 1945   INR 1.0   PTP 10.7        No results for input(s): PH, PCO2, PO2 in the last 72 hours. XR FEMUR LT 2 V    Result Date: 9/24/2021  Nondisplaced fracture through the intertrochanteric left proximal femur. CT HEAD WO CONT    Result Date: 9/24/2021  1. No evidence of acute infarct or intracranial hemorrhage. 2. Periventricular white matter disease is likely secondary to chronic small vessel ischemic changes. CT SPINE CERV WO CONT    Result Date: 9/24/2021  1. No acute osseous abnormality. 2. Multilevel degenerative spondylosis. CT SPINE [facility] WO CONT    Result Date: 9/24/2021  1. No acute osseous abnormality in the thoracic spine. 2. Moderate layering bilateral pleural effusions with bilateral lower lobe atelectasis. CT SPINE LUMB WO CONT    Result Date: 9/24/2021  1. No acute osseous abnormality. 2. Mild multilevel degenerative spondylosis. CT PELV WO CONT    Result Date: 9/24/2021  1. Acute nondisplaced intertrochanteric left proximal femoral fracture. 2. Associated acute nondisplaced avulsion fracture of the left greater trochanter. 3. Intact right total hip arthroplasty. XR CHEST PORT    Result Date: 9/24/2021  Trace right pleural effusion.            Assessment & Plan:     L hip fracture:  -pain control  -surgical plan per ortho  -no acute cardiac issues apparent but given his cardiac history and inability to provide history will ask cardiology to assess his perioperative risk    Afib: appears to be in sinus rhythm    CAD    COPD    Type 2 DM:  -Lantus  -SSI/POC checks    HTN:  -PRN IV hydralazine    Dementia with behavioral disturbances    DVT ppx: SCDs  Code status: full  Disposition: will likely need rehab    Signed By: Ji Quezada MD     September 24, 2021

## 2021-09-25 NOTE — PROGRESS NOTES
Hospitalist Progress Note      Hospital summary: 79 y.o man w/ CAD, CHF, DM, MI, dementia, who fell out of bed and developed left him pain. He is sleeping and screams in pain when awakened. He was found to have a left hip fracture in the ED.  9/24/2021      Assessment/Plan:  L hip fracture s/p Left Hip Nail on 9/25   Secondary to fall   - CT: Acute nondisplaced intertrochanteric left proximal femoral fracture. Associated acute nondisplaced avulsion fracture of the left greater trochanter.  - appreciate ortho input  - pain control     Afib: appears to be in sinus rhythm  CAD s/p CABG  ICM with EF 35%  - appreciate cardiology input - cleared for surgery   - echo ordered       Type 2 DM:  -Lantus 10U and ISS     Elevated BP:  -PRN IV hydralazine     Dementia with behavioral disturbances  - supportive care   - c/w home seroquel     Hypothyroidism  - synthroid     Code status: Full  DVT prophylaxis: SCDs  Disposition: TBD. May need rehab. PT/OT eval   ----------------------------------------------    CC: Left femur fracture     S: Patient is seen and examined at bedside this AM. Pt is alert but has dementia. Denied any pain  Discussed with nursing - elevated BP    Spoke with daughter Radha Raphael on phone and updated patient's status - just underwent left hip nail placement, medically likely will be ready in 2 days, daughter skeptical regarding SNF and will think about it      Review of Systems:  Review of systems not obtained due to patient factors.     O:  Visit Vitals  /67   Pulse (!) 120   Temp 98 °F (36.7 °C)   Resp 14   Ht 5' 4\" (1.626 m)   Wt 69.1 kg (152 lb 6.4 oz)   SpO2 93%   BMI 26.16 kg/m²       PHYSICAL EXAM:  Gen: NAD, elderly   HEENT: anicteric sclerae, normal conjunctiva, oropharynx clear, MM moist  Neck: supple, trachea midline, no adenopathy  Heart: RRR, no MRG, no JVD, no peripheral edema  Lungs: CTA b/l, non-labored respirations  Abd: soft, NT, ND, BS+, no organomegaly  Extr: warm  Skin: dry, no rash  Neuro: dementia, normal speech, moves all extremities  Psych: normal mood, appropriate affect      Intake/Output Summary (Last 24 hours) at 9/25/2021 1451  Last data filed at 9/25/2021 1429  Gross per 24 hour   Intake    Output 525 ml   Net -525 ml        Recent labs & imaging reviewed:  Recent Results (from the past 24 hour(s))   SAMPLES BEING HELD    Collection Time: 09/24/21  7:35 PM   Result Value Ref Range    SAMPLES BEING HELD 1red     COMMENT        Add-on orders for these samples will be processed based on acceptable specimen integrity and analyte stability, which may vary by analyte. TYPE & SCREEN    Collection Time: 09/24/21  7:38 PM   Result Value Ref Range    Crossmatch Expiration 09/27/2021,2359     ABO/Rh(D) Ruthie Gaudencio POSITIVE     Antibody screen NEG    PROTHROMBIN TIME + INR    Collection Time: 09/24/21  7:45 PM   Result Value Ref Range    INR 1.0 0.9 - 1.1      Prothrombin time 10.7 9.0 - 11.1 sec   MAGNESIUM    Collection Time: 09/24/21  7:45 PM   Result Value Ref Range    Magnesium 1.7 1.6 - 2.4 mg/dL   METABOLIC PANEL, COMPREHENSIVE    Collection Time: 09/24/21  7:45 PM   Result Value Ref Range    Sodium 135 (L) 136 - 145 mmol/L    Potassium 5.0 3.5 - 5.1 mmol/L    Chloride 108 97 - 108 mmol/L    CO2 26 21 - 32 mmol/L    Anion gap 1 (L) 5 - 15 mmol/L    Glucose 318 (H) 65 - 100 mg/dL    BUN 16 6 - 20 MG/DL    Creatinine 1.79 (H) 0.70 - 1.30 MG/DL    BUN/Creatinine ratio 9 (L) 12 - 20      GFR est AA 45 (L) >60 ml/min/1.73m2    GFR est non-AA 37 (L) >60 ml/min/1.73m2    Calcium 8.6 8.5 - 10.1 MG/DL    Bilirubin, total 0.3 0.2 - 1.0 MG/DL    ALT (SGPT) 20 12 - 78 U/L    AST (SGOT) 23 15 - 37 U/L    Alk.  phosphatase 103 45 - 117 U/L    Protein, total 7.3 6.4 - 8.2 g/dL    Albumin 3.0 (L) 3.5 - 5.0 g/dL    Globulin 4.3 (H) 2.0 - 4.0 g/dL    A-G Ratio 0.7 (L) 1.1 - 2.2     CBC WITH AUTOMATED DIFF    Collection Time: 09/24/21  7:45 PM   Result Value Ref Range WBC 12.8 (H) 4.1 - 11.1 K/uL    RBC 4.00 (L) 4.10 - 5.70 M/uL    HGB 11.8 (L) 12.1 - 17.0 g/dL    HCT 35.7 (L) 36.6 - 50.3 %    MCV 89.3 80.0 - 99.0 FL    MCH 29.5 26.0 - 34.0 PG    MCHC 33.1 30.0 - 36.5 g/dL    RDW 14.9 (H) 11.5 - 14.5 %    PLATELET 265 291 - 622 K/uL    MPV 12.0 8.9 - 12.9 FL    NRBC 0.0 0  WBC    ABSOLUTE NRBC 0.00 0.00 - 0.01 K/uL    NEUTROPHILS 81 (H) 32 - 75 %    LYMPHOCYTES 10 (L) 12 - 49 %    MONOCYTES 7 5 - 13 %    EOSINOPHILS 1 0 - 7 %    BASOPHILS 1 0 - 1 %    IMMATURE GRANULOCYTES 0 0.0 - 0.5 %    ABS. NEUTROPHILS 10.4 (H) 1.8 - 8.0 K/UL    ABS. LYMPHOCYTES 1.3 0.8 - 3.5 K/UL    ABS. MONOCYTES 0.9 0.0 - 1.0 K/UL    ABS. EOSINOPHILS 0.1 0.0 - 0.4 K/UL    ABS. BASOPHILS 0.1 0.0 - 0.1 K/UL    ABS. IMM.  GRANS. 0.1 (H) 0.00 - 0.04 K/UL    DF AUTOMATED     EKG, 12 LEAD, INITIAL    Collection Time: 09/24/21  8:10 PM   Result Value Ref Range    Ventricular Rate 97 BPM    QRS Duration 72 ms    Q-T Interval 328 ms    QTC Calculation (Bezet) 416 ms    Calculated R Axis 54 degrees    Calculated T Axis 77 degrees    Diagnosis       Accelerated Junctional rhythm  Cannot rule out Inferior infarct , age undetermined  Abnormal ECG  When compared with ECG of 07-SEP-2019 08:44,  Junctional rhythm has replaced Sinus rhythm     URINALYSIS W/ REFLEX CULTURE    Collection Time: 09/24/21 10:43 PM    Specimen: Urine   Result Value Ref Range    Color YELLOW/STRAW      Appearance CLEAR CLEAR      Specific gravity 1.017 1.003 - 1.030      pH (UA) 5.5 5.0 - 8.0      Protein 30 (A) NEG mg/dL    Glucose >1,000 (A) NEG mg/dL    Ketone TRACE (A) NEG mg/dL    Bilirubin Negative NEG      Blood SMALL (A) NEG      Urobilinogen 0.2 0.2 - 1.0 EU/dL    Nitrites Negative NEG      Leukocyte Esterase Negative NEG      WBC 0-4 0 - 4 /hpf    RBC 0-5 0 - 5 /hpf    Epithelial cells FEW FEW /lpf    Bacteria Negative NEG /hpf    UA:UC IF INDICATED CULTURE NOT INDICATED BY UA RESULT CNI     COVID-19 RAPID TEST    Collection Time: 09/24/21 10:46 PM   Result Value Ref Range    Specimen source Nasopharyngeal      COVID-19 rapid test Not detected NOTD     GLUCOSE, POC    Collection Time: 09/25/21  6:49 AM   Result Value Ref Range    Glucose (POC) 210 (H) 65 - 117 mg/dL    Performed by Erika alvarado RN    GLUCOSE, POC    Collection Time: 09/25/21 12:15 PM   Result Value Ref Range    Glucose (POC) 136 (H) 65 - 117 mg/dL    Performed by Juan Belle, POC    Collection Time: 09/25/21  2:27 PM   Result Value Ref Range    Glucose (POC) 183 (H) 65 - 117 mg/dL    Performed by CECILLE Perez 505     09/24/21 1945   WBC 12.8*   HGB 11.8*   HCT 35.7*        Recent Labs     09/24/21 1945   *   K 5.0      CO2 26   BUN 16   CREA 1.79*   *   CA 8.6   MG 1.7     Recent Labs     09/24/21 1945   ALT 20      TBILI 0.3   TP 7.3   ALB 3.0*   GLOB 4.3*     Recent Labs     09/24/21 1945   INR 1.0   PTP 10.7      No results for input(s): FE, TIBC, PSAT, FERR in the last 72 hours. Lab Results   Component Value Date/Time    Folate 21.9 (H) 02/04/2019 07:35 PM      No results for input(s): PH, PCO2, PO2 in the last 72 hours. No results for input(s): CPK, CKNDX, TROIQ in the last 72 hours.     No lab exists for component: CPKMB  Lab Results   Component Value Date/Time    Cholesterol, total 170 09/08/2020 04:30 PM    HDL Cholesterol 51 09/08/2020 04:30 PM    LDL, calculated 103 (H) 09/08/2020 04:30 PM    LDL, calculated 93 02/12/2019 08:49 AM    Triglyceride 89 09/08/2020 04:30 PM    CHOL/HDL Ratio 2.3 07/18/2017 02:14 AM     Lab Results   Component Value Date/Time    Glucose (POC) 183 (H) 09/25/2021 02:27 PM    Glucose (POC) 136 (H) 09/25/2021 12:15 PM    Glucose (POC) 210 (H) 09/25/2021 06:49 AM    Glucose (POC) 129 (H) 09/07/2019 11:09 AM    Glucose (POC) 87 02/06/2019 06:49 AM     Lab Results   Component Value Date/Time    Color YELLOW/STRAW 09/24/2021 10:43 PM    Appearance CLEAR 09/24/2021 10:43 PM Specific gravity 1.017 09/24/2021 10:43 PM    Specific gravity 1.010 07/17/2017 04:12 PM    pH (UA) 5.5 09/24/2021 10:43 PM    Protein 30 (A) 09/24/2021 10:43 PM    Glucose >1,000 (A) 09/24/2021 10:43 PM    Ketone TRACE (A) 09/24/2021 10:43 PM    Bilirubin Negative 09/24/2021 10:43 PM    Urobilinogen 0.2 09/24/2021 10:43 PM    Nitrites Negative 09/24/2021 10:43 PM    Leukocyte Esterase Negative 09/24/2021 10:43 PM    Epithelial cells FEW 09/24/2021 10:43 PM    Bacteria Negative 09/24/2021 10:43 PM    WBC 0-4 09/24/2021 10:43 PM    RBC 0-5 09/24/2021 10:43 PM       Med list reviewed  Current Facility-Administered Medications   Medication Dose Route Frequency    hydrALAZINE (APRESOLINE) 20 mg/mL injection 20 mg  20 mg IntraVENous Q6H PRN    insulin glargine (LANTUS) injection 10 Units  10 Units SubCUTAneous DAILY    valproic acid (as sodium salt) (DEPAKENE) 250 mg/5 mL (5 mL) oral solution 250 mg  250 mg Oral BID PRN    lactated Ringers infusion  125 mL/hr IntraVENous CONTINUOUS    0.9% sodium chloride infusion  50 mL/hr IntraVENous CONTINUOUS    sodium chloride (NS) flush 5-40 mL  5-40 mL IntraVENous Q8H    sodium chloride (NS) flush 5-40 mL  5-40 mL IntraVENous PRN    lidocaine (PF) (XYLOCAINE) 10 mg/mL (1 %) injection 0.1 mL  0.1 mL SubCUTAneous PRN    fentaNYL citrate (PF) injection 50 mcg  50 mcg IntraVENous PRN    midazolam (VERSED) injection 1 mg  1 mg IntraVENous PRN    midazolam (VERSED) injection 1 mg  1 mg IntraVENous PRN    lactated Ringers infusion  75 mL/hr IntraVENous CONTINUOUS    0.9% sodium chloride infusion  25 mL/hr IntraVENous CONTINUOUS    sodium chloride (NS) flush 5-40 mL  5-40 mL IntraVENous Q8H    sodium chloride (NS) flush 5-40 mL  5-40 mL IntraVENous PRN    HYDROcodone-acetaminophen (NORCO) 5-325 mg per tablet 1 Tablet  1 Tablet Oral PRN    fentaNYL citrate (PF) injection 25 mcg  25 mcg IntraVENous Multiple    morphine injection 2 mg  2 mg IntraVENous Multiple    HYDROmorphone (DILAUDID) injection 0.2 mg  0.2 mg IntraVENous Multiple    ondansetron (ZOFRAN) injection 4 mg  4 mg IntraVENous PRN    midazolam (VERSED) injection 0.5 mg  0.5 mg IntraVENous Q5MIN PRN    diphenhydrAMINE (BENADRYL) injection 12.5 mg  12.5 mg IntraVENous PRN    meperidine (DEMEROL) injection 12.5 mg  12.5 mg IntraVENous PRN    sodium chloride (NS) flush 5-40 mL  5-40 mL IntraVENous Q8H    sodium chloride (NS) flush 5-40 mL  5-40 mL IntraVENous PRN    acetaminophen (TYLENOL) tablet 650 mg  650 mg Oral Q6H PRN    Or    acetaminophen (TYLENOL) suppository 650 mg  650 mg Rectal Q6H PRN    polyethylene glycol (MIRALAX) packet 17 g  17 g Oral DAILY PRN    ondansetron (ZOFRAN ODT) tablet 4 mg  4 mg Oral Q8H PRN    Or    ondansetron (ZOFRAN) injection 4 mg  4 mg IntraVENous Q6H PRN    aspirin chewable tablet 81 mg  81 mg Oral DAILY    levothyroxine (SYNTHROID) tablet 50 mcg  50 mcg Oral ACB    QUEtiapine (SEROquel) tablet 50 mg  50 mg Oral DAILY    QUEtiapine (SEROquel) tablet 25 mg  25 mg Oral QHS PRN    QUEtiapine (SEROquel) tablet 50 mg  50 mg Oral QHS    insulin lispro (HUMALOG) injection   SubCUTAneous AC&HS    glucose chewable tablet 16 g  4 Tablet Oral PRN    dextrose (D50W) injection syrg 12.5-25 g  12.5-25 g IntraVENous PRN    glucagon (GLUCAGEN) injection 1 mg  1 mg IntraMUSCular PRN    0.9% sodium chloride infusion  75 mL/hr IntraVENous CONTINUOUS       Care Plan discussed with:  Patient/Family and Nurse    Ayala Brady MD  Internal Medicine  Date of Service: 9/25/2021

## 2021-09-25 NOTE — ROUTINE PROCESS
TRANSFER - OUT REPORT:    Verbal report given to karely(name) on Giovani Lester  being transferred to  (unit) for routine progression of care       Report consisted of patients Situation, Background, Assessment and   Recommendations(SBAR). Information from the following report(s) SBAR, ED Summary, Intake/Output, MAR and Recent Results was reviewed with the receiving nurse. Lines:   Peripheral IV 09/24/21 Right Antecubital (Active)   Site Assessment Clean, dry, & intact 09/24/21 1929   Phlebitis Assessment 0 09/24/21 1929   Infiltration Assessment 0 09/24/21 1929   Dressing Status Clean, dry, & intact 09/24/21 1929   Hub Color/Line Status Pink 09/24/21 1929   Action Taken Blood drawn 09/24/21 1929        Opportunity for questions and clarification was provided.       Patient transported with:   Monitor  Registered Nurse

## 2021-09-25 NOTE — CONSULTS
ORTHO CONSULT NOTE    Date of Consultation:  2021  Referring Physician:  Yina Winters MD  CC: L hip pain    HPI:  Nadya Gurrola is a 79 y.o. male who fell out of bed at home and began complaining of L hip pain to family; he was brought in by EMS; the fall was unwitnessed; pt unable to answer any questions due to dementia. Per daughter that are not acute medical issues; last saw cardiologist several years ago; no recent complaints to family of cp, sob, unexplained extremity swelling, dizziness, cough, ha, n/v.    Current Anticoagulant Medications: ASA  Past Medical History:   Diagnosis Date    [facility] [address]     CAD (coronary artery disease)     Complicated grief 9415    COPD (chronic obstructive pulmonary disease) ([facility] [address]     Diabetes ([facility] [address]     1970    Heart failure ([facility] [address]     Hypokalemia 2018    Hyponatremia 2017    Ill-defined condition     SOB    MI (myocardial infarction) ([facility] [address] 2019    NSTEMI (non-ST elevated myocardial infarction) ([facility] [address] 2018    Pneumonia     Syncope 2017    Urinary incontinence     Volume depletion 2017      Past Surgical History:   Procedure Laterality Date    HX HEENT  1975    nasal surgery    HX MOHS PROCEDURES      left    MO CARDIAC SURG PROCEDURE UNLIST  2000    open heart      Family History   Problem Relation Age of Onset    Diabetes Mother     Diabetes Brother       Social History     Tobacco Use    Smoking status: Former Smoker    Smokeless tobacco: Never Used    Tobacco comment: 1-2 cigars daily   Substance Use Topics    Alcohol use: No     Alcohol/week: 0.0 standard drinks     Allergies   Allergen Reactions    Sulfa (Sulfonamide Antibiotics) Unknown (comments)        Review of Systems:  Per HPI.     Objective:     Patient Vitals for the past 8 hrs:   BP Temp Pulse Resp SpO2   21 1805 (!) 175/82 97.6 °F (36.4 °C) 83 18 97 %     Temp (24hrs), Av.6 °F (36.4 °C), Min:97.6 °F (36.4 °C), Max:97.6 °F (36.4 °C)      EXAM:   NAD, non-labored respirations; unable to answer questions. Moves BUE spontaneously with NTTP long bones and joints. RLE no pain PROM, NTTP long bones and joints. Moves foot OK with SILT and CR toes < 2 secs. LLE normal in alignment; TTP hip and distal femur; Hip skin intact and soft compartments. Knee, ankle and foot NTTP. Moves foot OK with SILT and CR toes < 2 secs. Bilat calf soft and NTTP. Imaging Review:   Results from Hospital Encounter encounter on 09/24/21    XR FEMUR LT 2 V    Narrative  EXAM: XR FEMUR LT 2 V    INDICATION: fall. COMPARISON: None. FINDINGS: Two views of the left femur demonstrate a nondisplaced curvilinear  fracture through the intertrochanteric left proximal femur. The bones are  osteopenic. There is left hip DJD. There are extensive vascular calcifications. The knee is intact. Partially visualized is a right total hip arthroplasty. Impression  Nondisplaced fracture through the intertrochanteric left proximal  femur. Results from [place] encounter on 09/24/21    CT PELV WO CONT    Narrative  INDICATION:  Fall with left hip pain    COMPARISON:  None    TECHNIQUE: Without IV contrast, 2.5 mm axial images were obtained through the  osseous pelvis. Coronal and sagittal reconstructions were generated. CT dose reduction was achieved through use of a standardized protocol tailored  for this examination and automatic exposure control for dose modulation. FINDINGS:    There is an acute nondisplaced intertrochanteric left proximal femoral fracture. There is an associated nondisplaced avulsion of the left greater trochanter. There is an intact right total hip arthroplasty. The bones are osteopenic. Impression  1. Acute nondisplaced intertrochanteric left proximal femoral fracture. 2. Associated acute nondisplaced avulsion fracture of the left greater  trochanter. 3. Intact right total hip arthroplasty.       Labs:   Recent Results (from the past 24 hour(s))   SAMPLES BEING HELD    Collection Time: 09/24/21  7:35 PM   Result Value Ref Range    SAMPLES BEING HELD 1red     COMMENT        Add-on orders for these samples will be processed based on acceptable specimen integrity and analyte stability, which may vary by analyte. TYPE & SCREEN    Collection Time: 09/24/21  7:38 PM   Result Value Ref Range    Crossmatch Expiration 09/27/2021,2359     ABO/Rh(D) Teryl Raleigh POSITIVE     Antibody screen NEG    PROTHROMBIN TIME + INR    Collection Time: 09/24/21  7:45 PM   Result Value Ref Range    INR 1.0 0.9 - 1.1      Prothrombin time 10.7 9.0 - 11.1 sec   MAGNESIUM    Collection Time: 09/24/21  7:45 PM   Result Value Ref Range    Magnesium 1.7 1.6 - 2.4 mg/dL   METABOLIC PANEL, COMPREHENSIVE    Collection Time: 09/24/21  7:45 PM   Result Value Ref Range    Sodium 135 (L) 136 - 145 mmol/L    Potassium 5.0 3.5 - 5.1 mmol/L    Chloride 108 97 - 108 mmol/L    CO2 26 21 - 32 mmol/L    Anion gap 1 (L) 5 - 15 mmol/L    Glucose 318 (H) 65 - 100 mg/dL    BUN 16 6 - 20 MG/DL    Creatinine 1.79 (H) 0.70 - 1.30 MG/DL    BUN/Creatinine ratio 9 (L) 12 - 20      GFR est AA 45 (L) >60 ml/min/1.73m2    GFR est non-AA 37 (L) >60 ml/min/1.73m2    Calcium 8.6 8.5 - 10.1 MG/DL    Bilirubin, total 0.3 0.2 - 1.0 MG/DL    ALT (SGPT) 20 12 - 78 U/L    AST (SGOT) 23 15 - 37 U/L    Alk.  phosphatase 103 45 - 117 U/L    Protein, total 7.3 6.4 - 8.2 g/dL    Albumin 3.0 (L) 3.5 - 5.0 g/dL    Globulin 4.3 (H) 2.0 - 4.0 g/dL    A-G Ratio 0.7 (L) 1.1 - 2.2     CBC WITH AUTOMATED DIFF    Collection Time: 09/24/21  7:45 PM   Result Value Ref Range    WBC 12.8 (H) 4.1 - 11.1 K/uL    RBC 4.00 (L) 4.10 - 5.70 M/uL    HGB 11.8 (L) 12.1 - 17.0 g/dL    HCT 35.7 (L) 36.6 - 50.3 %    MCV 89.3 80.0 - 99.0 FL    MCH 29.5 26.0 - 34.0 PG    MCHC 33.1 30.0 - 36.5 g/dL    RDW 14.9 (H) 11.5 - 14.5 %    PLATELET 811 001 - 659 K/uL    MPV 12.0 8.9 - 12.9 FL    NRBC 0.0 0  WBC    ABSOLUTE NRBC 0.00 0.00 - 0.01 K/uL    NEUTROPHILS 81 (H) 32 - 75 %    LYMPHOCYTES 10 (L) 12 - 49 %    MONOCYTES 7 5 - 13 %    EOSINOPHILS 1 0 - 7 %    BASOPHILS 1 0 - 1 %    IMMATURE GRANULOCYTES 0 0.0 - 0.5 %    ABS. NEUTROPHILS 10.4 (H) 1.8 - 8.0 K/UL    ABS. LYMPHOCYTES 1.3 0.8 - 3.5 K/UL    ABS. MONOCYTES 0.9 0.0 - 1.0 K/UL    ABS. EOSINOPHILS 0.1 0.0 - 0.4 K/UL    ABS. BASOPHILS 0.1 0.0 - 0.1 K/UL    ABS. IMM. GRANS. 0.1 (H) 0.00 - 0.04 K/UL    DF AUTOMATED     EKG, 12 LEAD, INITIAL    Collection Time: 09/24/21  8:10 PM   Result Value Ref Range    Ventricular Rate 97 BPM    QRS Duration 72 ms    Q-T Interval 328 ms    QTC Calculation (Bezet) 416 ms    Calculated R Axis 54 degrees    Calculated T Axis 77 degrees    Diagnosis       Accelerated Junctional rhythm  Cannot rule out Inferior infarct , age undetermined  Abnormal ECG  When compared with ECG of 07-SEP-2019 08:44,  Junctional rhythm has replaced Sinus rhythm         Impression:     Left Femur Intertrochanteric fracture    Plan:   I explained the nature of the injury and discussed the recommended surgery with the [facility]. I discussed potential risks/benefits/alternatives of surgery as well as expected hospital course. Patients [facility] consents. Plan for Left Hip Nail on 9/25 at 0800 with Dr. Antwon Anders evaluation/clearance pending. Please advise if Orthopedic PA or Surgeon if surgery needs to be delayed for medical optimization. Bedrest.  NPO p MN. Ice. SCDs OK. Hold Chem DVT ppx. Dr. Diana Benítez is aware and agrees with above plan.       BRYAN Newton  Orthopedic Trauma Service  [facility]

## 2021-09-26 NOTE — OP NOTES
[address]  OPERATIVE REPORT    Name:  Manjula Croft  MR#:  [id number]  :  1940  ACCOUNT #:  [de-identified]  DATE OF SERVICE:  2021    PREOPERATIVE DIAGNOSIS:  Nondisplaced left intertrochanteric femur fracture. POSTOPERATIVE DIAGNOSIS:  Nondisplaced left intertrochanteric femur fracture. PROCEDURE PERFORMED:  Left hip intramedullary nailing. SURGEON:  Sergei Tony MD    ASSISTANT:  [facility] staff. ANESTHESIA:  General endotracheal intubation. COMPLICATIONS:  None. SPECIMENS REMOVED:  None. IMPLANTS:  Dewy Rose 170 mm x 10 mm gamma III nail, 105 mm lag screw, 1 set screw, 1 distal locking screw. ESTIMATED BLOOD LOSS:  100 mL. INDICATIONS FOR SURGERY:  The patient is a 61-year-old male who had a ground-level fall at home. He presented to the emergency department where x-rays and CT scan revealed nondisplaced left intertrochanteric femur fracture. I explained the risks and benefits of the procedure to the patient and his family. I explained the planned procedure. I gave the patient and his family an opportunity to ask questions. I gave neither the family nor the patient any guarantees of the outcome of this procedure. The patient's family was agreeable to this procedure. PROCEDURE:  After informed consent was obtained, the patient was then brought to the operating room and placed in the supine position. After adequate anesthesia was administered and the patient was intubated, he was transferred to the [facility] table. A well-padded peroneal bump was used. Both feet were placed in well-padded traction boots. The left arm was draped across the chest, well padded at the elbow and secured. The left leg was placed in the straight-line minimal traction with slight internal rotation. The right leg was scissored below the left leg. Prior to prepping and draping, fluoroscopic views demonstrated no displacement of the fracture.   At this point, the left hip and thigh were draped and prepped in the usual sterile fashion. Proper time-out was performed. I did use fluoroscopy throughout this procedure to check fracture reduction and hardware placement. I identified the tip of the greater trochanter under fluoroscopic guidance and made a skin incision about 1 inch proximal to this area and this incision was approximately 1 inch long in line with the femur. The subcutaneous layer was incised in line with the skin. I incised the IT band in line with the skin incision in order to gain access to the tip of the greater trochanter. I placed a sharp-tip awl to the tip of the greater trochanter and drove this into the intramedullary canal under fluoroscopic guidance. When I felt it was in the proper position, placed a ball-tip guidewire through the awl into the intramedullary canal.  I checked the ball-tip guidewire's position in AP and lateral views. This was acceptable. I removed the awl and then used a one-step reamer over the guidewire to open up the canal proximally. I then placed the selected intramedullary nail over the guidewire with the attached external guide. Once this was in the proper position in the intramedullary canal, I then used the external guide to place the lag screw. I used the proper hole in the guide to place the cannula, which I placed through skin, made a skin indentation, made a skin incision at this site and carried it to the subcutaneous tissue. I placed the cannula to the lateral cortex of the femur and then placed a guidewire in the center-center position of the head and neck. Once I felt it was in the proper position, I measured the length of the lag screw to be placed. I then set the reamer to that depth and over-reamed the guidewire to that depth. I then placed the lag screw over the guidewire and placed this into the femoral head. At this point, fracture reduction and hardware placement looked acceptable.   I released the slight traction that I had in this leg and applied compression over the fracture site using the system. Once this was complete, I locked the lag screw from above with the set screw. At this point, fracture reduction and hardware placement looked acceptable. I then used the external guide again to place 1 distal locking screw. I selected the static hole in the external guide, placed a cannula through this to the lateral thigh where I made a skin indentation, made a skin incision at the site and then carried this incision through the lateral cortex of the bone. I placed the cannula to the lateral cortex of the bone and then drilled from lateral to medial.  I measured the length of the screw to be placed and placed a fully-threaded cortical screw to the locking nail distally. At this point, fluoroscopy demonstrated acceptable fracture reduction and hardware placement. I removed the external guide after obtaining final fluoroscopic views. I irrigated all wounds profusely with normal saline using a bulb syringe. The larger proximal wound was closed in layers using a 2-0 Vicryl suture to close the incision through the IT band and the subcutaneous tissue. The skin was approximated with staples. The smaller poke-hole incisions on the distal thigh were closed by approximating the skin with staples. Dry sterile dressings were applied  to the patient's wounds. The patient was then easily awakened, extubated, and then transported to the postanesthesia care unit in stable condition. There were no complications in this case.         MD DARREN [id number]  D:  09/25/2021 14:37  T:  09/25/2021 21:33  JOB #:  [id number]

## 2021-09-26 NOTE — PROGRESS NOTES
Bedside and Verbal shift change report given to Nusrat Ndiaye RN (oncoming nurse) by Vandana Fish RN (offgoing nurse). Report included the following information SBAR.

## 2021-09-26 NOTE — PROGRESS NOTES
Hospitalist Progress Note      Hospital summary: 79 y.o man w/ CAD, CHF, DM, MI, dementia, who fell out of bed and developed left him pain. He is sleeping and screams in pain when awakened. He was found to have a left hip fracture in the ED.  9/24/2021      Assessment/Plan:  L hip fracture s/p Left Hip Nail on 9/25   Secondary to fall   - CT: Acute nondisplaced intertrochanteric left proximal femoral fracture. Associated acute nondisplaced avulsion fracture of the left greater trochanter.  - appreciate ortho input  - pain control - scheduled tylenol      Anemia - post po blood loss  - hb 6.7 today. 1u PRBC ordered  - will monitor hb    Afib: appears to be in sinus rhythm  CAD s/p CABG  ICM with EF 35%  - appreciate cardiology input - cleared for surgery   - echo ordered       Type 2 DM:  -c/w Lantus 10U and ISS     Elevated BP: likely due to pain  -PRN IV hydralazine     Dementia with behavioral disturbances  - supportive care   - c/w home seroquel     Hypothyroidism  - synthroid     Code status: Full  DVT prophylaxis: SCDs  Disposition: TBD. May need rehab. PT/OT eval   ----------------------------------------------    CC: Left femur fracture     S: Patient is seen and examined at bedside this AM. Pt is alert but has dementia. Discussed with nursing - elevated BP    Spoke with daughter Coby Cordero on phone and updated patient's status - hb low, 1U PRBC today- she consented, likely PT/OT eval tomorrow     Review of Systems:  Review of systems not obtained due to patient factors.     O:  Visit Vitals  BP (!) 138/51 (BP 1 Location: Left upper arm, BP Patient Position: At rest)   Pulse (!) 111   Temp 98.2 °F (36.8 °C)   Resp 18   Ht 5' 4\" (1.626 m)   Wt 68.5 kg (151 lb 0.2 oz)   SpO2 97%   BMI 25.92 kg/m²       PHYSICAL EXAM:  Gen: NAD, elderly   HEENT: anicteric sclerae, normal conjunctiva, oropharynx clear, MM moist  Neck: supple, trachea midline, no adenopathy  Heart: RRR, no MRG, no JVD, no peripheral edema  Lungs: CTA b/l, non-labored respirations  Abd: soft, NT, ND, BS+, no organomegaly  Extr: warm  Skin: dry, no rash  Neuro: dementia, normal speech, moves all extremities  Psych: normal mood, appropriate affect      Intake/Output Summary (Last 24 hours) at 9/26/2021 1227  Last data filed at 9/26/2021 1025  Gross per 24 hour   Intake 420 ml   Output 1275 ml   Net -855 ml        Recent labs & imaging reviewed:  Recent Results (from the past 24 hour(s))   GLUCOSE, POC    Collection Time: 09/25/21  2:27 PM   Result Value Ref Range    Glucose (POC) 183 (H) 65 - 117 mg/dL    Performed by Saúl Hylton    GLUCOSE, POC    Collection Time: 09/25/21  4:29 PM   Result Value Ref Range    Glucose (POC) 178 (H) 65 - 117 mg/dL    Performed by [address] [place] [address] Street, POC    Collection Time: 09/25/21  9:47 PM   Result Value Ref Range    Glucose (POC) 110 65 - 117 mg/dL    Performed by Dara Brown (CON)    CBC W/O DIFF    Collection Time: 09/26/21  3:30 AM   Result Value Ref Range    WBC 11.9 (H) 4.1 - 11.1 K/uL    RBC 2.27 (L) 4.10 - 5.70 M/uL    HGB 6.7 (L) 12.1 - 17.0 g/dL    HCT 20.3 (L) 36.6 - 50.3 %    MCV 89.4 80.0 - 99.0 FL    MCH 29.5 26.0 - 34.0 PG    MCHC 33.0 30.0 - 36.5 g/dL    RDW 14.9 (H) 11.5 - 14.5 %    PLATELET 014 445 - 177 K/uL    MPV 12.5 8.9 - 12.9 FL    NRBC 0.0 0  WBC    ABSOLUTE NRBC 0.00 0.00 - 7.10 K/uL   METABOLIC PANEL, BASIC    Collection Time: 09/26/21  3:30 AM   Result Value Ref Range    Sodium 138 136 - 145 mmol/L    Potassium 4.5 3.5 - 5.1 mmol/L    Chloride 109 (H) 97 - 108 mmol/L    CO2 24 21 - 32 mmol/L    Anion gap 5 5 - 15 mmol/L    Glucose 77 65 - 100 mg/dL    BUN 19 6 - 20 MG/DL    Creatinine 1.86 (H) 0.70 - 1.30 MG/DL    BUN/Creatinine ratio 10 (L) 12 - 20      GFR est AA 43 (L) >60 ml/min/1.73m2    GFR est non-AA 35 (L) >60 ml/min/1.73m2    Calcium 8.6 8.5 - 10.1 MG/DL   NT-PRO BNP    Collection Time: 09/26/21  3:30 AM   Result Value Ref Range    NT pro-BNP 10,501 (H) <450 PG/ML   GLUCOSE, POC    Collection Time: 09/26/21  5:55 AM   Result Value Ref Range    Glucose (POC) 96 65 - 117 mg/dL    Performed by Cole Sharma (CON)    RBC, ALLOCATE    Collection Time: 09/26/21  7:45 AM   Result Value Ref Range    HISTORY CHECKED? Historical check performed      Recent Labs     09/26/21 0330 09/24/21 1945   WBC 11.9* 12.8*   HGB 6.7* 11.8*   HCT 20.3* 35.7*    326     Recent Labs     09/26/21 0330 09/24/21 1945    135*   K 4.5 5.0   * 108   CO2 24 26   BUN 19 16   CREA 1.86* 1.79*   GLU 77 318*   CA 8.6 8.6   MG  --  1.7     Recent Labs     09/24/21 1945   ALT 20      TBILI 0.3   TP 7.3   ALB 3.0*   GLOB 4.3*     Recent Labs     09/24/21 1945   INR 1.0   PTP 10.7      No results for input(s): FE, TIBC, PSAT, FERR in the last 72 hours. Lab Results   Component Value Date/Time    Folate 21.9 (H) 02/04/2019 07:35 PM      No results for input(s): PH, PCO2, PO2 in the last 72 hours. No results for input(s): CPK, CKNDX, TROIQ in the last 72 hours.     No lab exists for component: CPKMB  Lab Results   Component Value Date/Time    Cholesterol, total 170 09/08/2020 04:30 PM    HDL Cholesterol 51 09/08/2020 04:30 PM    LDL, calculated 103 (H) 09/08/2020 04:30 PM    LDL, calculated 93 02/12/2019 08:49 AM    Triglyceride 89 09/08/2020 04:30 PM    CHOL/HDL Ratio 2.3 07/18/2017 02:14 AM     Lab Results   Component Value Date/Time    Glucose (POC) 96 09/26/2021 05:55 AM    Glucose (POC) 110 09/25/2021 09:47 PM    Glucose (POC) 178 (H) 09/25/2021 04:29 PM    Glucose (POC) 183 (H) 09/25/2021 02:27 PM    Glucose (POC) 136 (H) 09/25/2021 12:15 PM     Lab Results   Component Value Date/Time    Color YELLOW/STRAW 09/24/2021 10:43 PM    Appearance CLEAR 09/24/2021 10:43 PM    Specific gravity 1.017 09/24/2021 10:43 PM    Specific gravity 1.010 07/17/2017 04:12 PM    pH (UA) 5.5 09/24/2021 10:43 PM    Protein 30 (A) 09/24/2021 10:43 PM    Glucose >1,000 (A) 09/24/2021 10:43 PM Ketone TRACE (A) 09/24/2021 10:43 PM    Bilirubin Negative 09/24/2021 10:43 PM    Urobilinogen 0.2 09/24/2021 10:43 PM    Nitrites Negative 09/24/2021 10:43 PM    Leukocyte Esterase Negative 09/24/2021 10:43 PM    Epithelial cells FEW 09/24/2021 10:43 PM    Bacteria Negative 09/24/2021 10:43 PM    WBC 0-4 09/24/2021 10:43 PM    RBC 0-5 09/24/2021 10:43 PM       Med list reviewed  Current Facility-Administered Medications   Medication Dose Route Frequency    0.9% sodium chloride infusion 250 mL  250 mL IntraVENous PRN    hydrALAZINE (APRESOLINE) 20 mg/mL injection 20 mg  20 mg IntraVENous Q6H PRN    insulin glargine (LANTUS) injection 10 Units  10 Units SubCUTAneous DAILY    valproic acid (as sodium salt) (DEPAKENE) 250 mg/5 mL (5 mL) oral solution 250 mg  250 mg Oral BID PRN    sodium chloride (NS) flush 5-40 mL  5-40 mL IntraVENous Q8H    sodium chloride (NS) flush 5-40 mL  5-40 mL IntraVENous PRN    acetaminophen (TYLENOL) tablet 650 mg  650 mg Oral Q6H PRN    Or    acetaminophen (TYLENOL) suppository 650 mg  650 mg Rectal Q6H PRN    polyethylene glycol (MIRALAX) packet 17 g  17 g Oral DAILY PRN    ondansetron (ZOFRAN ODT) tablet 4 mg  4 mg Oral Q8H PRN    Or    ondansetron (ZOFRAN) injection 4 mg  4 mg IntraVENous Q6H PRN    aspirin chewable tablet 81 mg  81 mg Oral DAILY    levothyroxine (SYNTHROID) tablet 50 mcg  50 mcg Oral ACB    QUEtiapine (SEROquel) tablet 50 mg  50 mg Oral DAILY    QUEtiapine (SEROquel) tablet 25 mg  25 mg Oral QHS PRN    QUEtiapine (SEROquel) tablet 50 mg  50 mg Oral QHS    insulin lispro (HUMALOG) injection   SubCUTAneous AC&HS    glucose chewable tablet 16 g  4 Tablet Oral PRN    dextrose (D50W) injection syrg 12.5-25 g  12.5-25 g IntraVENous PRN    glucagon (GLUCAGEN) injection 1 mg  1 mg IntraMUSCular PRN    0.9% sodium chloride infusion  75 mL/hr IntraVENous CONTINUOUS       Care Plan discussed with:  Patient/Family and Nurse    James Dye MD  Internal Medicine  Date of Service: 9/26/2021

## 2021-09-26 NOTE — PROGRESS NOTES
S Cardiology Progress Note    Date of Service: 9/26/2021    Subjective:  Underwent left IM nailing yesterday. Hgb dropped significantly post-operative to 6.7 from 11.8 pre-operatively. This morning he remains unable to give significant history due to advanced dementia. Denies dyspnea or pain.     Objective:    Visit Vitals  BP (!) 163/77 (BP 1 Location: Left upper arm, BP Patient Position: At rest)   Pulse (!) 107   Temp 97.7 °F (36.5 °C)   Resp 18   Ht 5' 4\" (1.626 m)   Wt 68.5 kg (151 lb 0.2 oz)   SpO2 97%   BMI 25.92 kg/m²         Intake/Output Summary (Last 24 hours) at 9/26/2021 1122  Last data filed at 9/26/2021 1025  Gross per 24 hour   Intake 420 ml   Output 1275 ml   Net -855 ml        Physical Exam  GEN: NAD, appears stated age  HEENT: EOMI, normal JVP     *Patient again would not allow me to use my stethoscope and kept pulling it away saying \"get that off me\"     ABD: NABS, soft, NT/ND  EXT: No edema   NEURO: Not oriented    Current Facility-Administered Medications   Medication Dose Route Frequency    0.9% sodium chloride infusion 250 mL  250 mL IntraVENous PRN    hydrALAZINE (APRESOLINE) 20 mg/mL injection 20 mg  20 mg IntraVENous Q6H PRN    insulin glargine (LANTUS) injection 10 Units  10 Units SubCUTAneous DAILY    valproic acid (as sodium salt) (DEPAKENE) 250 mg/5 mL (5 mL) oral solution 250 mg  250 mg Oral BID PRN    sodium chloride (NS) flush 5-40 mL  5-40 mL IntraVENous Q8H    sodium chloride (NS) flush 5-40 mL  5-40 mL IntraVENous PRN    acetaminophen (TYLENOL) tablet 650 mg  650 mg Oral Q6H PRN    Or    acetaminophen (TYLENOL) suppository 650 mg  650 mg Rectal Q6H PRN    polyethylene glycol (MIRALAX) packet 17 g  17 g Oral DAILY PRN    ondansetron (ZOFRAN ODT) tablet 4 mg  4 mg Oral Q8H PRN    Or    ondansetron (ZOFRAN) injection 4 mg  4 mg IntraVENous Q6H PRN    aspirin chewable tablet 81 mg  81 mg Oral DAILY    levothyroxine (SYNTHROID) tablet 50 mcg  50 mcg Oral ACB    QUEtiapine (SEROquel) tablet 50 mg  50 mg Oral DAILY    QUEtiapine (SEROquel) tablet 25 mg  25 mg Oral QHS PRN    QUEtiapine (SEROquel) tablet 50 mg  50 mg Oral QHS    insulin lispro (HUMALOG) injection   SubCUTAneous AC&HS    glucose chewable tablet 16 g  4 Tablet Oral PRN    dextrose (D50W) injection syrg 12.5-25 g  12.5-25 g IntraVENous PRN    glucagon (GLUCAGEN) injection 1 mg  1 mg IntraMUSCular PRN    0.9% sodium chloride infusion  75 mL/hr IntraVENous CONTINUOUS       Data Reviewed:  Recent Labs     09/26/21 0330 09/24/21 1945    135*   K 4.5 5.0   * 108   CO2 24 26   GLU 77 318*   BUN 19 16   CREA 1.86* 1.79*   CA 8.6 8.6   MG  --  1.7   ALB  --  3.0*   ALT  --  20   INR  --  1.0     Recent Labs     09/26/21 0330 09/24/21 1945   WBC 11.9* 12.8*   HGB 6.7* 11.8*   HCT 20.3* 35.7*    326     No results found for: SDES  Lab Results   Component Value Date/Time    Culture result: NO GROWTH 5 DAYS 08/06/2015 06:46 AM       All Cardiac Markers in the last 24 hours:    Lab Results   Component Value Date/Time    BNPNT 10,501 (H) 09/26/2021 03:30 AM       Telemetry (personally reviewed): normal sinus rhythm, occasional PVCs    Assessment:  1. Left hip fracture  2. CAD s/p CABG in 2000  3. ICM with reported EF of 35%, NYHA class III  4. CKD  5. DM2 with other circulatory complications  6. CKD stage 3b  7. COPD  8. Dementia    Plan:  - CV conditions appear clinically stable  - Continue outpatient CV medications  - Monitor volume status; will likely need gentle maintenance diuretic  - Pain control; adjust BP medications if remains hypertensive  - Ok to defer TTE at this time  - Follow-up with [facility] on discharge  - Patient not cooperative with assessments    We will sign-off, but are available if additional questions arise.   Thank you for the opportunity to participate in the care of Tomer Del Rosario and please do not hesitate to contact us should you have any questions. Signed:  Carlos Mcgraw.  Lucinda Mcgowan, [address] [place] [address] [facility]  09/26/21

## 2021-09-26 NOTE — PROGRESS NOTES
Physical Therapy - hold  Orders acknowledged and chart reviewed in prep for PT evaluation. Noted patient POD #1 s/p L IM nailing of hip. Patient hgb currently 6.7 (9/26 0330), down from 11.8 on 9/24. Will defer evaluation at this time and f/u as able and when medically stable. Thank you.

## 2021-09-26 NOTE — PROGRESS NOTES
Transition of Care Plan   RUR- medium   DISPOSITION:  CM will need to follow.  Daughter who is ACP and MOPA request call at [phone] [facility] Monday 9/27/2021 for dc planning.  Daughter explained that she is with an organization that works with facility placement. She stated that this organization advocates for patients in facilities.  F/U with PCP/Specialist     Transport: [facility] phone [phone]          Reason for Admission:   Hip fracture                    RUR Score:     17%             PCP: First and Last name:   Alexander Marino MD     Name of Practice: [facility] Visit Team Home Based   Are you a current patient: Yes/No:  unsure   Approximate date of last visit: August 2021   Can you participate in a virtual visit if needed:  yes    Do you (patient/family) have any concerns for transition/discharge? Daughter stated that patient does have Medicaid # [id number] LTSS completed by  for [employer] for five hours daily for personal care in the home, daughter stated come concerns that this may no been enough hours. Patient's son is the personal care support. Plan for utilizing home health:    Unsure/ CM will need to follow. PT and OT evaluations are pending. Current Advanced Directive/Advance Care Plan:  DNR      Healthcare Decision Maker:   Click here to complete HealthCare Decision Makers including selection of the Healthcare Decision Maker Relationship (ie \"Primary\")            Primary Decision Maker (Active): [place] Chela Villegas - [phone]    Primary Decision Maker: Patricia Edmonds - [phone]    Secondary Decision Maker: Deborah Fierro - Other [facility] - [phone]    Transition of Care Plan: This CM called and spoke with daughter, explained CM role and support of dc planning. She explained that does is requesting an in person visit with CM staff tomorrow to discuss dc planning. She request call Monday 9/27/2021 in regards to this. Patient has a walker and bedside commode that per daughter refuses to use. Patient has five hours daily of aide care for ADL through [employer]. PT/ OT eval are still pending. CM to follow. Care Management Interventions  PCP Verified by CM:  Yes  Palliative Care Criteria Met (RRAT>21 & CHF Dx)?: No  Mode of Transport at Discharge:  (likely will need stretcher)  Transition of Care Consult (CM Consult):  (TBD. CM will need to follow)  Support Systems:  (lives alone/ son is careprovider through [employer])  Discharge Location  Discharge Placement:  (TBD/ CM will need to follow)  Danish Elise  11:29 AM

## 2021-09-26 NOTE — PROGRESS NOTES
Nurse Varinder Faulkner gave bedside report to oncoming nurse Stephanie Covarrubias RN by JEANCARLOS and Alfonso

## 2021-09-26 NOTE — PROGRESS NOTES
Occupational therapy 4841 -   09.26.2021    Orders acknowledged and chart reviewed in prep for OT evaluation. Noted patient POD #1 s/p L IM nailing of hip. Patient hgb currently 6.7 - will defer evaluation at this time and f/u as able and when medically stable. Thank you. Laurel Gonzalez MS, OTR/L

## 2021-09-26 NOTE — PROGRESS NOTES
POD#1 s/p Left IM nailing of intertrochanteric femur fracture     S/  Hx of dementia. Patient comfortable    O/  Post op dressings are clean, intact and dry  NVI distally  Calf is soft and non tender    hgb - 6.7  Creatinine - 1.86    A/  Patient doing well from orthopedic standpoint.   Low hgb (6.7)    P/  Pain control  Monitor hgb  DVT ppx - Aspirin 81 mg BID or per medicine recommendation  Work with PT - WBAT  Discharge likely to SNF once cleared with medicine and PT

## 2021-09-27 NOTE — PROGRESS NOTES
Hospitalist Progress Note      Hospital summary: 79 y.o man w/ CAD, CHF, DM, MI, dementia, who fell out of bed and developed left him pain. He is sleeping and screams in pain when awakened. He was found to have a left hip fracture in the ED.  9/24/2021      Assessment/Plan:  L hip fracture s/p Left Hip Nail on 9/25   Secondary to fall   - CT: Acute nondisplaced intertrochanteric left proximal femoral fracture. Associated acute nondisplaced avulsion fracture of the left greater trochanter.  - appreciate ortho input  - pain control - scheduled tylenol   - DVT ppx: aspirin 81mg bid  - PT/OT recs SNF      Anemia - post po blood loss  - s/p 1u PRBC 9/26  - will monitor hb, transfuse <7    Afib: appears to be in sinus rhythm  CAD s/p CABG  ICM with EF 35%  - appreciate cardiology input - cleared for surgery   - echo ordered       Type 2 DM:  -c/w Lantus 10U and ISS     Elevated BP: likely due to pain  - added po amlodipine   -PRN IV hydralazine     Dementia with behavioral disturbances  - supportive care   - c/w home seroquel     Hypothyroidism  - synthroid     Code status: Full  DVT prophylaxis: SCDs  Disposition: TBD. Family wants to take him home with [facility]  ----------------------------------------------    CC: Left femur fracture     S: Patient is seen and examined at bedside this AM. Son present. Pt is alert but has dementia. Discussed with nursing - elevated BP    Spoke with daughter Willy Leo on phone and updated patient's status - medically stable, prefers to take him, requesting hospital bed, possible dc on Wednesday       Review of Systems:  Review of systems not obtained due to patient factors.     O:  Visit Vitals  /70 (BP 1 Location: Left upper arm)   Pulse (!) 104   Temp 98 °F (36.7 °C)   Resp 16   Ht 5' 4\" (1.626 m)   Wt 68.5 kg (151 lb 0.2 oz)   SpO2 98%   BMI 25.92 kg/m²       PHYSICAL EXAM:  Gen: NAD, elderly   HEENT: anicteric sclerae, normal conjunctiva, oropharynx clear, MM moist  Neck: supple, trachea midline, no adenopathy  Heart: RRR, no MRG, no JVD, no peripheral edema  Lungs: CTA b/l, non-labored respirations  Abd: soft, NT, ND, BS+, no organomegaly  Extr: warm  Skin: dry, no rash  Neuro: dementia, normal speech, moves all extremities  Psych: normal mood, appropriate affect    No intake or output data in the 24 hours ending 09/27/21 1535     Recent labs & imaging reviewed:  Recent Results (from the past 24 hour(s))   GLUCOSE, POC    Collection Time: 09/26/21  5:10 PM   Result Value Ref Range    Glucose (POC) 71 65 - 117 mg/dL    Performed by Enrique Perez, POC    Collection Time: 09/26/21  9:21 PM   Result Value Ref Range    Glucose (POC) 143 (H) 65 - 117 mg/dL    Performed by Lucinda Lr    HGB & HCT    Collection Time: 09/27/21  2:23 AM   Result Value Ref Range    HGB 10.0 (L) 12.1 - 17.0 g/dL    HCT 30.1 (L) 36.6 - 04.2 %   METABOLIC PANEL, BASIC    Collection Time: 09/27/21  2:23 AM   Result Value Ref Range    Sodium 137 136 - 145 mmol/L    Potassium 4.2 3.5 - 5.1 mmol/L    Chloride 110 (H) 97 - 108 mmol/L    CO2 22 21 - 32 mmol/L    Anion gap 5 5 - 15 mmol/L    Glucose 116 (H) 65 - 100 mg/dL    BUN 22 (H) 6 - 20 MG/DL    Creatinine 1.68 (H) 0.70 - 1.30 MG/DL    BUN/Creatinine ratio 13 12 - 20      GFR est AA 48 (L) >60 ml/min/1.73m2    GFR est non-AA 40 (L) >60 ml/min/1.73m2    Calcium 8.3 (L) 8.5 - 10.1 MG/DL   GLUCOSE, POC    Collection Time: 09/27/21  6:42 AM   Result Value Ref Range    Glucose (POC) 155 (H) 65 - 117 mg/dL    Performed by Lucinda Lr    GLUCOSE, POC    Collection Time: 09/27/21 11:39 AM   Result Value Ref Range    Glucose (POC) 139 (H) 65 - 117 mg/dL    Performed by Ced Stout      Recent Labs     09/27/21 0223 09/26/21  0330 09/24/21 1945 09/24/21 1945   WBC  --  11.9*  --  12.8*   HGB 10.0* 6.7*   < > 11.8*   HCT 30.1* 20.3*   < > 35.7*   PLT  --  316  --  326    < > = values in this interval not displayed.      Recent Labs 09/27/21  0223 09/26/21  0330 09/24/21 1945    138 135*   K 4.2 4.5 5.0   * 109* 108   CO2 22 24 26   BUN 22* 19 16   CREA 1.68* 1.86* 1.79*   * 77 318*   CA 8.3* 8.6 8.6   MG  --   --  1.7     Recent Labs     09/24/21 1945   ALT 20      TBILI 0.3   TP 7.3   ALB 3.0*   GLOB 4.3*     Recent Labs     09/24/21 1945   INR 1.0   PTP 10.7      No results for input(s): FE, TIBC, PSAT, FERR in the last 72 hours. Lab Results   Component Value Date/Time    Folate 21.9 (H) 02/04/2019 07:35 PM      No results for input(s): PH, PCO2, PO2 in the last 72 hours. No results for input(s): CPK, CKNDX, TROIQ in the last 72 hours.     No lab exists for component: CPKMB  Lab Results   Component Value Date/Time    Cholesterol, total 170 09/08/2020 04:30 PM    HDL Cholesterol 51 09/08/2020 04:30 PM    LDL, calculated 103 (H) 09/08/2020 04:30 PM    LDL, calculated 93 02/12/2019 08:49 AM    Triglyceride 89 09/08/2020 04:30 PM    CHOL/HDL Ratio 2.3 07/18/2017 02:14 AM     Lab Results   Component Value Date/Time    Glucose (POC) 139 (H) 09/27/2021 11:39 AM    Glucose (POC) 155 (H) 09/27/2021 06:42 AM    Glucose (POC) 143 (H) 09/26/2021 09:21 PM    Glucose (POC) 71 09/26/2021 05:10 PM    Glucose (POC) 95 09/26/2021 12:33 PM     Lab Results   Component Value Date/Time    Color YELLOW/STRAW 09/24/2021 10:43 PM    Appearance CLEAR 09/24/2021 10:43 PM    Specific gravity 1.017 09/24/2021 10:43 PM    Specific gravity 1.010 07/17/2017 04:12 PM    pH (UA) 5.5 09/24/2021 10:43 PM    Protein 30 (A) 09/24/2021 10:43 PM    Glucose >1,000 (A) 09/24/2021 10:43 PM    Ketone TRACE (A) 09/24/2021 10:43 PM    Bilirubin Negative 09/24/2021 10:43 PM    Urobilinogen 0.2 09/24/2021 10:43 PM    Nitrites Negative 09/24/2021 10:43 PM    Leukocyte Esterase Negative 09/24/2021 10:43 PM    Epithelial cells FEW 09/24/2021 10:43 PM    Bacteria Negative 09/24/2021 10:43 PM    WBC 0-4 09/24/2021 10:43 PM    RBC 0-5 09/24/2021 10:43 PM       Med list reviewed  Current Facility-Administered Medications   Medication Dose Route Frequency    amLODIPine (NORVASC) tablet 5 mg  5 mg Oral DAILY    0.9% sodium chloride infusion 250 mL  250 mL IntraVENous PRN    acetaminophen (TYLENOL) tablet 650 mg  650 mg Oral Q6H    aspirin chewable tablet 81 mg  81 mg Oral BID    hydrALAZINE (APRESOLINE) 20 mg/mL injection 20 mg  20 mg IntraVENous Q6H PRN    insulin glargine (LANTUS) injection 10 Units  10 Units SubCUTAneous DAILY    valproic acid (as sodium salt) (DEPAKENE) 250 mg/5 mL (5 mL) oral solution 250 mg  250 mg Oral BID PRN    sodium chloride (NS) flush 5-40 mL  5-40 mL IntraVENous Q8H    sodium chloride (NS) flush 5-40 mL  5-40 mL IntraVENous PRN    acetaminophen (TYLENOL) tablet 650 mg  650 mg Oral Q6H PRN    Or    acetaminophen (TYLENOL) suppository 650 mg  650 mg Rectal Q6H PRN    polyethylene glycol (MIRALAX) packet 17 g  17 g Oral DAILY PRN    ondansetron (ZOFRAN ODT) tablet 4 mg  4 mg Oral Q8H PRN    Or    ondansetron (ZOFRAN) injection 4 mg  4 mg IntraVENous Q6H PRN    levothyroxine (SYNTHROID) tablet 50 mcg  50 mcg Oral ACB    QUEtiapine (SEROquel) tablet 50 mg  50 mg Oral DAILY    QUEtiapine (SEROquel) tablet 25 mg  25 mg Oral QHS PRN    QUEtiapine (SEROquel) tablet 50 mg  50 mg Oral QHS    insulin lispro (HUMALOG) injection   SubCUTAneous AC&HS    glucose chewable tablet 16 g  4 Tablet Oral PRN    dextrose (D50W) injection syrg 12.5-25 g  12.5-25 g IntraVENous PRN    glucagon (GLUCAGEN) injection 1 mg  1 mg IntraMUSCular PRN       Care Plan discussed with:  Patient/Family and Nurse    Ondina Faulkner MD  Internal Medicine  Date of Service: 9/27/2021

## 2021-09-27 NOTE — PROGRESS NOTES
Bedside shift change report given to Suellen Mackenzie  (oncoming nurse) by Meet Funk  (offgoing nurse). Report included the following information SBAR, Kardex, Intake/Output and MAR.

## 2021-09-27 NOTE — PROGRESS NOTES
Patient name: Natalie Ribeiro  : 1940     The patient will require a semi - electric hospital bed upon discharge due to the effects of recent left hip fracture , which has resulted in significant diminished functionality. The patient will require frequent changes in body postioning in order to prevent respiratory issues or sacral wounds. Thank you,  Dr. Rai Joyce.

## 2021-09-27 NOTE — PROGRESS NOTES
Ortho Daily Progress Note      Patient: Zacarias Oseguera                   MRN: [id number]  Sex: male  YOB: 1940           Age: 79 y.o.     2 Days Post-Op     Procedure(s):  Left Hip IM Nail, [place] table/ C-arm  Gamma Nail ([place] rep aware)    Subjective: No new complaints, hip not bothering him     Visit Vitals  BP (!) 142/66 (BP 1 Location: Left upper arm, BP Patient Position: At rest)   Pulse (!) 103   Temp 98.1 °F (36.7 °C)   Resp 17   Ht 5' 4\" (1.626 m)   Wt 68.5 kg (151 lb 0.2 oz)   SpO2 99%   BMI 25.92 kg/m²        Lab Results:  HGB   Date/Time Value Ref Range Status   09/27/2021 02:23 AM 10.0 (L) 12.1 - 17.0 g/dL Final     Comment:     INVESTIGATED PER DELTA CHECK PROTOCOL     INR   Date/Time Value Ref Range Status   09/24/2021 07:45 PM 1.0 0.9 - 1.1   Final     Comment:     A single therapeutic range for Vit K antagonists may not be optimal for all indications - see June, 2008 issue of Chest, American College of Chest Physicians Evidence-Based Clinical Practice Guidelines, 8th Edition. Physical Exam:    GENERAL: 72yo male w/dementia, alert, cooperative, no distress  DRESSING: clean/dry  SWELLING: mild  NEUROLOGICAL: intact  PULSE:yes   WOUND: wound clean and dry no evidence of infection.   MOTION: no complaints with gentle log roll left hip  DVT Exam: No evidence of DVT seen on physical exam.      Plan:  Continue to monitor post-op anemia, HgB 10.0 today after 1 Unit PRBC  DVT prophylaxisAspirin 81mg bid x 1 mo  Weight bearing restriction WBAT  Pain Control:stable, mild-to-moderate joint symptoms intermittently, reasonably well controlled by current meds   Anticipate SNF placement      BRYAN Jay  9/27/2021   1:34 PM      .

## 2021-09-27 NOTE — PROGRESS NOTES
FELICIA: Family prefers discharge home with [facility], rather than SNF. CM will need to secure home health prior to discharge (daughter looking into [facility] options). Order for hospital bed has been sent tp [facility] [address] [facility] per family request. Pending insurance approval. Patient will need BLS transport home. Chart reviewed. Carlos Manuel lives at home with the son who is patient's paid caregiver through [place]. Son states he is with patient 24/7 and assists with ADLs. Son asked that CM contact his sister, carlos manuel'prakash [facility] #[phone]. CM spoke with sister via phone. She is requesting family meeting with the care team tomorrow at 6 AM. Family does not want SNF or rehab. Sister is requesting CM order a hospital bed. TARA notified MD that letter of medical necessity will be needed for hospital bed. MD working on this. [facility] . CM faxed referral for hospital bed to Jonna at [phone]. CM will continue to follow.     Christa Soliz, BSW/CRM

## 2021-09-27 NOTE — PROGRESS NOTES
Problem: Mobility Impaired (Adult and Pediatric)  Goal: *Acute Goals and Plan of Care (Insert Text)  Description: FUNCTIONAL STATUS PRIOR TO ADMISSION: Patient unable to give history due to dementia. True PLOF undetermined, but chart indicates that his son lives with him and is primary (paid) CG through [employer]. He receives 5 hrs of assistance daily. Pt has RW and BSC, with chart indicating pt refuses to use. HOME SUPPORT: True PLOF undetermined, chart states pt has caregiver x5 hours each day, providing unknown levels of ADL assist.         Physical Therapy Goals  Initiated 9/27/2021  1. Patient will move from supine to sit and sit to supine , scoot up and down, and roll side to side in bed with moderate assistance of 2 within 7 day(s). 2.  Patient will transfer from bed to chair and chair to bed with moderate assistance of 2 using the least restrictive device within 7 day(s). 3.  Patient will perform sit to stand with moderate assistance of 2 within 7 day(s). 4.  Patient will ambulate with moderate assistance of 2 for 10 feet with the least restrictive device within 7 day(s). Outcome: Progressing Towards Goal   PHYSICAL THERAPY EVALUATION  Patient: Giovani Lester (81 y.o. male)  Date: 9/27/2021  Primary Diagnosis: Hip fracture ([facility]) [S72.009A]  Procedure(s) (LRB):  Left Hip IM Nail, [place] table/ C-arm  Gamma Nail ([place] rep aware) (Left) 2 Days Post-Op   Precautions: FALL; WBAT       ASSESSMENT  Based on the objective data described below, the patient presents with significant  impairment in functional mobility, activity tolerance and balance s/p fall out of bed resulting in left hip fracture & gamma nail placement. Patient presents in bed awake and alert. Does not answer questions or follow directions. Does not speak other than to say \"No, don't move my leg\".      Current Level of Function Impacting Discharge (mobility/balance): Patient required maximal assistance of 2 for all mobility and total assistance to scoot up in bed. Attempted to get him to do some simple exercises (cued quad sets, assisted knee flexion) but he was unable. Functional Outcome Measure: The patient scored 0/100 on the Barthel outcome measure which is indicative of total impaired ability to care for basic self-needs/dependency on others. Other factors to consider for discharge: Significant Dementia     Patient will benefit from skilled therapy intervention to address the above noted impairments. PLAN :  Recommendations and Planned Interventions: bed mobility training, transfer training, gait training, therapeutic exercises, patient and family training/education, and therapeutic activities      Frequency/Duration: Patient will be followed by physical therapy:  daily to address goals. Recommendation for discharge: (in order for the patient to meet his/her long term goals)  Therapy up to 5 days/week in SNF setting    This discharge recommendation:  Has been made in collaboration with the attending provider and/or case management    IF patient discharges home will need the following DME: to be determined (TBD)         SUBJECTIVE:   Patient stated No, don't move my leg.     OBJECTIVE DATA SUMMARY:   HISTORY:    Past Medical History:   Diagnosis Date    Atrial fibrillation ([facility]     COPD (chronic obstructive pulmonary disease) ([facility] [address]     Diabetes ([facility] [address]     1970a    Heart failure ([facility] [address]     Hypokalemia 12/25/2018    Hyponatremia 4/11/2017    MI (myocardial infarction) ([address] 01/2019    NSTEMI (non-ST elevated myocardial infarction) ([facility] [address] 12/25/2018    Syncope 4/11/2017    Urinary incontinence      Past Surgical History:   Procedure Laterality Date    HX HEENT  1975    nasal surgery    HX MOHS PROCEDURES  2013    left    TX CARDIAC SURG PROCEDURE UNLIST  2000    open heart       Personal factors and/or comorbidities impacting plan of care: Significant Dementia    Home Situation  Support Systems:  (lives alone/ son is careprovider through [employer])    EXAMINATION/PRESENTATION/DECISION MAKING:   Critical Behavior:  Neurologic State: Alert  Orientation Level: Oriented to person, Disoriented to place, Disoriented to situation, Disoriented to time  Cognition: Decreased attention/concentration, Decreased command following, Poor safety awareness  Safety/Judgement: Decreased awareness of need for assistance, Decreased awareness of environment, Decreased awareness of need for safety, Decreased insight into deficits  Hearing: Auditory  Auditory Impairment: Hard of hearing, bilateral    Range Of Motion:  AROM: Generally decreased, functional           PROM: Generally decreased, functional           Strength:    Strength: Generally decreased, functional                    Tone & Sensation:   Tone: Normal (maybe increased? versus resistant)              Sensation:  (unable to assess)               Coordination:  Coordination: Generally decreased, functional  Vision:   Corrective Lenses: Reading glasses  Functional Mobility:  Bed Mobility:  Rolling: Maximum assistance;Assist x1  Supine to Sit: Total assistance;Assist x2  Sit to Supine: Total assistance;Assist x2  Scooting: Total assistance;Assist x2  Transfers:                             Balance:   Sitting:  (unable)  Standing:  (unable)  Ambulation/Gait Training:               Functional Measure:  Barthel Index:    Bathin  Bladder: 0  Bowels: 0  Groomin  Dressin  Feedin  Mobility: 0  Stairs: 0  Toilet Use: 0  Transfer (Bed to Chair and Back): 0  Total: 0/100       The Barthel ADL Index: Guidelines  1. The index should be used as a record of what a patient does, not as a record of what a patient could do. 2. The main aim is to establish degree of independence from any help, physical or verbal, however minor and for whatever reason. 3. The need for supervision renders the patient not independent.   4. A patient's performance should be established using the best available evidence. Asking the patient, friends/relatives and nurses are the usual sources, but direct observation and common sense are also important. However direct testing is not needed. 5. Usually the patient's performance over the preceding 24-48 hours is important, but occasionally longer periods will be relevant. 6. Middle categories imply that the patient supplies over 50 per cent of the effort. 7. Use of aids to be independent is allowed. Miko Dimas., Barthel, D.W. ([id number]). Functional evaluation: the Barthel Index. [address] [facility] (14)2. Chiki Berry joon BROOKLYN Jansen, Josette Amaya., Fish Bustillos., Dami, [address] (1999). Measuring the change indisability after inpatient rehabilitation; comparison of the responsiveness of the Barthel Index and Functional [place] Measure. Journal of Neurology, Neurosurgery, and Psychiatry, 66(4), 997-282. Renan Gabriel, N.J.A, LINDA Anderson, & Bryson Dos Santos M.A. (2004.) Assessment of post-stroke quality of life in cost-effectiveness studies: The usefulness of the Barthel Index and the EuroQoL-5D. Quality of Life Research, 15, 540-66           Physical Therapy Evaluation Charge Determination   History Examination Presentation Decision-Making   HIGH Complexity :3+ comorbidities / personal factors will impact the outcome/ POC  HIGH Complexity : 4+ Standardized tests and measures addressing body structure, function, activity limitation and / or participation in recreation  HIGH Complexity : Unstable and unpredictable characteristics  Other outcome measures    HIGH       Based on the above components, the patient evaluation is determined to be of the following complexity level: HIGH     Pain Rating:  Unable to rate, but cries out each time leg is touched.     Activity Tolerance:   Poor    After treatment patient left in no apparent distress:   Supine in bed, Heels elevated for pressure relief, Call bell within reach, Bed / chair alarm activated, Side rails x 3, and nurse notified. COMMUNICATION/EDUCATION:   The patients plan of care was discussed with: Occupational therapist, Registered nurse, and Case management. Patient unable to participate in plan of care/goal setting due to dementia.     Thank you for this referral.  Mony Horne   Time Calculation: 35 mins

## 2021-09-27 NOTE — PROGRESS NOTES
Problem: Self Care Deficits Care Plan (Adult)  Goal: *Acute Goals and Plan of Care (Insert Text)  Description:   FUNCTIONAL STATUS PRIOR TO ADMISSION: Pt poor historian, with pt only stating \"no\" during LLE PROM, otherwise not speaking during OT evaluation. True PLOF undetermined. Chart indicates caregiver x5 days/week. Pt has RW and BSC, with chart indicating pt refuses to use. HOME SUPPORT: True PLOF undetermined, chart states pt has caregiver x5 days a week, providing unknown levels of ADL assist.    Occupational Therapy Goals  Initiated 9/27/2021  1. Patient will perform seated self-feeding with supervision/set-up within 7 day(s). 2.  Patient will perform seated grooming with minimal assistance within 7 day(s). 3.  Patient will perform seated upper body dressing with supervision/set-up within 7 day(s). 4.  Patient will perform toilet transfers, EOB to [facility], with moderate assistance  within 7 day(s). 5.  Patient will perform all aspects of toileting, seated BSC, with moderate assistance  within 7 day(s). Outcome: Not Met     OCCUPATIONAL THERAPY EVALUATION  Patient: Karthikeyan Gaffney (61 y.o. male)  Date: 9/27/2021  Primary Diagnosis: Hip fracture ([facility] [address] [S72.009A]  Procedure(s) (LRB):  Left Hip IM Nail, [place] table/ C-arm  Gamma Nail (Betty rep aware) (Left) 2 Days Post-Op   Precautions: Falls, Confusion       ASSESSMENT  Based on the objective data described below, the patient presents with decreased problem solving/sequencing/safety/task initiation, c/o LLE pain with movement, decreased strength/endurance, decreased mobility/balance, decreased activity tolerance and decreased full body reaching, all of which limit pt's ability to safely complete self-care routine. Currently, pt benefits from [address] A for self-feeding, Max A for grooming/UB dressing and Total A for bathing/LE dressing/toileting.   Pt noted with poor occupational output this date secondary to confusion and LLE pain limiting pt's engagement. OT unable to assist pt in standing this date, with pt benefiting from Max A for rolling and Total A for inclined to EOB and back. Pt benefits from skilled OT to address functional deficits during acute hospitalization, with reporting therapist believing pt would benefit from SNF rehab upon discharge. Current Level of Function Impacting Discharge (ADLs/self-care): Min A for self-feeding, Max A for grooming/UB dressing and Total A for bathing/LE dressing/toileting    Functional Outcome Measure: The patient scored 0/100 on the Barthel Index outcome measure. Other factors to consider for discharge: Total A     Patient will benefit from skilled therapy intervention to address the above noted impairments. PLAN :  Recommendations and Planned Interventions: self care training, functional mobility training, therapeutic exercise, balance training, therapeutic activities, endurance activities, and patient education    Frequency/Duration: Patient will be followed by occupational therapy 5 times a week to address goals. Recommendation for discharge: (in order for the patient to meet his/her long term goals)  Therapy up to 5 days/week in SNF setting    This discharge recommendation:  Has been made in collaboration with the attending provider and/or case management    IF patient discharges home will need the following DME: patient owns DME required for discharge       SUBJECTIVE:   Patient stated no.  during LLE PROM    OBJECTIVE DATA SUMMARY:   HISTORY:   Past Medical History:   Diagnosis Date    Atrial fibrillation ([address]     COPD (chronic obstructive pulmonary disease) ([address]     Diabetes ([address]     1970a    Heart failure ([facility] [address]     Hypokalemia 12/25/2018    Hyponatremia 4/11/2017    MI (myocardial infarction) ([address] 01/2019    NSTEMI (non-ST elevated myocardial infarction) ([address] 12/25/2018    Syncope 4/11/2017    Urinary incontinence      Past Surgical History:   Procedure Laterality Date    HX RENNY  1975    nasal surgery    HX MOHS PROCEDURES  2013    left    CA CARDIAC SURG PROCEDURE UNLIST  2000    open heart       Expanded or extensive additional review of patient history:     Home Situation  Support Systems:  (lives alone/ son is careprovider through [employer])    Hand dominance: unknown    EXAMINATION OF PERFORMANCE DEFICITS:  Cognitive/Behavioral Status:  Neurologic State: Alert  Orientation Level: Oriented to person;Disoriented to place; Disoriented to situation;Disoriented to time  Cognition: Decreased attention/concentration;Decreased command following;Poor safety awareness     Perseveration: No perseveration noted  Safety/Judgement: Decreased awareness of need for assistance;Decreased awareness of environment;Decreased awareness of need for safety;Decreased insight into deficits    Hearing: Auditory  Auditory Impairment: Hard of hearing, bilateral    Vision/Perceptual:                                Corrective Lenses: Reading glasses    Range of Motion:  AROM: Generally decreased, functional (BUE)  PROM: Generally decreased, functional (BUE)                      Strength:  Strength: Generally decreased, functional (BUE)                Coordination:  Coordination: Generally decreased, functional (BUE)  Fine Motor Skills-Upper: Left Intact; Right Intact    Gross Motor Skills-Upper: Left Impaired;Right Impaired (decreased BUE proximal strength)    Tone & Sensation:  Tone: Normal  Sensation:  (unable to assess)                      Balance:  Sitting:  (pt resistive to OT's attempts at EOB sitting)    Functional Mobility and Transfers for ADLs:  Bed Mobility:  Rolling: Maximum assistance  Supine to Sit: Total assistance    Transfers:       ADL Assessment:  Feeding: Minimum assistance (good hand to mouth noted)    Oral Facial Hygiene/Grooming: Maximum assistance    Bathing: Total assistance    Upper Body Dressing: Maximum assistance    Lower Body Dressing:  Total assistance    Toileting: Total assistance      ADL Intervention and task modifications:  Cognitive Retraining  Safety/Judgement: Decreased awareness of need for assistance;Decreased awareness of environment;Decreased awareness of need for safety;Decreased insight into deficits    Functional Measure:  Barthel Index:    Bathin  Bladder: 0  Bowels: 0  Groomin  Dressin  Feedin  Mobility: 0  Stairs: 0  Toilet Use: 0  Transfer (Bed to Chair and Back): 0  Total: 0/100        The Barthel ADL Index: Guidelines  1. The index should be used as a record of what a patient does, not as a record of what a patient could do. 2. The main aim is to establish degree of independence from any help, physical or verbal, however minor and for whatever reason. 3. The need for supervision renders the patient not independent. 4. A patient's performance should be established using the best available evidence. Asking the patient, friends/relatives and nurses are the usual sources, but direct observation and common sense are also important. However direct testing is not needed. 5. Usually the patient's performance over the preceding 24-48 hours is important, but occasionally longer periods will be relevant. 6. Middle categories imply that the patient supplies over 50 per cent of the effort. 7. Use of aids to be independent is allowed. Yanni Bang., Barthel, DTITO. (1909). Functional evaluation: the Barthel Index. [facility] (14)2. ADIEL VelaF, Arik Verduzco., Ronald Natarajan., [place], [address] [place] [address] (). Measuring the change indisability after inpatient rehabilitation; comparison of the responsiveness of the Barthel Index and Functional [place] Measure. Journal of Neurology, Neurosurgery, and Psychiatry, 66(4), 663-629. Rickey Bartlett N.KOFI.A, LINDA Anderson, & Bernard Waldron MDougA. (2004.) Assessment of post-stroke quality of life in cost-effectiveness studies: The usefulness of the Barthel Index and the EuroQoL-5D.  Quality of Life Research, 13, 361-46         Occupational Therapy Evaluation Charge Determination   History Examination Decision-Making   LOW Complexity : Brief history review  HIGH Complexity : 5 or more performance deficits relating to physical, cognitive , or psychosocial skils that result in activity limitations and / or participation restrictions LOW Complexity : No comorbidities that affect functional and no verbal or physical assistance needed to complete eval tasks       Based on the above components, the patient evaluation is determined to be of the following complexity level: LOW   Pain Rating:  Indicating high amounts of LLE pain, did not quantify; nursing aware and following    Activity Tolerance:   Poor and requires frequent rest breaks    After treatment patient left in no apparent distress:    Supine in bed, Call bell within reach, Bed / chair alarm activated, and Side rails x 3    COMMUNICATION/EDUCATION:   The patients plan of care was discussed with: Physical therapist, Registered nurse, and Case management. Patient is unable to participate in goal setting and plan of care. This patients plan of care is appropriate for delegation to [facility].     Thank you for this referral.  Desirae Simon OT  Time Calculation: 15 mins

## 2021-09-27 NOTE — PROGRESS NOTES
Bedside and Verbal shift change report given to Gabriela Butcher RN (oncoming nurse) by Juni Dalal RN (offgoing nurse). Report included the following information SBAR, Kardex, MAR and Recent Results.

## 2021-09-28 NOTE — PROGRESS NOTES
Covid vaccine RN spoke with patient's POA, daughter Jeimy Cosme, regarding patient receiving the covid vaccine prior to discharge. Jeimy Cosme aware the J&J vaccine or Office Max vaccine is available. If patient will receive the vaccine prior to discharge Coby Cordero [facility]) updated she will need to give a Verbal Consent and is in agreement. Covid vaccine RN to follow patients progress and proceed accordingly once discharge is confirmed.   Geri Ko RN Covid Vaccine Team

## 2021-09-28 NOTE — PROGRESS NOTES
Problem: Self Care Deficits Care Plan (Adult)  Goal: *Acute Goals and Plan of Care (Insert Text)  Description:   FUNCTIONAL STATUS PRIOR TO ADMISSION: Pt poor historian, with pt only stating \"no\" during LLE PROM, otherwise not speaking during OT evaluation. True PLOF undetermined. Chart indicates caregiver x5 days/week. Pt has RW and BSC, with chart indicating pt refuses to use. HOME SUPPORT: True PLOF undetermined, chart states pt has caregiver x5 days a week, providing unknown levels of ADL assist.    Occupational Therapy Goals  Initiated 9/27/2021  1. Patient will perform seated self-feeding with supervision/set-up within 7 day(s). 2.  Patient will perform seated grooming with minimal assistance within 7 day(s). 3.  Patient will perform seated upper body dressing with supervision/set-up within 7 day(s). 4.  Patient will perform toilet transfers, EOB to [facility], with moderate assistance  within 7 day(s). 5.  Patient will perform all aspects of toileting, seated BSC, with moderate assistance  within 7 day(s). Outcome: Progressing Towards Goal    OCCUPATIONAL THERAPY TREATMENT  Patient: Lorenzo Knott (67 y.o. male)  Date: 9/28/2021  Diagnosis: Hip fracture ([facility] [address] Dony Gibson <principal problem not specified>  Procedure(s) (LRB):  Left Hip IM Nail, [place] table/ C-arm  Gamma Nail (Betty rep aware) (Left) 3 Days Post-Op  Precautions: Fall, WBAT  Chart, occupational therapy assessment, plan of care, and goals were reviewed. ASSESSMENT  Patient continues with skilled OT services and is slowly progressing towards goals. Per family request, OT attended meeting with pt's daughter and CM, with OT answering's daughter's questions regarding SNF recommendation verses HHOT with 24/7 Supervision and Total A at bed level, as well as, answering questions regarding what is to be expected with [facility] therapy services.    OT followed up with CM regarding recommendation for all [facility] therapy services, including aid, as well as, recommendation for hospital bed and charan lift, with CM following. Pt continues to benefit from skilled OT to address functional deficits during acute hospitalization, with reporting therapist believing pt would benefit from [address] [place] [address], 24/7 Supervision, with Total A bed level ADL completion at hospital bed with charan lift for transfers. Current Level of Function Impacting Discharge (ADLs): Total A    Other factors to consider for discharge: Total A         PLAN :  Patient continues to benefit from skilled intervention to address the above impairments. Continue treatment per established plan of care to address goals. Recommend with staff: Frequent positional changes, bed level ADLs    Recommend next OT session: POC progression    Recommendation for discharge: (in order for the patient to meet his/her long term goals)  Occupational therapy at least 2 days/week in the home AND ensure assist and/or supervision for safety with ADLs/IADLs, Total A bed level ADLs    This discharge recommendation:  Has been made in collaboration with the attending provider and/or case management    IF patient discharges home will need the following DME: hospital bed and mechanical lift       SUBJECTIVE:   Patient remained silent throughout family meeting.     OBJECTIVE DATA SUMMARY:   Cognitive/Behavioral Status:  Neurologic State: Alert  Orientation Level: Oriented to person  Cognition: Decreased attention/concentration;Decreased command following  Perseveration: No perseveration noted  Safety/Judgement: Decreased awareness of environment;Decreased awareness of need for assistance;Decreased awareness of need for safety;Decreased insight into deficits    ADL Intervention:  Cognitive Retraining  Safety/Judgement: Decreased awareness of environment;Decreased awareness of need for assistance;Decreased awareness of need for safety;Decreased insight into deficits    Activity Tolerance:   Poor and requires frequent rest breaks    After treatment patient left in no apparent distress:   Supine in bed, Call bell within reach, Bed / chair alarm activated, Caregiver / family present, and Side rails x 3    COMMUNICATION/COLLABORATION:   The patients plan of care was discussed with: Physical therapist, Registered nurse, Case management, and family .      Jack Grullon, OT  Time Calculation: 11 mins

## 2021-09-28 NOTE — PROGRESS NOTES
Arik 3, MD aware, on telemetry monitoring     09/28/21 1825   Vitals   Temp 97.9 °F (36.6 °C)   Temp Source Oral   Pulse (Heart Rate) (!) 111   Heart Rate Source Monitor   Resp Rate 16   O2 Sat (%) 97 %   Level of Consciousness Alert (0)   BP (!) 130/55   MAP (Calculated) 80   BP 1 Location Left upper arm   BP 1 Method Automatic   BP Patient Position At rest   MEWS Score 3

## 2021-09-28 NOTE — PROGRESS NOTES
0800: Patient noted to sustain a heart rate in the 140s on telemetry. When entering room the patient was noted to have vomited a large amount of dark brown stool colored vomit. Holding breakfast tray until further instructions. Physician notified and day shift RN aware. Bedside and Verbal shift change report given to Jose Galan RN (oncoming nurse) by Malik Whipple RN (offgoing nurse). Report included the following information SBAR, Kardex, Intake/Output and Recent Results.

## 2021-09-28 NOTE — PROGRESS NOTES
FELICIA: Plan for discharge home with son/caregiver and home health. [facility] has accepted patient for home health Skilled nursing, PT/OT, and home health aid. Patient lives at home with his son/caregiver, Kevin Roth who is home with patient 24/7. Order for hospital bed and charan lift has been sent to [facility], pending insurance approval and confirmation of delivery. Patient will need BLS transport home, [facility] phone [phone] transport on will call for Wednesday. There are 5 stairs to enter the home. CM met with patient's daughter, Behzad Morrow and discussed the above. TARA spoke with [facility] with [facility] #[phone]. They are working on hospital bed order. TARA informed [facility] that CM will be faxing additional orders for charan lift. TARA will discuss with MD regarding additional documentation needed for charan lift. 3:42 PM: TARA faxed order for charan lift and MD progress note to Jonna at Fax#[phone]. Care management will continue to follow.     HIPOLITO Negron/ALEJANDRA

## 2021-09-28 NOTE — ROUTINE PROCESS
TRANSFER - IN REPORT:    Verbal report received from Mika RN(name) on Anusha Hansen  being received from [facility](unit) for routine progression of care      Report consisted of patients Situation, Background, Assessment and   Recommendations(SBAR). Information from the following report(s) SBAR and Kardex was reviewed with the receiving nurse. Opportunity for questions and clarification was provided. Assessment completed upon patients arrival to unit and care assumed.

## 2021-09-28 NOTE — TELEPHONE ENCOUNTER
Vicky Roman is calling to speak to nurse regarding home health orders received from [facility]. Vicky Roman wants to know if Dr. Jasson Palumbo will be following and signing off on orders. Advised nurse would call her back.

## 2021-09-28 NOTE — PROGRESS NOTES
Ortho Daily Progress Note    9/28/2021    POD:  3 Days Post-Op  S/P:  Procedure(s):  Left hip intramedullary nail    HPI: 80 yo M that is POD3, s/p Left hip intramedullary nail with Dr. Scarlet Chen on 9/25/21. The patient had an episode of emesis this am. The patient denies pain, but doesn't elaborate on how he is feeling. He has no other orthopedic complaints. LABS:  Lab Results   Component Value Date/Time    HGB 10.0 (L) 09/27/2021 02:23 AM    INR 1.0 09/24/2021 07:45 PM     Recent Results (from the past 12 hour(s))   GLUCOSE, POC    Collection Time: 09/28/21  6:11 AM   Result Value Ref Range    Glucose (POC) 229 (H) 65 - 117 mg/dL    Performed by Anel Faustin          ORTHOPEDIC PHYSICAL EXAM:  LLE MSK: Left hip with dressing to the lateral  hip. Dressing is C/D/I. Compartments of the LLE are soft and compressible, without pain. 2+ DP pulse. Cap refill is brisk. Patient not following commands well. Without pain on passive flexion of great toe. Negative Hommans sign. ORTHOPEDIC ASSESSMENT:   Nondisplaced left intertrochanteric femur fracture that is now s/p Left hip intramedullary nail    ORTHOPEDIC PLAN:  1. Followed by Dr. Mitchell Alvarado  2. Ortho plan: No further ortho surgical intervention  3. DVT ppx: ASA 81mg BID; SCDs  4. Pain control: Adequate per admitting team, ice and elevation. 5. Dressing: May leave dressing and ace wrap in place if remains C/D/I. Change prn for saturation with dry sterile dressing. 6. Activity: WBAT LLE   7. Dispo: Awaiting SNF placement. Pt is okay to be d/c from an ortho standpoint and to f/u with Dr. Mitchell Alvarado in clinic in 3 weeks. Please see d/c instructions for further detail.     [place], [address]

## 2021-09-28 NOTE — PROGRESS NOTES
PHYSICAL THERAPY    Per RN request, will hold PT until he is cleared by physician as he was tachycardic this am & vomited dark brown emesis (possibly stool). Per Case Management, daughter is preferring to take patient home versus SNF admission. Patient has bed side commode and RW at home. With need semi-electric hospital bed, possibly wheelchair & [employer], 24/7 care in the home.     Jose M Medina

## 2021-09-28 NOTE — PROGRESS NOTES
Hospitalist Progress Note      Hospital summary: 79 y.o man w/ CAD, CHF, DM, MI, dementia, who fell out of bed and developed left him pain. He is sleeping and screams in pain when awakened. He was found to have a left hip fracture in the ED.  9/24/2021      Assessment/Plan:  L hip fracture s/p Left Hip Nail on 9/25   Secondary to fall   - CT: Acute nondisplaced intertrochanteric left proximal femoral fracture. Associated acute nondisplaced avulsion fracture of the left greater trochanter.  - appreciate ortho input  - pain control - scheduled tylenol   - DVT ppx: aspirin 81mg bid  - PT/OT recs SNF     Upper GI bleed   - pt had 2 episodes of coffee ground emesis. Gastric occult positive for blood   - hb remained stable  - KUB- normal  - CT abd ordered  - NPO  - appreciate HI input  - started on IV PPI  - plan for EGD tomorrow   - will monitor hb closely      Anemia - post po blood loss   - s/p 1u PRBC 9/26  - will monitor hb, transfuse <7    Afib: appears to be in sinus rhythm  CAD s/p CABG  ICM with EF 35%  - appreciate cardiology input - cleared for surgery   - echo ordered       Type 2 DM:  -hold Lantus 10U as pt is NPO. C/w ISS     BP: fluctuating   - hold  amlodipine   -PRN IV hydralazine     Dementia with behavioral disturbances  - supportive care   - c/w home seroquel     Hypothyroidism  - synthroid     Code status: DDNR   DVT prophylaxis: SCDs  Disposition: TBD. Family wants to take him home with [facility] [place] bed ordered    [facility] lift is required. Patient not able to transfer independently and would be bed bound without Cristy lift.   ----------------------------------------------    CC: Left femur fracture     S: Patient is seen and examined at bedside this AM. Pt is alert but has dementia.    Discussed with nursing - vomiting this early AM    Spoke with daughter Svetlana Parks on phone and updated patient's status - upper GI bleed, vomiting 2 episodes, hb stable, will monitor, tachycardiac- transfer to tele floor, GI planning for EGD tomorrow       Review of Systems:  Review of systems not obtained due to patient factors.     O:  Visit Vitals  /71 (BP 1 Location: Right upper arm, BP Patient Position: At rest)   Pulse (!) 117   Temp 97.5 °F (36.4 °C)   Resp 16   Ht 5' 4\" (1.626 m)   Wt 68.9 kg (151 lb 14.4 oz)   SpO2 97%   BMI 26.07 kg/m²       PHYSICAL EXAM:  Gen: NAD, elderly   HEENT: anicteric sclerae, normal conjunctiva, oropharynx clear, MM moist  Neck: supple, trachea midline, no adenopathy  Heart: RRR, no MRG, no JVD, no peripheral edema  Lungs: CTA b/l, non-labored respirations  Abd: soft, NT, ND, BS+, no organomegaly  Extr: warm  Skin: dry, no rash  Neuro: dementia, normal speech, moves all extremities  Psych: normal mood, appropriate affect      Intake/Output Summary (Last 24 hours) at 9/28/2021 1503  Last data filed at 9/27/2021 1727  Gross per 24 hour   Intake 120 ml   Output    Net 120 ml        Recent labs & imaging reviewed:  Recent Results (from the past 24 hour(s))   GLUCOSE, POC    Collection Time: 09/27/21  4:23 PM   Result Value Ref Range    Glucose (POC) 228 (H) 65 - 117 mg/dL    Performed by Abel Muniz    GLUCOSE, POC    Collection Time: 09/27/21  9:08 PM   Result Value Ref Range    Glucose (POC) 214 (H) 65 - 117 mg/dL    Performed by [address], POC    Collection Time: 09/28/21  6:11 AM   Result Value Ref Range    Glucose (POC) 229 (H) 65 - 117 mg/dL    Performed by Rox Knight    GLUCOSE, POC    Collection Time: 09/28/21 11:12 AM   Result Value Ref Range    Glucose (POC) 241 (H) 65 - 117 mg/dL    Performed by Katelynn RAYO    EKG, 12 LEAD, INITIAL    Collection Time: 09/28/21 11:37 AM   Result Value Ref Range    Ventricular Rate 124 BPM    Atrial Rate 163 BPM    QRS Duration 76 ms    Q-T Interval 312 ms    QTC Calculation (Bezet) 448 ms    Calculated R Axis 73 degrees    Calculated T Axis 175 degrees    Diagnosis Atrial fibrillation  Abnormal ECG  When compared with ECG of 24-SEP-2021 20:10,  Atrial fibrillation has replaced Junctional rhythm  ST elevation now present in Inferior leads  ST now depressed in Anterior leads  Nonspecific T wave abnormality, worse in Inferior leads     HGB & HCT    Collection Time: 09/28/21  1:03 PM   Result Value Ref Range    HGB 10.2 (L) 12.1 - 17.0 g/dL    HCT 30.8 (L) 36.6 - 58.7 %   METABOLIC PANEL, BASIC    Collection Time: 09/28/21  1:03 PM   Result Value Ref Range    Sodium 136 136 - 145 mmol/L    Potassium 4.3 3.5 - 5.1 mmol/L    Chloride 104 97 - 108 mmol/L    CO2 20 (L) 21 - 32 mmol/L    Anion gap 12 5 - 15 mmol/L    Glucose 267 (H) 65 - 100 mg/dL    BUN 32 (H) 6 - 20 MG/DL    Creatinine 1.82 (H) 0.70 - 1.30 MG/DL    BUN/Creatinine ratio 18 12 - 20      GFR est AA 44 (L) >60 ml/min/1.73m2    GFR est non-AA 36 (L) >60 ml/min/1.73m2    Calcium 8.2 (L) 8.5 - 10.1 MG/DL   OCCULT BLOOD, GASTRIC    Collection Time: 09/28/21  1:03 PM   Result Value Ref Range    OCCULT BLOOD,GASTRIC Positive (A) NEG      pH,GASTRIC  1.5 - 3.5       Unable to determine pH.  pH was not read within 30 seconds of application of specimen. Recent Labs     09/28/21  1303 09/27/21 0223 09/26/21  0330 09/26/21  0330   WBC  --   --   --  11.9*   HGB 10.2* 10.0*   < > 6.7*   HCT 30.8* 30.1*   < > 20.3*   PLT  --   --   --  316    < > = values in this interval not displayed. Recent Labs     09/28/21  1303 09/27/21 0223 09/26/21  0330    137 138   K 4.3 4.2 4.5    110* 109*   CO2 20* 22 24   BUN 32* 22* 19   CREA 1.82* 1.68* 1.86*   * 116* 77   CA 8.2* 8.3* 8.6     No results for input(s): ALT, AP, TBIL, TBILI, TP, ALB, GLOB, GGT, AML, LPSE in the last 72 hours. No lab exists for component: SGOT, GPT, AMYP, HLPSE  No results for input(s): INR, PTP, APTT, INREXT, INREXT in the last 72 hours. No results for input(s): FE, TIBC, PSAT, FERR in the last 72 hours.    Lab Results   Component Value Date/Time    Folate 21.9 (H) 02/04/2019 07:35 PM      No results for input(s): PH, PCO2, PO2 in the last 72 hours. No results for input(s): CPK, CKNDX, TROIQ in the last 72 hours.     No lab exists for component: CPKMB  Lab Results   Component Value Date/Time    Cholesterol, total 170 09/08/2020 04:30 PM    HDL Cholesterol 51 09/08/2020 04:30 PM    LDL, calculated 103 (H) 09/08/2020 04:30 PM    LDL, calculated 93 02/12/2019 08:49 AM    Triglyceride 89 09/08/2020 04:30 PM    CHOL/HDL Ratio 2.3 07/18/2017 02:14 AM     Lab Results   Component Value Date/Time    Glucose (POC) 241 (H) 09/28/2021 11:12 AM    Glucose (POC) 229 (H) 09/28/2021 06:11 AM    Glucose (POC) 214 (H) 09/27/2021 09:08 PM    Glucose (POC) 228 (H) 09/27/2021 04:23 PM    Glucose (POC) 139 (H) 09/27/2021 11:39 AM     Lab Results   Component Value Date/Time    Color YELLOW/STRAW 09/24/2021 10:43 PM    Appearance CLEAR 09/24/2021 10:43 PM    Specific gravity 1.017 09/24/2021 10:43 PM    Specific gravity 1.010 07/17/2017 04:12 PM    pH (UA) 5.5 09/24/2021 10:43 PM    Protein 30 (A) 09/24/2021 10:43 PM    Glucose >1,000 (A) 09/24/2021 10:43 PM    Ketone TRACE (A) 09/24/2021 10:43 PM    Bilirubin Negative 09/24/2021 10:43 PM    Urobilinogen 0.2 09/24/2021 10:43 PM    Nitrites Negative 09/24/2021 10:43 PM    Leukocyte Esterase Negative 09/24/2021 10:43 PM    Epithelial cells FEW 09/24/2021 10:43 PM    Bacteria Negative 09/24/2021 10:43 PM    WBC 0-4 09/24/2021 10:43 PM    RBC 0-5 09/24/2021 10:43 PM       Med list reviewed  Current Facility-Administered Medications   Medication Dose Route Frequency    pantoprazole (PROTONIX) 40 mg in 0.9% sodium chloride 10 mL injection  40 mg IntraVENous Q12H    amLODIPine (NORVASC) tablet 5 mg  5 mg Oral DAILY    0.9% sodium chloride infusion 250 mL  250 mL IntraVENous PRN    acetaminophen (TYLENOL) tablet 650 mg  650 mg Oral Q6H    [Held by provider] aspirin chewable tablet 81 mg  81 mg Oral BID    hydrALAZINE (APRESOLINE) 20 mg/mL injection 20 mg  20 mg IntraVENous Q6H PRN    insulin glargine (LANTUS) injection 10 Units  10 Units SubCUTAneous DAILY    valproic acid (as sodium salt) (DEPAKENE) 250 mg/5 mL (5 mL) oral solution 250 mg  250 mg Oral BID PRN    sodium chloride (NS) flush 5-40 mL  5-40 mL IntraVENous Q8H    sodium chloride (NS) flush 5-40 mL  5-40 mL IntraVENous PRN    acetaminophen (TYLENOL) tablet 650 mg  650 mg Oral Q6H PRN    Or    acetaminophen (TYLENOL) suppository 650 mg  650 mg Rectal Q6H PRN    polyethylene glycol (MIRALAX) packet 17 g  17 g Oral DAILY PRN    ondansetron (ZOFRAN ODT) tablet 4 mg  4 mg Oral Q8H PRN    Or    ondansetron (ZOFRAN) injection 4 mg  4 mg IntraVENous Q6H PRN    levothyroxine (SYNTHROID) tablet 50 mcg  50 mcg Oral ACB    QUEtiapine (SEROquel) tablet 50 mg  50 mg Oral DAILY    QUEtiapine (SEROquel) tablet 25 mg  25 mg Oral QHS PRN    QUEtiapine (SEROquel) tablet 50 mg  50 mg Oral QHS    insulin lispro (HUMALOG) injection   SubCUTAneous AC&HS    glucose chewable tablet 16 g  4 Tablet Oral PRN    dextrose (D50W) injection syrg 12.5-25 g  12.5-25 g IntraVENous PRN    glucagon (GLUCAGEN) injection 1 mg  1 mg IntraMUSCular PRN       Care Plan discussed with:  Patient/Family and Nurse    Karen Segura MD  Internal Medicine  Date of Service: 9/28/2021

## 2021-09-28 NOTE — CONSULTS
[address] [facility].   [address] [facility] [address]        GASTROENTEROLOGY CONSULTATION NOTE  [place]  [phone] office  [phone] NP/PA in-hospital cell phone M-F until 4:30PM  After 5PM or on weekends, please call  for physician on call        NAME:  Valerie Gonzalez   :   1940   MRN:   [id number]       Referring Physician: Dr. Conor Hermosillo Date: 2021 1:32 PM    Chief Complaint: unable to obtain     History of Present Illness:  Patient is a 79 y.o. who is seen in consultation at the request of Dr. Scooter Polk for upper GI bleed. Patient has a past medical history significant for coronary artery disease, congestive heart failure, diabetes mellitus, and dementia. He was admitted to the hospital on 21 for left hip fracture status post fall. Patient is unable to provide any history. History was obtained through chart review and the patient's RN. RN reports 2 episodes of dark emesis (last episode approximately 2 hours ago). No reported hematochezia or melena, although no documented bowel movement. No family is at the bedside. No anticoagulation. Colonoscopy (5/18/15) by Dr. Lelo Sellers showed mild sigmoid diverticulosis. A repeat colonoscopy was recommended in 10 years  EGD (4/27/15) by Dr. Lelo Sellers showed grade 1 reflux esophagitis and a moderate size hiatal hernia; dilated. I have reviewed the emergency room note, hospital admission note, notes by all other clinicians who have seen the patient during this hospitalization to date. I have reviewed the problem list and the reason for this hospitalization. I have reviewed the allergies and the medications the patient was taking at home prior to this hospitalization.       PMH:  Past Medical History:   Diagnosis Date    Atrial fibrillation ([address]     COPD (chronic obstructive pulmonary disease) ([address]     Diabetes ([address]     1970a    Heart failure ([facility]     Hypokalemia 2018    Hyponatremia 4/11/2017    MI (myocardial infarction) [facility] 01/2019    NSTEMI (non-ST elevated myocardial infarction) ([facility] 12/25/2018    Syncope 4/11/2017    Urinary incontinence        PSH:  Past Surgical History:   Procedure Laterality Date    HX HEENT  1975    nasal surgery    HX MOHS PROCEDURES  2013    left    PA CARDIAC SURG PROCEDURE UNLIST  2000    open heart       Allergies: Allergies   Allergen Reactions    Sulfa (Sulfonamide Antibiotics) Unknown (comments)       Home Medications:  Prior to Admission Medications   Prescriptions Last Dose Informant Patient Reported? Taking? DULoxetine (CYMBALTA) 60 mg capsule   Yes No   Sig: TAKE ONE CAPSULE BY MOUTH EVERY DAY   HYDROcodone-acetaminophen (NORCO) 5-325 mg per tablet   No No   Sig: Take 1 Tablet by mouth daily as needed for Pain for up to 30 days. Insulin Needles, Disposable, (BD Ultra-Fine Mini Pen Needle) 31 gauge x 3/16\" ndle   No No   Sig: USE TO INJECT INSULIN 4 TIMES A DAY   QUEtiapine (SEROquel) 25 mg tablet   No No   Sig: Take 25 mg in the afternoon. Patient taking differently: 25 mg nightly as needed. QUEtiapine (SEROquel) 50 mg tablet   No No   Sig: Take 50 mg in the morning and 75 mg in the evening. Patient taking differently: Take 50 mg in the morning and 50 mg in the evening. SENNA PLUS 8.6-50 mg per tablet   Yes No   Sig: TAKE 2 TABS BY MOUTH TWO (2) TIMES A DAY   acetaminophen (TYLENOL) 500 mg tablet   Yes No   Sig: Take 1,000 mg by mouth every six to eight (6-8) hours as needed for Pain. aspirin 81 mg chewable tablet   No No   Sig: Take 1 Tab by mouth daily. b complex vitamins (SUPER B-50 COMPLEX PLUS) tablet   No No   Sig: Take 1 Tab by mouth daily. glucose blood VI test strips (OneTouch Ultra Blue Test Strip) strip   No No   Sig: USE TO CHECK BLOOD SUGAR 3 TIMES DAILY. DIAGNOSIS CODE: E11.9   insulin glargine (LANTUS,BASAGLAR) 100 unit/mL (3 mL) inpn   No No   Sig: Inject 14 units every morning.    insulin lispro (HUMALOG) 100 unit/mL injection   No No   Sig: INJECT 3 UNITS BENEATH THE SKIN FOR BS>200, 5 UNITS FOR BS>250, 6 UNITS FOR BS>300. CALL MD FOR BS >400   insulin lispro (HUMALOG) 100 unit/mL kwikpen   No No   Sig: 3 units sq for bs greater than 200 ; 5  untis sq for bs greater than 250,  6units for blood sugar greater than 300 ; call me for bs greater than 400   levothyroxine (SYNTHROID) 50 mcg tablet   No No   Sig: TAKE 1 TABLET BY MOUTH EVERY DAY BEFORE BREAKFAST   lidocaine (LIDODERM) 5 %   No No   Sig: Apply patch to the affected area for 12 hours a day and remove for 12 hours a day. Patient taking differently: 1 Patch by TransDERmal route every twenty-four (24) hours. Apply patch to the affected area for 12 hours a day and remove for 12 hours a day. midodrine (PROAMITINE) 5 mg tablet   Yes No   Sig: Take 5 mg by mouth daily as needed for Other (if blood pressure is below 90/50). take 5 mg daily PRN - if blood pressure is below 90/50   omeprazole (PRILOSEC) 20 mg capsule   No No   Sig: Take 1 Capsule by mouth daily. Before breakfast   ondansetron (ZOFRAN ODT) 4 mg disintegrating tablet   No No   Sig: Take 1 Tab by mouth every six (6) hours as needed for Nausea. tamsulosin (Flomax) 0.4 mg capsule   No No   Sig: Take 1 Cap by mouth daily.    valproate (DEPAKENE) 250 mg/5 mL syrup   No No   Sig: TAKE 5ML BY MOUTH TWICE A DAY AS NEEDED FOR AGITATION      Facility-Administered Medications: None       Hospital Medications:  Current Facility-Administered Medications   Medication Dose Route Frequency    pantoprazole (PROTONIX) 40 mg in 0.9% sodium chloride 10 mL injection  40 mg IntraVENous Q12H    amLODIPine (NORVASC) tablet 5 mg  5 mg Oral DAILY    0.9% sodium chloride infusion 250 mL  250 mL IntraVENous PRN    acetaminophen (TYLENOL) tablet 650 mg  650 mg Oral Q6H    [Held by provider] aspirin chewable tablet 81 mg  81 mg Oral BID    hydrALAZINE (APRESOLINE) 20 mg/mL injection 20 mg  20 mg IntraVENous Q6H PRN    insulin glargine (LANTUS) injection 10 Units  10 Units SubCUTAneous DAILY    valproic acid (as sodium salt) (DEPAKENE) 250 mg/5 mL (5 mL) oral solution 250 mg  250 mg Oral BID PRN    sodium chloride (NS) flush 5-40 mL  5-40 mL IntraVENous Q8H    sodium chloride (NS) flush 5-40 mL  5-40 mL IntraVENous PRN    acetaminophen (TYLENOL) tablet 650 mg  650 mg Oral Q6H PRN    Or    acetaminophen (TYLENOL) suppository 650 mg  650 mg Rectal Q6H PRN    polyethylene glycol (MIRALAX) packet 17 g  17 g Oral DAILY PRN    ondansetron (ZOFRAN ODT) tablet 4 mg  4 mg Oral Q8H PRN    Or    ondansetron (ZOFRAN) injection 4 mg  4 mg IntraVENous Q6H PRN    levothyroxine (SYNTHROID) tablet 50 mcg  50 mcg Oral ACB    QUEtiapine (SEROquel) tablet 50 mg  50 mg Oral DAILY    QUEtiapine (SEROquel) tablet 25 mg  25 mg Oral QHS PRN    QUEtiapine (SEROquel) tablet 50 mg  50 mg Oral QHS    insulin lispro (HUMALOG) injection   SubCUTAneous AC&HS    glucose chewable tablet 16 g  4 Tablet Oral PRN    dextrose (D50W) injection syrg 12.5-25 g  12.5-25 g IntraVENous PRN    glucagon (GLUCAGEN) injection 1 mg  1 mg IntraMUSCular PRN       Social History:  Social History     Tobacco Use    Smoking status: Former Smoker    Smokeless tobacco: Never Used    Tobacco comment: 1-2 cigars daily   Substance Use Topics    Alcohol use: No     Alcohol/week: 0.0 standard drinks       Family History:  Family History   Problem Relation Age of Onset    Diabetes Mother     Diabetes Brother        Review of Systems:  Unable to obtain due to dementia      Objective:     Patient Vitals for the past 8 hrs:   BP Temp Pulse Resp SpO2 Weight   09/28/21 1216   (!) 124      09/28/21 1107 (!) 149/71  (!) 122 16 97 %    09/28/21 0959   (!) 116      09/28/21 0951 (!) 181/83  (!) 114      09/28/21 0854      68.9 kg (151 lb 14.4 oz)   09/28/21 0830 (!) 181/83 98.3 °F (36.8 °C) (!) 114 16 97 %    09/28/21 0556   (!) 114        No intake/output data recorded. 09/26 1901 - 09/28 0700  In: 120 [P.O.:120]  Out: -     EXAM:     CONST:  Pleasant male lying in bed, no acute distress   NEURO:  Awake, confused   HEENT: EOMI, no scleral icterus   LUNGS: No acute respiratory distress   CARD:  S1 S2   ABD:  Soft, no apparent tenderness, no rebound, no guarding. + Bowel sounds. EXT:  Warm   PSYCH: Not anxious or agitated       Data Review     Recent Labs     09/28/21  1303 09/27/21  0223 09/26/21  0330 09/26/21  0330   WBC  --   --   --  11.9*   HGB 10.2* 10.0*   < > 6.7*   HCT 30.8* 30.1*   < > 20.3*   PLT  --   --   --  316    < > = values in this interval not displayed. Recent Labs     09/27/21 0223 09/26/21 0330    138   K 4.2 4.5   * 109*   CO2 22 24   BUN 22* 19   CREA 1.68* 1.86*   * 77   CA 8.3* 8.6     No results for input(s): AP, TBIL, TP, ALB, GLOB, GGT, AML, LPSE in the last 72 hours. No lab exists for component: SGOT, GPT, AMYP, HLPSE  No results for input(s): INR, PTP, APTT, INREXT in the last 72 hours. Assessment:   · GI bleed, dark emesis: Hgb 10.2 (10.0 yesterday, 6.7 on 9/26, 11.8 on 9/24), platelets 743 on 5/26, INR 1.0 on 9/24. Received 1 unit PRBCs on 9/26. KUB (9/28/21): no evidence of ileus or obstruction. Differential includes peptic ulcer disease and AVMs.   · Left hip fracture status post left hip intramedullary nailing (9/25/21)  · Atrial fibrillation  · Coronary artery disease status post CABG  · Congestive heart failure  · Diabetes mellitus  · Dementia     Patient Active Problem List   Diagnosis Code    Late onset Alzheimer's disease with behavioral disturbance ([facility] G30.1, F02.81    Moderate recurrent major depression ([facility] F33.1    Uncontrolled type 2 diabetes mellitus with hyperglycemia ([facility]) E11.65    Urinary incontinence R32    CAD (coronary artery disease) I25.10    COPD (chronic obstructive pulmonary disease) ([facility]) J44.9    Orthostatic hypotension I95.1    Chronic systolic congestive heart failure (HCC) I50.22    Prostate cancer (HCC) C61    Acquired hypothyroidism E03.9    Stage 3a chronic kidney disease (HCC) N18.31    Other chronic pain G89.29    Debility R53.81    COVID-19 vaccine dose declined Z28.21    Hip fracture (HCC) S72.009A     Plan:   · NPO  · PPI BID  · Trend CBC and transfuse as necessary  · Plan for EGD tomorrow with Dr. Andrade Lovell. Details and risks of the procedure to include (but not limited to) anesthesia, bleeding, infection, and perforation were discussed with the patient's daughter, Rocío Briceño ([phone]), on the phone. All questions were answered. She understands and is in agreement to proceed. Please obtain consent.    · Negative rapid COVID test on 9/24  · Patient was discussed with Dr. Andrade Lovell  · Thank you for allowing me to participate in care of Daryle Round     Signed By: BRYAN Schuler     9/28/2021  1:32 PM     Agree with above  EGD in am

## 2021-09-28 NOTE — PROGRESS NOTES
TRANSFER - OUT REPORT:    Verbal report given to [facility] RN(name) on Giovani Lester  being transferred to (unit) for change in patient condition(tachycardia)       Report consisted of patients Situation, Background, Assessment and   Recommendations(SBAR). Information from the following report(s) SBAR and Kardex was reviewed with the receiving nurse. Lines:   Peripheral IV 09/25/21 Posterior;Right Forearm (Active)   Site Assessment Clean, dry, & intact 09/28/21 0000   Phlebitis Assessment 0 09/28/21 0000   Infiltration Assessment 0 09/28/21 0000   Dressing Status Clean, dry, & intact 09/28/21 0000   Dressing Type Transparent 09/28/21 0000   Hub Color/Line Status Blue 09/28/21 0000   Action Taken Open ports on tubing capped 09/28/21 0000   Alcohol Cap Used Yes 09/28/21 0000        Opportunity for questions and clarification was provided.       Patient transported with:

## 2021-09-28 NOTE — PROGRESS NOTES
Physician Progress Note      PATIENT:               Kylee Restrepo  CSN #:                  [id number]  :                       1940  ADMIT DATE:       2021 6:01 PM  [address] [place] DATE:  RESPONDING  PROVIDER #:        Kyrie Pierre MD          QUERY TEXT:    Devante Mcghee,    Pt admitted after a fall from bed resulting in acute non displaced intertrochanteric L proximal femur fracture and acute non displaced avulsion fracture of left greater trochanter.  XR FEMUR LT states the bones are osteopenic. Please further specify the type of fracture being treated: The medical record reflects the following:    Risk Factors:80M Dementia with behaviors, fall from bed at home, osteopenia on xray    Clinical Indicators:    EXAM: XR FEMUR LT 2 V  INDICATION: fall. COMPARISON: None. FINDINGS:  There is an acute nondisplaced intertrochanteric left proximal femoral fracture. There is an associated nondisplaced avulsion of the left greater trochanter. There is an intact right total hip arthroplasty. The bones are osteopenic. Mis Bills MD Consult   Ivan Swain is a 79 y.o. male who fell out of bed at home and began complaining of L hip pain to family; he was brought in by EMS; the fall was unwitnessed; pt unable to answer any questions due to dementia.   Impression:  Left Femur Intertrochanteric fracture     Petty Mcghee MD Progress Note  Assessment/Plan:  L hip fracture s/p Left Hip Nail on   Secondary to fall    Treatment: Hospital Admission, Left hip intramedullary nail with Dr. Soraya Sheehan on 21, Pain management, PT/OT, CM, Specialty bed orders      Thank you,    Bethany BOLESN RN [facility] [place]  Clinical Documentation Improvement Specialist  [phone]  Options provided:  -- Osteoporotic L femur fracture following fall which would not usually break a normal, healthy bone  -- Traumatic L femur  fracture  -- Other - I will add my own diagnosis  -- Disagree - Not applicable / Not valid  -- Disagree - Clinically unable to determine / Unknown  -- Refer to Clinical Documentation Reviewer    PROVIDER RESPONSE TEXT:    Provider is clinically unable to determine a response to this query.     Query created by: Akanksha Gao on 9/28/2021 1:33 PM      Electronically signed by:  Rosario Bardales MD 9/28/2021 1:46 PM

## 2021-09-28 NOTE — TELEPHONE ENCOUNTER
Call returned to [facility], with [facility] to confirm that Dr. Quin Bergeron will be following the patient once he is discharged from the hospital.

## 2021-09-29 NOTE — ANESTHESIA PREPROCEDURE EVALUATION
Relevant Problems   RESPIRATORY SYSTEM   (+) COPD (chronic obstructive pulmonary disease) (HCC)      NEUROLOGY   (+) Late onset Alzheimer's disease with behavioral disturbance (HCC)   (+) Moderate recurrent major depression (HCC)      CARDIOVASCULAR   (+) CAD (coronary artery disease)   (+) Chronic systolic congestive heart failure (HCC)      RENAL FAILURE   (+) Stage 3a chronic kidney disease (HCC)      ENDOCRINE   (+) Acquired hypothyroidism      PERSONAL HX & FAMILY HX OF CANCER   (+) Prostate cancer (HCC)       Anesthetic History   No history of anesthetic complications            Review of Systems / Medical History  Patient summary reviewed, nursing notes reviewed and pertinent labs reviewed    Pulmonary  Within defined limits  COPD               Neuro/Psych   Within defined limits           Cardiovascular  Within defined limits          Dysrhythmias : atrial fibrillation  CAD      Comments: Echo 2018  SUMMARY:  Left ventricle: Systolic function was mildly reduced. Ejection fraction  was estimated in the range of 40 % to 45 %. There was severe hypokinesis  of the basal-mid anteroseptal wall(s). There was mild hypokinesis of the  mid-apical anterior and apical septal wall(s).    Left atrium: The atrium was mildly dilated.     Mitral valve: There was moderate regurgitation.     Aortic valve: The valve was trileaflet. Leaflets exhibited moderately  increased thickness and moderate calcification. Transaortic velocity was  increased due to valvular stenosis. There was mild stenosis.     Pericardium: There was no pericardial effusion. There was a large left  pleural effusion.    GI/Hepatic/Renal         Renal disease: CRI       Endo/Other    Diabetes    Anemia     Other Findings              Physical Exam    Airway  Mallampati: II  TM Distance: > 6 cm  Neck ROM: normal range of motion   Mouth opening: Normal     Cardiovascular  Regular rate and rhythm,  S1 and S2 normal,  no murmur, click, rub, or gallop Dental  No notable dental hx       Pulmonary  Breath sounds clear to auscultation               Abdominal  GI exam deferred       Other Findings            Anesthetic Plan    ASA: 3  Anesthesia type: MAC          Induction: Intravenous  Anesthetic plan and risks discussed with: Healthcare power of  and Son / Daughter      I spoke with daughter Martín Jaramillo via phone regarding the risks/benefits of anesthesia and she agreed to proceed. She agreed to suspend the DNR for the perioperative period.

## 2021-09-29 NOTE — PROCEDURES
[address]  [address] [facility], [address] [facility] (EGD) Procedure Note    Jeaneth Nicholson  1940  [id number]      Procedure: Endoscopic Gastroduodenoscopy with biopsy    Indication:  Hematemesis     Pre-operative Diagnosis: see indication above    Post-operative Diagnosis: see findings below    : Uziel Dominique MD    Surgical Assistant: Endoscopy Technician-1: Jovany Lockwood  Endoscopy RN-1: Brenda Moody RN    Implants:  None    Referring Provider:  Truman Presley MD      Anesthesia/Sedation:  MAC anesthesia Propofol        Procedure Details     After infomed consent was obtained for the procedure, with all risks and benefits of procedure explained the patient was taken to the endoscopy suite and placed in the back  position. Following sequential administration of sedation as per above, the endoscope was inserted into the mouth and advanced under direct vision to third portion of the duodenum. A careful inspection was made as the gastroscope was withdrawn, including a retroflexed view of the proximal stomach; findings and interventions are described below. Findings:   Esophagus:hiatal hernia 5 cm in size   There was severe erosive esophagitis of grade 3 involving distal 7 cm of esophagus, secondary to acid reflux      Stomach: gastritis in antrum, biopsies taken     Duodenum/jejunum: erosive duodenitis with small non bleeding ulcers in bulb of duodenum       Therapies:  none    Specimens: as above         EBL: None      Complications:   None; patient tolerated the procedure well. Impression:    -See post-procedure diagnoses.     Recommendations:  -Continue acid suppression with PPI q 12 hours, to stay on PPI po bid as outpatient   -resumed PO  -discussed results with his daughter, Danelle Blackmon by phone  -f/u  With our GI clinic 4 to 6 weeks after d/c  Will follow as needed, please call for questions    Signed By: Heladio Armendariz MD     9/29/2021  10:14 AM

## 2021-09-29 NOTE — PROGRESS NOTES
Hospitalist Progress Note      Hospital summary: 79 y.o man w/ CAD, CHF, DM, MI, dementia, who fell out of bed and developed left him pain. He is sleeping and screams in pain when awakened. He was found to have a left hip fracture in the ED.  9/24/2021      Assessment/Plan:  L hip fracture s/p Left Hip Nail on 9/25   Secondary to fall   - CT: Acute nondisplaced intertrochanteric left proximal femoral fracture. Associated acute nondisplaced avulsion fracture of the left greater trochanter.  - appreciate ortho input  - pain control - scheduled tylenol   - DVT ppx: aspirin 81mg bid  - PT/OT recs SNF     Upper GI bleed   - pt had 2 episodes of coffee ground emesis on 9/28. Gastric occult positive for blood   - CT abd: Focal area of possible wall thickening and relative contraction of the mid right colon concerning for possible macular neoplasm versus normal contracted bowel. Further evaluation with colonoscopy is recommended  - appreciate GI input  -  PPI  -  EGD 9/29: severe erosive esophagitis of grade 3, gastritis , erosive duodenitis with small non bleeding ulcers   - regular diet   - discussed with Dr. Cuadra Gist - ok to c/w aspirin above     Anemia - post po blood loss   - s/p 1u PRBC 9/26  - will monitor hb, transfuse <7    Afib: appears to be in sinus rhythm  CAD s/p CABG  ICM with EF 35%  - appreciate cardiology input - cleared for surgery       Type 2 DM:  -hold Lantus 10U. C/w ISS     BP: fluctuating    -PRN IV hydralazine     Dementia with behavioral disturbances  - supportive care   - c/w home seroquel     Hypothyroidism  - synthroid     Code status: DDNR   DVT prophylaxis: SCDs  Disposition: TBD. Family wants to take him home with [facility]. Possible [address] [facility] bed and [facility] lift ordered  ----------------------------------------------    CC: Left femur fracture     S: Patient is seen and examined at bedside this AM. Pt is alert but has dementia.  He just had EGD done  Discussed with nursing - encourage to eat     Spoke with tiana Samaniego on phone and updated patient's status - EGD results discussed by GI, on PPI, hb stable. Not eating much. hospital bed and charan lift being delivered tomorrow. Possible dc tomorrow if hb remains stable and pt tolerating diet       Review of Systems:  Review of systems not obtained due to patient factors.     O:  Visit Vitals  /63 (BP 1 Location: Left upper arm, BP Patient Position: At rest)   Pulse 92   Temp 98.3 °F (36.8 °C)   Resp 16   Ht 5' 4\" (1.626 m)   Wt 68.9 kg (151 lb 14.4 oz)   SpO2 98%   BMI 26.07 kg/m²       PHYSICAL EXAM:  Gen: NAD, elderly   HEENT: anicteric sclerae, normal conjunctiva, oropharynx clear, MM moist  Neck: supple, trachea midline, no adenopathy  Heart: RRR, no MRG, no JVD, no peripheral edema  Lungs: CTA b/l, non-labored respirations  Abd: soft, NT, ND, BS+, no organomegaly  Extr: warm  Skin: dry, no rash  Neuro: dementia, normal speech, moves all extremities  Psych: normal mood, appropriate affect    No intake or output data in the 24 hours ending 09/29/21 1455     Recent labs & imaging reviewed:  Recent Results (from the past 24 hour(s))   HGB & HCT    Collection Time: 09/28/21  5:36 PM   Result Value Ref Range    HGB 9.6 (L) 12.1 - 17.0 g/dL    HCT 28.4 (L) 36.6 - 50.3 %   GLUCOSE, POC    Collection Time: 09/28/21  6:24 PM   Result Value Ref Range    Glucose (POC) 194 (H) 65 - 117 mg/dL    Performed by CHRIS LOTT    GLUCOSE, POC    Collection Time: 09/28/21  9:41 PM   Result Value Ref Range    Glucose (POC) 104 65 - 117 mg/dL    Performed by Myron Peoples    CBC W/O DIFF    Collection Time: 09/29/21  1:39 AM   Result Value Ref Range    WBC 7.8 4.1 - 11.1 K/uL    RBC 3.15 (L) 4.10 - 5.70 M/uL    HGB 9.2 (L) 12.1 - 17.0 g/dL    HCT 27.6 (L) 36.6 - 50.3 %    MCV 87.6 80.0 - 99.0 FL    MCH 29.2 26.0 - 34.0 PG    MCHC 33.3 30.0 - 36.5 g/dL    RDW 14.3 11.5 - 14.5 %    PLATELET 283 904 - 593 K/uL    MPV 12.4 8.9 - 12.9 FL    NRBC 0.0 0  WBC    ABSOLUTE NRBC 0.00 0.00 - 5.64 K/uL   METABOLIC PANEL, BASIC    Collection Time: 09/29/21  1:39 AM   Result Value Ref Range    Sodium 139 136 - 145 mmol/L    Potassium 3.4 (L) 3.5 - 5.1 mmol/L    Chloride 106 97 - 108 mmol/L    CO2 26 21 - 32 mmol/L    Anion gap 7 5 - 15 mmol/L    Glucose 112 (H) 65 - 100 mg/dL    BUN 32 (H) 6 - 20 MG/DL    Creatinine 1.78 (H) 0.70 - 1.30 MG/DL    BUN/Creatinine ratio 18 12 - 20      GFR est AA 45 (L) >60 ml/min/1.73m2    GFR est non-AA 37 (L) >60 ml/min/1.73m2    Calcium 8.6 8.5 - 10.1 MG/DL   GLUCOSE, POC    Collection Time: 09/29/21  6:00 AM   Result Value Ref Range    Glucose (POC) 110 65 - 117 mg/dL    Performed by Yesica Luna    GLUCOSE, POC    Collection Time: 09/29/21 11:47 AM   Result Value Ref Range    Glucose (POC) 126 (H) 65 - 117 mg/dL    Performed by Иван Coleman      Recent Labs     09/29/21  0139 09/28/21  1736   WBC 7.8  --    HGB 9.2* 9.6*   HCT 27.6* 28.4*     --      Recent Labs     09/29/21  0139 09/28/21  1303 09/27/21  0223    136 137   K 3.4* 4.3 4.2    104 110*   CO2 26 20* 22   BUN 32* 32* 22*   CREA 1.78* 1.82* 1.68*   * 267* 116*   CA 8.6 8.2* 8.3*     No results for input(s): ALT, AP, TBIL, TBILI, TP, ALB, GLOB, GGT, AML, LPSE in the last 72 hours. No lab exists for component: SGOT, GPT, AMYP, HLPSE  No results for input(s): INR, PTP, APTT, INREXT, INREXT in the last 72 hours. No results for input(s): FE, TIBC, PSAT, FERR in the last 72 hours. Lab Results   Component Value Date/Time    Folate 21.9 (H) 02/04/2019 07:35 PM      No results for input(s): PH, PCO2, PO2 in the last 72 hours. No results for input(s): CPK, CKNDX, TROIQ in the last 72 hours.     No lab exists for component: CPKMB  Lab Results   Component Value Date/Time    Cholesterol, total 170 09/08/2020 04:30 PM    HDL Cholesterol 51 09/08/2020 04:30 PM    LDL, calculated 103 (H) 09/08/2020 04:30 PM    LDL, calculated 93 02/12/2019 08:49 AM    Triglyceride 89 09/08/2020 04:30 PM    CHOL/HDL Ratio 2.3 07/18/2017 02:14 AM     Lab Results   Component Value Date/Time    Glucose (POC) 126 (H) 09/29/2021 11:47 AM    Glucose (POC) 110 09/29/2021 06:00 AM    Glucose (POC) 104 09/28/2021 09:41 PM    Glucose (POC) 194 (H) 09/28/2021 06:24 PM    Glucose (POC) 241 (H) 09/28/2021 11:12 AM     Lab Results   Component Value Date/Time    Color YELLOW/STRAW 09/24/2021 10:43 PM    Appearance CLEAR 09/24/2021 10:43 PM    Specific gravity 1.017 09/24/2021 10:43 PM    Specific gravity 1.010 07/17/2017 04:12 PM    pH (UA) 5.5 09/24/2021 10:43 PM    Protein 30 (A) 09/24/2021 10:43 PM    Glucose >1,000 (A) 09/24/2021 10:43 PM    Ketone TRACE (A) 09/24/2021 10:43 PM    Bilirubin Negative 09/24/2021 10:43 PM    Urobilinogen 0.2 09/24/2021 10:43 PM    Nitrites Negative 09/24/2021 10:43 PM    Leukocyte Esterase Negative 09/24/2021 10:43 PM    Epithelial cells FEW 09/24/2021 10:43 PM    Bacteria Negative 09/24/2021 10:43 PM    WBC 0-4 09/24/2021 10:43 PM    RBC 0-5 09/24/2021 10:43 PM       Med list reviewed  Current Facility-Administered Medications   Medication Dose Route Frequency    pantoprazole (PROTONIX) 40 mg in 0.9% sodium chloride 10 mL injection  40 mg IntraVENous Q12H    [Held by provider] amLODIPine (NORVASC) tablet 5 mg  5 mg Oral DAILY    0.9% sodium chloride infusion 250 mL  250 mL IntraVENous PRN    acetaminophen (TYLENOL) tablet 650 mg  650 mg Oral Q6H    [Held by provider] aspirin chewable tablet 81 mg  81 mg Oral BID    hydrALAZINE (APRESOLINE) 20 mg/mL injection 20 mg  20 mg IntraVENous Q6H PRN    [Held by provider] insulin glargine (LANTUS) injection 10 Units  10 Units SubCUTAneous DAILY    valproic acid (as sodium salt) (DEPAKENE) 250 mg/5 mL (5 mL) oral solution 250 mg  250 mg Oral BID PRN    sodium chloride (NS) flush 5-40 mL  5-40 mL IntraVENous Q8H    sodium chloride (NS) flush 5-40 mL  5-40 mL IntraVENous PRN  acetaminophen (TYLENOL) tablet 650 mg  650 mg Oral Q6H PRN    Or    acetaminophen (TYLENOL) suppository 650 mg  650 mg Rectal Q6H PRN    polyethylene glycol (MIRALAX) packet 17 g  17 g Oral DAILY PRN    ondansetron (ZOFRAN ODT) tablet 4 mg  4 mg Oral Q8H PRN    Or    ondansetron (ZOFRAN) injection 4 mg  4 mg IntraVENous Q6H PRN    levothyroxine (SYNTHROID) tablet 50 mcg  50 mcg Oral ACB    QUEtiapine (SEROquel) tablet 50 mg  50 mg Oral DAILY    QUEtiapine (SEROquel) tablet 25 mg  25 mg Oral QHS PRN    QUEtiapine (SEROquel) tablet 50 mg  50 mg Oral QHS    insulin lispro (HUMALOG) injection   SubCUTAneous AC&HS    glucose chewable tablet 16 g  4 Tablet Oral PRN    dextrose (D50W) injection syrg 12.5-25 g  12.5-25 g IntraVENous PRN    glucagon (GLUCAGEN) injection 1 mg  1 mg IntraMUSCular PRN       Care Plan discussed with:  Patient/Family and Nurse    Jerry Nguyen MD  Internal Medicine  Date of Service: 9/29/2021

## 2021-09-29 NOTE — PROGRESS NOTES
Problem: Pressure Injury - Risk of  Goal: *Prevention of pressure injury  Description: Document Flynn Scale and appropriate interventions in the flowsheet. Outcome: Progressing Towards Goal  Note: Pressure Injury Interventions:  Sensory Interventions: Minimize linen layers, Pressure redistribution bed/mattress (bed type)    Moisture Interventions: Absorbent underpads, Apply protective barrier, creams and emollients, Maintain skin hydration (lotion/cream)    Activity Interventions: Increase time out of bed, Pressure redistribution bed/mattress(bed type)    Mobility Interventions: Pressure redistribution bed/mattress (bed type), Float heels    Nutrition Interventions: Document food/fluid/supplement intake    Friction and Shear Interventions: HOB 30 degrees or less, Minimize layers                Problem: Falls - Risk of  Goal: *Absence of Falls  Description: Document Jose Fall Risk and appropriate interventions in the flowsheet.   Outcome: Progressing Towards Goal  Note: Fall Risk Interventions:       Mentation Interventions: Door open when patient unattended, Evaluate medications/consider consulting pharmacy    Medication Interventions: Evaluate medications/consider consulting pharmacy, Patient to call before getting OOB    Elimination Interventions: Call light in reach, Patient to call for help with toileting needs    History of Falls Interventions: Evaluate medications/consider consulting pharmacy, Door open when patient unattended         Problem: Patient Education: Go to Patient Education Activity  Goal: Patient/Family Education  Outcome: Progressing Towards Goal     Problem: Patient Education: Go to Patient Education Activity  Goal: Patient/Family Education  Outcome: Progressing Towards Goal

## 2021-09-29 NOTE — PERIOP NOTES
[facility] Snare TRANSFER - IN REPORT:    Verbal report received from Honey(name) on Hilton Butt  being received from [address] [place] [address](unit) for ordered procedure      Report consisted of patients Situation, Background, Assessment and   Recommendations(SBAR). Information from the following report(s) SBAR was reviewed with the receiving nurse. Opportunity for questions and clarification was provided. 0825: Telephone consent received from Herman Palomino. 0915: Pt rec'd in endo. 1665: . Patient has been evaluated by anesthesia pre-procedure. Patient removes glasses and replaces them. He looks to me when I say his name. Vital signs will not be charted by the Endoscopy nurse. All vitals, airway, and loc are monitored by anesthesia staff throughout procedure. 9974: . Endoscope will be pre-cleaned at bedside immediately following procedure by AB    1020: . TRANSFER - OUT REPORT:    Verbal report given to Dalia(name) [facility]       Report consisted of patients Situation, Background, Assessment and   Recommendations(SBAR). Information from the following report(s) Procedure Summary was reviewed with the receiving nurse. Lines:   Peripheral IV 09/25/21 Posterior;Right Forearm (Active)   Site Assessment Clean, dry, & intact 09/29/21 0239   Phlebitis Assessment 0 09/29/21 0239   Infiltration Assessment 0 09/29/21 0239   Dressing Status Clean, dry, & intact 09/29/21 0239   Dressing Type Tape;Transparent 09/29/21 0239   Hub Color/Line Status Flushed;End cap changed; Capped; Patent 09/29/21 0239   Action Taken Open ports on tubing capped 09/29/21 0239   Alcohol Cap Used Yes 09/29/21 0239       Peripheral IV 09/28/21 Proximal;Right Cephalic (Active)   Site Assessment Clean, dry, & intact 09/29/21 0239   Phlebitis Assessment 0 09/29/21 0239   Infiltration Assessment 0 09/29/21 0239   Dressing Status Clean, dry, & intact 09/29/21 0239   Dressing Type Petroleum jelly gauze;Transparent;Tape 09/29/21 0239   Hub Color/Line Status Pink;Flushed;End cap changed; Capped 09/29/21 0239   Action Taken Open ports on tubing capped 09/29/21 0239   Alcohol Cap Used Yes 09/29/21 0239        Opportunity for questions and clarification was provided.

## 2021-09-29 NOTE — ANESTHESIA POSTPROCEDURE EVALUATION
Procedure(s):  ESOPHAGOGASTRODUODENOSCOPY (EGD)  ESOPHAGOGASTRODUODENAL (EGD) BIOPSY. MAC    Anesthesia Post Evaluation      Multimodal analgesia: multimodal analgesia not used between 6 hours prior to anesthesia start to PACU discharge  Patient location during evaluation: bedside  Patient participation: complete - patient participated  Level of consciousness: awake  Pain score: 0  Pain management: satisfactory to patient  Airway patency: patent  Anesthetic complications: no  Cardiovascular status: acceptable and blood pressure returned to baseline  Respiratory status: acceptable  Hydration status: acceptable  Comments: I have evaluated the patient and meets criteria for discharge from PACU. Odalys Madera DO. Post anesthesia nausea and vomiting:  none  Final Post Anesthesia Temperature Assessment:  Normothermia (36.0-37.5 degrees C)      INITIAL Post-op Vital signs:   Vitals Value Taken Time   /97 09/29/21 1025   Temp     Pulse 102 09/29/21 1027   Resp 16 09/29/21 1027   SpO2 96 % 09/29/21 1027   Vitals shown include unvalidated device data.

## 2021-09-29 NOTE — ROUTINE PROCESS
Ivanfabio RainesBrynn  1940  [id number]    Situation:  Verbal report received from: Elsa  Procedure: Procedure(s):  ESOPHAGOGASTRODUODENOSCOPY (EGD)  ESOPHAGOGASTRODUODENAL (EGD) BIOPSY    Background:    Preoperative diagnosis: Hematemesis  Postoperative diagnosis: Hiatal hernia,  severe esophagitis, gastritis, duodenitis.  duodenal ulcers    :  Dr. Ascencion Menjivar  Assistant(s): Endoscopy Technician-1: Kunal Gregory  Endoscopy RN-1: Nancy Dickinson RN    Specimens:   ID Type Source Tests Collected by Time Destination   1 : gastric bx Preservative Gastric  Alexander Chavez MD 9/29/2021 1006 Pathology     H. Pylori  no    Assessment:  Intra-procedure medications     Anesthesia gave intra-procedure sedation and medications, see anesthesia flow sheet     Intravenous fluids: NS@ KVO     Vital signs stable     Abdominal assessment: round and soft     Recommendation:  Discharge patient per MD order  Return to floor room 215  Permission to share finding with family- yes

## 2021-09-29 NOTE — PROGRESS NOTES
FELICIA:  1. RUR-23%  2. MD monitor hgb labs. 3. Return home with son and [facility] services. 4. DME will be delivered on Thursday per daughter. Cm following for additional discharge needs.     John Kaur [facility]

## 2021-09-29 NOTE — PROGRESS NOTES
Occupational Therapy    Patient chart reviewed up to date - patient currently REYNALDO to ENDO. Will defer at this time and continue to follow as able and appropriate. Thank you.   Polly Marie, OTR/L

## 2021-09-29 NOTE — PROGRESS NOTES
Bedside and Verbal shift change report given to Patrizia Tse (oncoming nurse) by Lotus Baker RN (offgoing nurse). Report included the following information SBAR, Kardex, Intake/Output, MAR and Recent Results.

## 2021-09-30 NOTE — PROGRESS NOTES
Patient is alert and confused verbal consent given by one of two primary health decisons makers, Cheyenne Cortesallen (son) for patient to receive the J&J covid vaccine before discharge. Verbal approval also received from MILTON Go collaborating with Md Jodi Armstrong. This vaccinator enclosed a [employer] fact sheet for [employer] Covid 19 and covid vaccination record card to be given to family member prior to discharge. Vaccinator reviewed potential side effects of J&J with patient's primary health decision maker Cheyenne Hiram, receptive to communication and verbalized understanding.  Sushma Menjivar RN

## 2021-09-30 NOTE — PROGRESS NOTES
Patient is going home today with [facility] transport and [facility] services.  arranged transport for 1430, but [facility] provided time of 1730. This patient daughter and son are aware. There are no other discharge needs at this time. Discharge packet on the folder.     Seth Mcghee [facility]

## 2021-09-30 NOTE — WOUND CARE
WOCN Note:     New wound care consult placed for left heel wound    Chart shows:  Patient admitted on 9/24/21 with left hip fracture  Past Medical History:   Diagnosis Date    Atrial fibrillation ([facility]     COPD (chronic obstructive pulmonary disease) ([facility]     Diabetes ([facility]     1970a    Heart failure ([facility]     Hypokalemia 12/25/2018    Hyponatremia 4/11/2017    MI (myocardial infarction) ([facility] 01/2019    NSTEMI (non-ST elevated myocardial infarction) ([facility] 12/25/2018    Syncope 4/11/2017    Urinary incontinence      9/29/21  WBC = 7.8  Hgb = 9.2  Glucose = 112    Assessment:   Alert, confused   Pain to left leg/hip  Mobility: Max assistance with repositioning  Continence: Incontinent of urine and stool. Wearing briefs   Last Flynn Score: 15  Surface: foam mattress  Diet: regular    Bilateral buttocks, right heel, and sacral skin intact and with blanchable erythema   Left heel offloaded with heel medix boot and right heel offloaded with pillows    Surgery to left hip fracture  On 9/25/21. Dressing to left hip clean, dry, and intact    1. Left heel deep tissue injury (DTI)  4.5 x 6 cm  Deep purple in color, non-blanching, boggy to touch, skin intact, no exudate or odor. Periwound intact and without erythema     Moisture associated skin damage to scrotum    Wound Recommendations:      Venelex ointment to bilateral heels every 8 hours     Cleanse buttocks with Remedy foaming cleanser, pat dry, Apply Z-guard cream to buttocks and scotum TID  and as needed with incontinence care      PI Prevention:  Turn/reposition approximately every 2 hours  Offload heels  heel suspension boots at all times in bed. Pad bony prominences   Keep HOB 30 degrees or less to decrease shearing and pressure unless medically contraindicated.  If HOB is to be over 30 degrees, raise knees first then [facility] to prevent sliding   Minimize layers of linen/pads under patient to optimize support surface to one  sheet and one incontinence pad     Orders received from Dr. Pamela Johnson  Discussed with RN, Bharat Mancia    Transition of Care: Plan to follow weekly and as needed while admitted to hospital    Plan isfor patient to be discharge home today with home care will follow up later today and if patient will not be discharged, will order a specialty bed    Hoang LEWIS, RN, [facility], [address] [place] [address]  Certified Wound and Ostomy Nurse  office [phone]  Can be reached through [address] [place] [address]  pager [phone] or call  to page

## 2021-09-30 NOTE — TELEPHONE ENCOUNTER
Home Based Primary Care [facility] Supportive Services   Phone:  [phone]      Fax:  [phone] [place] [address], [address]    Name:  Chaparrita Elliott  YOB: 1940    Incoming call from [facility], Care Manager at [facility]. She states Chaparrita Elliott is going to be discharged back to his home and he needs a FELICIA appt with Dr. Shaka Gonzalez next week. This nurse explained that Dr. Shaka Gonzalez is out of the office until 10/5/21. We will schedule a FELICIA appt for patient next week and will call patient/family with date of visit.       ELVI GutierrezN, RN-BC, [facility]  Referral Navigator, Home Based Primary Care & Supportive Services

## 2021-09-30 NOTE — DISCHARGE SUMMARY
Discharge Summary       PATIENT ID: Dave Rivera  MRN: [id number]   YOB: 1940    DATE OF ADMISSION: 9/24/2021  6:01 PM    DATE OF DISCHARGE: 09/30/2021   PRIMARY CARE PROVIDER: Mary Tierney MD     ATTENDING PHYSICIAN: Kamar Novoa MD  DISCHARGING PROVIDER: Keenan Rose NP    To contact this individual call [phone] and ask the  to page. If unavailable ask to be transferred the Adult Hospitalist Department. CONSULTATIONS: IP CONSULT TO ORTHOPEDIC SURGERY  IP CONSULT TO HOSPITALIST  IP CONSULT TO CARDIOLOGY  IP CONSULT TO GASTROENTEROLOGY    PROCEDURES/SURGERIES: Procedure(s):  ESOPHAGOGASTRODUODENOSCOPY (EGD)  ESOPHAGOGASTRODUODENAL (EGD) BIOPSY    ADMITTING DIAGNOSES & HOSPITAL COURSE:   79 y.o man w/ CAD, CHF, DM, MI, dementia, who fell out of bed and developed left him pain. He is sleeping and screams in pain when awakened. He was found to have a left hip fracture in the ED.  9/24/2021 09/30 1230: Seen and examined patient. No new complaints. Spoke with patient's son Blaine Salamanca ([phone]) updated him on patient's condition and plan of care. Questions answered. Son states bed has arrived at the house. DISCHARGE DIAGNOSES / PLAN:      L hip fracture s/p Left Hip Nail on 9/25   Secondary to fall   - CT: Acute nondisplaced intertrochanteric left proximal femoral fracture. Associated acute nondisplaced avulsion fracture of the left greater trochanter.  - appreciate ortho input  - pain control - scheduled tylenol every 6 hours  - DVT ppx: aspirin 81mg bid  - PT/OT recs SNF   - Family wants patient to be discharged to home     Upper GI bleed   - pt had 2 episodes of coffee ground emesis on 9/28. Gastric occult positive for blood   - CT abd: Focal area of possible wall thickening and relative contraction of the mid right colon concerning for possible macular neoplasm versus normal contracted bowel.  Further evaluation with colonoscopy is recommended  - appreciate GI input  -  PPI  -  EGD 9/29: severe erosive esophagitis of grade 3, gastritis , erosive duodenitis with small non bleeding ulcers   - regular diet   - Per Dr. Stanley Mcgee - ok to c/w aspirin above     Anemia - post po blood loss   - s/p 1u PRBC 9/26  - Stable. hgb 9.2     Afib: appears to be in sinus rhythm  CAD s/p CABG  ICM with EF 35%  - appreciate cardiology input - cleared for surgery       Type 2 DM:  -Resume home regimen     BP: fluctuating    -PRN IV hydralazine     Dementia with behavioral disturbances  - supportive care   - c/w home seroquel      Hypothyroidism  - synthroid         ADDITIONAL CARE RECOMMENDATIONS:   Take medications as prescribed. See Ortho discharge instructions for further recommendations     PENDING TEST RESULTS:   At the time of discharge the following test results are still pending:     FOLLOW UP APPOINTMENTS:    Follow-up Information     Follow up With Specialties Details Why [address] [place] [address]  for home health skilled nursing, physical therapy, occupational therapy, and home health aid.  [address]    Dorina Hamman, MD Family Medicine, [address] [place] [address]  Mame Townshend  [phone]      Sheng Mcmahon MD Orthopedic Surgery Schedule an appointment as soon as possible for a visit in 2 weeks For follow up [address] [facility],5[address] [facility]      Nando Ledesma MD Gastroenterology Schedule an appointment as soon as possible for a visit in 4 weeks For follow up  [place] [address]  [phone]               DIET: Resume previous diet      ACTIVITY: Activity as tolerated    WOUND CARE: Per home health     EQUIPMENT needed: DME to be delivered to patient's home on day of discharge.        DISCHARGE MEDICATIONS:  Current Discharge Medication List      START taking these medications Details   balsam peru-castor oiL (VENELEX) ointment Apply  to affected area every eight (8) hours. Qty: 1 g, Refills: 0  Start date: 9/30/2021      lansoprazole (PREVACID) 3 mg/mL oral suspension Take 10 mL by mouth two (2) times a day. Qty: 300 mL, Refills: 1  Start date: 9/30/2021         CONTINUE these medications which have CHANGED    Details   aspirin 81 mg chewable tablet Take 1 Tablet by mouth two (2) times a day. Indications: treatment to prevent a heart attack, treatment to prevent peripheral artery thromboembolism  Qty: 30 Tablet, Refills: 1  Start date: 9/30/2021         CONTINUE these medications which have NOT CHANGED    Details   HYDROcodone-acetaminophen (NORCO) 5-325 mg per tablet Take 1 Tablet by mouth daily as needed for Pain for up to 30 days. Qty: 60 Tablet, Refills: 0    Associated Diagnoses: Other chronic pain      valproate (DEPAKENE) 250 mg/5 mL syrup TAKE 5ML BY MOUTH TWICE A DAY AS NEEDED FOR AGITATION  Qty: 120 mL, Refills: 1      !! QUEtiapine (SEROquel) 50 mg tablet Take 50 mg in the morning and 75 mg in the evening. Qty: 180 Tablet, Refills: 1    Associated Diagnoses: Late onset Alzheimer's disease with behavioral disturbance ([facility]      !! QUEtiapine (SEROquel) 25 mg tablet Take 25 mg in the afternoon. Qty: 180 Tablet, Refills: 1      insulin glargine (LANTUS,BASAGLAR) 100 unit/mL (3 mL) inpn Inject 14 units every morning. Qty: 3 Pen, Refills: 5    Associated Diagnoses: Uncontrolled type 2 diabetes mellitus with hyperglycemia (HCC)      Insulin Needles, Disposable, (BD Ultra-Fine Mini Pen Needle) 31 gauge x 3/16\" ndle USE TO INJECT INSULIN 4 TIMES A DAY  Qty: 100 Pen Needle, Refills: 1    Associated Diagnoses: Uncontrolled type 2 diabetes mellitus with hyperglycemia (HCC)      insulin lispro (HUMALOG) 100 unit/mL injection INJECT 3 UNITS BENEATH THE SKIN FOR BS>200, 5 UNITS FOR BS>250, 6 UNITS FOR BS>300.  CALL MD FOR BS >400  Qty: 30 mL, Refills: 1    Associated Diagnoses: Type II or unspecified type diabetes mellitus with neurological manifestations, uncontrolled(250.62) ([facility])      insulin lispro (HUMALOG) 100 unit/mL kwikpen 3 units sq for bs greater than 200 ; 5  untis sq for bs greater than 250,  6units for blood sugar greater than 300 ; call me for bs greater than 400  Qty: 3 Pen, Refills: 5    Associated Diagnoses: Uncontrolled type 2 diabetes mellitus with hyperglycemia ([facility])      levothyroxine (SYNTHROID) 50 mcg tablet TAKE 1 TABLET BY MOUTH EVERY DAY BEFORE BREAKFAST  Qty: 90 Tab, Refills: 3    Associated Diagnoses: Hypothyroidism, unspecified type      glucose blood VI test strips (OneTouch Ultra Blue Test Strip) strip USE TO CHECK BLOOD SUGAR 3 TIMES DAILY. DIAGNOSIS CODE: E11.9  Qty: 100 Strip, Refills: 5    Associated Diagnoses: Type II or unspecified type diabetes mellitus with neurological manifestations, uncontrolled(250.62) ([facility])      tamsulosin (Flomax) 0.4 mg capsule Take 1 Cap by mouth daily. Qty: 90 Cap, Refills: 3    Associated Diagnoses: Benign prostatic hyperplasia with urinary frequency      b complex vitamins (SUPER B-50 COMPLEX PLUS) tablet Take 1 Tab by mouth daily. Qty: 90 Tab, Refills: 2      DULoxetine (CYMBALTA) 60 mg capsule TAKE ONE CAPSULE BY MOUTH EVERY DAY  Refills: 3      SENNA PLUS 8.6-50 mg per tablet TAKE 2 TABS BY MOUTH TWO (2) TIMES A DAY  Refills: 11      lidocaine (LIDODERM) 5 % Apply patch to the affected area for 12 hours a day and remove for 12 hours a day. Qty: 1 Package, Refills: 0    Comments: Pt using this patch some days if pain as of 3/21/19 visit; adding to list      midodrine (PROAMITINE) 5 mg tablet Take 5 mg by mouth daily as needed for Other (if blood pressure is below 90/50). take 5 mg daily PRN - if blood pressure is below 90/50      ondansetron (ZOFRAN ODT) 4 mg disintegrating tablet Take 1 Tab by mouth every six (6) hours as needed for Nausea.   Qty: 30 Tab, Refills: 1      acetaminophen (TYLENOL) 500 mg tablet Take 1,000 mg by mouth every six to eight (6-8) hours as needed for Pain. !! - Potential duplicate medications found. Please discuss with provider. STOP taking these medications       omeprazole (PRILOSEC) 20 mg capsule Comments:   Reason for Stopping:                 NOTIFY YOUR PHYSICIAN FOR ANY OF THE FOLLOWING:   Fever over 101 degrees for 24 hours. Chest pain, shortness of breath, fever, chills, nausea, vomiting, diarrhea, change in mentation, falling, weakness, bleeding. Severe pain or pain not relieved by medications. Or, any other signs or symptoms that you may have questions about. DISPOSITION:  X  Home With:   OT  PT X HH  RN       Long term SNF/Inpatient Rehab    Independent/assisted living    Hospice    Other:       PATIENT CONDITION AT DISCHARGE:     Functional status   X Poor     Deconditioned     Independent      Cognition     Lucid     Forgetful    X Dementia      Catheters/lines (plus indication)    [place]     PICC     PEG    X None      Code status     Full code    X DNR      PHYSICAL EXAMINATION AT DISCHARGE:  General:          Alert, cooperative, no distress, appears stated age. HEENT:           Atraumatic, anicteric sclerae, pink conjunctivae                          No oral ulcers, mucosa moist, throat clear, dentition fair  Neck:               Supple, symmetrical  Lungs:             Clear to auscultation bilaterally. No Wheezing or Rhonchi. No rales. Chest wall:      No tenderness  No Accessory muscle use. Heart:              Regular  rhythm,  No  murmur   No edema  Abdomen:        Soft, non-tender. Not distended. Bowel sounds normal  Extremities:     No cyanosis. No clubbing,                            Skin turgor normal, Capillary refill normal  Skin:                Not pale. Not Jaundiced  No rashes. Left hip dressing is c,d,i   Psych:             Not anxious or agitated.   Neurologic:      Alert, moves all extremities with generalized weakness, answers questions appropriately and responds to commands       CHRONIC MEDICAL DIAGNOSES:  Problem List as of 9/30/2021 Date Reviewed: 9/29/2021        Codes Class Noted - Resolved    Hip fracture ([facility] ICD-10-CM: S72.009A  ICD-9-CM: 820.8  9/24/2021 - Present        COVID-19 vaccine dose declined ICD-10-CM: Z28.21  ICD-9-CM: V64.06  9/16/2021 - Present        Debility ICD-10-CM: R53.81  ICD-9-CM: 799.3  7/25/2021 - Present        Acquired hypothyroidism ICD-10-CM: E03.9  ICD-9-CM: 244.9  2/22/2021 - Present        Stage 3a chronic kidney disease ([facility] ICD-10-CM: N18.31  ICD-9-CM: 585.3  2/22/2021 - Present        Other chronic pain ICD-10-CM: G89.29  ICD-9-CM: 338.29  2/22/2021 - Present        Prostate cancer ([facility] ICD-10-CM: C61  ICD-9-CM: 185  2/12/2019 - Present        Chronic systolic congestive heart failure ([facility] ICD-10-CM: I50.22  ICD-9-CM: 428.22, 428.0  12/25/2018 - Present        Orthostatic hypotension ICD-10-CM: I95.1  ICD-9-CM: 458.0  7/17/2017 - Present        Uncontrolled type 2 diabetes mellitus with hyperglycemia (HCC) ICD-10-CM: E11.65  ICD-9-CM: 250.02  4/11/2017 - Present        Urinary incontinence ICD-10-CM: R32  ICD-9-CM: 788.30  4/11/2017 - Present        CAD (coronary artery disease) ICD-10-CM: I25.10  ICD-9-CM: 414.00  4/11/2017 - Present        COPD (chronic obstructive pulmonary disease) ([facility] ICD-10-CM: J44.9  ICD-9-CM: 496  4/11/2017 - Present        Late onset Alzheimer's disease with behavioral disturbance ([facility] ICD-10-CM: G30.1, F02.81  ICD-9-CM: 331.0, 294.11  7/11/2016 - Present    Overview Signed 7/11/2016  2:00 PM by Gomez Bentley MD     Due to [address]             Moderate recurrent major depression ([facility] ICD-10-CM: F33.1  ICD-9-CM: 296.32  7/11/2016 - Present        RESOLVED: Acute delirium ICD-10-CM: R41.0  ICD-9-CM: 780.09  2/4/2019 - 2/6/2019        RESOLVED: NSTEMI (non-ST elevated myocardial infarction) ([facility] ICD-10-CM: I21.4  ICD-9-CM: 410.70  12/25/2018 - 2/22/2021        RESOLVED: Hypokalemia ICD-10-CM: E87.6  ICD-9-CM: 276.8  12/25/2018 - 2/22/2021        RESOLVED: Syncope ICD-10-CM: R55  ICD-9-CM: 780.2  4/11/2017 - 2/22/2021        RESOLVED: Volume depletion ICD-10-CM: E86.9  ICD-9-CM: 276.50  4/11/2017 - 2/22/2021        RESOLVED: Hyponatremia ICD-10-CM: E87.1  ICD-9-CM: 276.1  4/11/2017 - 2/22/2021        RESOLVED: Pneumonia ICD-10-CM: J18.9  ICD-9-CM: 799  8/6/2015 - 8/8/2015        RESOLVED: Complicated grief [id number]: F43.21  ICD-9-CM: 309.0  7/23/2015 - 2/22/2021        RESOLVED: Major psychotic depression, single episode ([facility] ICD-10-CM: F32.3  ICD-9-CM: 296.24  7/23/2015 - 7/11/2016              Greater than 45 minutes were spent with the patient on counseling and coordination of care    Signed:   Jeremy Stoll NP  9/30/2021  12:40 PM

## 2021-09-30 NOTE — PROGRESS NOTES
Pt refusing meds, spitting, and hitting at staff. Notified NP. No orders for agitation given. Family is enroute to attempt to calm pt. Will continue to monitor.

## 2021-09-30 NOTE — PROGRESS NOTES
Reviewed discharge instructions with pt's son Aubrie Sharma. Good understanding verbalized. Time allowed for questions.

## 2021-09-30 NOTE — DISCHARGE INSTRUCTIONS
Discharge Instructions       PATIENT ID: Pricilla Fili  MRN: [id number]   YOB: 1940    DATE OF ADMISSION: 9/24/2021  6:01 PM    DATE OF DISCHARGE: 9/30/2021    PRIMARY CARE PROVIDER: Gianni Barros MD     ATTENDING PHYSICIAN: Jluis Zapata MD  DISCHARGING PROVIDER: Madina Osborn NP    To contact this individual call [phone] and ask the  to page. If unavailable ask to be transferred the Adult Hospitalist Department. DISCHARGE DIAGNOSES Left hip fracture. GI bleed    CONSULTATIONS: IP CONSULT TO ORTHOPEDIC SURGERY  IP CONSULT TO HOSPITALIST  IP CONSULT TO CARDIOLOGY  IP CONSULT TO GASTROENTEROLOGY    PROCEDURES/SURGERIES: Procedure(s):  ESOPHAGOGASTRODUODENOSCOPY (EGD)  ESOPHAGOGASTRODUODENAL (EGD) BIOPSY    PENDING TEST RESULTS:   At the time of discharge the following test results are still pending:     FOLLOW UP APPOINTMENTS:   Follow-up Information     Follow up With Specialties Details Why Contact Info    [place] [address]  for home health skilled nursing, physical therapy, occupational therapy, and home health aid. [facility], Brynn Moritz, MD Family Medicine, [address] [place] [address]  [phone]      Julian Pretty MD Orthopedic Surgery Schedule an appointment as soon as possible for a visit in 2 weeks For follow up [address] [facility],[address] [place]      Pankaj Arriaga MD Gastroenterology Schedule an appointment as soon as possible for a visit in 4 weeks For follow up  [place] [address]  [phone]             ADDITIONAL CARE RECOMMENDATIONS:   Take medications as prescribed    See Ortho discharge instruction sheet.      DIET: Resume previous diet      ACTIVITY: Activity as tolerated    WOUND CARE:     EQUIPMENT needed: DME to be delivered to patient's home day of discharge. DISCHARGE MEDICATIONS:   See Medication Reconciliation Form    · It is important that you take the medication exactly as they are prescribed. · Keep your medication in the bottles provided by the pharmacist and keep a list of the medication names, dosages, and times to be taken in your wallet. · Do not take other medications without consulting your doctor. NOTIFY YOUR PHYSICIAN FOR ANY OF THE FOLLOWING:   Fever over 101 degrees for 24 hours. Chest pain, shortness of breath, fever, chills, nausea, vomiting, diarrhea, change in mentation, falling, weakness, bleeding. Severe pain or pain not relieved by medications. Or, any other signs or symptoms that you may have questions about. DISPOSITION:  X  Home With:   OT  PT X HH  RN       SNF/Inpatient Rehab/LTAC    Independent/assisted living    Hospice    Other:       Signed:   Regulo Vergara NP  9/30/2021  12:37 PM                Post op Discharge Instructions Hip Fracture     Patient Name: Mallika More  Date of admission:  9/24/2021  Date of procedure: 9/25/2021   Procedure: Procedure(s):  Left Hip IM Nail, [place] table/ C-arm  Gamma Nail ([employer] rep aware)  Surgeon: Surgeon(s) and Role:     * Shanelle Tian MD - Primary  PCP: Niki Kaufman MD  Date of discharge: No discharge date for patient encounter. Follow up appointment with surgeon   See Surgeon(s) and Role:  Patricia Christianson MD - Primary approximately 3-4 weeks from date of surgery. Call [place] [address] office to make an appointment. When to call your Orthopaedic Surgeon.  If you call after 5pm or on a weekend, the on call physician will be contacted   Pain that is not relieved by pain medication, ice, activity   Signs of infection  o Incision is reddened  o Incision continues to drain; drainage has an odor  o Persistent fever over 101 degrees   Signs of a blood clot in your leg  o Calf pain, tenderness, redness and/or swelling of lower leg    When to call your Primary Care Physician   Concerns about medical conditions such as diabetes, high blood pressure, asthma, congestive heart failure   Call if blood sugars are elevated, persistent headache or dizziness, coughing or congestion, constipation or diarrhea, burning with urination, abnormal heart rate (slow or fast)    When to call 911 and go to the nearest emergency room   Acute onset of chest pain, shortness of breath, difficulty breathing    Activity   Walk with your walker WBAT weight bearing as instructed by your physical therapist. Continue using your walker until seen for follow-up visit.  Practice your exercises 3 times a day as instructed by the physical therapist.  [place] Cramp up frequently and walk (with assistance as needed)   You may not drive     Incision Care   Keep a dressing on your incision and change daily. Once your incision is not draining, you may leave it open to air.  Wash hands thoroughly before changing the dressing.  You may take a shower when your incision is dry. Do not take a tub bath or go swimming   You do have staples in your incision. If present they will be removed by the [address] [place] [address] Rahul Galeas, SNF or Rehab staff in 10-14 days. Preventing blood clots   Enteric coated Aspirin 81 mg one tablet twice daily for four weeks   Take Aspirin as prescribed   Wear elastic stockings (TEDS) for 4 weeks.   Remove them for approximately 1 hour daily for showering/sponge bathing    Pain management   Take pain medication as prescribed; decrease the amount you take as your pain lessens   Avoid alcoholic beverages while taking pain medications   Place an ice bag on the hip for 15-20 minutes after exercising and as needed throughout the day and night    Diet   Resume usual diet; drink plenty of fluids; eat foods high in fiber, calcium and vitamin D.   You may want to take a stool softener (such as Senokot-S or Colace) to prevent constipations while you are taking pain medication.    If constipation occurs, take a laxative (such as Dulcolax tablets, Miralax, or a suppository)

## 2021-09-30 NOTE — PROGRESS NOTES
Attempted to schedule hospital follow up PCP appointment. Office nurse will contact the patient at home with appointment information as his physician is out of the office until Tuesday, 10/5.   Gael Mora, Care Management Specialist.

## 2021-10-01 NOTE — PROGRESS NOTES
Problem: Falls - Risk of  Goal: *Absence of Falls  Description: Document Augustin Ramirez Fall Risk and appropriate interventions in the flowsheet.   Outcome: Progressing Towards Goal  Note: Fall Risk Interventions:       Mentation Interventions: Door open when patient unattended    Medication Interventions: Bed/chair exit alarm    Elimination Interventions: Bed/chair exit alarm    History of Falls Interventions: Bed/chair exit alarm

## 2021-10-01 NOTE — PROGRESS NOTES
Physician Progress Note      PATIENT:               Keena Madrid  CSN #:                  [id number]  :                       1940  ADMIT DATE:       2021 6:01 PM  [address] [place] DATE:        10/1/2021 10:08 AM  RESPONDING  PROVIDER #:        Elizabeth Armijo NP          QUERY TEXT:    Dear Attending's,    Pt admitted after a fall from bed resulting in acute non displaced intertrochanteric L proximal femur fracture and acute non displaced avulsion fracture of left greater trochanter.  XR FEMUR LT states the bones are osteopenic. Please further specify the type of fracture being treated: The medical record reflects the following:    Risk Factors:80M Dementia with behaviors, fall from bed at home, osteopenia on xray    Clinical Indicators:    EXAM: XR FEMUR LT 2 V  INDICATION: fall. COMPARISON: None. FINDINGS:  There is an acute nondisplaced intertrochanteric left proximal femoral fracture. There is an associated nondisplaced avulsion of the left greater trochanter. There is an intact right total hip arthroplasty. The bones are osteopenic. Cooper Gottron MD Consult   Christina Negro is a 79 y.o. male who fell out of bed at home and began complaining of L hip pain to family; he was brought in by EMS; the fall was unwitnessed; pt unable to answer any questions due to dementia.   Impression:  Left Femur Intertrochanteric fracture     Tamia Potter MD Progress Note  Assessment/Plan:  L hip fracture s/p Left Hip Nail on   Secondary to fall    Treatment: Hospital Admission, Left hip intramedullary nail with Dr. Fabrizio Varela on 21, Pain management, PT/OT, CM, Specialty bed orders      Thank you,    Alton Gilford BSN RN [facility]  Clinical Documentation Improvement Specialist  [phone]  Options provided:  -- Osteoporotic L femur fracture following fall which would not usually break a normal, healthy bone  -- Traumatic L femur  fracture  -- Other - I will add my own diagnosis  -- Disagree - Not applicable / Not valid  -- Disagree - Clinically unable to determine / Unknown  -- Refer to Clinical Documentation Reviewer    PROVIDER RESPONSE TEXT:    This patient has an osteoporotic fracture of the L femur following fall which would not break a normal, healthy bone. Query created by: Al Hernandez on 9/29/2021 5:21 AM      QUERY TEXT:    Dear Attending,    Pt admitted with nondisplaced left intertrochanteric femur fracture and is s/p Left hip intramedullary nail. Admission date 9/24 Hgb 11.8. On 9/26 the patient's Hgb dropped to 6.7 and he was transfused one unit of PRBCs. on 9/26 Anemia enters the record an is  documented by attending as acute post operative blood loss. On 9/28 GI was consulted due to patient having two episodes of dark emesis. EGD was completed on 9/29 with findings of severe erosive esophagitis, gastritis, and erosive duodenitis. Please clarify the record by indicating the following: The medical record reflects the following:  Risk Factors: 80M pmhx GERD, admitted inpatient    Clinical Indicators:    9/24 Hgb 11.8  9/26 Hgb  6.7  9/27 Hgb 10  9/28 Hgb 10.2  9/28 Hgb 9.6  9/29 Hgb 9.2    9/24 [address], [facility]  Unit Number [de-identified] TRANSFUSED    9/25 Rosa CROFT OP Note  ESTIMATED BLOOD LOSS:  100 mL.    9/27 Arabella King MD  Anemia - post po blood loss  s/p 1u PRBC 9/26  will monitor hb, transfuse <7    9/28 Loretta CROFT GI Consult  RN reports 2 episodes of dark emesis (last episode approximately 2 hours ago). No reported hematochezia or melena, although no documented bowel movement    9/29 Loretta CROFT EGD  Esophagus:hiatal hernia 5 cm in size  There was severe erosive esophagitis of grade 3 involving distal 7 cm of esophagus, secondary to acid reflux  Stomach: gastritis in antrum, biopsies taken  Duodenum/jejunum: erosive duodenitis with small non bleeding ulcers in bulb of duodenum  ?   Treatment: Serial H&H, Monitor for bleeding, EGD, GI Consult, PPI    Thank you,    Marge Barthel BSN RN [facility]  Clinical Documentation Improvement Specialist  [phone]  Options provided:  -- Anemia due to only to post operative blood loss  -- Anemia found to be due to both acute blood loss from GI bleed and post operative blood loss  -- Anemia found to be due to GI bleed only  -- Other - I will add my own diagnosis  -- Disagree - Not applicable / Not valid  -- Disagree - Clinically unable to determine / Unknown  -- Refer to Clinical Documentation Reviewer    PROVIDER RESPONSE TEXT:    Anemia found to be due to both acute blood loss from GI bleed and post operative blood loss.     Query created by: Chaitanya Stephenson on 9/30/2021 8:07 AM      Electronically signed by:  Vera Suh NP 10/1/2021 3:31 PM

## 2021-10-01 NOTE — TELEPHONE ENCOUNTER
[facility] [address]  Phone:  [phone]  Fax:  [phone]    Patient:  Martin Byrd  YOB: 1940    Hospital Discharge Transition of Care Note    Date/Time:  10/1/2021     Patient was discharged from [facility] on 10/1/21. Patient was hospitalized 21 to 10/1/21 and was treated for L hip fracture secondary to fall, s/p Left Hip Nail on 21 . Readmission Risk score: 72% High    Medical History:     Past Medical History:   Diagnosis Date    [facility]     COPD (chronic obstructive pulmonary disease) [facility]     Diabetes ([facility]     1970    Heart failure ([facility]     Hypokalemia 2018    Hyponatremia 2017    MI (myocardial infarction) ([facility] 2019    NSTEMI (non-ST elevated myocardial infarction) ([facility] 2018    Syncope 2017    Urinary incontinence        This nurse contacted the patient by telephone to perform post hospital discharge assessment. Spoke with patient's daughter Petty Solis. Verified patient's  and address with her as identifiers. Diet:   Daughter reports: Regular Diet, chop meats    Activity:    Daughter reports: as tolerated    DME:  Hospital bed, charan lift    Medication:   Performed medication reconciliation with patient, and patient verbalizes understanding of administration of home medications. There were no barriers to obtaining medications identified at this time. Support system:  son and daughter    Discharge Instructions :  Reviewed discharge instructions with patient's daughter. Patient verbalizes understanding of discharge instructions and follow-up care. Red Flags:  -fall risk  - Multiple chronic conditions including CAD, chronic systolic CHF, DM (insulin), COPD, Alzheimers which require more supervised care upon discharge    Reviewed plan of care with patient's daughter, she has provided input to plan and agrees with goals. GOALS:      Knowledge and adherence of prescribed medication (ie. action, side effects, missed dose, etc.). Daughter was able to name all medications. PCP/Specialist follow up: Will talk with Dr. Graciela Orr and schedule patient follow up within 1-2 weeks. Will call patient's daughter to inform her of visit date. Plan for What do Do if a Problem Arises:  Nurse reviewed [facility] 24/7 on call line. Patient is able to verbalize clinical signs and reasons to use EMS services and/or [facility]. Home Health Agency and Other Community Services Utilized by Patient:  [facility] to follow. Daughter says PT is there now. Pt has [facility] contact number [phone]) for further questions or concerns.        ELVI SosaN, RN-BC, [facility]

## 2021-10-01 NOTE — PROGRESS NOTES
Notification from [facility] that they will not be coming until 0045 due to emergency transfers taking precedence over this patient. 2110 Layo Dickey, patients daughter, called to inquire status of patient ambulance. Told her that [facility] had just called and gave us updated ETA of 0045. Laoy Dickey frustrated because her brother had been waiting at patients home for ambulance since [phone] (original ETA). Told her that they could bring in early AM and she was okay with this plan. 2137 Called [address] Case Management and told her of delay and new plan. 2145 Notified Will at [facility] that  time of 0900 is acceptable and to please place this as priority. Will notify oncoming shift.

## 2021-10-01 NOTE — PROGRESS NOTES
Bedside and Verbal shift change report given to Oscar Cancer RN (oncoming nurse) by Will Ivan (offgoing nurse). Report included the following information SBAR, Kardex and MAR.

## 2021-10-01 NOTE — DISCHARGE SUMMARY
Discharge Summary       PATIENT ID: Tonia Jones  MRN: [id number]   YOB: 1940    DATE OF ADMISSION: 9/24/2021  6:01 PM    DATE OF DISCHARGE: 10/01/2021  PRIMARY CARE PROVIDER: Alexander Marino MD     ATTENDING PHYSICIAN: Torie Nunes MD  DISCHARGING PROVIDER: Jeremy Stoll NP    To contact this individual call [phone] and ask the  to page. If unavailable ask to be transferred the Adult Hospitalist Department. CONSULTATIONS: IP CONSULT TO ORTHOPEDIC SURGERY  IP CONSULT TO HOSPITALIST  IP CONSULT TO CARDIOLOGY  IP CONSULT TO GASTROENTEROLOGY    PROCEDURES/SURGERIES: Procedure(s):  ESOPHAGOGASTRODUODENOSCOPY (EGD)  ESOPHAGOGASTRODUODENAL (EGD) BIOPSY    ADMITTING DIAGNOSES & HOSPITAL COURSE:   79 y.o man w/ CAD, CHF, DM, MI, dementia, who fell out of bed and developed left him pain. He is sleeping and screams in pain when awakened. He was found to have a left hip fracture in the ED.  9/24/2021 09/30 1230: Seen and examined patient. No new complaints. Spoke with patient's son Janine Smith ([phone]) updated him on patient's condition and plan of care. Questions answered. Son states bed has arrived at the house. 10/01: Patient seen and examined. No new complaints. [facility] was unavailable until 0045, family requested patient's discharge home be delayed until this am.     DISCHARGE DIAGNOSES / PLAN:      L hip fracture s/p Left Hip Nail on 9/25   Secondary to fall   - CT: Acute nondisplaced intertrochanteric left proximal femoral fracture. Associated acute nondisplaced avulsion fracture of the left greater trochanter.  - appreciate ortho input  - pain control - scheduled tylenol every 6 hours  - DVT ppx: aspirin 81mg bid  - PT/OT recs SNF   - Family wants patient to be discharged to home     Upper GI bleed   - pt had 2 episodes of coffee ground emesis on 9/28.  Gastric occult positive for blood   - CT abd: Focal area of possible wall thickening and relative contraction of the mid right colon concerning for possible macular neoplasm versus normal contracted bowel. Further evaluation with colonoscopy is recommended  - appreciate GI input  -  PPI  -  EGD 9/29: severe erosive esophagitis of grade 3, gastritis , erosive duodenitis with small non bleeding ulcers   - regular diet   - Per Dr. Steffany Jennings - ok to c/w aspirin above     Anemia - post po blood loss   - s/p 1u PRBC 9/26  - Stable. hgb 9.2     Afib: appears to be in sinus rhythm  CAD s/p CABG  ICM with EF 35%  - appreciate cardiology input - cleared for surgery       Type 2 DM:  -Resume home regimen     BP: fluctuating    -PRN IV hydralazine     Dementia with behavioral disturbances  - supportive care   - c/w home seroquel      Hypothyroidism  - synthroid         ADDITIONAL CARE RECOMMENDATIONS:   Take medications as prescribed. See Ortho discharge instructions for further recommendations     PENDING TEST RESULTS:   At the time of discharge the following test results are still pending:     FOLLOW UP APPOINTMENTS:    Follow-up Information     Follow up With Specialties Details Why [facility]  for home health skilled nursing, physical therapy, occupational therapy, and home health aid. [address] [facility] [place], Paul Valdivia MD Family Medicine, Hospitalist  Office nurse will contact you at home with follow up appointment information.  [address] [place] [address]      Leah Alva MD Orthopedic Surgery Schedule an appointment as soon as possible for a visit in 2 weeks For follow up 9 Stephanie Wesley MD Gastroenterology Schedule an appointment as soon as possible for a visit in 4 weeks For follow up  [place] [address]  [phone]               DIET: Resume previous diet      ACTIVITY: Activity as tolerated    WOUND CARE: Per home health     EQUIPMENT needed: DME to be delivered to patient's home on day of discharge. DISCHARGE MEDICATIONS:  Current Discharge Medication List      START taking these medications    Details   balsam peru-castor oiL (VENELEX) ointment Apply  to affected area every eight (8) hours. Qty: 1 g, Refills: 0  Start date: 9/30/2021      lansoprazole (PREVACID) 3 mg/mL oral suspension Take 10 mL by mouth two (2) times a day. Qty: 300 mL, Refills: 1  Start date: 9/30/2021         CONTINUE these medications which have CHANGED    Details   aspirin 81 mg chewable tablet Take 1 Tablet by mouth two (2) times a day. Indications: treatment to prevent a heart attack, treatment to prevent peripheral artery thromboembolism  Qty: 30 Tablet, Refills: 1  Start date: 9/30/2021      acetaminophen (TYLENOL) 325 mg tablet Take 2 Tablets by mouth every six (6) hours. Qty: 60 Tablet, Refills: 0  Start date: 9/30/2021         CONTINUE these medications which have NOT CHANGED    Details   HYDROcodone-acetaminophen (NORCO) 5-325 mg per tablet Take 1 Tablet by mouth daily as needed for Pain for up to 30 days. Qty: 60 Tablet, Refills: 0    Associated Diagnoses: Other chronic pain      valproate (DEPAKENE) 250 mg/5 mL syrup TAKE 5ML BY MOUTH TWICE A DAY AS NEEDED FOR AGITATION  Qty: 120 mL, Refills: 1      !! QUEtiapine (SEROquel) 50 mg tablet Take 50 mg in the morning and 75 mg in the evening. Qty: 180 Tablet, Refills: 1    Associated Diagnoses: Late onset Alzheimer's disease with behavioral disturbance ([facility]      !! QUEtiapine (SEROquel) 25 mg tablet Take 25 mg in the afternoon. Qty: 180 Tablet, Refills: 1      insulin glargine (LANTUS,BASAGLAR) 100 unit/mL (3 mL) inpn Inject 14 units every morning.   Qty: 3 Pen, Refills: 5    Associated Diagnoses: Uncontrolled type 2 diabetes mellitus with hyperglycemia (HCC)      Insulin Needles, Disposable, (BD Ultra-Fine Mini Pen Needle) 31 gauge x 3/16\" ndle USE TO INJECT INSULIN 4 TIMES A DAY  Qty: 100 Pen Needle, Refills: 1    Associated Diagnoses: Uncontrolled type 2 diabetes mellitus with hyperglycemia ([facility])      insulin lispro (HUMALOG) 100 unit/mL injection INJECT 3 UNITS BENEATH THE SKIN FOR BS>200, 5 UNITS FOR BS>250, 6 UNITS FOR BS>300. CALL MD FOR BS >400  Qty: 30 mL, Refills: 1    Associated Diagnoses: Type II or unspecified type diabetes mellitus with neurological manifestations, uncontrolled(250.62) ([facility])      insulin lispro (HUMALOG) 100 unit/mL kwikpen 3 units sq for bs greater than 200 ; 5  untis sq for bs greater than 250,  6units for blood sugar greater than 300 ; call me for bs greater than 400  Qty: 3 Pen, Refills: 5    Associated Diagnoses: Uncontrolled type 2 diabetes mellitus with hyperglycemia ([facility])      levothyroxine (SYNTHROID) 50 mcg tablet TAKE 1 TABLET BY MOUTH EVERY DAY BEFORE BREAKFAST  Qty: 90 Tab, Refills: 3    Associated Diagnoses: Hypothyroidism, unspecified type      glucose blood VI test strips (OneTouch Ultra Blue Test Strip) strip USE TO CHECK BLOOD SUGAR 3 TIMES DAILY. DIAGNOSIS CODE: E11.9  Qty: 100 Strip, Refills: 5    Associated Diagnoses: Type II or unspecified type diabetes mellitus with neurological manifestations, uncontrolled(250.62) ([facility])      tamsulosin (Flomax) 0.4 mg capsule Take 1 Cap by mouth daily. Qty: 90 Cap, Refills: 3    Associated Diagnoses: Benign prostatic hyperplasia with urinary frequency      b complex vitamins (SUPER B-50 COMPLEX PLUS) tablet Take 1 Tab by mouth daily. Qty: 90 Tab, Refills: 2      DULoxetine (CYMBALTA) 60 mg capsule TAKE ONE CAPSULE BY MOUTH EVERY DAY  Refills: 3      SENNA PLUS 8.6-50 mg per tablet TAKE 2 TABS BY MOUTH TWO (2) TIMES A DAY  Refills: 11      lidocaine (LIDODERM) 5 % Apply patch to the affected area for 12 hours a day and remove for 12 hours a day.   Qty: 1 Package, Refills: 0    Comments: Pt using this patch some days if pain as of 3/21/19 visit; adding to list midodrine (PROAMITINE) 5 mg tablet Take 5 mg by mouth daily as needed for Other (if blood pressure is below 90/50). take 5 mg daily PRN - if blood pressure is below 90/50      ondansetron (ZOFRAN ODT) 4 mg disintegrating tablet Take 1 Tab by mouth every six (6) hours as needed for Nausea. Qty: 30 Tab, Refills: 1       !! - Potential duplicate medications found. Please discuss with provider. STOP taking these medications       omeprazole (PRILOSEC) 20 mg capsule Comments:   Reason for Stopping:                 NOTIFY YOUR PHYSICIAN FOR ANY OF THE FOLLOWING:   Fever over 101 degrees for 24 hours. Chest pain, shortness of breath, fever, chills, nausea, vomiting, diarrhea, change in mentation, falling, weakness, bleeding. Severe pain or pain not relieved by medications. Or, any other signs or symptoms that you may have questions about. DISPOSITION:  X  Home With:   OT  PT X HH  RN       Long term SNF/Inpatient Rehab    Independent/assisted living    Hospice    Other:       PATIENT CONDITION AT DISCHARGE:     Functional status   X Poor     Deconditioned     Independent      Cognition     Lucid     Forgetful    X Dementia      Catheters/lines (plus indication)    Maldonado     PICC     PEG    X None      Code status     Full code    X DNR      PHYSICAL EXAMINATION AT DISCHARGE:  General:          Alert, cooperative, no distress, appears stated age. HEENT:           Atraumatic, anicteric sclerae, pink conjunctivae                          No oral ulcers, mucosa moist, throat clear, dentition fair  Neck:               Supple, symmetrical  Lungs:             Clear to auscultation bilaterally. No Wheezing or Rhonchi. No rales. Chest wall:      No tenderness  No Accessory muscle use. Heart:              Regular  rhythm,  No  murmur   No edema  Abdomen:        Soft, non-tender. Not distended. Bowel sounds normal  Extremities:     No cyanosis.   No clubbing,                            Skin turgor normal, Capillary refill normal  Skin:                Not pale. Not Jaundiced  No rashes. Left hip dressing is c,d,i   Psych:             Not anxious or agitated.   Neurologic:      Alert, moves all extremities with generalized weakness, answers questions appropriately and responds to commands       CHRONIC MEDICAL DIAGNOSES:  Problem List as of 10/1/2021 Date Reviewed: 9/29/2021        Codes Class Noted - Resolved    Hip fracture ([facility] ICD-10-CM: S72.009A  ICD-9-CM: 820.8  9/24/2021 - Present        COVID-19 vaccine dose declined ICD-10-CM: Z28.21  ICD-9-CM: V64.06  9/16/2021 - Present        Debility ICD-10-CM: R53.81  ICD-9-CM: 799.3  7/25/2021 - Present        Acquired hypothyroidism ICD-10-CM: E03.9  ICD-9-CM: 244.9  2/22/2021 - Present        Stage 3a chronic kidney disease ([facility] ICD-10-CM: N18.31  ICD-9-CM: 585.3  2/22/2021 - Present        Other chronic pain ICD-10-CM: G89.29  ICD-9-CM: 338.29  2/22/2021 - Present        Prostate cancer ([facility] ICD-10-CM: C61  ICD-9-CM: 185  2/12/2019 - Present        Chronic systolic congestive heart failure ([facility] ICD-10-CM: I50.22  ICD-9-CM: 428.22, 428.0  12/25/2018 - Present        Orthostatic hypotension ICD-10-CM: I95.1  ICD-9-CM: 458.0  7/17/2017 - Present        Uncontrolled type 2 diabetes mellitus with hyperglycemia ([facility] ICD-10-CM: E11.65  ICD-9-CM: 250.02  4/11/2017 - Present        Urinary incontinence ICD-10-CM: R32  ICD-9-CM: 788.30  4/11/2017 - Present        CAD (coronary artery disease) ICD-10-CM: I25.10  ICD-9-CM: 414.00  4/11/2017 - Present        COPD (chronic obstructive pulmonary disease) (HCC) ICD-10-CM: J44.9  ICD-9-CM: 496  4/11/2017 - Present        Late onset Alzheimer's disease with behavioral disturbance ([facility] ICD-10-CM: G30.1, F02.81  ICD-9-CM: 331.0, 294.11  7/11/2016 - Present    Overview Signed 7/11/2016  2:00 PM by Natalie Manzo MD     Due to [facility]             Moderate recurrent major depression ([facility] ICD-10-CM: F33.1  ICD-9-CM: 296.32  7/11/2016 - Present        RESOLVED: Acute delirium ICD-10-CM: R41.0  ICD-9-CM: 780.09  2/4/2019 - 2/6/2019        RESOLVED: NSTEMI (non-ST elevated myocardial infarction) ([facility] ICD-10-CM: I21.4  ICD-9-CM: 410.70  12/25/2018 - 2/22/2021        RESOLVED: Hypokalemia ICD-10-CM: E87.6  ICD-9-CM: 276.8  12/25/2018 - 2/22/2021        RESOLVED: Syncope ICD-10-CM: R55  ICD-9-CM: 780.2  4/11/2017 - 2/22/2021        RESOLVED: Volume depletion ICD-10-CM: E86.9  ICD-9-CM: 276.50  4/11/2017 - 2/22/2021        RESOLVED: Hyponatremia ICD-10-CM: E87.1  ICD-9-CM: 276.1  4/11/2017 - 2/22/2021        RESOLVED: Pneumonia ICD-10-CM: J18.9  ICD-9-CM: 550  8/6/2015 - 8/8/2015        RESOLVED: Complicated grief YEW-57-NU: F43.21  ICD-9-CM: 309.0  7/23/2015 - 2/22/2021        RESOLVED: Major psychotic depression, single episode ([facility] ICD-10-CM: F32.3  ICD-9-CM: 296.24  7/23/2015 - 7/11/2016              Greater than 45 minutes were spent with the patient on counseling and coordination of care    Signed:   Morris Urbano NP  10/1/2021  12:40 PM

## 2021-10-13 PROBLEM — I10 ESSENTIAL HYPERTENSION: Status: ACTIVE | Noted: 2021-01-01

## 2021-10-13 PROBLEM — N18.4 CKD (CHRONIC KIDNEY DISEASE) STAGE 4, GFR 15-29 ML/MIN (HCC): Status: ACTIVE | Noted: 2021-01-01

## 2021-10-13 NOTE — PROGRESS NOTES
Home Health Certification approval    Initial certification: 94/2/8495    Certification period: 10/1/2021 - 11/29/2021    CCN: [address]: [facility]    Diagnosis code:  Principal Diagnosis Start Date   Fracture of unspecified part of neck of left femur, subsequent encounter for closed fracture with routine healing [S72.002D] 10/01/2021   Surgical Procedure Date   N/A    Other Pertinent Diagnoses Date   History of falling [Z91.81] 10/01/2021   Alzheimer's disease with late onset [G30.1] 10/01/2021   Dementia in other diseases classified elsewhere without behavioral disturbance [F02.80] 10/01/2021   Type 2 diabetes mellitus with diabetic chronic kidney disease [E11.22] 10/01/2021   Chronic kidney disease, stage 3a [N18.31] 10/01/2021   Anemia, unspecified [D64.9] 10/01/2021   Pressure-induced deep tissue damage of left heel [L89.626] 80/09/8153   Chronic systolic (congestive) heart failure [I50.22] 10/01/2021   Unspecified atrial fibrillation [I48.91] 10/01/2021   Atherosclerotic heart disease of native coronary artery without angina pectoris [I25.10] 10/01/2021   Hypothyroidism, unspecified [E03.9] 10/01/2021   Other chronic pain [G89.29] 10/01/2021   Chronic obstructive pulmonary disease, unspecified [J44.9] 10/01/2021   Major depressive disorder, recurrent, unspecified [F33.9] 10/01/2021   Long term (current) use of insulin [Z79.4] 10/01/2021   Personal history of malignant neoplasm of prostate [Z85.46] 10/01/2021         I have reviewed and agree with plan of care. SN:      10/2/2021 to 10/2/2021: 1 visit every week for 1 week      10/2/2021 to 10/2/2021: 2 visits as needed      Wound leakage, inflammation, pain      10/3/2021 to 10/30/2021: 2 visits every week for 4 weeks      10/31/2021 to 11/29/2021: 1 visit every week for 5 weeks.    PT:      10/1/2021 to 10/2/2021: 1 visit every week for 1 week      10/3/2021 to 10/16/2021: 3 visits every week for 2 weeks      10/17/2021 to 11/13/2021: 2 visits every week for 4 weeks. OT:      10/5/2021 to 10/9/2021: 1 visit every week for 1 week      10/10/2021 to 10/30/2021: 2 visits every week for 3 weeks.        Leonard Humphries MD

## 2021-10-13 NOTE — PROGRESS NOTES
Home Based Primary Care [facility]) & Supportive Services    [phone]      NOTE: Home Based Primary Care is a PROVIDER (MD/NP) based interdisciplinary practice (RN, LCSW, Pharmacy, Dietician) for patient's who cannot access (or have a taxing effort) primary / speciality medical care in an office setting. [facility] is NOT [employer] but works with [facility]. when there is a skilled need. Our MD/NPs are integral in Care Transitions; PLEASE CALL [phone] if this patient arrives in the Emergency Department or Hospital.        Date of Current Visit: 10/13/2021    Patient/Family present for Current Visit: Patient, daughter, son    ASSESSMENT & PLAN       ICD-10-CM ICD-9-CM    1. Closed fracture of left hip, sequela  S72.002S 905.3    2. Gastritis with hemorrhage, unspecified chronicity, unspecified gastritis type  K29.71 535.51    3. Essential hypertension  I10 401.9    4. Uncontrolled type 2 diabetes mellitus with hyperglycemia (HCC)  E11.65 250.02    5. Late onset Alzheimer's disease with behavioral disturbance ([facility]  G30.1 331.0     F02.81 294.11    6. Moderate recurrent major depression ([facility])  F33.1 296.32    7. Stage 3b chronic kidney disease (HCC)  N18.32 585.3    8. Chronic obstructive pulmonary disease, unspecified COPD type ([email or web address]  J44.9 496    9. Prostate cancer ([facility]  C61 185    10. History of GI bleed  Z87.19 V12.79      -Recent hospital discharge for left hip fracture s/p Left hip intramedullary nail: continue Home Health. Given patient's recent fall, injury and subsequent surgery, patient would benefit from a standard wheelchair for use and functional positioning in and outside of the home. He also needs a new charan lift basket since he would be confined to his bed without one. Patient will not be able to f/u with Surgery as an outpatient due to functional limitations. Our team has discussed with the Surgery office and will coordinate staple removal with Home Health.   -Gastritis and anemia s/p 1 unit pRBCs during hospitalization: per GI recommendations: \"Continue acid suppression with PPI q 12 hours, to stay on PPI po bid as outpatient. \" Per hospital discharge summary patient was cleared to continue ASA. -Chronic pain: Taking Norco 5-325 mg once daily which is a chronic medication for him.  appropriate. Will refill when due. Holding off on urine drug or serum toxicology screen for now as any attempt to examine patient agitates him significantly. We need to update his controlled substance agreement as I am having trouble finding one on file. -Dementia: continue supportive care and current Seroquel regimen. Memantine previously discontinued. -T2DM: we have been liberal with BGs because of dementia and patient not liking to have frequent BG checks.    -Hypothyroidism: TSH wnl in 09/2020. Continue synthroid.  -Orthostatic hypotension: midodrine available as needed (not needed recently). Family continues regular blood pressure checks. -CHF: No exacerbations in several years. No betablocker or ace/arb secondary to [facility] or diuretics. -CKD: avoid nephrotoxic medications.  -Follow up: will plan follow up in 2-3 months, sooner as needed. Will need to complete Controlled Substance Agreement. If patient's behavior improves to the point that he could tolerate labs, then would check TSH and urine toxicology screen. Will also be due for Medicare Wellness Visit. HEALTH MAINTENANCE     Health Maintenance Due   Topic Date Due    Hepatitis C Screening  Never done    Medicare Yearly Exam  03/12/2021    Flu Vaccine (1) 09/01/2021    Foot Exam Q1  09/08/2021      CC & SUBJECTIVE including ROS & FUNCTION     Chief Complaint   Patient presents with    Other     recent hip fracture    Transitions Of Care      David Olmos is a 79 y.o. with chronic medical conditions as listed in the patient's updated Problem List (see below).  Patient is seen at home due to severe dementia preventing him from being able to leave the home. History as per his daughter as he is unable to give history. He has recently returned home from the hospital. Seems to be somewhat calmer after returning home. Is allowing PT/OT to work with him. Pain is relatively well controlled with tylenol and once daily norco 5-325 which is chronic. Currently family is giving him Seroquel 50 mg in the morning, Seroquel 25 mg in the afternoon, Seroquel 50 mg in the evening. Rarely using depakote at this point. He is eating well. No issues with constipation at this time. Hospital follow-up visit:  Transition of Care documentation:  Date of hospital admission: 9/24/21  Date of hospital discharge: 10/01/21  Date of professional contact: 10/01/21, please see telephone encounter note from team nurse (personally reviewed)    FTF visit: today   Complexity of MDM: moderate    Was home health instituted? Yes, PT/OT are necessary for strength and rehabilitation s/p surgery. (If so document needs and indications)    Discharge summary personally reviewed. Items to be followed up on include:  \"L hip fracture s/p Left Hip Nail on 9/25   Secondary to fall   - CT: Acute nondisplaced intertrochanteric left proximal femoral fracture.  Associated acute nondisplaced avulsion fracture of the left greater trochanter.  - appreciate ortho input  - pain control - scheduled tylenol every 6 hours  - DVT ppx: aspirin 81mg bid  - PT/OT recs SNF   - Family wants patient to be discharged to home     Upper GI bleed   - pt had 2 episodes of coffee ground emesis on 9/28. Gastric occult positive for blood   - CT abd: Focal area of possible wall thickening and relative contraction of the mid right colon concerning for possible macular neoplasm versus normal contracted bowel.  Further evaluation with colonoscopy is recommended  - appreciate GI input  -  PPI  -  EGD 9/29: severe erosive esophagitis of grade 3, gastritis , erosive duodenitis with small non bleeding ulcers   - regular diet   - Per Dr. Flora Salazar - ok to c/w aspirin above     Anemia - post po blood loss   - s/p 1u PRBC 9/26  - Stable. hgb 9.2     Afib: appears to be in sinus rhythm  CAD s/p CABG  ICM with EF 35%  - appreciate cardiology input - cleared for surgery       Type 2 DM:  -Resume home regimen     BP: fluctuating    -PRN IV hydralazine     Dementia with behavioral disturbances  - supportive care   - c/w home seroquel      Hypothyroidism  - synthroid\"    ROS: Patient unable to answer questions due to baseline cognitive issues. PPS:30  Karnofsky:   Timed Up and Go (TUG):   Fall Risk Assessment, last 12 mths 10/13/2021   Able to walk? No   Fall in past 12 months? -   Number of falls in past 12 months -   Fall with injury? -     Current Durable Medical Equipment in Home:  [] Hospital Bed  [] Bedside Commode  [] Wheelchair  [] Jayna Mcgovern  [] Racheal Martin    Has a cane  [] Respiratory Support:     ADVANCE CARE PLANNING                                 The following information was updated I/ reviewed as accurate in Orders and the Advance Care Planning Navigator:    DNR      Primary Decision Maker (Active): Linn Villegas - [phone]    Primary Decision Maker: Carolee Jaime - [phone]    Secondary Decision Maker: Philippe Merritt - Other Relative - [phone]  Advance Care Planning 2/21/2019   Patient's Healthcare Decision Maker is: Legal Next of Kin   Confirm Advance Directive None   Patient Would Like to Complete Advance Directive Unable   Does the patient have other document types Do Not Resuscitate      VITAL SIGNS & PHYSICAL EXAM     Median Arm Circumference (inches):     Wt Readings from Last 3 Encounters:   10/01/21 149 lb 14.6 oz (68 kg)   11/20/19 159 lb 1.6 oz (72.2 kg)   11/05/19 156 lb 6.4 oz (70.9 kg)     Temp Readings from Last 3 Encounters:   10/25/21 98.4 °F (36.9 °C)   10/22/21 98.1 °F (36.7 °C)   10/22/21 97.1 °F (36.2 °C) (Temporal)     BP Readings from Last 3 Encounters:   10/25/21 (!) 142/66 10/22/21 136/74   10/22/21 (!) 140/68     Pulse Readings from Last 3 Encounters:   10/25/21 74   10/22/21 68   10/22/21 (!) 57     Pacemaker:  ICD:  Feeding Tube:  Urine Catheter:  Other:    Visit Vitals  BP (!) 168/82   Pulse 76   Temp 98.4 °F (36.9 °C)   Resp 16        Physical Exam  Constitutional:       General: He is not in acute distress. Appearance: He is not diaphoretic. Comments: Chronically frail and thin appearing. HENT:      Head: Normocephalic and atraumatic. Right Ear: External ear normal.      Left Ear: External ear normal.      Nose: Nose normal.      Mouth/Throat:      Mouth: Mucous membranes are moist.      Pharynx: Oropharynx is clear. Eyes:      Extraocular Movements: Extraocular movements intact. Conjunctiva/sclera: Conjunctivae normal.   Cardiovascular:      Rate and Rhythm: Normal rate and regular rhythm. Pulmonary:      Effort: Pulmonary effort is normal. No respiratory distress. Breath sounds: Normal breath sounds. Abdominal:      General: Bowel sounds are normal. There is no distension. Palpations: Abdomen is soft. Tenderness: There is no abdominal tenderness. Musculoskeletal:      Cervical back: Normal range of motion. No rigidity. Comments: Staples in place in region of left hip surgery   Skin:     General: Skin is warm and dry. Capillary Refill: Capillary refill takes less than 2 seconds. Neurological:      Mental Status: He is alert. Comments: Awake, alert to self, allows exam today   Psychiatric:      Comments: Not restless or agitated.         PROBLEM LIST / PAST MEDICAL / SOCIAL HX     Patient Active Problem List   Diagnosis Code    Late onset Alzheimer's disease with behavioral disturbance (HCC) G30.1, F02.81    Moderate recurrent major depression ([facility] F33.1    Uncontrolled type 2 diabetes mellitus with hyperglycemia ([facility]) E11.65    Urinary incontinence R32    CAD (coronary artery disease) I25.10    COPD (chronic obstructive pulmonary disease) ([facility]) J44.9    Orthostatic hypotension I95.1    Chronic systolic congestive heart failure ([facility]) I50.22    Prostate cancer ([facility]) C61    Acquired hypothyroidism E03.9    Other chronic pain G89.29    Debility R53.81    COVID-19 vaccine dose declined Z28.21    Hip fracture ([facility]) S72.009A    Essential hypertension I10    Stage 3b chronic kidney disease ([facility]) N18.32    History of GI bleed Z87.19    Gastritis with hemorrhage K29.71      Family History   Problem Relation Age of Onset    Diabetes Mother     Diabetes Brother      Social History     Socioeconomic History    Marital status:      Spouse name: Not on file    Number of children: Not on file    Years of education: Not on file    Highest education level: Not on file   Tobacco Use    Smoking status: Former Smoker    Smokeless tobacco: Never Used    Tobacco comment: 1-2 cigars daily   Substance and Sexual Activity    Alcohol use: No     Alcohol/week: 0.0 standard drinks    Drug use: No     Social Determinants of Health     Financial Resource Strain:     Difficulty of Paying Living Expenses:    Food Insecurity:     Worried About Running Out of Food in the Last Year:     Ran Out of Food in the Last Year:    Transportation Needs:     Lack of Transportation (Medical):      Lack of Transportation (Non-Medical):    Physical Activity:     Days of Exercise per Week:     Minutes of Exercise per Session:    Stress:     Feeling of Stress :    Social Connections:     Frequency of Communication with Friends and Family:     Frequency of Social Gatherings with Friends and Family:     Attends Caodaism Services:     Active Member of Clubs or Organizations:     Attends Club or Organization Meetings:     Marital Status:       MEDICATIONS & PRESCRIPTIONS     Allergies   Allergen Reactions    Sulfa (Sulfonamide Antibiotics) Unknown (comments)      Current Outpatient Medications   Medication Sig    amLODIPine (NORVASC) 5 mg tablet Take 1 Tablet by mouth daily. Indications: high blood pressure    aspirin 81 mg chewable tablet Take 1 Tablet by mouth two (2) times a day. Indications: treatment to prevent a heart attack, treatment to prevent peripheral artery thromboembolism    lansoprazole (PREVACID) 3 mg/mL oral suspension Take 10 mL by mouth two (2) times a day.  acetaminophen (TYLENOL) 325 mg tablet Take 2 Tablets by mouth every six (6) hours.  valproate (DEPAKENE) 250 mg/5 mL syrup TAKE 5ML BY MOUTH TWICE A DAY AS NEEDED FOR AGITATION    QUEtiapine (SEROquel) 50 mg tablet Take 50 mg in the morning and 75 mg in the evening. (Patient taking differently: 50 mg two (2) times a day. Take 50 mg by mouth in the morning and 50 mg in the evening.)    QUEtiapine (SEROquel) 25 mg tablet Take 25 mg in the afternoon. (Patient taking differently: Take 25 mg by mouth daily as needed (anxiety, agitation). )    insulin glargine (LANTUS,BASAGLAR) 100 unit/mL (3 mL) inpn Inject 14 units every morning.  insulin lispro (HUMALOG) 100 unit/mL kwikpen 3 units sq for bs greater than 200 ; 5  untis sq for bs greater than 250,  6units for blood sugar greater than 300 ; call me for bs greater than 400 (Patient taking differently: check blood sugar before meals. sliding scale: 3 units sq for bs greater than 200 ; 5  untis sq for bs greater than 250,  6units for blood sugar greater than 300 ; call me for bs greater than 400)    levothyroxine (SYNTHROID) 50 mcg tablet TAKE 1 TABLET BY MOUTH EVERY DAY BEFORE BREAKFAST    tamsulosin (Flomax) 0.4 mg capsule Take 1 Cap by mouth daily.  DULoxetine (CYMBALTA) 60 mg capsule TAKE ONE CAPSULE BY MOUTH EVERY DAY    Insulin Needles, Disposable, (BD Ultra-Fine Mini Pen Needle) 31 gauge x 3/16\" ndle USE TO INJECT INSULIN 4 TIMES A DAY    polyethylene glycol (ClearLax) 17 gram/dose powder Take 17 g by mouth two (2) times a day.     HYDROcodone-acetaminophen (NORCO) 5-325 mg per tablet Take 1 Tablet by mouth daily as needed for Pain for up to 30 days.  balsam peru-castor oiL (VENELEX) ointment Apply  to affected area every eight (8) hours.  insulin lispro (HUMALOG) 100 unit/mL injection INJECT 3 UNITS BENEATH THE SKIN FOR BS>200, 5 UNITS FOR BS>250, 6 UNITS FOR BS>300. CALL MD FOR BS >400    glucose blood VI test strips (OneTouch Ultra Blue Test Strip) strip USE TO CHECK BLOOD SUGAR 3 TIMES DAILY. DIAGNOSIS CODE: E11.9    b complex vitamins (SUPER B-50 COMPLEX PLUS) tablet Take 1 Tab by mouth daily.  SENNA PLUS 8.6-50 mg per tablet TAKE 2 TABS BY MOUTH TWO (2) TIMES A DAY    lidocaine (LIDODERM) 5 % Apply patch to the affected area for 12 hours a day and remove for 12 hours a day. (Patient taking differently: 1 Patch by TransDERmal route every twenty-four (24) hours. Apply patch to the affected area for 12 hours a day and remove for 12 hours a day.)    midodrine (PROAMITINE) 5 mg tablet Take 5 mg by mouth daily as needed for Other (if blood pressure is below 90/50). take 5 mg daily PRN - if blood pressure is below 90/50    ondansetron (ZOFRAN ODT) 4 mg disintegrating tablet Take 1 Tab by mouth every six (6) hours as needed for Nausea. No current facility-administered medications for this visit. Medications Ordered Today   Medications    amLODIPine (NORVASC) 5 mg tablet     Sig: Take 1 Tablet by mouth daily.  Indications: high blood pressure     Dispense:  30 Tablet     Refill:  2       TEST DATA REVIEWED:     Lab Results   Component Value Date/Time    Hemoglobin A1c 9.4 (H) 09/08/2020 04:30 PM    Hemoglobin A1c, External 11.2 01/27/2016 12:00 AM     Lab Results   Component Value Date/Time    Microalb/Creat ratio (ug/mg creat.) 63.6 (H) 02/12/2019 08:49 AM     Lab Results   Component Value Date/Time    TSH 3.230 09/08/2020 04:30 PM     No results found for: TIM Perez      Lab Results   Component Value Date/Time    WBC 5.6 09/30/2021 11:16 AM HGB 9.1 (L) 09/30/2021 11:16 AM    PLATELET 242 27/40/0383 11:16 AM     Lab Results   Component Value Date/Time    Sodium 135 (L) 09/30/2021 11:16 AM    Potassium 3.9 09/30/2021 11:16 AM    Chloride 104 09/30/2021 11:16 AM    CO2 26 09/30/2021 11:16 AM    BUN 29 (H) 09/30/2021 11:16 AM    Creatinine 1.81 (H) 09/30/2021 11:16 AM    Calcium 8.6 09/30/2021 11:16 AM    Magnesium 1.7 09/24/2021 07:45 PM    Phosphorus 3.8 02/05/2019 01:42 AM      Lab Results   Component Value Date/Time    Alk.  phosphatase 103 09/24/2021 07:45 PM    Protein, total 7.3 09/24/2021 07:45 PM    Albumin 3.0 (L) 09/24/2021 07:45 PM    Globulin 4.3 (H) 09/24/2021 07:45 PM     Lab Results   Component Value Date/Time    Iron 39 02/04/2019 07:35 PM    TIBC 196 (L) 02/04/2019 07:35 PM    Iron % saturation 20 02/04/2019 07:35 PM    Ferritin 75 02/04/2019 07:35 PM          Cortney Daniel MD

## 2021-10-13 NOTE — PATIENT INSTRUCTIONS
High Blood Pressure: Care Instructions  Overview     It's normal for blood pressure to go up and down throughout the day. But if it stays up, you have high blood pressure. Another name for high blood pressure is hypertension. Despite what a lot of people think, high blood pressure usually doesn't cause headaches or make you feel dizzy or lightheaded. It usually has no symptoms. But it does increase your risk of stroke, heart attack, and other problems. You and your doctor will talk about your risks of these problems based on your blood pressure. Your doctor will give you a goal for your blood pressure. Your goal will be based on your health and your age. Lifestyle changes, such as eating healthy and being active, are always important to help lower blood pressure. You might also take medicine to reach your blood pressure goal.  Follow-up care is a key part of your treatment and safety. Be sure to make and go to all appointments, and call your doctor if you are having problems. It's also a good idea to know your test results and keep a list of the medicines you take. How can you care for yourself at home? Medical treatment  · If you stop taking your medicine, your blood pressure will go back up. You may take one or more types of medicine to lower your blood pressure. Be safe with medicines. Take your medicine exactly as prescribed. Call your doctor if you think you are having a problem with your medicine. · Talk to your doctor before you start taking aspirin every day. Aspirin can help certain people lower their risk of a heart attack or stroke. But taking aspirin isn't right for everyone, because it can cause serious bleeding. · See your doctor regularly. You may need to see the doctor more often at first or until your blood pressure comes down. · If you are taking blood pressure medicine, talk to your doctor before you take decongestants or anti-inflammatory medicine, such as ibuprofen.  Some of these medicines can raise blood pressure. · Learn how to check your blood pressure at home. Lifestyle changes  · Stay at a healthy weight. This is especially important if you put on weight around the waist. Losing even 10 pounds can help you lower your blood pressure. · If your doctor recommends it, get more exercise. Walking is a good choice. Bit by bit, increase the amount you walk every day. Try for at least 30 minutes on most days of the week. You also may want to swim, bike, or do other activities. · Avoid or limit alcohol. Talk to your doctor about whether you can drink any alcohol. · Try to limit how much sodium you eat to less than 2,300 milligrams (mg) a day. Your doctor may ask you to try to eat less than 1,500 mg a day. · Eat plenty of fruits (such as bananas and oranges), vegetables, legumes, whole grains, and low-fat dairy products. · Lower the amount of saturated fat in your diet. Saturated fat is found in animal products such as milk, cheese, and meat. Limiting these foods may help you lose weight and also lower your risk for heart disease. · Do not smoke. Smoking increases your risk for heart attack and stroke. If you need help quitting, talk to your doctor about stop-smoking programs and medicines. These can increase your chances of quitting for good. When should you call for help? Call 911  anytime you think you may need emergency care. This may mean having symptoms that suggest that your blood pressure is causing a serious heart or blood vessel problem. Your blood pressure may be over 180/120. For example, call 911 if:    · You have symptoms of a heart attack. These may include:  ? Chest pain or pressure, or a strange feeling in the chest.  ? Sweating. ? Shortness of breath. ? Nausea or vomiting. ? Pain, pressure, or a strange feeling in the back, neck, jaw, or upper belly or in one or both shoulders or arms. ? Lightheadedness or sudden weakness.   ? A fast or irregular heartbeat.     · You have symptoms of a stroke. These may include:  ? Sudden numbness, tingling, weakness, or loss of movement in your face, arm, or leg, especially on only one side of your body. ? Sudden vision changes. ? Sudden trouble speaking. ? Sudden confusion or trouble understanding simple statements. ? Sudden problems with walking or balance. ? A sudden, severe headache that is different from past headaches.     · You have severe back or belly pain. Do not wait until your blood pressure comes down on its own. Get help right away. Call your doctor now or seek immediate care if:    · Your blood pressure is much higher than normal (such as 180/120 or higher), but you don't have symptoms.     · You think high blood pressure is causing symptoms, such as:  ? Severe headache.  ? Blurry vision. Watch closely for changes in your health, and be sure to contact your doctor if:    · Your blood pressure measures higher than your doctor recommends at least 2 times. That means the top number is higher or the bottom number is higher, or both.     · You think you may be having side effects from your blood pressure medicine. Where can you learn more? Go to http://www.gray.com/  Enter [id number] in the search box to learn more about \"High Blood Pressure: Care Instructions. \"  Current as of: April 29, 2021               Content Version: 13.0  © 2006-2021 [employer]. Care instructions adapted under license by [employer] (which disclaims liability or warranty for this information). If you have questions about a medical condition or this instruction, always ask your healthcare professional. [place] [address] any warranty or liability for your use of this information.

## 2021-10-15 NOTE — TELEPHONE ENCOUNTER
I received a call from patient's [address] [facility] stating he has seen the patient today and Mr. Cobos's son expressed concerns with removal of luis antonio. Son reported the staples being entangled on patient's briefs, and he is concerned about \"pulling out\" accidentally. I reported I have spoken with Dr. Mallory Evans about and she mentioned reaching out to Dr. Pawel Archuleta. I called Dr. Belen More office, spoke with his nurse, Gabbie Vivas and she verbalized MD or PA usually like to remove the staples themselves, as there are follow up assessments that they would like to complete during that visit. I informed Gabbie Vivas that patient has limited ability to leave the home. Nurse agreed that the staples should be removed on [facility] nurse's next visit, on 10/19. But also requested a follow up visit with the patient.

## 2021-10-15 NOTE — TELEPHONE ENCOUNTER
I called Gabbie Vivas after speaking with Dr. Mallory Evans, who is familiar with patient, and explained to the nurse that Mr. Cobos's ability to leave the home is very limited, and patient becomes agitated easily. I mentioned that if needed, we could try to obtain x-Rays at home, utilizing [employer], and that may not even be easily accomplished secondary to patient's inability to understand commands, and stay still. Gabbie Vivas verbalized understanding and reported that this would affect Dr. Tuan Bauman ability to provide further orders/recommendations if needed. But they would contact patient's family if needed for follow up plan.

## 2021-10-18 NOTE — TELEPHONE ENCOUNTER
I called [address] to make sure Stefany Garza LPN had received the order for removal of luis antonio. I spoke with Wild Willis, and she took verbal order for removal to be done tomorrow.

## 2021-10-18 NOTE — CASE COMMUNICATION
Hesarah, Mr. Rand Hunt is progressing well. The family is requesting a wheelchair for use and functional positioning in and outside of the home. Can you order a standard wheelchair? They are getting a transport chair, but no need for an ultralight as he will not propel in the home. Also, we would like to order another \"basket\" for the charan lift with a hole in it for commode transfers. We would like to see him sit to void, have a BM, and bathe on the 3 in 1 commode.      thanks so much    momo sanchez, OTR/YOSEF

## 2021-10-20 NOTE — TELEPHONE ENCOUNTER
Surya Setter called and stated that the patient is out of hydrocodone. She asks that the prescription be put in for 30 pills and not 60 pills or the pharmacy will reject the order. Patient uses [facility] at [facility].

## 2021-10-25 NOTE — TELEPHONE ENCOUNTER
Home Based Primary Care & Supportive Services   Phone:  [phone]      Fax:  [phone] [facility] [address], [address]    Name:  Daryle Round  YOB: 1940    Incoming call from LONNIE Galvan with [facility] who says she visited Daryle Round today and his son says patient has not had a BM in 7 days. Patient is taking senna twice daily and miralax daily. Pt is not experiencing pain, nausea, or vomiting. Patient is eating and drinking and is passing gas. Patient's med list has senna plus 2 tabs twice daily. This nurse advised [place] Rockrene Caceres to tell patient to take senna plus 2 tabs twice daily, continue Miralax once a day, add a dose of Milk of Magnesia, if no BM in 12 hours, give one more dose Milk of Magnesia.   This nurse will call patient tomorrow to check in.    AN Hargrove, RN-BC, [facility]  Referral Navigator, Home Based [address]

## 2021-10-26 NOTE — TELEPHONE ENCOUNTER
Spike Newton called and said that the milk of magnesia has help only a tiny little bit and wants to know if they should give more or use a different product. She asks that someone call her.

## 2021-10-26 NOTE — TELEPHONE ENCOUNTER
Home Based Primary Care & Supportive Services   Phone:  [phone]      Fax:  [phone] [place] [address], [address]    Name:  Elba Key  YOB: 1940    Patient's daughter Ty Terry called and stated that patient had one dose of Milk of Magnesia last night, had a small bowel movement today. She wants to know what they should do next. This nurse explained that when home health nurse called yesterday, this nurse advised home health nurse to tell patient to take senna plus 2 tabs twice daily, continue Miralax once a day, add a dose of Milk of Magnesia, if no BM in 12 hours, give one more dose Milk of Magnesia. Ms. Yolette Marcus states she is not at patient's home. She spoke with her brother today who is at patient's home. She is not sure if patient is taking senna or senna plus. She will call her brother and make sure patient is taking senna plus 2 tabs twice daily,  Miralax once a day, and a 2nd  dose of Milk of Magnesia tonight. Ms. Yolette Marcus says her brother reported that patient is not in pain, his abdomen is not distended, he is passing gas.       ELVI PedersenN, RN-BC, [facility]  Referral Navigator, Home Based Primary Care & Supportive Services

## 2021-10-27 PROBLEM — Z87.19 HISTORY OF GI BLEED: Status: ACTIVE | Noted: 2021-01-01

## 2021-10-27 PROBLEM — N18.32 STAGE 3B CHRONIC KIDNEY DISEASE (HCC): Status: ACTIVE | Noted: 2021-01-01

## 2021-10-27 PROBLEM — K29.71 GASTRITIS WITH HEMORRHAGE: Status: ACTIVE | Noted: 2021-01-01

## 2021-10-27 NOTE — PROGRESS NOTES
Patient's daughter sent LOFTY message requesting refill prescriptions for insulin needles, diabetic test strips, and Venelex ointment. Dr. Celia Saleh ordered the insulin needles yesterday, this nurse sent refill order for Venelex today. Diabetic test strip prescription is still pended. Message sent to Dr. Celia Saleh.

## 2021-10-27 NOTE — TELEPHONE ENCOUNTER
Home Based Primary Care & Supportive Services   Phone:  [phone]      Fax:  [phone] [place] [address]    Name:  Esteban Ring  YOB: 1940    Outgoing call to patient's daughter to check on status of patient's constipation. She says she has not spoken with her brother today. She says she will call him this evening and call [address] [place] [address] office in the morning if patient has not had a satisfactory bowel movement by then.       ELVI HernandesN, RN-BC, [facility]  Referral Navigator, Home Based Primary Care & Supportive Services

## 2021-11-01 NOTE — TELEPHONE ENCOUNTER
Home Based Primary Care & Supportive Services   Phone:  [phone]      Fax:  [phone] [facility] [address], [address]    Name:  Ivan Swain  YOB: 1940    Standard wheelchair and replacement sling for hydraulic lift sent to [facility].       AN Sosa, RN-BC, [facility]  Referral Navigator, Home Based Primary Care & Supportive Services

## 2021-11-05 NOTE — TELEPHONE ENCOUNTER
Home Based Primary Care & Supportive Services   Phone:  [phone]      Fax:  [phone] [facility], [address] [place] [address], [facility]    Name:  Giovani Lester  YOB: 1940    Refill for One Touch Ultra Blue test strips [facility] [address] Patient's daughter notified.       ELVI CullenN, RN-BC, [facility]  Referral Navigator, Home Based Primary Care & Supportive Services

## 2021-11-09 NOTE — TELEPHONE ENCOUNTER
Home Based Primary Care & Supportive Services   Phone:  [phone]      Fax:  [phone] [place] [address], [address]    Name:  Karri Casarez  YOB: 1940    Received [employer]hart message from patient's daughter stating that she had to pay out of pocket for the diabetic test strips. She requests this nurse call [facility] and try to find out why. This nurse called [facility] and was told that Medicare B did not pay for the test strips last week because the order was phoned in. The pharmacist says that the test strips are billed through Medicare B and they require an electronic prescription or hard copy. They will not accept a phone in prescription. This nurse called patient's daughter back and explained above. The reason the prescription was called in was because there is an an old test strip order in the system that was ordered at some time in the past that was pended and not signed. So the computer would not allow a new order to be sent electronically. This nurse  will speak with Dr. Sahil Hernandez and see if she is able to resolve the issue of the pended prescription that is preventing us from e-scribing. Nurse will call daughter back to update once the issue is resolved.         AN Ferguson, RN-BC, [facility]  Referral Navigator, Home Based Primary Care & Supportive Services

## 2021-11-15 NOTE — PROGRESS NOTES
Home Based [address] [place] [address] Team Members: Lafrances Cranker, MD; Esther Tamayo NP; Lenin Price RN; René Blancas RN; SPENCER Luna  1940 / [id number]  male    The physician has reviewed and discussed the plan of care with the interdisciplinary group on 11/17/21 and agrees. Date of Initial Visit (Start of Care): 2/12/19     Diagnosis: 79 y.o. male with dementia, COPD,  chronic diastolic CHF, persistent afib, Type 2 diabetes with hypoglycemia, chronic anemia, hypothyroidism, depression    Patient status summary: Patient and POC discussed in IDT meeting for 60 day update. Homebound patient referred by Palliative Medicine Inpatient Team, Angeles Manuel NP to our services due to taxing effort to seek primary care needs in the community. DME/Supplies:  Bedside Commode     Advance Care Planning:  Code status: DNR--DDNR singed on 12/28/2018 is on file      Primary Decision Maker (Active): Linn Villegas - [phone]    Primary Decision Maker: Timmy Perez - [phone]    Secondary Decision Maker: Garth Wildered - Other Relative - [phone]    Allergies   Allergen Reactions    Sulfa (Sulfonamide Antibiotics) Unknown (comments)     Nutritional Requirements:   Oral with supported meal preparation    Functional/Activity Level:  Limited ambulation with support of wheelchair and Wheelchair with assistance for transfers    Safety Measures:   Fall risk, Self-care deficity and Smoker    Current Outpatient Medications   Medication Sig    glucose blood VI test strips (ASCENSIA AUTODISC VI, ONE TOUCH ULTRA TEST VI) strip Check blood sugar once daily in the morning    Wheel Chair angel Standard wheelchair    balsam peru-castor oiL (VENELEX) ointment Apply  to affected area every eight (8) hours.     Insulin Needles, Disposable, (BD Ultra-Fine Mini Pen Needle) 31 gauge x 3/16\" ndle USE TO INJECT INSULIN 4 TIMES A DAY    polyethylene glycol (ClearLax) 17 gram/dose powder Take 17 g by mouth two (2) times a day.  HYDROcodone-acetaminophen (NORCO) 5-325 mg per tablet Take 1 Tablet by mouth daily as needed for Pain for up to 30 days.  amLODIPine (NORVASC) 5 mg tablet Take 1 Tablet by mouth daily. Indications: high blood pressure    aspirin 81 mg chewable tablet Take 1 Tablet by mouth two (2) times a day. Indications: treatment to prevent a heart attack, treatment to prevent peripheral artery thromboembolism    lansoprazole (PREVACID) 3 mg/mL oral suspension Take 10 mL by mouth two (2) times a day.  acetaminophen (TYLENOL) 325 mg tablet Take 2 Tablets by mouth every six (6) hours.  valproate (DEPAKENE) 250 mg/5 mL syrup TAKE 5ML BY MOUTH TWICE A DAY AS NEEDED FOR AGITATION    QUEtiapine (SEROquel) 50 mg tablet Take 50 mg in the morning and 75 mg in the evening. (Patient taking differently: 50 mg two (2) times a day. Take 50 mg by mouth in the morning and 50 mg in the evening.)    QUEtiapine (SEROquel) 25 mg tablet Take 25 mg in the afternoon. (Patient taking differently: Take 25 mg by mouth daily as needed (anxiety, agitation). )    insulin glargine (LANTUS,BASAGLAR) 100 unit/mL (3 mL) inpn Inject 14 units every morning.  insulin lispro (HUMALOG) 100 unit/mL injection INJECT 3 UNITS BENEATH THE SKIN FOR BS>200, 5 UNITS FOR BS>250, 6 UNITS FOR BS>300. CALL MD FOR BS >400    insulin lispro (HUMALOG) 100 unit/mL kwikpen 3 units sq for bs greater than 200 ; 5  untis sq for bs greater than 250,  6units for blood sugar greater than 300 ; call me for bs greater than 400 (Patient taking differently: check blood sugar before meals.  sliding scale: 3 units sq for bs greater than 200 ; 5  untis sq for bs greater than 250,  6units for blood sugar greater than 300 ; call me for bs greater than 400)    levothyroxine (SYNTHROID) 50 mcg tablet TAKE 1 TABLET BY MOUTH EVERY DAY BEFORE BREAKFAST    glucose blood VI test strips (OneTouch Ultra Blue Test Strip) strip USE TO CHECK BLOOD SUGAR 3 TIMES DAILY. DIAGNOSIS CODE: E11.9    tamsulosin (Flomax) 0.4 mg capsule Take 1 Cap by mouth daily.  b complex vitamins (SUPER B-50 COMPLEX PLUS) tablet Take 1 Tab by mouth daily.  DULoxetine (CYMBALTA) 60 mg capsule TAKE ONE CAPSULE BY MOUTH EVERY DAY    SENNA PLUS 8.6-50 mg per tablet TAKE 2 TABS BY MOUTH TWO (2) TIMES A DAY    lidocaine (LIDODERM) 5 % Apply patch to the affected area for 12 hours a day and remove for 12 hours a day. (Patient taking differently: 1 Patch by TransDERmal route every twenty-four (24) hours. Apply patch to the affected area for 12 hours a day and remove for 12 hours a day.)    midodrine (PROAMITINE) 5 mg tablet Take 5 mg by mouth daily as needed for Other (if blood pressure is below 90/50). take 5 mg daily PRN - if blood pressure is below 90/50    ondansetron (ZOFRAN ODT) 4 mg disintegrating tablet Take 1 Tab by mouth every six (6) hours as needed for Nausea. No current facility-administered medications for this visit. The Plan of Care has been initiated for during discussion at interdisciplinary group meeting  and reviewed and updated by the Interdisciplinary Group (IDG) as frequently as the patient condition warrants. Plan of Care by Discipline:    1. Provider  Ongoing evaluation of treatment interventions for specific disease state and Create and implement disease /symptom specific plan to manage high risk conditions / symptoms    2. Nursing  Review Health Maintenance with patient and provider; update as appropriate and Provider caregiver / family support for patient's current and changing condition    3. Social Work  Establish therapeutic relationship through in home visits and telephonic touch points      Plan of Care Orders / Action   -Recent hospital discharge for left hip fracture s/p Left hip intramedullary nail: continue Home Health.  Given patient's recent fall, injury and subsequent surgery, patient would benefit from a standard wheelchair for use and functional positioning in and outside of the home. He also needs a new charan lift basket since he would be confined to his bed without one. Patient will not be able to f/u with Surgery as an outpatient due to functional limitations. Our team has discussed with the Surgery office and will coordinate staple removal with Home Health. -Gastritis and anemia s/p 1 unit pRBCs during hospitalization: per GI recommendations: \"Continue acid suppression with PPI q 12 hours, to stay on PPI po bid as outpatient. \" Per hospital discharge summary patient was cleared to continue ASA. -Chronic pain: Taking Norco 5-325 mg once daily which is a chronic medication for him.  appropriate. Will refill when due. Holding off on urine drug or serum toxicology screen for now as any attempt to examine patient agitates him significantly. We need to update his controlled substance agreement as I am having trouble finding one on file. -Dementia: continue supportive care and current Seroquel regimen. Memantine previously discontinued. -T2DM: we have been liberal with BGs because of dementia and patient not liking to have frequent BG checks.    -Hypothyroidism: TSH wnl in 09/2020. Continue synthroid.  -Orthostatic hypotension: midodrine available as needed (not needed recently).   Family continues regular blood pressure checks. -CHF: No exacerbations in several years.  No betablocker or ace/arb secondary to [facility] or diuretics. -CKD: avoid nephrotoxic medications.  -Follow up: will plan follow up in 2-3 months, sooner as needed. Will need to complete Controlled Substance Agreement. If patient's behavior improves to the point that he could tolerate labs, then would check TSH and urine toxicology screen.  Will also be due for Medicare Wellness Visit.       Health Maintenance   Topic Date Due    Hepatitis C Screening  Never done    Pneumococcal 65+ years (1 of 1 - PPSV23) Never done    Medicare Yearly Exam 03/12/2021    Flu Vaccine (1) 09/01/2021    Foot Exam Q1  09/08/2021    MICROALBUMIN Q1  01/25/2023 (Originally 2/12/2020)    Eye Exam Retinal or Dilated  03/11/2023 (Originally 12/11/1950)    Shingrix Vaccine Age 50> (1 of 2) 02/15/2025 (Originally 12/11/1991)    COVID-19 Vaccine (2 - Booster for Catie series) 11/25/2021    DTaP/Tdap/Td series (2 - Td or Tdap) 09/07/2029         Estimated Visit Frequency:  Every 2-3 month provider visit  Last MD visit: 10/13/2021  SW visits prn

## 2021-11-15 NOTE — TELEPHONE ENCOUNTER
Home Based Primary Care & Supportive Services   Phone:  [phone]      Fax:  [phone] [place] [address]    Name:  Ivan Swain  YOB: 1940        This nurse called [facility] and spoke with  with Catia Russell, the pharmacy manager. She says the prescription for the glucose test strips  that was e-scribed on 11/11/21 with the ICD code on it is fine. There should not be any problems, she has filled it. If patient's daughter paid out of pocket for a previous prescription, she might need to ask [facility] to resubmit the prescription to Medicare. [facility] won't reimburse her, she will need to contact Medicare. This nurse called and spoke with patient's daughter Kerline Reese. She states she has already contacted Medicare and started the process to get reimbursed for the test strips she paid out of pocket for. She just wanted to make sure that the current prescription that is in will work for future. This nurse informed her that the current prescription for the glucose strips that was sent in on 11/11/21 is correct and will work for future refills.       Adina Lynch, ELVIN, RN-BC, [facility]  Referral Navigator, Home Based Primary Care & Supportive Services

## 2021-11-15 NOTE — TELEPHONE ENCOUNTER
From: Tonia Jones  To: Martin See MD  Sent: 11/14/2021 2:01 PM EST  Subject: Test strips for Tonia Jones    We still cannot get the prescription filled for test strips. [facility] because of the way the request was entered. We had to pay out of pocket & will need another refill ASAP.  Please contact the pharmacy to make sure this prescription goes thru the system correctly and call me at [phone]   Thanks   Casandra Post

## 2021-11-19 NOTE — TELEPHONE ENCOUNTER
[address] [facility] Primary Care & Supportive Services  Nursing Note  [phone]  Fax [phone]     Name:  Anusha Hansen  YOB: 1940    Triage for Controlled Substance Refill Request    Related Diagnosis: chronic pain    Last Home Visit: 10/13/21    Next Home Visit: to be scheduled    Pharmacy: [place] [address]    Medication:HYDROcodone-acetaminophen [facility]) 5-325 mg per tablet  Dose and Directions: Take 1 Tablet by mouth daily as needed for Pain for up to 30 days.   Number dispensed:  30  Date filled ():  10/19/21  # left:  1     reviewed: yes    Date of Urine Toxicology:  9/3/19    Controlled  Substance Agreement:  9/10/19    Appropriate for refill:  yes    Action:  prescription pended and forwarded to Dr. Colby Reveles for signature    Annabelle Shirley, ELVIN, RN  Clinical Referral Navigator

## 2021-11-24 NOTE — CASE COMMUNICATION
Patient seen for discharge visit today, VSS, wound is improving on heel almost healed, son puts venelex cream on as ordered and usings pressure offload boots, no bowel or  issues, pt appetite the same, son using charan lift to get pt to chair, son dresses and baths pt in bed, pt has no complaints of pain today.

## 2021-11-26 NOTE — ED NOTES
Pt refusing oral temperature at this time. Temperature 98.1 temporally. Per EMS, pt's family reports pt becomes physically aggressive at times, especially during POC glucose check and IV insertion.

## 2021-11-26 NOTE — ED NOTES
Pt incontinent of stool and urine. Pt cleaned, brief and linen changed. [facility] transport at bedside. Verbal report given. Discharge paperwork and patient belongings given to patient son.

## 2021-11-26 NOTE — DISCHARGE INSTRUCTIONS
Work up was reassuring except for slightly low sodium. This improved with IV fluids here. Be sure you are not restricting salt from his diet and add electrolyte drinks (gatorade, pedialyte,etc.) to his daily fluid intake. Follow up with his primary care doctor next week for recheck.     Return to the ED if you feel his symptoms/condition is worsening- especially if he becomes lethargic/less responsive, continued vomiting, fever,labored breathing, etc.

## 2021-11-26 NOTE — ED TRIAGE NOTES
Pt arrives via EMS from home w/ CC HTN. Home BP cuff showed systolic in 983I. EMS reports systolic BP [id number]. Confusion at baseline. Has taken all medications as prescribed today. Pt only orientated to self upon arrival, which EMS reports is basline. EMS also stated he is more oriented in the morning and more confused at night.

## 2021-11-26 NOTE — ED PROVIDER NOTES
HPI     79 old male with a history of A. fib, dementia, diabetes, heart failure, hyponatremia, MI, recent admission in September for hip fracture, presents the emergency department with concerns for elevated blood pressure and generally not feeling well. Per the son he states he has been getting home health care since he was discharged from the hospital but all of the services stopped yesterday. This afternoon he did not seem to be feeling well the son checked his blood pressure and at that time it was 200/98. He did vomit x1 today he has not urinated all day. Son states he has been eating and drinking normally. Temperature was 99.2 at the highest.  He has been vaccinated for Covid. He did not feel like the patient was exhibiting any signs of chest pain or difficulty breathing and he had no stroke symptoms. Past Medical History:   Diagnosis Date    Atrial fibrillation (HCC)     COPD (chronic obstructive pulmonary disease) (HCC)     Diabetes ([facility]     1970a    Heart failure ([facility]     Hypokalemia 12/25/2018    Hyponatremia 4/11/2017    MI (myocardial infarction) ([facility] 01/2019    NSTEMI (non-ST elevated myocardial infarction) ([facility] 12/25/2018    Syncope 4/11/2017    Urinary incontinence        Past Surgical History:   Procedure Laterality Date    HX HEENT  1975    nasal surgery    HX MOHS PROCEDURES  2013    left    RI CARDIAC SURG PROCEDURE UNLIST  2000    open heart         Family History:   Problem Relation Age of Onset    Diabetes Mother     Diabetes Brother        Social History     Socioeconomic History    Marital status:      Spouse name: Not on file    Number of children: Not on file    Years of education: Not on file    Highest education level: Not on file   Occupational History    Not on file   Tobacco Use    Smoking status: Former Smoker    Smokeless tobacco: Never Used    Tobacco comment: 1-2 cigars daily   Substance and Sexual Activity    Alcohol use:  No Alcohol/week: 0.0 standard drinks    Drug use: No    Sexual activity: Not on file   Other Topics Concern    Not on file   Social History Narrative    Not on file     Social Determinants of Health     Financial Resource Strain:     Difficulty of Paying Living Expenses: Not on file   Food Insecurity:     Worried About Running Out of Food in the Last Year: Not on file    Jessy of Food in the Last Year: Not on file   Transportation Needs:     Lack of Transportation (Medical): Not on file    Lack of Transportation (Non-Medical): Not on file   Physical Activity:     Days of Exercise per Week: Not on file    Minutes of Exercise per Session: Not on file   Stress:     Feeling of Stress : Not on file   Social Connections:     Frequency of Communication with Friends and Family: Not on file    Frequency of Social Gatherings with Friends and Family: Not on file    Attends Latter-day Services: Not on file    Active Member of [address] [place] [address] or Organizations: Not on file    Attends Club or Organization Meetings: Not on file    Marital Status: Not on file   Intimate Partner Violence:     Fear of Current or Ex-Partner: Not on file    Emotionally Abused: Not on file    Physically Abused: Not on file    Sexually Abused: Not on file   Housing Stability:     Unable to Pay for Housing in the Last Year: Not on file    Number of [place] in the Last Year: Not on file    Unstable Housing in the Last Year: Not on file         ALLERGIES: Sulfa (sulfonamide antibiotics)    Review of Systems   Unable to perform ROS: Dementia       Vitals:    11/25/21 2337   BP: (!) 160/78   Pulse: 74   Temp: 98.1 °F (36.7 °C)   SpO2: 95%   Weight: 68 kg (150 lb)   Height: 5' 4\" (1.626 m)            Physical Exam  Constitutional:       General: He is not in acute distress. Appearance: He is well-developed. HENT:      Head: Normocephalic and atraumatic. Mouth/Throat:      Pharynx: No oropharyngeal exudate.    Eyes:      General: No scleral icterus. Right eye: No discharge. Left eye: No discharge. Pupils: Pupils are equal, round, and reactive to light. Neck:      Vascular: No JVD. Cardiovascular:      Rate and Rhythm: Normal rate and regular rhythm. Heart sounds: Normal heart sounds. No murmur heard. Pulmonary:      Effort: Pulmonary effort is normal. No respiratory distress. Breath sounds: Normal breath sounds. No stridor. No wheezing or rales. Chest:      Chest wall: No tenderness. Abdominal:      General: Bowel sounds are normal. There is no distension. Palpations: Abdomen is soft. There is no mass. Tenderness: There is no abdominal tenderness. There is no guarding or rebound. Musculoskeletal:         General: Normal range of motion. Cervical back: Normal range of motion and neck supple. Skin:     General: Skin is warm and dry. Capillary Refill: Capillary refill takes less than 2 seconds. Findings: No rash. Neurological:      Comments: Sleeping, wakes up to voice but does not answer any questions and goes right back to sleep. MDM       Procedures    Work up reassuring- sodium improved with IV fluids. Renal function at baseline. No fever. No sign of infection. BP ok. Son at bedside. Encouraged close follow up with pcp next week for recheck. Return precautions provided.

## 2021-11-29 NOTE — TELEPHONE ENCOUNTER
Home Based Primary Care & Supportive Services   Phone:  [phone]      Fax:  [phone] [facility], [address] [place] [address], [facility]    Name:  Nadya Gurrola  YOB: 1940    Incoming call from patient's son Maria D Campbell. He states that patient was seen in the [facility] ED on 11/25/21 with hypertension. Son says patient's BP was 200/98 when taken at home, BP was 160/78 in ED. Physician told family to have patient follow up with Dr. Stella Rubio regarding his BP medication. Patient is taking amlodipine 5mg daily. Son reports that patient's BP was 200/102 when he took it early this morning, patient's current BP is 155/78. Patient denies chest pain. (patient has dementia and is a poor historian). No shortness of breath, nausea, vomiting. Patient's son is asking if Dr. Stella Rubio would like to make any adjustments to patient's BP medication. This nurse will route the message to Dr. Stella Rubio and call Maria D Campbell back. Home number is [phone], mobile # [phone]. He asks that we call him and not his sister as she is currently dealing with a kidney stone. Addendum:  Discussed with Dr. Stella Rubio who ordered increasing amlodipine from 5mg daily to 10mg daily. This nurse called and relayed this information to son Maria D Campbell. Maria D Campbell verbalized understanding, verified by him being able to restate instructions to increase amlodipine from 5mg daily to 10mg daily. Maria D Campbell states patient has about 30 pills left. He will use what patient has on hand for now. This nurse will check in with Maria D Campbell in 1-2 days to check on patient's blood pressure. Maria D Campbell says he checks patient's BP at least 3 times daily.        Geno Mcgovern, ELVIN, RN-BC, [facility]  Referral Navigator, Home Based Primary Care & Supportive Services

## 2021-11-30 NOTE — TELEPHONE ENCOUNTER
Home Based Primary Care & Supportive Services   Phone:  [phone]      Fax:  [phone] [facility], [address] [place] [address], [facility]    Name:  Karri Casarez  YOB: 1940    Incoming call from patient's daughter Libertad Long. She states that she needs a letter from Dr. Sahil Hernandez for patient's [place] stating that patient is dependent in his ADL's and he requires 24/7 caregiver. Patient's son functions as the caregiver.         Drew Rojas, ELVIN, RN-BC, [facility]  Referral Navigator, Home Based Primary Care & Supportive Services

## 2021-12-08 NOTE — TELEPHONE ENCOUNTER
The patient's daughter, Alexis Caballero is calling to speak with Katie Brannon regarding something that was to be sent via email.

## 2021-12-20 NOTE — TELEPHONE ENCOUNTER
My Chart message requesting refill of Amlodipine. New rx for Amlodipine 10 mg tablet, take one daily sent to pharmacy.

## 2021-12-27 NOTE — TELEPHONE ENCOUNTER
Triage for Controlled Substance Refill Request    Related Diagnosis: chronic pain     Last Home Visit: 10/13/21     Next Home Visit: to be scheduled     Pharmacy: [facility] [address]     Medication:HYDROcodone-acetaminophen (NORCO) 5-325 mg per tablet  Dose and Directions: Take 1 Tablet by mouth daily as needed for Pain for up to 30 days.   Number dispensed:  30  Date filled ():  11/19/21  # left:  1      reviewed: yes     Date of Urine Toxicology:  9/3/19     Controlled  Substance Agreement:  9/10/19     Appropriate for refill:  yes     Action:  prescription pended and forwarded to Dr. Jeff Lemus for signature

## 2021-12-28 NOTE — TELEPHONE ENCOUNTER
Telephone call to [facility] and spoke with Mali Roe to determine what is the issue with filling new rx for glucose test strips for 3 x day testing. Pharmacist reports that dgt just picked up the old rx for 1 x day testing and Medicare won't approve filling for the higher amount of testing. Pharmacist attempted to over-ride with new 3 x day rx that was e-scribed today, without success. Pharmacist provided this nurse with phone number to contact Medicare directly [phone] - spoke with [facility] and described issue, [facility] will reach back out to the [facility] pharmacy for the error code that the rejection is displaying. She will work with pharmacy to correct and either she or pharmacy will call this nurse back with results. Telephone call to Navi Yost to describe above and provide the contact info for Medicare that was used by this nurse. Dgt has 100 glucose test strips on hand so we have one month to resolve before he will be out of strips again.

## 2021-12-28 NOTE — TELEPHONE ENCOUNTER
Spoke with Josie Montiel to confirm that patient is receiving 4 injections of insulin daily, 3 sliding scale lispro and one lantus.   Refills sent for insulin needles and test strips per dgt's request.

## 2022-01-01 ENCOUNTER — APPOINTMENT (OUTPATIENT)
Dept: GENERAL RADIOLOGY | Age: 82
DRG: 291 | End: 2022-01-01
Attending: HOSPITALIST
Payer: MEDICARE

## 2022-01-01 ENCOUNTER — HOME CARE VISIT (OUTPATIENT)
Dept: SCHEDULING | Facility: HOME HEALTH | Age: 82
End: 2022-01-01
Payer: MEDICARE

## 2022-01-01 ENCOUNTER — HOME VISIT (OUTPATIENT)
Dept: FAMILY MEDICINE CLINIC | Age: 82
End: 2022-01-01
Payer: MEDICARE

## 2022-01-01 ENCOUNTER — HOSPITAL ENCOUNTER (INPATIENT)
Dept: INTERVENTIONAL RADIOLOGY/VASCULAR | Age: 82
Discharge: HOME | DRG: 291 | End: 2022-03-18
Attending: PHYSICIAN ASSISTANT
Payer: MEDICARE

## 2022-01-01 ENCOUNTER — HOME CARE VISIT (OUTPATIENT)
Dept: HOSPICE | Facility: HOSPICE | Age: 82
End: 2022-01-01
Payer: MEDICARE

## 2022-01-01 ENCOUNTER — APPOINTMENT (OUTPATIENT)
Dept: GENERAL RADIOLOGY | Age: 82
DRG: 291 | End: 2022-01-01
Attending: PHYSICIAN ASSISTANT
Payer: MEDICARE

## 2022-01-01 ENCOUNTER — TELEPHONE (OUTPATIENT)
Dept: FAMILY MEDICINE CLINIC | Age: 82
End: 2022-01-01

## 2022-01-01 ENCOUNTER — APPOINTMENT (OUTPATIENT)
Dept: CT IMAGING | Age: 82
DRG: 291 | End: 2022-01-01
Attending: INTERNAL MEDICINE
Payer: MEDICARE

## 2022-01-01 ENCOUNTER — PATIENT OUTREACH (OUTPATIENT)
Dept: CASE MANAGEMENT | Age: 82
End: 2022-01-01

## 2022-01-01 ENCOUNTER — APPOINTMENT (OUTPATIENT)
Dept: GENERAL RADIOLOGY | Age: 82
DRG: 291 | End: 2022-01-01
Attending: NURSE PRACTITIONER
Payer: MEDICARE

## 2022-01-01 ENCOUNTER — APPOINTMENT (OUTPATIENT)
Dept: VASCULAR SURGERY | Age: 82
End: 2022-01-01
Attending: PHYSICIAN ASSISTANT
Payer: MEDICARE

## 2022-01-01 ENCOUNTER — HOSPITAL ENCOUNTER (INPATIENT)
Age: 82
LOS: 4 days | Discharge: HOME | DRG: 291 | End: 2022-03-03
Attending: EMERGENCY MEDICINE | Admitting: INTERNAL MEDICINE
Payer: MEDICARE

## 2022-01-01 ENCOUNTER — HOSPICE ADMISSION (OUTPATIENT)
Dept: HOSPICE | Facility: HOSPICE | Age: 82
End: 2022-01-01
Payer: MEDICARE

## 2022-01-01 ENCOUNTER — APPOINTMENT (OUTPATIENT)
Dept: ULTRASOUND IMAGING | Age: 82
DRG: 291 | End: 2022-01-01
Attending: FAMILY MEDICINE
Payer: MEDICARE

## 2022-01-01 ENCOUNTER — DOCUMENTATION ONLY (OUTPATIENT)
Dept: FAMILY MEDICINE CLINIC | Age: 82
End: 2022-01-01

## 2022-01-01 ENCOUNTER — APPOINTMENT (OUTPATIENT)
Dept: GENERAL RADIOLOGY | Age: 82
End: 2022-01-01
Attending: STUDENT IN AN ORGANIZED HEALTH CARE EDUCATION/TRAINING PROGRAM
Payer: MEDICARE

## 2022-01-01 ENCOUNTER — APPOINTMENT (OUTPATIENT)
Dept: ULTRASOUND IMAGING | Age: 82
DRG: 291 | End: 2022-01-01
Attending: HOSPITALIST
Payer: MEDICARE

## 2022-01-01 ENCOUNTER — HOSPITAL ENCOUNTER (EMERGENCY)
Age: 82
Discharge: HOME | End: 2022-02-02
Attending: STUDENT IN AN ORGANIZED HEALTH CARE EDUCATION/TRAINING PROGRAM
Payer: MEDICARE

## 2022-01-01 ENCOUNTER — APPOINTMENT (OUTPATIENT)
Dept: NON INVASIVE DIAGNOSTICS | Age: 82
DRG: 291 | End: 2022-01-01
Attending: INTERNAL MEDICINE
Payer: MEDICARE

## 2022-01-01 ENCOUNTER — PATIENT MESSAGE (OUTPATIENT)
Dept: FAMILY MEDICINE CLINIC | Age: 82
End: 2022-01-01

## 2022-01-01 ENCOUNTER — APPOINTMENT (OUTPATIENT)
Dept: GENERAL RADIOLOGY | Age: 82
DRG: 291 | End: 2022-01-01
Attending: EMERGENCY MEDICINE
Payer: MEDICARE

## 2022-01-01 ENCOUNTER — HOSPITAL ENCOUNTER (INPATIENT)
Age: 82
LOS: 6 days | Discharge: HOSPICE | DRG: 291 | End: 2022-03-19
Attending: EMERGENCY MEDICINE | Admitting: INTERNAL MEDICINE
Payer: MEDICARE

## 2022-01-01 ENCOUNTER — APPOINTMENT (OUTPATIENT)
Dept: CT IMAGING | Age: 82
DRG: 291 | End: 2022-01-01
Attending: FAMILY MEDICINE
Payer: MEDICARE

## 2022-01-01 VITALS
DIASTOLIC BLOOD PRESSURE: 50 MMHG | HEART RATE: 83 BPM | TEMPERATURE: 100.2 F | RESPIRATION RATE: 18 BRPM | OXYGEN SATURATION: 99 % | SYSTOLIC BLOOD PRESSURE: 141 MMHG

## 2022-01-01 VITALS
HEART RATE: 72 BPM | SYSTOLIC BLOOD PRESSURE: 127 MMHG | OXYGEN SATURATION: 99 % | RESPIRATION RATE: 16 BRPM | DIASTOLIC BLOOD PRESSURE: 55 MMHG | TEMPERATURE: 95.4 F

## 2022-01-01 VITALS
OXYGEN SATURATION: 100 % | HEIGHT: 74 IN | HEART RATE: 78 BPM | RESPIRATION RATE: 18 BRPM | BODY MASS INDEX: 17.15 KG/M2 | TEMPERATURE: 97.3 F | SYSTOLIC BLOOD PRESSURE: 139 MMHG | DIASTOLIC BLOOD PRESSURE: 65 MMHG | WEIGHT: 133.6 LBS

## 2022-01-01 VITALS
SYSTOLIC BLOOD PRESSURE: 114 MMHG | TEMPERATURE: 98.5 F | RESPIRATION RATE: 16 BRPM | HEART RATE: 74 BPM | DIASTOLIC BLOOD PRESSURE: 68 MMHG | OXYGEN SATURATION: 99 %

## 2022-01-01 VITALS
DIASTOLIC BLOOD PRESSURE: 70 MMHG | RESPIRATION RATE: 18 BRPM | OXYGEN SATURATION: 97 % | TEMPERATURE: 98.9 F | HEART RATE: 86 BPM | SYSTOLIC BLOOD PRESSURE: 136 MMHG

## 2022-01-01 VITALS
HEART RATE: 80 BPM | OXYGEN SATURATION: 100 % | TEMPERATURE: 98.2 F | SYSTOLIC BLOOD PRESSURE: 126 MMHG | DIASTOLIC BLOOD PRESSURE: 70 MMHG | RESPIRATION RATE: 16 BRPM

## 2022-01-01 VITALS
TEMPERATURE: 98.2 F | SYSTOLIC BLOOD PRESSURE: 137 MMHG | RESPIRATION RATE: 16 BRPM | DIASTOLIC BLOOD PRESSURE: 62 MMHG | HEART RATE: 80 BPM | OXYGEN SATURATION: 99 %

## 2022-01-01 VITALS
DIASTOLIC BLOOD PRESSURE: 78 MMHG | RESPIRATION RATE: 18 BRPM | TEMPERATURE: 97.8 F | OXYGEN SATURATION: 99 % | HEART RATE: 96 BPM | SYSTOLIC BLOOD PRESSURE: 146 MMHG

## 2022-01-01 VITALS
SYSTOLIC BLOOD PRESSURE: 124 MMHG | HEART RATE: 78 BPM | TEMPERATURE: 98.1 F | OXYGEN SATURATION: 97 % | RESPIRATION RATE: 18 BRPM | DIASTOLIC BLOOD PRESSURE: 68 MMHG

## 2022-01-01 VITALS
HEART RATE: 82 BPM | TEMPERATURE: 98.6 F | OXYGEN SATURATION: 99 % | DIASTOLIC BLOOD PRESSURE: 68 MMHG | RESPIRATION RATE: 16 BRPM | SYSTOLIC BLOOD PRESSURE: 128 MMHG

## 2022-01-01 VITALS
DIASTOLIC BLOOD PRESSURE: 72 MMHG | RESPIRATION RATE: 18 BRPM | TEMPERATURE: 98.2 F | SYSTOLIC BLOOD PRESSURE: 137 MMHG | OXYGEN SATURATION: 100 % | HEART RATE: 77 BPM

## 2022-01-01 VITALS
RESPIRATION RATE: 18 BRPM | SYSTOLIC BLOOD PRESSURE: 124 MMHG | TEMPERATURE: 97 F | DIASTOLIC BLOOD PRESSURE: 62 MMHG | HEART RATE: 87 BPM | OXYGEN SATURATION: 100 %

## 2022-01-01 VITALS
DIASTOLIC BLOOD PRESSURE: 68 MMHG | RESPIRATION RATE: 18 BRPM | HEART RATE: 86 BPM | TEMPERATURE: 98.5 F | OXYGEN SATURATION: 99 % | SYSTOLIC BLOOD PRESSURE: 134 MMHG

## 2022-01-01 VITALS
OXYGEN SATURATION: 94 % | DIASTOLIC BLOOD PRESSURE: 84 MMHG | TEMPERATURE: 97.7 F | RESPIRATION RATE: 18 BRPM | SYSTOLIC BLOOD PRESSURE: 134 MMHG | HEART RATE: 81 BPM

## 2022-01-01 VITALS
DIASTOLIC BLOOD PRESSURE: 70 MMHG | SYSTOLIC BLOOD PRESSURE: 116 MMHG | OXYGEN SATURATION: 98 % | RESPIRATION RATE: 18 BRPM | HEART RATE: 78 BPM | TEMPERATURE: 98.1 F

## 2022-01-01 VITALS
HEART RATE: 83 BPM | RESPIRATION RATE: 15 BRPM | DIASTOLIC BLOOD PRESSURE: 70 MMHG | SYSTOLIC BLOOD PRESSURE: 120 MMHG | TEMPERATURE: 98.1 F

## 2022-01-01 VITALS
OXYGEN SATURATION: 99 % | SYSTOLIC BLOOD PRESSURE: 140 MMHG | DIASTOLIC BLOOD PRESSURE: 72 MMHG | HEART RATE: 86 BPM | RESPIRATION RATE: 16 BRPM | TEMPERATURE: 98.6 F

## 2022-01-01 VITALS
HEART RATE: 82 BPM | SYSTOLIC BLOOD PRESSURE: 116 MMHG | OXYGEN SATURATION: 98 % | DIASTOLIC BLOOD PRESSURE: 66 MMHG | RESPIRATION RATE: 18 BRPM | TEMPERATURE: 97.4 F

## 2022-01-01 VITALS
SYSTOLIC BLOOD PRESSURE: 124 MMHG | RESPIRATION RATE: 20 BRPM | OXYGEN SATURATION: 99 % | HEART RATE: 78 BPM | TEMPERATURE: 98.5 F | DIASTOLIC BLOOD PRESSURE: 68 MMHG

## 2022-01-01 VITALS
SYSTOLIC BLOOD PRESSURE: 145 MMHG | HEART RATE: 68 BPM | DIASTOLIC BLOOD PRESSURE: 78 MMHG | OXYGEN SATURATION: 99 % | RESPIRATION RATE: 18 BRPM

## 2022-01-01 VITALS
TEMPERATURE: 98.2 F | OXYGEN SATURATION: 97 % | SYSTOLIC BLOOD PRESSURE: 110 MMHG | DIASTOLIC BLOOD PRESSURE: 72 MMHG | RESPIRATION RATE: 18 BRPM | HEART RATE: 76 BPM

## 2022-01-01 VITALS
HEART RATE: 50 BPM | DIASTOLIC BLOOD PRESSURE: 69 MMHG | OXYGEN SATURATION: 99 % | SYSTOLIC BLOOD PRESSURE: 116 MMHG | TEMPERATURE: 97 F | RESPIRATION RATE: 16 BRPM

## 2022-01-01 VITALS
HEART RATE: 90 BPM | OXYGEN SATURATION: 92 % | SYSTOLIC BLOOD PRESSURE: 143 MMHG | TEMPERATURE: 97.6 F | DIASTOLIC BLOOD PRESSURE: 55 MMHG | RESPIRATION RATE: 21 BRPM | HEIGHT: 74 IN | WEIGHT: 136.02 LBS | BODY MASS INDEX: 17.46 KG/M2

## 2022-01-01 VITALS
TEMPERATURE: 99 F | HEART RATE: 98 BPM | RESPIRATION RATE: 18 BRPM | DIASTOLIC BLOOD PRESSURE: 70 MMHG | SYSTOLIC BLOOD PRESSURE: 116 MMHG

## 2022-01-01 VITALS
SYSTOLIC BLOOD PRESSURE: 132 MMHG | DIASTOLIC BLOOD PRESSURE: 78 MMHG | RESPIRATION RATE: 18 BRPM | TEMPERATURE: 97.7 F | OXYGEN SATURATION: 99 % | HEART RATE: 79 BPM

## 2022-01-01 VITALS
SYSTOLIC BLOOD PRESSURE: 102 MMHG | OXYGEN SATURATION: 100 % | HEART RATE: 102 BPM | TEMPERATURE: 97.6 F | RESPIRATION RATE: 28 BRPM | DIASTOLIC BLOOD PRESSURE: 70 MMHG

## 2022-01-01 VITALS
OXYGEN SATURATION: 99 % | SYSTOLIC BLOOD PRESSURE: 170 MMHG | DIASTOLIC BLOOD PRESSURE: 98 MMHG | RESPIRATION RATE: 22 BRPM | HEART RATE: 89 BPM

## 2022-01-01 VITALS — RESPIRATION RATE: 22 BRPM | OXYGEN SATURATION: 99 % | HEART RATE: 80 BPM | TEMPERATURE: 97.5 F

## 2022-01-01 VITALS
OXYGEN SATURATION: 99 % | SYSTOLIC BLOOD PRESSURE: 122 MMHG | TEMPERATURE: 98.2 F | DIASTOLIC BLOOD PRESSURE: 79 MMHG | HEART RATE: 52 BPM | RESPIRATION RATE: 18 BRPM

## 2022-01-01 VITALS
SYSTOLIC BLOOD PRESSURE: 131 MMHG | TEMPERATURE: 96.9 F | DIASTOLIC BLOOD PRESSURE: 49 MMHG | HEART RATE: 99 BPM | RESPIRATION RATE: 18 BRPM | OXYGEN SATURATION: 100 %

## 2022-01-01 VITALS — RESPIRATION RATE: 16 BRPM

## 2022-01-01 VITALS
DIASTOLIC BLOOD PRESSURE: 76 MMHG | HEART RATE: 78 BPM | SYSTOLIC BLOOD PRESSURE: 134 MMHG | RESPIRATION RATE: 18 BRPM | TEMPERATURE: 98.6 F | OXYGEN SATURATION: 96 %

## 2022-01-01 VITALS
SYSTOLIC BLOOD PRESSURE: 122 MMHG | TEMPERATURE: 98 F | OXYGEN SATURATION: 96 % | DIASTOLIC BLOOD PRESSURE: 62 MMHG | HEART RATE: 81 BPM

## 2022-01-01 VITALS
TEMPERATURE: 98.3 F | OXYGEN SATURATION: 99 % | DIASTOLIC BLOOD PRESSURE: 61 MMHG | SYSTOLIC BLOOD PRESSURE: 126 MMHG | HEART RATE: 61 BPM | RESPIRATION RATE: 18 BRPM

## 2022-01-01 VITALS — TEMPERATURE: 95 F | RESPIRATION RATE: 12 BRPM | HEART RATE: 48 BPM

## 2022-01-01 VITALS
RESPIRATION RATE: 18 BRPM | HEART RATE: 84 BPM | TEMPERATURE: 98.4 F | SYSTOLIC BLOOD PRESSURE: 150 MMHG | OXYGEN SATURATION: 99 % | DIASTOLIC BLOOD PRESSURE: 85 MMHG

## 2022-01-01 DIAGNOSIS — Z79.891 ENCOUNTER FOR LONG-TERM OPIATE ANALGESIC USE: Primary | ICD-10-CM

## 2022-01-01 DIAGNOSIS — Z87.19 HISTORY OF GI BLEED: ICD-10-CM

## 2022-01-01 DIAGNOSIS — I50.9 ACUTE CONGESTIVE HEART FAILURE, UNSPECIFIED HEART FAILURE TYPE (HCC): Primary | ICD-10-CM

## 2022-01-01 DIAGNOSIS — G89.29 OTHER CHRONIC PAIN: Primary | ICD-10-CM

## 2022-01-01 DIAGNOSIS — C61 PROSTATE CANCER (HCC): ICD-10-CM

## 2022-01-01 DIAGNOSIS — J44.9 CHRONIC OBSTRUCTIVE PULMONARY DISEASE, UNSPECIFIED COPD TYPE (HCC): ICD-10-CM

## 2022-01-01 DIAGNOSIS — I50.22 CHRONIC SYSTOLIC CONGESTIVE HEART FAILURE (HCC): ICD-10-CM

## 2022-01-01 DIAGNOSIS — E03.9 HYPOTHYROIDISM, UNSPECIFIED TYPE: ICD-10-CM

## 2022-01-01 DIAGNOSIS — Z79.4 TYPE 2 DIABETES MELLITUS WITH HYPERGLYCEMIA, WITH LONG-TERM CURRENT USE OF INSULIN (HCC): ICD-10-CM

## 2022-01-01 DIAGNOSIS — E11.65 TYPE 2 DIABETES MELLITUS WITH HYPERGLYCEMIA, WITH LONG-TERM CURRENT USE OF INSULIN (HCC): ICD-10-CM

## 2022-01-01 DIAGNOSIS — Z51.5 PALLIATIVE CARE BY SPECIALIST: ICD-10-CM

## 2022-01-01 DIAGNOSIS — G30.1 LATE ONSET ALZHEIMER'S DISEASE WITH BEHAVIORAL DISTURBANCE (HCC): Primary | ICD-10-CM

## 2022-01-01 DIAGNOSIS — F33.1 MODERATE RECURRENT MAJOR DEPRESSION (HCC): Primary | ICD-10-CM

## 2022-01-01 DIAGNOSIS — G30.1 LATE ONSET ALZHEIMER'S DISEASE WITH BEHAVIORAL DISTURBANCE (HCC): ICD-10-CM

## 2022-01-01 DIAGNOSIS — Z71.89 GOALS OF CARE, COUNSELING/DISCUSSION: ICD-10-CM

## 2022-01-01 DIAGNOSIS — R22.42 LOCALIZED SWELLING OF LEFT LOWER EXTREMITY: Primary | ICD-10-CM

## 2022-01-01 DIAGNOSIS — F33.1 MODERATE RECURRENT MAJOR DEPRESSION (HCC): ICD-10-CM

## 2022-01-01 DIAGNOSIS — I10 PRIMARY HYPERTENSION: ICD-10-CM

## 2022-01-01 DIAGNOSIS — I50.9 CONGESTIVE HEART FAILURE, UNSPECIFIED HF CHRONICITY, UNSPECIFIED HEART FAILURE TYPE (HCC): ICD-10-CM

## 2022-01-01 DIAGNOSIS — Z00.00 MEDICARE ANNUAL WELLNESS VISIT, SUBSEQUENT: ICD-10-CM

## 2022-01-01 DIAGNOSIS — N18.32 STAGE 3B CHRONIC KIDNEY DISEASE (HCC): ICD-10-CM

## 2022-01-01 DIAGNOSIS — R09.02 HYPOXIA: ICD-10-CM

## 2022-01-01 DIAGNOSIS — E11.65 UNCONTROLLED TYPE 2 DIABETES MELLITUS WITH HYPERGLYCEMIA (HCC): ICD-10-CM

## 2022-01-01 DIAGNOSIS — I50.23 ACUTE ON CHRONIC HFREF (HEART FAILURE WITH REDUCED EJECTION FRACTION) (HCC): ICD-10-CM

## 2022-01-01 DIAGNOSIS — I10 ESSENTIAL HYPERTENSION: ICD-10-CM

## 2022-01-01 DIAGNOSIS — R52 GENERALIZED PAIN: ICD-10-CM

## 2022-01-01 DIAGNOSIS — T40.2X5A CONSTIPATION DUE TO OPIOID THERAPY: Primary | ICD-10-CM

## 2022-01-01 DIAGNOSIS — F02.818 LATE ONSET ALZHEIMER'S DISEASE WITH BEHAVIORAL DISTURBANCE (HCC): Primary | ICD-10-CM

## 2022-01-01 DIAGNOSIS — R06.02 SHORTNESS OF BREATH: ICD-10-CM

## 2022-01-01 DIAGNOSIS — D64.9 ANEMIA, UNSPECIFIED TYPE: ICD-10-CM

## 2022-01-01 DIAGNOSIS — E87.1 HYPONATREMIA: ICD-10-CM

## 2022-01-01 DIAGNOSIS — N18.9 CHRONIC KIDNEY DISEASE, UNSPECIFIED CKD STAGE: ICD-10-CM

## 2022-01-01 DIAGNOSIS — G89.29 OTHER CHRONIC PAIN: ICD-10-CM

## 2022-01-01 DIAGNOSIS — R53.1 WEAKNESS GENERALIZED: ICD-10-CM

## 2022-01-01 DIAGNOSIS — I50.23 ACUTE ON CHRONIC HFREF (HEART FAILURE WITH REDUCED EJECTION FRACTION) (HCC): Primary | ICD-10-CM

## 2022-01-01 DIAGNOSIS — F02.818 LATE ONSET ALZHEIMER'S DISEASE WITH BEHAVIORAL DISTURBANCE (HCC): ICD-10-CM

## 2022-01-01 DIAGNOSIS — R01.1 MURMUR: ICD-10-CM

## 2022-01-01 DIAGNOSIS — I48.91 ATRIAL FIBRILLATION, UNSPECIFIED TYPE (HCC): ICD-10-CM

## 2022-01-01 DIAGNOSIS — I50.21 ACUTE SYSTOLIC CONGESTIVE HEART FAILURE (HCC): Primary | ICD-10-CM

## 2022-01-01 DIAGNOSIS — K59.03 CONSTIPATION DUE TO OPIOID THERAPY: Primary | ICD-10-CM

## 2022-01-01 DIAGNOSIS — K29.71 GASTRITIS WITH HEMORRHAGE, UNSPECIFIED CHRONICITY, UNSPECIFIED GASTRITIS TYPE: ICD-10-CM

## 2022-01-01 LAB
ALBUMIN SERPL-MCNC: 2.7 G/DL (ref 3.5–5)
ALBUMIN SERPL-MCNC: 2.8 G/DL (ref 3.5–5)
ALBUMIN SERPL-MCNC: 2.9 G/DL (ref 3.5–5)
ALBUMIN SERPL-MCNC: 3 G/DL (ref 3.5–5)
ALBUMIN SERPL-MCNC: 3 G/DL (ref 3.5–5)
ALBUMIN SERPL-MCNC: 3.1 G/DL (ref 3.5–5)
ALBUMIN/GLOB SERPL: 0.6 {RATIO} (ref 1.1–2.2)
ALBUMIN/GLOB SERPL: 0.7 {RATIO} (ref 1.1–2.2)
ALBUMIN/GLOB SERPL: 0.8 {RATIO} (ref 1.1–2.2)
ALBUMIN/GLOB SERPL: 0.8 {RATIO} (ref 1.1–2.2)
ALP SERPL-CCNC: 78 U/L (ref 45–117)
ALP SERPL-CCNC: 89 U/L (ref 45–117)
ALP SERPL-CCNC: 89 U/L (ref 45–117)
ALP SERPL-CCNC: 90 U/L (ref 45–117)
ALP SERPL-CCNC: 91 U/L (ref 45–117)
ALP SERPL-CCNC: 99 U/L (ref 45–117)
ALT SERPL-CCNC: 13 U/L (ref 12–78)
ALT SERPL-CCNC: 14 U/L (ref 12–78)
ALT SERPL-CCNC: 14 U/L (ref 12–78)
ALT SERPL-CCNC: 20 U/L (ref 12–78)
ALT SERPL-CCNC: 20 U/L (ref 12–78)
ALT SERPL-CCNC: 21 U/L (ref 12–78)
ANION GAP SERPL CALC-SCNC: 11 MMOL/L (ref 5–15)
ANION GAP SERPL CALC-SCNC: 3 MMOL/L (ref 5–15)
ANION GAP SERPL CALC-SCNC: 4 MMOL/L (ref 5–15)
ANION GAP SERPL CALC-SCNC: 5 MMOL/L (ref 5–15)
ANION GAP SERPL CALC-SCNC: 6 MMOL/L (ref 5–15)
ANION GAP SERPL CALC-SCNC: 6 MMOL/L (ref 5–15)
ANION GAP SERPL CALC-SCNC: 7 MMOL/L (ref 5–15)
APPEARANCE UR: CLEAR
APTT PPP: 34.7 SEC (ref 22.1–31)
AST SERPL-CCNC: 16 U/L (ref 15–37)
AST SERPL-CCNC: 16 U/L (ref 15–37)
AST SERPL-CCNC: 17 U/L (ref 15–37)
AST SERPL-CCNC: 18 U/L (ref 15–37)
AST SERPL-CCNC: 21 U/L (ref 15–37)
AST SERPL-CCNC: 24 U/L (ref 15–37)
BACTERIA SPEC CULT: NORMAL
BACTERIA SPEC CULT: NORMAL
BACTERIA URNS QL MICRO: NEGATIVE /HPF
BASOPHILS # BLD: 0 K/UL (ref 0–0.1)
BASOPHILS # BLD: 0.1 K/UL (ref 0–0.1)
BASOPHILS # BLD: 0.1 K/UL (ref 0–0.1)
BASOPHILS NFR BLD: 0 % (ref 0–1)
BASOPHILS NFR BLD: 1 % (ref 0–1)
BASOPHILS NFR BLD: 1 % (ref 0–1)
BILIRUB SERPL-MCNC: 0.5 MG/DL (ref 0.2–1)
BILIRUB SERPL-MCNC: 0.6 MG/DL (ref 0.2–1)
BILIRUB SERPL-MCNC: 0.8 MG/DL (ref 0.2–1)
BILIRUB SERPL-MCNC: 0.9 MG/DL (ref 0.2–1)
BILIRUB UR QL: NEGATIVE
BNP SERPL-MCNC: 7999 PG/ML
BNP SERPL-MCNC: 8317 PG/ML
BNP SERPL-MCNC: ABNORMAL PG/ML
BUN SERPL-MCNC: 15 MG/DL (ref 6–20)
BUN SERPL-MCNC: 16 MG/DL (ref 6–20)
BUN SERPL-MCNC: 16 MG/DL (ref 6–20)
BUN SERPL-MCNC: 18 MG/DL (ref 6–20)
BUN SERPL-MCNC: 18 MG/DL (ref 6–20)
BUN SERPL-MCNC: 21 MG/DL (ref 6–20)
BUN SERPL-MCNC: 21 MG/DL (ref 6–20)
BUN SERPL-MCNC: 22 MG/DL (ref 6–20)
BUN SERPL-MCNC: 22 MG/DL (ref 6–20)
BUN SERPL-MCNC: 23 MG/DL (ref 6–20)
BUN SERPL-MCNC: 24 MG/DL (ref 6–20)
BUN SERPL-MCNC: 32 MG/DL (ref 6–20)
BUN SERPL-MCNC: 42 MG/DL (ref 6–20)
BUN/CREAT SERPL: 10 (ref 12–20)
BUN/CREAT SERPL: 10 (ref 12–20)
BUN/CREAT SERPL: 11 (ref 12–20)
BUN/CREAT SERPL: 11 (ref 12–20)
BUN/CREAT SERPL: 12 (ref 12–20)
BUN/CREAT SERPL: 12 (ref 12–20)
BUN/CREAT SERPL: 13 (ref 12–20)
BUN/CREAT SERPL: 14 (ref 12–20)
BUN/CREAT SERPL: 15 (ref 12–20)
BUN/CREAT SERPL: 15 (ref 12–20)
BUN/CREAT SERPL: 16 (ref 12–20)
BUN/CREAT SERPL: 16 (ref 12–20)
BUN/CREAT SERPL: 19 (ref 12–20)
CALCIUM SERPL-MCNC: 8.1 MG/DL (ref 8.5–10.1)
CALCIUM SERPL-MCNC: 8.4 MG/DL (ref 8.5–10.1)
CALCIUM SERPL-MCNC: 8.4 MG/DL (ref 8.5–10.1)
CALCIUM SERPL-MCNC: 8.5 MG/DL (ref 8.5–10.1)
CALCIUM SERPL-MCNC: 8.6 MG/DL (ref 8.5–10.1)
CALCIUM SERPL-MCNC: 8.7 MG/DL (ref 8.5–10.1)
CALCIUM SERPL-MCNC: 8.7 MG/DL (ref 8.5–10.1)
CALCIUM SERPL-MCNC: 8.8 MG/DL (ref 8.5–10.1)
CALCIUM SERPL-MCNC: 9 MG/DL (ref 8.5–10.1)
CALCIUM SERPL-MCNC: 9.1 MG/DL (ref 8.5–10.1)
CALCIUM SERPL-MCNC: 9.2 MG/DL (ref 8.5–10.1)
CALCULATED R AXIS, ECG10: 118 DEGREES
CALCULATED R AXIS, ECG10: 55 DEGREES
CALCULATED R AXIS, ECG10: 63 DEGREES
CALCULATED T AXIS, ECG11: -57 DEGREES
CALCULATED T AXIS, ECG11: 147 DEGREES
CALCULATED T AXIS, ECG11: 166 DEGREES
CHLORIDE SERPL-SCNC: 100 MMOL/L (ref 97–108)
CHLORIDE SERPL-SCNC: 102 MMOL/L (ref 97–108)
CHLORIDE SERPL-SCNC: 102 MMOL/L (ref 97–108)
CHLORIDE SERPL-SCNC: 105 MMOL/L (ref 97–108)
CHLORIDE SERPL-SCNC: 92 MMOL/L (ref 97–108)
CHLORIDE SERPL-SCNC: 95 MMOL/L (ref 97–108)
CHLORIDE SERPL-SCNC: 96 MMOL/L (ref 97–108)
CHLORIDE SERPL-SCNC: 96 MMOL/L (ref 97–108)
CHLORIDE SERPL-SCNC: 97 MMOL/L (ref 97–108)
CHLORIDE SERPL-SCNC: 97 MMOL/L (ref 97–108)
CHLORIDE SERPL-SCNC: 98 MMOL/L (ref 97–108)
CHLORIDE SERPL-SCNC: 98 MMOL/L (ref 97–108)
CHLORIDE SERPL-SCNC: 99 MMOL/L (ref 97–108)
CO2 SERPL-SCNC: 19 MMOL/L (ref 21–32)
CO2 SERPL-SCNC: 24 MMOL/L (ref 21–32)
CO2 SERPL-SCNC: 25 MMOL/L (ref 21–32)
CO2 SERPL-SCNC: 26 MMOL/L (ref 21–32)
CO2 SERPL-SCNC: 27 MMOL/L (ref 21–32)
CO2 SERPL-SCNC: 28 MMOL/L (ref 21–32)
COLOR UR: ABNORMAL
COMMENT, HOLDF: NORMAL
COVID-19 RAPID TEST, COVR: NOT DETECTED
COVID-19 RAPID TEST, COVR: NOT DETECTED
CREAT SERPL-MCNC: 1.44 MG/DL (ref 0.7–1.3)
CREAT SERPL-MCNC: 1.45 MG/DL (ref 0.7–1.3)
CREAT SERPL-MCNC: 1.51 MG/DL (ref 0.7–1.3)
CREAT SERPL-MCNC: 1.52 MG/DL (ref 0.7–1.3)
CREAT SERPL-MCNC: 1.53 MG/DL (ref 0.7–1.3)
CREAT SERPL-MCNC: 1.54 MG/DL (ref 0.7–1.3)
CREAT SERPL-MCNC: 1.55 MG/DL (ref 0.7–1.3)
CREAT SERPL-MCNC: 1.58 MG/DL (ref 0.7–1.3)
CREAT SERPL-MCNC: 1.66 MG/DL (ref 0.7–1.3)
CREAT SERPL-MCNC: 1.71 MG/DL (ref 0.7–1.3)
CREAT SERPL-MCNC: 1.8 MG/DL (ref 0.7–1.3)
CREAT SERPL-MCNC: 2.01 MG/DL (ref 0.7–1.3)
CREAT SERPL-MCNC: 2.17 MG/DL (ref 0.7–1.3)
D DIMER PPP FEU-MCNC: 1.18 MG/L FEU (ref 0–0.65)
D DIMER PPP FEU-MCNC: 1.86 MG/L FEU (ref 0–0.65)
DIAGNOSIS, 93000: NORMAL
DIFFERENTIAL METHOD BLD: ABNORMAL
ECHO AV AREA PEAK VELOCITY: 0.9 CM2
ECHO AV AREA PEAK VELOCITY: 0.9 CM2
ECHO AV AREA VTI: 1.1 CM2
ECHO AV AREA VTI: 1.1 CM2
ECHO AV MEAN GRADIENT: 21 MMHG
ECHO AV MEAN VELOCITY: 2.3 M/S
ECHO AV PEAK GRADIENT: 29 MMHG
ECHO AV PEAK VELOCITY: 2.7 M/S
ECHO AV VELOCITY RATIO: 0.3
ECHO AV VTI: 56.1 CM
ECHO LA DIAMETER INDEX: 2.21 CM/M2
ECHO LA DIAMETER: 3.8 CM
ECHO LA VOL 4C: 58 ML (ref 18–58)
ECHO LA VOLUME INDEX A4C: 34 ML/M2 (ref 16–34)
ECHO LV FRACTIONAL SHORTENING: 31 % (ref 28–44)
ECHO LV INTERNAL DIMENSION DIASTOLE INDEX: 2.27 CM/M2
ECHO LV INTERNAL DIMENSION DIASTOLIC: 3.9 CM (ref 4.2–5.9)
ECHO LV INTERNAL DIMENSION SYSTOLIC INDEX: 1.57 CM/M2
ECHO LV INTERNAL DIMENSION SYSTOLIC: 2.7 CM
ECHO LV IVSD: 0.9 CM (ref 0.6–1)
ECHO LV MASS 2D: 105.3 G (ref 88–224)
ECHO LV MASS INDEX 2D: 61.2 G/M2 (ref 49–115)
ECHO LV POSTERIOR WALL DIASTOLIC: 0.9 CM (ref 0.6–1)
ECHO LV RELATIVE WALL THICKNESS RATIO: 0.46
ECHO LVOT AREA: 3.5 CM2
ECHO LVOT AV VTI INDEX: 0.37
ECHO LVOT DIAM: 2.1 CM
ECHO LVOT MEAN GRADIENT: 2 MMHG
ECHO LVOT PEAK GRADIENT: 3 MMHG
ECHO LVOT PEAK VELOCITY: 0.8 M/S
ECHO LVOT STROKE VOLUME INDEX: 42.3 ML/M2
ECHO LVOT SV: 72.7 ML
ECHO LVOT VTI: 21 CM
ECHO MV REGURGITANT ALIASING (NYQUIST) VELOCITY: 37 CM/S
ECHO MV REGURGITANT PEAK GRADIENT: 159 MMHG
ECHO MV REGURGITANT PEAK VELOCITY: 6.3 M/S
ECHO MV REGURGITANT VELOCITY PISA: 6.6 M/S
ECHO MV REGURGITANT VTIA: 174.9 CM
ECHO MV REGURGITANT VTIA: 182.8 CM
ECHO PULMONARY ARTERY END DIASTOLIC PRESSURE: 28 MMHG
ECHO PV MAX VELOCITY: 2.7 M/S
ECHO RV INTERNAL DIMENSION: 3.2 CM
ECHO RV TAPSE: 1.1 CM (ref 1.5–2)
EOSINOPHIL # BLD: 0 K/UL (ref 0–0.4)
EOSINOPHIL # BLD: 0.1 K/UL (ref 0–0.4)
EOSINOPHIL # BLD: 0.1 K/UL (ref 0–0.4)
EOSINOPHIL # BLD: 0.2 K/UL (ref 0–0.4)
EOSINOPHIL NFR BLD: 0 % (ref 0–7)
EOSINOPHIL NFR BLD: 1 % (ref 0–7)
EOSINOPHIL NFR BLD: 1 % (ref 0–7)
EOSINOPHIL NFR BLD: 4 % (ref 0–7)
EPITH CASTS URNS QL MICRO: ABNORMAL /LPF
ERYTHROCYTE [DISTWIDTH] IN BLOOD BY AUTOMATED COUNT: 16.2 % (ref 11.5–14.5)
ERYTHROCYTE [DISTWIDTH] IN BLOOD BY AUTOMATED COUNT: 17.2 % (ref 11.5–14.5)
ERYTHROCYTE [DISTWIDTH] IN BLOOD BY AUTOMATED COUNT: 17.7 % (ref 11.5–14.5)
ERYTHROCYTE [DISTWIDTH] IN BLOOD BY AUTOMATED COUNT: 17.9 % (ref 11.5–14.5)
ERYTHROCYTE [DISTWIDTH] IN BLOOD BY AUTOMATED COUNT: 18.1 % (ref 11.5–14.5)
ERYTHROCYTE [DISTWIDTH] IN BLOOD BY AUTOMATED COUNT: 18.4 % (ref 11.5–14.5)
ERYTHROCYTE [DISTWIDTH] IN BLOOD BY AUTOMATED COUNT: 18.5 % (ref 11.5–14.5)
ERYTHROCYTE [DISTWIDTH] IN BLOOD BY AUTOMATED COUNT: 18.6 % (ref 11.5–14.5)
ERYTHROCYTE [DISTWIDTH] IN BLOOD BY AUTOMATED COUNT: 18.9 % (ref 11.5–14.5)
ERYTHROCYTE [DISTWIDTH] IN BLOOD BY AUTOMATED COUNT: 19 % (ref 11.5–14.5)
ERYTHROCYTE [DISTWIDTH] IN BLOOD BY AUTOMATED COUNT: 19.1 % (ref 11.5–14.5)
EST. AVERAGE GLUCOSE BLD GHB EST-MCNC: 128 MG/DL
FERRITIN SERPL-MCNC: 167 NG/ML (ref 26–388)
FIBRINOGEN PPP-MCNC: 418 MG/DL (ref 200–475)
FOLATE SERPL-MCNC: 5.9 NG/ML (ref 5–21)
GLOBULIN SER CALC-MCNC: 3.7 G/DL (ref 2–4)
GLOBULIN SER CALC-MCNC: 3.7 G/DL (ref 2–4)
GLOBULIN SER CALC-MCNC: 4.2 G/DL (ref 2–4)
GLOBULIN SER CALC-MCNC: 4.4 G/DL (ref 2–4)
GLOBULIN SER CALC-MCNC: 4.5 G/DL (ref 2–4)
GLOBULIN SER CALC-MCNC: 4.5 G/DL (ref 2–4)
GLUCOSE BLD STRIP.AUTO-MCNC: 108 MG/DL (ref 65–117)
GLUCOSE BLD STRIP.AUTO-MCNC: 115 MG/DL (ref 65–117)
GLUCOSE BLD STRIP.AUTO-MCNC: 122 MG/DL (ref 65–117)
GLUCOSE BLD STRIP.AUTO-MCNC: 126 MG/DL (ref 65–117)
GLUCOSE BLD STRIP.AUTO-MCNC: 132 MG/DL (ref 65–117)
GLUCOSE BLD STRIP.AUTO-MCNC: 133 MG/DL (ref 65–117)
GLUCOSE BLD STRIP.AUTO-MCNC: 142 MG/DL (ref 65–117)
GLUCOSE BLD STRIP.AUTO-MCNC: 147 MG/DL (ref 65–117)
GLUCOSE BLD STRIP.AUTO-MCNC: 151 MG/DL (ref 65–117)
GLUCOSE BLD STRIP.AUTO-MCNC: 154 MG/DL (ref 65–117)
GLUCOSE BLD STRIP.AUTO-MCNC: 159 MG/DL (ref 65–117)
GLUCOSE BLD STRIP.AUTO-MCNC: 161 MG/DL (ref 65–117)
GLUCOSE BLD STRIP.AUTO-MCNC: 164 MG/DL (ref 65–117)
GLUCOSE BLD STRIP.AUTO-MCNC: 167 MG/DL (ref 65–117)
GLUCOSE BLD STRIP.AUTO-MCNC: 172 MG/DL (ref 65–117)
GLUCOSE BLD STRIP.AUTO-MCNC: 183 MG/DL (ref 65–117)
GLUCOSE BLD STRIP.AUTO-MCNC: 187 MG/DL (ref 65–117)
GLUCOSE BLD STRIP.AUTO-MCNC: 188 MG/DL (ref 65–117)
GLUCOSE BLD STRIP.AUTO-MCNC: 191 MG/DL (ref 65–117)
GLUCOSE BLD STRIP.AUTO-MCNC: 196 MG/DL (ref 65–117)
GLUCOSE BLD STRIP.AUTO-MCNC: 198 MG/DL (ref 65–117)
GLUCOSE BLD STRIP.AUTO-MCNC: 201 MG/DL (ref 65–117)
GLUCOSE BLD STRIP.AUTO-MCNC: 201 MG/DL (ref 65–117)
GLUCOSE BLD STRIP.AUTO-MCNC: 209 MG/DL (ref 65–117)
GLUCOSE BLD STRIP.AUTO-MCNC: 210 MG/DL (ref 65–117)
GLUCOSE BLD STRIP.AUTO-MCNC: 219 MG/DL (ref 65–117)
GLUCOSE BLD STRIP.AUTO-MCNC: 230 MG/DL (ref 65–117)
GLUCOSE BLD STRIP.AUTO-MCNC: 246 MG/DL (ref 65–117)
GLUCOSE BLD STRIP.AUTO-MCNC: 265 MG/DL (ref 65–117)
GLUCOSE BLD STRIP.AUTO-MCNC: 267 MG/DL (ref 65–117)
GLUCOSE BLD STRIP.AUTO-MCNC: 281 MG/DL (ref 65–117)
GLUCOSE BLD STRIP.AUTO-MCNC: 282 MG/DL (ref 65–117)
GLUCOSE BLD STRIP.AUTO-MCNC: 301 MG/DL (ref 65–117)
GLUCOSE BLD STRIP.AUTO-MCNC: 302 MG/DL (ref 65–117)
GLUCOSE BLD STRIP.AUTO-MCNC: 323 MG/DL (ref 65–117)
GLUCOSE BLD STRIP.AUTO-MCNC: 412 MG/DL (ref 65–117)
GLUCOSE BLD STRIP.AUTO-MCNC: 417 MG/DL (ref 65–117)
GLUCOSE BLD STRIP.AUTO-MCNC: 429 MG/DL (ref 65–117)
GLUCOSE BLD STRIP.AUTO-MCNC: 461 MG/DL (ref 65–117)
GLUCOSE BLD STRIP.AUTO-MCNC: 47 MG/DL (ref 65–117)
GLUCOSE BLD STRIP.AUTO-MCNC: 51 MG/DL (ref 65–117)
GLUCOSE BLD STRIP.AUTO-MCNC: 55 MG/DL (ref 65–117)
GLUCOSE BLD STRIP.AUTO-MCNC: 64 MG/DL (ref 65–117)
GLUCOSE BLD STRIP.AUTO-MCNC: 64 MG/DL (ref 65–117)
GLUCOSE BLD STRIP.AUTO-MCNC: 66 MG/DL (ref 65–117)
GLUCOSE BLD STRIP.AUTO-MCNC: 67 MG/DL (ref 65–117)
GLUCOSE BLD STRIP.AUTO-MCNC: 73 MG/DL (ref 65–117)
GLUCOSE BLD STRIP.AUTO-MCNC: 97 MG/DL (ref 65–117)
GLUCOSE SERPL-MCNC: 139 MG/DL (ref 65–100)
GLUCOSE SERPL-MCNC: 151 MG/DL (ref 65–100)
GLUCOSE SERPL-MCNC: 159 MG/DL (ref 65–100)
GLUCOSE SERPL-MCNC: 168 MG/DL (ref 65–100)
GLUCOSE SERPL-MCNC: 215 MG/DL (ref 65–100)
GLUCOSE SERPL-MCNC: 221 MG/DL (ref 65–100)
GLUCOSE SERPL-MCNC: 230 MG/DL (ref 65–100)
GLUCOSE SERPL-MCNC: 231 MG/DL (ref 65–100)
GLUCOSE SERPL-MCNC: 235 MG/DL (ref 65–100)
GLUCOSE SERPL-MCNC: 237 MG/DL (ref 65–100)
GLUCOSE SERPL-MCNC: 259 MG/DL (ref 65–100)
GLUCOSE SERPL-MCNC: 71 MG/DL (ref 65–100)
GLUCOSE SERPL-MCNC: 86 MG/DL (ref 65–100)
GLUCOSE UR STRIP.AUTO-MCNC: 500 MG/DL
GRAM STN SPEC: NORMAL
GRAM STN SPEC: NORMAL
HBA1C MFR BLD: 6.1 % (ref 4–5.6)
HCT VFR BLD AUTO: 27.3 % (ref 36.6–50.3)
HCT VFR BLD AUTO: 28.2 % (ref 36.6–50.3)
HCT VFR BLD AUTO: 28.6 % (ref 36.6–50.3)
HCT VFR BLD AUTO: 29.7 % (ref 36.6–50.3)
HCT VFR BLD AUTO: 30.4 % (ref 36.6–50.3)
HCT VFR BLD AUTO: 30.5 % (ref 36.6–50.3)
HCT VFR BLD AUTO: 31.4 % (ref 36.6–50.3)
HCT VFR BLD AUTO: 32.3 % (ref 36.6–50.3)
HCT VFR BLD AUTO: 32.3 % (ref 36.6–50.3)
HCT VFR BLD AUTO: 32.4 % (ref 36.6–50.3)
HCT VFR BLD AUTO: 35.9 % (ref 36.6–50.3)
HCT VFR BLD AUTO: 36 % (ref 36.6–50.3)
HCT VFR BLD AUTO: 39.8 % (ref 36.6–50.3)
HGB BLD-MCNC: 10.3 G/DL (ref 12.1–17)
HGB BLD-MCNC: 10.7 G/DL (ref 12.1–17)
HGB BLD-MCNC: 10.8 G/DL (ref 12.1–17)
HGB BLD-MCNC: 10.9 G/DL (ref 12.1–17)
HGB BLD-MCNC: 11.1 G/DL (ref 12.1–17)
HGB BLD-MCNC: 12 G/DL (ref 12.1–17)
HGB BLD-MCNC: 12.1 G/DL (ref 12.1–17)
HGB BLD-MCNC: 12.7 G/DL (ref 12.1–17)
HGB BLD-MCNC: 9.1 G/DL (ref 12.1–17)
HGB BLD-MCNC: 9.5 G/DL (ref 12.1–17)
HGB BLD-MCNC: 9.7 G/DL (ref 12.1–17)
HGB UR QL STRIP: ABNORMAL
HYALINE CASTS URNS QL MICRO: ABNORMAL /LPF (ref 0–5)
IMM GRANULOCYTES # BLD AUTO: 0 K/UL (ref 0–0.04)
IMM GRANULOCYTES # BLD AUTO: 0.1 K/UL (ref 0–0.04)
IMM GRANULOCYTES NFR BLD AUTO: 0 % (ref 0–0.5)
IMM GRANULOCYTES NFR BLD AUTO: 1 % (ref 0–0.5)
INR PPP: 1.1 (ref 0.9–1.1)
IRON SATN MFR SERPL: 14 % (ref 20–50)
IRON SERPL-MCNC: 25 UG/DL (ref 35–150)
IRON SERPL-MCNC: 29 UG/DL (ref 35–150)
KETONES UR QL STRIP.AUTO: ABNORMAL MG/DL
LEUKOCYTE ESTERASE UR QL STRIP.AUTO: NEGATIVE
LYMPHOCYTES # BLD: 0.2 K/UL (ref 0.8–3.5)
LYMPHOCYTES # BLD: 0.3 K/UL (ref 0.8–3.5)
LYMPHOCYTES # BLD: 0.3 K/UL (ref 0.8–3.5)
LYMPHOCYTES # BLD: 0.4 K/UL (ref 0.8–3.5)
LYMPHOCYTES # BLD: 0.5 K/UL (ref 0.8–3.5)
LYMPHOCYTES # BLD: 0.8 K/UL (ref 0.8–3.5)
LYMPHOCYTES NFR BLD: 15 % (ref 12–49)
LYMPHOCYTES NFR BLD: 2 % (ref 12–49)
LYMPHOCYTES NFR BLD: 3 % (ref 12–49)
LYMPHOCYTES NFR BLD: 3 % (ref 12–49)
LYMPHOCYTES NFR BLD: 4 % (ref 12–49)
LYMPHOCYTES NFR BLD: 4 % (ref 12–49)
LYMPHOCYTES NFR BLD: 5 % (ref 12–49)
LYMPHOCYTES NFR BLD: 5 % (ref 12–49)
MAGNESIUM SERPL-MCNC: 1.8 MG/DL (ref 1.6–2.4)
MAGNESIUM SERPL-MCNC: 2.1 MG/DL (ref 1.6–2.4)
MCH RBC QN AUTO: 26.8 PG (ref 26–34)
MCH RBC QN AUTO: 27.2 PG (ref 26–34)
MCH RBC QN AUTO: 27.3 PG (ref 26–34)
MCH RBC QN AUTO: 27.3 PG (ref 26–34)
MCH RBC QN AUTO: 27.4 PG (ref 26–34)
MCH RBC QN AUTO: 27.5 PG (ref 26–34)
MCH RBC QN AUTO: 27.6 PG (ref 26–34)
MCH RBC QN AUTO: 27.6 PG (ref 26–34)
MCH RBC QN AUTO: 27.8 PG (ref 26–34)
MCH RBC QN AUTO: 27.8 PG (ref 26–34)
MCH RBC QN AUTO: 27.9 PG (ref 26–34)
MCHC RBC AUTO-ENTMCNC: 31.9 G/DL (ref 30–36.5)
MCHC RBC AUTO-ENTMCNC: 33.2 G/DL (ref 30–36.5)
MCHC RBC AUTO-ENTMCNC: 33.3 G/DL (ref 30–36.5)
MCHC RBC AUTO-ENTMCNC: 33.4 G/DL (ref 30–36.5)
MCHC RBC AUTO-ENTMCNC: 33.4 G/DL (ref 30–36.5)
MCHC RBC AUTO-ENTMCNC: 33.6 G/DL (ref 30–36.5)
MCHC RBC AUTO-ENTMCNC: 33.7 G/DL (ref 30–36.5)
MCHC RBC AUTO-ENTMCNC: 33.8 G/DL (ref 30–36.5)
MCHC RBC AUTO-ENTMCNC: 33.9 G/DL (ref 30–36.5)
MCHC RBC AUTO-ENTMCNC: 34.1 G/DL (ref 30–36.5)
MCHC RBC AUTO-ENTMCNC: 34.3 G/DL (ref 30–36.5)
MCHC RBC AUTO-ENTMCNC: 34.4 G/DL (ref 30–36.5)
MCHC RBC AUTO-ENTMCNC: 34.7 G/DL (ref 30–36.5)
MCV RBC AUTO: 78.6 FL (ref 80–99)
MCV RBC AUTO: 79.8 FL (ref 80–99)
MCV RBC AUTO: 80.3 FL (ref 80–99)
MCV RBC AUTO: 80.3 FL (ref 80–99)
MCV RBC AUTO: 81 FL (ref 80–99)
MCV RBC AUTO: 81.3 FL (ref 80–99)
MCV RBC AUTO: 81.6 FL (ref 80–99)
MCV RBC AUTO: 82 FL (ref 80–99)
MCV RBC AUTO: 82.7 FL (ref 80–99)
MCV RBC AUTO: 82.7 FL (ref 80–99)
MCV RBC AUTO: 83.1 FL (ref 80–99)
MCV RBC AUTO: 83.6 FL (ref 80–99)
MCV RBC AUTO: 84 FL (ref 80–99)
MONOCYTES # BLD: 0.5 K/UL (ref 0–1)
MONOCYTES # BLD: 0.6 K/UL (ref 0–1)
MONOCYTES # BLD: 0.7 K/UL (ref 0–1)
MONOCYTES # BLD: 0.8 K/UL (ref 0–1)
MONOCYTES # BLD: 0.8 K/UL (ref 0–1)
MONOCYTES # BLD: 0.9 K/UL (ref 0–1)
MONOCYTES # BLD: 0.9 K/UL (ref 0–1)
MONOCYTES # BLD: 1 K/UL (ref 0–1)
MONOCYTES NFR BLD: 11 % (ref 5–13)
MONOCYTES NFR BLD: 12 % (ref 5–13)
MONOCYTES NFR BLD: 6 % (ref 5–13)
MONOCYTES NFR BLD: 7 % (ref 5–13)
MONOCYTES NFR BLD: 7 % (ref 5–13)
MONOCYTES NFR BLD: 8 % (ref 5–13)
MONOCYTES NFR BLD: 8 % (ref 5–13)
MONOCYTES NFR BLD: 9 % (ref 5–13)
NEUTS SEG # BLD: 13 K/UL (ref 1.8–8)
NEUTS SEG # BLD: 3.8 K/UL (ref 1.8–8)
NEUTS SEG # BLD: 6.5 K/UL (ref 1.8–8)
NEUTS SEG # BLD: 6.7 K/UL (ref 1.8–8)
NEUTS SEG # BLD: 7.4 K/UL (ref 1.8–8)
NEUTS SEG # BLD: 7.8 K/UL (ref 1.8–8)
NEUTS SEG # BLD: 8.6 K/UL (ref 1.8–8)
NEUTS SEG # BLD: 9.7 K/UL (ref 1.8–8)
NEUTS SEG NFR BLD: 68 % (ref 32–75)
NEUTS SEG NFR BLD: 84 % (ref 32–75)
NEUTS SEG NFR BLD: 86 % (ref 32–75)
NEUTS SEG NFR BLD: 88 % (ref 32–75)
NEUTS SEG NFR BLD: 89 % (ref 32–75)
NEUTS SEG NFR BLD: 89 % (ref 32–75)
NITRITE UR QL STRIP.AUTO: NEGATIVE
NRBC # BLD: 0 K/UL (ref 0–0.01)
NRBC BLD-RTO: 0 PER 100 WBC
PH UR STRIP: 5 [PH] (ref 5–8)
PHOSPHATE SERPL-MCNC: 3.5 MG/DL (ref 2.6–4.7)
PHOSPHATE SERPL-MCNC: 4.1 MG/DL (ref 2.6–4.7)
PLATELET # BLD AUTO: 201 K/UL (ref 150–400)
PLATELET # BLD AUTO: 241 K/UL (ref 150–400)
PLATELET # BLD AUTO: 246 K/UL (ref 150–400)
PLATELET # BLD AUTO: 280 K/UL (ref 150–400)
PLATELET # BLD AUTO: 296 K/UL (ref 150–400)
PLATELET # BLD AUTO: 300 K/UL (ref 150–400)
PLATELET # BLD AUTO: 302 K/UL (ref 150–400)
PLATELET # BLD AUTO: 310 K/UL (ref 150–400)
PLATELET # BLD AUTO: 335 K/UL (ref 150–400)
PLATELET # BLD AUTO: 337 K/UL (ref 150–400)
PLATELET # BLD AUTO: 344 K/UL (ref 150–400)
PLATELET # BLD AUTO: 346 K/UL (ref 150–400)
PLATELET # BLD AUTO: 347 K/UL (ref 150–400)
PLATELET COMMENTS,PCOM: ABNORMAL
PLATELET COMMENTS,PCOM: ABNORMAL
PMV BLD AUTO: 10.3 FL (ref 8.9–12.9)
PMV BLD AUTO: 10.4 FL (ref 8.9–12.9)
PMV BLD AUTO: 10.9 FL (ref 8.9–12.9)
PMV BLD AUTO: 10.9 FL (ref 8.9–12.9)
PMV BLD AUTO: 11 FL (ref 8.9–12.9)
PMV BLD AUTO: 11.1 FL (ref 8.9–12.9)
PMV BLD AUTO: 11.2 FL (ref 8.9–12.9)
PMV BLD AUTO: 11.4 FL (ref 8.9–12.9)
PMV BLD AUTO: 11.8 FL (ref 8.9–12.9)
PMV BLD AUTO: 12.1 FL (ref 8.9–12.9)
PMV BLD AUTO: 12.9 FL (ref 8.9–12.9)
POTASSIUM SERPL-SCNC: 4 MMOL/L (ref 3.5–5.1)
POTASSIUM SERPL-SCNC: 4 MMOL/L (ref 3.5–5.1)
POTASSIUM SERPL-SCNC: 4.1 MMOL/L (ref 3.5–5.1)
POTASSIUM SERPL-SCNC: 4.1 MMOL/L (ref 3.5–5.1)
POTASSIUM SERPL-SCNC: 4.3 MMOL/L (ref 3.5–5.1)
POTASSIUM SERPL-SCNC: 4.3 MMOL/L (ref 3.5–5.1)
POTASSIUM SERPL-SCNC: 4.6 MMOL/L (ref 3.5–5.1)
POTASSIUM SERPL-SCNC: 4.8 MMOL/L (ref 3.5–5.1)
POTASSIUM SERPL-SCNC: 4.9 MMOL/L (ref 3.5–5.1)
POTASSIUM SERPL-SCNC: 5 MMOL/L (ref 3.5–5.1)
POTASSIUM SERPL-SCNC: 5 MMOL/L (ref 3.5–5.1)
PROT SERPL-MCNC: 6.6 G/DL (ref 6.4–8.2)
PROT SERPL-MCNC: 6.8 G/DL (ref 6.4–8.2)
PROT SERPL-MCNC: 7 G/DL (ref 6.4–8.2)
PROT SERPL-MCNC: 7.1 G/DL (ref 6.4–8.2)
PROT SERPL-MCNC: 7.5 G/DL (ref 6.4–8.2)
PROT SERPL-MCNC: 7.5 G/DL (ref 6.4–8.2)
PROT UR STRIP-MCNC: 100 MG/DL
PROTHROMBIN TIME: 11.1 SEC (ref 9–11.1)
Q-T INTERVAL, ECG07: 360 MS
Q-T INTERVAL, ECG07: 368 MS
Q-T INTERVAL, ECG07: 380 MS
QRS DURATION, ECG06: 68 MS
QRS DURATION, ECG06: 76 MS
QRS DURATION, ECG06: 78 MS
QTC CALCULATION (BEZET), ECG08: 424 MS
QTC CALCULATION (BEZET), ECG08: 429 MS
QTC CALCULATION (BEZET), ECG08: 435 MS
RBC # BLD AUTO: 3.3 M/UL (ref 4.1–5.7)
RBC # BLD AUTO: 3.42 M/UL (ref 4.1–5.7)
RBC # BLD AUTO: 3.51 M/UL (ref 4.1–5.7)
RBC # BLD AUTO: 3.69 M/UL (ref 4.1–5.7)
RBC # BLD AUTO: 3.74 M/UL (ref 4.1–5.7)
RBC # BLD AUTO: 3.78 M/UL (ref 4.1–5.7)
RBC # BLD AUTO: 3.91 M/UL (ref 4.1–5.7)
RBC # BLD AUTO: 3.94 M/UL (ref 4.1–5.7)
RBC # BLD AUTO: 3.99 M/UL (ref 4.1–5.7)
RBC # BLD AUTO: 4.06 M/UL (ref 4.1–5.7)
RBC # BLD AUTO: 4.32 M/UL (ref 4.1–5.7)
RBC # BLD AUTO: 4.41 M/UL (ref 4.1–5.7)
RBC # BLD AUTO: 4.74 M/UL (ref 4.1–5.7)
RBC #/AREA URNS HPF: ABNORMAL /HPF (ref 0–5)
RBC MORPH BLD: ABNORMAL
SAMPLES BEING HELD,HOLD: NORMAL
SERVICE CMNT-IMP: ABNORMAL
SERVICE CMNT-IMP: NORMAL
SODIUM SERPL-SCNC: 126 MMOL/L (ref 136–145)
SODIUM SERPL-SCNC: 127 MMOL/L (ref 136–145)
SODIUM SERPL-SCNC: 127 MMOL/L (ref 136–145)
SODIUM SERPL-SCNC: 128 MMOL/L (ref 136–145)
SODIUM SERPL-SCNC: 129 MMOL/L (ref 136–145)
SODIUM SERPL-SCNC: 131 MMOL/L (ref 136–145)
SODIUM SERPL-SCNC: 132 MMOL/L (ref 136–145)
SODIUM SERPL-SCNC: 132 MMOL/L (ref 136–145)
SODIUM SERPL-SCNC: 135 MMOL/L (ref 136–145)
SOURCE, COVRS: NORMAL
SOURCE, COVRS: NORMAL
SP GR UR REFRACTOMETRY: 1.01 (ref 1–1.03)
THERAPEUTIC RANGE,PTTT: ABNORMAL SECS (ref 58–77)
TIBC SERPL-MCNC: 212 UG/DL (ref 250–450)
TROPONIN-HIGH SENSITIVITY: 21 NG/L (ref 0–76)
TROPONIN-HIGH SENSITIVITY: 21 NG/L (ref 0–76)
TROPONIN-HIGH SENSITIVITY: 22 NG/L (ref 0–76)
TROPONIN-HIGH SENSITIVITY: 23 NG/L (ref 0–76)
TROPONIN-HIGH SENSITIVITY: 24 NG/L (ref 0–76)
TROPONIN-HIGH SENSITIVITY: 24 NG/L (ref 0–76)
TROPONIN-HIGH SENSITIVITY: 25 NG/L (ref 0–76)
TROPONIN-HIGH SENSITIVITY: 25 NG/L (ref 0–76)
TSH SERPL DL<=0.05 MIU/L-ACNC: 2.42 UIU/ML (ref 0.36–3.74)
TSH SERPL DL<=0.05 MIU/L-ACNC: 3.91 UIU/ML (ref 0.36–3.74)
UROBILINOGEN UR QL STRIP.AUTO: 0.2 EU/DL (ref 0.2–1)
VENTRICULAR RATE, ECG03: 75 BPM
VENTRICULAR RATE, ECG03: 82 BPM
VENTRICULAR RATE, ECG03: 88 BPM
VIT B12 SERPL-MCNC: 518 PG/ML (ref 193–986)
WBC # BLD AUTO: 10.9 K/UL (ref 4.1–11.1)
WBC # BLD AUTO: 11.2 K/UL (ref 4.1–11.1)
WBC # BLD AUTO: 11.7 K/UL (ref 4.1–11.1)
WBC # BLD AUTO: 14.6 K/UL (ref 4.1–11.1)
WBC # BLD AUTO: 14.6 K/UL (ref 4.1–11.1)
WBC # BLD AUTO: 5.6 K/UL (ref 4.1–11.1)
WBC # BLD AUTO: 7.5 K/UL (ref 4.1–11.1)
WBC # BLD AUTO: 8 K/UL (ref 4.1–11.1)
WBC # BLD AUTO: 8 K/UL (ref 4.1–11.1)
WBC # BLD AUTO: 8.4 K/UL (ref 4.1–11.1)
WBC # BLD AUTO: 9.1 K/UL (ref 4.1–11.1)
WBC # BLD AUTO: 9.6 K/UL (ref 4.1–11.1)
WBC # BLD AUTO: 9.8 K/UL (ref 4.1–11.1)
WBC URNS QL MICRO: ABNORMAL /HPF (ref 0–4)

## 2022-01-01 PROCEDURE — 77030018870 HC TY PARCNT BD -B

## 2022-01-01 PROCEDURE — 74011636637 HC RX REV CODE- 636/637: Performed by: FAMILY MEDICINE

## 2022-01-01 PROCEDURE — 87040 BLOOD CULTURE FOR BACTERIA: CPT

## 2022-01-01 PROCEDURE — G0299 HHS/HOSPICE OF RN EA 15 MIN: HCPCS

## 2022-01-01 PROCEDURE — 74011000250 HC RX REV CODE- 250: Performed by: INTERNAL MEDICINE

## 2022-01-01 PROCEDURE — 0651 HSPC ROUTINE HOME CARE

## 2022-01-01 PROCEDURE — 82962 GLUCOSE BLOOD TEST: CPT

## 2022-01-01 PROCEDURE — 93005 ELECTROCARDIOGRAM TRACING: CPT

## 2022-01-01 PROCEDURE — G0156 HHCP-SVS OF AIDE,EA 15 MIN: HCPCS

## 2022-01-01 PROCEDURE — 77030002996 HC SUT SLK J&J -A

## 2022-01-01 PROCEDURE — 74011250636 HC RX REV CODE- 250/636: Performed by: HOSPITALIST

## 2022-01-01 PROCEDURE — 65660000000 HC RM CCU STEPDOWN

## 2022-01-01 PROCEDURE — 99285 EMERGENCY DEPT VISIT HI MDM: CPT

## 2022-01-01 PROCEDURE — HOSPICE MEDICATION HC HH HOSPICE MEDICATION

## 2022-01-01 PROCEDURE — G0155 HHCP-SVS OF CSW,EA 15 MIN: HCPCS

## 2022-01-01 PROCEDURE — 81001 URINALYSIS AUTO W/SCOPE: CPT

## 2022-01-01 PROCEDURE — 85025 COMPLETE CBC W/AUTO DIFF WBC: CPT

## 2022-01-01 PROCEDURE — 80053 COMPREHEN METABOLIC PANEL: CPT

## 2022-01-01 PROCEDURE — 74011000250 HC RX REV CODE- 250: Performed by: EMERGENCY MEDICINE

## 2022-01-01 PROCEDURE — G9717 DOC PT DX DEP/BP F/U NT REQ: HCPCS | Performed by: FAMILY MEDICINE

## 2022-01-01 PROCEDURE — G8536 NO DOC ELDER MAL SCRN: HCPCS | Performed by: FAMILY MEDICINE

## 2022-01-01 PROCEDURE — 74011636637 HC RX REV CODE- 636/637: Performed by: INTERNAL MEDICINE

## 2022-01-01 PROCEDURE — 84100 ASSAY OF PHOSPHORUS: CPT

## 2022-01-01 PROCEDURE — 85027 COMPLETE CBC AUTOMATED: CPT

## 2022-01-01 PROCEDURE — 77010033678 HC OXYGEN DAILY

## 2022-01-01 PROCEDURE — 85379 FIBRIN DEGRADATION QUANT: CPT

## 2022-01-01 PROCEDURE — 74011000258 HC RX REV CODE- 258: Performed by: INTERNAL MEDICINE

## 2022-01-01 PROCEDURE — 74011250636 HC RX REV CODE- 250/636: Performed by: INTERNAL MEDICINE

## 2022-01-01 PROCEDURE — 80048 BASIC METABOLIC PNL TOTAL CA: CPT

## 2022-01-01 PROCEDURE — 94760 N-INVAS EAR/PLS OXIMETRY 1: CPT

## 2022-01-01 PROCEDURE — 71045 X-RAY EXAM CHEST 1 VIEW: CPT

## 2022-01-01 PROCEDURE — G8420 CALC BMI NORM PARAMETERS: HCPCS | Performed by: FAMILY MEDICINE

## 2022-01-01 PROCEDURE — 73630 X-RAY EXAM OF FOOT: CPT

## 2022-01-01 PROCEDURE — 71046 X-RAY EXAM CHEST 2 VIEWS: CPT

## 2022-01-01 PROCEDURE — 83036 HEMOGLOBIN GLYCOSYLATED A1C: CPT

## 2022-01-01 PROCEDURE — 74011250637 HC RX REV CODE- 250/637: Performed by: INTERNAL MEDICINE

## 2022-01-01 PROCEDURE — G0439 PPPS, SUBSEQ VISIT: HCPCS | Performed by: FAMILY MEDICINE

## 2022-01-01 PROCEDURE — 74011250637 HC RX REV CODE- 250/637: Performed by: EMERGENCY MEDICINE

## 2022-01-01 PROCEDURE — 83880 ASSAY OF NATRIURETIC PEPTIDE: CPT

## 2022-01-01 PROCEDURE — 36415 COLL VENOUS BLD VENIPUNCTURE: CPT

## 2022-01-01 PROCEDURE — 74011000250 HC RX REV CODE- 250

## 2022-01-01 PROCEDURE — 87635 SARS-COV-2 COVID-19 AMP PRB: CPT

## 2022-01-01 PROCEDURE — 71250 CT THORAX DX C-: CPT

## 2022-01-01 PROCEDURE — 74011250636 HC RX REV CODE- 250/636: Performed by: EMERGENCY MEDICINE

## 2022-01-01 PROCEDURE — C1729 CATH, DRAINAGE: HCPCS

## 2022-01-01 PROCEDURE — 84484 ASSAY OF TROPONIN QUANT: CPT

## 2022-01-01 PROCEDURE — 2709999900 HC NON-CHARGEABLE SUPPLY

## 2022-01-01 PROCEDURE — 3331090004 HSPC SERVICE INTENSITY ADD-ON

## 2022-01-01 PROCEDURE — 96375 TX/PRO/DX INJ NEW DRUG ADDON: CPT

## 2022-01-01 PROCEDURE — 0W9B3ZZ DRAINAGE OF LEFT PLEURAL CAVITY, PERCUTANEOUS APPROACH: ICD-10-PCS | Performed by: RADIOLOGY

## 2022-01-01 PROCEDURE — 77030031139 HC SUT VCRL2 J&J -A

## 2022-01-01 PROCEDURE — 77030032034 HC SYS EVAC ASEPT DISP BBMI -B

## 2022-01-01 PROCEDURE — 99350 HOME/RES VST EST HIGH MDM 60: CPT | Performed by: FAMILY MEDICINE

## 2022-01-01 PROCEDURE — 77030040212 HC SYS EVAC ASEPT DISP BBMI -A

## 2022-01-01 PROCEDURE — 83735 ASSAY OF MAGNESIUM: CPT

## 2022-01-01 PROCEDURE — 99496 TRANSJ CARE MGMT HIGH F2F 7D: CPT | Performed by: NURSE PRACTITIONER

## 2022-01-01 PROCEDURE — 0W993ZZ DRAINAGE OF RIGHT PLEURAL CAVITY, PERCUTANEOUS APPROACH: ICD-10-PCS | Performed by: RADIOLOGY

## 2022-01-01 PROCEDURE — 74011250637 HC RX REV CODE- 250/637: Performed by: FAMILY MEDICINE

## 2022-01-01 PROCEDURE — 99284 EMERGENCY DEPT VISIT MOD MDM: CPT

## 2022-01-01 PROCEDURE — G8756 NO BP MEASURE DOC: HCPCS | Performed by: FAMILY MEDICINE

## 2022-01-01 PROCEDURE — 74176 CT ABD & PELVIS W/O CONTRAST: CPT

## 2022-01-01 PROCEDURE — 32555 ASPIRATE PLEURA W/ IMAGING: CPT

## 2022-01-01 PROCEDURE — 85610 PROTHROMBIN TIME: CPT

## 2022-01-01 PROCEDURE — 74011000250 HC RX REV CODE- 250: Performed by: RADIOLOGY

## 2022-01-01 PROCEDURE — 1101F PT FALLS ASSESS-DOCD LE1/YR: CPT | Performed by: FAMILY MEDICINE

## 2022-01-01 PROCEDURE — 74011250637 HC RX REV CODE- 250/637: Performed by: HOSPITALIST

## 2022-01-01 PROCEDURE — 96374 THER/PROPH/DIAG INJ IV PUSH: CPT

## 2022-01-01 PROCEDURE — 93306 TTE W/DOPPLER COMPLETE: CPT

## 2022-01-01 PROCEDURE — G8427 DOCREV CUR MEDS BY ELIG CLIN: HCPCS | Performed by: FAMILY MEDICINE

## 2022-01-01 PROCEDURE — 87015 SPECIMEN INFECT AGNT CONCNTJ: CPT

## 2022-01-01 PROCEDURE — 82728 ASSAY OF FERRITIN: CPT

## 2022-01-01 PROCEDURE — 32550 INSERT PLEURAL CATH: CPT

## 2022-01-01 PROCEDURE — 83540 ASSAY OF IRON: CPT

## 2022-01-01 PROCEDURE — 77030010507 HC ADH SKN DERMBND J&J -B

## 2022-01-01 PROCEDURE — 84443 ASSAY THYROID STIM HORMONE: CPT

## 2022-01-01 PROCEDURE — 0W9B30Z DRAINAGE OF LEFT PLEURAL CAVITY WITH DRAINAGE DEVICE, PERCUTANEOUS APPROACH: ICD-10-PCS | Performed by: RADIOLOGY

## 2022-01-01 PROCEDURE — 3336500001 HSPC ELECTION

## 2022-01-01 PROCEDURE — 93971 EXTREMITY STUDY: CPT

## 2022-01-01 PROCEDURE — 82746 ASSAY OF FOLIC ACID SERUM: CPT

## 2022-01-01 PROCEDURE — 82607 VITAMIN B-12: CPT

## 2022-01-01 PROCEDURE — 73590 X-RAY EXAM OF LOWER LEG: CPT

## 2022-01-01 PROCEDURE — 87205 SMEAR GRAM STAIN: CPT

## 2022-01-01 PROCEDURE — 99223 1ST HOSP IP/OBS HIGH 75: CPT | Performed by: FAMILY MEDICINE

## 2022-01-01 PROCEDURE — G0300 HHS/HOSPICE OF LPN EA 15 MIN: HCPCS

## 2022-01-01 PROCEDURE — 88305 TISSUE EXAM BY PATHOLOGIST: CPT

## 2022-01-01 PROCEDURE — 88112 CYTOPATH CELL ENHANCE TECH: CPT

## 2022-01-01 RX ORDER — HYDROCODONE BITARTRATE AND ACETAMINOPHEN 5; 325 MG/1; MG/1
1 TABLET ORAL
Status: DISCONTINUED | OUTPATIENT
Start: 2022-01-01 | End: 2022-01-01 | Stop reason: HOSPADM

## 2022-01-01 RX ORDER — ACETAMINOPHEN 650 MG/1
650 SUPPOSITORY RECTAL
Status: DISCONTINUED | OUTPATIENT
Start: 2022-01-01 | End: 2022-01-01 | Stop reason: HOSPADM

## 2022-01-01 RX ORDER — DOCUSATE SODIUM 100 MG/1
100 CAPSULE, LIQUID FILLED ORAL
Status: DISCONTINUED | OUTPATIENT
Start: 2022-01-01 | End: 2022-01-01 | Stop reason: HOSPADM

## 2022-01-01 RX ORDER — AMLODIPINE BESYLATE 5 MG/1
10 TABLET ORAL DAILY
Status: DISCONTINUED | OUTPATIENT
Start: 2022-01-01 | End: 2022-01-01 | Stop reason: HOSPADM

## 2022-01-01 RX ORDER — ONDANSETRON 2 MG/ML
4 INJECTION INTRAMUSCULAR; INTRAVENOUS
Status: DISCONTINUED | OUTPATIENT
Start: 2022-01-01 | End: 2022-01-01 | Stop reason: HOSPADM

## 2022-01-01 RX ORDER — QUETIAPINE FUMARATE 25 MG/1
25 TABLET, FILM COATED ORAL
COMMUNITY
End: 2022-01-01

## 2022-01-01 RX ORDER — ONDANSETRON 2 MG/ML
4 INJECTION INTRAMUSCULAR; INTRAVENOUS
Status: DISCONTINUED | OUTPATIENT
Start: 2022-01-01 | End: 2022-01-01

## 2022-01-01 RX ORDER — MAGNESIUM SULFATE 100 %
4 CRYSTALS MISCELLANEOUS AS NEEDED
Status: DISCONTINUED | OUTPATIENT
Start: 2022-01-01 | End: 2022-01-01 | Stop reason: HOSPADM

## 2022-01-01 RX ORDER — ONDANSETRON 4 MG/1
4 TABLET, ORALLY DISINTEGRATING ORAL
Status: DISCONTINUED | OUTPATIENT
Start: 2022-01-01 | End: 2022-01-01 | Stop reason: HOSPADM

## 2022-01-01 RX ORDER — TAMSULOSIN HYDROCHLORIDE 0.4 MG/1
0.4 CAPSULE ORAL
Status: DISCONTINUED | OUTPATIENT
Start: 2022-01-01 | End: 2022-01-01 | Stop reason: HOSPADM

## 2022-01-01 RX ORDER — TAMSULOSIN HYDROCHLORIDE 0.4 MG/1
0.4 CAPSULE ORAL
COMMUNITY
End: 2022-01-01 | Stop reason: SDUPTHER

## 2022-01-01 RX ORDER — LACTULOSE 10 G/15ML
20 SOLUTION ORAL; RECTAL
Status: DISCONTINUED | OUTPATIENT
Start: 2022-01-01 | End: 2022-01-01 | Stop reason: HOSPADM

## 2022-01-01 RX ORDER — LIDOCAINE HYDROCHLORIDE 10 MG/ML
10 INJECTION, SOLUTION EPIDURAL; INFILTRATION; INTRACAUDAL; PERINEURAL
Status: COMPLETED | OUTPATIENT
Start: 2022-01-01 | End: 2022-01-01

## 2022-01-01 RX ORDER — QUETIAPINE FUMARATE 25 MG/1
25 TABLET, FILM COATED ORAL
Status: DISCONTINUED | OUTPATIENT
Start: 2022-01-01 | End: 2022-01-01 | Stop reason: HOSPADM

## 2022-01-01 RX ORDER — SODIUM CHLORIDE 0.9 % (FLUSH) 0.9 %
5-40 SYRINGE (ML) INJECTION AS NEEDED
Status: DISCONTINUED | OUTPATIENT
Start: 2022-01-01 | End: 2022-01-01 | Stop reason: HOSPADM

## 2022-01-01 RX ORDER — LEVOTHYROXINE SODIUM 50 UG/1
50 TABLET ORAL
Status: DISCONTINUED | OUTPATIENT
Start: 2022-01-01 | End: 2022-01-01 | Stop reason: HOSPADM

## 2022-01-01 RX ORDER — ACETAMINOPHEN 325 MG/1
650 TABLET ORAL
Status: DISCONTINUED | OUTPATIENT
Start: 2022-01-01 | End: 2022-01-01 | Stop reason: HOSPADM

## 2022-01-01 RX ORDER — MIDODRINE HYDROCHLORIDE 5 MG/1
5 TABLET ORAL
Status: DISCONTINUED | OUTPATIENT
Start: 2022-01-01 | End: 2022-01-01 | Stop reason: HOSPADM

## 2022-01-01 RX ORDER — HEPARIN SODIUM 10000 [USP'U]/100ML
12-25 INJECTION, SOLUTION INTRAVENOUS
Status: CANCELLED | OUTPATIENT
Start: 2022-01-01

## 2022-01-01 RX ORDER — OMEPRAZOLE 20 MG/1
20 CAPSULE, DELAYED RELEASE ORAL DAILY
COMMUNITY
End: 2022-01-01

## 2022-01-01 RX ORDER — FUROSEMIDE 10 MG/ML
20 INJECTION INTRAMUSCULAR; INTRAVENOUS 2 TIMES DAILY
Status: DISPENSED | OUTPATIENT
Start: 2022-01-01 | End: 2022-01-01

## 2022-01-01 RX ORDER — DOXYCYCLINE HYCLATE 100 MG
100 TABLET ORAL EVERY 12 HOURS
Status: DISCONTINUED | OUTPATIENT
Start: 2022-01-01 | End: 2022-01-01 | Stop reason: HOSPADM

## 2022-01-01 RX ORDER — ASCORBIC ACID 500 MG
500 TABLET ORAL DAILY
Status: DISCONTINUED | OUTPATIENT
Start: 2022-01-01 | End: 2022-01-01 | Stop reason: HOSPADM

## 2022-01-01 RX ORDER — BALSAM PERU/CASTOR OIL
OINTMENT (GRAM) TOPICAL EVERY 8 HOURS
Status: DISCONTINUED | OUTPATIENT
Start: 2022-01-01 | End: 2022-01-01 | Stop reason: HOSPADM

## 2022-01-01 RX ORDER — SODIUM CHLORIDE 0.9 % (FLUSH) 0.9 %
5-40 SYRINGE (ML) INJECTION EVERY 8 HOURS
Status: DISCONTINUED | OUTPATIENT
Start: 2022-01-01 | End: 2022-01-01 | Stop reason: HOSPADM

## 2022-01-01 RX ORDER — IPRATROPIUM BROMIDE AND ALBUTEROL SULFATE 2.5; .5 MG/3ML; MG/3ML
3 SOLUTION RESPIRATORY (INHALATION)
Status: DISCONTINUED | OUTPATIENT
Start: 2022-01-01 | End: 2022-01-01 | Stop reason: HOSPADM

## 2022-01-01 RX ORDER — DULOXETIN HYDROCHLORIDE 60 MG/1
60 CAPSULE, DELAYED RELEASE ORAL DAILY
Status: DISCONTINUED | OUTPATIENT
Start: 2022-01-01 | End: 2022-01-01 | Stop reason: HOSPADM

## 2022-01-01 RX ORDER — MULTIVIT WITH MINERALS/HERBS
1 TABLET ORAL DAILY
Status: DISCONTINUED | OUTPATIENT
Start: 2022-01-01 | End: 2022-01-01 | Stop reason: HOSPADM

## 2022-01-01 RX ORDER — INSULIN GLARGINE 100 [IU]/ML
10 INJECTION, SOLUTION SUBCUTANEOUS DAILY
Qty: 3 PEN | Refills: 5 | Status: SHIPPED | OUTPATIENT
Start: 2022-01-01

## 2022-01-01 RX ORDER — HEPARIN SODIUM 1000 [USP'U]/ML
60 INJECTION, SOLUTION INTRAVENOUS; SUBCUTANEOUS ONCE
Status: CANCELLED | OUTPATIENT
Start: 2022-01-01 | End: 2022-01-01

## 2022-01-01 RX ORDER — LACTULOSE 10 G/15ML
20 SOLUTION ORAL; RECTAL
Qty: 480 ML | Refills: 1 | Status: SHIPPED | OUTPATIENT
Start: 2022-01-01

## 2022-01-01 RX ORDER — INSULIN GLARGINE 100 [IU]/ML
8 INJECTION, SOLUTION SUBCUTANEOUS
Status: DISCONTINUED | OUTPATIENT
Start: 2022-01-01 | End: 2022-01-01 | Stop reason: HOSPADM

## 2022-01-01 RX ORDER — PREDNISONE 20 MG/1
40 TABLET ORAL
Status: DISCONTINUED | OUTPATIENT
Start: 2022-01-01 | End: 2022-01-01

## 2022-01-01 RX ORDER — LEVOTHYROXINE SODIUM 25 UG/1
50 TABLET ORAL
Status: DISCONTINUED | OUTPATIENT
Start: 2022-01-01 | End: 2022-01-01 | Stop reason: HOSPADM

## 2022-01-01 RX ORDER — FUROSEMIDE 20 MG/1
20 TABLET ORAL
Qty: 12 TABLET | Refills: 0 | Status: SHIPPED | OUTPATIENT
Start: 2022-01-01 | End: 2022-01-01

## 2022-01-01 RX ORDER — INSULIN GLARGINE 100 [IU]/ML
14 INJECTION, SOLUTION SUBCUTANEOUS DAILY
Status: DISCONTINUED | OUTPATIENT
Start: 2022-01-01 | End: 2022-01-01

## 2022-01-01 RX ORDER — FUROSEMIDE 10 MG/ML
20 INJECTION INTRAMUSCULAR; INTRAVENOUS
Status: COMPLETED | OUTPATIENT
Start: 2022-01-01 | End: 2022-01-01

## 2022-01-01 RX ORDER — POLYETHYLENE GLYCOL 3350 17 G/17G
17 POWDER, FOR SOLUTION ORAL DAILY PRN
Status: DISCONTINUED | OUTPATIENT
Start: 2022-01-01 | End: 2022-01-01 | Stop reason: HOSPADM

## 2022-01-01 RX ORDER — FUROSEMIDE 10 MG/ML
40 INJECTION INTRAMUSCULAR; INTRAVENOUS DAILY
Status: DISCONTINUED | OUTPATIENT
Start: 2022-01-01 | End: 2022-01-01 | Stop reason: HOSPADM

## 2022-01-01 RX ORDER — INSULIN LISPRO 100 [IU]/ML
INJECTION, SOLUTION INTRAVENOUS; SUBCUTANEOUS
Status: DISCONTINUED | OUTPATIENT
Start: 2022-01-01 | End: 2022-01-01 | Stop reason: HOSPADM

## 2022-01-01 RX ORDER — LIDOCAINE HYDROCHLORIDE 20 MG/ML
20 INJECTION, SOLUTION INFILTRATION; PERINEURAL
Status: COMPLETED | OUTPATIENT
Start: 2022-01-01 | End: 2022-01-01

## 2022-01-01 RX ORDER — DOCUSATE SODIUM 100 MG/1
100 CAPSULE, LIQUID FILLED ORAL
COMMUNITY
End: 2022-01-01

## 2022-01-01 RX ORDER — ASCORBIC ACID 500 MG
500 TABLET ORAL DAILY
COMMUNITY
End: 2022-01-01

## 2022-01-01 RX ORDER — GUAIFENESIN 100 MG/5ML
81 LIQUID (ML) ORAL DAILY
Status: DISCONTINUED | OUTPATIENT
Start: 2022-01-01 | End: 2022-01-01 | Stop reason: HOSPADM

## 2022-01-01 RX ORDER — DULOXETIN HYDROCHLORIDE 60 MG/1
60 CAPSULE, DELAYED RELEASE ORAL DAILY
Status: DISCONTINUED | OUTPATIENT
Start: 2022-01-01 | End: 2022-01-01

## 2022-01-01 RX ORDER — TAMSULOSIN HYDROCHLORIDE 0.4 MG/1
0.4 CAPSULE ORAL
Qty: 90 CAPSULE | Refills: 3 | Status: SHIPPED | OUTPATIENT
Start: 2022-01-01

## 2022-01-01 RX ORDER — MULTIVIT WITH MINERALS/HERBS
1 TABLET ORAL DAILY
COMMUNITY
End: 2022-01-01

## 2022-01-01 RX ORDER — DULOXETIN HYDROCHLORIDE 60 MG/1
60 CAPSULE, DELAYED RELEASE ORAL DAILY
Qty: 90 CAPSULE | Refills: 1 | Status: SHIPPED | OUTPATIENT
Start: 2022-01-01

## 2022-01-01 RX ORDER — HEPARIN SODIUM 5000 [USP'U]/ML
5000 INJECTION, SOLUTION INTRAVENOUS; SUBCUTANEOUS EVERY 8 HOURS
Status: DISCONTINUED | OUTPATIENT
Start: 2022-01-01 | End: 2022-01-01 | Stop reason: HOSPADM

## 2022-01-01 RX ORDER — FUROSEMIDE 20 MG/1
20 TABLET ORAL DAILY
Status: DISCONTINUED | OUTPATIENT
Start: 2022-01-01 | End: 2022-01-01 | Stop reason: HOSPADM

## 2022-01-01 RX ORDER — HYDROCODONE BITARTRATE AND ACETAMINOPHEN 5; 325 MG/1; MG/1
1 TABLET ORAL
Qty: 30 TABLET | Refills: 0 | Status: SHIPPED | OUTPATIENT
Start: 2022-01-01 | End: 2022-01-01 | Stop reason: SDUPTHER

## 2022-01-01 RX ORDER — INSULIN GLARGINE 100 [IU]/ML
8 INJECTION, SOLUTION SUBCUTANEOUS DAILY
Status: DISCONTINUED | OUTPATIENT
Start: 2022-01-01 | End: 2022-01-01

## 2022-01-01 RX ORDER — DOXYCYCLINE HYCLATE 100 MG
100 TABLET ORAL EVERY 12 HOURS
Qty: 12 TABLET | Refills: 0 | Status: SHIPPED | OUTPATIENT
Start: 2022-01-01 | End: 2022-01-01

## 2022-01-01 RX ORDER — BLOOD SUGAR DIAGNOSTIC
1 STRIP MISCELLANEOUS 3 TIMES DAILY
Qty: 100 STRIP | Refills: 11 | Status: SHIPPED | OUTPATIENT
Start: 2022-01-01

## 2022-01-01 RX ORDER — HYDROCODONE BITARTRATE AND ACETAMINOPHEN 5; 325 MG/1; MG/1
1 TABLET ORAL
Qty: 30 TABLET | Refills: 0 | Status: ON HOLD | OUTPATIENT
Start: 2022-01-01 | End: 2022-01-01 | Stop reason: ALTCHOICE

## 2022-01-01 RX ORDER — INSULIN LISPRO 100 [IU]/ML
12 INJECTION, SOLUTION INTRAVENOUS; SUBCUTANEOUS ONCE
Status: COMPLETED | OUTPATIENT
Start: 2022-01-01 | End: 2022-01-01

## 2022-01-01 RX ORDER — LANOLIN ALCOHOL/MO/W.PET/CERES
325 CREAM (GRAM) TOPICAL
COMMUNITY
End: 2022-01-01

## 2022-01-01 RX ORDER — PREDNISONE 20 MG/1
40 TABLET ORAL
Status: DISPENSED | OUTPATIENT
Start: 2022-01-01 | End: 2022-01-01

## 2022-01-01 RX ORDER — CYANOCOBALAMIN (VITAMIN B-12) 500 MCG
400 TABLET ORAL DAILY
COMMUNITY
End: 2022-01-01

## 2022-01-01 RX ORDER — QUETIAPINE FUMARATE 25 MG/1
75 TABLET, FILM COATED ORAL
Status: DISCONTINUED | OUTPATIENT
Start: 2022-01-01 | End: 2022-01-01

## 2022-01-01 RX ORDER — TAMSULOSIN HYDROCHLORIDE 0.4 MG/1
0.4 CAPSULE ORAL DAILY
Status: DISCONTINUED | OUTPATIENT
Start: 2022-01-01 | End: 2022-01-01 | Stop reason: HOSPADM

## 2022-01-01 RX ORDER — PREDNISONE 10 MG/1
30 TABLET ORAL DAILY
Qty: 15 TABLET | Refills: 0 | Status: SHIPPED | OUTPATIENT
Start: 2022-01-01 | End: 2022-01-01

## 2022-01-01 RX ORDER — LANOLIN ALCOHOL/MO/W.PET/CERES
325 CREAM (GRAM) TOPICAL
Status: DISCONTINUED | OUTPATIENT
Start: 2022-01-01 | End: 2022-01-01 | Stop reason: HOSPADM

## 2022-01-01 RX ORDER — DOXYCYCLINE HYCLATE 100 MG
100 TABLET ORAL EVERY 12 HOURS
Status: DISCONTINUED | OUTPATIENT
Start: 2022-01-01 | End: 2022-01-01

## 2022-01-01 RX ORDER — HEPARIN SODIUM 5000 [USP'U]/ML
5000 INJECTION, SOLUTION INTRAVENOUS; SUBCUTANEOUS EVERY 12 HOURS
Status: DISCONTINUED | OUTPATIENT
Start: 2022-01-01 | End: 2022-01-01 | Stop reason: HOSPADM

## 2022-01-01 RX ORDER — ACETAMINOPHEN 325 MG/1
650 TABLET ORAL
Status: DISCONTINUED | OUTPATIENT
Start: 2022-01-01 | End: 2022-01-01

## 2022-01-01 RX ORDER — INSULIN LISPRO 100 [IU]/ML
INJECTION, SOLUTION INTRAVENOUS; SUBCUTANEOUS
Status: DISCONTINUED | OUTPATIENT
Start: 2022-01-01 | End: 2022-01-01

## 2022-01-01 RX ORDER — CYANOCOBALAMIN (VITAMIN B-12) 500 MCG
400 TABLET ORAL DAILY
Status: DISCONTINUED | OUTPATIENT
Start: 2022-01-01 | End: 2022-01-01 | Stop reason: HOSPADM

## 2022-01-01 RX ORDER — GUAIFENESIN 100 MG/5ML
81 LIQUID (ML) ORAL 2 TIMES DAILY
Status: DISCONTINUED | OUTPATIENT
Start: 2022-01-01 | End: 2022-01-01 | Stop reason: HOSPADM

## 2022-01-01 RX ORDER — FENTANYL CITRATE 50 UG/ML
25-100 INJECTION, SOLUTION INTRAMUSCULAR; INTRAVENOUS
Status: DISCONTINUED | OUTPATIENT
Start: 2022-01-01 | End: 2022-01-01 | Stop reason: ALTCHOICE

## 2022-01-01 RX ORDER — DULOXETIN HYDROCHLORIDE 60 MG/1
60 CAPSULE, DELAYED RELEASE ORAL
Status: DISCONTINUED | OUTPATIENT
Start: 2022-01-01 | End: 2022-01-01 | Stop reason: HOSPADM

## 2022-01-01 RX ORDER — INSULIN GLARGINE 100 [IU]/ML
14 INJECTION, SOLUTION SUBCUTANEOUS DAILY
Status: DISCONTINUED | OUTPATIENT
Start: 2022-01-01 | End: 2022-01-01 | Stop reason: HOSPADM

## 2022-01-01 RX ORDER — LEVOTHYROXINE SODIUM 50 UG/1
50 TABLET ORAL
Qty: 90 TABLET | Refills: 3 | Status: SHIPPED | OUTPATIENT
Start: 2022-01-01

## 2022-01-01 RX ORDER — GUAIFENESIN 100 MG/5ML
81 LIQUID (ML) ORAL DAILY
COMMUNITY
End: 2022-01-01

## 2022-01-01 RX ORDER — LIDOCAINE HYDROCHLORIDE 10 MG/ML
INJECTION, SOLUTION EPIDURAL; INFILTRATION; INTRACAUDAL; PERINEURAL
Status: COMPLETED
Start: 2022-01-01 | End: 2022-01-01

## 2022-01-01 RX ADMIN — HEPARIN SODIUM 5000 UNITS: 5000 INJECTION INTRAVENOUS; SUBCUTANEOUS at 15:26

## 2022-01-01 RX ADMIN — CEFTRIAXONE SODIUM 1 G: 1 INJECTION, POWDER, FOR SOLUTION INTRAMUSCULAR; INTRAVENOUS at 05:41

## 2022-01-01 RX ADMIN — QUETIAPINE FUMARATE 75 MG: 25 TABLET ORAL at 21:42

## 2022-01-01 RX ADMIN — SODIUM CHLORIDE, PRESERVATIVE FREE 10 ML: 5 INJECTION INTRAVENOUS at 08:49

## 2022-01-01 RX ADMIN — PREDNISONE 40 MG: 20 TABLET ORAL at 12:51

## 2022-01-01 RX ADMIN — INSULIN GLARGINE 8 UNITS: 100 INJECTION, SOLUTION SUBCUTANEOUS at 22:30

## 2022-01-01 RX ADMIN — SODIUM CHLORIDE, PRESERVATIVE FREE 10 ML: 5 INJECTION INTRAVENOUS at 06:55

## 2022-01-01 RX ADMIN — FUROSEMIDE 20 MG: 10 INJECTION, SOLUTION INTRAVENOUS at 17:12

## 2022-01-01 RX ADMIN — Medication 30 MG: at 09:19

## 2022-01-01 RX ADMIN — SODIUM CHLORIDE, PRESERVATIVE FREE 10 ML: 5 INJECTION INTRAVENOUS at 14:26

## 2022-01-01 RX ADMIN — AMLODIPINE BESYLATE 10 MG: 5 TABLET ORAL at 08:45

## 2022-01-01 RX ADMIN — SODIUM CHLORIDE, PRESERVATIVE FREE 10 ML: 5 INJECTION INTRAVENOUS at 06:09

## 2022-01-01 RX ADMIN — DOXYCYCLINE HYCLATE 100 MG: 100 TABLET, COATED ORAL at 22:30

## 2022-01-01 RX ADMIN — DOXYCYCLINE 100 MG: 100 INJECTION, POWDER, LYOPHILIZED, FOR SOLUTION INTRAVENOUS at 09:42

## 2022-01-01 RX ADMIN — HEPARIN SODIUM 5000 UNITS: 5000 INJECTION INTRAVENOUS; SUBCUTANEOUS at 22:30

## 2022-01-01 RX ADMIN — SODIUM CHLORIDE, PRESERVATIVE FREE 10 ML: 5 INJECTION INTRAVENOUS at 05:18

## 2022-01-01 RX ADMIN — SODIUM CHLORIDE, PRESERVATIVE FREE 10 ML: 5 INJECTION INTRAVENOUS at 22:22

## 2022-01-01 RX ADMIN — PREDNISONE 30 MG: 20 TABLET ORAL at 08:55

## 2022-01-01 RX ADMIN — SODIUM CHLORIDE, PRESERVATIVE FREE 10 ML: 5 INJECTION INTRAVENOUS at 06:27

## 2022-01-01 RX ADMIN — DEXTROSE MONOHYDRATE 250 ML: 100 INJECTION, SOLUTION INTRAVENOUS at 22:21

## 2022-01-01 RX ADMIN — DOXYCYCLINE 100 MG: 100 INJECTION, POWDER, LYOPHILIZED, FOR SOLUTION INTRAVENOUS at 23:13

## 2022-01-01 RX ADMIN — Medication 5 UNITS: at 06:51

## 2022-01-01 RX ADMIN — LIDOCAINE HYDROCHLORIDE 10 ML: 10 INJECTION, SOLUTION EPIDURAL; INFILTRATION; INTRACAUDAL; PERINEURAL at 10:28

## 2022-01-01 RX ADMIN — DULOXETINE HYDROCHLORIDE 60 MG: 60 CAPSULE, DELAYED RELEASE ORAL at 08:45

## 2022-01-01 RX ADMIN — SODIUM CHLORIDE, PRESERVATIVE FREE 5 ML: 5 INJECTION INTRAVENOUS at 06:36

## 2022-01-01 RX ADMIN — DOXYCYCLINE 100 MG: 100 INJECTION, POWDER, LYOPHILIZED, FOR SOLUTION INTRAVENOUS at 22:00

## 2022-01-01 RX ADMIN — TAMSULOSIN HYDROCHLORIDE 0.4 MG: 0.4 CAPSULE ORAL at 08:55

## 2022-01-01 RX ADMIN — LIDOCAINE HYDROCHLORIDE: 10 INJECTION, SOLUTION EPIDURAL; INFILTRATION; INTRACAUDAL; PERINEURAL at 17:00

## 2022-01-01 RX ADMIN — Medication 30 MG: at 09:25

## 2022-01-01 RX ADMIN — Medication 2 UNITS: at 19:17

## 2022-01-01 RX ADMIN — HEPARIN SODIUM 5000 UNITS: 5000 INJECTION INTRAVENOUS; SUBCUTANEOUS at 15:14

## 2022-01-01 RX ADMIN — DULOXETINE HYDROCHLORIDE 60 MG: 60 CAPSULE, DELAYED RELEASE ORAL at 21:34

## 2022-01-01 RX ADMIN — HEPARIN SODIUM 5000 UNITS: 5000 INJECTION INTRAVENOUS; SUBCUTANEOUS at 23:55

## 2022-01-01 RX ADMIN — Medication: at 15:35

## 2022-01-01 RX ADMIN — Medication 30 MG: at 21:56

## 2022-01-01 RX ADMIN — ASPIRIN 81 MG CHEWABLE TABLET 81 MG: 81 TABLET CHEWABLE at 10:15

## 2022-01-01 RX ADMIN — DEXTROSE MONOHYDRATE 125 ML: 100 INJECTION, SOLUTION INTRAVENOUS at 06:03

## 2022-01-01 RX ADMIN — Medication 2 UNITS: at 07:06

## 2022-01-01 RX ADMIN — HEPARIN SODIUM 5000 UNITS: 5000 INJECTION, SOLUTION INTRAVENOUS; SUBCUTANEOUS at 01:31

## 2022-01-01 RX ADMIN — FUROSEMIDE 20 MG: 10 INJECTION, SOLUTION INTRAVENOUS at 03:19

## 2022-01-01 RX ADMIN — PREDNISONE 30 MG: 20 TABLET ORAL at 10:20

## 2022-01-01 RX ADMIN — Medication 30 MG: at 21:32

## 2022-01-01 RX ADMIN — TAMSULOSIN HYDROCHLORIDE 0.4 MG: 0.4 CAPSULE ORAL at 09:35

## 2022-01-01 RX ADMIN — CHOLECALCIFEROL (VITAMIN D3) 10 MCG (400 UNIT) TABLET 400 UNITS: at 09:18

## 2022-01-01 RX ADMIN — HEPARIN SODIUM 5000 UNITS: 5000 INJECTION INTRAVENOUS; SUBCUTANEOUS at 08:44

## 2022-01-01 RX ADMIN — Medication 30 MG: at 10:21

## 2022-01-01 RX ADMIN — FUROSEMIDE 40 MG: 10 INJECTION, SOLUTION INTRAVENOUS at 09:16

## 2022-01-01 RX ADMIN — SODIUM CHLORIDE, PRESERVATIVE FREE 10 ML: 5 INJECTION INTRAVENOUS at 21:42

## 2022-01-01 RX ADMIN — Medication: at 06:36

## 2022-01-01 RX ADMIN — INSULIN GLARGINE 8 UNITS: 100 INJECTION, SOLUTION SUBCUTANEOUS at 21:40

## 2022-01-01 RX ADMIN — HEPARIN SODIUM 5000 UNITS: 5000 INJECTION INTRAVENOUS; SUBCUTANEOUS at 21:35

## 2022-01-01 RX ADMIN — SODIUM CHLORIDE, PRESERVATIVE FREE 10 ML: 5 INJECTION INTRAVENOUS at 15:36

## 2022-01-01 RX ADMIN — DULOXETINE HYDROCHLORIDE 60 MG: 60 CAPSULE, DELAYED RELEASE ORAL at 09:19

## 2022-01-01 RX ADMIN — Medication 5 UNITS: at 17:40

## 2022-01-01 RX ADMIN — FUROSEMIDE 20 MG: 10 INJECTION, SOLUTION INTRAVENOUS at 17:36

## 2022-01-01 RX ADMIN — DOXYCYCLINE 100 MG: 100 INJECTION, POWDER, LYOPHILIZED, FOR SOLUTION INTRAVENOUS at 11:41

## 2022-01-01 RX ADMIN — AMLODIPINE BESYLATE 10 MG: 5 TABLET ORAL at 10:19

## 2022-01-01 RX ADMIN — PREDNISONE 40 MG: 20 TABLET ORAL at 09:18

## 2022-01-01 RX ADMIN — CEFTRIAXONE SODIUM 1 G: 1 INJECTION, POWDER, FOR SOLUTION INTRAMUSCULAR; INTRAVENOUS at 06:27

## 2022-01-01 RX ADMIN — DOXYCYCLINE 100 MG: 100 INJECTION, POWDER, LYOPHILIZED, FOR SOLUTION INTRAVENOUS at 11:19

## 2022-01-01 RX ADMIN — SODIUM CHLORIDE, PRESERVATIVE FREE 10 ML: 5 INJECTION INTRAVENOUS at 22:24

## 2022-01-01 RX ADMIN — AMLODIPINE BESYLATE 10 MG: 5 TABLET ORAL at 08:37

## 2022-01-01 RX ADMIN — Medication: at 22:22

## 2022-01-01 RX ADMIN — Medication: at 06:34

## 2022-01-01 RX ADMIN — LIDOCAINE HYDROCHLORIDE 10 ML: 20 INJECTION, SOLUTION INFILTRATION; PERINEURAL at 15:25

## 2022-01-01 RX ADMIN — DOXYCYCLINE 100 MG: 100 INJECTION, POWDER, LYOPHILIZED, FOR SOLUTION INTRAVENOUS at 22:32

## 2022-01-01 RX ADMIN — Medication: at 22:35

## 2022-01-01 RX ADMIN — HEPARIN SODIUM 5000 UNITS: 5000 INJECTION INTRAVENOUS; SUBCUTANEOUS at 21:43

## 2022-01-01 RX ADMIN — LEVOTHYROXINE SODIUM 50 MCG: 0.03 TABLET ORAL at 06:27

## 2022-01-01 RX ADMIN — Medication: at 21:32

## 2022-01-01 RX ADMIN — DOXYCYCLINE 100 MG: 100 INJECTION, POWDER, LYOPHILIZED, FOR SOLUTION INTRAVENOUS at 22:14

## 2022-01-01 RX ADMIN — SODIUM CHLORIDE, PRESERVATIVE FREE 10 ML: 5 INJECTION INTRAVENOUS at 14:39

## 2022-01-01 RX ADMIN — SODIUM CHLORIDE, PRESERVATIVE FREE 10 ML: 5 INJECTION INTRAVENOUS at 21:34

## 2022-01-01 RX ADMIN — Medication: at 05:11

## 2022-01-01 RX ADMIN — FERROUS SULFATE TAB 325 MG (65 MG ELEMENTAL FE) 325 MG: 325 (65 FE) TAB at 07:13

## 2022-01-01 RX ADMIN — SODIUM CHLORIDE, PRESERVATIVE FREE 5 ML: 5 INJECTION INTRAVENOUS at 21:33

## 2022-01-01 RX ADMIN — DOXYCYCLINE 100 MG: 100 INJECTION, POWDER, LYOPHILIZED, FOR SOLUTION INTRAVENOUS at 11:39

## 2022-01-01 RX ADMIN — ASPIRIN 81 MG 81 MG: 81 TABLET ORAL at 08:38

## 2022-01-01 RX ADMIN — SODIUM CHLORIDE, PRESERVATIVE FREE 10 ML: 5 INJECTION INTRAVENOUS at 14:25

## 2022-01-01 RX ADMIN — SODIUM CHLORIDE, PRESERVATIVE FREE 10 ML: 5 INJECTION INTRAVENOUS at 05:27

## 2022-01-01 RX ADMIN — HEPARIN SODIUM 5000 UNITS: 5000 INJECTION, SOLUTION INTRAVENOUS; SUBCUTANEOUS at 01:41

## 2022-01-01 RX ADMIN — Medication: at 04:43

## 2022-01-01 RX ADMIN — SODIUM CHLORIDE, PRESERVATIVE FREE 10 ML: 5 INJECTION INTRAVENOUS at 05:12

## 2022-01-01 RX ADMIN — CHOLECALCIFEROL (VITAMIN D3) 10 MCG (400 UNIT) TABLET 400 UNITS: at 08:46

## 2022-01-01 RX ADMIN — DOXYCYCLINE 100 MG: 100 INJECTION, POWDER, LYOPHILIZED, FOR SOLUTION INTRAVENOUS at 21:41

## 2022-01-01 RX ADMIN — Medication 3 UNITS: at 12:50

## 2022-01-01 RX ADMIN — ASPIRIN 81 MG CHEWABLE TABLET 81 MG: 81 TABLET CHEWABLE at 10:19

## 2022-01-01 RX ADMIN — SODIUM CHLORIDE, PRESERVATIVE FREE 10 ML: 5 INJECTION INTRAVENOUS at 06:26

## 2022-01-01 RX ADMIN — SODIUM CHLORIDE, PRESERVATIVE FREE 10 ML: 5 INJECTION INTRAVENOUS at 14:27

## 2022-01-01 RX ADMIN — Medication: at 19:17

## 2022-01-01 RX ADMIN — Medication 1 CAPSULE: at 09:16

## 2022-01-01 RX ADMIN — INSULIN GLARGINE 8 UNITS: 100 INJECTION, SOLUTION SUBCUTANEOUS at 21:34

## 2022-01-01 RX ADMIN — Medication: at 05:18

## 2022-01-01 RX ADMIN — Medication 1 CAPSULE: at 10:17

## 2022-01-01 RX ADMIN — FERROUS SULFATE TAB 325 MG (65 MG ELEMENTAL FE) 325 MG: 325 (65 FE) TAB at 06:43

## 2022-01-01 RX ADMIN — LEVOTHYROXINE SODIUM 50 MCG: 0.03 TABLET ORAL at 07:13

## 2022-01-01 RX ADMIN — Medication 30 MG: at 18:12

## 2022-01-01 RX ADMIN — Medication 1 CAPSULE: at 08:55

## 2022-01-01 RX ADMIN — Medication 10 MG: at 11:55

## 2022-01-01 RX ADMIN — AMLODIPINE BESYLATE 10 MG: 5 TABLET ORAL at 09:19

## 2022-01-01 RX ADMIN — TAMSULOSIN HYDROCHLORIDE 0.4 MG: 0.4 CAPSULE ORAL at 17:53

## 2022-01-01 RX ADMIN — HEPARIN SODIUM 5000 UNITS: 5000 INJECTION INTRAVENOUS; SUBCUTANEOUS at 06:35

## 2022-01-01 RX ADMIN — SODIUM CHLORIDE, PRESERVATIVE FREE 10 ML: 5 INJECTION INTRAVENOUS at 17:56

## 2022-01-01 RX ADMIN — AMLODIPINE BESYLATE 10 MG: 5 TABLET ORAL at 09:35

## 2022-01-01 RX ADMIN — Medication 1 CAPSULE: at 08:37

## 2022-01-01 RX ADMIN — Medication 30 MG: at 22:22

## 2022-01-01 RX ADMIN — ONDANSETRON HYDROCHLORIDE 4 MG: 2 SOLUTION INTRAMUSCULAR; INTRAVENOUS at 06:26

## 2022-01-01 RX ADMIN — PREDNISONE 40 MG: 20 TABLET ORAL at 07:13

## 2022-01-01 RX ADMIN — AMLODIPINE BESYLATE 10 MG: 5 TABLET ORAL at 08:55

## 2022-01-01 RX ADMIN — DULOXETINE HYDROCHLORIDE 60 MG: 60 CAPSULE, DELAYED RELEASE ORAL at 09:16

## 2022-01-01 RX ADMIN — PREDNISONE 40 MG: 20 TABLET ORAL at 09:35

## 2022-01-01 RX ADMIN — DOXYCYCLINE 100 MG: 100 INJECTION, POWDER, LYOPHILIZED, FOR SOLUTION INTRAVENOUS at 11:25

## 2022-01-01 RX ADMIN — CEFTRIAXONE SODIUM 1 G: 1 INJECTION, POWDER, FOR SOLUTION INTRAMUSCULAR; INTRAVENOUS at 05:11

## 2022-01-01 RX ADMIN — LEVOTHYROXINE SODIUM 50 MCG: 0.1 TABLET ORAL at 06:36

## 2022-01-01 RX ADMIN — Medication: at 14:05

## 2022-01-01 RX ADMIN — FUROSEMIDE 20 MG: 20 TABLET ORAL at 08:55

## 2022-01-01 RX ADMIN — SODIUM CHLORIDE, PRESERVATIVE FREE 10 ML: 5 INJECTION INTRAVENOUS at 14:00

## 2022-01-01 RX ADMIN — GLUCAGON HYDROCHLORIDE 1 MG: 1 INJECTION, POWDER, FOR SOLUTION INTRAMUSCULAR; INTRAVENOUS; SUBCUTANEOUS at 06:55

## 2022-01-01 RX ADMIN — CHOLECALCIFEROL (VITAMIN D3) 10 MCG (400 UNIT) TABLET 400 UNITS: at 09:16

## 2022-01-01 RX ADMIN — INSULIN GLARGINE 8 UNITS: 100 INJECTION, SOLUTION SUBCUTANEOUS at 22:23

## 2022-01-01 RX ADMIN — Medication: at 14:39

## 2022-01-01 RX ADMIN — CEFTRIAXONE SODIUM 1 G: 1 INJECTION, POWDER, FOR SOLUTION INTRAMUSCULAR; INTRAVENOUS at 06:54

## 2022-01-01 RX ADMIN — AMLODIPINE BESYLATE 10 MG: 5 TABLET ORAL at 09:16

## 2022-01-01 RX ADMIN — Medication 1 UNITS: at 21:47

## 2022-01-01 RX ADMIN — ASPIRIN 81 MG 81 MG: 81 TABLET ORAL at 08:46

## 2022-01-01 RX ADMIN — Medication 2 UNITS: at 21:40

## 2022-01-01 RX ADMIN — DULOXETINE HYDROCHLORIDE 60 MG: 60 CAPSULE, DELAYED RELEASE ORAL at 22:23

## 2022-01-01 RX ADMIN — FUROSEMIDE 40 MG: 10 INJECTION, SOLUTION INTRAVENOUS at 09:20

## 2022-01-01 RX ADMIN — CEFTRIAXONE SODIUM 1 G: 1 INJECTION, POWDER, FOR SOLUTION INTRAMUSCULAR; INTRAVENOUS at 05:17

## 2022-01-01 RX ADMIN — SODIUM CHLORIDE, PRESERVATIVE FREE 10 ML: 5 INJECTION INTRAVENOUS at 22:06

## 2022-01-01 RX ADMIN — Medication 1 TABLET: at 11:55

## 2022-01-01 RX ADMIN — CEFTRIAXONE SODIUM 1 G: 1 INJECTION, POWDER, FOR SOLUTION INTRAMUSCULAR; INTRAVENOUS at 04:42

## 2022-01-01 RX ADMIN — SODIUM CHLORIDE, PRESERVATIVE FREE 10 ML: 5 INJECTION INTRAVENOUS at 22:31

## 2022-01-01 RX ADMIN — OXYCODONE HYDROCHLORIDE AND ACETAMINOPHEN 500 MG: 500 TABLET ORAL at 08:46

## 2022-01-01 RX ADMIN — Medication: at 05:41

## 2022-01-01 RX ADMIN — DULOXETINE HYDROCHLORIDE 60 MG: 60 CAPSULE, DELAYED RELEASE ORAL at 09:35

## 2022-01-01 RX ADMIN — Medication 1 TABLET: at 11:38

## 2022-01-01 RX ADMIN — Medication 2 UNITS: at 07:13

## 2022-01-01 RX ADMIN — LIDOCAINE HYDROCHLORIDE 10 ML: 10 INJECTION, SOLUTION EPIDURAL; INFILTRATION; INTRACAUDAL; PERINEURAL at 10:51

## 2022-01-01 RX ADMIN — Medication 14 UNITS: at 08:38

## 2022-01-01 RX ADMIN — CHOLECALCIFEROL (VITAMIN D3) 10 MCG (400 UNIT) TABLET 400 UNITS: at 08:37

## 2022-01-01 RX ADMIN — PREDNISONE 40 MG: 20 TABLET ORAL at 07:02

## 2022-01-01 RX ADMIN — HEPARIN SODIUM 5000 UNITS: 5000 INJECTION INTRAVENOUS; SUBCUTANEOUS at 06:54

## 2022-01-01 RX ADMIN — HEPARIN SODIUM 5000 UNITS: 5000 INJECTION INTRAVENOUS; SUBCUTANEOUS at 05:27

## 2022-01-01 RX ADMIN — Medication 1 CAPSULE: at 08:46

## 2022-01-01 RX ADMIN — Medication 1 CAPSULE: at 09:35

## 2022-01-01 RX ADMIN — Medication 1 TABLET: at 08:50

## 2022-01-01 RX ADMIN — DOXYCYCLINE 100 MG: 100 INJECTION, POWDER, LYOPHILIZED, FOR SOLUTION INTRAVENOUS at 21:27

## 2022-01-01 RX ADMIN — AMLODIPINE BESYLATE 10 MG: 5 TABLET ORAL at 10:14

## 2022-01-01 RX ADMIN — LEVOTHYROXINE SODIUM 50 MCG: 0.03 TABLET ORAL at 09:18

## 2022-01-01 RX ADMIN — LEVOTHYROXINE SODIUM 50 MCG: 0.1 TABLET ORAL at 06:54

## 2022-01-01 RX ADMIN — Medication 2 UNITS: at 11:41

## 2022-01-01 RX ADMIN — CEFTRIAXONE SODIUM 1 G: 1 INJECTION, POWDER, FOR SOLUTION INTRAMUSCULAR; INTRAVENOUS at 06:36

## 2022-01-01 RX ADMIN — Medication 14 UNITS: at 09:00

## 2022-01-01 RX ADMIN — LEVOTHYROXINE SODIUM 50 MCG: 0.1 TABLET ORAL at 08:43

## 2022-01-01 RX ADMIN — LEVOTHYROXINE SODIUM 50 MCG: 0.03 TABLET ORAL at 06:51

## 2022-01-01 RX ADMIN — Medication 2 UNITS: at 17:12

## 2022-01-01 RX ADMIN — FERROUS SULFATE TAB 325 MG (65 MG ELEMENTAL FE) 325 MG: 325 (65 FE) TAB at 06:27

## 2022-01-01 RX ADMIN — Medication: at 21:49

## 2022-01-01 RX ADMIN — DOXYCYCLINE 100 MG: 100 INJECTION, POWDER, LYOPHILIZED, FOR SOLUTION INTRAVENOUS at 22:25

## 2022-01-01 RX ADMIN — Medication: at 12:22

## 2022-01-01 RX ADMIN — Medication: at 21:17

## 2022-01-01 RX ADMIN — FUROSEMIDE 20 MG: 10 INJECTION, SOLUTION INTRAVENOUS at 11:09

## 2022-01-01 RX ADMIN — Medication 30 MG: at 11:00

## 2022-01-01 RX ADMIN — ASPIRIN 81 MG CHEWABLE TABLET 81 MG: 81 TABLET CHEWABLE at 18:12

## 2022-01-01 RX ADMIN — OXYCODONE HYDROCHLORIDE AND ACETAMINOPHEN 500 MG: 500 TABLET ORAL at 08:38

## 2022-01-01 RX ADMIN — Medication 1 CAPSULE: at 08:44

## 2022-01-01 RX ADMIN — Medication 1 TABLET: at 09:42

## 2022-01-01 RX ADMIN — Medication 1 CAPSULE: at 09:18

## 2022-01-01 RX ADMIN — HEPARIN SODIUM 5000 UNITS: 5000 INJECTION INTRAVENOUS; SUBCUTANEOUS at 15:35

## 2022-01-01 RX ADMIN — SODIUM CHLORIDE, PRESERVATIVE FREE 10 ML: 5 INJECTION INTRAVENOUS at 06:35

## 2022-01-01 RX ADMIN — Medication 14 UNITS: at 09:16

## 2022-01-01 RX ADMIN — SODIUM CHLORIDE, PRESERVATIVE FREE 10 ML: 5 INJECTION INTRAVENOUS at 05:41

## 2022-01-01 RX ADMIN — FERROUS SULFATE TAB 325 MG (65 MG ELEMENTAL FE) 325 MG: 325 (65 FE) TAB at 06:51

## 2022-01-01 RX ADMIN — Medication: at 21:57

## 2022-01-01 RX ADMIN — Medication: at 18:31

## 2022-01-01 RX ADMIN — LIDOCAINE HYDROCHLORIDE 10 ML: 10 INJECTION, SOLUTION EPIDURAL; INFILTRATION; INTRACAUDAL; PERINEURAL at 11:20

## 2022-01-01 RX ADMIN — HEPARIN SODIUM 5000 UNITS: 5000 INJECTION INTRAVENOUS; SUBCUTANEOUS at 14:52

## 2022-01-01 RX ADMIN — ASPIRIN 81 MG CHEWABLE TABLET 81 MG: 81 TABLET CHEWABLE at 18:48

## 2022-01-01 RX ADMIN — FUROSEMIDE 20 MG: 10 INJECTION, SOLUTION INTRAVENOUS at 21:13

## 2022-01-01 RX ADMIN — HEPARIN SODIUM 5000 UNITS: 5000 INJECTION, SOLUTION INTRAVENOUS; SUBCUTANEOUS at 02:42

## 2022-01-01 RX ADMIN — Medication: at 14:00

## 2022-01-01 RX ADMIN — SODIUM CHLORIDE, PRESERVATIVE FREE 10 ML: 5 INJECTION INTRAVENOUS at 21:49

## 2022-01-01 RX ADMIN — DOXYCYCLINE HYCLATE 100 MG: 100 TABLET, COATED ORAL at 08:55

## 2022-01-01 RX ADMIN — DOXYCYCLINE 100 MG: 100 INJECTION, POWDER, LYOPHILIZED, FOR SOLUTION INTRAVENOUS at 11:47

## 2022-01-01 RX ADMIN — PREDNISONE 30 MG: 20 TABLET ORAL at 11:08

## 2022-01-01 RX ADMIN — OXYCODONE HYDROCHLORIDE AND ACETAMINOPHEN 500 MG: 500 TABLET ORAL at 09:18

## 2022-01-01 RX ADMIN — ASPIRIN 81 MG CHEWABLE TABLET 81 MG: 81 TABLET CHEWABLE at 17:12

## 2022-01-01 RX ADMIN — TAMSULOSIN HYDROCHLORIDE 0.4 MG: 0.4 CAPSULE ORAL at 10:18

## 2022-01-01 RX ADMIN — Medication: at 22:28

## 2022-01-01 RX ADMIN — Medication 2 UNITS: at 11:47

## 2022-01-01 RX ADMIN — HEPARIN SODIUM 5000 UNITS: 5000 INJECTION INTRAVENOUS; SUBCUTANEOUS at 06:34

## 2022-01-01 RX ADMIN — Medication 30 MG: at 17:45

## 2022-01-01 RX ADMIN — LEVOTHYROXINE SODIUM 50 MCG: 0.03 TABLET ORAL at 06:43

## 2022-01-01 RX ADMIN — Medication 30 MG: at 21:49

## 2022-01-01 RX ADMIN — Medication 12 UNITS: at 18:12

## 2022-01-01 RX ADMIN — ASPIRIN 81 MG CHEWABLE TABLET 81 MG: 81 TABLET CHEWABLE at 08:55

## 2022-01-01 RX ADMIN — HEPARIN SODIUM 5000 UNITS: 5000 INJECTION, SOLUTION INTRAVENOUS; SUBCUTANEOUS at 14:25

## 2022-01-01 RX ADMIN — ASPIRIN 81 MG 81 MG: 81 TABLET ORAL at 09:19

## 2022-01-01 RX ADMIN — Medication 2 UNITS: at 08:47

## 2022-01-01 RX ADMIN — SODIUM CHLORIDE, PRESERVATIVE FREE 10 ML: 5 INJECTION INTRAVENOUS at 15:15

## 2022-01-01 RX ADMIN — HEPARIN SODIUM 5000 UNITS: 5000 INJECTION, SOLUTION INTRAVENOUS; SUBCUTANEOUS at 02:22

## 2022-01-01 RX ADMIN — Medication 2 UNITS: at 22:23

## 2022-01-01 RX ADMIN — TAMSULOSIN HYDROCHLORIDE 0.4 MG: 0.4 CAPSULE ORAL at 18:28

## 2022-01-01 RX ADMIN — Medication 5 UNITS: at 17:45

## 2022-01-01 RX ADMIN — DULOXETINE HYDROCHLORIDE 60 MG: 60 CAPSULE, DELAYED RELEASE ORAL at 08:37

## 2022-01-01 RX ADMIN — LEVOTHYROXINE SODIUM 50 MCG: 0.1 TABLET ORAL at 06:34

## 2022-01-01 RX ADMIN — Medication 4 UNITS: at 22:30

## 2022-01-01 RX ADMIN — Medication 1 CAPSULE: at 10:19

## 2022-01-01 RX ADMIN — Medication 2 UNITS: at 12:48

## 2022-01-01 RX ADMIN — DOXYCYCLINE 100 MG: 100 INJECTION, POWDER, LYOPHILIZED, FOR SOLUTION INTRAVENOUS at 22:07

## 2022-01-01 RX ADMIN — Medication 3 UNITS: at 17:35

## 2022-01-01 RX ADMIN — SODIUM CHLORIDE, PRESERVATIVE FREE 10 ML: 5 INJECTION INTRAVENOUS at 22:25

## 2022-01-01 RX ADMIN — ASPIRIN 81 MG 81 MG: 81 TABLET ORAL at 09:16

## 2022-01-01 RX ADMIN — ASPIRIN 81 MG CHEWABLE TABLET 81 MG: 81 TABLET CHEWABLE at 17:40

## 2022-01-01 RX ADMIN — FUROSEMIDE 20 MG: 10 INJECTION, SOLUTION INTRAVENOUS at 09:35

## 2022-01-01 RX ADMIN — Medication: at 14:26

## 2022-01-01 RX ADMIN — DOXYCYCLINE 100 MG: 100 INJECTION, POWDER, LYOPHILIZED, FOR SOLUTION INTRAVENOUS at 08:42

## 2022-01-01 RX ADMIN — Medication 4 UNITS: at 21:49

## 2022-01-01 RX ADMIN — TAMSULOSIN HYDROCHLORIDE 0.4 MG: 0.4 CAPSULE ORAL at 10:21

## 2022-01-01 RX ADMIN — FERROUS SULFATE TAB 325 MG (65 MG ELEMENTAL FE) 325 MG: 325 (65 FE) TAB at 09:18

## 2022-01-01 RX ADMIN — HEPARIN SODIUM 5000 UNITS: 5000 INJECTION, SOLUTION INTRAVENOUS; SUBCUTANEOUS at 14:00

## 2022-01-01 RX ADMIN — TAMSULOSIN HYDROCHLORIDE 0.4 MG: 0.4 CAPSULE ORAL at 17:13

## 2022-01-01 RX ADMIN — NITROGLYCERIN 1 INCH: 20 OINTMENT TOPICAL at 03:19

## 2022-01-01 RX ADMIN — DEXTROSE MONOHYDRATE 125 ML: 100 INJECTION, SOLUTION INTRAVENOUS at 06:51

## 2022-01-01 RX ADMIN — Medication 1 TABLET: at 11:08

## 2022-01-01 RX ADMIN — Medication 2 UNITS: at 22:05

## 2022-01-01 RX ADMIN — OXYCODONE HYDROCHLORIDE AND ACETAMINOPHEN 500 MG: 500 TABLET ORAL at 09:16

## 2022-01-01 RX ADMIN — Medication: at 12:55

## 2022-01-01 RX ADMIN — Medication 30 MG: at 18:48

## 2022-01-01 RX ADMIN — SODIUM CHLORIDE, PRESERVATIVE FREE 10 ML: 5 INJECTION INTRAVENOUS at 04:44

## 2022-01-01 RX ADMIN — CEFTRIAXONE SODIUM 1 G: 1 INJECTION, POWDER, FOR SOLUTION INTRAMUSCULAR; INTRAVENOUS at 06:34

## 2022-01-01 RX ADMIN — Medication 1 TABLET: at 11:25

## 2022-01-01 RX ADMIN — Medication: at 06:03

## 2022-01-01 RX ADMIN — Medication 30 MG: at 21:17

## 2022-01-01 RX ADMIN — Medication: at 06:55

## 2022-01-01 RX ADMIN — Medication 1 TABLET: at 09:20

## 2022-01-01 RX ADMIN — Medication 30 MG: at 08:50

## 2022-01-01 RX ADMIN — HEPARIN SODIUM 5000 UNITS: 5000 INJECTION, SOLUTION INTRAVENOUS; SUBCUTANEOUS at 01:39

## 2022-01-01 RX ADMIN — HEPARIN SODIUM 5000 UNITS: 5000 INJECTION, SOLUTION INTRAVENOUS; SUBCUTANEOUS at 14:37

## 2022-01-01 RX ADMIN — Medication: at 21:40

## 2022-01-01 RX ADMIN — Medication 2 UNITS: at 21:49

## 2022-01-01 RX ADMIN — PREDNISONE 40 MG: 20 TABLET ORAL at 08:43

## 2022-01-01 RX ADMIN — CEFTRIAXONE SODIUM 1 G: 1 INJECTION, POWDER, FOR SOLUTION INTRAMUSCULAR; INTRAVENOUS at 11:08

## 2022-01-01 RX ADMIN — TAMSULOSIN HYDROCHLORIDE 0.4 MG: 0.4 CAPSULE ORAL at 17:40

## 2022-01-01 RX ADMIN — HEPARIN SODIUM 5000 UNITS: 5000 INJECTION, SOLUTION INTRAVENOUS; SUBCUTANEOUS at 17:53

## 2022-01-01 RX ADMIN — AMLODIPINE BESYLATE 10 MG: 5 TABLET ORAL at 08:43

## 2022-01-01 RX ADMIN — Medication 1 TABLET: at 12:51

## 2022-01-01 RX ADMIN — DULOXETINE HYDROCHLORIDE 60 MG: 60 CAPSULE, DELAYED RELEASE ORAL at 22:30

## 2022-01-01 RX ADMIN — ASPIRIN 81 MG CHEWABLE TABLET 81 MG: 81 TABLET CHEWABLE at 09:35

## 2022-01-11 NOTE — PROGRESS NOTES
Home Based [address] [place] [address] Team Members: Tyra De MD; Paola Mike NP; Divya Gtz RN; Tamara Thapa, RN; Ernestina Poe RN;  Karly Saxena LCSW    Zia Lieberman  1940 / [id number]  male    The physician has reviewed and discussed the plan of care with the interdisciplinary group on 01/18/22 and agrees. Date of Initial Visit (Start of Care): 2/12/19     Diagnosis: [de-identified] y.o. male with dementia, COPD,  chronic diastolic CHF, persistent afib, Type 2 diabetes with hypoglycemia, chronic anemia, hypothyroidism, depression    Patient status summary: Patient and POC discussed in IDT meeting for 60 day update. Homebound patient referred by Palliative Medicine Inpatient Team, Letha Champagne NP to our services due to taxing effort to seek primary care needs in the community. DME/Supplies:  Bedside Commode     Advance Care Planning:  Code status: Prior--DDNR singed on 12/28/2018 is on file      Primary Decision Maker (Active): Linn Villegas - [phone]    Primary Decision Maker: Augusto Pereirakala - [phone]    Secondary Decision Maker: Lorie Paiz - Other Relative - [phone]    Allergies   Allergen Reactions    Sulfa (Sulfonamide Antibiotics) Unknown (comments)     Nutritional Requirements:   Oral with supported meal preparation    Functional/Activity Level:  Limited ambulation with support of wheelchair and Wheelchair with assistance for transfers    Safety Measures:   Fall risk, Self-care deficity and Smoker    Current Outpatient Medications   Medication Sig    glucose blood VI test strips (ASCENSIA AUTODISC VI, ONE TOUCH ULTRA TEST VI) strip Check blood sugar three times daily before meals    Insulin Needles, Disposable, (BD Ultra-Fine Mini Pen Needle) 31 gauge x 3/16\" ndle USE TO INJECT INSULIN 3 TIMES A DAY    HYDROcodone-acetaminophen (NORCO) 5-325 mg per tablet Take 1 Tablet by mouth daily as needed for Pain for up to 30 days.     amLODIPine (NORVASC) 10 mg tablet Take 1 Tablet by mouth daily.  QUEtiapine (SEROquel) 25 mg tablet Take 1 Tablet by mouth every evening for 180 days. Take on 25mg tablet and one 50mg tablet every evening for a dose of 75mg every evening.  QUEtiapine (SEROquel) 50 mg tablet Take 1 Tablet by mouth two (2) times a day for 180 days. Take 50 mg in the morning and 75 mg in the evening (Take one 50 mg tablet by mouth in the morning and one 50 mg tab and one 25 mg tablet in the evening.)    Wheel Chair angel Standard wheelchair    balsam peru-castor oiL (VENELEX) ointment Apply  to affected area every eight (8) hours.  polyethylene glycol (ClearLax) 17 gram/dose powder Take 17 g by mouth two (2) times a day.  aspirin 81 mg chewable tablet Take 1 Tablet by mouth two (2) times a day. Indications: treatment to prevent a heart attack, treatment to prevent peripheral artery thromboembolism    lansoprazole (PREVACID) 3 mg/mL oral suspension Take 10 mL by mouth two (2) times a day.  acetaminophen (TYLENOL) 325 mg tablet Take 2 Tablets by mouth every six (6) hours.  valproate (DEPAKENE) 250 mg/5 mL syrup TAKE 5ML BY MOUTH TWICE A DAY AS NEEDED FOR AGITATION    insulin glargine (LANTUS,BASAGLAR) 100 unit/mL (3 mL) inpn Inject 14 units every morning.  insulin lispro (HUMALOG) 100 unit/mL injection INJECT 3 UNITS BENEATH THE SKIN FOR BS>200, 5 UNITS FOR BS>250, 6 UNITS FOR BS>300. CALL MD FOR BS >400    insulin lispro (HUMALOG) 100 unit/mL kwikpen 3 units sq for bs greater than 200 ; 5  untis sq for bs greater than 250,  6units for blood sugar greater than 300 ; call me for bs greater than 400 (Patient taking differently: check blood sugar before meals.  sliding scale: 3 units sq for bs greater than 200 ; 5  untis sq for bs greater than 250,  6units for blood sugar greater than 300 ; call me for bs greater than 400)    levothyroxine (SYNTHROID) 50 mcg tablet TAKE 1 TABLET BY MOUTH EVERY DAY BEFORE BREAKFAST  tamsulosin (Flomax) 0.4 mg capsule Take 1 Cap by mouth daily.  b complex vitamins (SUPER B-50 COMPLEX PLUS) tablet Take 1 Tab by mouth daily.  DULoxetine (CYMBALTA) 60 mg capsule TAKE ONE CAPSULE BY MOUTH EVERY DAY    SENNA PLUS 8.6-50 mg per tablet TAKE 2 TABS BY MOUTH TWO (2) TIMES A DAY    lidocaine (LIDODERM) 5 % Apply patch to the affected area for 12 hours a day and remove for 12 hours a day. (Patient taking differently: 1 Patch by TransDERmal route every twenty-four (24) hours. Apply patch to the affected area for 12 hours a day and remove for 12 hours a day.)    midodrine (PROAMITINE) 5 mg tablet Take 5 mg by mouth daily as needed for Other (if blood pressure is below 90/50). take 5 mg daily PRN - if blood pressure is below 90/50    ondansetron (ZOFRAN ODT) 4 mg disintegrating tablet Take 1 Tab by mouth every six (6) hours as needed for Nausea. No current facility-administered medications for this visit. The Plan of Care has been initiated for during discussion at interdisciplinary group meeting  and reviewed and updated by the Interdisciplinary Group (IDG) as frequently as the patient condition warrants. Plan of Care by Discipline:    1. Provider  Ongoing evaluation of treatment interventions for specific disease state and Create and implement disease /symptom specific plan to manage high risk conditions / symptoms    2. Nursing  Review Health Maintenance with patient and provider; update as appropriate and Provider caregiver / family support for patient's current and changing condition    3. Social Work  Establish therapeutic relationship through in home visits and telephonic touch points    Plan of Care Orders / Action     -Recent hospital discharge for left hip fracture s/p Left hip intramedullary nail: continue Home Health.  Given patient's recent fall, injury and subsequent surgery, patient would benefit from a standard wheelchair for use and functional positioning in and outside of the home. He also needs a new charan lift basket since he would be confined to his bed without one. Patient will not be able to f/u with Surgery as an outpatient due to functional limitations. Our team has discussed with the Surgery office and will coordinate staple removal with Home Health. -Gastritis and anemia s/p 1 unit pRBCs during hospitalization: per GI recommendations: \"Continue acid suppression with PPI q 12 hours, to stay on PPI po bid as outpatient. \" Per hospital discharge summary patient was cleared to continue ASA. -Chronic pain: Taking Norco 5-325 mg once daily which is a chronic medication for him.  appropriate. Will refill when due. Holding off on urine drug or serum toxicology screen for now as any attempt to examine patient agitates him significantly. We need to update his controlled substance agreement as I am having trouble finding one on file. -Dementia: continue supportive care and current Seroquel regimen. Memantine previously discontinued. -T2DM: we have been liberal with BGs because of dementia and patient not liking to have frequent BG checks.    -Hypothyroidism: TSH wnl in 09/2020. Continue synthroid.  -Orthostatic hypotension: midodrine available as needed (not needed recently).   Family continues regular blood pressure checks. -CHF: No exacerbations in several years.  No betablocker or ace/arb secondary to [facility] or diuretics. -CKD: avoid nephrotoxic medications.  -Follow up: will plan follow up in 2-3 months, sooner as needed. Will need to complete Controlled Substance Agreement. If patient's behavior improves to the point that he could tolerate labs, then would check TSH and urine toxicology screen.  Will also be due for Medicare Wellness Visit.       Health Maintenance   Topic Date Due    Pneumococcal 65+ years (1 of 1 - PPSV23) Never done    Medicare Yearly Exam  03/12/2021    Foot Exam Q1  09/08/2021    COVID-19 Vaccine (2 - Booster for Lesta Idania series) 11/25/2021  MICROALBUMIN Q1  01/25/2023 (Originally 2/12/2020)    Eye Exam Retinal or Dilated  03/11/2023 (Originally 12/11/1950)    Shingrix Vaccine Age 50> (1 of 2) 02/15/2025 (Originally 12/11/1991)    DTaP/Tdap/Td series (2 - Td or Tdap) 09/07/2029    Flu Vaccine  Completed         Estimated Visit Frequency:  Every 3 month provider visit  Last MD visit: 10/13/2021  SW visits prn

## 2022-01-24 NOTE — TELEPHONE ENCOUNTER
Triage for Controlled Substance Refill Request     Related Diagnosis: chronic pain     Last Home Visit: 10/13/21     Next Home Visit: 2/1/2022     Pharmacy: [facility] [address]     Medication:HYDROcodone-acetaminophen (NORCO) 5-325 mg per tablet  Dose and Directions: Take 1 Tablet by mouth daily as needed for Pain for up to 30 days.     Number dispensed:  30    Date filled ():  12/27/2021    # left:  3      reviewed: yes     Date of Urine Toxicology:  9/3/19     Controlled  Substance Agreement:  9/10/19     Appropriate for refill:  yes     Action:  prescription pended to Dr. Randi Dye for signature

## 2022-01-27 NOTE — TELEPHONE ENCOUNTER
Telephone call to Gibson Babinski to advise that this nurse spoke with Dr. Zakiya Gandara and she recommends he take Magnesium Citrate 1/2 bottle first thing in the AM, if no BM in 4 hours then take the remaining 1/2 bottle. He is also to increase his Senna Plus to 3 tabs twice a day. Riverbank Adan was able to repeat verbal instructions correctly. She states he has been drinking liquids poorly for the past couple of weeks. Encouraged her to push PO fluids as able. Advised that his Hydrocodone is very constipating.

## 2022-02-02 NOTE — ED NOTES
ED Course as of 02/02/22 0107   Tue Feb 01, 2022   2208 Pt signed out to me from Dr. Asa Burden pending blood work, ekg.   [TT]   2239 GFR est non-AA(!): 47 [TT]   Wed Feb 02, 2022   0000 Troponin-High Sensitivity: 22  We will repeat per protocol. EKG without ischemia. [TT]   0104 Troponin-High Sensitivity: 21  Troponin is stable. Will discharge home. Advised PCP follow-up. Given return precautions.  [TT]      ED Course User Index  [TT] Flavia Ray MD

## 2022-02-02 NOTE — ED NOTES
Pt incontinent of urine and stool. Pt cleaned, brief changed and linen changed. [facility] transport at bedside to transport pt home. Discharge paperwork, facesheet and ed summary provided to [facility]. Pt daughter updated on patient being transported home at this time.

## 2022-02-02 NOTE — ED NOTES
Bedside and Verbal shift change report given to Marybeth Cassidy RN (oncoming nurse) by Heavenly Conley RN (offgoing nurse). Report included the following information SBAR, ED Summary, MAR and Recent Results.

## 2022-02-02 NOTE — ED PROVIDER NOTES
HPI       Patient is an 71-year-old male with history of dementia who presents today for left lower extremity swelling. Limited HPI, as the patient has a history of dementia. I spoke with the daughter, and the daughter says that earlier today, patient's left foot was swollen. The daughter says that the last time he had swelling, he ended up having abnormal lab work and ended up having a heart attack. He was brought to the ER for further evaluation.     Past Medical History:   Diagnosis Date    Atrial fibrillation (HCC)     COPD (chronic obstructive pulmonary disease) (HCC)     Diabetes ([facility]     1970a    Heart failure ([facility]     Hypokalemia 12/25/2018    Hyponatremia 4/11/2017    MI (myocardial infarction) ([facility] 01/2019    NSTEMI (non-ST elevated myocardial infarction) ([facility] 12/25/2018    Syncope 4/11/2017    Urinary incontinence        Past Surgical History:   Procedure Laterality Date    HX HEENT  1975    nasal surgery    HX MOHS PROCEDURES  2013    left    NV CARDIAC SURG PROCEDURE UNLIST  2000    open heart         Family History:   Problem Relation Age of Onset    Diabetes Mother     Diabetes Brother        Social History     Socioeconomic History    Marital status:      Spouse name: Not on file    Number of children: Not on file    Years of education: Not on file    Highest education level: Not on file   Occupational History    Not on file   Tobacco Use    Smoking status: Former Smoker    Smokeless tobacco: Never Used    Tobacco comment: 1-2 cigars daily   Substance and Sexual Activity    Alcohol use: No     Alcohol/week: 0.0 standard drinks    Drug use: No    Sexual activity: Not on file   Other Topics Concern    Not on file   Social History Narrative    Not on file     Social Determinants of Health     Financial Resource Strain:     Difficulty of Paying Living Expenses: Not on file   Food Insecurity:     Worried About Running Out of Food in the Last Year: Not on file  Ran Out of Food in the Last Year: Not on file   Transportation Needs:     Lack of Transportation (Medical): Not on file    Lack of Transportation (Non-Medical): Not on file   Physical Activity:     Days of Exercise per Week: Not on file    Minutes of Exercise per Session: Not on file   Stress:     Feeling of Stress : Not on file   Social Connections:     Frequency of Communication with Friends and Family: Not on file    Frequency of Social Gatherings with Friends and Family: Not on file    Attends Yazidism Services: Not on file    Active Member of [address] [place] [address] [employer] or Organizations: Not on file    Attends Club or Organization Meetings: Not on file    Marital Status: Not on file   Intimate Partner Violence:     Fear of Current or Ex-Partner: Not on file    Emotionally Abused: Not on file    Physically Abused: Not on file    Sexually Abused: Not on file   Housing Stability:     Unable to Pay for Housing in the Last Year: Not on file    Number of [place] in the Last Year: Not on file    Unstable Housing in the Last Year: Not on file         ALLERGIES: Sulfa (sulfonamide antibiotics)    Review of Systems   Unable to perform ROS: Acuity of condition       Vitals:    02/01/22 1850   BP: (!) 168/85   Pulse: 83   Resp: 22   Temp: 99.2 °F (37.3 °C)   SpO2: 96%            Physical Exam  Vitals and nursing note reviewed. Constitutional:       General: He is not in acute distress. Appearance: Normal appearance. HENT:      Head: Normocephalic and atraumatic. Nose: Nose normal.      Mouth/Throat:      Mouth: Mucous membranes are moist.      Pharynx: Oropharynx is clear. Eyes:      Extraocular Movements: Extraocular movements intact. Conjunctiva/sclera: Conjunctivae normal.      Pupils: Pupils are equal, round, and reactive to light. Cardiovascular:      Rate and Rhythm: Normal rate and regular rhythm. Pulses: Normal pulses. Heart sounds: Normal heart sounds. No murmur heard.   No friction rub. No gallop. Pulmonary:      Effort: Pulmonary effort is normal.      Breath sounds: Normal breath sounds. Abdominal:      General: Bowel sounds are normal. There is no distension. Palpations: Abdomen is soft. Tenderness: There is no abdominal tenderness. Musculoskeletal:         General: Normal range of motion. Cervical back: Normal range of motion. Legs:    Skin:     General: Skin is warm and dry. Capillary Refill: Capillary refill takes less than 2 seconds. Neurological:      General: No focal deficit present. Mental Status: He is alert. Mental status is at baseline. Comments: Alert, baseline dementia   Psychiatric:         Mood and Affect: Mood normal.        MDM      MEDICAL DECISION MAKING:  [de-identified] y.o. male presents with Foot Swelling    Differential diagnosis includes but not limited to: Fracture versus DVT versus cellulitis    Patient is an 81-year-old male with history of dementia who presents today with lower extremity edema. I spoke with the daughter, who says that the patient had swelling localized to his left lower extremity. The daughter says the last time he had swelling, he had a heart attack . On exam, patient is alert in no acute stress. He is neurovascularly intact. He has no evidence of cellulitis. He has mild swelling to the left lower extremity, localized to the distal portion of the tibia and fibula. Ultrasound ordered, negative for DVT. Xray without evidence of fracture. 10:21 PM  Change of shift. Care of patient signed over to Dr. John Paul Fair. Bedside handoff complete. Awaiting xray and labwork.       LABORATORY TESTS:  Labs Reviewed   CBC WITH AUTOMATED DIFF - Abnormal; Notable for the following components:       Result Value    RDW 16.2 (*)     All other components within normal limits   METABOLIC PANEL, COMPREHENSIVE - Abnormal; Notable for the following components:    Sodium 135 (*)     Anion gap 3 (*)     Creatinine 1.45 (*) BUN/Creatinine ratio 10 (*)     GFR est AA 57 (*)     GFR est non-AA 47 (*)     Albumin 3.0 (*)     Globulin 4.5 (*)     A-G Ratio 0.7 (*)     All other components within normal limits             All other components within normal limits   SAMPLES BEING HELD   TROPONIN-HIGH SENSITIVITY       IMAGING RESULTS:  XR FOOT LT MIN 3 V   Final Result   No acute abnormality. Diffuse osteopenia. XR TIB/FIB LT   Final Result   No acute abnormality. Diffuse osteopenia. DUPLEX LOWER EXT VENOUS LEFT    (Results Pending)       MEDICATIONS GIVEN:  Medications - No data to display      ED Course as of 02/01/22 2223 Tue Feb 01, 2022 2208 Pt signed out to me from Dr. Asa Burden pending blood work, ekg.   [TT]      ED Course User Index  [TT] Flavia Ray MD       IMPRESSION:  1.  Localized swelling of left lower extremity        Burgess Abbi MD            Procedures

## 2022-02-15 NOTE — TELEPHONE ENCOUNTER
Telephone call to dgt Corinne Escobar in response to [employer] message requesting refill of Duloxetine. Ronaldo Soulier reports that script keeps being sent to Messi Moore NP for refill instead of Dr. Deyvi Perez. Inquired when med was last filled and who prescribed. Ronaldo Soulier unable to tell this nurse as she is at work. Ronaldo Soulier also states there is a problem with diabetic test strips being prescribed by [facility] and not being covered by insurance, they are having to purchase out of pocket. Nikki Issa that this nurse will speak with Dr. Deyvi Perez about Duloxetine refill, reminded Ronaldo Soulier that Dr. Deyvi Perez will be visiting the patient tomorrow. This nurse will attempt to follow up with patient's [employer] to see which type of test strips are on formulary and will be covered. Telephone call to [facility] at [phone] and spoke with pharmacist to determine that duloxetine was last filled Sept 2020 - prescribed by Dr. Lee Negro. This pharmacy does not have patient's Medicaid card on file, they only have Medicare part B which is covering glucose test strips - his co-pay for 30 day supply to test TID is $3.33. Pharmacy also has Part D on file for him. Pharmacy will automatically bill to which ever plan has best coverage. There was a problem in [place] with Medicare refusing to approve the test strips but that has been resolved.

## 2022-02-16 NOTE — TELEPHONE ENCOUNTER
Telephone call to Gibson Babinski to provide information on requests she made to Dr. Zakiya Gandara at time of her home visit today. Information on podiatrist who will make home visits, [address] [place] [address] at [phone]. Advised that Duloxetine 60 mg daily new RX was sent to [facility] pharmacy with Dr. Zakiya Gandara as prescriber. Advised that [facility] does not have patient's Medicaid card on file, encouraged her to provide that to them as they will bill either Medicare or Medicaid to get the least co-pay. Advised that test strips are being covered by Medicare part B, 30 day supply of 3 x day is covered with only $3.33 co-pay and it is for the same glucometer they currently use. Jaida Nunn had questions about a special chair to stop pt from sliding out of the chair. We looked at Geriatric chairs but Medicare will only cover 80% of the lift mechanism and none of the cost of the actual chair. Dgt appreciative of info provided, encouraged to contact our office with any additional questions or concerns.

## 2022-02-18 PROBLEM — Z87.81 HISTORY OF FRACTURE OF LEFT HIP: Status: ACTIVE | Noted: 2022-01-01

## 2022-02-18 PROBLEM — S72.009A HIP FRACTURE (HCC): Status: RESOLVED | Noted: 2021-01-01 | Resolved: 2022-01-01

## 2022-02-18 NOTE — PROGRESS NOTES
Home Based Primary Care [facility]) & Supportive Services    [phone]      NOTE: Home Based Primary Care is a PROVIDER (MD/NP) based interdisciplinary practice (RN, LCSW, Pharmacy, Dietician) for patient's who cannot access (or have a taxing effort) primary / speciality medical care in an office setting. [facility] is NOT [facility] but works with [facility]. when there is a skilled need. Our MD/NPs are integral in Care Transitions; PLEASE CALL [phone] if this patient arrives in the Emergency Department or Hospital.        Date of Current Visit: 2/16/2022    ASSESSMENT & PLAN       ICD-10-CM ICD-9-CM    1. Late onset Alzheimer's disease with behavioral disturbance (HCC)  G30.1 331.0     F02.81 294.11    2. Essential hypertension  I10 401.9    3. Moderate recurrent major depression (HCC)  F33.1 296.32    4. Chronic obstructive pulmonary disease, unspecified COPD type ([facility]  J44.9 496    5. Uncontrolled type 2 diabetes mellitus with hyperglycemia (HCC)  E11.65 250.02    6. Stage 3b chronic kidney disease (HCC)  N18.32 585.3    7. Chronic systolic congestive heart failure (HCC)  I50.22 428.22      428.0    8. Gastritis with hemorrhage, unspecified chronicity, unspecified gastritis type  K29.71 535.51    9. Prostate cancer ([facility]  C61 185    10. Other chronic pain  G89.29 338.29         -Chronic pain: Taking Norco 5-325 mg once daily which is a chronic medication for him. Also taking cymbalta 60 mg daily, which I will refill. Holding off on urine drug or serum toxicology screen for now as any attempt to examine patient agitates him significantly. We discussed the controlled substance agreement today and will mail one to daughter for signing.  -Dementia: continue supportive care and nightly seroquel. Memantine was previously discontinued. -T2DM: we have been liberal with BGs because of dementia and patient not liking to have frequent BG checks.  He is not on any medication for diabetes at this time. Last A1c was 9.4 in 9/2020. He is typically agitated with physical exams or lab attempts, so we have not been checking labs regularly. Daughter today feels it is best to not try for lab work at this time.  -Hypothyroidism: TSH wnl in 09/2020. Continue synthroid.  -Gastritis and anemia: per GI recommendations: \"Continue acid suppression with PPI q 12 hours, to stay on PPI po bid as outpatient. \" Per hospital discharge summary patient was cleared to continue ASA. -Orthostatic hypotension: midodrine available as needed (not currently using). Family continues regular blood pressure checks. -CHF: No exacerbations in several years. Not receiving betablocker, ace/arb or diuretics secondary to [facility].   -COPD: No exacerbation. Not on any medications for this. -CKD: avoid nephrotoxic medications.  -Other: will provide podiatry referral per daughter's request. Will coordinate with nursing and pharmacy to determine which diabetes test strips patient's insurance will cover. Daughter would like to use the one touch verio flex test strips.  -Follow up: will plan follow up in 2-3 months, sooner as needed. Will need to complete Controlled Substance Agreement. If patient's behavior improves to the point that he could tolerate labs, then would check TSH and urine toxicology screen. Will also be due for Medicare Wellness Visit. HEALTH MAINTENANCE     Health Maintenance Due   Topic Date Due    Depression Monitoring  Never done    Pneumococcal 65+ years (1 of 1 - PPSV23) Never done    Medicare Yearly Exam  03/12/2021    Foot Exam Q1  09/08/2021    COVID-19 Vaccine (2 - Booster for [employer] series) 11/25/2021      CC & SUBJECTIVE including ROS & FUNCTION     Chief Complaint   Patient presents with    Dementia    Other     Chronic pain      Giuseppe Leiva is a [de-identified] y.o. with chronic medical conditions as listed in the patient's updated Problem List (see below).  Patient is seen at home due to severe dementia preventing him from being able to leave the home. History as per his daughter as he is unable to give history. Pain is relatively well controlled with tylenol and once daily norco 5-325 which is chronic. Currently family is giving him Seroquel 75 mg qhs and none during the day. Not using depakote at this point. He is eating well. No issues with constipation at this time. ROS: Patient unable to answer questions due to baseline cognitive issues. VITAL SIGNS & PHYSICAL EXAM     Visit Vitals  Resp 16     Exam unable to be completed in full d/t patient not allowing exam.     Physical Exam  Constitutional:       General: He is not in acute distress. Appearance: He is not diaphoretic. Comments: Chronically frail and thin appearing. HENT:      Head: Normocephalic and atraumatic. Right Ear: External ear normal.      Left Ear: External ear normal.      Nose: Nose normal.      Mouth/Throat:      Mouth: Mucous membranes are moist.      Pharynx: Oropharynx is clear. Eyes:      Extraocular Movements: Extraocular movements intact. Conjunctiva/sclera: Conjunctivae normal.   Pulmonary:      Effort: Pulmonary effort is normal. No respiratory distress. Musculoskeletal:      Cervical back: Normal range of motion. No rigidity. Skin:     Capillary Refill: Capillary refill takes less than 2 seconds. Coloration: Skin is not jaundiced. Findings: No rash. Neurological:      Mental Status: He is alert. Comments: Awake, alert to self, does not allow exam today   Psychiatric:      Comments: Not restless or agitated.         PROBLEM LIST / PAST MEDICAL / SOCIAL HX     Patient Active Problem List   Diagnosis Code    Late onset Alzheimer's disease with behavioral disturbance ([facility]) G30.1, F02.81    Moderate recurrent major depression ([facility] F33.1    Uncontrolled type 2 diabetes mellitus with hyperglycemia ([facility]) E11.65    Urinary incontinence R32    CAD (coronary artery disease) I25.10    COPD (chronic obstructive pulmonary disease) ([facility] J44.9    Orthostatic hypotension I95.1    Acute on chronic HFrEF (heart failure with reduced ejection fraction) ([facility]) I50.23    Prostate cancer ([facility] C61    Acquired hypothyroidism E03.9    Other chronic pain G89.29    Debility R53.81    COVID-19 vaccine dose declined Z28.21    Essential hypertension I10    Stage 3b chronic kidney disease (HCC) N18.32    History of GI bleed Z87.19    Gastritis with hemorrhage K29.71    History of fracture of left hip Z87.81      Family History   Problem Relation Age of Onset    Diabetes Mother     Diabetes Brother      Social History     Socioeconomic History    Marital status:    Tobacco Use    Smoking status: Former Smoker    Smokeless tobacco: Never Used    Tobacco comment: 1-2 cigars daily   Substance and Sexual Activity    Alcohol use: No     Alcohol/week: 0.0 standard drinks    Drug use: No      MEDICATIONS & PRESCRIPTIONS     Allergies   Allergen Reactions    Sulfa (Sulfonamide Antibiotics) Unknown (comments)      No current facility-administered medications for this visit. No current outpatient medications on file. Facility-Administered Medications Ordered in Other Visits   Medication Dose Route Frequency    sodium chloride (NS) flush 5-40 mL  5-40 mL IntraVENous Q8H    sodium chloride (NS) flush 5-40 mL  5-40 mL IntraVENous PRN    amLODIPine (NORVASC) tablet 10 mg  10 mg Oral DAILY    aspirin chewable tablet 81 mg  81 mg Oral BID    vitamin B complex tablet  1 Tablet Oral DAILY    DULoxetine (CYMBALTA) capsule 60 mg  60 mg Oral DAILY    HYDROcodone-acetaminophen (NORCO) 5-325 mg per tablet 1 Tablet  1 Tablet Oral DAILY PRN    levothyroxine (SYNTHROID) tablet 50 mcg  50 mcg Oral ACB    lansoprazole (PREVACID) 3 mg/mL oral suspension 30 mg  30 mg Oral BID    . PHARMACY TO SUBSTITUTE PER PROTOCOL (Reordered from: lidocaine (LIDODERM) 5 %)    Per Protocol    midodrine (PROAMATINE) tablet 5 mg  5 mg Oral DAILY PRN    QUEtiapine (SEROquel) tablet 75 mg  75 mg Oral QHS    tamsulosin (FLOMAX) capsule 0.4 mg  0.4 mg Oral DAILY    sodium chloride (NS) flush 5-40 mL  5-40 mL IntraVENous Q8H    sodium chloride (NS) flush 5-40 mL  5-40 mL IntraVENous PRN    acetaminophen (TYLENOL) tablet 650 mg  650 mg Oral Q6H PRN    Or    acetaminophen (TYLENOL) suppository 650 mg  650 mg Rectal Q6H PRN    polyethylene glycol (MIRALAX) packet 17 g  17 g Oral DAILY PRN    ondansetron (ZOFRAN ODT) tablet 4 mg  4 mg Oral Q8H PRN    Or    ondansetron (ZOFRAN) injection 4 mg  4 mg IntraVENous Q6H PRN    heparin (porcine) injection 5,000 Units  5,000 Units SubCUTAneous Q8H    predniSONE (DELTASONE) tablet 40 mg  40 mg Oral DAILY WITH BREAKFAST    L.acidophilus-paracasei-S.thermophil-bifidobacter (RISAQUAD) 8 billion cell capsule  1 Capsule Oral DAILY    doxycycline (VIBRAMYCIN) 100 mg in 0.9% sodium chloride (MBP/ADV) 100 mL MBP  100 mg IntraVENous Q12H    cefTRIAXone (ROCEPHIN) 1 g in sterile water (preservative free) 10 mL IV syringe  1 g IntraVENous Q24H    albuterol-ipratropium (DUO-NEB) 2.5 MG-0.5 MG/3 ML  3 mL Nebulization Q4H PRN    insulin lispro (HUMALOG) injection   SubCUTAneous AC&HS    glucose chewable tablet 16 g  4 Tablet Oral PRN    glucagon (GLUCAGEN) injection 1 mg  1 mg IntraMUSCular PRN    dextrose 10 % infusion 0-250 mL  0-250 mL IntraVENous PRN    insulin glargine (LANTUS) injection 8 Units  8 Units SubCUTAneous QHS     No orders of the defined types were placed in this encounter.       TEST DATA REVIEWED:     Lab Results   Component Value Date/Time    Hemoglobin A1c 9.4 (H) 09/08/2020 04:30 PM    Hemoglobin A1c, External 11.2 01/27/2016 12:00 AM     Lab Results   Component Value Date/Time    Microalb/Creat ratio (ug/mg creat.) 63.6 (H) 02/12/2019 08:49 AM     Lab Results   Component Value Date/Time    TSH 2.42 02/27/2022 08:20 AM     No results found for: Maurisio Boucher, Adia Feliz, TIM Ridley Lab Results   Component Value Date/Time    WBC 9.8 02/27/2022 08:20 AM    HGB 12.0 (L) 02/27/2022 08:20 AM    PLATELET 740 26/13/3630 08:20 AM     Lab Results   Component Value Date/Time    Sodium 132 (L) 02/27/2022 08:20 AM    Potassium 4.6 02/27/2022 08:20 AM    Chloride 102 02/27/2022 08:20 AM    CO2 25 02/27/2022 08:20 AM    BUN 16 02/27/2022 08:20 AM    Creatinine 1.55 (H) 02/27/2022 08:20 AM    Calcium 8.8 02/27/2022 08:20 AM    Magnesium 1.8 02/27/2022 08:20 AM    Phosphorus 4.1 02/27/2022 08:20 AM      Lab Results   Component Value Date/Time    Alk.  phosphatase 89 02/27/2022 08:20 AM    Protein, total 7.1 02/27/2022 08:20 AM    Albumin 2.7 (L) 02/27/2022 08:20 AM    Globulin 4.4 (H) 02/27/2022 08:20 AM     Lab Results   Component Value Date/Time    Iron 39 02/04/2019 07:35 PM    TIBC 196 (L) 02/04/2019 07:35 PM    Iron % saturation 20 02/04/2019 07:35 PM    Ferritin 75 02/04/2019 07:35 PM          Coleman Adam MD

## 2022-02-18 NOTE — PATIENT INSTRUCTIONS
Duloxetine (By mouth)   Duloxetine (doo-LOX-e-teen)  Treats depression, anxiety, diabetic peripheral neuropathy, fibromyalgia, and chronic muscle or bone pain. This medicine is an SSNRI. Brand Name(s): Cymbalta, DermacinRx Jonnie Camilo   There may be other brand names for this medicine. When This Medicine Should Not Be Used: This medicine is not right for everyone. Do not use it if you had an allergic reaction to duloxetine. How to Use This Medicine:   Capsule, Delayed Release Capsule  · Take your medicine as directed. Your dose may need to be changed several times to find what works best for you. · Delayed-release capsule: Swallow the capsule whole. Do not crush, chew, break, or open it. · This medicine should come with a Medication Guide. Ask your pharmacist for a copy if you do not have one. · Missed dose: Take a dose as soon as you remember. If it is almost time for your next dose, wait until then and take a regular dose. Do not take extra medicine to make up for a missed dose. · Store the medicine in a closed container at room temperature, away from heat, moisture, and direct light. Drugs and Foods to Avoid:   Ask your doctor or pharmacist before using any other medicine, including over-the-counter medicines, vitamins, and herbal products. · Do not take duloxetine if you have used an MAO inhibitor (MAOI) within the past 14 days. Do not start taking an MAO inhibitor within 5 days of stopping duloxetine. · Some medicines can affect how duloxetine works.  Tell your doctor if you are using any of the following:  ¨ Buspirone, cimetidine, ciprofloxacin, enoxacin, fentanyl, lithium, Abhijit's wort, theophylline, tramadol, tryptophan, or warfarin  ¨ Amphetamines  ¨ Blood pressure medicine  ¨ Diuretic (water pill)  ¨ Medicine for heart rhythm problems (including flecainide, propafenone, quinidine)  ¨ Medicine to treat migraine headaches (including triptans)  ¨ NSAID pain or arthritis medicine (including aspirin, celecoxib, diclofenac, ibuprofen, naproxen)  ¨ Other medicine to treat depression or mood disorders (including amitriptyline, desipramine, fluoxetine, imipramine, nortriptyline, paroxetine)  ¨ Phenothiazine medicine (including thioridazine)  · Tell your doctor if you use anything else that makes you sleepy. Some examples are allergy medicine, narcotic pain medicine, and alcohol. · Do not drink alcohol while you are using this medicine. Warnings While Using This Medicine:   · Tell your doctor if you are pregnant or breastfeeding, or if you have kidney disease, liver disease, diabetes, digestion problems, glaucoma, heart disease, high or low blood pressure, or problems with urination. Tell your doctor if you smoke or you have a history of seizures, or drug or alcohol addiction. · This medicine may cause the following problems:   ¨ Serious liver problems  ¨ Serotonin syndrome (more likely when used with certain other medicines)  ¨ Increased risk of bleeding problems  ¨ Serious skin reactions  ¨ Low sodium levels in the blood  · This medicine can increase thoughts of suicide. Tell your doctor right away if you start to feel depressed and have thoughts about hurting yourself. · This medicine can cause changes in your blood pressure. This may make you dizzy or drowsy. Do not drive or do anything that could be dangerous until you know how this medicine affects you. Stand up slowly to avoid falls. · Do not stop using this medicine suddenly. Your doctor will need to slowly decrease your dose before you stop it completely. · Your doctor will check your progress and the effects of this medicine at regular visits. Keep all appointments. · Keep all medicine out of the reach of children. Never share your medicine with anyone.   Possible Side Effects While Using This Medicine:   Call your doctor right away if you notice any of these side effects:  · Allergic reaction: Itching or hives, swelling in your face or hands, swelling or tingling in your mouth or throat, chest tightness, trouble breathing  · Anxiety, restlessness, fever, fast heartbeat, sweating, muscle spasms, diarrhea, seeing or hearing things that are not there  · Blistering, peeling, red skin rash  · Confusion, weakness, muscle twitching  · Dark urine or pale stools, nausea, vomiting, loss of appetite, stomach pain, yellow skin or eyes  · Decrease in how much or how often you urinate  · Eye pain, vision changes, seeing halos around lights  · Feeling more energetic than usual  · Lightheadedness, dizziness, or fainting  · Unusual moods or behaviors, worsening depression, thoughts about hurting yourself, trouble sleeping  · Unusual bleeding or bruising  If you notice these less serious side effects, talk with your doctor:   · Decrease in appetite or weight  · Dry mouth, constipation, mild nausea  · Unusual drowsiness, sleepiness, or tiredness  If you notice other side effects that you think are caused by this medicine, tell your doctor. Call your doctor for medical advice about side effects. You may report side effects to FDA at 7-397-FDA-1774  © 2017 [facility] Information is for End User's use only and may not be sold, redistributed or otherwise used for commercial purposes. The above information is an  only. It is not intended as medical advice for individual conditions or treatments. Talk to your doctor, nurse or pharmacist before following any medical regimen to see if it is safe and effective for you.

## 2022-02-23 NOTE — TELEPHONE ENCOUNTER
Triage for Controlled Substance Refill Request     Related Diagnosis: chronic pain     Last Home Visit: 2/16/2022     Next Home Visit: to be determined     Pharmacy: [facility] [address]     Medication:HYDROcodone-acetaminophen (NORCO) 5-325 mg per tablet  Dose and Directions: Take 1 Tablet by mouth daily as needed for Pain for up to 30 days.     Number dispensed:  30     Date filled ():  1/24/2022     # left:  2      reviewed: yes     Date of Urine Toxicology:  9/3/19     Controlled  Substance Agreement:  9/10/19     Appropriate for refill:  yes    Pended to Dr. Qi Villanueva

## 2022-02-27 PROBLEM — I50.23 ACUTE ON CHRONIC HFREF (HEART FAILURE WITH REDUCED EJECTION FRACTION) (HCC): Status: ACTIVE | Noted: 2018-12-25

## 2022-02-27 NOTE — PROGRESS NOTES
Patient's son alerted this RN that patient was nauseous and heaving. Emesis bag provided. Order from Chris Dupree MD for 4mg IV Zofran. When asked about being nauseous, patient states \"yuck\". Answers \"yes\" when asked if he feels sick to his stomach. Abdomen is soft and non-distended. Patient has no urinated \"since 10am\" (on 2/26/22) per son. Was given lasix approx. 3 hours ago, has not urinated. During bladder scan, patient urinated. Pericare completed, new gown and linen change. Small yellow BM.

## 2022-02-27 NOTE — ED PROVIDER NOTES
Patient arrives from home where his son thought the patient was having SOB and may be dehydrated.      EMS arrived and patient was 84% on room air. They put him on 4L NC with an imrovement to 96%-98%     Patient has hx of dementia, DM, and COPD.      He is a poor historian. 32-year-old male brought in to the ER by EMS found to be in respiratory distress ox saturation 84% on room air placed on 4 L nasal cannula which improved patient's ox saturation. Patient has history of CHF, COPD, diabetes and dementia. Has been told that he has a valve problem. Has history of CABG. Patient is a DNR/DNI. Family reports he has had some decreased appetite and worsening shortness of breath the last day or 2. Was concerned that patient was dehydrated. Breathing worsened this evening. No reported any fevers or chills. Denies any lower leg swelling. Patient's mental status at baseline. No report of any chest pain or palpitations. Has history of A. fib not anticoagulated.            Past Medical History:   Diagnosis Date    Atrial fibrillation ([address]     COPD (chronic obstructive pulmonary disease) (HCC)     Diabetes ([address]     1970a    Heart failure ([address]     Hip fracture ([address] 9/24/2021    Hypokalemia 12/25/2018    Hyponatremia 4/11/2017    MI (myocardial infarction) ([address] 01/2019    NSTEMI (non-ST elevated myocardial infarction) ([address] 12/25/2018    Syncope 4/11/2017    Urinary incontinence        Past Surgical History:   Procedure Laterality Date    HX HEENT  1975    nasal surgery    HX MOHS PROCEDURES  2013    left    LA CARDIAC SURG PROCEDURE UNLIST  2000    open heart         Family History:   Problem Relation Age of Onset    Diabetes Mother     Diabetes Brother        Social History     Socioeconomic History    Marital status:      Spouse name: Not on file    Number of children: Not on file    Years of education: Not on file    Highest education level: Not on file   Occupational History    Not on file   Tobacco Use    Smoking status: Former Smoker    Smokeless tobacco: Never Used    Tobacco comment: 1-2 cigars daily   Substance and Sexual Activity    Alcohol use: No     Alcohol/week: 0.0 standard drinks    Drug use: No    Sexual activity: Not on file   Other Topics Concern    Not on file   Social History Narrative    Not on file     Social Determinants of Health     Financial Resource Strain:     Difficulty of Paying Living Expenses: Not on file   Food Insecurity:     Worried About Running Out of Food in the Last Year: Not on file    Jessy of Food in the Last Year: Not on file   Transportation Needs:     Lack of Transportation (Medical): Not on file    Lack of Transportation (Non-Medical): Not on file   Physical Activity:     Days of Exercise per Week: Not on file    Minutes of Exercise per Session: Not on file   Stress:     Feeling of Stress : Not on file   Social Connections:     Frequency of Communication with Friends and Family: Not on file    Frequency of Social Gatherings with Friends and Family: Not on file    Attends Mandaeism Services: Not on file    Active Member of [address] [place] [address] or Organizations: Not on file    Attends Club or Organization Meetings: Not on file    Marital Status: Not on file   Intimate Partner Violence:     Fear of Current or Ex-Partner: Not on file    Emotionally Abused: Not on file    Physically Abused: Not on file    Sexually Abused: Not on file   Housing Stability:     Unable to Pay for Housing in the Last Year: Not on file    Number of [place] in the Last Year: Not on file    Unstable Housing in the Last Year: Not on file         ALLERGIES: Sulfa (sulfonamide antibiotics)    Review of Systems   Unable to perform ROS: Dementia       Vitals:    02/27/22 0208   BP: (!) 170/65   Pulse: 80   Resp: 28   Temp: 98.4 °F (36.9 °C)   SpO2: 92%            Physical Exam  Constitutional:       Appearance: He is well-developed.       Interventions: Nasal cannula in place. Comments: frail   HENT:      Head: Normocephalic. Eyes:      Conjunctiva/sclera: Conjunctivae normal.   Neck:      Vascular: JVD present. Cardiovascular:      Rate and Rhythm: Normal rate and regular rhythm. Heart sounds: Murmur heard. Systolic murmur is present. Pulmonary:      Effort: Pulmonary effort is normal. Tachypnea present. No respiratory distress. Breath sounds: Rales (diffuse) present. Abdominal:      General: Bowel sounds are normal.      Palpations: Abdomen is soft. Tenderness: There is no abdominal tenderness. Musculoskeletal:         General: Normal range of motion. Cervical back: Normal range of motion and neck supple. Skin:     General: Skin is warm. Capillary Refill: Capillary refill takes less than 2 seconds. Findings: No rash. Neurological:      General: No focal deficit present. Mental Status: He is alert. Mental status is at baseline. He is disoriented. Comments: No gross motor or sensory deficits          MDM  Number of Diagnoses or Management Options  Acute congestive heart failure, unspecified heart failure type (HCC)  Atrial fibrillation, unspecified type (HCC)  Chronic kidney disease, unspecified CKD stage  Hypoxia  Murmur  Primary hypertension  Type 2 diabetes mellitus with hyperglycemia, with long-term current use of insulin ([facility] [address] [facility]  Diagnosis management comments: Patient presenting ER with acute respiratory distress requiring submental oxygen hypoxia. Patient has rales on lung exam and significant murmur concerning for significant aortic valve stenosis. Patient hypertensive. Will start patient on Nitropaste and give Lasix for pulmonary edema. Covid negative. Chronic kidney disease no electrolyte abnormalities. Patient proBNP elevated and troponin elevated we will repeat and trend. We will admit for CHF exacerbation.     Total critical care time spent exclusive of procedures:  35min         Amount and/or Complexity of Data Reviewed  Clinical lab tests: reviewed  Tests in the radiology section of CPT®: reviewed  Decide to obtain previous medical records or to obtain history from someone other than the patient: yes      ED Course as of 02/27/22 0352   Sun Feb 27, 2022   0301 A. fib with a rate of 88 bpm with normal interval QRS and normal QTC. No ST elevation depressions. EKG interpreted by Silvana Gary MD   [ZD]   7417 COVID-19 rapid test: Not detected [ZD]      ED Course User Index  [ZD] Nigel Kunz MD       Procedures               Perfect Serve Consult for Admission  3:39 AM    ED Room Number: UQ88/96  Patient Name and age: Brielle Dyer [de-identified] y.o.  male  Working Diagnosis:   1. Acute congestive heart failure, unspecified heart failure type ([facility] [address]    2. Hypoxia    3. Chronic kidney disease, unspecified CKD stage    4. Type 2 diabetes mellitus with hyperglycemia, with long-term current use of insulin (HCC)    5. Atrial fibrillation, unspecified type ([facility] [address]    6. Murmur        COVID-19 Suspicion:  Other, pending rapid  Sepsis present:  no  Reassessment needed: no  Code Status:  Do Not Resuscitate  Readmission: no  Isolation Requirements:  no  Recommended Level of Care:  telemetry  Department:[facility] Adult ED - 21   Other:          Recent Results (from the past 24 hour(s))   CBC WITH AUTOMATED DIFF    Collection Time: 02/27/22  2:18 AM   Result Value Ref Range    WBC 7.5 4.1 - 11.1 K/uL    RBC 4.41 4.10 - 5.70 M/uL    HGB 12.1 12.1 - 17.0 g/dL    HCT 36.0 (L) 36.6 - 50.3 %    MCV 81.6 80.0 - 99.0 FL    MCH 27.4 26.0 - 34.0 PG    MCHC 33.6 30.0 - 36.5 g/dL    RDW 18.4 (H) 11.5 - 14.5 %    PLATELET 426 286 - 840 K/uL    MPV 10.9 8.9 - 12.9 FL    NRBC 0.0 0  WBC    ABSOLUTE NRBC 0.00 0.00 - 0.01 K/uL    NEUTROPHILS 88 (H) 32 - 75 %    LYMPHOCYTES 5 (L) 12 - 49 %    MONOCYTES 6 5 - 13 %    EOSINOPHILS 0 0 - 7 %    BASOPHILS 1 0 - 1 %    IMMATURE GRANULOCYTES 0 0.0 - 0.5 %    ABS.  NEUTROPHILS 6. 5 1.8 - 8.0 K/UL    ABS. LYMPHOCYTES 0.4 (L) 0.8 - 3.5 K/UL    ABS. MONOCYTES 0.5 0.0 - 1.0 K/UL    ABS. EOSINOPHILS 0.0 0.0 - 0.4 K/UL    ABS. BASOPHILS 0.1 0.0 - 0.1 K/UL    ABS. IMM. GRANS. 0.0 0.00 - 0.04 K/UL    DF SMEAR SCANNED      RBC COMMENTS ANISOCYTOSIS  1+        RBC COMMENTS MICROCYTOSIS  1+       METABOLIC PANEL, COMPREHENSIVE    Collection Time: 02/27/22  2:18 AM   Result Value Ref Range    Sodium 132 (L) 136 - 145 mmol/L    Potassium 5.0 3.5 - 5.1 mmol/L    Chloride 102 97 - 108 mmol/L    CO2 26 21 - 32 mmol/L    Anion gap 4 (L) 5 - 15 mmol/L    Glucose 215 (H) 65 - 100 mg/dL    BUN 16 6 - 20 MG/DL    Creatinine 1.52 (H) 0.70 - 1.30 MG/DL    BUN/Creatinine ratio 11 (L) 12 - 20      GFR est AA 54 (L) >60 ml/min/1.73m2    GFR est non-AA 44 (L) >60 ml/min/1.73m2    Calcium 9.2 8.5 - 10.1 MG/DL    Bilirubin, total 0.9 0.2 - 1.0 MG/DL    ALT (SGPT) 14 12 - 78 U/L    AST (SGOT) 18 15 - 37 U/L    Alk. phosphatase 90 45 - 117 U/L    Protein, total 7.5 6.4 - 8.2 g/dL    Albumin 3.0 (L) 3.5 - 5.0 g/dL    Globulin 4.5 (H) 2.0 - 4.0 g/dL    A-G Ratio 0.7 (L) 1.1 - 2.2     SAMPLES BEING HELD    Collection Time: 02/27/22  2:18 AM   Result Value Ref Range    SAMPLES BEING HELD 1RED,1BLUE     COMMENT        Add-on orders for these samples will be processed based on acceptable specimen integrity and analyte stability, which may vary by analyte.    NT-PRO BNP    Collection Time: 02/27/22  2:18 AM   Result Value Ref Range    NT pro-BNP 7,999 (H) <450 PG/ML   TROPONIN-HIGH SENSITIVITY    Collection Time: 02/27/22  2:18 AM   Result Value Ref Range    Troponin-High Sensitivity 21 0 - 76 ng/L   COVID-19 RAPID TEST    Collection Time: 02/27/22  3:19 AM   Result Value Ref Range    Specimen source Nasopharyngeal      COVID-19 rapid test Not detected NOTD         XR CHEST PORT    Result Date: 2/27/2022  INDICATION: sob EXAM:  AP CHEST RADIOGRAPH COMPARISON: February 1, 2022 FINDINGS: AP portable view of the chest demonstrates prior median sternotomy. Unchanged mediastinal silhouette. Bilateral interstitial and airspace opacities, and small bilateral pleural effusions. No pneumothorax. The osseous structures are unremarkable. Interstitial and airspace opacities with pleural effusions, could represent pulmonary edema and/or pneumonia.

## 2022-02-27 NOTE — ED NOTES
Verbal shift change report given to Feliciano (oncoming nurse) by Kate Mcburney, RN (offgoing nurse). Report included the following information SBAR, Kardex and ED Summary.

## 2022-02-27 NOTE — CONSULTS
[address]    Name:  Felicia Clarke  MR#:  [id number]  :  1940  ACCOUNT #:  [de-identified]  DATE OF SERVICE:  2022      REFERRING PHYSICIAN:  Dr. Violeta Mckinney. HISTORY OF PRESENT ILLNESS:  The patient was brought to the hospital when his home oxygen saturation device indicated very low oxygen levels. He was brought to the hospital and subsequently admitted. The patient has profound dementia and was essentially not communicating. His daughter was there and gave all of the background information. The patient was here in 2021 with a hip fracture from a fall and has chronically maintained on midodrine. Review of his prior to admission medications indicates that he probably was not taking a diuretic. The most recent echo in our system is from 2018, and at that time he had an ejection fraction of 40-45%. An echocardiogram has been ordered during this admission, it has not yet been done. REVIEW OF SYSTEMS:  Again is not obtainable. PHYSICAL EXAMINATION:  GENERAL:  This is a well-developed, frail-looking elderly gentleman. He is awake and alert, but not really responsive on a purposeful way. VITAL SIGNS:  Blood pressure 165/87, pulse 83, respirations 29. He is on 4 liters nasal O2. HEENT:  Unremarkable. NECK:  Supple. No adenopathy. CHEST:  Chest wall unremarkable. Anteriorly his lungs were clear. HEART:  Muffled heart tones. No S3 gallop or friction rub. No thrills, lifts, or heaves. ABDOMEN:  Soft, nontender. EXTREMITIES:  No significant edema. NEUROLOGIC:  Deferred. His arms and legs all move in a purposeful way and apparently normal strength. Chest x-ray demonstrates pulmonary edema. Labs include an elevated NT-proBNP at 7999. Several high-sensitivity troponins are here, all in the 20s dating back to at least 2021. Next, an echocardiogram has been ordered, not yet done. IMPRESSION:  1.   The patient is volume overloaded and has mild pulmonary edema. He is in no distress. 2.  He is on midodrine and likely has profound orthostatic hypotension, and for that good reason apparently he has not been on diuretics. 3.  Clearly he needs some diuretics. 4.  An echocardiogram to be done to reassess his ejection fraction. Further recommendations to follow.   Thank you for this referral.      Yordan Love MD      [id number]  D:  02/27/2022 15:02  T:  02/27/2022 15:34  JOB #:  [id number]

## 2022-02-27 NOTE — PROGRESS NOTES
Date of visit: 2/16/22    This is a Subsequent Medicare Annual Wellness Visit (AWV), (Performed more than 12 months after effective date of Medicare Part B enrollment and 12 months after last preventive visit, Once in a lifetime)    I have reviewed the patient's medical history in detail and updated the computerized patient record. History obtained from: child and the patient. Concerns today   (Patient understands that medical problems addressed today may incur additional cost as this is a preventive visit)  -See separate documentation for management of chronic medical conditions.     History     Patient Active Problem List   Diagnosis Code    Late onset Alzheimer's disease with behavioral disturbance ([facility] G30.1, F02.81    Moderate recurrent major depression ([facility] F33.1    Uncontrolled type 2 diabetes mellitus with hyperglycemia ([address] E11.65    Urinary incontinence R32    CAD (coronary artery disease) I25.10    COPD (chronic obstructive pulmonary disease) ([facility]) J44.9    Orthostatic hypotension I95.1    Acute on chronic HFrEF (heart failure with reduced ejection fraction) ([facility]) I50.23    Prostate cancer ([facility] C61    Acquired hypothyroidism E03.9    Other chronic pain G89.29    Debility R53.81    COVID-19 vaccine dose declined Z28.21    Essential hypertension I10    Stage 3b chronic kidney disease ([facility]) N18.32    History of GI bleed Z87.19    Gastritis with hemorrhage K29.71    History of fracture of left hip Z87.81     Past Medical History:   Diagnosis Date    Atrial fibrillation (HCC)     COPD (chronic obstructive pulmonary disease) ([facility]     Diabetes ([address]     1970a    Heart failure ([facility]     Hip fracture ([address] 9/24/2021    Hypokalemia 12/25/2018    Hyponatremia 4/11/2017    MI (myocardial infarction) ([address] 01/2019    NSTEMI (non-ST elevated myocardial infarction) ([address] 12/25/2018    Syncope 4/11/2017    Urinary incontinence       Past Surgical History:   Procedure Laterality Date    HX HEENT  1975    nasal surgery    HX MOHS PROCEDURES  2013    left    [place] CARDIAC SURG PROCEDURE UNLIST  2000    open heart     Allergies   Allergen Reactions    Sulfa (Sulfonamide Antibiotics) Unknown (comments)     Facility-Administered Medications Ordered in Other Visits   Medication Dose Route Frequency Provider Last Rate Last Admin    sodium chloride (NS) flush 5-40 mL  5-40 mL IntraVENous Q8H Felecia Castro MD   10 mL at 02/27/22 2537    sodium chloride (NS) flush 5-40 mL  5-40 mL IntraVENous PRN Felecia Castro MD        amLODIPine (NORVASC) tablet 10 mg  10 mg Oral DAILY Ramón Beach MD   10 mg at 02/27/22 1435    aspirin chewable tablet 81 mg  81 mg Oral BID Ramón Melo MD        vitamin B complex tablet  1 Tablet Oral DAILY Ramón Melo MD        DULoxetine (CYMBALTA) capsule 60 mg  60 mg Oral DAILY Ramón Melo MD        HYDROcodone-acetaminophen (NORCO) 5-325 mg per tablet 1 Tablet  1 Tablet Oral DAILY PRN Ramón Melo MD        levothyroxine (SYNTHROID) tablet 50 mcg  50 mcg Oral ACB Ramón Melo MD   50 mcg at 02/27/22 0843    lansoprazole (PREVACID) 3 mg/mL oral suspension 30 mg  30 mg Oral BID Rhett Elliott MD        . PHARMACY TO SUBSTITUTE PER PROTOCOL (Reordered from: lidocaine (LIDODERM) 5 %)    Per Protocol Ramón Melo MD        midodrine (PROAMATINE) tablet 5 mg  5 mg Oral DAILY PRN Ramón Melo MD        QUEtiapine (SEROquel) tablet 75 mg  75 mg Oral QHS Ramón Melo MD        tamsulosin (FLOMAX) capsule 0.4 mg  0.4 mg Oral DAILY Ramón Melo MD        sodium chloride (NS) flush 5-40 mL  5-40 mL IntraVENous Q8H Ramón Melo MD   10 mL at 02/27/22 0849    sodium chloride (NS) flush 5-40 mL  5-40 mL IntraVENous PRN Ramón Melo MD        acetaminophen (TYLENOL) tablet 650 mg  650 mg Oral Q6H PRN Ramón Melo MD        Or    acetaminophen (TYLENOL) suppository 650 mg  650 mg Rectal Q6H PRN Deirdre Live MD        polyethylene glycol (MIRALAX) packet 17 g  17 g Oral DAILY PRN Ramón Melo MD        ondansetron (ZOFRAN ODT) tablet 4 mg  4 mg Oral Q8H PRN Ramón Melo MD        Or    ondansetron (ZOFRAN) injection 4 mg  4 mg IntraVENous Q6H PRN Ramón Melo MD        heparin (porcine) injection 5,000 Units  5,000 Units SubCUTAneous Q8H Ramón Melo MD   5,000 Units at 02/27/22 0844    predniSONE (DELTASONE) tablet 40 mg  40 mg Oral DAILY WITH BREAKFAST Ramón Melo MD   40 mg at 02/27/22 0843    L.acidophilus-paracasei-S.thermophil-bifidobacter (RISAQUAD) 8 billion cell capsule  1 Capsule Oral DAILY Ramón Melo MD   1 Capsule at 02/27/22 0844    doxycycline (VIBRAMYCIN) 100 mg in 0.9% sodium chloride (MBP/ADV) 100 mL MBP  100 mg IntraVENous Q12H Ramón Melo  mL/hr at 02/27/22 0842 100 mg at 02/27/22 0842    cefTRIAXone (ROCEPHIN) 1 g in sterile water (preservative free) 10 mL IV syringe  1 g IntraVENous Q24H Ramón Melo MD        albuterol-ipratropium (DUO-NEB) 2.5 MG-0.5 MG/3 ML  3 mL Nebulization Q4H PRN Ramón Melo MD        insulin lispro (HUMALOG) injection   SubCUTAneous AC&HS Ramón Melo MD   2 Units at 02/27/22 0847    glucose chewable tablet 16 g  4 Tablet Oral PRN Ramón Melo MD        glucagon (GLUCAGEN) injection 1 mg  1 mg IntraMUSCular PRN Ramón Melo MD        dextrose 10 % infusion 0-250 mL  0-250 mL IntraVENous PRN Ramón Melo MD        insulin glargine (LANTUS) injection 8 Units  8 Units SubCUTAneous QHS Aliyah Garcia MD         Family History   Problem Relation Age of Onset    Diabetes Mother     Diabetes Brother      Social History     Tobacco Use    Smoking status: Former Smoker    Smokeless tobacco: Never Used    Tobacco comment: 1-2 cigars daily   Substance Use Topics    Alcohol use: No     Alcohol/week: 0.0 standard drinks       Specialists/Care Team   Vidal العلي has established care with the following healthcare providers:  PCP: Alberto Castelan MD  No specialist care at this time. Health Risk Assessment     Demographics   male  [de-identified] y.o. [address] Questions   -During the past 4 weeks:   -how would you rate your health in general? severe dementia   -how often have you been bothered by feeling dizzy when standing up? patient cannot answer d/t dementia   -how much have you been bothered by bodily pain? Patient cannot answer d/t dementia   -Have you noticed any hearing difficulties? Patient cannot answer d/t dementia   -has your physical and emotional health limited your social activities with family or friends? yes    Emotional Health Questions   -Do you have a history of depression, anxiety, or emotional problems? Patient cannot answer d/t dementia  -Over the past 2 weeks, have you felt down, depressed or hopeless? Patient cannot answer d/t dementia  -Over the past 2 weeks, have you felt little interest or pleasure in doing things? Patient cannot answer d/t dementia    Health Habits   Please describe your diet habits: patient eats foods as prepared by his family. He likes items such as mashed potatoes. Do you exercise regularly? no    Activities of Daily Living and Functional Status   -Do you need help with eating, walking, dressing, bathing, toileting, the phone, transportation, shopping, preparing meals, housework, laundry, medications or managing money? yes  -In the past four weeks, was someone available to help you if you needed and wanted help with anything? Patient cannot answer d/t dementia. -Are you confident are you that you can control and manage most of your health problems? yes  -Have you been given information to help you keep track of your medications? yes  -How often do you have trouble taking your medications as prescribed? never    Fall Risk and Home Safety   Have you fallen 2 or more times in the past year?  yes  Do you have difficulties driving a car? n/a  Do you always fasten your seat belt when you are in a car? n/a    Review of Systems (if indicated for problems addressed today)   -See separate documentation for management of chronic medical conditions. Physical Examination     Vitals:    02/18/22 1125   Resp: 16     There is no height or weight on file to calculate BMI. No exam data present  Was the patient's timed Up & Go test unsteady or longer than 30 seconds? N/a, patient does not ambulate regularly    Evaluation of Cognitive Function   Mood/affect:  withdrawn  Orientation: Person  Appearance: age appropriate and well dressed  Family member/caregiver input: -See separate documentation for management of chronic medical conditions. Additional exam if indicated for problems addressed today:  -See separate documentation for management of chronic medical conditions. Advice/Referrals/Counseling (as indicated)   Education and counseling provided for any problems identified above: -See separate documentation for management of chronic medical conditions. Preventive Services   -Family declines labs, vaccines or tests needed outside the home d/t patient impaired mobility, severe dementia, agitation associated with dementia. Discussion of Advance Directive   There is no AMD, and patient cannot complete one d/t dementia. There is a DDNR on file signed by his daughter in 2019. Assessment/Plan       ICD-10-CM ICD-9-CM    1. Late onset Alzheimer's disease with behavioral disturbance (HCC)  G30.1 331.0     F02.81 294.11    2. Other chronic pain  G89.29 338.29    3. Uncontrolled type 2 diabetes mellitus with hyperglycemia (HCC)  E11.65 250.02    4. Essential hypertension  I10 401.9    5. Moderate recurrent major depression (HCC)  F33.1 296.32    6. Chronic obstructive pulmonary disease, unspecified COPD type ([facility]  J44.9 496    7. Stage 3b chronic kidney disease (HCC)  N18.32 585.3    8.  Chronic systolic congestive heart failure (HCC)  I50.22 428.22      428.0    9. Gastritis with hemorrhage, unspecified chronicity, unspecified gastritis type  K29.71 535.51    10. Prostate cancer ([facility]  C61 185    11. Medicare annual wellness visit, subsequent  Z00.00 V70.0        -[address]  visit completed. -See separate note for management of chronic conditions.  -Family declines labs, vaccines or tests needed outside the home d/t patient impaired mobility, severe dementia, agitation associated with dementia.  -Will continue to follow patient approx every 3 months for management of chronic conditions.       Rito Gonzalez MD

## 2022-02-27 NOTE — PROGRESS NOTES
Patient's son at bedside, reports his father was \"feeling rough\" for a couple days. States that patient hasn't eaten or drank much in a few days and \"hasn't peed all day\". Patient's son feels that he may be having difficulty swallowing now as well d/t \"he was heaving when drinking water\".

## 2022-02-27 NOTE — H&P
[address]  HISTORY AND PHYSICAL    Name:  Neri Kumari  MR#:  [id number]  :  1940  ACCOUNT #:  [de-identified]  ADMIT DATE:  2022      The patient was seen, evaluated, and admitted by me on 2022. PRIMARY CARE PHYSICIAN:  Kylee Way MD    SOURCE OF INFORMATION:  The patient who is not a good historian because of his dementia, the patient's son who was present at the bedside and review of ED and old electronic medical records. CHIEF COMPLAINT:  Shortness of breath. HISTORY OF PRESENT ILLNESS:  This is an 81-year-old man with a past medical history significant for chronic congestive heart failure; COPD; chronic atrial fibrillation; type 2 diabetes; hypertension; hypothyroidism; dementia; coronary artery disease, status post CABG; chronic kidney disease, was in his usual state of health until a couple of days ago when the patient developed shortness of breath. This is progressive and getting worse. His son called EMS 2 days ago. The patient was evaluated at home, was found to have decent oxygen saturation, the patient was reassured and was not brought to the hospital.  His son was asked to get pulse oximeter to measure the patient's oxygen saturation at home. Because of worsening symptoms, his son called EMS and when the EMS arrived at the scene, the patient's oxygen saturation was 84% on room air. He was then placed on 4 liters of  nasal cannula and the oxygen saturation improved to 96-98%. The patient is not on oxygen at home. He was brought to the emergency room for further evaluation. When the patient arrived at the emergency room, the patient was found to have an elevated BNP level. Chest x-ray suggestive of pneumonia as well as vascular congestion. The patient was referred to the hospitalist service for admission. The patient is a very poor historian because of his dementia. He was last admitted to this hospital from 2021 to 10/01/2021.   The patient was admitted following a fall, in which the patient sustained a left hip fracture and underwent operative repair for the hip fracture. The patient was also treated for GI bleed during that hospitalization. This could be the reason why the patient is no longer on anticoagulation for atrial fibrillation. PAST MEDICAL HISTORY:  COPD; chronic congestive heart failure; chronic atrial fibrillation; type 2 diabetes; hypertension; dementia; hypothyroidism; coronary artery disease, status post CABG; chronic kidney disease. ALLERGIES:  NO KNOWN DRUG ALLERGIES. MEDICATIONS:  1. Tylenol 650 mg every 6 hours as needed for pain and fever. 2.  Norvasc 10 mg daily. 3.  Aspirin 81 mg daily. 4.  Cymbalta 60 mg daily. 5.  Norco 5/325 one tablet daily as needed for pain. 6.  Lantus insulin 40 units subcutaneously daily in the morning. 7.  Sliding scale with insulin coverage. 8.  Prevacid 30 mg twice daily. 9.  Synthroid 50 mcg daily. 10.  Midodrine 5 mg daily. 11.  Zofran 4 mg every 6 hours as needed. 12.  Seroquel 50 mg twice daily. 13.  Flomax 0.4 mg daily. 14.  Depakene 250 mg twice daily. FAMILY HISTORY:  This was reviewed. His mother had diabetes. PAST SURGICAL HISTORY:  This is significant for CABG, nasal surgery. SOCIAL HISTORY:  The patient is a former smoker. No history of alcohol abuse. REVIEW OF SYSTEMS:  Unable to obtain because of the patient's mental status. PHYSICAL EXAMINATION:  GENERAL APPEARANCE:  The patient appeared ill, in moderate distress. VITAL SIGNS:  On arrival at the emergency room, temperature 98.4, pulse 80, respiratory rate of 28, blood pressure 170/65, oxygen saturation 92% on 4 liters of oxygen. HEENT:  Head:  Normocephalic, atraumatic. Eyes:  Normal eye movement. No redness, no drainage, no discharge. Ears:  Normal external ears with no evidence of drainage. Nose:  No deformity and no drainage. Mouth and Throat:  No visible oral lesion.   NECK: Neck is supple. Mild JVD. No thyromegaly. CHEST:  Diffuse expiratory wheezing and bilateral basilar crackles. HEART:  Normal S1 and S2, regular. No clinically appreciable murmur. ABDOMEN:  Soft, nontender. Normal bowel sounds. CNS:  Alert, oriented to person and place. No gross focal neurological deficit. EXTREMITIES:  No edema. Pulses 2+ bilaterally. MUSCULOSKELETAL SYSTEM:  No evidence of joint deformity and swelling. SKIN:  No active skin lesions seen in the exposed part of the body. PSYCHIATRY:  Unable to assess mood and affect. LYMPHATIC SYSTEM:  No cervical lymphadenopathy. DIAGNOSTIC DATA:  Chest x-ray shows interstitial and airspace opacities with pleural effusion could represent pulmonary edema and/or pneumonia. EKG shows atrial fibrillation and nonspecific ST and T-waves abnormalities. LABORATORY DATA:  Chemistry:  Sodium 132, potassium 5.0, chloride 102, CO2 of 26, glucose 215, BUN 16, creatinine 1.52, calcium 9.2, total bilirubin 0.9, ALT 14, AST 18, alkaline phosphatase 90, total protein at 7.5, albumin level 3.0. Hematology:  WBC 7.5, hemoglobin 12.1, hematocrit 36.0, platelets 254. Troponin high-sensitivity 21. Pro-BNP level 7999. COVID-19 rapid test not detected. ASSESSMENT:  1. Acute-on-chronic heart failure with reduced ejection fraction. 2.  Chronic obstructive pulmonary disease with acute exacerbation. 3.  Persistent atrial fibrillation. 4.  Type 2 diabetes with hyperglycemia. 5.  Hypertension. 6.  Dementia with behavioral disturbance. 7.  Hypothyroidism. 8.  Coronary artery disease, status post coronary artery bypass grafting. 9.  Chronic kidney disease, stage III. 10.  Hyponatremia. 11.  Bacterial pneumonia. 12.  Acute respiratory failure with hypoxia. PLAN:  1. Acute-on-chronic heart failure with reduced ejection fraction. We will admit the patient for further evaluation and treatment.   This is the most likely cause of the patient's shortness of breath. We will start the patient on Lasix. We will obtain echocardiogram.  We will check serial cardiac markers to rule out acute myocardial infarction. The patient's Cardiology group will be consulted to assist in further evaluation and treatment. 2.  COPD with acute exacerbation. This is also contributing to the patient's shortness of breath. We will start the patient on short course of prednisone. We will continue with DuoNeb as needed. We will monitor the patient closely. 3.  Persistent atrial fibrillation. The patient is not on any anticoagulation. Most likely as a result of fall. We will monitor the patient closely. We will await further recommendation from the cardiologist.  4.  Type 2 diabetes with hyperglycemia. The patient will be placed on sliding scale with insulin coverage. We will check hemoglobin A1c level if one has not been checked recently. We will also resume home basal insulin. 5.  Hypertension. We will continue with preadmission medication. 6.  Dementia with behavioral disturbance. We will resume preadmission medication. We will also carry out supportive treatment. 7.  Hypothyroidism. We will continue with Synthroid. We will check TSH level. 8.  Coronary artery disease, status post CABG. The patient is chest pain free. We will continue with cardiac medication. 9.  Chronic kidney disease stage III. We will continue to monitor the patient's renal function. The patient may require Nephrology consult if this is getting worse. 10.  Hyponatremia. This is mild. Most likely due to the congestive heart failure. We will monitor the patient's sodium level closely. 11.  Bacterial pneumonia. We will start the patient on Rocephin and doxycycline. This is also contributing to the patient's shortness of breath. We will obtain CT scan of the chest without contrast for further evaluation of the bacterial pneumonia. 12.  Acute respiratory failure with hypoxia.   This is multifactorial and includes bacterial pneumonia, congestive heart failure. We will check serial markers to rule out acute myocardial infarction. We will obtain D-dimer to evaluate the patient for thromboembolism as another possible cause of acute respiratory failure with hypoxia. If the patient's D-dimer is significantly elevated, the patient will require V/Q scan and ultrasound of the lower extremities to evaluate the patient for thromboembolism. OTHER ISSUES:  Code Status: The patient is a full code. We will place the patient on heparin for DVT prophylaxis. FUNCTIONAL STATUS PRIOR TO ADMISSION:  The patient came from home. The patient is nonambulatory. COVID PRECAUTION:  The patient was wearing a facemask. I was wearing a facemask and gloves for this patient's encounter.         Trice Kuhn MD      [id number]  D:  02/27/2022 6:25  T:  02/27/2022 7:51  JOB #:  [id number]  CC:  Greer Infante MD

## 2022-02-27 NOTE — ED TRIAGE NOTES
Patient arrives from home where his son thought the patient was having SOB and may be dehydrated. EMS arrived and patient was 84% on room air. They put him on 4L NC with an imrovement to 96%-98%    Patient has hx of dementia, DM, CHF, Prostate CA, and COPD. He is a poor historian.

## 2022-02-27 NOTE — ED NOTES
TRANSFER - OUT REPORT:    Verbal report given to Joe Jo RN (name) on Sobia Dominguez  being transferred to [address] [place] [address] Drive (unit) for routine progression of care       Report consisted of patients Situation, Background, Assessment and   Recommendations(SBAR). Information from the following report(s) SBAR, Kardex and ED Summary was reviewed with the receiving nurse. Lines:   Peripheral IV 02/27/22 Right Antecubital (Active)   Site Assessment Clean, dry, & intact 02/27/22 0218   Phlebitis Assessment 0 02/27/22 0218   Infiltration Assessment 0 02/27/22 0218   Dressing Status Clean, dry, & intact 02/27/22 5349        Opportunity for questions and clarification was provided.       Patient transported with:   Monitor  Registered Nurse

## 2022-02-27 NOTE — PROGRESS NOTES
Charting and patient care of Sobia Dominguez by Chacorta Matute RN from 21 [phone] to 2000 was supervised and reviewed by this RN.

## 2022-02-28 NOTE — NURSE NAVIGATOR
Chart reviewed by Heart Failure Nurse Navigator. Heart Failure database completed. EF:  Prior echo 2018, ef 40/45%; repeat echo pending     ACEi/ARB/ARNi: not currently indicated    BB: not currently indicated    Aldosterone Antagonist: not currently indicated    Obstructive Sleep Apnea Screening: screening priority 3 - advanced age, dementia   Referred to Sleep Medicine:     CRT not currently indicated. NYHA Functional Class documentation requested. Heart Failure Teach Back in Patient Education. Heart Failure Avoiding Triggers on Discharge Instructions. Cardiologist: [facility]      Post discharge follow up phone call to be made within 48-72 hours of discharge.

## 2022-02-28 NOTE — PROGRESS NOTES
0730: Bedside shift change report given to Libby Casper (oncoming nurse) by Herrera Wall (offgoing nurse). Report included the following information SBAR, Kardex, MAR and Cardiac Rhythm Afib c PVC.

## 2022-02-28 NOTE — PROGRESS NOTES
Occupational Therapy  2/28/2022    Chart reviewed. Referral for OT received. Met with pt and son at bedside. Per son, pt with hx of significant dementia and requires A with all ADLs. Pt's son, Evans Matthew, lives with pt. Pt is charan lifted OOB to his recliner chair where he sits for 6-7 hours each day. Pt also has a w/c. Son performs pt's ADLs at bed level as pt is incontinent. Up until last week, pt was able to feed himself. His son is paid caregiver daily for 7 hours. Son reports being able to manage pt at home and has no concerns with discharge home. His sister has ordered a \"hospital chair\" with a set belt for pt. Issued pt waffle cushion to assist with pressure relief since pt is sitting for prolonged periods of time. Will sign off at this time as formal OT evaluation is not warranted. Son in agreement.  Shanel Worthy, OT

## 2022-02-28 NOTE — WOUND CARE
WOCN Note:     New wound care consult placed for erythema to sacrum and heels  Assessed in room 467    Chart shows:  Patient admitted on 2/27/22 with acute on chronic HF  Past Medical History:   Diagnosis Date    [facility]     [facility]     Diabetes ([facility]     1970a    Heart failure ([facility]     Hip fracture ([facility] 9/24/2021    Hypokalemia 12/25/2018    Hyponatremia 4/11/2017    MI (myocardial infarction) ([facility] 01/2019    NSTEMI (non-ST elevated myocardial infarction) ([facility] 12/25/2018    Syncope 4/11/2017    Urinary incontinence       Assessment:   Alert, confused  Mobility: total assistance with repositioning and turning  Continence: Incontinent of urine and stool   Last Flynn Score: 13  Surface: foam mattress  Diet: dysphagia, 3 carb choice     Wt Readings from Last 1 Encounters:   02/28/22 66.8 kg (147 lb 4.3 oz)     Heels offloaded with pillows     Right heel, left heel, and sacrum skin intact with blanchable erythema    Wound Recommendations:      Venelex ointment to bilateral heels and sacrum every 8 hours. Sacral foam border dressing to sacrum, change every 3 days    PI Prevention:  Turn/reposition approximately every 2 hours  Offload heels with pillows at all times in bed. Z-guard cream  to buttocks and sacrum TID  and as needed with incontinence care   Pad bony prominences   Keep HOB 30 degrees or less to decrease shearing and pressure unless medically contraindicated. If HOB is to be over 30 degrees, raise knees first then [facility] to prevent sliding   Minimize layers of linen/pads under patient to optimize support surface to one flat sheet and one incontinence pad   Air mattress Prius ordered today. Please call [employer] at [phone] for bed to be picked up at discharge     Orders received from Dr. Guilherme Miller  Discussed with RN    Transition of Care: Plan to follow weekly and as needed while admitted to hospital.      Josué Jack MSN, RN, [facility]  Certified Wound and Ostomy Nurse  office [phone]  Can be reached through [address] [place] [address]

## 2022-02-28 NOTE — PROGRESS NOTES
Home Based [address] [place] [address] Team Members: Taya Bruce MD; Nikki Cunningham NP; Stephanie Vega, MILTON; Charito Pierre RN; Hailey Ray, RN; Ernestina Poe RN;  Linda Barbosa LCSW    [place] George Mushtaq  1940 / [id number]  male    The physician has reviewed and discussed the plan of care with the interdisciplinary group on 03/01/22 and agrees. Date of Initial Visit (Start of Care): 2/12/19     Diagnosis: [de-identified] y.o. male with dementia, COPD,  chronic diastolic CHF, persistent afib, Type 2 diabetes with hypoglycemia, chronic anemia, hypothyroidism, depression    Patient status summary: Patient and POC discussed in IDT meeting for 60 day update. Homebound patient referred by Palliative Medicine Inpatient Team, Nicolette Lassiter NP to our services due to taxing effort to seek primary care needs in the community. DME/Supplies:  Bedside [employer]     Advance Care Planning:  Code status: DNR--DDNR singed on 12/28/2018 is on file      Primary Decision Maker (Active): Linn Villegas - [phone]    Primary Decision Maker: Vikas Param - [phone]    Secondary Decision Maker: kyle Quintanaalcira - Other [facility] - [phone]    Allergies   Allergen Reactions    Sulfa (Sulfonamide Antibiotics) Unknown (comments)     Nutritional Requirements:   Oral with supported meal preparation    Functional/Activity Level:  Limited ambulation with support of wheelchair and Wheelchair with assistance for transfers    Safety Measures:   Fall risk, Self-care deficity and Smoker    No current facility-administered medications for this visit. No current outpatient medications on file.      Facility-Administered Medications Ordered in Other Visits   Medication Dose Route Frequency    lidocaine (PF) (XYLOCAINE) 10 mg/mL (1 %) injection 10 mL  10 mL SubCUTAneous RAD ONCE    sodium chloride (NS) flush 5-40 mL  5-40 mL IntraVENous Q8H    sodium chloride (NS) flush 5-40 mL  5-40 mL IntraVENous PRN    amLODIPine (NORVASC) tablet 10 mg  10 mg Oral DAILY    aspirin chewable tablet 81 mg  81 mg Oral BID    vitamin B complex tablet  1 Tablet Oral DAILY    DULoxetine (CYMBALTA) capsule 60 mg  60 mg Oral DAILY    HYDROcodone-acetaminophen (NORCO) 5-325 mg per tablet 1 Tablet  1 Tablet Oral DAILY PRN    levothyroxine (SYNTHROID) tablet 50 mcg  50 mcg Oral ACB    lansoprazole (PREVACID) 3 mg/mL oral suspension 30 mg  30 mg Oral BID    midodrine (PROAMATINE) tablet 5 mg  5 mg Oral DAILY PRN    QUEtiapine (SEROquel) tablet 75 mg  75 mg Oral QHS    tamsulosin (FLOMAX) capsule 0.4 mg  0.4 mg Oral DAILY    sodium chloride (NS) flush 5-40 mL  5-40 mL IntraVENous Q8H    sodium chloride (NS) flush 5-40 mL  5-40 mL IntraVENous PRN    acetaminophen (TYLENOL) tablet 650 mg  650 mg Oral Q6H PRN    Or    acetaminophen (TYLENOL) suppository 650 mg  650 mg Rectal Q6H PRN    polyethylene glycol (MIRALAX) packet 17 g  17 g Oral DAILY PRN    ondansetron (ZOFRAN ODT) tablet 4 mg  4 mg Oral Q8H PRN    Or    ondansetron (ZOFRAN) injection 4 mg  4 mg IntraVENous Q6H PRN    heparin (porcine) injection 5,000 Units  5,000 Units SubCUTAneous Q8H    predniSONE (DELTASONE) tablet 40 mg  40 mg Oral DAILY WITH BREAKFAST    L.acidophilus-paracasei-S.thermophil-bifidobacter (RISAQUAD) 8 billion cell capsule  1 Capsule Oral DAILY    doxycycline (VIBRAMYCIN) 100 mg in 0.9% sodium chloride (MBP/ADV) 100 mL MBP  100 mg IntraVENous Q12H    cefTRIAXone (ROCEPHIN) 1 g in sterile water (preservative free) 10 mL IV syringe  1 g IntraVENous Q24H    albuterol-ipratropium (DUO-NEB) 2.5 MG-0.5 MG/3 ML  3 mL Nebulization Q4H PRN    glucose chewable tablet 16 g  4 Tablet Oral PRN    glucagon (GLUCAGEN) injection 1 mg  1 mg IntraMUSCular PRN    dextrose 10 % infusion 0-250 mL  0-250 mL IntraVENous PRN    insulin glargine (LANTUS) injection 8 Units  8 Units SubCUTAneous QHS    insulin lispro (HUMALOG) injection   SubCUTAneous AC&HS    furosemide (LASIX) injection 20 mg  20 mg IntraVENous BID     The Plan of Care has been initiated for during discussion at interdisciplinary group meeting  and reviewed and updated by the Interdisciplinary Group (IDG) as frequently as the patient condition warrants. Plan of Care by Discipline:    1. Provider  Ongoing evaluation of treatment interventions for specific disease state and Create and implement disease /symptom specific plan to manage high risk conditions / symptoms    2. Nursing  Review Health Maintenance with patient and provider; update as appropriate and Provider caregiver / family support for patient's current and changing condition    3. Social Work  Establish therapeutic relationship through in home visits and telephonic touch points    Plan of Care Orders / Action      - Patient currently hospitalized for \"Low Oxygen Levels\" on 2/27/22. He was brought to the hospital and subsequently admitted. -Gastritis and anemia s/p 1 unit pRBCs during hospitalization: per GI recommendations: \"Continue acid suppression with PPI q 12 hours, to stay on PPI po bid as outpatient. \" Per hospital discharge summary patient was cleared to continue ASA. -Chronic pain: Taking Norco 5-325 mg once daily which is a chronic medication for him.  appropriate. Will refill when due. Holding off on urine drug or serum toxicology screen for now as any attempt to examine patient agitates him significantly. -Dementia: continue supportive care and current Seroquel regimen. Memantine previously discontinued. Patient has profound dementia and was essentially not communicating per ED note. His daughter was there and gave all of the background information. -T2DM: we have been liberal with BGs because of dementia and patient not liking to have frequent BG checks.   -Hypothyroidism: TSH wnl in 09/2020. Continue synthroid.  -Orthostatic hypotension: midodrine available as needed (not needed recently).   Family continues regular blood pressure checks. -CHF: No exacerbations in several years.  No betablocker or ace/arb secondary to [facility] or diuretics. -CKD: avoid nephrotoxic medications.  -Follow up: will plan follow up in 2-3 months, sooner as needed. Will need to complete Controlled Substance Agreement. If patient's behavior improves to the point that he could tolerate labs, then would check TSH and urine toxicology screen. Will also be due for Medicare Wellness Visit.  -The most recent echo in our system is from 2018, and at that time he had an ejection fraction of 40-45%. An echocardiogram has been ordered during this admission, it has not yet been done. **Patient is currently hospitalized. Will coordinate FELICIA upon discharge. **     Health Maintenance   Topic Date Due    Depression Monitoring  Never done    Pneumococcal 65+ years (1 of 1 - PPSV23) Never done    Foot Exam Q1  09/08/2021    COVID-19 Vaccine (2 - Booster for Catie series) 11/25/2021    MICROALBUMIN Q1  01/25/2023 (Originally 2/12/2020)    Eye Exam Retinal or Dilated  03/11/2023 (Originally 12/11/1950)    Shingrix Vaccine Age 50> (1 of 2) 02/15/2025 (Originally 12/11/1991)    Medicare Yearly Exam  02/17/2023    DTaP/Tdap/Td series (2 - Td or Tdap) 09/07/2029    Flu Vaccine  Completed         Estimated Visit Frequency:  Every 3 month provider visit  Last MD visit: 2/16/2022  SW visits prn

## 2022-02-28 NOTE — PROGRESS NOTES
Transitions of Care:    RUR -19% mod    Disposition: Home with family assistance    Follow-up: PCP - Home based    Transportation: BLS - placed on will call for LITA of 3/2/22    Reason for Admission:                       RUR Score:     Shortness of breath -CHF/COPD             PCP: First and Last name:   Jelena Tipton MD     Name of Practice:    Are you a current patient: Yes/No: yes   Approximate date of last visit: about 2 weeks ago   Can you participate in a virtual visit if needed: no    Do you (patient/family) have any concerns for transition/discharge?    None at this time                Plan for utilizing home health:   Not indicated at this time - patient had [facility] for SN and PT until about 2 months ago    Current Advanced Directive/Advance Care Plan:  DNR    Per Sandra Riggs, son Marie Xavier is primary contact for medical decisions - no AMD on file at this time    Healthcare Decision Maker:   Click here to [address] including selection of the Healthcare Decision Maker Relationship (ie \"Primary\")            Primary Decision Maker (Active): Linn Villegas - [phone]    Primary Decision Maker: Ashwini Sheth - [phone]    Secondary Decision Maker: Ana Paula Duran - Other Relative - [phone]    Transition of Care Plan:            CM met with Sandra Riggs son at bedside- patient lives with son - has hx hip fx last year (about 6 months ago) patient now requires charan lift for transfers- also has a transport w/c, charan lift recliner chair with platform, hospital bed and family has also ordered a new recliner type chair for home for improved positioning - patient has Medicare and also [address]  Medicaid with consumer directed care (provided by son) and approved for 7 hours a day/42 hours a week personal care- facilitor is [employer] [facility] [employer] as medicaid payor- uses [facility] at [address] has set up [facility] for will call for transport to home on 3/2/22. OWEN Mann     Medicare pt has received, reviewed, and signed 1st IM letter informing them of their right to appeal the discharge. Signed copied has been placed on pt bedside chart. Care Management Interventions  PCP Verified by CM:  Yes (seen by PCP at home- last visit about 2 weeks ago)  MyChart Signup: No  Discharge Durable Medical Equipment: No  Physical Therapy Consult: Yes  Occupational Therapy Consult: Yes  Speech Therapy Consult: No  Support Systems: Child(del)  Confirm Follow Up Transport: Other (see comment) (BLS for home)  Discharge Location  Patient Expects to be Discharged to[de-identified] Home

## 2022-02-28 NOTE — PROGRESS NOTES
Physical Therapy  2/28/2022      Chart reviewed. Referral for PT received. OT met with pt and son at bedside. Per son, pt with hx of significant dementia and requires A with all ADLs. Pt's son, Kindra Waldron, lives with pt. Pt is charan lifted OOB to his recliner chair where he sits for 6-7 hours each day. Pt also has a w/c. His son is paid caregiver daily for 7 hours. Son reports being able to manage pt at home and has no concerns with discharge home. His sister has ordered a \"hospital chair\" with a seat belt for pt. OT issued pt waffle cushion to assist with pressure relief since pt is sitting for prolonged periods of time. Will sign off at this time as formal PT evaluation is not warranted. Per OT, son in agreement.      Mayelin Palumbo, PT, DPT

## 2022-03-01 NOTE — PROGRESS NOTES
Cardiology Progress Note  3/1/2022     Admit Date: 2022  Admit Diagnosis: Acute on chronic HFrEF (heart failure with reduced ejection fraction) ([facility]) [I50.23]  CC: none currently    Assessment/Plan:   Feels/looks better after thoracentesis and some diuresis  Would recommend furosemide or bumetanide in a small dose 2-3 days per week going forward. Will see prn at your request.  Thank you for this referral.  For other plans, see orders. Subjective: Vidal العلي reports   Chest Pain:  [x]  none;  consistent with []  non-cardiac  []  atypical  []  angina             [x]  none now    []  on-going  Dyspnea: [x]  none    []  at rest    []  with exertion   []  improved    []  unchanged    []  worsening  PND:       [x]  none      []  overnight      Orthopnea:   [x]  none        []  improved         []  unchanged        []  worsening  Presyncope: [x]  none        []  improved         []  unchanged        []  worsening  Ambulated in hallway without symptoms  []  Yes  Ambulated in room without symptoms  []  Yes    Objective:    Physical Exam:  Overall VSSAF;    Visit Vitals  /71 (BP 1 Location: Left upper arm, BP Patient Position: At rest)   Pulse 89   Temp 97.5 °F (36.4 °C)   Resp 17   Ht 5' 4\" (1.626 m)   Wt 66.8 kg (147 lb 4.3 oz)   SpO2 94%   BMI 25.28 kg/m²     Temp (24hrs), Av.8 °F (36.6 °C), Min:97.4 °F (36.3 °C), Max:98.3 °F (36.8 °C)    Patient Vitals for the past 8 hrs:   Pulse   22 1608 89   22 1607 84   22 1600 94   22 1115 85   22 1107 86   22 1106 94    Patient Vitals for the past 8 hrs:   Resp   22 1600 17   22 1106 16    Patient Vitals for the past 8 hrs:   BP   22 1600 131/71   22 1106 (!) 142/60          Intake/Output Summary (Last 24 hours) at 3/1/2022 1729  Last data filed at 2022  Gross per 24 hour   Intake 100 ml   Output    Net 100 ml       General Appearance: Well developed, well nourished, no acute distress. Ears/Nose/Mouth/Throat:   Normal MM; anicteric. JVP: WNL   Resp:   Lungs clear to auscultation bilaterally. Nl resp effort. Cardiovascular:  RRR, S1, S2 normal, no new murmur. No gallop or rub. Abdomen:   Soft, non-tender, bowel sounds are present. Extremities: No edema bilaterally. Skin:  Neuro: Warm and dry. A/O x3, grossly nonfocal    []  cath site intact w/o hematoma or bruit; distal pulse unchanged. Data Review:     Telemetry independently reviewed : []  sinus      []  chronic afib     []  par afib    []  NSVT    ECG independently reviewed:  []  NSR         []  no significant changes  [] no new ECG provided for review  Lab results reviewed as noted below. Current medications reviewed as noted below. No results for input(s): PH, PCO2, PO2 in the last 72 hours. No results for input(s): CPK, CKMB, TROIQ in the last 72 hours. Recent Labs     03/01/22  0415 02/28/22 0311 02/27/22 0820 02/27/22 0218 02/27/22 0218   * 129* 132*   < > 132*   K 4.0 4.3 4.6   < > 5.0   CL 97 98 102   < > 102   CO2 27 25 25   < > 26   BUN 24* 21* 16   < > 16   CREA 1.51* 1.66* 1.55*   < > 1.52*   * 231* 230*   < > 215*   PHOS  --   --  4.1  --   --    CA 8.7 8.6 8.8   < > 9.2   ALB  --   --  2.7*  --  3.0*   WBC 14.6* 11.7* 9.8   < > 7.5   HGB 10.7* 10.9* 12.0*   < > 12.1   HCT 31.4* 32.3* 35.9*   < > 36.0*    296 344   < > 347    < > = values in this interval not displayed. Recent Labs     02/27/22 0820 02/27/22 0218   ALT 13 14   AP 89 90   TBILI 0.8 0.9   TP 7.1 7.5   ALB 2.7* 3.0*   GLOB 4.4* 4.5*     Recent Labs     02/28/22  0311   INR 1.1   PTP 11.1   APTT 34.7*      No results for input(s): FE, TIBC, PSAT, FERR in the last 72 hours.    Lab Results   Component Value Date/Time    Glucose (POC) 209 (H) 03/01/2022 04:42 PM    Glucose (POC) 172 (H) 03/01/2022 11:17 AM    Glucose (POC) 126 (H) 03/01/2022 08:46 AM    Glucose (POC) 246 (H) 02/28/2022 09:43 PM    Glucose (POC) 210 (H) 02/28/2022 04:51 PM       Current Facility-Administered Medications   Medication Dose Route Frequency    predniSONE (DELTASONE) tablet 30 mg  30 mg Oral DAILY WITH BREAKFAST    QUEtiapine (SEROquel) tablet 25 mg  25 mg Oral BID PRN    DULoxetine (CYMBALTA) capsule 60 mg  60 mg Oral QHS    balsam peru-castor oiL (VENELEX) ointment   Topical Q8H    sodium chloride (NS) flush 5-40 mL  5-40 mL IntraVENous Q8H    sodium chloride (NS) flush 5-40 mL  5-40 mL IntraVENous PRN    amLODIPine (NORVASC) tablet 10 mg  10 mg Oral DAILY    aspirin chewable tablet 81 mg  81 mg Oral BID    vitamin B complex tablet  1 Tablet Oral DAILY    HYDROcodone-acetaminophen (NORCO) 5-325 mg per tablet 1 Tablet  1 Tablet Oral DAILY PRN    levothyroxine (SYNTHROID) tablet 50 mcg  50 mcg Oral ACB    lansoprazole (PREVACID) 3 mg/mL oral suspension 30 mg  30 mg Oral BID    midodrine (PROAMATINE) tablet 5 mg  5 mg Oral DAILY PRN    tamsulosin (FLOMAX) capsule 0.4 mg  0.4 mg Oral DAILY    sodium chloride (NS) flush 5-40 mL  5-40 mL IntraVENous Q8H    sodium chloride (NS) flush 5-40 mL  5-40 mL IntraVENous PRN    acetaminophen (TYLENOL) tablet 650 mg  650 mg Oral Q6H PRN    Or    acetaminophen (TYLENOL) suppository 650 mg  650 mg Rectal Q6H PRN    polyethylene glycol (MIRALAX) packet 17 g  17 g Oral DAILY PRN    ondansetron (ZOFRAN ODT) tablet 4 mg  4 mg Oral Q8H PRN    Or    ondansetron (ZOFRAN) injection 4 mg  4 mg IntraVENous Q6H PRN    [Held by provider] heparin (porcine) injection 5,000 Units  5,000 Units SubCUTAneous Q8H    L.acidophilus-paracasei-S.thermophil-bifidobacter (RISAQUAD) 8 billion cell capsule  1 Capsule Oral DAILY    doxycycline (VIBRAMYCIN) 100 mg in 0.9% sodium chloride (MBP/ADV) 100 mL MBP  100 mg IntraVENous Q12H    cefTRIAXone (ROCEPHIN) 1 g in sterile water (preservative free) 10 mL IV syringe  1 g IntraVENous Q24H    albuterol-ipratropium (DUO-NEB) 2.5 MG-0.5 MG/3 ML  3 mL Nebulization Q4H PRN    glucose chewable tablet 16 g  4 Tablet Oral PRN    glucagon (GLUCAGEN) injection 1 mg  1 mg IntraMUSCular PRN    dextrose 10 % infusion 0-250 mL  0-250 mL IntraVENous PRN    insulin glargine (LANTUS) injection 8 Units  8 Units SubCUTAneous QHS    insulin lispro (HUMALOG) injection   SubCUTAneous AC&HS    furosemide (LASIX) injection 20 mg  20 mg IntraVENous BID        Chiquita Wilson MD

## 2022-03-01 NOTE — PROGRESS NOTES
[address] [facility] Adult  Hospitalist Group                                                                                          Hospitalist Progress Note  Alcides Lee MD  Answering service: [phone] from in house phone        Date of Service:  2022  NAME:  Desmond Pritchard  :  1940  MRN:  [id number]      Admission Summary: This is an 81-year-old man with a past medical history significant for chronic congestive heart failure; COPD; chronic atrial fibrillation; type 2 diabetes; hypertension; hypothyroidism; dementia; coronary artery disease, status post CABG; chronic kidney disease, was in his usual state of health until a couple of days ago when the patient developed shortness of breath. This is progressive and getting worse. His son called EMS 2 days ago. The patient was evaluated at home, was found to have decent oxygen saturation, the patient was reassured and was not brought to the hospital.  His son was asked to get pulse oximeter to measure the patient's oxygen saturation at home. Because of worsening symptoms, his son called EMS and when the EMS arrived at the scene, the patient's oxygen saturation was 84% on room air. He was then placed on 4 liters of  nasal cannula and the oxygen saturation improved to 96-98%. The patient is not on oxygen at home. He was brought to the emergency room for further evaluation. When the patient arrived at the emergency room, the patient was found to have an elevated BNP level. Chest x-ray suggestive of pneumonia as well as vascular congestion. The patient was referred to the hospitalist service for admission. The patient is a very poor historian because of his dementia. He was last admitted to this hospital from 2021 to 10/01/2021. The patient was admitted following a fall, in which the patient sustained a left hip fracture and underwent operative repair for the hip fracture.   The patient was also treated for GI bleed during that hospitalization. This could be the reason why the patient is no longer on anticoagulation for atrial fibrillation. Interval history / Subjective:   Patient short of breath  Sleepy due to larger than normal dose of seroquel given last night  R sided thoracentesis today, review home med list and corrected inpatient MAR today     Assessment & Plan:      1. Acute-on-chronic heart failure with reduced ejection fraction  management per cardiology- diuretics BID. 2.  Chronic obstructive pulmonary disease - stable  3.  hypoxemia- due to large bilateral pleural effusions- throracentesis today with 1L removed from R pleural space- weaned off oxygen, drain L pleural effusion tomorrow  4. Type 2 diabetes with hyperglycemia  Diabetic diet- accuchecks and ss insulin. 5.  Hypertension, CAD, afib, - resume home cardiac meds. 6.  Dementia with behavioral disturbance- stable on seroquel  Adjusted back to home dose. 7.  Hypothyroidism- cont synthroid. 8.  cont abx for suspected pneumonia. 9.  Chronic kidney disease, stage III. - creatinine stable     Code status:   Prophylaxis:   Care Plan discussed with:   Anticipated Disposition:      Hospital Problems  Date Reviewed: 2/27/2022          Codes Class Noted POA    Acute on chronic HFrEF (heart failure with reduced ejection fraction) ([facility] [address] ICD-10-CM: I02.57  ICD-9-CM: 428.23  12/25/2018 Unknown                Review of Systems:   A comprehensive review of systems was negative except for that written in the HPI. Vital Signs:    Last 24hrs VS reviewed since prior progress note.  Most recent are:  Visit Vitals  BP (!) 160/64 (BP 1 Location: Left arm, BP Patient Position: At rest)   Pulse 87   Temp 97.9 °F (36.6 °C)   Resp 18   Ht 5' 4\" (1.626 m)   Wt 66.8 kg (147 lb 4.3 oz)   SpO2 97%   BMI 25.28 kg/m²     Patient Vitals for the past 24 hrs:   Temp Pulse Resp BP SpO2   02/28/22 2200  87      02/28/22 2029 97.9 °F (36.6 °C) 84  (!) 160/64 97 %   02/28/22 2022     97 %   02/28/22 2000  86      02/28/22 1800  86      02/28/22 1543 98 °F (36.7 °C) 83 18 (!) 161/59 (!) 88 %   02/28/22 1400  84      02/28/22 1238 97.9 °F (36.6 °C) 85 17 (!) 124/40 98 %   02/28/22 1217    (!) 159/77    02/28/22 1200  87      02/28/22 1120 98.4 °F (36.9 °C) 87 18 (!) 159/77 100 %   02/28/22 1000  92      02/28/22 0600  81      02/28/22 0400  84      02/28/22 0306  83 22 (!) 163/63 95 %   02/28/22 0200  81          Intake/Output Summary (Last 24 hours) at 2/28/2022 2352  Last data filed at 2/28/2022 2127  Gross per 24 hour   Intake 100 ml   Output 800 ml   Net -700 ml        Physical Examination:     I had a face to face encounter with this patient and independently examined them on 2/28/2022 as outlined below:          Constitutional:  No acute distress, cooperative, pleasant    ENT:  Oral mucosa moist, oropharynx benign. Resp:  decreased breath sounds bilaterally. No wheezing/.    CV:  Regular rhythm, normal rate, no murmurs,    GI:  Soft, non distended, non tender. normoactive bowel sounds, no hepatosplenomegaly     Musculoskeletal:  No edema, warm, 2+ pulses throughout    Neurologic:  Moves all extremities. AAOx3, CN II-XII reviewed            Data Review:    Review and/or order of clinical lab test  Review and/or order of tests in the radiology section of CPT  Review and/or order of tests in the medicine section of CPT   CT Results  (Last 48 hours)               02/27/22 0750  CT ABD PELV WO CONT Final result    Impression:  Moderate to large bilateral pleural effusions with underlying consolidation and   bilateral upper lobe interstitial infiltrates. Gallbladder distention with small   gallstones. Narrative:  EXAM: CT CHEST WO CONT, CT ABD PELV WO CONT       INDICATION: Shortness of breath, nausea, vomiting, history of prostate cancer       COMPARISON: Abdomen CT 11/25/2021, Chest CT 10/18/2006       IV CONTRAST: None.        ORAL CONTRAST: None       TECHNIQUE:    Following the uneventful intravenous administration of contrast, thin axial   images were obtained through the chest, abdomen and pelvis. Coronal and sagittal   reformats were generated. CT dose reduction was achieved through use of a   standardized protocol tailored for this examination and automatic exposure   control for dose modulation. FINDINGS:        CHEST WALL: No mass or axillary lymphadenopathy. THYROID: No nodule. MEDIASTINUM: No mass or lymphadenopathy. NISH: No mass or lymphadenopathy. THORACIC AORTA: No dissection or aneurysm. MAIN PULMONARY ARTERY: Normal in caliber. TRACHEA/BRONCHI: Patent. ESOPHAGUS: No wall thickening or dilatation. HEART: Dilatation of the left atrium and left ventricle. PLEURA: Moderate to large bilateral pleural effusions. LUNGS: Bilateral lower lobe consolidation. Right middle lobe and lingular   atelectasis. Mild interstitial infiltrates in the upper lobes bilaterally. Calcified pleural thickening right apex. LIVER: No mass. BILIARY TREE: The gallbladder is distended and small gallstones are noted. CBD   is not dilated. SPLEEN: within normal limits. PANCREAS: No mass or ductal dilatation. ADRENALS: Unremarkable. KIDNEYS: No mass, calculus, or hydronephrosis. STOMACH: Unremarkable. SMALL BOWEL: No dilatation or wall thickening. COLON: No dilatation or wall thickening. APPENDIX: Not visualized. PERITONEUM: No ascites or pneumoperitoneum. RETROPERITONEUM: No lymphadenopathy or aortic aneurysm. REPRODUCTIVE ORGANS: No pelvic mass or lymphadenopathy. URINARY BLADDER: No mass or calculus. BONES: The patient is status post right total hip replacement and ORIF of the   left femur. Degenerative changes are seen in the thoracic and lumbar spine. ABDOMINAL WALL: No mass or hernia.    ADDITIONAL COMMENTS: N/A           02/27/22 8980  CT CHEST WO CONT Final result    Impression:  Moderate to large bilateral pleural effusions with underlying consolidation and   bilateral upper lobe interstitial infiltrates. Gallbladder distention with small   gallstones. Narrative:  EXAM: CT CHEST WO CONT, CT ABD PELV WO CONT       INDICATION: Shortness of breath, nausea, vomiting, history of prostate cancer       COMPARISON: Abdomen CT 11/25/2021, Chest CT 10/18/2006       IV CONTRAST: None. ORAL CONTRAST: None       TECHNIQUE:    Following the uneventful intravenous administration of contrast, thin axial   images were obtained through the chest, abdomen and pelvis. Coronal and sagittal   reformats were generated. CT dose reduction was achieved through use of a   standardized protocol tailored for this examination and automatic exposure   control for dose modulation. FINDINGS:        CHEST WALL: No mass or axillary lymphadenopathy. THYROID: No nodule. MEDIASTINUM: No mass or lymphadenopathy. NISH: No mass or lymphadenopathy. THORACIC AORTA: No dissection or aneurysm. MAIN PULMONARY ARTERY: Normal in caliber. TRACHEA/BRONCHI: Patent. ESOPHAGUS: No wall thickening or dilatation. HEART: Dilatation of the left atrium and left ventricle. PLEURA: Moderate to large bilateral pleural effusions. LUNGS: Bilateral lower lobe consolidation. Right middle lobe and lingular   atelectasis. Mild interstitial infiltrates in the upper lobes bilaterally. Calcified pleural thickening right apex. LIVER: No mass. BILIARY TREE: The gallbladder is distended and small gallstones are noted. CBD   is not dilated. SPLEEN: within normal limits. PANCREAS: No mass or ductal dilatation. ADRENALS: Unremarkable. KIDNEYS: No mass, calculus, or hydronephrosis. STOMACH: Unremarkable. SMALL BOWEL: No dilatation or wall thickening. COLON: No dilatation or wall thickening. APPENDIX: Not visualized. PERITONEUM: No ascites or pneumoperitoneum.    RETROPERITONEUM: No lymphadenopathy or aortic aneurysm. REPRODUCTIVE ORGANS: No pelvic mass or lymphadenopathy. URINARY BLADDER: No mass or calculus. BONES: The patient is status post right total hip replacement and ORIF of the   left femur. Degenerative changes are seen in the thoracic and lumbar spine. ABDOMINAL WALL: No mass or hernia. ADDITIONAL COMMENTS: N/A                   Labs:     Recent Labs     02/28/22 0311 02/27/22 0820   WBC 11.7* 9.8   HGB 10.9* 12.0*   HCT 32.3* 35.9*    344     Recent Labs     02/28/22 0311 02/27/22 0820 02/27/22 0218   * 132* 132*   K 4.3 4.6 5.0   CL 98 102 102   CO2 25 25 26   BUN 21* 16 16   CREA 1.66* 1.55* 1.52*   * 230* 215*   CA 8.6 8.8 9.2   MG  --  1.8  --    PHOS  --  4.1  --      Recent Labs     02/27/22 0820 02/27/22 0218   ALT 13 14   AP 89 90   TBILI 0.8 0.9   TP 7.1 7.5   ALB 2.7* 3.0*   GLOB 4.4* 4.5*     Recent Labs     02/28/22 0311   INR 1.1   PTP 11.1   APTT 34.7*      No results for input(s): FE, TIBC, PSAT, FERR in the last 72 hours. Lab Results   Component Value Date/Time    Folate 21.9 (H) 02/04/2019 07:35 PM      No results for input(s): PH, PCO2, PO2 in the last 72 hours. No results for input(s): CPK, CKNDX, TROIQ in the last 72 hours.     No lab exists for component: CPKMB  Lab Results   Component Value Date/Time    Cholesterol, total 170 09/08/2020 04:30 PM    HDL Cholesterol 51 09/08/2020 04:30 PM    LDL, calculated 103 (H) 09/08/2020 04:30 PM    LDL, calculated 93 02/12/2019 08:49 AM    Triglyceride 89 09/08/2020 04:30 PM    CHOL/HDL Ratio 2.3 07/18/2017 02:14 AM     Lab Results   Component Value Date/Time    Glucose (POC) 246 (H) 02/28/2022 09:43 PM    Glucose (POC) 210 (H) 02/28/2022 04:51 PM    Glucose (POC) 201 (H) 02/28/2022 11:19 AM    Glucose (POC) 167 (H) 02/28/2022 06:43 AM    Glucose (POC) 301 (H) 02/27/2022 09:40 PM     Lab Results   Component Value Date/Time    Color YELLOW/STRAW 02/27/2022 11:00 PM    Appearance CLEAR 02/27/2022 11:00 PM Specific gravity 1.012 02/27/2022 11:00 PM    Specific gravity 1.010 07/17/2017 04:12 PM    pH (UA) 5.0 02/27/2022 11:00 PM    Protein 100 (A) 02/27/2022 11:00 PM    Glucose 500 (A) 02/27/2022 11:00 PM    Ketone TRACE (A) 02/27/2022 11:00 PM    Bilirubin Negative 02/27/2022 11:00 PM    Urobilinogen 0.2 02/27/2022 11:00 PM    Nitrites Negative 02/27/2022 11:00 PM    Leukocyte Esterase Negative 02/27/2022 11:00 PM    Epithelial cells FEW 02/27/2022 11:00 PM    Bacteria Negative 02/27/2022 11:00 PM    WBC 0-4 02/27/2022 11:00 PM    RBC 0-5 02/27/2022 11:00 PM         Medications Reviewed:     Current Facility-Administered Medications   Medication Dose Route Frequency    [START ON 3/1/2022] predniSONE (DELTASONE) tablet 30 mg  30 mg Oral DAILY WITH BREAKFAST    QUEtiapine (SEROquel) tablet 25 mg  25 mg Oral BID PRN    DULoxetine (CYMBALTA) capsule 60 mg  60 mg Oral QHS    balsam peru-castor oiL (VENELEX) ointment   Topical Q8H    sodium chloride (NS) flush 5-40 mL  5-40 mL IntraVENous Q8H    sodium chloride (NS) flush 5-40 mL  5-40 mL IntraVENous PRN    amLODIPine (NORVASC) tablet 10 mg  10 mg Oral DAILY    aspirin chewable tablet 81 mg  81 mg Oral BID    vitamin B complex tablet  1 Tablet Oral DAILY    HYDROcodone-acetaminophen (NORCO) 5-325 mg per tablet 1 Tablet  1 Tablet Oral DAILY PRN    levothyroxine (SYNTHROID) tablet 50 mcg  50 mcg Oral ACB    lansoprazole (PREVACID) 3 mg/mL oral suspension 30 mg  30 mg Oral BID    midodrine (PROAMATINE) tablet 5 mg  5 mg Oral DAILY PRN    tamsulosin (FLOMAX) capsule 0.4 mg  0.4 mg Oral DAILY    sodium chloride (NS) flush 5-40 mL  5-40 mL IntraVENous Q8H    sodium chloride (NS) flush 5-40 mL  5-40 mL IntraVENous PRN    acetaminophen (TYLENOL) tablet 650 mg  650 mg Oral Q6H PRN    Or    acetaminophen (TYLENOL) suppository 650 mg  650 mg Rectal Q6H PRN    polyethylene glycol (MIRALAX) packet 17 g  17 g Oral DAILY PRN    ondansetron (ZOFRAN ODT) tablet 4 mg  4 mg Oral Q8H PRN    Or    ondansetron (ZOFRAN) injection 4 mg  4 mg IntraVENous Q6H PRN    heparin (porcine) injection 5,000 Units  5,000 Units SubCUTAneous Q8H    L.acidophilus-paracasei-S.thermophil-bifidobacter (RISAQUAD) 8 billion cell capsule  1 Capsule Oral DAILY    doxycycline (VIBRAMYCIN) 100 mg in 0.9% sodium chloride (MBP/ADV) 100 mL MBP  100 mg IntraVENous Q12H    cefTRIAXone (ROCEPHIN) 1 g in sterile water (preservative free) 10 mL IV syringe  1 g IntraVENous Q24H    albuterol-ipratropium (DUO-NEB) 2.5 MG-0.5 MG/3 ML  3 mL Nebulization Q4H PRN    glucose chewable tablet 16 g  4 Tablet Oral PRN    glucagon (GLUCAGEN) injection 1 mg  1 mg IntraMUSCular PRN    dextrose 10 % infusion 0-250 mL  0-250 mL IntraVENous PRN    insulin glargine (LANTUS) injection 8 Units  8 Units SubCUTAneous QHS    insulin lispro (HUMALOG) injection   SubCUTAneous AC&HS    [Held by provider] furosemide (LASIX) injection 20 mg  20 mg IntraVENous BID     ______________________________________________________________________  EXPECTED LENGTH OF STAY: - - -  ACTUAL LENGTH OF STAY:          1                 Ronaldo Felty, MD

## 2022-03-02 NOTE — PROGRESS NOTES
Spiritual Care Assessment/Progress Note  [facility]      NAME: Henrry Meza      MRN: [id number]  AGE: [de-identified] y.o. SEX: male  Mandaeism Affiliation: Adventism   Language: English     3/2/2022     Total Time (in minutes): 15     Spiritual Assessment begun in [facility] [place] [facility] through conversation with:         []Patient        [] Family    [] Friend(s)        Reason for Consult: Initial visit     Spiritual beliefs: (Please include comment if needed)     [] Identifies with a stevie tradition:         [] Supported by a stevie community:            [] Claims no spiritual orientation:           [] Seeking spiritual identity:                [] Adheres to an individual form of spirituality:           [x] Not able to assess:                           Identified resources for coping:      [] Prayer                               [] Music                  [] Guided Imagery     [] Family/friends                 [] Pet visits     [] Devotional reading                         [x] Unknown     [] Other:                                               Interventions offered during this visit: (See comments for more details)                Plan of Care:     [] Support spiritual and/or cultural needs    [] Support AMD and/or advance care planning process      [] Support grieving process   [] Coordinate Rites and/or Rituals    [] Coordination with community clergy   [] No spiritual needs identified at this time   [] Detailed Plan of Care below (See Comments)  [] Make referral to Music Therapy  [] Make referral to Pet Therapy     [] Make referral to Addiction services  [] Make referral to [facility]  [] Make referral to Spiritual Care Partner  [] No future visits requested        [] Contact Spiritual Care for further referrals     Comments: Visit for spiritual assessment in Room 467. Patient appeared to be sleeping. Provided ministry of presence and silent prayer.  Please contact Spiritual Care for any further referrals. Visited by: Shannon Crystal.  [address] paging Service [phone] ([phone])

## 2022-03-02 NOTE — PROGRESS NOTES
Patient/family seen: YES       Informed patient/family of BPCI-A Bundle Program. Also advised of potential outreach by Care Transitions Team.    Bundle Payment Care Improvement Beneficiary Letter Delivered to Beneficiary or Representative:YES.  Date BCPI -A was given:radha Cantor

## 2022-03-02 NOTE — PROGRESS NOTES
[address] [facility] Adult  Hospitalist Group                                                                                          Hospitalist Progress Note  Gordon Lujan MD  Answering service: [phone] from in house phone        Date of Service:  3/1/2022  NAME:  Alyssa Gomes  :  1940  MRN:  [id number]      Admission Summary: This is an 70-year-old man with a past medical history significant for chronic congestive heart failure; COPD; chronic atrial fibrillation; type 2 diabetes; hypertension; hypothyroidism; dementia; coronary artery disease, status post CABG; chronic kidney disease, was in his usual state of health until a couple of days ago when the patient developed shortness of breath. This is progressive and getting worse. His son called EMS 2 days ago. The patient was evaluated at home, was found to have decent oxygen saturation, the patient was reassured and was not brought to the hospital.  His son was asked to get pulse oximeter to measure the patient's oxygen saturation at home. Because of worsening symptoms, his son called EMS and when the EMS arrived at the scene, the patient's oxygen saturation was 84% on room air. He was then placed on 4 liters of  nasal cannula and the oxygen saturation improved to 96-98%. The patient is not on oxygen at home. He was brought to the emergency room for further evaluation. When the patient arrived at the emergency room, the patient was found to have an elevated BNP level. Chest x-ray suggestive of pneumonia as well as vascular congestion. The patient was referred to the hospitalist service for admission. The patient is a very poor historian because of his dementia. He was last admitted to this hospital from 2021 to 10/01/2021. The patient was admitted following a fall, in which the patient sustained a left hip fracture and underwent operative repair for the hip fracture.   The patient was also treated for GI bleed during that hospitalization. This could be the reason why the patient is no longer on anticoagulation for atrial fibrillation. Interval history / Subjective:   Patient short of breath  Sleepy due to larger than normal dose of seroquel given last night  R sided thoracentesis yesterday, L sided thoracentesis today  review home med list and corrected inpatient MAR today     Assessment & Plan:      1. Acute-on-chronic heart failure with reduced ejection fraction  management per cardiology- diuretics BID. 2.  Chronic obstructive pulmonary disease - stable  3.  hypoxemia- due to large bilateral pleural effusions-R throracentesis2/28/22 with 1L removed from R pleural space- L thoracentisis 3/1/22 with 1400ml output-   wean off oxygen, hopefully home tomorrow  4. Type 2 diabetes with hyperglycemia  Diabetic diet- accuchecks and ss insulin. 5.  Hypertension, CAD, afib, - resume home cardiac meds. 6.  Dementia with behavioral disturbance- stable on seroquel  Adjusted back to home dose. 7.  Hypothyroidism- cont synthroid. 8.  cont abx for suspected pneumonia. 9.  Chronic kidney disease, stage III. - creatinine stable     Code status:   Prophylaxis:   Care Plan discussed with:   Anticipated Disposition:      Hospital Problems  Date Reviewed: 2/27/2022          Codes Class Noted POA    Acute on chronic HFrEF (heart failure with reduced ejection fraction) ([facility] [address] ICD-10-CM: E17.03  ICD-9-CM: 428.23  12/25/2018 Unknown                Review of Systems:   A comprehensive review of systems was negative except for that written in the HPI. Vital Signs:    Last 24hrs VS reviewed since prior progress note.  Most recent are:  Visit Vitals  BP (!) 164/67   Pulse 88   Temp 97.5 °F (36.4 °C)   Resp 17   Ht 5' 4\" (1.626 m)   Wt 66.8 kg (147 lb 4.3 oz)   SpO2 96%   BMI 25.28 kg/m²     Patient Vitals for the past 24 hrs:   Temp Pulse Resp BP SpO2   03/01/22 2156  88      03/01/22 2032  80      03/01/22 1958  81    03/01/22 1900 97.5 °F (36.4 °C) 82 17 (!) 164/67    03/01/22 1758  90  (!) 153/71 96 %   03/01/22 1608  89   94 %   03/01/22 1607  84   92 %   03/01/22 1600 97.5 °F (36.4 °C) 94 17 131/71 (!) 85 %   03/01/22 1115  85      03/01/22 1107  86   94 %   03/01/22 1106 97.4 °F (36.3 °C) 94 16 (!) 142/60    03/01/22 0830 97.4 °F (36.3 °C) 82 16 (!) 168/67 91 %   03/01/22 0800  82      03/01/22 0602  83      03/01/22 0400 98 °F (36.7 °C) 91 16 (!) 175/75 93 %   03/01/22 0205  82      03/01/22 0000 98.3 °F (36.8 °C) 84 16 (!) 156/69 93 %       Intake/Output Summary (Last 24 hours) at 3/1/2022 2309  Last data filed at 3/1/2022 2032  Gross per 24 hour   Intake 960 ml   Output    Net 960 ml        Physical Examination:     I had a face to face encounter with this patient and independently examined them on 3/1/2022 as outlined below:          Constitutional:  No acute distress, cooperative, pleasant    ENT:  Oral mucosa moist, oropharynx benign. Resp:  decreased breath sounds bilaterally. No wheezing/.    CV:  Regular rhythm, normal rate, no murmurs,    GI:  Soft, non distended, non tender. normoactive bowel sounds, no hepatosplenomegaly     Musculoskeletal:  No edema, warm, 2+ pulses throughout    Neurologic:  Moves all extremities.   AAOx3, CN II-XII reviewed            Data Review:    Review and/or order of clinical lab test  Review and/or order of tests in the radiology section of CPT  Review and/or order of tests in the medicine section of CPT   CT Results  (Last 48 hours)    None            Labs:     Recent Labs     03/01/22  0415 02/28/22  0311   WBC 14.6* 11.7*   HGB 10.7* 10.9*   HCT 31.4* 32.3*    296     Recent Labs     03/01/22  0415 02/28/22  0311 02/27/22  0820   * 129* 132*   K 4.0 4.3 4.6   CL 97 98 102   CO2 27 25 25   BUN 24* 21* 16   CREA 1.51* 1.66* 1.55*   * 231* 230*   CA 8.7 8.6 8.8   MG  --   --  1.8   PHOS  --   --  4.1     Recent Labs 02/27/22  0820 02/27/22  0218   ALT 13 14   AP 89 90   TBILI 0.8 0.9   TP 7.1 7.5   ALB 2.7* 3.0*   GLOB 4.4* 4.5*     Recent Labs     02/28/22  0311   INR 1.1   PTP 11.1   APTT 34.7*      No results for input(s): FE, TIBC, PSAT, FERR in the last 72 hours. Lab Results   Component Value Date/Time    Folate 21.9 (H) 02/04/2019 07:35 PM      No results for input(s): PH, PCO2, PO2 in the last 72 hours. No results for input(s): CPK, CKNDX, TROIQ in the last 72 hours.     No lab exists for component: CPKMB  Lab Results   Component Value Date/Time    Cholesterol, total 170 09/08/2020 04:30 PM    HDL Cholesterol 51 09/08/2020 04:30 PM    LDL, calculated 103 (H) 09/08/2020 04:30 PM    LDL, calculated 93 02/12/2019 08:49 AM    Triglyceride 89 09/08/2020 04:30 PM    CHOL/HDL Ratio 2.3 07/18/2017 02:14 AM     Lab Results   Component Value Date/Time    Glucose (POC) 282 (H) 03/01/2022 09:33 PM    Glucose (POC) 209 (H) 03/01/2022 04:42 PM    Glucose (POC) 172 (H) 03/01/2022 11:17 AM    Glucose (POC) 126 (H) 03/01/2022 08:46 AM    Glucose (POC) 246 (H) 02/28/2022 09:43 PM     Lab Results   Component Value Date/Time    Color YELLOW/STRAW 02/27/2022 11:00 PM    Appearance CLEAR 02/27/2022 11:00 PM    Specific gravity 1.012 02/27/2022 11:00 PM    Specific gravity 1.010 07/17/2017 04:12 PM    pH (UA) 5.0 02/27/2022 11:00 PM    Protein 100 (A) 02/27/2022 11:00 PM    Glucose 500 (A) 02/27/2022 11:00 PM    Ketone TRACE (A) 02/27/2022 11:00 PM    Bilirubin Negative 02/27/2022 11:00 PM    Urobilinogen 0.2 02/27/2022 11:00 PM    Nitrites Negative 02/27/2022 11:00 PM    Leukocyte Esterase Negative 02/27/2022 11:00 PM    Epithelial cells FEW 02/27/2022 11:00 PM    Bacteria Negative 02/27/2022 11:00 PM    WBC 0-4 02/27/2022 11:00 PM    RBC 0-5 02/27/2022 11:00 PM         Medications Reviewed:     Current Facility-Administered Medications   Medication Dose Route Frequency    predniSONE (DELTASONE) tablet 30 mg  30 mg Oral DAILY WITH BREAKFAST    QUEtiapine (SEROquel) tablet 25 mg  25 mg Oral BID PRN    DULoxetine (CYMBALTA) capsule 60 mg  60 mg Oral QHS    balsam peru-castor oiL (VENELEX) ointment   Topical Q8H    sodium chloride (NS) flush 5-40 mL  5-40 mL IntraVENous Q8H    sodium chloride (NS) flush 5-40 mL  5-40 mL IntraVENous PRN    amLODIPine (NORVASC) tablet 10 mg  10 mg Oral DAILY    aspirin chewable tablet 81 mg  81 mg Oral BID    vitamin B complex tablet  1 Tablet Oral DAILY    HYDROcodone-acetaminophen (NORCO) 5-325 mg per tablet 1 Tablet  1 Tablet Oral DAILY PRN    levothyroxine (SYNTHROID) tablet 50 mcg  50 mcg Oral ACB    lansoprazole (PREVACID) 3 mg/mL oral suspension 30 mg  30 mg Oral BID    midodrine (PROAMATINE) tablet 5 mg  5 mg Oral DAILY PRN    tamsulosin (FLOMAX) capsule 0.4 mg  0.4 mg Oral DAILY    sodium chloride (NS) flush 5-40 mL  5-40 mL IntraVENous Q8H    sodium chloride (NS) flush 5-40 mL  5-40 mL IntraVENous PRN    acetaminophen (TYLENOL) tablet 650 mg  650 mg Oral Q6H PRN    Or    acetaminophen (TYLENOL) suppository 650 mg  650 mg Rectal Q6H PRN    polyethylene glycol (MIRALAX) packet 17 g  17 g Oral DAILY PRN    ondansetron (ZOFRAN ODT) tablet 4 mg  4 mg Oral Q8H PRN    Or    ondansetron (ZOFRAN) injection 4 mg  4 mg IntraVENous Q6H PRN    [Held by provider] heparin (porcine) injection 5,000 Units  5,000 Units SubCUTAneous Q8H    L.acidophilus-paracasei-S.thermophil-bifidobacter (RISAQUAD) 8 billion cell capsule  1 Capsule Oral DAILY    doxycycline (VIBRAMYCIN) 100 mg in 0.9% sodium chloride (MBP/ADV) 100 mL MBP  100 mg IntraVENous Q12H    cefTRIAXone (ROCEPHIN) 1 g in sterile water (preservative free) 10 mL IV syringe  1 g IntraVENous Q24H    albuterol-ipratropium (DUO-NEB) 2.5 MG-0.5 MG/3 ML  3 mL Nebulization Q4H PRN    glucose chewable tablet 16 g  4 Tablet Oral PRN    glucagon (GLUCAGEN) injection 1 mg  1 mg IntraMUSCular PRN    dextrose 10 % infusion 0-250 mL  0-250 mL IntraVENous PRN    insulin glargine (LANTUS) injection 8 Units  8 Units SubCUTAneous QHS    insulin lispro (HUMALOG) injection   SubCUTAneous AC&HS    furosemide (LASIX) injection 20 mg  20 mg IntraVENous BID     ______________________________________________________________________  EXPECTED LENGTH OF STAY: 3d 19h  ACTUAL LENGTH OF STAY:          2                 Pam Riedel, MD

## 2022-03-02 NOTE — PROGRESS NOTES
Transitions of Care:    RUR - 19%    Disposition: Home with family assistance    Follow-up: PCP/Specialist    Transport: [facility] will call    Medicare pt has received, reviewed, and signed 2nd IM letter informing them of their right to appeal the discharge. Signed copied has been placed on pt bedside chart. CM noted need for home O2 and oxygen to home. CM spoke with nursing regarding O2 sat qualifications. MD may also try to order overnight oximetry for  Home. CM has placed various calls to pursue a company for private pay if possible.   OWEN Kohler

## 2022-03-02 NOTE — PROGRESS NOTES
1930: Bedside shift change report given to LONNIE Vernon (oncoming nurse) by Diego Carey RN (offgoing nurse). Report included the following information SBAR, Kardex, Intake/Output, MAR, and Recent Results. 0730: Bedside shift change report given to Diego Carey RN (oncoming nurse) by LONNIE Vernon (offgoing nurse). Report included the following information SBAR, Kardex, Intake/Output, MAR and Recent Results. Problem: Falls - Risk of  Goal: *Absence of Falls  Description: Document Esther Nap Fall Risk and appropriate interventions in the flowsheet.   Outcome: Progressing Towards Goal  Note: Fall Risk Interventions:  Mobility Interventions: Patient to call before getting OOB    Mentation Interventions: Bed/chair exit alarm,Door open when patient unattended    Medication Interventions: Teach patient to arise slowly,Patient to call before getting OOB    Elimination Interventions: Call light in reach,Bed/chair exit alarm    History of Falls Interventions: Bed/chair exit alarm

## 2022-03-03 NOTE — DISCHARGE SUMMARY
Discharge Summary       PATIENT ID: Vidya Cline  MRN: [id number]   YOB: 1940    DATE OF ADMISSION: 2/27/2022  2:07 AM    DATE OF DISCHARGE: 3/3/22   PRIMARY CARE PROVIDER: Yassine Durand MD     ATTENDING PHYSICIAN: Trini Negro  DISCHARGING PROVIDER: Coby Alvarez MD    To contact this individual call [phone] and ask the  to page. If unavailable ask to be transferred the Adult Hospitalist Department. CONSULTATIONS: IP CONSULT TO CARDIOLOGY    PROCEDURES/SURGERIES: * No surgery found *    DISCHARGE DIAGNOSES: Acute-on-chronic heart failure with reduced ejection fraction    This is an 71-year-old man with a past medical history significant for chronic congestive heart failure; COPD; chronic atrial fibrillation; type 2 diabetes; hypertension; hypothyroidism; dementia; coronary artery disease, status post CABG; chronic kidney disease, was in his usual state of health until a couple of days ago when the patient developed shortness of breath.  This is progressive and getting worse.  His son called EMS 2 days ago. Miriam Baptiste patient was evaluated at home, was found to have decent oxygen saturation, the patient was reassured and was not brought to the hospital. [facility] son was asked to get pulse oximeter to measure the patient's oxygen saturation at home. [facility] of worsening symptoms, his son called EMS and when the EMS arrived at the scene, the patient's oxygen saturation was 84% on room air.  He was then placed on 4 liters of  nasal cannula and the oxygen saturation improved to 96-98%.  The patient is not on oxygen at home. Zadie Bernheim was brought to the emergency room for further evaluation.  When the patient arrived at the emergency room, the patient was found to have an elevated BNP level.  Chest x-ray suggestive of pneumonia as well as vascular congestion.  The patient was referred to the hospitalist service for admission.  The patient is a very poor historian because of his dementia. Noelle Li was last admitted to this hospital from 09/24/2021 to 10/01/2021.  The patient was admitted following a fall, in which the patient sustained a left hip fracture and underwent operative repair for the hip fracture.  The patient was also treated for GI bleed during that hospitalization.  This could be the reason why the patient is no longer on anticoagulation for atrial fibrillation. [address] [facility] COURSE:   Patient admitted with acute on chronic congestive heart failure with reduced ejection fraction patient was diuresed and he was hypoxemic due to a large bilateral pleural effusion thoracentesis done 3 1/22 weaned off oxygen patient did not require overnight oxygen but oxygen concentrator will be delivered at home in case he needs it discussed with patient son at bedside patient also has dementia hypertension type 2 diabetes with hyperglycemia and COPD few days more of oral steroid recommended patient finished ceftriaxone given doxycycline till 3/9 patient has chronic kidney disease CKD stage III stable discharge home with family    DISCHARGE DIAGNOSES / PLAN:      D/C HOME    BMI: Body mass index is 17.46 kg/m². . This patient: Meets criteria for underweight given BMI </= 18.5. The patient should be followed closely for risk of malnutrition. PENDING TEST RESULTS:   At the time of discharge the following test results are still pending:      ADDITIONAL CARE RECOMMENDATIONS:        NOTIFY YOUR PHYSICIAN FOR ANY OF THE FOLLOWING:   Fever over 101 degrees for 24 hours. Chest pain, shortness of breath, fever, chills, nausea, vomiting, diarrhea, change in mentation, falling, weakness, bleeding. Severe pain or pain not relieved by medications, as well as any other signs or symptoms that you may have questions about.     FOLLOW UP APPOINTMENTS:    Follow-up Information     Follow up With Specialties Details Why Contact Info    Sly Gates MD Family Medicine, Hospitalist On 3/7/2022 Hospital follow up PCP appointment Monday, 3/7/22. Office will contact you prior to coming to your home. [address]      Valentino Guthrie, MD Cardio Vascular Surgery, Interventional Cardiology, Cardiology In 2 weeks  [address] [facility]  [address] [facility]  [phone]      Jasmin Jenkins NP Nurse Practitioner   [address] [address] [phone]               DIET: Cardiac Diet    ACTIVITY: Activity as tolerated    EQUIPMENT needed: HOME OXYGEN    DISCHARGE MEDICATIONS:  Current Discharge Medication List      START taking these medications    Details   doxycycline (VIBRA-TABS) 100 mg tablet Take 1 Tablet by mouth every twelve (12) hours for 12 doses. Qty: 12 Tablet, Refills: 0  Start date: 3/3/2022, End date: 3/9/2022      L.acid,para-B. bifidum-S.therm (RISAQUAD) 8 billion cell cap cap Take 1 Capsule by mouth daily for 15 days. Qty: 15 Capsule, Refills: 0  Start date: 3/4/2022, End date: 3/19/2022      furosemide (Lasix) 20 mg tablet Take 1 Tablet by mouth every Monday, Wednesday, Friday for 30 days. Qty: 12 Tablet, Refills: 0  Start date: 3/4/2022, End date: 4/3/2022      predniSONE (DELTASONE) 10 mg tablet Take 30 mg by mouth daily for 5 days. Dasie Brody: 15 Tablet, Refills: 0  Start date: 3/3/2022, End date: 3/8/2022         CONTINUE these medications which have CHANGED    Details   insulin glargine (LANTUS,BASAGLAR) 100 unit/mL (3 mL) inpn 10 Units by SubCUTAneous route daily. Inject 14 units every morning. Qty: 3 Pen, Refills: 5  Start date: 3/3/2022    Associated Diagnoses: Uncontrolled type 2 diabetes mellitus with hyperglycemia (HCC)      lansoprazole (PREVACID) 3 mg/mL oral suspension Take 10 mL by mouth two (2) times a day.   Qty: 300 mL, Refills: 1  Start date: 3/3/2022         CONTINUE these medications which have NOT CHANGED    Details   ascorbic acid, vitamin C, (Vitamin C) 500 mg tablet Take 500 mg by mouth daily.      multivit-minerals/folic acid (MULTIVITAMIN GUMMIES PO) Take 1 Tablet by mouth daily. cholecalciferol (VITAMIN D3) (400 Units /10 mcg) tab tablet Take 400 Units by mouth daily. ferrous sulfate 325 mg (65 mg iron) tablet Take 325 mg by mouth Daily (before breakfast). docusate sodium (COLACE) 100 mg capsule Take 100 mg by mouth two (2) times daily as needed for Constipation. aspirin 81 mg chewable tablet Take 81 mg by mouth daily. omeprazole (PRILOSEC) 20 mg capsule Take 20 mg by mouth daily. tamsulosin (Flomax) 0.4 mg capsule Take 0.4 mg by mouth nightly. QUEtiapine (SEROqueL) 25 mg tablet Take 25 mg by mouth two (2) times daily as needed for PRN Reason (Other) (anxiety, behavioral issues). b complex vitamins tablet Take 1 Tablet by mouth daily. DULoxetine (CYMBALTA) 60 mg capsule Take 1 Capsule by mouth daily. Qty: 90 Capsule, Refills: 1    Associated Diagnoses: Moderate recurrent major depression (HCC)      glucose blood VI test strips (ASCENSIA AUTODISC VI, ONE TOUCH ULTRA TEST VI) strip Check blood sugar three times daily before meals  Qty: 100 Strip, Refills: 2    Associated Diagnoses: Uncontrolled type 2 diabetes mellitus with hyperglycemia ([facility])      amLODIPine (NORVASC) 10 mg tablet Take 1 Tablet by mouth daily. Qty: 90 Tablet, Refills: 1    Associated Diagnoses: Essential hypertension      balsam peru-castor oiL (VENELEX) ointment Apply  to affected area every eight (8) hours. Qty: 1 g, Refills: 0    Associated Diagnoses: Nonhealing wound of heel      insulin lispro (HUMALOG) 100 unit/mL injection INJECT 3 UNITS BENEATH THE SKIN FOR BS>200, 5 UNITS FOR BS>250, 6 UNITS FOR BS>300.  CALL MD FOR BS >400  Qty: 30 mL, Refills: 1    Associated Diagnoses: Type II or unspecified type diabetes mellitus with neurological manifestations, uncontrolled(250.62) (HCC)      levothyroxine (SYNTHROID) 50 mcg tablet TAKE 1 TABLET BY MOUTH EVERY DAY BEFORE BREAKFAST  Qty: 90 Tab, Refills: 3    Associated Diagnoses: Hypothyroidism, unspecified type      Insulin Needles, Disposable, (BD Ultra-Fine Mini Pen Needle) 31 gauge x 3/16\" ndle USE TO INJECT INSULIN 3 TIMES A DAY  Qty: 100 Pen Needle, Refills: 2    Comments: E11.65  Associated Diagnoses: Uncontrolled type 2 diabetes mellitus with hyperglycemia (HCC)      Wheel Chair angel Standard wheelchair  Qty: 1 Each, Refills: 0    Associated Diagnoses: Closed fracture of neck of left femur with routine healing, subsequent encounter; Debility         STOP taking these medications       HYDROcodone-acetaminophen (NORCO) 5-325 mg per tablet Comments:   Reason for Stopping:         polyethylene glycol (ClearLax) 17 gram/dose powder Comments:   Reason for Stopping:         valproate (DEPAKENE) 250 mg/5 mL syrup Comments:   Reason for Stopping:         lidocaine (LIDODERM) 5 % Comments:   Reason for Stopping:         midodrine (PROAMITINE) 5 mg tablet Comments:   Reason for Stopping:         ondansetron (ZOFRAN ODT) 4 mg disintegrating tablet Comments:   Reason for Stopping:               DISPOSITION:   X Home With:   OT  PT  HH  RN       Long term SNF/Inpatient Rehab    Independent/assisted living    Hospice    Other:       PATIENT CONDITION AT DISCHARGE:     Functional status   X Poor     Deconditioned     Independent      Cognition     Lucid     Forgetful    X Dementia      Catheters/lines (plus indication)    [place]     PICC     PEG    X None      Code status     Full code    X DNR      PHYSICAL EXAMINATION AT DISCHARGE:  General:          Alert, cooperative, no distress, appears stated age. HEENT:           Atraumatic, anicteric sclerae, pink conjunctivae                          No oral ulcers, mucosa moist, throat clear, dentition fair  Neck:               Supple, symmetrical  Lungs:             Clear to auscultation bilaterally. No Wheezing or Rhonchi. No rales.   Chest wall:      No tenderness  No Accessory muscle use.  Heart:              Regular  rhythm,  No  murmur   No edema  Abdomen:        Soft, non-tender. Not distended. Bowel sounds normal  Extremities:     No cyanosis. No clubbing,                            Skin turgor normal, Capillary refill normal  Skin:                Not pale. Not Jaundiced  No rashes   Psych:             Not anxious or agitated.   Neurologic:      Alert, moves all extremities      CHRONIC MEDICAL DIAGNOSES:  Problem List as of 3/3/2022 Date Reviewed: 2/27/2022          Codes Class Noted - Resolved    History of fracture of left hip ICD-10-CM: Z87.81  ICD-9-CM: V15.51  2/18/2022 - Present        History of GI bleed ICD-10-CM: Z87.19  ICD-9-CM: V12.79  10/27/2021 - Present        Gastritis with hemorrhage ICD-10-CM: K29.71  ICD-9-CM: 535.51  10/27/2021 - Present        Essential hypertension ICD-10-CM: I10  ICD-9-CM: 401.9  10/13/2021 - Present        Stage 3b chronic kidney disease ([facility] ICD-10-CM: N18.32  ICD-9-CM: 585.3  10/13/2021 - Present        COVID-19 vaccine dose declined ICD-10-CM: Z28.21  ICD-9-CM: V64.06  9/16/2021 - Present        Debility ICD-10-CM: R53.81  ICD-9-CM: 799.3  7/25/2021 - Present        Acquired hypothyroidism ICD-10-CM: E03.9  ICD-9-CM: 244.9  2/22/2021 - Present        Other chronic pain ICD-10-CM: G89.29  ICD-9-CM: 338.29  2/22/2021 - Present        Prostate cancer ([facility] ICD-10-CM: C61  ICD-9-CM: 013  2/12/2019 - Present        Acute on chronic HFrEF (heart failure with reduced ejection fraction) ([facility] ICD-10-CM: I50.23  ICD-9-CM: 428.23  12/25/2018 - Present        Orthostatic hypotension ICD-10-CM: I95.1  ICD-9-CM: 458.0  7/17/2017 - Present        Uncontrolled type 2 diabetes mellitus with hyperglycemia (HCC) ICD-10-CM: E11.65  ICD-9-CM: 250.02  4/11/2017 - Present        Urinary incontinence ICD-10-CM: R32  ICD-9-CM: 788.30  4/11/2017 - Present        CAD (coronary artery disease) ICD-10-CM: I25.10  ICD-9-CM: 414.00  4/11/2017 - Present        COPD (chronic obstructive pulmonary disease) ([facility] ICD-10-CM: J44.9  ICD-9-CM: 496  4/11/2017 - Present        Late onset Alzheimer's disease with behavioral disturbance ([facility] ICD-10-CM: G30.1, F02.81  ICD-9-CM: 331.0, 294.11  7/11/2016 - Present    Overview Signed 7/11/2016  2:00 PM by Karina Garzon MD     Due to [facility]             Moderate recurrent major depression ([facility] ICD-10-CM: F33.1  ICD-9-CM: 296.32  7/11/2016 - Present        RESOLVED: Hip fracture ([facility] ICD-10-CM: S72.009A  ICD-9-CM: 820.8  9/24/2021 - 2/18/2022        RESOLVED: Acute delirium ICD-10-CM: R41.0  ICD-9-CM: 780.09  2/4/2019 - 2/6/2019        RESOLVED: NSTEMI (non-ST elevated myocardial infarction) ([facility] ICD-10-CM: I21.4  ICD-9-CM: 410.70  12/25/2018 - 2/22/2021        RESOLVED: Hypokalemia ICD-10-CM: E87.6  ICD-9-CM: 276.8  12/25/2018 - 2/22/2021        RESOLVED: Syncope ICD-10-CM: R55  ICD-9-CM: 780.2  4/11/2017 - 2/22/2021        RESOLVED: Volume depletion ICD-10-CM: E86.9  ICD-9-CM: 276.50  4/11/2017 - 2/22/2021        RESOLVED: Hyponatremia ICD-10-CM: E87.1  ICD-9-CM: 276.1  4/11/2017 - 2/22/2021        RESOLVED: Pneumonia ICD-10-CM: J18.9  ICD-9-CM: 486  8/6/2015 - 8/8/2015        RESOLVED: Complicated grief [id number]: F43.21  ICD-9-CM: 309.0  7/23/2015 - 2/22/2021        RESOLVED: Major psychotic depression, single episode ([facility] ICD-10-CM: F32.3  ICD-9-CM: 296.24  7/23/2015 - 7/11/2016              Greater than 30  minutes were spent with the patient on counseling and coordination of care    Signed:   Brent Ladd MD  3/3/2022  10:52 AM

## 2022-03-03 NOTE — PROGRESS NOTES
[address] [facility] Adult  Hospitalist Group                                                                                          Hospitalist Progress Note  Zacarias Rust MD  Answering service: [phone] from in house phone        Date of Service:  3/2/2022  NAME:  Donavan Clark  :  1940  MRN:  [id number]      Admission Summary: This is an 60-year-old man with a past medical history significant for chronic congestive heart failure; COPD; chronic atrial fibrillation; type 2 diabetes; hypertension; hypothyroidism; dementia; coronary artery disease, status post CABG; chronic kidney disease, was in his usual state of health until a couple of days ago when the patient developed shortness of breath. This is progressive and getting worse. His son called EMS 2 days ago. The patient was evaluated at home, was found to have decent oxygen saturation, the patient was reassured and was not brought to the hospital.  His son was asked to get pulse oximeter to measure the patient's oxygen saturation at home. Because of worsening symptoms, his son called EMS and when the EMS arrived at the scene, the patient's oxygen saturation was 84% on room air. He was then placed on 4 liters of  nasal cannula and the oxygen saturation improved to 96-98%. The patient is not on oxygen at home. He was brought to the emergency room for further evaluation. When the patient arrived at the emergency room, the patient was found to have an elevated BNP level. Chest x-ray suggestive of pneumonia as well as vascular congestion. The patient was referred to the hospitalist service for admission. The patient is a very poor historian because of his dementia. He was last admitted to this hospital from 2021 to 10/01/2021. The patient was admitted following a fall, in which the patient sustained a left hip fracture and underwent operative repair for the hip fracture.   The patient was also treated for GI bleed during that hospitalization. This could be the reason why the patient is no longer on anticoagulation for atrial fibrillation. Interval history / Subjective:   Patient without complaints today, stable SO2 on RA  Spent 30 min updating and correcting his home med rec in computer today  Overnight oximetry tonight and DC home in am tomorrow  Family interested in paying for home O2 if he does not qualify     Assessment & Plan:      1. Acute-on-chronic heart failure with reduced ejection fraction  management per cardiology- diuretics changed to lasix on Mon/Wed/Fri  2. Chronic obstructive pulmonary disease - stable on steroids  3.  hypoxemia- due to large bilateral pleural effusions-R throracentesis2/28/22 with 1L removed from R pleural space- L thoracentisis 3/1/22 with 1400ml output-   weaned off oxygen, hopefully home tomorrow with oxygen  4. Type 2 diabetes with hyperglycemia  Diabetic diet- accuchecks and ss insulin. 5.  Hypertension, CAD, afib, - resumed home cardiac meds. 6.  Dementia with behavioral disturbance- stable on seroquel PRN  Adjusted back to home dose. 7.  Hypothyroidism- cont synthroid. 8.  cont abx for suspected pneumonia. 9.  Chronic kidney disease, stage III. - creatinine stable     Code status: DNR  Prophylaxis: SCDs  Care Plan discussed with: patient and family  Anticipated Disposition:home with oxygen and with family tomorrow      Hospital Problems  Date Reviewed: 2/27/2022          Codes Class Noted POA    Acute on chronic HFrEF (heart failure with reduced ejection fraction) ([facility] ICD-10-CM: W15.51  ICD-9-CM: 428.23  12/25/2018 Unknown                Review of Systems:   A comprehensive review of systems was negative except for that written in the HPI. Vital Signs:    Last 24hrs VS reviewed since prior progress note.  Most recent are:  Visit Vitals  BP (!) 166/76 (BP 1 Location: Left upper arm, BP Patient Position: At rest)   Pulse 88   Temp 99.2 °F (37.3 °C)   Resp 18   Ht 6' 2\" (1.88 m)   Wt 66.6 kg (146 lb 13.2 oz)   SpO2 95%   BMI 18.85 kg/m²     Patient Vitals for the past 24 hrs:   Temp Pulse Resp BP SpO2   03/02/22 2239  88      03/02/22 2022  89      03/02/22 2012 99.2 °F (37.3 °C) 91 18 (!) 166/76 95 %   03/02/22 1933 98.9 °F (37.2 °C) 84  (!) 175/58 98 %   03/02/22 1800  92      03/02/22 1545 98.9 °F (37.2 °C) 96 23 (!) 142/75 94 %   03/02/22 1400  95      03/02/22 1200  (!) 103      03/02/22 1120 97.9 °F (36.6 °C) 91 16 (!) 145/51 90 %   03/02/22 1000  87      03/02/22 0800  88  (!) 156/79 91 %   03/02/22 0713 97.6 °F (36.4 °C) 85 18 (!) 150/90 95 %   03/02/22 0559  76      03/02/22 0432  90 16 (!) 165/77 92 %   03/02/22 0400  93      03/02/22 0207  98      03/01/22 2328  79 18 (!) 169/57 93 %       Intake/Output Summary (Last 24 hours) at 3/2/2022 2316  Last data filed at 3/2/2022 2012  Gross per 24 hour   Intake 960 ml   Output 0 ml   Net 960 ml        Physical Examination:     I had a face to face encounter with this patient and independently examined them on 3/2/2022 as outlined below:          Constitutional:  No acute distress, cooperative, pleasant    ENT:  Oral mucosa moist, oropharynx benign. Resp:  decreased breath sounds bilaterally. No wheezing/.    CV:  Regular rhythm, normal rate, no murmurs,    GI:  Soft, non distended, non tender. normoactive bowel sounds, no hepatosplenomegaly     Musculoskeletal:  No edema, warm, 2+ pulses throughout    Neurologic:  Moves all extremities.   AAOx3, CN II-XII reviewed            Data Review:    Review and/or order of clinical lab test  Review and/or order of tests in the radiology section of CPT  Review and/or order of tests in the medicine section of CPT   CT Results  (Last 48 hours)    None            Labs:     Recent Labs     03/02/22  0114 03/01/22  0415   WBC 11.2* 14.6*   HGB 11.1* 10.7*   HCT 32.4* 31.4*    302     Recent Labs     03/02/22  0114 03/01/22  0415 02/28/22 0311   * 129* 129*   K 4.1 4.0 4.3   CL 96* 97 98   CO2 25 27 25   BUN 23* 24* 21*   CREA 1.58* 1.51* 1.66*   * 168* 231*   CA 8.4* 8.7 8.6     No results for input(s): ALT, AP, TBIL, TBILI, TP, ALB, GLOB, GGT, AML, LPSE in the last 72 hours. No lab exists for component: SGOT, GPT, AMYP, HLPSE  Recent Labs     02/28/22 0311   INR 1.1   PTP 11.1   APTT 34.7*      No results for input(s): FE, TIBC, PSAT, FERR in the last 72 hours. Lab Results   Component Value Date/Time    Folate 21.9 (H) 02/04/2019 07:35 PM      No results for input(s): PH, PCO2, PO2 in the last 72 hours. No results for input(s): CPK, CKNDX, TROIQ in the last 72 hours.     No lab exists for component: CPKMB  Lab Results   Component Value Date/Time    Cholesterol, total 170 09/08/2020 04:30 PM    HDL Cholesterol 51 09/08/2020 04:30 PM    LDL, calculated 103 (H) 09/08/2020 04:30 PM    LDL, calculated 93 02/12/2019 08:49 AM    Triglyceride 89 09/08/2020 04:30 PM    CHOL/HDL Ratio 2.3 07/18/2017 02:14 AM     Lab Results   Component Value Date/Time    Glucose (POC) 302 (H) 03/02/2022 10:16 PM    Glucose (POC) 412 (H) 03/02/2022 05:10 PM    Glucose (POC) 417 (H) 03/02/2022 05:08 PM    Glucose (POC) 196 (H) 03/02/2022 11:29 AM    Glucose (POC) 161 (H) 03/02/2022 06:29 AM     Lab Results   Component Value Date/Time    Color YELLOW/STRAW 02/27/2022 11:00 PM    Appearance CLEAR 02/27/2022 11:00 PM    Specific gravity 1.012 02/27/2022 11:00 PM    Specific gravity 1.010 07/17/2017 04:12 PM    pH (UA) 5.0 02/27/2022 11:00 PM    Protein 100 (A) 02/27/2022 11:00 PM    Glucose 500 (A) 02/27/2022 11:00 PM    Ketone TRACE (A) 02/27/2022 11:00 PM    Bilirubin Negative 02/27/2022 11:00 PM    Urobilinogen 0.2 02/27/2022 11:00 PM    Nitrites Negative 02/27/2022 11:00 PM    Leukocyte Esterase Negative 02/27/2022 11:00 PM    Epithelial cells FEW 02/27/2022 11:00 PM    Bacteria Negative 02/27/2022 11:00 PM    WBC 0-4 02/27/2022 11:00 PM    RBC 0-5 02/27/2022 11:00 PM         Medications Reviewed:     Current Facility-Administered Medications   Medication Dose Route Frequency    [START ON 3/3/2022] furosemide (LASIX) tablet 20 mg  20 mg Oral DAILY    doxycycline (VIBRA-TABS) tablet 100 mg  100 mg Oral Q12H    predniSONE (DELTASONE) tablet 30 mg  30 mg Oral DAILY WITH BREAKFAST    QUEtiapine (SEROquel) tablet 25 mg  25 mg Oral BID PRN    DULoxetine (CYMBALTA) capsule 60 mg  60 mg Oral QHS    balsam peru-castor oiL (VENELEX) ointment   Topical Q8H    sodium chloride (NS) flush 5-40 mL  5-40 mL IntraVENous Q8H    sodium chloride (NS) flush 5-40 mL  5-40 mL IntraVENous PRN    amLODIPine (NORVASC) tablet 10 mg  10 mg Oral DAILY    aspirin chewable tablet 81 mg  81 mg Oral BID    vitamin B complex tablet  1 Tablet Oral DAILY    HYDROcodone-acetaminophen (NORCO) 5-325 mg per tablet 1 Tablet  1 Tablet Oral DAILY PRN    levothyroxine (SYNTHROID) tablet 50 mcg  50 mcg Oral ACB    lansoprazole (PREVACID) 3 mg/mL oral suspension 30 mg  30 mg Oral BID    midodrine (PROAMATINE) tablet 5 mg  5 mg Oral DAILY PRN    tamsulosin (FLOMAX) capsule 0.4 mg  0.4 mg Oral DAILY    sodium chloride (NS) flush 5-40 mL  5-40 mL IntraVENous Q8H    sodium chloride (NS) flush 5-40 mL  5-40 mL IntraVENous PRN    acetaminophen (TYLENOL) tablet 650 mg  650 mg Oral Q6H PRN    Or    acetaminophen (TYLENOL) suppository 650 mg  650 mg Rectal Q6H PRN    polyethylene glycol (MIRALAX) packet 17 g  17 g Oral DAILY PRN    ondansetron (ZOFRAN ODT) tablet 4 mg  4 mg Oral Q8H PRN    Or    ondansetron (ZOFRAN) injection 4 mg  4 mg IntraVENous Q6H PRN    heparin (porcine) injection 5,000 Units  5,000 Units SubCUTAneous Q8H    L.acidophilus-paracasei-S.thermophil-bifidobacter (RISAQUAD) 8 billion cell capsule  1 Capsule Oral DAILY    cefTRIAXone (ROCEPHIN) 1 g in sterile water (preservative free) 10 mL IV syringe  1 g IntraVENous Q24H    albuterol-ipratropium (DUO-NEB) 2.5 MG-0.5 MG/3 ML  3 mL Nebulization Q4H PRN    glucose chewable tablet 16 g  4 Tablet Oral PRN    glucagon (GLUCAGEN) injection 1 mg  1 mg IntraMUSCular PRN    dextrose 10 % infusion 0-250 mL  0-250 mL IntraVENous PRN    insulin glargine (LANTUS) injection 8 Units  8 Units SubCUTAneous QHS    insulin lispro (HUMALOG) injection   SubCUTAneous AC&HS     ______________________________________________________________________  EXPECTED LENGTH OF STAY: 3d 19h  ACTUAL LENGTH OF STAY:          3                 Roxanne Keen MD

## 2022-03-03 NOTE — PROGRESS NOTES
Spiritual Care Partner Volunteer visited patient in [facility] on 3.2.22.     Documented by Maria Esther Dominguez, Staff , on 3.3.22  Please call WILMER ([phone]) to page  if needed

## 2022-03-03 NOTE — PROGRESS NOTES
03/02/22 2012   Jose Fall Risk   Mobility 1.0   Mobility Interventions Patient to call before getting OOB   Mentation 1.0   Mentation Interventions Bed/chair exit alarm   Medication 1   Medication Interventions Teach patient to arise slowly; Patient to call before getting OOB; Bed/chair exit alarm   Elimination 1.00   Elimination Interventions Bed/chair exit alarm   Prior Fall History 0.0   History of Falls Interventions Bed/chair exit alarm   Total Score 4   Standard Fall Precautions Yes   High Fall Risk Yes     Patient is high fall risk, please coordinate with PT for six minute walk.

## 2022-03-03 NOTE — PROGRESS NOTES
Transitions of Care:    RUR - 19%    Disposition: Home with family support    Follow-up:  PCP/Cardiology    Transport:  BLS at 2:15 pm    CM sent referral to [facility] with added noctural pulse ox on RA results from last night - patient is eligible for concentrator for home and will contact family to arrange delivery for home - [facility] arranged for pick-up at 2:15 pm.        SMAART-E discharge completed with Marci Fritz RN, CM and Breanne Larkin, son at bedside.  OWEN Dlil

## 2022-03-03 NOTE — PROGRESS NOTES
Hospital follow-up PCP transitional care appointment has been scheduled with Dr. Bhaskar Humphery for Monday, 3/7/22. Office will contact the patient prior to arrival. Pending patient discharge. Chantel Blanton, Care Management Assistant.

## 2022-03-03 NOTE — PROGRESS NOTES
Physician Progress Note      PATIENT:               Laurita Morris  CSN #:                  [id number]  :                       1940  ADMIT DATE:       2022 2:07 AM  DISCH DATE:  RESPONDING  PROVIDER #:        Te Storm MD          QUERY TEXT:    Pt has Acute on Chronic CHF with reduced EF documented. EF noted to be 35 to 40% per Echo. If possible, please document in progress notes and discharge summary further specificity regarding the type and acuity of CHF:    The medical record reflects the following:  Risk Factors: hx of CHF, admitted with large Pleural Effusion  Clinical Indicators: admitted with increased SOB, with O2 Sat 84% on EMS arrival. BNP elevated at 7999, CXR showing pulmonary edema and bilateral pleural effusions. Echo has been done with EF 35-40%. 'A/C CHF with reduced EF' is documented. Treatment: Lasix IV BID, Norvasc, pt has undergone bilateral Thoracentesis, Cardiology consult was made. Thank you,  Amita Potts RN  Clinical Documentation  [phone]  Options provided:  -- Acute on Chronic Systolic CHF/HFrEF  -- Acute on Chronic Systolic and Diastolic CHF  -- Other - I will add my own diagnosis  -- Disagree - Not applicable / Not valid  -- Disagree - Clinically unable to determine / Unknown  -- Refer to Clinical Documentation Reviewer    PROVIDER RESPONSE TEXT:    This patient is in acute on chronic systolic CHF/HFrEF. Query created by: Arie Marino on 3/1/2022 6:02 PM      QUERY TEXT:    Documentation reflects Acute Hypoxic Resp Failure in the H&P, but not in subsequent documentation. Pt had O2 Sat 84% on room air on EMS arrival and was placed on O2 4L with improvement. Pt is not on home O2. Wheezing/ crackles noted on exam, RR 28 on admission. If possible, please document in the progress notes and discharge summary if Acute Hypoxic Resp Failure was:     The medical record reflects the following:  Risk Factors: CHF, large bilateral pleural effusions  Clinical Indicators: admitted with increased SOB, noted to have low O2 Sat of 84% on EMS arrival to home. Pt is not on home O2. RR 28 in ER, with O2 Sat 92% on 4L. Tachypnea and rales/ wheezing noted on exam. Pt found to have large bilateral pleural effusions. Acute Resp Failure is documented in the H&P but not in subsequent documentation. Resp status has improved following thoracentesis. Treatment: O2 4L, diuresis with IV Lasix, pt has undergone bilateral Thoracentesis. Duonebs, IV ABX for underlying infection. Thank you,  Minus Libby FLEMING  Clinical Documentation  [phone]  Options provided:  -- Acute Hypoxic Resp Failure confirmed after study  -- Acute Hypoxic Resp Failure treated and resolved  -- Acute Hypoxic Resp Failure ruled out after study  -- Other - I will add my own diagnosis  -- Disagree - Not applicable / Not valid  -- Disagree - Clinically unable to determine / Unknown  -- Refer to Clinical Documentation Reviewer    PROVIDER RESPONSE TEXT:    Acute Hypoxic Resp Failure confirmed after study.     Query created by: Donny Gallo on 3/2/2022 11:19 AM      Electronically signed by:  Bohdan Gowers MD 3/3/2022 5:36 AM

## 2022-03-03 NOTE — DISCHARGE INSTRUCTIONS
Discharge Instructions       PATIENT ID: Coy Liu  MRN: [id number]   YOB: 1940    DATE OF ADMISSION: 2/27/2022  2:07 AM    DATE OF DISCHARGE: 3/3/2022    PRIMARY CARE PROVIDER: Gomez Cervantes MD     ATTENDING PHYSICIAN: Ruperto Greenberg MD  DISCHARGING PROVIDER: Eddie Tomas MD    To contact this individual call [phone] and ask the  to page. If unavailable ask to be transferred the Adult Hospitalist Department. DISCHARGE DIAGNOSES  Acute-on-chronic SYSTOLIC HEART FAILURE NYHA 2-3     CONSULTATIONS: IP CONSULT TO CARDIOLOGY    PROCEDURES/SURGERIES: * No surgery found *    PENDING TEST RESULTS:   At the time of discharge the following test results are still pending:     FOLLOW UP APPOINTMENTS:   Follow-up Information     Follow up With Specialties Details Why Contact Info    Gomez Cervantes MD Family Medicine, Hospitalist In 1 week  [address] [place] [address] Road [address] [place] [address], [address] [place] [address] Vascular Surgery, Interventional Cardiology, Cardiology In 2 weeks  [address] [place] [address] Road  [phone]             ADDITIONAL CARE RECOMMENDATIONS:     DIET: Cardiac Diet    ACTIVITY: Activity as tolerated    WOUND CARE:     EQUIPMENT needed:       Radiology      DISCHARGE MEDICATIONS:   See Medication Reconciliation Form    · It is important that you take the medication exactly as they are prescribed. · Keep your medication in the bottles provided by the pharmacist and keep a list of the medication names, dosages, and times to be taken in your wallet. · Do not take other medications without consulting your doctor. NOTIFY YOUR PHYSICIAN FOR ANY OF THE FOLLOWING:   Fever over 101 degrees for 24 hours. Chest pain, shortness of breath, fever, chills, nausea, vomiting, diarrhea, change in mentation, falling, weakness, bleeding. Severe pain or pain not relieved by medications.   Or, any other signs or symptoms that you may have questions about. DISPOSITION:  X  Home With:   OT  PT X HH  RN       SNF/Inpatient Rehab/LTAC    Independent/assisted living    Hospice    Other:     CDMP Checked: Yes X     PROBLEM LIST Updated:   Yes X       Signed:   Jcarlos Escobar MD  3/3/2022  9:13 AM

## 2022-03-03 NOTE — TELEPHONE ENCOUNTER
Telephone call from [facility] care manager with [facility] advising that patient will be discharged later today. Requesting appt for pt with PCP Dr. Zakiya Gandara. Advised that pt is on the schedule for transition of care visit on Monday 3/7.

## 2022-03-04 NOTE — PROGRESS NOTES
Physician Progress Note      PATIENT:               Bharath Olson  CSN #:                  [id number]  :                       1940  ADMIT DATE:       2022 2:07 AM  Monty Villarreal [place] DATE:        3/3/2022 6:00 PM  RESPONDING  PROVIDER #:        Kenia Torres MD          QUERY TEXT:    Patient noted to have Large bilateral pleural effusions. If possible, please document in progress notes and d/c summary further specificity regarding the type/underlying cause of pleural effusion: The medical record reflects the following:  Risk Factors: admitted with PNA, also found to have A/C CHF  Clinical Indicators: admitted with SOB, RR 28, wheezing and crackles noted on exam, O2 Sat 84% on room air on EMS arrival. WBC 7.9, BNP 7999, EF 35-40%. CXR showing large bilateral pleural effusions and pneumonia. Pt underwent bilateral thoracentesis with 1L removed from Right side, and 1.6L removed from the Left side. Treatment: Lasix IV, Rocephin/ Doxycycline IV, Duoenbs, O2, Norvasc, O2, Cardiology consult was made. Thank you,  Alfred Murrieta RN  Clinical Documentation  [phone]  Options provided:  -- Pleural effusion due to CHF  -- Pleural effusion due to Bacterial PNA  -- Other - I will add my own diagnosis  -- Disagree - Not applicable / Not valid  -- Disagree - Clinically unable to determine / Unknown  -- Refer to Clinical Documentation Reviewer    PROVIDER RESPONSE TEXT:    Provider is clinically unable to determine a response to this query.     Query created by: Zahra Osborne on 3/4/2022 7:58 AM      Electronically signed by:  Kenia Torres MD 3/4/2022 10:46 AM

## 2022-03-04 NOTE — TELEPHONE ENCOUNTER
[facility] [address]  Phone:  [phone]  Fax:  [phone]    Patient:  Sobia Dominguez  YOB: 1940    Hospital Discharge Transition of Care Note    Date/Time:  3/4/2022 10:55 AM    Patient was discharged from [facility] on 3/4/2022. Patient was hospitalized 2022 to 3/3/2022 and was treated for ACUTE ON CHRONIC CHF with reduced EF. RRAT score: 93% High    Medical History:     Past Medical History:   Diagnosis Date    Atrial fibrillation (HCC)     COPD [facility] obstructive [facility] disease) [facility]     Diabetes ([facility]     1970a    Heart failure ([facility]     Hip fracture ([facility] 2021    Hypokalemia 2018    Hyponatremia 2017    MI (myocardial infarction) ([facility] 2019    NSTEMI (non-ST elevated myocardial infarction) ([facility] 2018    Syncope 2017    Urinary incontinence        This nurse contacted the patient by telephone to perform post hospital discharge assessment. Verified  and address with patient as identifiers. Diet:   Patient reports: Regular Diet    Activity:    Patient reports: As tolerated, homebound    DME: Home Oxygen -  Daughter reports patient received Concentrator, but hot used, as he did not need it. Medication:   Performed medication reconciliation with patient, and patient verbalizes understanding of administration of home medications. There were no barriers to obtaining medications identified at this time. Support system:   daughter - Jed s    Discharge Instructions :  Reviewed discharge instructions with patient. Patient verbalizes understanding of discharge instructions and follow-up care.      Red Flags:  Oxygen use - Non compliance    Labs Reviewed:  Recent Labs     22  0001 22   WBC 9.6 11.2*   HGB 10.3* 11.1*   HCT 29.7* 32.4*    310     Recent Labs     22  0001 22  011   * 127*   K 4.0 4.1   CL 92* 96*   CO2 27 25   * 259*   BUN 22* 23*   CREA 1.54* 1.58*   CA 8.5 8.4*     No components found for: GLPOC  No results for input(s): PH, PCO2, PO2, HCO3, FIO2 in the last 72 hours. No results for input(s): INR, INREXT in the last 72 hours. Imaging results reviewed:    Advance Care Planning:     Reviewed plan of care. Patient has provided input to plan and agrees with goals. GOALS:      Knowledge and adherence of prescribed medication (ie. action, side effects, missed dose, etc.). Patient was able to name all medications. Gan Preeti reports taking all medications as directed. Develop action plan for Self-Management Chronic Disease. Patient has follow up appointment scheduled with Dr. Nicolette Carpenter on 3/7/22. PCP/Specialist follow up:   Patient scheduled to follow up with Jose Olmos MD on 3/7/22. Plan for What do Do if a Problem Arises:  Nurse reviewed [facility] 24/7 on call line. Patient is able to verbalize clinical signs and reasons to use EMS services and/or [facility]. Communication with [facility] by Patient:     Pt has [facility] [place] contact number ([phone]) for further questions or concerns.      Rae Peoples RN

## 2022-03-04 NOTE — PROGRESS NOTES
No transition of care outreach indicated due to patient being treated by [address] [place] [address] or Home Based Palliative team.     Luisa Ann MSN, RN, CCM / Care Transition Nurse / [phone]

## 2022-03-07 NOTE — PATIENT INSTRUCTIONS
Heart Failure: Care Instructions  Your Care Instructions     Heart failure occurs when your heart does not pump as much blood as the body needs. Failure does not mean that the heart has stopped pumping but rather that it is not pumping as well as it should. Over time, this causes fluid buildup in your lungs and other parts of your body. Fluid buildup can cause shortness of breath, fatigue, swollen ankles, and other problems. By taking medicines regularly, reducing sodium (salt) in your diet, checking your weight every day, and making lifestyle changes, you can feel better and live longer. Follow-up care is a key part of your treatment and safety. Be sure to make and go to all appointments, and call your doctor if you are having problems. It's also a good idea to know your test results and keep a list of the medicines you take. How can you care for yourself at home? Medicines    · Be safe with medicines. Take your medicines exactly as prescribed. Call your doctor if you think you are having a problem with your medicine.     · Do not take any vitamins, over-the-counter medicine, or herbal products without talking to your doctor first. Andres Arnett not take ibuprofen (Advil or Motrin) and naproxen (Aleve) without talking to your doctor first. They could make your heart failure worse.     · You may take some of the following medicine. ? Angiotensin-converting enzyme inhibitors (ACEIs) or angiotensin II receptor blockers (ARBs) reduce the heart's workload, lower blood pressure, and reduce swelling. Taking an ACEI or ARB may lower your chance of needing to be hospitalized. ? Beta-blockers can slow heart rate, decrease blood pressure, and improve your condition. Taking a beta-blocker may lower your chance of needing to be hospitalized. ? Diuretics, also called water pills, reduce swelling. You will get more details on the specific medicines your doctor prescribes. Diet    · Your doctor may suggest that you limit sodium. Your doctor can tell you how much sodium is right for you. An example is less than 3,000 mg a day. This includes all the salt you eat in cooking or in packaged foods. People get most of their sodium from processed foods. Fast food and restaurant meals also tend to be very high in sodium.     · Ask your doctor how much liquid you can drink each day. You may have to limit liquids. Weight    · Weigh yourself without clothing at the same time each day. Record your weight. Call your doctor if you have a sudden weight gain, such as more than 2 to 3 pounds in a day or 5 pounds in a week. (Your doctor may suggest a different range of weight gain.) A sudden weight gain may mean that your heart failure is getting worse. Activity level    · Start light exercise (if your doctor says it is okay). Even if you can only do a small amount, exercise will help you get stronger, have more energy, and manage your weight and your stress. Walking is an easy way to get exercise. Start out by walking a little more than you did before. Bit by bit, increase the amount you walk.     · When you exercise, watch for signs that your heart is working too hard. You are pushing yourself too hard if you cannot talk while you are exercising. If you become short of breath or dizzy or have chest pain, stop, sit down, and rest.     · If you feel \"wiped out\" the day after you exercise, walk slower or for a shorter distance until you can work up to a better pace.     · Get enough rest at night. Sleeping with 1 or 2 pillows under your upper body and head may help you breathe easier. Lifestyle changes    · Do not smoke. Smoking can make a heart condition worse. If you need help quitting, talk to your doctor about stop-smoking programs and medicines. These can increase your chances of quitting for good.  Quitting smoking may be the most important step you can take to protect your heart.     · Limit alcohol to 2 drinks a day for men and 1 drink a day for women. Too much alcohol can cause health problems.     · Avoid getting sick from colds and the flu. Get a pneumococcal vaccine shot. If you have had one before, ask your doctor whether you need another dose. Get a flu shot each year. If you must be around people with colds or the flu, wash your hands often. When should you call for help? Call 911  if you have symptoms of sudden heart failure such as:    · You have severe trouble breathing.     · You cough up pink, foamy mucus.     · You have a new irregular or rapid heartbeat. Call your doctor now or seek immediate medical care if:    · You have new or increased shortness of breath.     · You are dizzy or lightheaded, or you feel like you may faint.     · You have sudden weight gain, such as more than 2 to 3 pounds in a day or 5 pounds in a week. (Your doctor may suggest a different range of weight gain.)     · You have increased swelling in your legs, ankles, or feet.     · You are suddenly so tired or weak that you cannot do your usual activities. Watch closely for changes in your health, and be sure to contact your doctor if you develop new symptoms. Where can you learn more? Go to http://www.gray.com/  Enter C349 in the search box to learn more about \"Heart Failure: Care Instructions. \"  Current as of: April 29, 2021               Content Version: 13.0  © 9954-4668 [employer]. Care instructions adapted under license by [employer] (which disclaims liability or warranty for this information). If you have questions about a medical condition or this instruction, always ask your healthcare professional. [place] [address] any warranty or liability for your use of this information.

## 2022-03-07 NOTE — PROGRESS NOTES
[facility] formerly [facility]    [phone]        NOTE: Home Based Primary Care is a PROVIDER (MD/NP) based interdisciplinary practice (RN, LCSW, Pharmacy, Dietician) for patients who cannot access (or have a taxing effort) primary / specialty medical care in an office setting. [facility] [place] is NOT [employer] but works with [facility] when there is a skilled need. Our MD/NPs are integral in Care Transitions; PLEASE CALL [phone] if this patient arrives in the Emergency Department or Hospital.         Transition of Care documentation:  Date of hospital admission: 2/27/22  Date of hospital discharge: 3/3/22  Date of professional contact: 3/4/22, please see telephone encounter note from team nurse (personally reviewed)    FTF visit:  3/7/2022  Complexity of MDM: moderate    Was home health instituted? No    Discharge summary personally reviewed. Items to be followed up on include:  Labwork obtained during today's vitis- CBC to follow-up on anemia and BMP after initiation of diuretic      Joey Baker is a [de-identified] y.o. male who presents to the office today for the following:  Chief Complaint   Patient presents with    Transitions Of Care    Congestive Heart Failure       ASSESSMENT & PLAN       ICD-10-CM ICD-9-CM    1. Acute on chronic HFrEF (heart failure with reduced ejection fraction) ([facility])  I50.23 428.23    2. Uncontrolled type 2 diabetes mellitus with hyperglycemia ([facility])  E11.65 250.02    3. Late onset Alzheimer's disease with behavioral disturbance ([facility])  G30.1 331.0     F02.81 294.11    4. Stage 3b chronic kidney disease (HCC)  N18.32 585.3    5. Hyponatremia  E87.1 276.1    6. History of GI bleed  Z87.19 V12.79    7. Chronic obstructive pulmonary disease, unspecified COPD type ([facility] [address]  J44.9 496    8. Other chronic pain  G89.29 338.29        Acute on Chronic HFrEF- Appears euvolemic on exam today. Echo 2/28/22 with EF 35-40%.   Required IV diuresis during hospitalization. Bilateral thoracentesis completed on 3/1/22. Was not previously on Ace-I, beta blocker, or diuretic therapy due to orthostatic hypotension. Furosemide 20mg initiated during hospitalization to be taken on M, W, F. Family instructed to use 2L of oxygen via nasal cannula for SpO2 <90% and call if unrelieved by oxygen. Oxygen settings and nasal placement reviewed with son during visit. Type 2 DM- A1C excellent at 6.1% on 2/28/22. Blood glucose has been elevated while receiving prednisone. Reviewed family log, they are checking multiple times daily with average readings 200s-300s. Continue Lantus 14U daily and SSI as needed. Alzheimer's Disease- Patient is calm and mostly cooperative during today's visit. Family reports somewhat disturbed sleep cycle, \"he sleeps off and on,\" and behaviors and appetite have been at baseline. Continue quetiapine 25mg twice daily as needed. Continue with supportive care. He was previously taking Memantine which has been discontinued. CKD Stage 3b- Last GFR on 3/3/22 was 44. Avoid nephrotoxic medications when possible and consider renal dosing as indicated. BMP obtained today to follow-up after initiation of furosemide. Hyponatremia- Sodium 126 at discharge on 3/3/21. Per chart review, this has been a chronic problem, but worsened form previous sodium levels of about 130. No obvious offending medications and PO intake unchanged per family. BMP obtained today for recheck. History of GIB- Hgb 10.3 on 3/3/22. Patient taking aspirin 81mg daily (cleared to continue after GIB by GI consult during hospitalization). Continue omeprazole for GI protection. CBC obtained today. COPD- No recent exacerbations, unlikely to successfully use inhalers or discus due to advanced dementia. Using 2L oxygen as needed for respiratory support. Currently taking 30mg prednisone daily for total of 5 day course.     Chronic pain- Taking Norco 5-325 for pain, this is a chronic medication. Also taking Cymbalta 60mg daily. Toxicology screen obtained during today's visit. Follow-up visit in 3 months or sooner as needed. Will need controlled substance contract at that time. Will discuss need for Covid booster at next visit and give number for mobile vaccine service if needed. Health Maintenance Due   Topic Date Due    Depression Monitoring  Never done    Pneumococcal 65+ years (1 of 1 - PPSV23) Never done    Foot Exam Q1  09/08/2021    COVID-19 Vaccine (2 - Booster for Catie series) 11/25/2021       Follow-up and Dispositions    · Return in about 3 months (around 6/7/2022) for 3 month follow-up. Routing History          I have left a SUMMARY / INSTRUCTIONS from today's visit with the patient/family in the home           HISTORY:     CHIEF COMPLAINT:    Chief Complaint   Patient presents with    Transitions Of Care    Congestive Heart Failure       HPI/SUBJECTIVE:    The patient is: [x] Verbal / [] Nonverbal     Mr. Selma Estrada is an [de-identified] y/o M seen today for transition of care visit. He is seen in the home due to taxing effort to seek primary care services in the community s/t immobility and advanced dementia. Dr. Alfredo Hobbs, supervising physician, is present for today's visit as well as Mahsa Victoria RN.  Mr. Selma Estrada was hospitalized 2/27/22-3/3/22 for acute on chronic HFrEF. He was diuresed and had a bilateral thoracentesis. He is discharged home with oxygen as needed and PO Lasix which he takes M,W,F. He is also completing a course of doxycycline and prednisone. He was not previously on diuretic therapy due to history of orthostatic hypotension, but had no previous heart failure exacerbations. At today's visit, patient is wearing oxygen which was placed by family due to SpO2 of 89% last night. Oxygen for today's visit is 96%. He is calm and appears in no acute distress. ROS and physical exam are limited by mental status s/t dementia.       Patient also has a history of Type 2 DM, COPD, Alzheimer's, and chronic pain. Per his son who is present during visit, he has not appeared to be in pain lately, so he has not been given his PRN Norco.  His blood sugars have been somewhat elevated in 200s-300s. Reassured family that this was common while taking prednisone. They are giving sliding scale insulin per discharge instructions. Most recent A1C is 6.1% on 2/28/22. Patient has had a small but regular appetite and he has had no worsening behaviors. He is calm and mostly cooperative during visit today and allows venipuncture. REVIEW OF SYSTEMS      Patient is unable to complete ROS due to underlying cognitive status s/t dementia. PHYSICAL EXAM      Visit Vitals  /62 (BP 1 Location: Left upper arm, BP Patient Position: Supine)   Pulse 81   Temp 98 °F (36.7 °C)   SpO2 96%       Physical Exam  Vitals reviewed. Constitutional:       General: He is not in acute distress. Appearance: He is not toxic-appearing. Comments: Thin and frail-appearing, in bed sleeping     HENT:      Head: Normocephalic and atraumatic. Eyes:      General: No scleral icterus. Pupils: Pupils are equal, round, and reactive to light. Cardiovascular:      Rate and Rhythm: Normal rate. Rhythm irregular. Pulses: Normal pulses. Comments: Grade III Systolic murmur appreciated  Pulmonary:      Effort: Pulmonary effort is normal. No respiratory distress. Breath sounds: No wheezing, rhonchi or rales. Comments: Breath sounds diminished in bases, limited by patient who is unable to cooperate for deep inhalation. Wearing 2L oxygen via nc  Abdominal:      General: Abdomen is flat. Bowel sounds are normal. There is no distension. Palpations: Abdomen is soft. Musculoskeletal:      Right lower leg: No edema. Left lower leg: No edema. Skin:     Findings: Bruising present.       Comments: BUE and abdominal bruising s/t DVT prophylaxis and venipuncture during hospitalization. St 1 pressure injury of sacrum   Neurological:      Mental Status: He is disoriented. Psychiatric:      Comments: Cooperative, answers to name but does not follow all commands          MEDICAL HISTORY      Past Medical and Surgical History reviewed in [facility] on date of initial visit    Patient Active Problem List   Diagnosis Code    Late onset Alzheimer's disease with behavioral disturbance ([facility]) G30.1, F02.81    Moderate recurrent major depression ([facility] [address] F33.1    Uncontrolled type 2 diabetes mellitus with hyperglycemia [facility] E11.65    Urinary incontinence R32    CAD (coronary artery disease) I25.10    COPD (chronic obstructive pulmonary disease) ([facility]) J44.9    Orthostatic hypotension I95.1    Acute on chronic HFrEF (heart failure with reduced ejection fraction) ([facility]) I50.23    Prostate cancer ([facility] C61    Acquired hypothyroidism E03.9    Other chronic pain G89.29    Debility R53.81    COVID-19 vaccine dose declined Z28.21    Essential hypertension I10    Stage 3b chronic kidney disease ([facility]) N18.32    History of GI bleed Z87.19    Gastritis with hemorrhage K29.71    History of fracture of left hip Z87.81     Family History   Problem Relation Age of Onset    Diabetes Mother     Diabetes Brother       Social History     Tobacco Use    Smoking status: Former Smoker    Smokeless tobacco: Never Used    Tobacco comment: 1-2 cigars daily   Substance Use Topics    Alcohol use: No     Alcohol/week: 0.0 standard drinks     Allergies   Allergen Reactions    Sulfa (Sulfonamide Antibiotics) Unknown (comments)      Current Outpatient Medications   Medication Sig    doxycycline (VIBRA-TABS) 100 mg tablet Take 1 Tablet by mouth every twelve (12) hours for 12 doses.  insulin glargine (LANTUS,BASAGLAR) 100 unit/mL (3 mL) inpn 10 Units by SubCUTAneous route daily. Inject 14 units every morning.  L.acid,para-B. bifidum-S.therm (RISAQUAD) 8 billion cell cap cap Take 1 Capsule by mouth daily for 15 days.  lansoprazole (PREVACID) 3 mg/mL oral suspension Take 10 mL by mouth two (2) times a day.  furosemide (Lasix) 20 mg tablet Take 1 Tablet by mouth every Monday, Wednesday, Friday for 30 days.  predniSONE (DELTASONE) 10 mg tablet Take 30 mg by mouth daily for 5 days. Carito Saladitya ascorbic acid, vitamin C, (Vitamin C) 500 mg tablet Take 500 mg by mouth daily.  multivit-minerals/folic acid (MULTIVITAMIN GUMMIES PO) Take 1 Tablet by mouth daily.  cholecalciferol (VITAMIN D3) (400 Units /10 mcg) tab tablet Take 400 Units by mouth daily.  ferrous sulfate 325 mg (65 mg iron) tablet Take 325 mg by mouth Daily (before breakfast).  docusate sodium (COLACE) 100 mg capsule Take 100 mg by mouth two (2) times daily as needed for Constipation.  aspirin 81 mg chewable tablet Take 81 mg by mouth daily.  omeprazole (PRILOSEC) 20 mg capsule Take 20 mg by mouth daily.  tamsulosin (Flomax) 0.4 mg capsule Take 0.4 mg by mouth nightly.  QUEtiapine (SEROqueL) 25 mg tablet Take 25 mg by mouth two (2) times daily as needed for PRN Reason (Other) (anxiety, behavioral issues).  b complex vitamins tablet Take 1 Tablet by mouth daily.  DULoxetine (CYMBALTA) 60 mg capsule Take 1 Capsule by mouth daily.  glucose blood VI test strips (ASCENSIA AUTODISC VI, ONE TOUCH ULTRA TEST VI) strip Check blood sugar three times daily before meals    Insulin Needles, Disposable, (BD Ultra-Fine Mini Pen Needle) 31 gauge x 3/16\" ndle USE TO INJECT INSULIN 3 TIMES A DAY    amLODIPine (NORVASC) 10 mg tablet Take 1 Tablet by mouth daily.  Wheel Chair angel Standard wheelchair    balsam peru-castor oiL (VENELEX) ointment Apply  to affected area every eight (8) hours.  insulin lispro (HUMALOG) 100 unit/mL injection INJECT 3 UNITS BENEATH THE SKIN FOR BS>200, 5 UNITS FOR BS>250, 6 UNITS FOR BS>300.  CALL MD FOR BS >400    levothyroxine (SYNTHROID) 50 mcg tablet TAKE 1 TABLET BY MOUTH EVERY DAY BEFORE BREAKFAST     No current facility-administered medications for this visit. LAB DATA REVIEWED:     Lab Results   Component Value Date/Time    Hemoglobin A1c 6.1 (H) 02/28/2022 03:10 AM    Hemoglobin A1c, External 11.2 01/27/2016 12:00 AM     Lab Results   Component Value Date/Time    Microalb/Creat ratio (ug/mg creat.) 63.6 (H) 02/12/2019 08:49 AM     Lab Results   Component Value Date/Time    TSH 2.42 02/27/2022 08:20 AM     No results found for: Chuck Rabago [id number]      Lab Results   Component Value Date/Time    WBC 8.0 03/07/2022 12:10 PM    HGB 10.8 (L) 03/07/2022 12:10 PM    PLATELET 129 38/71/7229 12:10 PM     Lab Results   Component Value Date/Time    Sodium 128 (L) 03/07/2022 12:10 PM    Potassium 4.6 03/07/2022 12:10 PM    Chloride 95 (L) 03/07/2022 12:10 PM    CO2 28 03/07/2022 12:10 PM    BUN 21 (H) 03/07/2022 12:10 PM    Creatinine 1.44 (H) 03/07/2022 12:10 PM    Calcium 8.8 03/07/2022 12:10 PM    Magnesium 1.8 02/27/2022 08:20 AM    Phosphorus 4.1 02/27/2022 08:20 AM      Lab Results   Component Value Date/Time    Alk.  phosphatase 78 03/07/2022 12:10 PM    Protein, total 6.6 03/07/2022 12:10 PM    Albumin 2.9 (L) 03/07/2022 12:10 PM    Globulin 3.7 03/07/2022 12:10 PM     Lab Results   Component Value Date/Time    Iron 39 02/04/2019 07:35 PM    TIBC 196 (L) 02/04/2019 07:35 PM    Iron % saturation 20 02/04/2019 07:35 PM    Ferritin 75 02/04/2019 07:35 PM         GOALS OF CARE / TREATMENT PREFERENCES:     Advance Care Planning:    Primary Decision Maker (Active): Rock Mcclellan Child - [phone]    Primary Decision Maker: Geoff Puckett - [phone]    Secondary Decision Maker: Ced Rivas - Other [facility] - [phone]  Advance Care Planning 2/21/2019   Patient's Healthcare Decision Maker is: Legal Next of Kin   Confirm Advance Directive None   Patient Would Like to Complete Advance Directive Unable   Does the patient have other document types Do Not Resuscitate Code Status:  [] Attempt Resuscitation       [x] Do Not Attempt Resuscitation            Trent Seip, NP

## 2022-03-07 NOTE — TELEPHONE ENCOUNTER
Call received from [facility] to inform that toxicology tests ordered can not be processed on specimen tubes provided (2 gold and 2 lavender tubes). They need a Red tube for the test.   I asked if there is any toxicology test that can be completed with current sample, and was told no. *This information contradicts the previous information given by [facility] that 2 lavender tubes was the required specimen for toxicology.

## 2022-03-11 NOTE — TELEPHONE ENCOUNTER
Sandy message from [facility] reporting patient without BM for over 7 days. They are giving senekot and miralax but patient refuses to take mag citrate liquid, even when mixed in other liquids. Dr. Qi Villanueva is out of office today, spoke with Mahesh Vega NP covering. Order received for Fleets Enema daily for up to 3 days and Lactulose 30 ml BID PRN until patient has a BM. Telephone call to [facility] to advise of above order, she was able to restate the directions. Confirmed that she is to continue both the miralax and senekot as well as adding the lactulose and Fleet's enema. Frannie also requests refills for Levothyroxine and Tamsulosin be sent to [address] Refills sent.

## 2022-03-11 NOTE — TELEPHONE ENCOUNTER
----- Message from Orion Capone sent at 3/11/2022  8:08 AM EST -----  Regarding: Orion Capone   Dad has been constipated for over 7 days now. We are using the senokot  & miralax but he refuses to drink the liquid magnesium. How much longer should we wait? Any other suggestions?     Charisse Paredes   [phone]

## 2022-03-13 PROBLEM — J96.21 ACUTE ON CHRONIC RESPIRATORY FAILURE WITH HYPOXIA (HCC): Status: ACTIVE | Noted: 2022-01-01

## 2022-03-13 NOTE — ED TRIAGE NOTES
Pt arrives via EMS from home c/o SOB. EMS report pt was wearing 2L NC and was 88%. EMS reports 4L NC up to 93-94%. EMS reports all other VSS. Pt hx of dementia and not very talkative at baseline. Pt recently had fluid drained from lungs.

## 2022-03-13 NOTE — ED PROVIDER NOTES
Mr. Salvador Miguel is an [de-identified] male who presents to the ER with shortness of breath. History is obtained by EMS. He has a history of COPD and CHF. He was recently in the hospital for CHF exacerbation. His family noted that recently he is having more trouble breathing. Otherwise, he is at his mental status baseline. When Kolton hidalgo arrived, he was on his 2 L home oxygen. Oxygen saturations were 85%. The put him on 4 L and his sats improved to the low 90s. No other complaints reported. The patient's history is limited by his baseline dementia.                  Past Medical History:   Diagnosis Date    Atrial fibrillation (HCC)     COPD (chronic obstructive pulmonary disease) (HCC)     Diabetes ([facility] [address]     1970a    Heart failure ([facility]     Hip fracture ([facility] 9/24/2021    Hypokalemia 12/25/2018    Hyponatremia 4/11/2017    MI (myocardial infarction) ([facility] [address] 01/2019    NSTEMI (non-ST elevated myocardial infarction) ([facility] [address] 12/25/2018    Syncope 4/11/2017    Urinary incontinence        Past Surgical History:   Procedure Laterality Date    HX HEENT  1975    nasal surgery    HX MOHS PROCEDURES  2013    left    HI CARDIAC SURG PROCEDURE UNLIST  2000    open heart         Family History:   Problem Relation Age of Onset    Diabetes Mother     Diabetes Brother        Social History     Socioeconomic History    Marital status:      Spouse name: Not on file    Number of children: Not on file    Years of education: Not on file    Highest education level: Not on file   Occupational History    Not on file   Tobacco Use    Smoking status: Former Smoker    Smokeless tobacco: Never Used    Tobacco comment: 1-2 cigars daily   Substance and Sexual Activity    Alcohol use: No     Alcohol/week: 0.0 standard drinks    Drug use: No    Sexual activity: Not on file   Other Topics Concern    Not on file   Social History Narrative    Not on file     Social Determinants of Health     Financial Resource Strain:    Brody Gregory Difficulty of Paying Living Expenses: Not on file   Food Insecurity:     Worried About Running Out of Food in the Last Year: Not on file    Ran Out of Food in the Last Year: Not on file   Transportation Needs:     Lack of Transportation (Medical): Not on file    Lack of Transportation (Non-Medical): Not on file   Physical Activity:     Days of Exercise per Week: Not on file    Minutes of Exercise per Session: Not on file   Stress:     Feeling of Stress : Not on file   Social Connections:     Frequency of Communication with Friends and Family: Not on file    Frequency of Social Gatherings with Friends and Family: Not on file    Attends Buddhist Services: Not on file    Active Member of [address] [place] [address] or Organizations: Not on file    Attends Club or Organization Meetings: Not on file    Marital Status: Not on file   Intimate Partner Violence:     Fear of Current or Ex-Partner: Not on file    Emotionally Abused: Not on file    Physically Abused: Not on file    Sexually Abused: Not on file   Housing Stability:     Unable to Pay for Housing in the Last Year: Not on file    Number of [place] in the Last Year: Not on file    Unstable Housing in the Last Year: Not on file         ALLERGIES: Sulfa (sulfonamide antibiotics)    Review of Systems   Unable to perform ROS: Dementia       Vitals:    03/13/22 1927 03/13/22 2007   BP: (!) 162/65    Pulse: 70    Resp: 24    SpO2: 94% 96%            Physical Exam     Vital signs reviewed. Nursing notes reviewed. Const:  No acute distress, well developed, well nourished  Head:  Atraumatic, normocephalic  Eyes:  PERRL, conjunctiva normal, no scleral icterus  Neck:  Supple, trachea midline  Cardiovascular: Regular rate  Resp: Mild increased work of breathing.   Mild respiratory distress, decreased air movement in the bilateral lower lung fields  Abd:  Soft, non-tender, non-distended  MSK:  No pedal edema, normal ROM  Neuro:  Alert and awake, no cranial nerve defect  Skin:  Warm, dry, intact  Psych: Not agitated        MDM  Number of Diagnoses or Management Options     Amount and/or Complexity of Data Reviewed  Clinical lab tests: ordered and reviewed  Tests in the radiology section of CPT®: ordered and reviewed  Review and summarize past medical records: yes    Patient Progress  Patient progress: stable         Procedures      Perfect Serve Consult for Admission  8:42 PM    ED Room Number: ER06/06  Patient Name and age: Simona Sanderson [de-identified] y.o.  male  Working Diagnosis:   1. Acute systolic congestive heart failure ([address]    2. Hypoxia        COVID-19 Suspicion:  yes  Sepsis present:  no  Reassessment needed: no  Code Status:  Do Not Resuscitate  Readmission: yes  Isolation Requirements:  yes  Recommended Level of Care:  telemetry  Department:[facility]   Other:  Hypoxic on room air.  sats improved with supplemental oxygen      Mr. Ricardo Garcia is an [de-identified] male who presents to the ER with complaints of SOB. He is hypoxic at rest, but his sats improve with supplemental oxygen. Pt. Has an elevated BNP. Concern for CHF exacerbation. Pt. To be evaluated for admission by the hospitalist.       Total critical care time spent exclusive of procedures:  35 min.

## 2022-03-14 NOTE — H&P
[address]  HISTORY AND PHYSICAL    Name:  Iza Arroyo  MR#:  [id number]  :  1940  ACCOUNT #:  [de-identified]  ADMIT DATE:  2022      The patient was seen, evaluated, and admitted by me on 2022. PRIMARY CARE PHYSICIAN:  Delmer Chaudhari MD    SOURCE OF INFORMATION:  Patient's son who was present at the bedside and review of ED and old electronic medical records. CHIEF COMPLAINT:  Shortness of breath. HISTORY OF PRESENT ILLNESS:  This is an 73-year-old man with a past medical history significant for chronic heart failure with reduced ejection fraction, COPD, type 2 diabetes, hypertension, hypothyroidism, coronary artery disease, status post CABG, chronic kidney disease, Alzheimer's dementia, who was in his usual state of health until a couple of days ago when the patient developed worsening of his shortness of breath. The patient's has shortness of breath at baseline because of his COPD and congestive heart failure. The patient was recently admitted to this hospital from 2022 to 2022. The patient was admitted for evaluation and treatment of acute-on-chronic heart failure with reduced ejection fraction. The patient has responded to diuresis. The patient had no oxygen requirement at the time of discharge from the hospital, but the patient had oxygen at the house in case the patient is going to require oxygen. The son stated that the patient has been using the oxygen at home at 2 L for the shortness of breath. When the EMS arrived at the scene, the patient's oxygen saturation was 88% on 2 L of oxygen. This was increased to 4 L and the oxygen saturation improved to 93% to 94%. The patient was brought to the emergency room for further evaluation. The patient is not able to provide history because of his Alzheimer's dementia.   When the patient arrived at the emergency room, chest x-ray shows interval increase in parenchymal opacities on the right and on the left. The patient was subsequently referred to the hospitalist service for evaluation for admission. PAST MEDICAL HISTORY:  Congestive heart failure, COPD, type 2 diabetes, hypertension, hypothyroidism, coronary artery disease, status post CABG, chronic kidney disease, chronic atrial fibrillation, and dementia. ALLERGIES:  THE PATIENT IS ALLERGIC TO SULFA. MEDICATIONS:  1.  Norvasc 10 mg daily. 2.  Vitamin C 500 mg daily. 3.  Aspirin 81 mg daily. 4.  Colace 100 mg twice daily. 5.  Cymbalta 60 mg daily. 6.  Ferrous sulfate 1 tablet daily. 7.  Lasix 20 mg 3 times a week, Monday, Wednesday, and Friday. 8.  Lantus insulin 10 units subcutaneously daily. 9.  Sliding scale with insulin coverage. 10.  Lactulose 30 mL twice daily as needed for constipation. 11.  Prevacid 15mg twice daily. 12.  Synthroid 50 mcg daily. 13.  Prilosec 20 mg daily. 14.  Seroquel 25 mg twice daily. 15.  Flomax 0.4 mg 1 capsule daily at bedtime. FAMILY HISTORY:  This was reviewed. His mother had diabetes. His brother also has diabetes. PAST SURGICAL HISTORY:  This is significant for nasal surgery and CABG. SOCIAL HISTORY:  The patient is a former smoker. No history of alcohol abuse. REVIEW OF SYSTEMS:  Unable to obtain because of the patient's dementia. PHYSICAL EXAMINATION:  GENERAL APPEARANCE:  The patient appeared ill, in moderate distress. VITAL SIGNS:  On arrival at the emergency room, temperature 97.5, pulse 70, respiratory rate 24, blood pressure 162/68, and oxygen saturation 94% on 4 L of oxygen. HEAD:  Normocephalic, atraumatic. EYES:  Unable to assess eye movement, but no redness, no drainage, no discharge. EARS:  Normal external ears with no obvious drainage. NOSE:  No deformity and no drainage. MOUTH AND THROAT:  No visible oral lesion. NECK:  Neck is supple. Mild JVD. No thyromegaly. CHEST:  Bilateral basilar crackles and expiratory wheezing.   HEART:  Normal S1 and S2, irregularly irregular. No clinically appreciable murmur. ABDOMEN:  Soft, nontender. Normal bowel sounds. CENTRAL NERVOUS SYSTEM:  Alert and not oriented. EXTREMITIES:  Edema 2+. Pulses 2+ bilaterally. MUSCULOSKELETAL:  Contracture of bilateral knees noted and present on admission. SKIN:  No active skin lesions seen in the exposed parts of the body. PSYCHIATRIC:  Unable to assess mood and affect. LYMPHATIC:  No cervical lymphadenopathy. DIAGNOSTIC DATA:  EKG shows atrial fibrillation and nonspecific ST and T-wave abnormalities. Chest x-ray shows interval increase in parenchymal opacities on the right and on the left, interval increase in left-sided effusion. LABORATORY DATA:  Hematology, WBC 8.0, hemoglobin 10.3, hematocrit 30.5, and platelets 931. Chemistry, sodium 127, potassium 5.0, chloride 97, CO2 of 25, glucose 235, BUN 18, creatinine 1.71, calcium 8.4. Total bilirubin 0.6, ALT 23, AST 17, alkaline phosphatase 89, total protein 7.0, albumin level 2.8, globulin 4.2. Pro-BNP level 11,223. Troponin high-sensitivity 24. COVID-19 rapid test not detected. ASSESSMENT:  1. Acute-on-chronic respiratory failure with hypoxia. 2.  Acute-on-chronic heart failure with reduced ejection fraction. 3.  Chronic obstructive pulmonary disease with acute exacerbation. 4.  Type 2 diabetes with hyperglycemia. 5.  Hypertension. 6.  Hypothyroidism. 7.  Coronary artery disease, status post coronary artery bypass grafting. 8.  Chronic kidney disease, stage III. 9.  Suspected bacterial pneumonia. 10.  Anemia. 11.  Hyponatremia. 12.  Alzheimer's dementia. 13.  Contracture of both knee joints. 14.  Persistent atrial fibrillation. PLAN:  1. Acute-on-chronic respiratory failure with hypoxia. We will admit the patient for further evaluation and treatment. We will identify and treat underlying etiological factors which include CHF, COPD, possibly pneumonia. This condition will be discussed below.   We will check D-dimer to evaluate the patient for thromboembolism as another possible cause of acute-on-chronic respiratory failure with hypoxia. We will continue with supplemental oxygen. The patient was on 2 L of oxygen at home. The patient is now requiring 4 L to maintain a decent oxygen saturation. 2.  Acute-on-chronic heart failure with reduced ejection fraction. We will start the patient on Lasix. This is contributing to the patient's acute respiratory failure and the patient's Cardiology group will be consulted to assist in further evaluation and treatment. We will obtain echocardiogram if one has not been done recently. 3.  COPD with acute exacerbation. This is also contributing to the patient's respiratory failure. We will start the patient on a short course of prednisone. The patient will be placed on DuoNeb as needed. We will continue with supplemental oxygen. 4.  Type 2 diabetes with hyperglycemia. The patient will be placed on sliding scale with insulin coverage. We will check hemoglobin A1c level if one has not been checked recently. We will also resume home basal insulin. 5.  Hypertension. We will resume preadmission medication. We will monitor the patient's blood pressure closely. 6.  Hypothyroidism. We will continue with Synthroid. We will check TSH level. 7.  Coronary artery disease, status post CABG. We will continue with cardiac medication. We will check cardiac markers as stated above to rule out acute myocardial infarction. 8.  Chronic kidney disease, stage III. We will continue to monitor the patient's renal function. Consider Nephrology consult if the patient's renal function is getting worse. 9.  Suspected bacterial pneumonia. This is also contributing to the patient's acute respiratory failure with hypoxia. We will start the patient on Rocephin and doxycycline.   We will obtain noncontrast CT scan of the chest for further evaluation of the pneumonia and also to evaluate this patient for suspected parapneumonic effusion. 10.  Anemia. This is most likely due to chronic disease. We will carry out anemia workup including checking stool guaiac to rule out occult GI bleed. 11.  Hyponatremia. This is most likely due to the congestive heart failure. We will carry out fluid restriction. The patient may require Nephrology consult if there is no significant improvement with fluid restriction. 12.  Alzheimer's dementia. We will continue supportive treatment. 13.  Contracture of the bilateral knee joints, present on admission. We will continue supportive treatment. 14.  Persistent atrial fibrillation. The patient is not on any anticoagulation. The patient presented with acute worsening of his shortness of breath. We will start the patient on heparin drip for anticoagulation for atrial fibrillation and also for suspected pulmonary embolism until when pulmonary embolism can be ruled out. We will also continue heparin infusion until when the patient is seen by his cardiologist to determine whether the patient will require anticoagulation for atrial fibrillation or not. 15.  Other issues:  Code status, the patient is a full code. The patient will be started on full-dose heparin for anticoagulation for atrial fibrillation and also for suspected pulmonary embolism. Because of that, there is no need for DVT prophylaxis. FUNCTIONAL STATUS PRIOR TO ADMISSION:  The patient came from home. The patient is bed-bound. COVID PRECAUTION:  The patient was wearing a face mask. I was wearing a face mask and gloves for this patient's encounter. The patient tested negative for COVID-19 virus infection in the emergency room. Radha Garnett MD      [id number]  D:  03/14/2022 1:20  T:  03/14/2022 3:24  JOB #:  [id number]  CC:   Asher Sykes MD

## 2022-03-14 NOTE — PROGRESS NOTES
Transition of Care Plan   RUR- 23% High Risk   DISPOSITION: The disposition plan is pending medical progression and recommendation.  F/U with PCP/Specialist     Transport: [facility]    Reason for Admission: \"Fluid\"                    RUR Score: 23% High Risk                    Plan for utilizing home health: N/A         PCP: First and Last name:  Enrrique Szymanski MD     [facility] current patient: Yes/No: Yes   Approximate date of last visit: Monday   Can you participate in a virtual visit with your PCP: N/A                    Current Advanced Directive/Advance Care Plan: Full Code  Has ACP documentation on file at this time    Healthcare Decision Maker:   Click here to [address] including selection of the [address] Relationship (ie \"Primary\")             Primary Decision Maker (Active): Linn Villegas - [phone]    Primary Decision Maker: Cheng Green - [phone]    Secondary Decision Maker: Darrel Lisa - [facility] - [phone]                  Transition of Care Plan:  Pending recommendation. Reviewed chart for transitions of care and discussed in rounds. CM met with patient at bedside to explain role and offer support. CM spoke with patients son who is present at bedside, patient unable to provide answers to complete this assessment. Baseline: DME- patient uses [employer] at home, unable to walk at this time  ADLs/IDALS: Needs assistance   Previous Home Health: Yes, [facility]   Previous SNF/IPR: N/A  ER Contact: Daughter - Marlon Landers    Patient lives in a 1 level house with 4 steps to enter. Patient lives with son at his house. Patient needs assistance with ADLs/IDALs     Patient uses DMEs ([employer]) to ambulate. Patient's preferred pharmacy is [address] [place] [address] located on [facility] and [place] for his prescriptions. [facility] is expected to transport at discharge.      Care Management Interventions  PCP Verified by CM: Yes  Palliative Care Criteria Met (RRAT>21 & CHF Dx)?: No  Mode of Transport at Discharge:  Other (see comment)  Transition of Care Consult (CM Consult): Discharge Planning  MyChart Signup: Yes  Discharge Durable Medical Equipment: No  Physical Therapy Consult: No  Occupational Therapy Consult: No  Speech Therapy Consult: No  Support Systems: Child(del)  Confirm Follow Up Transport: Family  The Patient and/or Patient Representative was Provided with a Choice of Provider and Agrees with the Discharge Plan?: Yes  Freedom of Choice List was Provided with Basic Dialogue that Supports the Patient's Individualized Plan of Care/Goals, Treatment Preferences and Shares the Quality Data Associated with the Providers?: Yes   Resource Information Provided?: No  Discharge Location  Patient Expects to be Discharged to[de-identified] Home    Readmission Assessment  Number of days since last admission?: 1-7 days  Previous disposition: Home with Home Health  Who is being interviewed?: Caregiver  What was the patient's/caregiver's perception as to why they think they needed to return back to the hospital?: Other (Comment)  Did you visit your Primary Care Physician after you left the hospital, before you returned this time?: No  Why weren't you able to visit your PCP?: Did not have an appointment  Who advised the patient to return to the hospital?: Caregiver  In our efforts to provide the best possible care to you and others like you, can you think of anything that we could have done to help you after you left the hospital the first time, so that you might not have needed to return so soon?: Other (Comment)    OWEN Morel, ALEJANDRA, LMHP-e  Available in [address] [place] [address]

## 2022-03-14 NOTE — ROUTINE PROCESS
Emergency Room Outgoing Transfer Nursing Note      Verbal and/or Written report given to Floor RN, RN by Elan Brunner, LONNIE on Shane Yeboah a [de-identified] y.o. male who was admitted on 3/13/2022  7:17 PM and being transferred for routine progression of care. Report consisted of the following information SBAR, Kardex, MAR and Recent Results and the information was reviewed with the receiving nurse. Code Status: Full Code      Chief Complaint: Shortness of Breath      Admit Diagnosis: Acute on chronic respiratory failure with hypoxia ([facility] [C44.12]      Admitting Provider: Shen Deleon MD      Surgery: * No surgery found *       Infections: No current active infections      Allergies: Sulfa (sulfonamide antibiotics)      Current diet: ADULT DIET Regular; 3 carb choices (45 gm/meal)      Lines:   Peripheral IV 03/13/22 Anterior;Distal;Right Forearm (Active)                Vital Signs:     Patient Vitals for the past 12 hrs:   Temp Pulse Resp BP SpO2   03/13/22 2300 98.8 °F (37.1 °C) 73 19 (!) 159/79 94 %   03/13/22 2200  76 18 (!) 157/79 95 %   03/13/22 2113  80  (!) 166/75 94 %   03/13/22 2007     96 %   03/13/22 1927 97.5 °F (36.4 °C) 70 24 (!) 162/65 94 %           Intake & Output:   No intake or output data in the 24 hours ending 03/14/22 0047       Laboratory Results:     Recent Results (from the past 12 hour(s))   SAMPLES BEING HELD    Collection Time: 03/13/22  7:43 PM   Result Value Ref Range    SAMPLES BEING HELD 1red,1blu     COMMENT        Add-on orders for these samples will be processed based on acceptable specimen integrity and analyte stability, which may vary by analyte.    CBC WITH AUTOMATED DIFF    Collection Time: 03/13/22  7:43 PM   Result Value Ref Range    WBC 8.0 4.1 - 11.1 K/uL    RBC 3.69 (L) 4.10 - 5.70 M/uL    HGB 10.3 (L) 12.1 - 17.0 g/dL    HCT 30.5 (L) 36.6 - 50.3 %    MCV 82.7 80.0 - 99.0 FL    MCH 27.9 26.0 - 34.0 PG    MCHC 33.8 30.0 - 36.5 g/dL    RDW 18.9 (H) 11.5 - 14.5 %    PLATELET 546 224 - 407 K/uL    MPV 12.9 8.9 - 12.9 FL    NRBC 0.0 0  WBC    ABSOLUTE NRBC 0.00 0.00 - 0.01 K/uL    NEUTROPHILS 84 (H) 32 - 75 %    LYMPHOCYTES 4 (L) 12 - 49 %    MONOCYTES 11 5 - 13 %    EOSINOPHILS 1 0 - 7 %    BASOPHILS 0 0 - 1 %    IMMATURE GRANULOCYTES 0 0.0 - 0.5 %    ABS. NEUTROPHILS 6.7 1.8 - 8.0 K/UL    ABS. LYMPHOCYTES 0.3 (L) 0.8 - 3.5 K/UL    ABS. MONOCYTES 0.9 0.0 - 1.0 K/UL    ABS. EOSINOPHILS 0.1 0.0 - 0.4 K/UL    ABS. BASOPHILS 0.0 0.0 - 0.1 K/UL    ABS. IMM. GRANS. 0.0 0.00 - 0.04 K/UL    DF SMEAR SCANNED      RBC COMMENTS WAYNE CELLS  PRESENT        RBC COMMENTS ANISOCYTOSIS  1+        RBC COMMENTS MICROCYTOSIS  1+       METABOLIC PANEL, COMPREHENSIVE    Collection Time: 03/13/22  7:43 PM   Result Value Ref Range    Sodium 127 (L) 136 - 145 mmol/L    Potassium 5.0 3.5 - 5.1 mmol/L    Chloride 97 97 - 108 mmol/L    CO2 25 21 - 32 mmol/L    Anion gap 5 5 - 15 mmol/L    Glucose 235 (H) 65 - 100 mg/dL    BUN 18 6 - 20 MG/DL    Creatinine 1.71 (H) 0.70 - 1.30 MG/DL    BUN/Creatinine ratio 11 (L) 12 - 20      GFR est AA 47 (L) >60 ml/min/1.73m2    GFR est non-AA 39 (L) >60 ml/min/1.73m2    Calcium 8.4 (L) 8.5 - 10.1 MG/DL    Bilirubin, total 0.6 0.2 - 1.0 MG/DL    ALT (SGPT) 20 12 - 78 U/L    AST (SGOT) 17 15 - 37 U/L    Alk.  phosphatase 89 45 - 117 U/L    Protein, total 7.0 6.4 - 8.2 g/dL    Albumin 2.8 (L) 3.5 - 5.0 g/dL    Globulin 4.2 (H) 2.0 - 4.0 g/dL    A-G Ratio 0.7 (L) 1.1 - 2.2     NT-PRO BNP    Collection Time: 03/13/22  7:43 PM   Result Value Ref Range    NT pro-BNP 11,223 (H) <450 PG/ML   TROPONIN-HIGH SENSITIVITY    Collection Time: 03/13/22  7:43 PM   Result Value Ref Range    Troponin-High Sensitivity 24 0 - 76 ng/L   COVID-19 RAPID TEST    Collection Time: 03/13/22  8:45 PM   Result Value Ref Range    Specimen source Nasopharyngeal      COVID-19 rapid test Not detected NOTD     EKG, 12 LEAD, INITIAL    Collection Time: 03/13/22  9:37 PM Result Value Ref Range    Ventricular Rate 75 BPM    QRS Duration 76 ms    Q-T Interval 380 ms    QTC Calculation (Bezet) 424 ms    Calculated R Axis 63 degrees    Calculated T Axis 147 degrees    Diagnosis       Atrial fibrillation  Cannot rule out Anterior infarct , age undetermined  ST & T wave abnormality, consider lateral ischemia  Abnormal ECG  When compared with ECG of 27-FEB-2022 02:13,  No significant change was found             Patient transported with : Registered Nurse     Opportunity for questions and clarifications were provided.          Fabby Nelson RN, TIFFANIE, BSN, VIA [facility]     3/14/2022, 12:47 AM

## 2022-03-14 NOTE — WOUND CARE
WOCN Note:     New wound care consult placed for sacral redness   Assessed in room 217  Isolation: no    Chart shows:  Patient admitted on 3/13/22 with acute on chronic respiratory failure  Past Medical History:   Diagnosis Date    [facility] fibrillation ([facility]     [facility] obstructive [facility] disease) [facility]     Diabetes ([facility]     1970a    Heart failure ([facility]     Hip fracture ([facility] 9/24/2021    Hypokalemia 12/25/2018    Hyponatremia 4/11/2017    MI (myocardial infarction) ([facility] 01/2019    NSTEMI (non-ST elevated myocardial infarction) ([facility] 12/25/2018    Syncope 4/11/2017    Urinary incontinence       3/14/22  WBC = 14.6  Hgb = 10.3    Assessment:   Alert, confused  Mobility: total assistance with repositioning  Continence: Incontinent of urine and stool,  Primo fit male external urinary containment device   Restraints: no   Last Flynn Score: 12  Surface: Ullin isoflex mattress  Diet: Dysphagia 3 carb choice    Wt Readings from Last 1 Encounters:   03/14/22 67.2 kg (148 lb 2.4 oz)     Heels offloaded with pillows    Sacrococcygeal area with erythema that is slow to naomi. Bilateral heels with blanching erythema    Wound Recommendations:      Apply venelex ointment every 8 hours then cover with sacral foam border dressing . Change sacral foam border dressing every 3 days and prn if loosens or becomes soiled. Lift dressing to apply venelex and to assess skin integrity, then re-secure dressing    PI Prevention:  Turn/reposition approximately every 2 hours  Offload heels with pillows at all times in bed. Z-guard cream to buttocks and sacrum TID  and as needed with incontinence care   Pad bony prominences   Keep HOB 30 degrees or less to decrease shearing and pressure unless medically contraindicated.  If HOB is to be over 30 degrees, raise knees first then [facility] to prevent sliding  Minimize layers of linen/pads under patient to optimize support surface to one flat sheet and one incontinence pad   Specialty bed:  Prius ordered today. Please call [employer] at [phone] for bed to be picked up at discharge    Orders received from Dr. Berg Headings  Discussed with RN, Robbi Stauffer    Transition of Care: Plan to follow weekly and as needed while admitted to hospital.      Raquel LEWIS, RN, [employer]  Certified Wound and Ostomy Nurse  office [phone]  Can be reached through [address] [place] [address]

## 2022-03-14 NOTE — PROGRESS NOTES
[address] [facility] Adult  Hospitalist Group                                                                                          Hospitalist Progress Note  Darlene Mcghee MD  Answering service: [phone] OR [phone] from in house phone        Date of Service:  3/14/2022  NAME:  Giuseppe Leiva  :  1940  MRN:  [id number]      Admission Summary: This is an 81-year-old man with a past medical history significant for chronic heart failure with reduced ejection fraction, COPD, type 2 diabetes, hypertension, hypothyroidism, coronary artery disease, status post CABG, chronic kidney disease, Alzheimer's dementia, who was in his usual state of health until a couple of days ago when the patient developed worsening of his shortness of breath. The patient's has shortness of breath at baseline because of his COPD and congestive heart failure. The patient was recently admitted to this hospital from 2022 to 2022. The patient was admitted for evaluation and treatment of acute-on-chronic heart failure with reduced ejection fraction. The patient has responded to diuresis. The patient had no oxygen requirement at the time of discharge from the hospital, but the patient had oxygen at the  in case the patient is going to require oxygen. The son stated that the patient has been using the oxygen at home at 2 L for the shortness of breath. When the EMS arrived at the scene, the patient's oxygen saturation was 88% on 2 L of oxygen. This was increased to 4 L and the oxygen saturation improved to 93% to 94%. The patient was brought to the emergency room for further evaluation. The patient is not able to provide history because of his Alzheimer's dementia. When the patient arrived at the emergency room, chest x-ray shows interval increase in parenchymal opacities on the right and on the left. The patient was subsequently referred to the hospitalist service for evaluation for admission.        Interval history / Subjective:   F/u acute respiratory failure    On 4l NC  Recent admission with similar symptoms when had thoracentesis done 3/1/22  Son in the room  Assessment & Plan:     Acute-on-chronic respiratory failure with hypoxia (baseline 2l NC)  Bilateral pleural effusion  -sec to Acute on chronic systolic CHF NYHA III vs Pneumonia vs COPD exacerbation  -recent admission with thoracentesis done 3/1/22  -I/O  -daily weight  -IV diuresis  -Continue short course of oral steroids  -follow cultures  -Continue antibiotics  -Follow cardiology, GDMT per cardiology  -repeat CXR tomorrow, may have to do thoracentesis again.   -Wean off supp oxygen to keep sat>90%    Type 2 diabetes with hyperglycemia. SSI/lantus  Hypertension. continue home meds  Hypothyroidism- on synthroid  Coronary artery disease, status post CABG. Continue home meds  Chronic kidney disease, stage III. seems at baseline. Monitor  Anemia, iron deficiency most likely due to chronic disease. Follow anemia workup including checking stool guaiac to rule out occult GI bleed. Hyponatremia. Monitor  Contracture of the bilateral knee joints, present on admission. supportive treatment. Persistent atrial fibrillation. Rate controlled. Not on anticoagulation   Alzheimer's dementia. We will continue supportive treatment. Dysphagia diet    PT/OT     Code status: DNR, confirmed with son  Prophylaxis:   Lily Landers at [phone]  Care Plan discussed with: son  Anticipated Disposition:      Hospital Problems  Date Reviewed: 3/14/2022          Codes Class Noted POA    * (Principal) Acute on chronic respiratory failure with hypoxia [facility]) ICD-10-CM: J96.21  ICD-9-CM: 518.84, 799.02  3/13/2022 Yes                Review of Systems:   A comprehensive review of systems was negative except for that written in the HPI. Vital Signs:    Last 24hrs VS reviewed since prior progress note.  Most recent are:  Visit Vitals  BP (!) 135/53 (BP 1 Location: Left arm, BP Patient Position: At rest)   Pulse 73   Temp 97.9 °F (36.6 °C)   Resp 20   Wt 67.2 kg (148 lb 2.4 oz)   SpO2 96%   BMI 19.02 kg/m²       No intake or output data in the 24 hours ending 03/14/22 1306     Physical Examination:     I had a face to face encounter with this patient and independently examined them on 3/14/2022 as outlined below:          Constitutional:  No acute distress   ENT:  Oral mucosa moist, oropharynx benign. Resp:  CTA bilaterally. No wheezing/rhonchi/rales. No accessory muscle use. CV:  Regular rhythm, normal rate, no murmurs, gallops, rubs    GI:  Soft, non distended, non tender. normoactive bowel sounds, no hepatosplenomegaly     Musculoskeletal:  No edema, warm, 2+ pulses throughout    Neurologic:  Moves all extremities. AAOx3, CN II-XII reviewed            Data Review:    Review and/or order of clinical lab test      Labs:     Recent Labs     03/14/22 0858 03/13/22 1943   WBC 14.6* 8.0   HGB 10.3* 10.3*   HCT 30.4* 30.5*    337     Recent Labs     03/14/22  0858 03/13/22 1943   * 127*   K 4.6 5.0   CL 96* 97   CO2 26 25   BUN 18 18   CREA 1.53* 1.71*   * 235*   CA 9.0 8.4*   MG 2.1  --    PHOS 3.5  --      Recent Labs     03/14/22 0858 03/13/22 1943   ALT 20 20   AP 91 89   TBILI 0.6 0.6   TP 6.8 7.0   ALB 3.1* 2.8*   GLOB 3.7 4.2*     No results for input(s): INR, PTP, APTT, INREXT in the last 72 hours. No results for input(s): FE, TIBC, PSAT, FERR in the last 72 hours. Lab Results   Component Value Date/Time    Folate 5.9 03/14/2022 08:58 AM      No results for input(s): PH, PCO2, PO2 in the last 72 hours. No results for input(s): CPK, CKNDX, TROIQ in the last 72 hours.     No lab exists for component: CPKMB  Lab Results   Component Value Date/Time    Cholesterol, total 170 09/08/2020 04:30 PM    HDL Cholesterol 51 09/08/2020 04:30 PM    LDL, calculated 103 (H) 09/08/2020 04:30 PM    LDL, calculated 93 02/12/2019 08:49 AM    Triglyceride 89 09/08/2020 04:30 PM    CHOL/HDL Ratio 2.3 07/18/2017 02:14 AM     Lab Results   Component Value Date/Time    Glucose (POC) 115 03/14/2022 11:30 AM    Glucose (POC) 97 03/14/2022 07:32 AM    Glucose (POC) 55 (L) 03/14/2022 06:36 AM    Glucose (POC) 429 (H) 03/03/2022 05:25 PM    Glucose (POC) 461 (H) 03/03/2022 05:23 PM     Lab Results   Component Value Date/Time    Color YELLOW/STRAW 02/27/2022 11:00 PM    Appearance CLEAR 02/27/2022 11:00 PM    Specific gravity 1.012 02/27/2022 11:00 PM    Specific gravity 1.010 07/17/2017 04:12 PM    pH (UA) 5.0 02/27/2022 11:00 PM    Protein 100 (A) 02/27/2022 11:00 PM    Glucose 500 (A) 02/27/2022 11:00 PM    Ketone TRACE (A) 02/27/2022 11:00 PM    Bilirubin Negative 02/27/2022 11:00 PM    Urobilinogen 0.2 02/27/2022 11:00 PM    Nitrites Negative 02/27/2022 11:00 PM    Leukocyte Esterase Negative 02/27/2022 11:00 PM    Epithelial cells FEW 02/27/2022 11:00 PM    Bacteria Negative 02/27/2022 11:00 PM    WBC 0-4 02/27/2022 11:00 PM    RBC 0-5 02/27/2022 11:00 PM         Medications Reviewed:     Current Facility-Administered Medications   Medication Dose Route Frequency    amLODIPine (NORVASC) tablet 10 mg  10 mg Oral DAILY    ascorbic acid (vitamin C) (VITAMIN C) tablet 500 mg  500 mg Oral DAILY    aspirin chewable tablet 81 mg  81 mg Oral DAILY    vitamin B complex tablet  1 Tablet Oral DAILY    cholecalciferol (VITAMIN D3) (400 Units /10 mcg) tablet 400 Units  400 Units Oral DAILY    docusate sodium (COLACE) capsule 100 mg  100 mg Oral BID PRN    DULoxetine (CYMBALTA) capsule 60 mg  60 mg Oral DAILY    ferrous sulfate tablet 325 mg  325 mg Oral ACB    furosemide (LASIX) injection 40 mg  40 mg IntraVENous DAILY    insulin glargine (LANTUS) injection 14 Units  14 Units SubCUTAneous DAILY    lactulose (CHRONULAC) 10 gram/15 mL solution 30 mL  20 g Oral BID PRN    lansoprazole compounding kit (PREVACID) 3 mg/mL oral suspension 30 mg  30 mg Oral BID    levothyroxine (SYNTHROID) tablet 50 mcg  50 mcg Oral ACB    QUEtiapine (SEROquel) tablet 25 mg  25 mg Oral BID PRN    tamsulosin (FLOMAX) capsule 0.4 mg  0.4 mg Oral PCD    sodium chloride (NS) flush 5-40 mL  5-40 mL IntraVENous Q8H    sodium chloride (NS) flush 5-40 mL  5-40 mL IntraVENous PRN    acetaminophen (TYLENOL) tablet 650 mg  650 mg Oral Q6H PRN    Or    acetaminophen (TYLENOL) suppository 650 mg  650 mg Rectal Q6H PRN    polyethylene glycol (MIRALAX) packet 17 g  17 g Oral DAILY PRN    ondansetron (ZOFRAN ODT) tablet 4 mg  4 mg Oral Q8H PRN    Or    ondansetron (ZOFRAN) injection 4 mg  4 mg IntraVENous Q6H PRN    predniSONE (DELTASONE) tablet 40 mg  40 mg Oral DAILY WITH BREAKFAST    L.acidophilus-paracasei-S.thermophil-bifidobacter (RISAQUAD) 8 billion cell capsule  1 Capsule Oral DAILY    cefTRIAXone (ROCEPHIN) 1 g in 0.9% sodium chloride 10 mL IV syringe  1 g IntraVENous Q24H    doxycycline (VIBRAMYCIN) 100 mg in 0.9% sodium chloride (MBP/ADV) 100 mL MBP  100 mg IntraVENous Q12H    insulin lispro (HUMALOG) injection   SubCUTAneous AC&HS    glucose chewable tablet 16 g  4 Tablet Oral PRN    glucagon (GLUCAGEN) injection 1 mg  1 mg IntraMUSCular PRN    dextrose 10 % infusion 0-250 mL  0-250 mL IntraVENous PRN    balsam peru-castor oiL (VENELEX) ointment   Topical Q8H    albuterol-ipratropium (DUO-NEB) 2.5 MG-0.5 MG/3 ML  3 mL Nebulization Q4H PRN     ______________________________________________________________________  EXPECTED LENGTH OF STAY: - - -  ACTUAL LENGTH OF STAY:          1                 Cristi Echevarria MD

## 2022-03-14 NOTE — PROGRESS NOTES
Tried weaning patient from 4LO2 patient oxygen saturation was 88% on 3LO2. I bumped him back up to 4LO2.

## 2022-03-14 NOTE — PROGRESS NOTES
Medicare pt has received, reviewed, and signed 1st IM letter informing them of their right to appeal the discharge. Signed copy has been placed on pt bedside chart.     OWEN Nguyen, ALEJANDRA, LMHP-e  Available in [employer]

## 2022-03-14 NOTE — PROGRESS NOTES
Transition of Care Plan  · RUR- 23% High Risk  · DISPOSITION: The disposition plan is pending medical progression and recommendation. · F/U with PCP/Specialist    · Transport: [facility]     Reason for Admission: \"Fluid\"                    RUR Score: 23% High Risk                    Plan for utilizing home health: N/A          PCP:    First and Last name:  Mario Hurley MD                 [facility] current patient: Yes/No: Yes              Approximate date of last visit: Monday              Can you participate in a virtual visit with your PCP: N/A                    Current Advanced Directive/Advance Care Plan: Full Code  Has ACP documentation on file at this time  [address]:   Click here to complete [address] including selection of the [address] Relationship (ie \"Primary\")             Primary Decision Maker (Active): Adriana Harris Child - [phone]    Primary Decision Maker: Arminda Mayo - [phone]    Secondary Decision Maker: Yong Chinchilla - Other Relative - [phone]                  Transition of Care Plan:  Pending recommendation.                      Reviewed chart for transitions of care and discussed in rounds. CM met with patient at bedside to explain role and offer support. CM spoke with patients son who is present at bedside, patient unable to provide answers to complete this assessment.     Baseline: DME- patient uses charan lift at home, unable to walk at this time  ADLs/IDALS: Needs assistance   Previous Home Health: Yes, [facility]   Previous SNF/IPR: N/A  ER Contact: Daughter - Deng Benavidez     Patient lives in a 1 level house with 4 steps to enter. Patient lives with son at his house. Patient needs assistance with ADLs/IDALs      Patient uses DMEs (charan lift) to ambulate. Patient's preferred pharmacy is [address] [place] [address] located on [facility] and [place] for his prescriptions.  [facility] is expected to transport at discharge.      Care Management Interventions  PCP Verified by CM: Yes  Palliative Care Criteria Met (RRAT>21 & CHF Dx)?: No  Mode of Transport at Discharge:  Other (see comment)  Transition of Care Consult (CM Consult): Discharge Planning  MyChart Signup: Yes  Discharge Durable Medical Equipment: No  Physical Therapy Consult: No  Occupational Therapy Consult: No  Speech Therapy Consult: No  Support Systems: Child(del)  Confirm Follow Up Transport: Family  The Patient and/or Patient Representative was Provided with a Choice of Provider and Agrees with the Discharge Plan?: Yes  Freedom of Choice List was Provided with Basic Dialogue that Supports the Patient's Individualized Plan of Care/Goals, Treatment Preferences and Shares the Quality Data Associated with the Providers?: Yes   Resource Information Provided?: No  Discharge Location  Patient Expects to be Discharged to[de-identified] Home     Readmission Assessment  Number of days since last admission?: 1-7 days  Previous disposition: Home with Home Health  Who is being interviewed?: Caregiver  What was the patient's/caregiver's perception as to why they think they needed to return back to the hospital?: Other (Comment)  Did you visit your Primary Care Physician after you left the hospital, before you returned this time?: No  Why weren't you able to visit your PCP?: Did not have an appointment  Who advised the patient to return to the hospital?: Caregiver  In our efforts to provide the best possible care to you and others like you, can you think of anything that we could have done to help you after you left the hospital the first time, so that you might not have needed to return so soon?: Other (Comment)     Simone Childs, OWEN, ALEJANDRA, LMHP-e  Available in [facility]

## 2022-03-14 NOTE — NURSE NAVIGATOR
Chart reviewed by Heart Failure Nurse Navigator. Heart Failure database completed. EF:  35/40%, echo 2/22/22    Patient not currently prescribed GDMT for HF. Please document contraindication or specific reason no prescribed. ACEi/ARB/ARNi:   BB:   Aldosterone Antagonist: not currently indicated (EF not less than 35%)    Obstructive Sleep Apnea Screening: screening priority 3 - age, hx dementia   Referred to Sleep Medicine:     CRT: not currently indicated     NYHA Functional Class documentation requested. Heart Failure Teach Back in Patient Education. Heart Failure Avoiding Triggers on Discharge Instructions. Cardiologist: Dr. Rosey Floyd ([facility]) to be consulted      Post discharge follow up phone call to be made within 48-72 hours of discharge. 27 Day HF Readmission:    Readmit date 3/13/22    Prior admit 2/27/22   Discharged 3/3/22    Discharge provider: Dr. Olvera Heading    Discharge unit: [facility] discharge done)    Patient was discharged to home with family. Per DC summary, patient weaned off oxygen and did not require overnight oxygen but oxygen concentrator will be delivered to home in case he needs it. Hospital follow appointment scheduled for 3/7/22 with Home Based Primary Care/Palliative provider Dr. Faraz Lamar. Dr. Faraz Krausident visited patient on 3/7/22 and per visit note, patient was euvolemic at that time. Advised to follow up in 3 months. Patient did not have cardiology follow up scheduled prior to discharge. Patient's original final coding was unspecified bacterial pneumonia. After query to MD, coding was changed to Hypertensive heart and chronic kidney disease with heart failure and stage 1 through stage 4 chronic kidney disease, or unspecified chronic kidney disease. Patient presented to ED 3/13/22 with complaint of shortness of breath. O2 sat 88% on 2 L oxygen. Elevated NT pro-BNP at 11, 223. Patient was given IV furosemide and is diuresing well.   Cardiologist  Alina Maria (S) is consulted.

## 2022-03-14 NOTE — ED NOTES
TRANSFER - OUT REPORT:    Verbal report given to Sabina(name) on Devota Card  being transferred to (unit) for routine progression of care       Report consisted of patients Situation, Background, Assessment and   Recommendations(SBAR). Information from the following report(s) SBAR, ED Summary, [facility] and Recent Results was reviewed with the receiving nurse. Lines:   Peripheral IV 03/13/22 Anterior;Distal;Right Forearm (Active)        Opportunity for questions and clarification was provided.       Patient transported with:   O2 @ 2 liters

## 2022-03-15 NOTE — ROUTINE PROCESS
Ultrasound staff in room to perform L thoracentesis. 100% NRB placed on pt. . 2000 cc fluid removed. Pt VSS stable. Will place pt back on 6 L nasal cannula. Stat PCR ordered.

## 2022-03-15 NOTE — PROGRESS NOTES
Comprehensive Nutrition Assessment      Type and Reason for Visit: Initial,Positive nutrition screen    Nutrition Recommendations/Plan:   1. Adjusted diet- 4 carb choice to allow more calories for weight gain. On Soft and Bite sized for ease of intake. 2. Continue Glucerna (chocolate) BID 2/2 current fluid restriction. 3. Encourage PO intake, assist with set up. 4. Document PO intake in flowsheet. Nutrition Assessment:       Pt admitted for Acute on chronic respiratory failure with hypoxia (HCC) [J96.21]. Past Medical History:   Diagnosis Date    Atrial fibrillation ([address]     [facility]     Diabetes ([facility]     1970a    Heart failure ([facility]     Hip fracture ([facility] 9/24/2021    Hypokalemia 12/25/2018    Hyponatremia 4/11/2017    MI (myocardial infarction) ([facility] 01/2019    NSTEMI (non-ST elevated myocardial infarction) ([facility] 12/25/2018    Syncope 4/11/2017    Urinary incontinence      Pt screened for low BMI. No current PO intake documented. Pt unable to provide diet/wt hx 2/2 baseline dementia. No tray observed. Pt with muscle/fat wasting present. Wt hx per chart indicates pt is up 4 lbs since previous admission for SOB earlier this month, bu down 15-20 lbs from usual BW of ~155-160 lbs over the last 2 years. Pt with hx of DM- previous A1c uncontrolled in 2017 and prior. Since then A1c has trended down, - last month A1c of 6.1. Likely 2/2 less PO in and wt loss. See wound care notes- no current pressure injuries, but pt at risk for skin breakdown. Pt on 1000 mL fluid restriction per MD. Pt receiving 480 mL from Glucerna - pt prefers chocolate. Pt currently on 5 l/min.        Wt Readings from Last 10 Encounters:   03/15/22 63.7 kg (140 lb 6.9 oz)   03/03/22 61.7 kg (136 lb 0.4 oz)   11/26/21 65.4 kg (144 lb 2.9 oz)   10/01/21 68 kg (149 lb 14.6 oz)   11/20/19 72.2 kg (159 lb 1.6 oz)   11/05/19 70.9 kg (156 lb 6.4 oz)   10/29/19 70.7 kg (155 lb 12.8 oz) 10/22/19 70.3 kg (155 lb)   10/15/19 69.9 kg (154 lb 3.2 oz)   10/08/19 69.4 kg (153 lb)       Malnutrition Assessment:  Malnutrition Status: Moderate malnutrition    Context:  Chronic illness     Findings of the 6 clinical characteristics of malnutrition:   Energy Intake:  7 - 75% or less est energy requirements for 1 month or longer  Weight Loss:  No significant weight loss     Body Fat Loss:  1 - Mild body fat loss,     Muscle Mass Loss:  7 - Severe muscle mass loss, Hand (interosseous),Temples (temporalis)  Fluid Accumulation:  No significant fluid accumulation,     Strength:  Not performed           Estimated Daily Nutrient Needs:  Energy (kcal): 1950 (BMRx1.2)+250; Weight Used for Energy Requirements: Current  Protein (g): 86 (1.0g/kg); Weight Used for Protein Requirements: Ideal  Fluid (ml/day): 1000 mL per MD; Method Used for Fluid Requirements: Other (comment)    Documented meal intake:   No data found. Documented Supplement intake:  No data found. Nutrition Related Findings:    Bowel sounds:  Active   Last BM: 03/13/22,    Edema: No data recorded    Medications:     Current Facility-Administered Medications   Medication Dose Route Frequency    amLODIPine (NORVASC) tablet 10 mg  10 mg Oral DAILY    ascorbic acid (vitamin C) (VITAMIN C) tablet 500 mg  500 mg Oral DAILY    aspirin chewable tablet 81 mg  81 mg Oral DAILY    vitamin B complex tablet  1 Tablet Oral DAILY    cholecalciferol (VITAMIN D3) (400 Units /10 mcg) tablet 400 Units  400 Units Oral DAILY    DULoxetine (CYMBALTA) capsule 60 mg  60 mg Oral DAILY    ferrous sulfate tablet 325 mg  325 mg Oral ACB    furosemide (LASIX) injection 40 mg  40 mg IntraVENous DAILY    insulin glargine (LANTUS) injection 14 Units  14 Units SubCUTAneous DAILY    lansoprazole compounding kit (PREVACID) 3 mg/mL oral suspension 30 mg  30 mg Oral BID    levothyroxine (SYNTHROID) tablet 50 mcg  50 mcg Oral ACB    tamsulosin (FLOMAX) capsule 0.4 mg 0.4 mg Oral PCD    sodium chloride (NS) flush 5-40 mL  5-40 mL IntraVENous Q8H    predniSONE (DELTASONE) tablet 40 mg  40 mg Oral DAILY WITH BREAKFAST    L.acidophilus-paracasei-S.thermophil-bifidobacter (RISAQUAD) 8 billion cell capsule  1 Capsule Oral DAILY    cefTRIAXone (ROCEPHIN) 1 g in 0.9% sodium chloride 10 mL IV syringe  1 g IntraVENous Q24H    doxycycline (VIBRAMYCIN) 100 mg in 0.9% sodium chloride (MBP/ADV) 100 mL MBP  100 mg IntraVENous Q12H    insulin lispro (HUMALOG) injection   SubCUTAneous AC&HS    balsam peru-castor oiL (VENELEX) ointment   Topical Q8H    heparin (porcine) injection 5,000 Units  5,000 Units SubCUTAneous Q12H         Wounds:    None       Current Nutrition Therapies:  ADULT ORAL NUTRITION SUPPLEMENT Dinner, Lunch; Diabetic Supplement  ADULT DIET Dysphagia - Soft & Bite Sized; 4 carb choices (60 gm/meal); 1000 ml    Anthropometric Measures:  · Height:  6' 2.02\" (188 cm)  · Current Body Wt:  63.7 kg (140 lb 6.9 oz)   · Admission Body Wt:   140 lbs    · Usual Body Wt:   155-160 lbs     · Ideal Body Wt:  190 lbs:  73.9 %   · BMI Category:  Underweight (BMI less than 22) age over 72       Nutrition Diagnosis:   · Inadequate oral intake related to cognitive or neurological impairment as evidenced by weight loss,BMI        Nutrition Interventions:   Food and/or Nutrient Delivery: Modify current diet,Continue oral nutrition supplement  Nutrition Education and Counseling: No recommendations at this time  Coordination of Nutrition Care: Continue to monitor while inpatient,Interdisciplinary rounds    Goals:  PO >50% of meals and 1-2 ONS per day within 5-7 days       Nutrition Monitoring and Evaluation:   Behavioral-Environmental Outcomes: Knowledge or skill  Food/Nutrient Intake Outcomes: Supplement intake,Food and nutrient intake  Physical Signs/Symptoms Outcomes: Biochemical data,Skin,Weight,Fluid status or edema    Discharge Planning:    Continue oral nutrition supplement,Continue current diet     Electronically signed by Lena Acuna, [address]   Contact: [phone]

## 2022-03-15 NOTE — PROGRESS NOTES
Orders received, chart reviewed and patient baseline ADLs and functional mobility 2* Alzheimer's Dementia. FUNCTIONAL STATUS PRIOR TO ADMISSION: Patient poor historian, with pt only stating \"no\". ADLs total assistance at bed level except sometimes feeds self, incontinent. DME: RW and BSC, with chart indicating pt refuses to use. Patient is now charan lift to recliner chair or w/c, sits 7 hours per day. W/c  With seat belt, waffle cushion. Home setup: 1 story, 4 steps to enter. Caregiver daily 7 hours/day to assist with all ADLs and mobility. Son, Evangelista Fallon, lives with and assists otherwise. Sister assists PRN. Staff recommendations to increase patient independence, orientation and prevent and/or decrease potential agitation as able and safe:   -keeping day/night schedule with lights and sleeping  -bed/chair position or to chair during meals (regardless if patient eating the meal)  -complete self feeding as able   -one step instructions/simple information to allow patient time to process and respond  -inform patient what you are doing prior to doing it    Thank you!

## 2022-03-15 NOTE — PROGRESS NOTES
Physical Therapy  Received consult, chart reviewed, and gathered information from OT as well. Pt is at baseline of dependence and requires a charan lift for transfers OOB to a recliner chair or wheelchair. Pt lives with his son, Willis Patrick, and has caregivers daily 7 hours per day. He also has a daughter that assist PRN. DME includes RW, BSC, wheelchair, and charan lift. Pt has no further needs at this time. Recommend nursing staff call mobility team for charan lift transfers OOB if appropriate. Will discontinue PT services at this time.    Janette Alvarenga PT

## 2022-03-15 NOTE — CONSULTS
Consult Note    Date of  Admission: 3/13/2022  7:17 PM     Brielle Dyer is a [de-identified] y.o. male admitted for Acute on chronic respiratory failure with hypoxia ([facility] [address] [J96.21]. Consult requested by Onur Ash MD    Assessment  · Recurrent pleural effusions. Just had thoracentesis about 2 weeks ago and the effusions have quickly reaccumulated. 2 L were drained today. This is likely multifactorial including protein malnutrition, chronic heart failure, aortic stenosis, excessive fluid intake and CKD. His son indicates that the patient drinks at least 1.5 gallons of water daily. · His Cr is higher even though his diuretic dose was reduced to 3 days per week when he was discharged on 3/3/22    Recommendations:  A modest fluid restriction should be tried. Albumin infusions to help with mobilizing fluid  I doubt he is a candidate for pleurodesis or a Pleur X catheter but you might want to confirm this with pulmonary. Consider palliative care for help with decision making. This was discussed with his son at the bedside. Subjective: The patient was brought to the hospital by his son, his primary caretaker, due to progressive hypoxemia. The patient was found to have accumulated large bilateral pleural effusion since his index thoracentesis about 2 weeks ago. Earlier this evening, he had a repeat thoracentesis with 2 L of straw colored fluid removed. His O2 sats have improved and his son indicates that his father is much more comfortable. The patient is not very communicative but his son says that he has not complained of chest pain. He did have profound orthopnea which has now resolved.     Patient Active Problem List    Diagnosis Date Noted    Acute on chronic respiratory failure with hypoxia ([address] 03/13/2022    History of fracture of left hip 02/18/2022    History of GI bleed 10/27/2021    Gastritis with hemorrhage 10/27/2021    Essential hypertension 10/13/2021    Stage 3b chronic kidney disease ([address] 10/13/2021    COVID-19 vaccine dose declined 09/16/2021    Debility 07/25/2021    Acquired hypothyroidism 02/22/2021    Other chronic pain 02/22/2021    Prostate cancer ([facility] [address] 02/12/2019    Acute on chronic HFrEF (heart failure with reduced ejection fraction) ([address] 12/25/2018    Orthostatic hypotension 07/17/2017    Uncontrolled type 2 diabetes mellitus with hyperglycemia ([address] 04/11/2017    Urinary incontinence 04/11/2017    CAD (coronary artery disease) 04/11/2017    COPD (chronic obstructive pulmonary disease) ([address] 04/11/2017    Late onset Alzheimer's disease with behavioral disturbance ([address] 07/11/2016    Moderate recurrent major depression ([facility] [address] 07/11/2016      Past Medical History:   Diagnosis Date    Atrial fibrillation (HCC)     COPD (chronic obstructive pulmonary disease) ([address]     Diabetes ([address]     1970a    Heart failure ([facility] [address]     Hip fracture ([address] 9/24/2021    Hypokalemia 12/25/2018    Hyponatremia 4/11/2017    MI (myocardial infarction) ([address] 01/2019    NSTEMI (non-ST elevated myocardial infarction) ([facility] [address] 12/25/2018    Syncope 4/11/2017    Urinary incontinence       Past Surgical History:   Procedure Laterality Date    HX HEENT  1975    nasal surgery    HX MOHS PROCEDURES  2013    left    [place] CARDIAC SURG PROCEDURE UNLIST  2000    open heart     Allergies   Allergen Reactions    Sulfa (Sulfonamide Antibiotics) Unknown (comments)             Review of Symptoms: The patient is not communicative and cannot offer a ROS except for the things the son referenced.    Physical Exam    Visit Vitals  /68   Pulse 81   Temp 97.1 °F (36.2 °C)   Resp 16   Ht 6' 2.02\" (1.88 m)   Wt 63.7 kg (140 lb 6.9 oz)   SpO2 100%   BMI 18.02 kg/m²     Neck: supple, symmetrical, trachea midline, no adenopathy, thyroid: not enlarged, symmetric, no tenderness/mass/nodules, no carotid bruit and no JVD  Heart: no S3 or S4, no rub  Lungs: dullness to percussion R anterior, R base, L anterior, L base, rales R anterior, L anterior, diminished breath sounds R anterior, L anterior  Abdomen: soft, non-tender. Bowel sounds normal. No masses,  no organomegaly  Extremities: extremities normal, atraumatic, no cyanosis or edema  Pulses: 2+ and symmetric  Neurologic: Mental status: Alert, oriented, thought content appropriate, has dementia    Cardiographics    Telemetry: AFIB  ECG: atrial fibrillation, rate LVH, poor R wve progression  Echocardiogram: Not done    Recent radiology, intake/output and wt reviewed    Lab Results   Component Value Date/Time    WBC 8.4 03/15/2022 03:53 AM    HGB 9.1 (L) 03/15/2022 03:53 AM    HCT 27.3 (L) 03/15/2022 03:53 AM    PLATELET 558 01/88/3623 03:53 AM    MCV 82.7 03/15/2022 03:53 AM     Lab Results   Component Value Date/Time    GFR est non-AA 36 (L) 03/15/2022 03:53 AM    GFRNA, POC 42 (L) 02/23/2018 02:30 PM    GFR est AA 44 (L) 03/15/2022 03:53 AM    GFRAA, POC 51 (L) 02/23/2018 02:30 PM    Creatinine 1.80 (H) 03/15/2022 03:53 AM    Creatinine (POC) 1.6 (H) 02/23/2018 02:30 PM    BUN 22 (H) 03/15/2022 03:53 AM    Sodium 129 (L) 03/15/2022 03:53 AM    Potassium 4.8 03/15/2022 03:53 AM    Chloride 99 03/15/2022 03:53 AM    CO2 19 (L) 03/15/2022 03:53 AM    Magnesium 2.1 03/14/2022 08:58 AM    Phosphorus 3.5 03/14/2022 08:58 AM     Lab Results   Component Value Date/Time    Sodium 129 (L) 03/15/2022 03:53 AM    Potassium 4.8 03/15/2022 03:53 AM    Chloride 99 03/15/2022 03:53 AM    CO2 19 (L) 03/15/2022 03:53 AM    Anion gap 11 03/15/2022 03:53 AM    Glucose 159 (H) 03/15/2022 03:53 AM    BUN 22 (H) 03/15/2022 03:53 AM    Creatinine 1.80 (H) 03/15/2022 03:53 AM    BUN/Creatinine ratio 12 03/15/2022 03:53 AM    GFR est AA 44 (L) 03/15/2022 03:53 AM    GFR est non-AA 36 (L) 03/15/2022 03:53 AM    Calcium 8.8 03/15/2022 03:53 AM    Bilirubin, total 0.6 03/14/2022 08:58 AM    ALT (SGPT) 20 03/14/2022 08:58 AM    Alk.  phosphatase 91 03/14/2022 08:58 AM    Protein, total 6.8 03/14/2022 08:58 AM    Albumin 3.1 (L) 03/14/2022 08:58 AM    Globulin 3.7 03/14/2022 08:58 AM    A-G Ratio 0.8 (L) 03/14/2022 08:58 AM

## 2022-03-15 NOTE — PROGRESS NOTES
[id number]  Hospitalist Group                                                                                          Hospitalist Progress Note  Goldie Rodriguez MD  Answering service: [phone] or [phone] from in house phone        Date of Service:  3/15/2022  NAME:  Zia Lieberman  :  1940  MRN:  [id number]      Admission Summary: This is an 64-year-old man with a past medical history significant for chronic heart failure with reduced ejection fraction, COPD, type 2 diabetes, hypertension, hypothyroidism, coronary artery disease, status post CABG, chronic kidney disease, Alzheimer's dementia, who was in his usual state of health until a couple of days ago when the patient developed worsening of his shortness of breath. The patient's has shortness of breath at baseline because of his COPD and congestive heart failure. The patient was recently admitted to this hospital from 2022 to 2022. The patient was admitted for evaluation and treatment of acute-on-chronic heart failure with reduced ejection fraction. The patient has responded to diuresis. The patient had no oxygen requirement at the time of discharge from the hospital, but the patient had oxygen at the  in case the patient is going to require oxygen. The son stated that the patient has been using the oxygen at home at 2 L for the shortness of breath. When the EMS arrived at the scene, the patient's oxygen saturation was 88% on 2 L of oxygen. This was increased to 4 L and the oxygen saturation improved to 93% to 94%. The patient was brought to the emergency room for further evaluation. The patient is not able to provide history because of his Alzheimer's dementia. When the patient arrived at the emergency room, chest x-ray shows interval increase in parenchymal opacities on the right and on the left. The patient was subsequently referred to the hospitalist service for evaluation for admission.        Interval history / Subjective:   F/u acute respiratory failure    On 5l NC  Recent admission with similar symptoms when had thoracentesis done 3/1/22  Son in the room  The patient looks better today  Assessment & Plan:     Acute-on-chronic respiratory failure with hypoxia (baseline 2l NC)  Bilateral pleural effusion  -sec to Acute on chronic systolic CHF NYHA III vs Pneumonia vs COPD exacerbation  -recent admission with thoracentesis done 3/1/22  -I/O  -daily weight  -IV diuresis, will hold for now sec to worsening renal function  -Continue short course of oral steroids  -follow cultures  -Continue antibiotics  -Follow cardiology, GDMT per cardiology  -repeat CXR 3/15 continues to have bilateral pleural effusion  -Will get thoracentesis done    -Wean off supp oxygen to keep sat>90%    Type 2 diabetes with hyperglycemia. SSI/lantus  Hypertension. continue home meds  Hypothyroidism- on synthroid  Coronary artery disease, status post CABG. Continue home meds  Chronic kidney disease, stage III. seems at baseline. Monitor  Anemia, iron deficiency most likely due to chronic disease. Follow anemia workup including checking stool guaiac to rule out occult GI bleed. Hyponatremia. Monitor  Contracture of the bilateral knee joints, present on admission. supportive treatment. Persistent atrial fibrillation. Rate controlled. Not on anticoagulation   Alzheimer's dementia. We will continue supportive treatment.     Dysphagia diet    PT/OT     Code status: DNR, confirmed with son  Prophylaxis:   Lily Lemon at [phone]    Plan: Thoracentesis done today and cardiology input  Care Plan discussed with: son  Anticipated Disposition:      Hospital Problems  Date Reviewed: 3/14/2022          Codes Class Noted POA    * (Principal) Acute on chronic respiratory failure with hypoxia [facility]) ICD-10-CM: W43.60  ICD-9-CM: 518.84, 799.02  3/13/2022 Yes                Review of Systems:   A comprehensive review of systems was negative except for that written in the HPI. Vital Signs:    Last 24hrs VS reviewed since prior progress note. Most recent are:  Visit Vitals  BP (!) 154/74 (BP 1 Location: Left upper arm, BP Patient Position: At rest)   Pulse 81   Temp 96.8 °F (36 °C)   Resp 20   Ht 6' 2.02\" (1.88 m)   Wt 63.7 kg (140 lb 6.9 oz)   SpO2 94%   BMI 18.02 kg/m²         Intake/Output Summary (Last 24 hours) at 3/15/2022 1152  Last data filed at 3/15/2022 0411  Gross per 24 hour   Intake    Output 550 ml   Net -550 ml        Physical Examination:     I had a face to face encounter with this patient and independently examined them on 3/15/2022 as outlined below:          Constitutional:  No acute distress   ENT:  Oral mucosa moist, oropharynx benign. Resp:  CTA bilaterally. No wheezing/rhonchi/rales. No accessory muscle use. CV:  Regular rhythm, normal rate, no murmurs, gallops, rubs    GI:  Soft, non distended, non tender. normoactive bowel sounds, no hepatosplenomegaly     Musculoskeletal:  No edema, warm, 2+ pulses throughout    Neurologic:  Moves all extremities. AAOx3, CN II-XII reviewed            Data Review:    Review and/or order of clinical lab test      Labs:     Recent Labs     03/15/22  0353 03/14/22  0858   WBC 8.4 14.6*   HGB 9.1* 10.3*   HCT 27.3* 30.4*    280     Recent Labs     03/15/22  0353 03/14/22  0858 03/13/22  1943   * 129* 127*   K 4.8 4.6 5.0   CL 99 96* 97   CO2 19* 26 25   BUN 22* 18 18   CREA 1.80* 1.53* 1.71*   * 139* 235*   CA 8.8 9.0 8.4*   MG  --  2.1  --    PHOS  --  3.5  --      Recent Labs     03/14/22 0858 03/13/22 1943   ALT 20 20   AP 91 89   TBILI 0.6 0.6   TP 6.8 7.0   ALB 3.1* 2.8*   GLOB 3.7 4.2*     No results for input(s): INR, PTP, APTT, INREXT, INREXT in the last 72 hours.    Recent Labs     03/14/22  1430   TIBC 212*   PSAT 14*   FERR 167      Lab Results   Component Value Date/Time    Folate 5.9 03/14/2022 08:58 AM      No results for input(s): PH, PCO2, PO2 in the last 72 hours. No results for input(s): CPK, CKNDX, TROIQ in the last 72 hours.     No lab exists for component: CPKMB  Lab Results   Component Value Date/Time    Cholesterol, total 170 09/08/2020 04:30 PM    HDL Cholesterol 51 09/08/2020 04:30 PM    LDL, calculated 103 (H) 09/08/2020 04:30 PM    LDL, calculated 93 02/12/2019 08:49 AM    Triglyceride 89 09/08/2020 04:30 PM    CHOL/HDL Ratio 2.3 07/18/2017 02:14 AM     Lab Results   Component Value Date/Time    Glucose (POC) 188 (H) 03/15/2022 11:28 AM    Glucose (POC) 154 (H) 03/15/2022 06:32 AM    Glucose (POC) 219 (H) 03/14/2022 09:55 PM    Glucose (POC) 267 (H) 03/14/2022 05:29 PM    Glucose (POC) 115 03/14/2022 11:30 AM     Lab Results   Component Value Date/Time    Color YELLOW/STRAW 02/27/2022 11:00 PM    Appearance CLEAR 02/27/2022 11:00 PM    Specific gravity 1.012 02/27/2022 11:00 PM    Specific gravity 1.010 07/17/2017 04:12 PM    pH (UA) 5.0 02/27/2022 11:00 PM    Protein 100 (A) 02/27/2022 11:00 PM    Glucose 500 (A) 02/27/2022 11:00 PM    Ketone TRACE (A) 02/27/2022 11:00 PM    Bilirubin Negative 02/27/2022 11:00 PM    Urobilinogen 0.2 02/27/2022 11:00 PM    Nitrites Negative 02/27/2022 11:00 PM    Leukocyte Esterase Negative 02/27/2022 11:00 PM    Epithelial cells FEW 02/27/2022 11:00 PM    Bacteria Negative 02/27/2022 11:00 PM    WBC 0-4 02/27/2022 11:00 PM    RBC 0-5 02/27/2022 11:00 PM         Medications Reviewed:     Current Facility-Administered Medications   Medication Dose Route Frequency    amLODIPine (NORVASC) tablet 10 mg  10 mg Oral DAILY    ascorbic acid (vitamin C) (VITAMIN C) tablet 500 mg  500 mg Oral DAILY    aspirin chewable tablet 81 mg  81 mg Oral DAILY    vitamin B complex tablet  1 Tablet Oral DAILY    cholecalciferol (VITAMIN D3) (400 Units /10 mcg) tablet 400 Units  400 Units Oral DAILY    docusate sodium (COLACE) capsule 100 mg  100 mg Oral BID PRN    DULoxetine (CYMBALTA) capsule 60 mg  60 mg Oral DAILY    ferrous sulfate tablet 325 mg  325 mg Oral ACB    [Held by provider] furosemide (LASIX) injection 40 mg  40 mg IntraVENous DAILY    insulin glargine (LANTUS) injection 14 Units  14 Units SubCUTAneous DAILY    lactulose (CHRONULAC) 10 gram/15 mL solution 30 mL  20 g Oral BID PRN    lansoprazole compounding kit (PREVACID) 3 mg/mL oral suspension 30 mg  30 mg Oral BID    levothyroxine (SYNTHROID) tablet 50 mcg  50 mcg Oral ACB    QUEtiapine (SEROquel) tablet 25 mg  25 mg Oral BID PRN    tamsulosin (FLOMAX) capsule 0.4 mg  0.4 mg Oral PCD    sodium chloride (NS) flush 5-40 mL  5-40 mL IntraVENous Q8H    sodium chloride (NS) flush 5-40 mL  5-40 mL IntraVENous PRN    acetaminophen (TYLENOL) tablet 650 mg  650 mg Oral Q6H PRN    Or    acetaminophen (TYLENOL) suppository 650 mg  650 mg Rectal Q6H PRN    polyethylene glycol (MIRALAX) packet 17 g  17 g Oral DAILY PRN    ondansetron (ZOFRAN ODT) tablet 4 mg  4 mg Oral Q8H PRN    Or    ondansetron (ZOFRAN) injection 4 mg  4 mg IntraVENous Q6H PRN    predniSONE (DELTASONE) tablet 40 mg  40 mg Oral DAILY WITH BREAKFAST    L.acidophilus-paracasei-S.thermophil-bifidobacter (RISAQUAD) 8 billion cell capsule  1 Capsule Oral DAILY    cefTRIAXone (ROCEPHIN) 1 g in 0.9% sodium chloride 10 mL IV syringe  1 g IntraVENous Q24H    doxycycline (VIBRAMYCIN) 100 mg in 0.9% sodium chloride (MBP/ADV) 100 mL MBP  100 mg IntraVENous Q12H    insulin lispro (HUMALOG) injection   SubCUTAneous AC&HS    glucose chewable tablet 16 g  4 Tablet Oral PRN    glucagon (GLUCAGEN) injection 1 mg  1 mg IntraMUSCular PRN    dextrose 10 % infusion 0-250 mL  0-250 mL IntraVENous PRN    balsam peru-castor oiL (VENELEX) ointment   Topical Q8H    heparin (porcine) injection 5,000 Units  5,000 Units SubCUTAneous Q12H    albuterol-ipratropium (DUO-NEB) 2.5 MG-0.5 MG/3 ML  3 mL Nebulization Q4H PRN     ______________________________________________________________________  EXPECTED LENGTH OF STAY: - - -  ACTUAL LENGTH OF STAY:          2                 Tiera Stephens MD

## 2022-03-16 NOTE — PROGRESS NOTES
Problem: Falls - Risk of  Goal: *Absence of Falls  Description: Document Jana Funk Fall Risk and appropriate interventions in the flowsheet. Outcome: Progressing Towards Goal  Note: Fall Risk Interventions:  Mobility Interventions: Bed/chair exit alarm    Mentation Interventions: Bed/chair exit alarm    Medication Interventions: Bed/chair exit alarm    Elimination Interventions: Call light in reach    History of Falls Interventions: Bed/chair exit alarm         Problem: Patient Education: Go to Patient Education Activity  Goal: Patient/Family Education  Outcome: Progressing Towards Goal     Problem: Pressure Injury - Risk of  Goal: *Prevention of pressure injury  Description: Document Flynn Scale and appropriate interventions in the flowsheet.   Outcome: Progressing Towards Goal  Note: Pressure Injury Interventions:  Sensory Interventions: Assess changes in LOC    Moisture Interventions: Absorbent underpads,Apply protective barrier, creams and emollients    Activity Interventions: Pressure redistribution bed/mattress(bed type)    Mobility Interventions: Pressure redistribution bed/mattress (bed type)    Nutrition Interventions: Document food/fluid/supplement intake,Offer support with meals,snacks and hydration    Friction and Shear Interventions: HOB 30 degrees or less,Feet elevated on foot rest                Problem: Patient Education: Go to Patient Education Activity  Goal: Patient/Family Education  Outcome: Progressing Towards Goal

## 2022-03-16 NOTE — PROGRESS NOTES
[facility] Adult  Hospitalist Group                                                                                          Hospitalist Progress Note  Jose Ramey MD  Answering service: [phone] or [phone] from in house phone        Date of Service:  3/16/2022  NAME:  Crosby Buerger  :  1940  MRN:  [id number]      Admission Summary: This is an 76-year-old man with a past medical history significant for chronic heart failure with reduced ejection fraction, COPD, type 2 diabetes, hypertension, hypothyroidism, coronary artery disease, status post CABG, chronic kidney disease, Alzheimer's dementia, who was in his usual state of health until a couple of days ago when the patient developed worsening of his shortness of breath. The patient's has shortness of breath at baseline because of his COPD and congestive heart failure. The patient was recently admitted to this hospital from 2022 to 2022. The patient was admitted for evaluation and treatment of acute-on-chronic heart failure with reduced ejection fraction. The patient has responded to diuresis. The patient had no oxygen requirement at the time of discharge from the hospital, but the patient had oxygen at the  in case the patient is going to require oxygen. The son stated that the patient has been using the oxygen at home at 2 L for the shortness of breath. When the EMS arrived at the scene, the patient's oxygen saturation was 88% on 2 L of oxygen. This was increased to 4 L and the oxygen saturation improved to 93% to 94%. The patient was brought to the emergency room for further evaluation. The patient is not able to provide history because of his Alzheimer's dementia. When the patient arrived at the emergency room, chest x-ray shows interval increase in parenchymal opacities on the right and on the left. The patient was subsequently referred to the hospitalist service for evaluation for admission.        Interval history / Subjective:   F/u acute respiratory failure   Now on 6l NC (yesterday 5l)  Recent admission with similar symptoms when had thoracentesis done 3/1/22  Left Thoracentesis done 3/15  Right thoracentesis done 3/16  Son in the room  Slight cough but looks comfortable  Assessment & Plan:     Acute-on-chronic respiratory failure with hypoxia (baseline 2l NC)  Bilateral pleural effusion  -sec to Acute on chronic systolic CHF NYHA III vs Pneumonia vs COPD exacerbation  -recent admission with bilateral thoracentesis done 3/1/22  -I/O  -daily weight  -IV diuresis on hold for now sec to worsening renal function  -Continue short course of oral steroids  -follow cultures  -Continue antibiotics  -repeat CXR 3/15 continues to have bilateral pleural effusion  -s/p thoracentesis bilat    -Wean off supp oxygen to keep sat>90%  -Follow cardiology, GDMT per cardiology  -Consult Pulmonary    Type 2 diabetes with hyperglycemia. SSI/lantus  Hypertension. continue home meds  Hypothyroidism- on synthroid  Coronary artery disease, status post CABG. Continue home meds  Chronic kidney disease, stage III. seems at baseline. Monitor  Anemia, iron deficiency most likely due to chronic disease. Follow anemia workup including checking stool guaiac to rule out occult GI bleed. Hyponatremia. Monitor  Contracture of the bilateral knee joints, present on admission. supportive treatment. Persistent atrial fibrillation. Rate controlled. Not on anticoagulation   Alzheimer's dementia. We will continue supportive treatment. Dysphagia diet    PT/OT     Code status: DNR, confirmed with son  Prophylaxis:   Lily Escobar at [phone]    Plan: Spoke to the son/patient and the daughter on phone. Told them that we are limited in terms of options for now. Holding diuretics for now sec to renal dysfunction. Will wait for pulmonary recommendations. If no further rec, then may have to think about comfort measures.  Palliative to see the patient tomorrow  Care Plan discussed with: son, patient, daughter  Anticipated Disposition: in 2-3 days     Hospital Problems  Date Reviewed: 3/14/2022          Codes Class Noted POA    * (Principal) Acute on chronic respiratory failure with hypoxia [facility]) ICD-10-CM: J96.21  ICD-9-CM: 518.84, 799.02  3/13/2022 Yes                Review of Systems:   A comprehensive review of systems was negative except for that written in the HPI. Vital Signs:    Last 24hrs VS reviewed since prior progress note. Most recent are:  Visit Vitals  /64 (BP 1 Location: Left upper arm, BP Patient Position: At rest)   Pulse 84   Temp 97.8 °F (36.6 °C)   Resp 20   Ht 6' 2.02\" (1.88 m)   Wt 61 kg (134 lb 7.7 oz)   SpO2 (!) 85%   BMI 17.26 kg/m²       No intake or output data in the 24 hours ending 03/16/22 0957     Physical Examination:     I had a face to face encounter with this patient and independently examined them on 3/16/2022 as outlined below:          Constitutional:  No acute distress   ENT:  Oral mucosa moist, oropharynx benign. Resp:  CTA bilaterally. No wheezing/rhonchi/rales. No accessory muscle use. CV:  Regular rhythm, normal rate, no murmurs, gallops, rubs    GI:  Soft, non distended, non tender. normoactive bowel sounds, no hepatosplenomegaly     Musculoskeletal:  No edema, warm, 2+ pulses throughout    Neurologic:  Moves all extremities.   AAOx3, CN II-XII reviewed            Data Review:    Review and/or order of clinical lab test      Labs:     Recent Labs     03/16/22  0127 03/15/22  0353   WBC 10.9 8.4   HGB 9.5* 9.1*   HCT 28.6* 27.3*    246     Recent Labs     03/16/22  0127 03/15/22  0353 03/14/22  0858   * 129* 129*   K 4.9 4.8 4.6   CL 98 99 96*   CO2 24 19* 26   BUN 32* 22* 18   CREA 2.01* 1.80* 1.53*   * 159* 139*   CA 8.7 8.8 9.0   MG  --   --  2.1   PHOS  --   --  3.5     Recent Labs     03/14/22  0858 03/13/22  1943   ALT 20 20   AP 91 89   TBILI 0.6 0.6   TP 6.8 7.0   ALB 3.1* 2.8*   GLOB 3.7 4.2*     No results for input(s): INR, PTP, APTT, INREXT, INREXT in the last 72 hours. Recent Labs     03/14/22  1430   TIBC 212*   PSAT 14*   FERR 167      Lab Results   Component Value Date/Time    Folate 5.9 03/14/2022 08:58 AM      No results for input(s): PH, PCO2, PO2 in the last 72 hours. No results for input(s): CPK, CKNDX, TROIQ in the last 72 hours.     No lab exists for component: CPKMB  Lab Results   Component Value Date/Time    Cholesterol, total 170 09/08/2020 04:30 PM    HDL Cholesterol 51 09/08/2020 04:30 PM    LDL, calculated 103 (H) 09/08/2020 04:30 PM    LDL, calculated 93 02/12/2019 08:49 AM    Triglyceride 89 09/08/2020 04:30 PM    CHOL/HDL Ratio 2.3 07/18/2017 02:14 AM     Lab Results   Component Value Date/Time    Glucose (POC) 142 (H) 03/16/2022 06:02 AM    Glucose (POC) 201 (H) 03/15/2022 09:35 PM    Glucose (POC) 133 (H) 03/15/2022 05:16 PM    Glucose (POC) 188 (H) 03/15/2022 11:28 AM    Glucose (POC) 154 (H) 03/15/2022 06:32 AM     Lab Results   Component Value Date/Time    Color YELLOW/STRAW 02/27/2022 11:00 PM    Appearance CLEAR 02/27/2022 11:00 PM    Specific gravity 1.012 02/27/2022 11:00 PM    Specific gravity 1.010 07/17/2017 04:12 PM    pH (UA) 5.0 02/27/2022 11:00 PM    Protein 100 (A) 02/27/2022 11:00 PM    Glucose 500 (A) 02/27/2022 11:00 PM    Ketone TRACE (A) 02/27/2022 11:00 PM    Bilirubin Negative 02/27/2022 11:00 PM    Urobilinogen 0.2 02/27/2022 11:00 PM    Nitrites Negative 02/27/2022 11:00 PM    Leukocyte Esterase Negative 02/27/2022 11:00 PM    Epithelial cells FEW 02/27/2022 11:00 PM    Bacteria Negative 02/27/2022 11:00 PM    WBC 0-4 02/27/2022 11:00 PM    RBC 0-5 02/27/2022 11:00 PM         Medications Reviewed:     Current Facility-Administered Medications   Medication Dose Route Frequency    amLODIPine (NORVASC) tablet 10 mg  10 mg Oral DAILY    ascorbic acid (vitamin C) (VITAMIN C) tablet 500 mg  500 mg Oral DAILY    aspirin chewable tablet 81 mg  81 mg Oral DAILY    vitamin B complex tablet  1 Tablet Oral DAILY    cholecalciferol (VITAMIN D3) (400 Units /10 mcg) tablet 400 Units  400 Units Oral DAILY    docusate sodium (COLACE) capsule 100 mg  100 mg Oral BID PRN    DULoxetine (CYMBALTA) capsule 60 mg  60 mg Oral DAILY    ferrous sulfate tablet 325 mg  325 mg Oral ACB    [Held by provider] furosemide (LASIX) injection 40 mg  40 mg IntraVENous DAILY    insulin glargine (LANTUS) injection 14 Units  14 Units SubCUTAneous DAILY    lactulose (CHRONULAC) 10 gram/15 mL solution 30 mL  20 g Oral BID PRN    lansoprazole compounding kit (PREVACID) 3 mg/mL oral suspension 30 mg  30 mg Oral BID    levothyroxine (SYNTHROID) tablet 50 mcg  50 mcg Oral ACB    QUEtiapine (SEROquel) tablet 25 mg  25 mg Oral BID PRN    tamsulosin (FLOMAX) capsule 0.4 mg  0.4 mg Oral PCD    sodium chloride (NS) flush 5-40 mL  5-40 mL IntraVENous Q8H    sodium chloride (NS) flush 5-40 mL  5-40 mL IntraVENous PRN    acetaminophen (TYLENOL) tablet 650 mg  650 mg Oral Q6H PRN    Or    acetaminophen (TYLENOL) suppository 650 mg  650 mg Rectal Q6H PRN    polyethylene glycol (MIRALAX) packet 17 g  17 g Oral DAILY PRN    ondansetron (ZOFRAN ODT) tablet 4 mg  4 mg Oral Q8H PRN    Or    ondansetron (ZOFRAN) injection 4 mg  4 mg IntraVENous Q6H PRN    predniSONE (DELTASONE) tablet 40 mg  40 mg Oral DAILY WITH BREAKFAST    L.acidophilus-paracasei-S.thermophil-bifidobacter (RISAQUAD) 8 billion cell capsule  1 Capsule Oral DAILY    cefTRIAXone (ROCEPHIN) 1 g in 0.9% sodium chloride 10 mL IV syringe  1 g IntraVENous Q24H    doxycycline (VIBRAMYCIN) 100 mg in 0.9% sodium chloride (MBP/ADV) 100 mL MBP  100 mg IntraVENous Q12H    insulin lispro (HUMALOG) injection   SubCUTAneous AC&HS    glucose chewable tablet 16 g  4 Tablet Oral PRN    glucagon (GLUCAGEN) injection 1 mg  1 mg IntraMUSCular PRN    dextrose 10 % infusion 0-250 mL  0-250 mL IntraVENous PRN    balsam peru-castor oiL (VENELEX) ointment   Topical Q8H    heparin (porcine) injection 5,000 Units  5,000 Units SubCUTAneous Q12H    albuterol-ipratropium (DUO-NEB) 2.5 MG-0.5 MG/3 ML  3 mL Nebulization Q4H PRN     ______________________________________________________________________  EXPECTED LENGTH OF STAY: 4d 2h  ACTUAL LENGTH OF STAY:          3                 Zaria Tobias MD

## 2022-03-16 NOTE — CONSULTS
Palliative medicine~    Consult received. Our team will see patient tomorrow when Dr Cartwright Section on service- well known to her from home based primary care.

## 2022-03-17 NOTE — PROGRESS NOTES
Problem: Falls - Risk of  Goal: *Absence of Falls  Description: Document Wes Morales Fall Risk and appropriate interventions in the flowsheet. Outcome: Progressing Towards Goal  Note: Fall Risk Interventions:  Mobility Interventions: Bed/chair exit alarm    Mentation Interventions: Bed/chair exit alarm    Medication Interventions: Bed/chair exit alarm    Elimination Interventions: Call light in reach    History of Falls Interventions: Bed/chair exit alarm    Problem: Patient Education: Go to Patient Education Activity  Goal: Patient/Family Education  Outcome: Progressing Towards Goal     Problem: Pressure Injury - Risk of  Goal: *Prevention of pressure injury  Description: Document Flynn Scale and appropriate interventions in the flowsheet. Outcome: Progressing Towards Goal  Note: Pressure Injury Interventions:  Sensory Interventions: Assess changes in LOC,Pressure redistribution bed/mattress (bed type)    Moisture Interventions: Absorbent underpads    Activity Interventions: Pressure redistribution bed/mattress(bed type)    Mobility Interventions: HOB 30 degrees or less,Pressure redistribution bed/mattress (bed type)    Nutrition Interventions: Document food/fluid/supplement intake    Friction and Shear Interventions: Apply protective barrier, creams and emollients,HOB 30 degrees or less    Problem: Patient Education: Go to Patient Education Activity  Goal: Patient/Family Education  Outcome: Progressing Towards Goal    Aox1 - patient rested throughout shift. .. no adverse events noted - will continue to monitor.

## 2022-03-17 NOTE — CONSULTS
Pulmonary, Critical Care, and Sleep Medicine~Consult Note    Name: Sobia Dominguez MRN: [id number]   : 1940 Hospital: [facility]   Date: 3/17/2022 12:29 PM Admission: 3/13/2022     Impression Plan   1. Acute hypoxic resp failure  2. Recurrent bilateral pleural effusions, tapped twice on both sides. Suspected to be multifactorial from  HF and renal disease   3. HFrEF  4. AS   5. WENDY on CKD due to diuretics   6. S/p CABG, CAD  7. alzheimer's dementia   8. A fib 1. Options are limited for treatment thus the pivot to comfort control   2. Agree with pleural catheter placement for comfort measures. Discussed with son, daughter and hospitalist. Will look to have it placed on the left as that reaccumulated  the fastest   3. O2 titration above 90%      Daily Progression:    Consult Note requested by hospitalist.    Patient was admitted for increased dyspnea. Was recently admitted at the end of Feb for similar issues. He has had recurrent bilateral pleural effusions; tapped on  right 1L path was benign, 3/1 left tapped 1.6L, 3/15 left tapped 2L, 3/16 right tapped 2L. Unfortunately no pleural fluid labs were sent on all the taps, but it is suspected to be from decom HF. Currently stable and noted plans are to transition home with a focus on comfort. ECHO  Left Ventricle: Left ventricle size is normal. Moderately reduced left ventricular systolic function with a visually estimated EF of 35 - 40%. Abnormal diastolic function.   Aortic Valve: Severely calcified cusp. Trace transvalvular regurgitation. Moderate stenosis of the aortic valve. AV mean gradient is 21 mmHg. AV peak velocity is 2.7 m/s. Aortic valve area 1.3 cm2.   Right Ventricle: Moderately reduced systolic function.   Mitral Valve: Mild to moderate transvalvular regurgitation.   Left Atrium: Left atrium is mildly dilated.   Pericardium: Large left pleural effusion.       I have reviewed the labs and previous days notes. Review of systems not obtained due to patient factors. Past Medical History:   Diagnosis Date    Atrial fibrillation ([facility]     COPD (chronic obstructive pulmonary disease) ([facility]     Diabetes ([facility]     1970a    Heart failure ([facility]     Hip fracture ([facility] 9/24/2021    Hypokalemia 12/25/2018    Hyponatremia 4/11/2017    MI (myocardial infarction) ([facility] 01/2019    NSTEMI (non-ST elevated myocardial infarction) ([facility] 12/25/2018    Syncope 4/11/2017    Urinary incontinence       Past Surgical History:   Procedure Laterality Date    HX HEENT  1975    nasal surgery    HX MOHS PROCEDURES  2013    left    CA CARDIAC SURG PROCEDURE UNLIST  2000    open heart      Prior to Admission medications    Medication Sig Start Date End Date Taking? Authorizing Provider   lactulose (CHRONULAC) 10 gram/15 mL solution Take 30 mL by mouth two (2) times daily as needed (as needed for constipation). 3/11/22   Adrian Clarke NP   levothyroxine (SYNTHROID) 50 mcg tablet Take 1 Tablet by mouth Daily (before breakfast). 3/11/22   Mady Styles MD   tamsulosin (Flomax) 0.4 mg capsule Take 1 Capsule by mouth nightly. 3/11/22   Mady Styles MD   insulin glargine (LANTUS,BASAGLAR) 100 unit/mL (3 mL) inpn 10 Units by SubCUTAneous route daily. Inject 14 units every morning. 3/3/22   Ivette Ozuna MD   L.acid,para-B. bifidum-S.therm (RISAQUAD) 8 billion cell cap cap Take 1 Capsule by mouth daily for 15 days. 3/4/22 3/19/22  Ivette Ozuna MD   lansoprazole (PREVACID) 3 mg/mL oral suspension Take 10 mL by mouth two (2) times a day. 3/3/22   Ivette Ozuna MD   furosemide (Lasix) 20 mg tablet Take 1 Tablet by mouth every Monday, Wednesday, Friday for 30 days. 3/4/22 4/3/22  Ivette Ozuna MD   ascorbic acid, vitamin C, (Vitamin C) 500 mg tablet Take 500 mg by mouth daily. Provider, Historical   multivit-minerals/folic acid (MULTIVITAMIN GUMMIES PO) Take 1 Tablet by mouth daily.     Provider, Historical   cholecalciferol (VITAMIN D3) (400 Units /10 mcg) tab tablet Take 400 Units by mouth daily. Provider, Historical   ferrous sulfate 325 mg (65 mg iron) tablet Take 325 mg by mouth Daily (before breakfast). Provider, Historical   docusate sodium (COLACE) 100 mg capsule Take 100 mg by mouth two (2) times daily as needed for Constipation. Provider, Historical   aspirin 81 mg chewable tablet Take 81 mg by mouth daily. Provider, Historical   omeprazole (PRILOSEC) 20 mg capsule Take 20 mg by mouth daily. Provider, Historical   QUEtiapine (SEROqueL) 25 mg tablet Take 25 mg by mouth two (2) times daily as needed for PRN Reason (Other) (anxiety, behavioral issues). Provider, Historical   b complex vitamins tablet Take 1 Tablet by mouth daily. Provider, Historical   DULoxetine (CYMBALTA) 60 mg capsule Take 1 Capsule by mouth daily. 2/16/22   Azul Slater MD   glucose blood VI test strips (ASCENSIA AUTODISC VI, ONE TOUCH ULTRA TEST VI) strip Check blood sugar three times daily before meals 12/28/21   Azul Slater MD   Insulin Needles, Disposable, (BD Ultra-Fine Mini Pen Needle) 31 gauge x 3/16\" ndle USE TO INJECT INSULIN 3 TIMES A DAY 12/28/21   Azul Slater MD   amLODIPine (NORVASC) 10 mg tablet Take 1 Tablet by mouth daily. 12/20/21   Azul Slater MD   Wheel Chair angel Standard wheelchair 11/1/21   Azul Slater MD   balsam peru-castor oiL (VENELEX) ointment Apply  to affected area every eight (8) hours. 10/27/21   Azul Slater MD   insulin lispro (HUMALOG) 100 unit/mL injection INJECT 3 UNITS BENEATH THE SKIN FOR BS>200, 5 UNITS FOR BS>250, 6 UNITS FOR BS>300.  CALL MD FOR BS >400 11/10/20   Trinity Mclaughlin NP     Allergies   Allergen Reactions    Sulfa (Sulfonamide Antibiotics) Unknown (comments)      Social History     Tobacco Use    Smoking status: Former Smoker    Smokeless tobacco: Never Used    Tobacco comment: 1-2 cigars daily Substance Use Topics    Alcohol use: No     Alcohol/week: 0.0 standard drinks      Family History   Problem Relation Age of Onset    Diabetes Mother     Diabetes Brother      OBJECTIVE:     Vital Signs:       Visit Vitals  /69 (BP 1 Location: Left upper arm, BP Patient Position: At rest)   Pulse 85   Temp 97.9 °F (36.6 °C)   Resp 24   Ht 6' 2.02\" (1.88 m)   Wt 61 kg (134 lb 7.7 oz)   SpO2 94%   BMI 17.26 kg/m²      Temp (24hrs), Av.4 °F (36.9 °C), Min:97.7 °F (36.5 °C), Max:99 °F (37.2 °C)     Intake/Output:     Last shift: No intake/output data recorded.     Last 3 shifts: 03/15 1901 -  0700  In: 120 [P.O.:120]  Out: 2 [Urine:2]          Intake/Output Summary (Last 24 hours) at 3/17/2022 1229  Last data filed at 3/17/2022 0636  Gross per 24 hour   Intake 120 ml   Output    Net 120 ml       Physical Exam:                                        Exam Findings Other   General: No resp distress noted, appears stated age    [de-identified]:  No ulcers, JVD not elevated, no cervical LAD    Chest: No pectus deformity, normal chest rise b/l    HEART:  No visible thrills     Lungs:  Normal expansion     ABD: Soft/NT, non rigid mildly distended    EXT: No cyanosis/clubbing/edema, normal peripheral pulses    Skin: No rashes or ulcers, no mottling    Neuro: Sleeping in bed         Medications:  Current Facility-Administered Medications   Medication Dose Route Frequency    amLODIPine (NORVASC) tablet 10 mg  10 mg Oral DAILY    ascorbic acid (vitamin C) (VITAMIN C) tablet 500 mg  500 mg Oral DAILY    aspirin chewable tablet 81 mg  81 mg Oral DAILY    vitamin B complex tablet  1 Tablet Oral DAILY    cholecalciferol (VITAMIN D3) (400 Units /10 mcg) tablet 400 Units  400 Units Oral DAILY    docusate sodium (COLACE) capsule 100 mg  100 mg Oral BID PRN    DULoxetine (CYMBALTA) capsule 60 mg  60 mg Oral DAILY    ferrous sulfate tablet 325 mg  325 mg Oral ACB    [Held by provider] furosemide (LASIX) injection 40 mg 40 mg IntraVENous DAILY    insulin glargine (LANTUS) injection 14 Units  14 Units SubCUTAneous DAILY    lactulose (CHRONULAC) 10 gram/15 mL solution 30 mL  20 g Oral BID PRN    lansoprazole compounding kit (PREVACID) 3 mg/mL oral suspension 30 mg  30 mg Oral BID    levothyroxine (SYNTHROID) tablet 50 mcg  50 mcg Oral ACB    QUEtiapine (SEROquel) tablet 25 mg  25 mg Oral BID PRN    tamsulosin (FLOMAX) capsule 0.4 mg  0.4 mg Oral PCD    sodium chloride (NS) flush 5-40 mL  5-40 mL IntraVENous Q8H    sodium chloride (NS) flush 5-40 mL  5-40 mL IntraVENous PRN    acetaminophen (TYLENOL) tablet 650 mg  650 mg Oral Q6H PRN    Or    acetaminophen (TYLENOL) suppository 650 mg  650 mg Rectal Q6H PRN    polyethylene glycol (MIRALAX) packet 17 g  17 g Oral DAILY PRN    ondansetron (ZOFRAN ODT) tablet 4 mg  4 mg Oral Q8H PRN    Or    ondansetron (ZOFRAN) injection 4 mg  4 mg IntraVENous Q6H PRN    predniSONE (DELTASONE) tablet 40 mg  40 mg Oral DAILY WITH BREAKFAST    L.acidophilus-paracasei-S.thermophil-bifidobacter (RISAQUAD) 8 billion cell capsule  1 Capsule Oral DAILY    cefTRIAXone (ROCEPHIN) 1 g in 0.9% sodium chloride 10 mL IV syringe  1 g IntraVENous Q24H    doxycycline (VIBRAMYCIN) 100 mg in 0.9% sodium chloride (MBP/ADV) 100 mL MBP  100 mg IntraVENous Q12H    insulin lispro (HUMALOG) injection   SubCUTAneous AC&HS    glucose chewable tablet 16 g  4 Tablet Oral PRN    glucagon (GLUCAGEN) injection 1 mg  1 mg IntraMUSCular PRN    dextrose 10 % infusion 0-250 mL  0-250 mL IntraVENous PRN    balsam peru-castor oiL (VENELEX) ointment   Topical Q8H    heparin (porcine) injection 5,000 Units  5,000 Units SubCUTAneous Q12H    albuterol-ipratropium (DUO-NEB) 2.5 MG-0.5 MG/3 ML  3 mL Nebulization Q4H PRN       Labs:  ABG No results for input(s): PHI, PCO2I, PO2I, HCO3I, SO2I, FIO2I in the last 72 hours.      CBC Recent Labs     03/17/22  0251 03/16/22  0127 03/15/22  0353   WBC 9.1 10.9 8.4   HGB 9.7* 9.5* 9.1*   HCT 28.2* 28.6* 27.3*    241 246   MCV 80.3 83.6 82.7   MCH 27.6 27.8 74.7        Metabolic  Panel Recent Labs     03/17/22  0251 03/16/22  0127 03/15/22  0353   * 129* 129*   K 4.1 4.9 4.8    98 99   CO2 26 24 19*   GLU 71 151* 159*   BUN 42* 32* 22*   CREA 2.17* 2.01* 1.80*   CA 8.1* 8.7 8.8        Pertinent Labs                Joshua Garcia PA-C  3/17/2022

## 2022-03-17 NOTE — HOSPICE
[facility]  [phone]    Patient Name: Mynor Garcia  YOB: 1940  Age: [de-identified] y.o. [facility]  Hospice consult noted. Chart reviewed. Plan of care discussed with patients nurse & care manager. In to meet with patient, patient's son Rashaun Carreno, pt daughter, Karime Arriaga and pt son Geneva Kuo with his wife. Discussed Hospice philosophy, general plan of care, levels of care, services and on call procedures. Family information packet provided & reviewed with family bedside. Pt appears to be having minimal respiratory distress. Family report patient is more comfortable today. Pt on 6 liters O2 NC. Home oxygen is 2 lpm NC. Pt appears to be sleeping during hospice discussion. Pt has been living with Delfina Brooks assists with care giving - per family. Family state plan of care currently is medical team to continue to treat respiratory distress, and pulmonology managing lung drainage for comfort. Pt planning to have pleural drain in place prior to discharge. Family are still considering discharge planning. Family have NOT agreed to home with hospice services at this time. Family plan to discuss discharge planning; family discussing patient to return home where son, Wen Hernandez is hired [facility] aid. Per Advanced Directive Discussions with Dr. Cindy Echevarria:  Primary Decision Maker: [facility]h [facility] - [phone]    Thank you for the opportunity to be of service to this patient. Plan: Contact family on 3/18/2022 to continue with Hospice at home discussion. Hospice will update CM and attending. Once discharge date is decided, hospice will assist with home with hospice planning.         Nelli Sorenson RN, [place] [address] Nurse Liaison  [phone] Mobile  [phone] Office  Available on [employer]

## 2022-03-17 NOTE — CONSULTS
Palliative Medicine Consult  Vargas: [phone] ([phone])    Patient Name: Alyssa Gomes  YOB: 1940    Date of Initial Consult: 3/17/2022  Reason for Consult: Care Decisions  Requesting Provider: Dr. Khanh Gonzalez   Primary Care Physician: Jose Olmos MD     SUMMARY:   Alyssa Gomes is a [de-identified] y.o. with Dementia, CHF, T2DM, CKD, COPD, h/o GIB, chronic pain who was admitted on 3/13/2022 from home with a diagnosis of CHF exacerbation and AHRF. He has been found to have pleural effusions which have reaccumulated since they were drained during previous hospitalization from 2/27-3/3 for CHF exacerbation. He has received right and left thoracentesis during this hospitalization. Per Cardiology, this reaccumulation is likely multifactorial related to protein malnutrition, chronic heart failure, aortic stenosis, excessive fluid intake and CKD. Patient's kidney dysfunction has limited the diuretic dose he can tolerate. It has been recommended to consider comfort measures. It is felt patient is likely not a candidate for a pleural catheter, and Pulmonology has been consulted for evaluation and clarification of this. Current medical issues leading to Palliative Medicine involvement include: Care Decisions. Social:  Patient lives with his son Patricia Washington. His daughter is Kaushal García. PALLIATIVE DIAGNOSES:   1. Encounter for Palliative Care  2. Goals of Care Discussion  3. Shortness of breath s/p right and left thoracentesis (2L removed on each side)  4. Generalized pain  5. Generalized weakness     PLAN:   1. I am familiar with Mr. Josy Vail as I have cared for him in the home setting in the [address] [place] [address]. He has advanced dementia at baseline and is unable to participate in medical decision making. This morning patient is resting and is unable to participate in any conversation. 2. Patient's son Patricia Washington is at bedside. He called his sister Malik Álvarez to join us by phone.  We discussed patient's current status. Discussed reaccumulation of pleural effusion and worsening kidney function limiting how aggressively he can receive diuresis/fluid medication. Discussed that relatively quick reaccumulation of fluid can be a sign that he may have limited time remaining since we unfortunately cannot reverse the underlying causes of the reaccumulation. Reviewed Cardiology and Hospitalist recommendations. Odalys Fermin and Kenisha Velazco are interested in pleural catheter placement if it would help to improve his symptoms. Counseled regarding hospice philosophy and discussed that this is a good option for families who want to focus on comfort and quality of life at home at the end of life rather than going back and forth to the hospital. Odalys Fermin and Kenisha Velazco are interested in meeting with hospice today. They emphasize their goal is to help their dad to stay as comfortable as possible. 3. I placed a hospice consult and have discussed case with hospice liaison Raymond Carmona RN, for coordination of care. 4. I communicated with Dr. Ewa Etienne regarding family's wishes. 5. Thank you for asking our team to participate in the care of Becky Tinoco.      GOALS OF CARE / TREATMENT PREFERENCES:     GOALS OF CARE:  Patient/Health Care Proxy Stated Goals: Comfort    TREATMENT PREFERENCES:   Code Status: DNR    Patient and family's personal goals include: patient cannot answer d/t medical condition    Important upcoming milestones or family events: patient cannot answer d/t medical condition    The patient identifies the following as important for living well: patient cannot answer d/t medical condition      Advance Care Planning:  [] [facility] Interdisciplinary Team has updated the ACP Navigator with [address] and Patient Capacity      Primary Decision Maker (Active): Linn Villegas - [phone]    Primary Decision Maker: Rose Mary Benitez - [phone]    Secondary Decision Maker: Dyana Ricardo - Other Relative - [phone]  Advance Care Planning 2/21/2019   Patient's Healthcare Decision Maker is: Legal Next of Kin   Confirm Advance Directive None   Patient Would Like to Complete Advance Directive Unable   Does the patient have other document types Do Not Resuscitate               Other:    As far as possible, the palliative care team has discussed with patient / health care proxy about goals of care / treatment preferences for patient. HISTORY:     History obtained from: chart, family    CHIEF COMPLAINT: patient cannot answer d/t medical condition    HPI/SUBJECTIVE:    The patient is:   [] Verbal and participatory  [x] Non-participatory due to: medical condition     Clinical Pain Assessment (nonverbal scale for severity on nonverbal patients): Activity (Movement): Lying quietly, normal position    Duration: for how long has pt been experiencing pain (e.g., 2 days, 1 month, years)  Frequency: how often pain is an issue (e.g., several times per day, once every few days, constant)     FUNCTIONAL ASSESSMENT:     Palliative Performance Scale (PPS):  PPS: 20       PSYCHOSOCIAL/SPIRITUAL SCREENING:     Palliative IDT has assessed this patient for cultural preferences / practices and a referral made as appropriate to needs (Cultural Services, Patient Advocacy, Ethics, etc.)    Any spiritual / Nondenominational concerns:  [] Yes /  [x] No   If \"Yes\" to discuss with pastoral care during IDT     Does caregiver feel burdened by caring for their loved one:   [] Yes /  [x] No /  [] No Caregiver Present    If \"Yes\" to discuss with social work during IDT    Anticipatory grief assessment:   [x] Normal  / [] Maladaptive     If \"Maladaptive\" to discuss with social work during IDT    ESAS Anxiety: Anxiety:  (patient cannot answer d/t medical condition)    ESAS Depression:          REVIEW OF SYSTEMS:     Positive and pertinent negative findings in ROS are noted above in HPI.   The following systems were [x] reviewed / [] unable to be reviewed as noted in HPI  Other findings are noted below. Systems: constitutional, ears/nose/mouth/throat, respiratory, gastrointestinal, genitourinary, musculoskeletal, integumentary, neurologic, psychiatric, endocrine. Positive findings noted below. Modified ESAS Completed by: provider   Fatigue:  (patient cannot answer d/t medical condition)       Pain:  (patient cannot answer d/t medical condition)   Anxiety:  (patient cannot answer d/t medical condition)       Dyspnea:  (patient cannot answer d/t medical condition)                    PHYSICAL EXAM:     From RN flowsheet:  Wt Readings from Last 3 Encounters:   03/17/22 122 lb 5.7 oz (55.5 kg)   03/03/22 136 lb 0.4 oz (61.7 kg)   11/26/21 144 lb 2.9 oz (65.4 kg)     Blood pressure 118/62, pulse 80, temperature 99 °F (37.2 °C), resp. rate 20, height 6' 2.02\" (1.88 m), weight 122 lb 5.7 oz (55.5 kg), SpO2 96 %.     Pain Scale 1: Adult Nonverbal Pain Scale  Pain Intensity 1: 0                 Last bowel movement, if known:     Constitutional: patient is lying abed, not awake, appears very frail, NAD  Eyes: lids normal, no drainage  ENMT: no nasal discharge, moist mucous membranes  Cardiovascular: no LE edema, distal pulses intact  Respiratory: breathing not labored, rate is normal  Gastrointestinal: soft, non-tender  Musculoskeletal: no deformity, no tenderness to palpation  Skin: warm, dry  Neurologic: does not wake or stir to exam  Psychiatric: unresponsive, not restless or agitated at this time  Other:       HISTORY:     Principal Problem:    Acute on chronic respiratory failure with hypoxia ([address] (3/13/2022)      Past Medical History:   Diagnosis Date    Atrial fibrillation (HCC)     COPD (chronic obstructive pulmonary disease) ([address]     Diabetes ([address]     1970a    Heart failure ([address]     Hip fracture ([address] 9/24/2021    Hypokalemia 12/25/2018    Hyponatremia 4/11/2017    MI (myocardial infarction) ([address] 01/2019    NSTEMI (non-ST elevated myocardial infarction) ([address] 12/25/2018    Syncope 4/11/2017    Urinary incontinence       Past Surgical History:   Procedure Laterality Date    HX HEENT  1975    nasal surgery    HX MOHS PROCEDURES  2013    left    MO CARDIAC SURG PROCEDURE UNLIST  2000    open heart      Family History   Problem Relation Age of Onset    Diabetes Mother     Diabetes Brother       History reviewed, no pertinent family history.   Social History     Tobacco Use    Smoking status: Former Smoker    Smokeless tobacco: Never Used    Tobacco comment: 1-2 cigars daily   Substance Use Topics    Alcohol use: No     Alcohol/week: 0.0 standard drinks     Allergies   Allergen Reactions    Sulfa (Sulfonamide Antibiotics) Unknown (comments)      Current Facility-Administered Medications   Medication Dose Route Frequency    amLODIPine (NORVASC) tablet 10 mg  10 mg Oral DAILY    ascorbic acid (vitamin C) (VITAMIN C) tablet 500 mg  500 mg Oral DAILY    aspirin chewable tablet 81 mg  81 mg Oral DAILY    vitamin B complex tablet  1 Tablet Oral DAILY    cholecalciferol (VITAMIN D3) (400 Units /10 mcg) tablet 400 Units  400 Units Oral DAILY    docusate sodium (COLACE) capsule 100 mg  100 mg Oral BID PRN    DULoxetine (CYMBALTA) capsule 60 mg  60 mg Oral DAILY    ferrous sulfate tablet 325 mg  325 mg Oral ACB    [Held by provider] furosemide (LASIX) injection 40 mg  40 mg IntraVENous DAILY    insulin glargine (LANTUS) injection 14 Units  14 Units SubCUTAneous DAILY    lactulose (CHRONULAC) 10 gram/15 mL solution 30 mL  20 g Oral BID PRN    lansoprazole compounding kit (PREVACID) 3 mg/mL oral suspension 30 mg  30 mg Oral BID    levothyroxine (SYNTHROID) tablet 50 mcg  50 mcg Oral ACB    QUEtiapine (SEROquel) tablet 25 mg  25 mg Oral BID PRN    tamsulosin (FLOMAX) capsule 0.4 mg  0.4 mg Oral PCD    sodium chloride (NS) flush 5-40 mL  5-40 mL IntraVENous Q8H    sodium chloride (NS) flush 5-40 mL  5-40 mL IntraVENous PRN    acetaminophen (TYLENOL) tablet 650 mg  650 mg Oral Q6H PRN    Or    acetaminophen (TYLENOL) suppository 650 mg  650 mg Rectal Q6H PRN    polyethylene glycol (MIRALAX) packet 17 g  17 g Oral DAILY PRN    ondansetron (ZOFRAN ODT) tablet 4 mg  4 mg Oral Q8H PRN    Or    ondansetron (ZOFRAN) injection 4 mg  4 mg IntraVENous Q6H PRN    predniSONE (DELTASONE) tablet 40 mg  40 mg Oral DAILY WITH BREAKFAST    L.acidophilus-paracasei-S.thermophil-bifidobacter (RISAQUAD) 8 billion cell capsule  1 Capsule Oral DAILY    cefTRIAXone (ROCEPHIN) 1 g in 0.9% sodium chloride 10 mL IV syringe  1 g IntraVENous Q24H    doxycycline (VIBRAMYCIN) 100 mg in 0.9% sodium chloride (MBP/ADV) 100 mL MBP  100 mg IntraVENous Q12H    insulin lispro (HUMALOG) injection   SubCUTAneous AC&HS    glucose chewable tablet 16 g  4 Tablet Oral PRN    glucagon (GLUCAGEN) injection 1 mg  1 mg IntraMUSCular PRN    dextrose 10 % infusion 0-250 mL  0-250 mL IntraVENous PRN    balsam peru-castor oiL (VENELEX) ointment   Topical Q8H    heparin (porcine) injection 5,000 Units  5,000 Units SubCUTAneous Q12H    albuterol-ipratropium (DUO-NEB) 2.5 MG-0.5 MG/3 ML  3 mL Nebulization Q4H PRN          LAB AND IMAGING FINDINGS:     Lab Results   Component Value Date/Time    WBC 9.1 03/17/2022 02:51 AM    HGB 9.7 (L) 03/17/2022 02:51 AM    PLATELET 433 67/83/3232 02:51 AM     Lab Results   Component Value Date/Time    Sodium 131 (L) 03/17/2022 02:51 AM    Potassium 4.1 03/17/2022 02:51 AM    Chloride 100 03/17/2022 02:51 AM    CO2 26 03/17/2022 02:51 AM    BUN 42 (H) 03/17/2022 02:51 AM    Creatinine 2.17 (H) 03/17/2022 02:51 AM    Calcium 8.1 (L) 03/17/2022 02:51 AM    Magnesium 2.1 03/14/2022 08:58 AM    Phosphorus 3.5 03/14/2022 08:58 AM      Lab Results   Component Value Date/Time    Alk.  phosphatase 91 03/14/2022 08:58 AM    Protein, total 6.8 03/14/2022 08:58 AM    Albumin 3.1 (L) 03/14/2022 08:58 AM    Globulin 3.7 03/14/2022 08:58 AM     Lab Results   Component Value Date/Time    INR 1.1 02/28/2022 03:11 AM    Prothrombin time 11.1 02/28/2022 03:11 AM    aPTT 34.7 (H) 02/28/2022 03:11 AM      Lab Results   Component Value Date/Time    Iron 29 (L) 03/14/2022 02:30 PM    TIBC 212 (L) 03/14/2022 02:30 PM    Iron % saturation 14 (L) 03/14/2022 02:30 PM    Ferritin 167 03/14/2022 02:30 PM      No results found for: PH, PCO2, PO2  No components found for: [place]   Lab Results   Component Value Date/Time    CK 82 02/05/2019 01:42 AM    CK - MB 4.5 (H) 02/05/2019 01:42 AM                Total time: 70 minutes  Counseling / coordination time, spent as noted above: 40 minutes  > 50% counseling / coordination?: yes    Prolonged service was provided for  []30 min   []75 min in face to face time in the presence of the patient, spent as noted above. Time Start:   Time End:   Note: this can only be billed with 32226 (initial) or 33461 (follow up). If multiple start / stop times, list each separately.

## 2022-03-17 NOTE — PROGRESS NOTES
Bedside and Verbal shift change report given to Seun RN (oncoming nurse) by Ann Marie Reyes RN (offgoing nurse). Report included the following information SBAR, Kardex, OR Summary, Procedure Summary, Intake/Output, MAR and Accordion.

## 2022-03-17 NOTE — HOSPICE
[facility]  [phone]     Patient Name: Becky Tinoco  YOB: 1940  Age: [de-identified] y.o. 89 [facility] RN Note:  Hospice consult received, reviewing chart. Will follow up with Unit Nurse and Care Manager to discuss plan of care, patient status and discharge disposition within the hour. Thank you for the opportunity to be of service to this patient. 13:00: Call and spoke to patient daughter, Pawel Patrick at [phone]. Concetta plans to be at [facility] [place] this afternoon. Plan to meet bedside today at 15:00 for family meeting.     Raymond Carmona RN, [place] [address] Nurse Liaison  [phone] Mobile  [phone] Office  Available on Perfect Serve

## 2022-03-17 NOTE — PROGRESS NOTES
Transition of Care Plan   RUR- 25% High Risk   DISPOSITION: The disposition plan is pending medical progression and recommendation.  F/U with PCP/Specialist     Transport: [facility]/family     At 11:55am - CM observed hospice referral. CM submitted to [facility] for review.     Jennifer Tam, MSW, CRM, LMHP-e  Available in Perfect Serve

## 2022-03-17 NOTE — PROGRESS NOTES
[facility] Adult  Hospitalist Group                                                                                          Hospitalist Progress Note  Maggie Murrieta MD  Answering service: [phone] or [phone] from in house phone        Date of Service:  3/17/2022  NAME:  Desmond Pritchard  :  1940  MRN:  [id number]      Admission Summary:     Presents with acute on chronic respiratory failure due to combination of CHF, pneumonia and COPD. While diuresing, patient's renal function has gotten worse and therefore diuretic was held. Patient is also s/p multiple thoracentesis for recurrent pleural effusion. Patient is clinically declining, baseline dementia. After discussion with the family, it was decided patient to be on comfort care. Hospice following. Interval history / Subjective:     Patient unable to communicate. Son present at bedside. Assessment & Plan:     Acute respiratory failure  -Acute on chronic-hypoxic respiratory failure due to combination of CHF, pneumonia and COPD exacerbation  -Initial chest x-ray shows interval increase parenchymal opacities on the right and on the left.   Interval increase left-sided effusion  -At baseline patient requires 2 L of oxygen, currently on 6 L    Acute on chronic systolic CHF  -Patient with acute on chronic systolic CHF, NYHA is class III on admission  -Previous echo 2022 shows EF of 35 to 40%  -Previously was on diuresis with Lasix, now Lasix was on hold due to worsening renal function  -Cardiology previously evaluated the patient    Recurrent bilateral pleural effusions  -Pulmonology following  -Plan for Pleurx catheter placement prior to home with hospice    Pneumonia  -Blood cultures negative  -On ceftriaxone and doxycycline     COPD exacerbation  -On steroid    WENDY  -Worsening renal function and diuretics on hold  -Overall poor prognosis, hospice on board    Hypertension  -On Norvasc    Diabetes type 2   -On Lantus insulin sliding scale coverage  -Monitor blood sugars     Alzheimer's dementia  -Supportive treatment  -Palliative care and hospice following     Code status:  DNR  Prophylaxis: SCDs  Care Plan discussed with: Patient's son present at bedside  Anticipated Disposition: In 24 to 48 hours, probable home with home hospice     Hospital Problems  Date Reviewed: 3/14/2022          Codes Class Noted POA    * (Principal) Acute on chronic respiratory failure with hypoxia [facility]) ICD-10-CM: J96.21  ICD-9-CM: 518.84, 799.02  3/13/2022 Yes                Review of Systems:   Review of system cannot be obtained. Vital Signs:    Last 24hrs VS reviewed since prior progress note. Most recent are:  Visit Vitals  /60 (BP 1 Location: Left upper arm, BP Patient Position: At rest)   Pulse 86   Temp 97.8 °F (36.6 °C)   Resp 20   Ht 6' 2.02\" (1.88 m)   Wt 55.5 kg (122 lb 5.7 oz)   SpO2 97%   BMI 15.70 kg/m²         Intake/Output Summary (Last 24 hours) at 3/17/2022 1632  Last data filed at 3/17/2022 0636  Gross per 24 hour   Intake 120 ml   Output    Net 120 ml        Physical Examination:     I had a face to face encounter with this patient and independently examined them on 3/17/2022 as outlined below:          Constitutional:  Appears drowsy   ENT:  Oral mucosa moist, oropharynx benign. Resp:  Bilateral crackles. No accessory muscle use. CV:  Regular rhythm, normal rate, no murmurs, gallops, rubs    GI:  Soft, non distended, non tender.  normoactive bowel sounds, no hepatosplenomegaly     Musculoskeletal:  No edema, warm, 2+ pulses throughout    Neurologic:  Appears drowsy            Data Review:    Review and/or order of clinical lab test      Labs:     Recent Labs     03/17/22  0251 03/16/22  0127   WBC 9.1 10.9   HGB 9.7* 9.5*   HCT 28.2* 28.6*    241     Recent Labs     03/17/22  0251 03/16/22  0127 03/15/22  0353   * 129* 129*   K 4.1 4.9 4.8    98 99   CO2 26 24 19*   BUN 42* 32* 22*   CREA 2.17* 2.01* 1.80* GLU 71 151* 159*   CA 8.1* 8.7 8.8     No results for input(s): ALT, AP, TBIL, TBILI, TP, ALB, GLOB, GGT, AML, LPSE in the last 72 hours. No lab exists for component: SGOT, GPT, AMYP, HLPSE  No results for input(s): INR, PTP, APTT, INREXT in the last 72 hours. No results for input(s): FE, TIBC, PSAT, FERR in the last 72 hours. Lab Results   Component Value Date/Time    Folate 5.9 03/14/2022 08:58 AM      No results for input(s): PH, PCO2, PO2 in the last 72 hours. No results for input(s): CPK, CKNDX, TROIQ in the last 72 hours.     No lab exists for component: CPKMB  Lab Results   Component Value Date/Time    Cholesterol, total 170 09/08/2020 04:30 PM    HDL Cholesterol 51 09/08/2020 04:30 PM    LDL, calculated 103 (H) 09/08/2020 04:30 PM    LDL, calculated 93 02/12/2019 08:49 AM    Triglyceride 89 09/08/2020 04:30 PM    CHOL/HDL Ratio 2.3 07/18/2017 02:14 AM     Lab Results   Component Value Date/Time    Glucose (POC) 230 (H) 03/17/2022 11:37 AM    Glucose (POC) 151 (H) 03/17/2022 07:13 AM    Glucose (POC) 64 (L) 03/17/2022 06:42 AM    Glucose (POC) 64 (L) 03/17/2022 06:07 AM    Glucose (POC) 122 (H) 03/16/2022 09:19 PM     Lab Results   Component Value Date/Time    Color YELLOW/STRAW 02/27/2022 11:00 PM    Appearance CLEAR 02/27/2022 11:00 PM    Specific gravity 1.012 02/27/2022 11:00 PM    Specific gravity 1.010 07/17/2017 04:12 PM    pH (UA) 5.0 02/27/2022 11:00 PM    Protein 100 (A) 02/27/2022 11:00 PM    Glucose 500 (A) 02/27/2022 11:00 PM    Ketone TRACE (A) 02/27/2022 11:00 PM    Bilirubin Negative 02/27/2022 11:00 PM    Urobilinogen 0.2 02/27/2022 11:00 PM    Nitrites Negative 02/27/2022 11:00 PM    Leukocyte Esterase Negative 02/27/2022 11:00 PM    Epithelial cells FEW 02/27/2022 11:00 PM    Bacteria Negative 02/27/2022 11:00 PM    WBC 0-4 02/27/2022 11:00 PM    RBC 0-5 02/27/2022 11:00 PM         Medications Reviewed:     Current Facility-Administered Medications   Medication Dose Route Frequency  amLODIPine (NORVASC) tablet 10 mg  10 mg Oral DAILY    ascorbic acid (vitamin C) (VITAMIN C) tablet 500 mg  500 mg Oral DAILY    aspirin chewable tablet 81 mg  81 mg Oral DAILY    vitamin B complex tablet  1 Tablet Oral DAILY    cholecalciferol (VITAMIN D3) (400 Units /10 mcg) tablet 400 Units  400 Units Oral DAILY    docusate sodium (COLACE) capsule 100 mg  100 mg Oral BID PRN    DULoxetine (CYMBALTA) capsule 60 mg  60 mg Oral DAILY    ferrous sulfate tablet 325 mg  325 mg Oral ACB    [Held by provider] furosemide (LASIX) injection 40 mg  40 mg IntraVENous DAILY    insulin glargine (LANTUS) injection 14 Units  14 Units SubCUTAneous DAILY    lactulose (CHRONULAC) 10 gram/15 mL solution 30 mL  20 g Oral BID PRN    lansoprazole compounding kit (PREVACID) 3 mg/mL oral suspension 30 mg  30 mg Oral BID    levothyroxine (SYNTHROID) tablet 50 mcg  50 mcg Oral ACB    QUEtiapine (SEROquel) tablet 25 mg  25 mg Oral BID PRN    tamsulosin (FLOMAX) capsule 0.4 mg  0.4 mg Oral PCD    sodium chloride (NS) flush 5-40 mL  5-40 mL IntraVENous Q8H    sodium chloride (NS) flush 5-40 mL  5-40 mL IntraVENous PRN    acetaminophen (TYLENOL) tablet 650 mg  650 mg Oral Q6H PRN    Or    acetaminophen (TYLENOL) suppository 650 mg  650 mg Rectal Q6H PRN    polyethylene glycol (MIRALAX) packet 17 g  17 g Oral DAILY PRN    ondansetron (ZOFRAN ODT) tablet 4 mg  4 mg Oral Q8H PRN    Or    ondansetron (ZOFRAN) injection 4 mg  4 mg IntraVENous Q6H PRN    predniSONE (DELTASONE) tablet 40 mg  40 mg Oral DAILY WITH BREAKFAST    L.acidophilus-paracasei-S.thermophil-bifidobacter (RISAQUAD) 8 billion cell capsule  1 Capsule Oral DAILY    cefTRIAXone (ROCEPHIN) 1 g in 0.9% sodium chloride 10 mL IV syringe  1 g IntraVENous Q24H    doxycycline (VIBRAMYCIN) 100 mg in 0.9% sodium chloride (MBP/ADV) 100 mL MBP  100 mg IntraVENous Q12H    insulin lispro (HUMALOG) injection   SubCUTAneous AC&HS    glucose chewable tablet 16 g  4 Tablet Oral PRN    glucagon (GLUCAGEN) injection 1 mg  1 mg IntraMUSCular PRN    dextrose 10 % infusion 0-250 mL  0-250 mL IntraVENous PRN    balsam peru-castor oiL (VENELEX) ointment   Topical Q8H    heparin (porcine) injection 5,000 Units  5,000 Units SubCUTAneous Q12H    albuterol-ipratropium (DUO-NEB) 2.5 MG-0.5 MG/3 ML  3 mL Nebulization Q4H PRN     ______________________________________________________________________  EXPECTED LENGTH OF STAY: 4d 2h  ACTUAL LENGTH OF STAY:          4                 Toshia Quan MD

## 2022-03-18 NOTE — PROGRESS NOTES
Shift change report given to Heidi Carl RN (oncoming nurse) by Maritza Richards RN (offgoing nurse). Report included the following information SBAR, Kardex, Procedure Summary, MAR and Cardiac Rhythm A-Fib.

## 2022-03-18 NOTE — PROGRESS NOTES
[facility]  Hospitalist Group                                                                                          Hospitalist Progress Note  Calvin Claros MD  Answering service: [phone] OR [phone] from in house phone        Date of Service:  3/18/2022  NAME:  Giuseppe Leiva  :  1940  MRN:  [id number]      Admission Summary:     Presents with acute on chronic respiratory failure due to combination of CHF, pneumonia and COPD. While diuresing, patient's renal function has gotten worse and therefore diuretic was held. Patient is also s/p multiple thoracentesis for recurrent pleural effusion. Patient is clinically declining, baseline dementia. After discussion with the family, it was decided patient to be on comfort care. Hospice following. Interval history / Subjective:     Patient unable to communicate. Son present at bedside. Assessment & Plan:     Acute respiratory failure  -Acute on chronic-hypoxic respiratory failure due to combination of CHF, pneumonia and COPD exacerbation  -Initial chest x-ray shows interval increase parenchymal opacities on the right and on the left.   Interval increase left-sided effusion  -At baseline patient requires 2 L of oxygen, currently on 6 L    Acute on chronic systolic CHF  -Patient with acute on chronic systolic CHF, NYHA is class III on admission  -Previous echo 2022 shows EF of 35 to 40%  -Previously was on diuresis with Lasix, now Lasix was on hold due to worsening renal function  -Cardiology previously evaluated the patient    Recurrent bilateral pleural effusions  -Pulmonology following  -Plan for bilateral Pleurx catheter placement prior to home with hospice    Pneumonia  -Blood cultures negative  -On ceftriaxone and doxycycline     COPD exacerbation  -On steroid    WENDY  -Worsening renal function and diuretics on hold  -Overall poor prognosis, hospice on board    Hypertension  -On Norvasc    Diabetes type 2   -On Lantus insulin sliding scale coverage  -Monitor blood sugars     Alzheimer's dementia  -Supportive treatment  -Palliative care and hospice following     Code status:  DNR  Prophylaxis: SCDs  Care Plan discussed with: Patient's son present at bedside  Anticipated Disposition: Plan for home with hospice in a.m. Hospital Problems  Date Reviewed: 3/14/2022          Codes Class Noted POA    * (Principal) Acute on chronic respiratory failure with hypoxia [facility]) ICD-10-CM: J96.21  ICD-9-CM: 518.84, 799.02  3/13/2022 Yes                Review of Systems:   Review of system cannot be obtained. Vital Signs:    Last 24hrs VS reviewed since prior progress note. Most recent are:  Visit Vitals  /63 (BP 1 Location: Left upper arm, BP Patient Position: At rest)   Pulse 89   Temp 99 °F (37.2 °C)   Resp 20   Ht 6' 2.02\" (1.88 m)   Wt 55.5 kg (122 lb 5.7 oz)   SpO2 97%   BMI 15.70 kg/m²         Intake/Output Summary (Last 24 hours) at 3/18/2022 1451  Last data filed at 3/18/2022 0321  Gross per 24 hour   Intake 90 ml   Output    Net 90 ml        Physical Examination:     I had a face to face encounter with this patient and independently examined them on 3/18/2022 as outlined below:          Constitutional:  Appears drowsy   ENT:  Oral mucosa moist, oropharynx benign. Resp:  Bilateral crackles. No accessory muscle use. CV:  Regular rhythm, normal rate, no murmurs, gallops, rubs    GI:  Soft, non distended, non tender.  normoactive bowel sounds, no hepatosplenomegaly     Musculoskeletal:  No edema, warm, 2+ pulses throughout    Neurologic:  Appears drowsy            Data Review:    Review and/or order of clinical lab test      Labs:     Recent Labs     03/17/22  0251 03/16/22  0127   WBC 9.1 10.9   HGB 9.7* 9.5*   HCT 28.2* 28.6*    241     Recent Labs     03/17/22  0251 03/16/22  0127   * 129*   K 4.1 4.9    98   CO2 26 24   BUN 42* 32*   CREA 2.17* 2.01*   GLU 71 151*   CA 8.1* 8.7     No results for input(s): ALT, AP, TBIL, TBILI, TP, ALB, GLOB, GGT, AML, LPSE in the last 72 hours. No lab exists for component: SGOT, GPT, AMYP, HLPSE  No results for input(s): INR, PTP, APTT, INREXT, INREXT in the last 72 hours. No results for input(s): FE, TIBC, PSAT, FERR in the last 72 hours. Lab Results   Component Value Date/Time    Folate 5.9 03/14/2022 08:58 AM      No results for input(s): PH, PCO2, PO2 in the last 72 hours. No results for input(s): CPK, CKNDX, TROIQ in the last 72 hours.     No lab exists for component: CPKMB  Lab Results   Component Value Date/Time    Cholesterol, total 170 09/08/2020 04:30 PM    HDL Cholesterol 51 09/08/2020 04:30 PM    LDL, calculated 103 (H) 09/08/2020 04:30 PM    LDL, calculated 93 02/12/2019 08:49 AM    Triglyceride 89 09/08/2020 04:30 PM    CHOL/HDL Ratio 2.3 07/18/2017 02:14 AM     Lab Results   Component Value Date/Time    Glucose (POC) 147 (H) 03/18/2022 11:22 AM    Glucose (POC) 281 (H) 03/18/2022 06:08 AM    Glucose (POC) 323 (H) 03/17/2022 09:04 PM    Glucose (POC) 164 (H) 03/17/2022 05:41 PM    Glucose (POC) 230 (H) 03/17/2022 11:37 AM     Lab Results   Component Value Date/Time    Color YELLOW/STRAW 02/27/2022 11:00 PM    Appearance CLEAR 02/27/2022 11:00 PM    Specific gravity 1.012 02/27/2022 11:00 PM    Specific gravity 1.010 07/17/2017 04:12 PM    pH (UA) 5.0 02/27/2022 11:00 PM    Protein 100 (A) 02/27/2022 11:00 PM    Glucose 500 (A) 02/27/2022 11:00 PM    Ketone TRACE (A) 02/27/2022 11:00 PM    Bilirubin Negative 02/27/2022 11:00 PM    Urobilinogen 0.2 02/27/2022 11:00 PM    Nitrites Negative 02/27/2022 11:00 PM    Leukocyte Esterase Negative 02/27/2022 11:00 PM    Epithelial cells FEW 02/27/2022 11:00 PM    Bacteria Negative 02/27/2022 11:00 PM    WBC 0-4 02/27/2022 11:00 PM    RBC 0-5 02/27/2022 11:00 PM         Medications Reviewed:     Current Facility-Administered Medications   Medication Dose Route Frequency    amLODIPine (NORVASC) tablet 10 mg  10 mg Oral DAILY  ascorbic acid (vitamin C) (VITAMIN C) tablet 500 mg  500 mg Oral DAILY    aspirin chewable tablet 81 mg  81 mg Oral DAILY    vitamin B complex tablet  1 Tablet Oral DAILY    cholecalciferol (VITAMIN D3) (400 Units /10 mcg) tablet 400 Units  400 Units Oral DAILY    docusate sodium (COLACE) capsule 100 mg  100 mg Oral BID PRN    DULoxetine (CYMBALTA) capsule 60 mg  60 mg Oral DAILY    ferrous sulfate tablet 325 mg  325 mg Oral ACB    [Held by provider] furosemide (LASIX) injection 40 mg  40 mg IntraVENous DAILY    insulin glargine (LANTUS) injection 14 Units  14 Units SubCUTAneous DAILY    lactulose (CHRONULAC) 10 gram/15 mL solution 30 mL  20 g Oral BID PRN    lansoprazole compounding kit (PREVACID) 3 mg/mL oral suspension 30 mg  30 mg Oral BID    levothyroxine (SYNTHROID) tablet 50 mcg  50 mcg Oral ACB    QUEtiapine (SEROquel) tablet 25 mg  25 mg Oral BID PRN    tamsulosin (FLOMAX) capsule 0.4 mg  0.4 mg Oral PCD    sodium chloride (NS) flush 5-40 mL  5-40 mL IntraVENous Q8H    sodium chloride (NS) flush 5-40 mL  5-40 mL IntraVENous PRN    acetaminophen (TYLENOL) tablet 650 mg  650 mg Oral Q6H PRN    Or    acetaminophen (TYLENOL) suppository 650 mg  650 mg Rectal Q6H PRN    polyethylene glycol (MIRALAX) packet 17 g  17 g Oral DAILY PRN    ondansetron (ZOFRAN ODT) tablet 4 mg  4 mg Oral Q8H PRN    Or    ondansetron (ZOFRAN) injection 4 mg  4 mg IntraVENous Q6H PRN    predniSONE (DELTASONE) tablet 40 mg  40 mg Oral DAILY WITH BREAKFAST    L.acidophilus-paracasei-S.thermophil-bifidobacter (RISAQUAD) 8 billion cell capsule  1 Capsule Oral DAILY    doxycycline (VIBRAMYCIN) 100 mg in 0.9% sodium chloride (MBP/ADV) 100 mL MBP  100 mg IntraVENous Q12H    insulin lispro (HUMALOG) injection   SubCUTAneous AC&HS    glucose chewable tablet 16 g  4 Tablet Oral PRN    glucagon (GLUCAGEN) injection 1 mg  1 mg IntraMUSCular PRN    dextrose 10 % infusion 0-250 mL  0-250 mL IntraVENous PRN    balsam peru-castor oiL (VENELEX) ointment   Topical Q8H    heparin (porcine) injection 5,000 Units  5,000 Units SubCUTAneous Q12H    albuterol-ipratropium (DUO-NEB) 2.5 MG-0.5 MG/3 ML  3 mL Nebulization Q4H PRN     Facility-Administered Medications Ordered in Other Encounters   Medication Dose Route Frequency    lidocaine (XYLOCAINE) 20 mg/mL (2 %) injection 400 mg  20 mL SubCUTAneous RAD ONCE    fentaNYL citrate (PF) injection  mcg   mcg IntraVENous Rad Multiple     ______________________________________________________________________  EXPECTED LENGTH OF STAY: 4d 2h  ACTUAL LENGTH OF STAY:          5                 Eber Pina MD

## 2022-03-18 NOTE — HOSPICE
[facility]  [phone]    Hospice Nursing PRE-Admission   Discharge Summary  Patient Name: Iftikhar Goins  YOB: 1940  Age: [de-identified] y.o. Date of PLANNED Hospice Admission: 3/19/2022  Hospice Attending:  Ellyn Porras  Primary Care Physician: Jelena Tipton MD     Home Hospice Address:  [address] [place] [id number] [phone] [facility]     Primary Contact and Phone:    MAKAYLA is patient's daughter, Heber Blount [phone]   Pt lives with his son, Jimmy Wolf [phone]  Brother Josy Pedraza and his wife    MAKAYLA Blount address:  [address] [place] [address]  [address] [id number]      ADVANCE CARE PLANNING    Code Status: DNR  Durable DNR: [x]  Yes  []  No  Advance Care Planning 2019   Patient's Healthcare Decision Maker is: Legal Next of Kin   Confirm Advance Directive None   Patient Would Like to Complete Advance Directive Unable   Does the patient have other document types Do Not Resuscitate       Rastafari: Spiritism   Home:     HOSPICE SUMMARY     Verbal CTI of terminal diagnosis with life expectancy of 6 months or less received from: Dr. Marcela Horan    For the Hospice Diagnosis of: End Stage CHF    NCD: Requested      CLINICAL INFORMATION   Allergies: Allergies   Allergen Reactions    Sulfa (Sulfonamide Antibiotics) Unknown (comments)         Currently this patient has:  [x] Supplemental O2    [] Maldonado Catheter      [x] Wounds - Post-op Pleural Drain        ASSESSMENT & PLAN     1. Symptom Issues Identified: Respiratory Distress, Ascites, New pleural drain or drains in IR today. 2.  Psych/ Social/ Emotional Issues Identified: PT lives with his son, Jimmy Wolf. Sandra Riggs is hired [employer]. Other siblings provide support, and have been caring for patient while he has been in the Home Based Primary Care team, under Dr. Ghislaine Gardner. CARE COORDINATION     1.        Hospice Consents: Signed by patient daughterMarie Rosana Carty. MPOA paperwork was not available, however, family agree that there is documentation and that pt  has ACP as well as DDNR. Nancy Angeles, and her brothers Dajuan Tadeo and Natalie Hernandes all in agreement Nancy Angeles is MPOA for patient. 2. DME Ordered/Company/Delivery Plan: Oxygen Concentrator 10Liter. Thank you for your order [id number]. It is scheduled to be Delivered by Fri Mar 18 04:00 PM -  08:00 PM EST. Pt has in the home: 5 L concentrator, hospital bed (family unsure if they are paying for this DME or who it is from); family have OBT and WC.    3. Symptom Kit and other Medications Needs: Ordered through [facility]  Confirmation #: [id number]  01:07 PM  3/18/2022. Symptom Kit medications handed to pt daughter Nancy Angeles bedside with instructions to hand medications to the home admission nurse. Included: Liquid Lorazepam 30ml and liquid Morphine 30ml. 4. Home Admission Reservation: Saturday 3/19/2022    5. Transportation by: [facility]   Scheduled for  Around 11:00am       6. Verbal Report/Handoff given to: Hospice Admit team      7. Phone number to OSMIN BAILEY [facility] 2nd Floor [phone]    3. Supplies/Wound Care: Will need new Pleural Drain supplies.     Zak Rutledge RN, [place] [address] Nurse Liaison  [phone] Mobile  [phone] Office  Available on [employer]

## 2022-03-18 NOTE — HOSPICE
Hospice Liaison Note:    Chart reviewed for updates in plan of care. Plan: Continue to provide hospice support and education    Please call Hospice team to offer support for patient, family or staff. Thank you for your coordination with the hospice plan of care    11:30: Update received from bedside nurse, patient to have pleural drain placed today in left lung. Plan to contact family to continue with discharge planning. If family agree to home with [facility]; Hospice can admit patient at his home on Saturday 3/19/2022, depending on medical team recommendations. 12:08:  left for patient's daughter, Roosevelt Schulte with return call information. Plan to visit bedside to look for family. 12:45: Phone call with Roosevelt Schulte. Candido Maliks states that the family have decided they want to bring patient home on Saturday and admit into [facility]. Reviewed DME needs and medications needs for home. Dr. Reese Heard has provided verbal CTI for the hospice diagnosis of End Stage CHF.     PLAN: Meet with family bedside this afternoon to review Hospice consents and continue with discharge planning for hospice services to begin on 3/19/2022    Jess Remy RN, [place] [address] Nurse Liaison  [phone] Mobile  [phone] Office

## 2022-03-18 NOTE — PROGRESS NOTES
TRANSFER - OUT REPORT:    Verbal report given to LONNIE Fernandez(name) on Qing Arciniega  being transferred to [address] [place] [address](unit) for routine post - op       Report consisted of patients Situation, Background, Assessment and   Recommendations(SBAR). Information from the following report(s) SBAR, Intake/Output and Recent Results was reviewed with the receiving nurse. Lines:   Peripheral IV 03/15/22 Left;Posterior Hand (Active)   Site Assessment Clean, dry, & intact 03/17/22 2059   Phlebitis Assessment 0 03/17/22 2059   Infiltration Assessment 0 03/17/22 2059   Dressing Status Clean, dry, & intact 03/17/22 2059   Dressing Type Transparent 03/17/22 2059   Hub Color/Line Status Flushed;Capped 03/17/22 2059   Action Taken Open ports on tubing capped 03/16/22 1943   Alcohol Cap Used Yes 03/16/22 1943        Opportunity for questions and clarification was provided.       Patient transported with:   O2 6L  Transport

## 2022-03-18 NOTE — PROGRESS NOTES
Pulmonary, Critical Care, and Sleep Medicine~PRogress note    Name: Anna Patel MRN: [id number]   : 1940 [facility]   Date: 3/18/2022 12:29 PM Admission: 3/13/2022     Impression Plan   1. Acute hypoxic resp failure  2. Recurrent bilateral pleural effusions, tapped twice on both sides. Suspected to be multifactorial from  HF and renal disease   3. HFrEF  4. AS   5. WENDY on CKD due to diuretics   6. S/p CABG, CAD  7. alzheimer's dementia   8. A fib 1. Noted transition to hospice   2. pleural catheter placement for comfort measures today  3. Discussed with son at bedside   4. O2 titration above 90%   5. We will be available again to see if needed      Daily Progression:    Consult Note requested by hospitalist.    Patient was admitted for increased dyspnea. Was recently admitted at the end of Feb for similar issues. He has had recurrent bilateral pleural effusions; tapped on  right 1L path was benign, 3/1 left tapped 1.6L, 3/15 left tapped 2L, 3/16 right tapped 2L. Unfortunately no pleural fluid labs were sent on all the taps, but it is suspected to be from decom HF. Currently stable and noted plans are to transition home with a focus on comfort. ECHO  Left Ventricle: Left ventricle size is normal. Moderately reduced left ventricular systolic function with a visually estimated EF of 35 - 40%. Abnormal diastolic function.   Aortic Valve: Severely calcified cusp. Trace transvalvular regurgitation. Moderate stenosis of the aortic valve. AV mean gradient is 21 mmHg. AV peak velocity is 2.7 m/s. Aortic valve area 1.3 cm2.   Right Ventricle: Moderately reduced systolic function.   Mitral Valve: Mild to moderate transvalvular regurgitation.   Left Atrium: Left atrium is mildly dilated.   Pericardium: Large left pleural effusion. I have reviewed the labs and previous days notes. Review of systems not obtained due to patient factors.   Past Medical History:   Diagnosis Date    Atrial fibrillation ([facility]     [facility]     Diabetes ([facility]     1970a    Heart failure ([facility]     Hip fracture ([facility] 9/24/2021    Hypokalemia 12/25/2018    Hyponatremia 4/11/2017    [facility]myocardial infarction) [facility] 01/2019    NSTEMI (non-ST elevated myocardial infarction) ([facility] 12/25/2018    Syncope 4/11/2017    Urinary incontinence       Past Surgical History:   Procedure Laterality Date    HX HEENT  1975    nasal surgery    HX MOHS PROCEDURES  2013    left    ID CARDIAC SURG PROCEDURE UNLIST  2000    open heart      Prior to Admission medications    Medication Sig Start Date End Date Taking? Authorizing Provider   lactulose (CHRONULAC) 10 gram/15 mL solution Take 30 mL by mouth two (2) times daily as needed (as needed for constipation). 3/11/22   Elaine Contreras NP   levothyroxine (SYNTHROID) 50 mcg tablet Take 1 Tablet by mouth Daily (before breakfast). 3/11/22   Kaykay Pacheco MD   tamsulosin (Flomax) 0.4 mg capsule Take 1 Capsule by mouth nightly. 3/11/22   Kaykay Pacheco MD   insulin glargine (LANTUS,BASAGLAR) 100 unit/mL (3 mL) inpn 10 Units by SubCUTAneous route daily. Inject 14 units every morning. 3/3/22   Dora Penn MD   L.acid,para-B. bifidum-S.therm (RISAQUAD) 8 billion cell cap cap Take 1 Capsule by mouth daily for 15 days. 3/4/22 3/19/22  Dora Penn MD   lansoprazole (PREVACID) 3 mg/mL oral suspension Take 10 mL by mouth two (2) times a day. 3/3/22   Dora Penn MD   furosemide (Lasix) 20 mg tablet Take 1 Tablet by mouth every Monday, Wednesday, Friday for 30 days. 3/4/22 4/3/22  Dora Penn MD   ascorbic acid, vitamin C, (Vitamin C) 500 mg tablet Take 500 mg by mouth daily. Provider, Historical   multivit-minerals/folic acid (MULTIVITAMIN GUMMIES PO) Take 1 Tablet by mouth daily.     Provider, Historical   cholecalciferol (VITAMIN D3) (400 Units /10 mcg) tab tablet Take 400 Units by mouth daily. Provider, Historical   ferrous sulfate 325 mg (65 mg iron) tablet Take 325 mg by mouth Daily (before breakfast). Provider, Historical   docusate sodium (COLACE) 100 mg capsule Take 100 mg by mouth two (2) times daily as needed for Constipation. Provider, Historical   aspirin 81 mg chewable tablet Take 81 mg by mouth daily. Provider, Historical   omeprazole (PRILOSEC) 20 mg capsule Take 20 mg by mouth daily. Provider, Historical   QUEtiapine (SEROqueL) 25 mg tablet Take 25 mg by mouth two (2) times daily as needed for PRN Reason (Other) (anxiety, behavioral issues). Provider, Historical   b complex vitamins tablet Take 1 Tablet by mouth daily. Provider, Historical   DULoxetine (CYMBALTA) 60 mg capsule Take 1 Capsule by mouth daily. 2/16/22   Yassine Durnad MD   glucose blood VI test strips (ASCENSIA AUTODISC VI, ONE TOUCH ULTRA TEST VI) strip Check blood sugar three times daily before meals 12/28/21   Yassine Durand MD   Insulin Needles, Disposable, (BD Ultra-Fine Mini Pen Needle) 31 gauge x 3/16\" ndle USE TO INJECT INSULIN 3 TIMES A DAY 12/28/21   Yassine Durand MD   amLODIPine (NORVASC) 10 mg tablet Take 1 Tablet by mouth daily. 12/20/21   Yassine Durand MD   Wheel Chair angel Standard wheelchair 11/1/21   Yassine Ladarius, MD   balsam peru-castor oiL (VENELEX) ointment Apply  to affected area every eight (8) hours. 10/27/21   Yassine Durand MD   insulin lispro (HUMALOG) 100 unit/mL injection INJECT 3 UNITS BENEATH THE SKIN FOR BS>200, 5 UNITS FOR BS>250, 6 UNITS FOR BS>300.  CALL MD FOR BS >400 11/10/20   Phylicia Mclaughlin Pipes, NP     Allergies   Allergen Reactions    Sulfa (Sulfonamide Antibiotics) Unknown (comments)      Social History     Tobacco Use    Smoking status: Former Smoker    Smokeless tobacco: Never Used    Tobacco comment: 1-2 cigars daily   Substance Use Topics    Alcohol use: No     Alcohol/week: 0.0 standard drinks Family History   Problem Relation Age of Onset    Diabetes Mother     Diabetes Brother      OBJECTIVE:     Vital Signs:       Visit Vitals  BP (!) 143/64 (BP 1 Location: Left upper arm, BP Patient Position: At rest)   Pulse 81   Temp 98.8 °F (37.1 °C)   Resp 20   Ht 6' 2.02\" (1.88 m)   Wt 55.5 kg (122 lb 5.7 oz)   SpO2 96%   BMI 15.70 kg/m²      Temp (24hrs), Av.3 °F (36.8 °C), Min:97.8 °F (36.6 °C), Max:99 °F (37.2 °C)     Intake/Output:     Last shift: No intake/output data recorded.     Last 3 shifts:  1901 -  0700  In: 210 [P.O.:210]  Out: -           Intake/Output Summary (Last 24 hours) at 3/18/2022 1134  Last data filed at 3/18/2022 0321  Gross per 24 hour   Intake 90 ml   Output    Net 90 ml       Physical Exam:                                        Exam Findings Other   General: No resp distress noted, appears stated age    [de-identified]:  No ulcers, JVD not elevated, no cervical LAD    Chest: No pectus deformity, normal chest rise b/l    HEART:  No visible thrills     Lungs:  Normal expansion     ABD: Soft/NT, non rigid mildly distended    EXT: No cyanosis/clubbing/edema, normal peripheral pulses    Skin: No rashes or ulcers, no mottling    Neuro: Sleeping in bed         Medications:  Current Facility-Administered Medications   Medication Dose Route Frequency    amLODIPine (NORVASC) tablet 10 mg  10 mg Oral DAILY    ascorbic acid (vitamin C) (VITAMIN C) tablet 500 mg  500 mg Oral DAILY    aspirin chewable tablet 81 mg  81 mg Oral DAILY    vitamin B complex tablet  1 Tablet Oral DAILY    cholecalciferol (VITAMIN D3) (400 Units /10 mcg) tablet 400 Units  400 Units Oral DAILY    docusate sodium (COLACE) capsule 100 mg  100 mg Oral BID PRN    DULoxetine (CYMBALTA) capsule 60 mg  60 mg Oral DAILY    ferrous sulfate tablet 325 mg  325 mg Oral ACB    [Held by provider] furosemide (LASIX) injection 40 mg  40 mg IntraVENous DAILY    insulin glargine (LANTUS) injection 14 Units  14 Units SubCUTAneous DAILY    lactulose (CHRONULAC) 10 gram/15 mL solution 30 mL  20 g Oral BID PRN    lansoprazole compounding kit (PREVACID) 3 mg/mL oral suspension 30 mg  30 mg Oral BID    levothyroxine (SYNTHROID) tablet 50 mcg  50 mcg Oral ACB    QUEtiapine (SEROquel) tablet 25 mg  25 mg Oral BID PRN    tamsulosin (FLOMAX) capsule 0.4 mg  0.4 mg Oral PCD    sodium chloride (NS) flush 5-40 mL  5-40 mL IntraVENous Q8H    sodium chloride (NS) flush 5-40 mL  5-40 mL IntraVENous PRN    acetaminophen (TYLENOL) tablet 650 mg  650 mg Oral Q6H PRN    Or    acetaminophen (TYLENOL) suppository 650 mg  650 mg Rectal Q6H PRN    polyethylene glycol (MIRALAX) packet 17 g  17 g Oral DAILY PRN    ondansetron (ZOFRAN ODT) tablet 4 mg  4 mg Oral Q8H PRN    Or    ondansetron (ZOFRAN) injection 4 mg  4 mg IntraVENous Q6H PRN    predniSONE (DELTASONE) tablet 40 mg  40 mg Oral DAILY WITH BREAKFAST    L.acidophilus-paracasei-S.thermophil-bifidobacter (RISAQUAD) 8 billion cell capsule  1 Capsule Oral DAILY    doxycycline (VIBRAMYCIN) 100 mg in 0.9% sodium chloride (MBP/ADV) 100 mL MBP  100 mg IntraVENous Q12H    insulin lispro (HUMALOG) injection   SubCUTAneous AC&HS    glucose chewable tablet 16 g  4 Tablet Oral PRN    glucagon (GLUCAGEN) injection 1 mg  1 mg IntraMUSCular PRN    dextrose 10 % infusion 0-250 mL  0-250 mL IntraVENous PRN    balsam peru-castor oiL (VENELEX) ointment   Topical Q8H    heparin (porcine) injection 5,000 Units  5,000 Units SubCUTAneous Q12H    albuterol-ipratropium (DUO-NEB) 2.5 MG-0.5 MG/3 ML  3 mL Nebulization Q4H PRN       Labs:  ABG No results for input(s): PHI, PCO2I, PO2I, HCO3I, SO2I, FIO2I in the last 72 hours.      CBC Recent Labs     03/17/22  0251 03/16/22  0127   WBC 9.1 10.9   HGB 9.7* 9.5*   HCT 28.2* 28.6*    241   MCV 80.3 83.6   MCH 27.6 38.9        Metabolic  Panel Recent Labs     03/17/22  0251 03/16/22  0127   * 129*   K 4.1 4.9    98   CO2 26 24   GLU 71 151*   BUN 42* 32*   CREA 2.17* 2.01*   CA 8.1* 8.7        Pertinent Labs                Joshua Garcia PA-C  3/18/2022

## 2022-03-18 NOTE — PROGRESS NOTES
Problem: Falls - Risk of  Goal: *Absence of Falls  Description: Document Rhys Keenan Fall Risk and appropriate interventions in the flowsheet. Outcome: Progressing Towards Goal  Note: Fall Risk Interventions:  Mobility Interventions: Communicate number of staff needed for ambulation/transfer    Mentation Interventions: Door open when patient unattended,Evaluate medications/consider consulting pharmacy,Eyeglasses and hearing aids,More frequent rounding,Reorient patient    Medication Interventions: Evaluate medications/consider consulting pharmacy    Elimination Interventions: Call light in reach,Toileting schedule/hourly rounds    History of Falls Interventions: Door open when patient unattended,Evaluate medications/consider consulting pharmacy    Problem: Patient Education: Go to Patient Education Activity  Goal: Patient/Family Education  Outcome: Progressing Towards Goal     Problem: Pressure Injury - Risk of  Goal: *Prevention of pressure injury  Description: Document Flynn Scale and appropriate interventions in the flowsheet. Outcome: Progressing Towards Goal  Note: Pressure Injury Interventions:  Sensory Interventions: Minimize linen layers,Pressure redistribution bed/mattress (bed type),Turn and reposition approx. every two hours (pillows and wedges if needed)    Moisture Interventions: Apply protective barrier, creams and emollients    Activity Interventions: Pressure redistribution bed/mattress(bed type)    Mobility Interventions: Float heels,HOB 30 degrees or less,Pressure redistribution bed/mattress (bed type)    Nutrition Interventions: Document food/fluid/supplement intake,Offer support with meals,snacks and hydration    Friction and Shear Interventions: Apply protective barrier, creams and emollients    Problem: Patient Education: Go to Patient Education Activity  Goal: Patient/Family Education  Outcome: Progressing Towards Goal    Aox1, no c/o pain. .. no adverse events noted - will continue to monitor.

## 2022-03-19 NOTE — PROGRESS NOTES
Shift change report given to Simeon Zhang RN (oncoming nurse) by Bryon Dewitt RN (offgoing nurse). Report included the following information SBAR, Kardex and Cardiac Rhythm A-Fib.

## 2022-03-19 NOTE — PROGRESS NOTES
Transition of Care Plan   RUR- 25% Moderate Risk   DISPOSITION: The disposition plan is home with family assistance. [facility] accepted.  Transport: [facility] phone [phone] - scheduled for 11:00am     Patient to discharge home with [address]. Yesterday CM met with family member at bedside to provide this update. Patient was off unit at the time. CM spoke with [facility] RN, to confirm 11:00am  via [facility]. [facility] packet has been placed at bedside chart.     OWEN Preston, CRM, LMHP-e  Available in [employer]

## 2022-03-19 NOTE — DISCHARGE SUMMARY
Discharge Summary       PATIENT ID: Joey Baker  MRN: [id number]   YOB: 1940    DATE OF ADMISSION: 3/13/2022  7:17 PM    DATE OF DISCHARGE: 3/19/2022   PRIMARY CARE PROVIDER: Simone Moraes MD     ATTENDING PHYSICIAN: Osbaldo Henning  DISCHARGING PROVIDER: Francis Montes MD    To contact this individual call [phone] and ask the  to page. If unavailable ask to be transferred the Adult Hospitalist Department. CONSULTATIONS: IP CONSULT TO CARDIOLOGY  IP CONSULT TO PALLIATIVE CARE - PROVIDER  IP CONSULT TO INTERVENTIONAL RADIOLOGY  IP CONSULT TO PULMONOLOGY    PROCEDURES/SURGERIES: * No surgery found *    DISCHARGE DIAGNOSES:     Acute respiratory failure  Acute on chronic systolic CHF  Recurrent bilateral pleural effusions  Pneumonia  COPD  WENDY  HTN  DM  Dementia    ADMISSION SUMMARY AND HOSPITAL COURSE:     HPI  This is an 45-year-old man with a past medical history significant for chronic heart failure with reduced ejection fraction, COPD, type 2 diabetes, hypertension, hypothyroidism, coronary artery disease, status post CABG, chronic kidney disease, Alzheimer's dementia, who was in his usual state of health until a couple of days ago when the patient developed worsening of his shortness of breath. The patient's has shortness of breath at baseline because of his COPD and congestive heart failure. The patient was recently admitted to this hospital from 02/27/2022 to 03/03/2022. The patient was admitted for evaluation and treatment of acute-on-chronic heart failure with reduced ejection fraction. The patient has responded to diuresis. The patient had no oxygen requirement at the time of discharge from the hospital, but the patient had oxygen at the house in case the patient is going to require oxygen. The son stated that the patient has been using the oxygen at home at 2 L for the shortness of breath.   When the EMS arrived at the scene, the patient's oxygen saturation was 88% on 2 L of oxygen. This was increased to 4 L and the oxygen saturation improved to 93% to 94%. The patient was brought to the emergency room for further evaluation. The patient is not able to provide history because of his Alzheimer's dementia. When the patient arrived at the emergency room, chest x-ray shows interval increase in parenchymal opacities on the right and on the left. The patient was subsequently referred to the hospitalist service for evaluation for admission. Hospital Course  Patient presents with acute on chronic hypoxic respiratory failure due to combination of CHF, pneumonia and COPD exacerbation. Patient with acute on chronic systolic CHF with NYHA class III on admission and discharge. Previous echo February 2022 shows EF of 35 to 40%. Patient was previously diuresed with Lasix but later Lasix was held due to worsening renal function. Patient also with bilateral recurrent pleural effusion. Despite treatment patient has continued to decline. Patient also was treated with antibiotics for pneumonia and steroid for COPD. Later palliative care follows the patient and patient was also subsequently seen by hospice. Given recurrent pleural effusions, left Pleurx catheter was placed prior to discharge to home with home hospice. Comfort medications including Ativan and morphine has been prescribed through the hospice. DISCHARGE DIAGNOSES / PLAN:        BMI: Body mass index is 17.15 kg/m². . This patient: Meets criteria for underweight given BMI </= 18.5. The patient should be followed closely for risk of malnutrition. PENDING TEST RESULTS:   At the time of discharge the following test results are still pending: None     ADDITIONAL CARE RECOMMENDATIONS:     Per hospice    NOTIFY YOUR PHYSICIAN FOR ANY OF THE FOLLOWING:   Fever over 101 degrees for 24 hours. Chest pain, shortness of breath, fever, chills, nausea, vomiting, diarrhea, change in mentation, falling, weakness, bleeding.  Severe pain or pain not relieved by medications, as well as any other signs or symptoms that you may have questions about. FOLLOW UP APPOINTMENTS:    Follow-up Information     Follow up With Specialties Details Why Contact Info    Michael Villavicencio MD [facility] Medicine, Hospitalist   [address]  [phone], Lorenzo Stock NP Nurse Practitioner   [place] [address] [address]  [phone]               DIET: Comfort feeding    ACTIVITY: Activity as tolerated    EQUIPMENT needed: None    DISCHARGE MEDICATIONS:  Current Discharge Medication List      CONTINUE these medications which have NOT CHANGED    Details   lactulose (CHRONULAC) 10 gram/15 mL solution Take 30 mL by mouth two (2) times daily as needed (as needed for constipation). Qty: 480 mL, Refills: 1    Associated Diagnoses: Constipation due to opioid therapy      levothyroxine (SYNTHROID) 50 mcg tablet Take 1 Tablet by mouth Daily (before breakfast). Qty: 90 Tablet, Refills: 3    Associated Diagnoses: Hypothyroidism, unspecified type      tamsulosin (Flomax) 0.4 mg capsule Take 1 Capsule by mouth nightly. Qty: 90 Capsule, Refills: 3      insulin glargine (LANTUS,BASAGLAR) 100 unit/mL (3 mL) inpn 10 Units by SubCUTAneous route daily. Inject 14 units every morning. Qty: 3 Pen, Refills: 5    Associated Diagnoses: Uncontrolled type 2 diabetes mellitus with hyperglycemia ([facility]      L.acid,para-B. bifidum-S.therm (RISAQUAD) 8 billion cell cap cap Take 1 Capsule by mouth daily for 15 days. Qty: 15 Capsule, Refills: 0      lansoprazole (PREVACID) 3 mg/mL oral suspension Take 10 mL by mouth two (2) times a day. Qty: 300 mL, Refills: 1      ascorbic acid, vitamin C, (Vitamin C) 500 mg tablet Take 500 mg by mouth daily. cholecalciferol (VITAMIN D3) (400 Units /10 mcg) tab tablet Take 400 Units by mouth daily.       ferrous sulfate 325 mg (65 mg iron) tablet Take 325 mg by mouth Daily (before breakfast). docusate sodium (COLACE) 100 mg capsule Take 100 mg by mouth two (2) times daily as needed for Constipation. aspirin 81 mg chewable tablet Take 81 mg by mouth daily. QUEtiapine (SEROqueL) 25 mg tablet Take 25 mg by mouth two (2) times daily as needed for PRN Reason (Other) (anxiety, behavioral issues). b complex vitamins tablet Take 1 Tablet by mouth daily. DULoxetine (CYMBALTA) 60 mg capsule Take 1 Capsule by mouth daily. Qty: 90 Capsule, Refills: 1    Associated Diagnoses: Moderate recurrent major depression (HCC)      amLODIPine (NORVASC) 10 mg tablet Take 1 Tablet by mouth daily. Qty: 90 Tablet, Refills: 1    Associated Diagnoses: Essential hypertension      balsam peru-castor oiL (VENELEX) ointment Apply  to affected area every eight (8) hours. Qty: 1 g, Refills: 0    Associated Diagnoses: Nonhealing wound of heel      insulin lispro (HUMALOG) 100 unit/mL injection INJECT 3 UNITS BENEATH THE SKIN FOR BS>200, 5 UNITS FOR BS>250, 6 UNITS FOR BS>300.  CALL MD FOR BS >400  Qty: 30 mL, Refills: 1    Associated Diagnoses: Type II or unspecified type diabetes mellitus with neurological manifestations, uncontrolled(250.62) (HCC)         STOP taking these medications       furosemide (Lasix) 20 mg tablet Comments:   Reason for Stopping:         multivit-minerals/folic acid (MULTIVITAMIN GUMMIES PO) Comments:   Reason for Stopping:         omeprazole (PRILOSEC) 20 mg capsule Comments:   Reason for Stopping:         glucose blood VI test strips (ASCENSIA AUTODISC VI, ONE TOUCH ULTRA TEST VI) strip Comments:   Reason for Stopping:         Insulin Needles, Disposable, (BD Ultra-Fine Mini Pen Needle) 31 gauge x 3/16\" ndle Comments:   Reason for Stopping:         Wheel Chair angel Comments:   Reason for Stopping:               DISPOSITION:    Home With:   OT  PT  HH  RN       Long term SNF/Inpatient Rehab    Independent/assisted living    Hospice    Other:       PATIENT CONDITION AT DISCHARGE:     Functional status    Poor     Deconditioned     Independent      Cognition     Lucid     Forgetful     Dementia      Catheters/lines (plus indication)    Maldonado     PICC     PEG     None      Code status     Full code     DNR      PHYSICAL EXAMINATION AT DISCHARGE:  General:          Alert, cooperative, no distress, appears stated age. HEENT:           Atraumatic, anicteric sclerae, pink conjunctivae                          No oral ulcers, mucosa moist, throat clear, dentition fair  Neck:               Supple, symmetrical  Lungs:             Clear to auscultation bilaterally. No Wheezing or Rhonchi. No rales. Chest wall:      No tenderness  No Accessory muscle use. Heart:              Regular  rhythm,  No  murmur   No edema  Abdomen:        Soft, non-tender. Not distended. Bowel sounds normal  Extremities:     No cyanosis. No clubbing,                            Skin turgor normal, Capillary refill normal  Skin:                Not pale. Not Jaundiced  No rashes   Psych:             Not anxious or agitated.   Neurologic:      Alert, moves all extremities, answers questions appropriately and responds to commands       CHRONIC MEDICAL DIAGNOSES:  Problem List as of 3/19/2022 Date Reviewed: 3/14/2022          Codes Class Noted - Resolved    * (Principal) Acute on chronic respiratory failure with hypoxia ([facility] ICD-10-CM: J96.21  ICD-9-CM: 518.84, 799.02  3/13/2022 - Present        History of fracture of left hip ICD-10-CM: Z87.81  ICD-9-CM: V15.51  2/18/2022 - Present        History of GI bleed ICD-10-CM: Z87.19  ICD-9-CM: V12.79  10/27/2021 - Present        Gastritis with hemorrhage ICD-10-CM: K29.71  ICD-9-CM: 535.51  10/27/2021 - Present        Essential hypertension ICD-10-CM: I10  ICD-9-CM: 401.9  10/13/2021 - Present        Stage 3b chronic kidney disease ([facility] ICD-10-CM: R97.48  ICD-9-CM: 585.3  10/13/2021 - Present        COVID-19 vaccine dose declined ICD-10-CM: Z28.21  ICD-9-CM: V64.06 9/16/2021 - Present        Debility ICD-10-CM: R53.81  ICD-9-CM: 799.3  7/25/2021 - Present        Acquired hypothyroidism ICD-10-CM: E03.9  ICD-9-CM: 244.9  2/22/2021 - Present        Other chronic pain ICD-10-CM: G89.29  ICD-9-CM: 338.29  2/22/2021 - Present        Prostate cancer ([facility] ICD-10-CM: C61  ICD-9-CM: 196  2/12/2019 - Present        Acute on chronic HFrEF (heart failure with reduced ejection fraction) ([facility] ICD-10-CM: I50.23  ICD-9-CM: 428.23  12/25/2018 - Present        Orthostatic hypotension ICD-10-CM: I95.1  ICD-9-CM: 458.0  7/17/2017 - Present        Uncontrolled type 2 diabetes mellitus with hyperglycemia (HCC) ICD-10-CM: E11.65  ICD-9-CM: 250.02  4/11/2017 - Present        Urinary incontinence ICD-10-CM: R32  ICD-9-CM: 788.30  4/11/2017 - Present        CAD (coronary artery disease) ICD-10-CM: I25.10  ICD-9-CM: 414.00  4/11/2017 - Present        COPD (chronic obstructive pulmonary disease) ([facility] ICD-10-CM: J44.9  ICD-9-CM: 496  4/11/2017 - Present        Late onset Alzheimer's disease with behavioral disturbance ([facility] ICD-10-CM: G30.1, F02.81  ICD-9-CM: 331.0, 294.11  7/11/2016 - Present    Overview Signed 7/11/2016  2:00 PM by Karina Garzon MD     Due to [facility]             Moderate recurrent major depression ([facility] ICD-10-CM: F33.1  ICD-9-CM: 296.32  7/11/2016 - Present        RESOLVED: Hip fracture ([facility] ICD-10-CM: S72.009A  ICD-9-CM: 820.8  9/24/2021 - 2/18/2022        RESOLVED: Acute delirium ICD-10-CM: R41.0  ICD-9-CM: 780.09  2/4/2019 - 2/6/2019        RESOLVED: NSTEMI (non-ST elevated myocardial infarction) ([facility] ICD-10-CM: I21.4  ICD-9-CM: 410.70  12/25/2018 - 2/22/2021        RESOLVED: Hypokalemia ICD-10-CM: E87.6  ICD-9-CM: 276.8  12/25/2018 - 2/22/2021        RESOLVED: Syncope ICD-10-CM: R55  ICD-9-CM: 780.2  4/11/2017 - 2/22/2021        RESOLVED: Volume depletion ICD-10-CM: E86.9  ICD-9-CM: 276.50  4/11/2017 - 2/22/2021        RESOLVED: Hyponatremia ICD-10-CM: E87.1  ICD-9-CM: 276.1 4/11/2017 - 2/22/2021        RESOLVED: Pneumonia ICD-10-CM: J18.9  ICD-9-CM: 486  8/6/2015 - 8/8/2015        RESOLVED: Complicated grief [id number]: F43.21  ICD-9-CM: 309.0  7/23/2015 - 2/22/2021        RESOLVED: Major psychotic depression, single episode ([facility] ICD-10-CM: F32.3  ICD-9-CM: 296.24  7/23/2015 - 7/11/2016              Greater than 60 minutes were spent with the patient on counseling and coordination of care    Signed:   Yolis Collins MD  3/19/2022  9:29 AM

## 2022-03-19 NOTE — PROGRESS NOTES
Problem: Falls - Risk of  Goal: *Absence of Falls  Description: Document Ramesh Oneill Fall Risk and appropriate interventions in the flowsheet. Outcome: Not Progressing Towards Goal  Note: Fall Risk Interventions:  Mobility Interventions: Assess mobility with egress test    Mentation Interventions: Door open when patient unattended,Eyeglasses and hearing aids,More frequent rounding,Reorient patient    Medication Interventions: Evaluate medications/consider consulting pharmacy    Elimination Interventions: Call light in reach    History of Falls Interventions: Consult care management for discharge planning,Door open when patient unattended    Problem: Patient Education: Go to Patient Education Activity  Goal: Patient/Family Education  Outcome: Not Progressing Towards Goal     Problem: Pressure Injury - Risk of  Goal: *Prevention of pressure injury  Description: Document Flynn Scale and appropriate interventions in the flowsheet. Outcome: Not Progressing Towards Goal  Note: Pressure Injury Interventions:  Sensory Interventions: Float heels,Keep linens dry and wrinkle-free,Pad between skin to skin,Pressure redistribution bed/mattress (bed type)    Moisture Interventions: Absorbent underpads,Apply protective barrier, creams and emollients    Activity Interventions: Pressure redistribution bed/mattress(bed type)    Mobility Interventions: Float heels,HOB 30 degrees or less,Pressure redistribution bed/mattress (bed type)    Nutrition Interventions: Document food/fluid/supplement intake,Offer support with meals,snacks and hydration    Friction and Shear Interventions: HOB 30 degrees or less    Problem: Patient Education: Go to Patient Education Activity  Goal: Patient/Family Education  Outcome: Not Progressing Towards Goal    Aox1, VSS. .. patient rested throughout shift, no adverse events. .. planned 03/19 d/c home w/ hospice - will continue to monitor.

## 2022-03-19 NOTE — HOSPICE
[facility]  Good Help to Those in Need  [phone]  Patient Name: Nima Caraballo  YOB: 1940  Age: [de-identified] yoM                                                         Principle Hospice Diagnosis: End stage heart failure  Diagnoses RELATED to the terminal prognosis: Acute on chronic respiratory failure, recurrent bilateral pleural effusions, aortic stenosis, acute on chronic kidney disease, s/p CABG (approx. 15 years ago), coronary artery disease, chronic obstructive pulmonary disease, congestive heart failure, pneumonia. Unrelated Diagnosis: Alzheimer's dementia, hypothyroidism    Date of Hospice Admission: 3/19/2022  Hospice Attending Elected by Patient: Shea Crawford MD   Primary Care Physician: Aline Tolentino MD    Admitting RN: Kalie Mendez RN  Nurse CM:  Bailey Colunga RN  : OWEN Maloney  :  Shaheen Randolph, [address] [place] [address] DNR: Yes   Service: N/A       Home: TBD    Direct Observation:  The patient is a [de-identified] yoM with principle hospice diagnosis of end stage heart failure. The patient has co-morbidities of AS, CAD, hx CABG, CHF, COPD, CKDIII, dementia, hypothyroidism and PNA. On assessment the patient is received while lying in bed, eyes closed, lethargic, arouses briefly to light touch, does not vocalize and has limited vocabulary per caregiver, does not appear in respiratory distress, lungs are CTA and diminished in bilateral basis to mid- lung fields, spO2 99 percent, has pleural drain near L midaxillary line with dressing CDI, holosystolic murmur-grade III, HR is RRR, no edema noted, ABD is soft/non-TTP/flat, hypoactive bowel sounds, frequent constipation reported by caregiver, incontinent of bladder and bowel, mild bilateral contracture of LEs, stage I pressure ulcer to L heel, skin otherwise intact, no s/sx's anxiety, dementia pain scale 0/10. The patient requires assistance for all ADLs, non-ambulatory, on dysphagia diet.   PPS 30 percent. ER Visits/ Hospitalizations in past year: 5  Onset Date of Hospice Diagnosis:  2/2022  Summary of Disease Progression Leading to Hospice Diagnosis: Excerpted from Dr. Manjula Joseph discharge summary dtd 3/18/2022 HPI This is an 61-year-old man with a past medical history significant for chronic heart failure with reduced ejection fraction, COPD, type 2 diabetes, hypertension, hypothyroidism, coronary artery disease, status post CABG, chronic kidney disease, Alzheimer's dementia, who was in his usual state of health until a couple of days ago when the patient developed worsening of his shortness of breath. The patient's has shortness of breath at baseline because of his COPD and congestive heart failure. The patient was recently admitted to this hospital from 02/27/2022 to 03/03/2022. The patient was admitted for evaluation and treatment of acute-on-chronic heart failure with reduced ejection fraction. The patient has responded to diuresis. The patient had no oxygen requirement at the time of discharge from the hospital, but the patient had oxygen at the house in case the patient is going to require oxygen. The son stated that the patient has been using the oxygen at home at 2 L for the shortness of breath. When the EMS arrived at the scene, the patient's oxygen saturation was 88% on 2 L of oxygen. This was increased to 4 L and the oxygen saturation improved to 93% to 94%. The patient was brought to the emergency room for further evaluation. The patient is not able to provide history because of his Alzheimer's dementia. When the patient arrived at the emergency room, chest x-ray shows interval increase in parenchymal opacities on the right and on the left. The patient was subsequently referred to the hospitalist service for evaluation for admission.    Hospital Course Patient presents with acute on chronic hypoxic respiratory failure due to combination of CHF, pneumonia and COPD exacerbation. Patient with acute on chronic systolic CHF with NYHA class III on admission and discharge. Previous echo February 2022 shows EF of 35 to 40%. Patient was previously diuresed with Lasix but later Lasix was held due to worsening renal function. Patient also with bilateral recurrent pleural effusion. Despite treatment patient has continued to decline. Patient also was treated with antibiotics for pneumonia and steroid for COPD. Later palliative care follows the patient and patient was also subsequently seen by hospice. Given recurrent pleural effusions, left Pleurx catheter was placed prior to discharge to home with home hospice. Comfort medications including Ativan and morphine has been prescribed through the hospice  Labs/Diagnostics:    Pro-BNP: 11, 223  Blood cultures: neg  Na: 131  BUN: 42  Creatinine: 2.17  GFR 36    Echocardiogram: (2/28/22)  Result status: Final result   Left Ventricle: Left ventricle size is normal. Moderately reduced left ventricular systolic function with a visually estimated EF of 35 - 40%. Abnormal diastolic function.  Aortic Valve: Severely calcified cusp. Trace transvalvular regurgitation. Moderate stenosis of the aortic valve. AV mean gradient is 21 mmHg. AV peak velocity is 2.7 m/s. Aortic valve area 1.3 cm2.  Right Ventricle: Moderately reduced systolic function.  Mitral Valve: Mild to moderate transvalvular regurgitation.  Left Atrium: Left atrium is mildly dilated.  Pericardium: Large left pleural effusion. Use LCD Guidelines and list features:   Patients will be considered to be in the terminal stage of heart disease (life expectancy of six months or less) if they meet the following criteria. (1 and 2 should be present. Factors from 3 will add supporting documentation.):    ____X____  1.  At the time of initial certification or recertification for hospice, the patient is or has been                           already optimally treated for heart disease or is not a candidate for a surgical procedure or                           has declined a procedure. (Optimally treated means that patients who are not on                           vasodilators have a medical reason for refusing these drugs, e.g., hypotension or renal                           disease.)  _____X___  2. The patient is classified as New York Heart Association (NYHA) Class IV and may have                           significant symptoms of heart failure or angina at rest. (Class IV patients with heart disease                           have an inability to carry on any physical activity without discomfort. Symptoms of heart                           failure or of the anginal syndrome may be present even at rest. If any physical activity is                           undertaken, discomfort is increased.) Significant congestive heart failure may be                          documented by an ejection fraction of ?20%, but is not required if not already available.  ________  3. Documentation of the following factors will support but is not required to establish                           eligibility for hospice care:                            ________  a. Treatment resistant symptomatic supraventricular or ventricular arrhythmias;                          ________  b. History of cardiac arrest or resuscitation;                          ________  c. History of unexplained syncope;                          ________  d. Brain embolism of cardiac origin;                          ________  e. Concomitant HIV disease. SPIRITUAL/Social/Emotional/psych: Declines  support. Dr. Esdras De Leon MD contacted, agrees to serve as attending provider for hospice and provided verbal certification of terminal illness with prognosis of 6 months or less life expectancy. Orders for hospice admission, medications and plan of treatment received. Medication reconciliation completed.       Currently this patient has:  Supplemental O2: Yes  Peripheral IV:           Fistula:   PICC:                        PORT:           Maldonado Catheter:       NG Tube:   PEG Tube:                Ostomy:        Pleural Drain: Yes  AICD: Has ICD been deactivated?   Yes:_____   No:_____   (If no, please identify ex ([address]; [facility] etc)      MEDS:  I have reviewed the patient's medication list with MD and identified the following:  Nonformulary medications: n/a  Unrelated medications:  C/w Synthroid as d/c could exacerbate HF symptoms    IDT communication to include MD, SN, SW, [address] and support team.

## 2022-03-19 NOTE — HOSPICE
[address] [facility] Help to Those in Need  [phone]     Patient Name: Sobia Dominguez  YOB: 1940  Age: [de-identified] y.o. [facility]    Patient to transport home at 11am for hospice admission. Family took home box of pleural drain kits yesterday. Bag of supplies given to son today. DDNR printed and sent home with son. Patient is alert and eating breakfast. Stable for transport home at 11am  Report called to admission nurse, Carlos Owens. Thank you for the opportunity to be of service to this patient.     Tila Current RN  Clinical Nurse Liaison   [facility]  (B)[phone] (C) [phone]

## 2022-03-19 NOTE — PROGRESS NOTES
Nighttime blood sugar 47, recheck 51. .. completed D10 250mg bolus for 20min per protocol - recheck 159. MD perfect serve aware.

## 2022-03-20 NOTE — HOSPICE
2nd visit following admission  Patient sleeping in hospital bed. Patient easily aroused when talking to him. Patient confused. Patient was not compliant with BP. Daughter states he gets \"stubborn\" at times. VS are taken every morning as well as BSs. Latest BS was 363 and he was administered 5u of insulin. Patient resting comfortably with no s/s of grimacing, groaning, guarding, or restlessness. Patient breathing unlabored. Son and Daughter asked about how to drain Aspira drain and when. Advised to observe when patient is having increased difficulty in breathing. Since he did not need draining today, a nurse needs to come Monday to reassess. Also let daughter Moira Garcia know 30 minutes in advance so she can get there to learn how to drain if draining is needed. Angella Lott is under the impression a MSW is coming on Monday. Since Angella Lott is not at the house, it might be helpful for her if RN and MSW make a joint visit if that is possible.

## 2022-03-22 NOTE — HOSPICE
LMSW arrived at the patient's home to complete an Initial Psychosocial Assessment for [facility]. The LMSW was greeted at the front door by the patient's son Kindra Waldron and the patient's An Kendall was present. The home was clean and clutter free. The patient is an [de-identified] y/o  male with a BSC Dx. End stage heart failure. Patient was sleeping comfortably upon arrival. Oswaldo Cisneros and Kindra Waldron were the [address] for this assessment. Oswaldo Cisneros reported that the patient was alert and oriented to self only. Patient's appetite was poor. Patient sleeps during the day and is awake during the night. Kindra Waldron is the primary caregiver and is getting paid through [employer]. Oswaldo Cisneros and Kindra Waldron are looking for another person to provide respite care but do not know anyone and [facility] has a 30+ days waiting list. Kindra Waldron and Oswaldo Cisneros are coping one day at a time. Kindra Waldron lives with the patient and provides 24-hour care but is compensated through [place] for 6 days a week or 42 hours. Patient was an independent  for 40 years. Patient was  to [place] for 77 years who had passed in 2015. They have three children: Kindra Waldron who lives with the patient, Oswaldo Cisneros who lives in [facility], and Sameer who lives in [place]. [facility]. Patient has a sister Roger Mattson who lives in [address] [place] [address]. Patient does not belong to a Denominational but identifies as a [employer]. Patient was a member of the [email or web address] and would like to be cremated and then family [place] the [place] service and they would like the [employer] in [place] to be involved. LMSW provided community resources, emotional support, and information on the ancillary services.

## 2022-03-23 NOTE — HOSPICE
Initial visit by [facility] following admission. Patient's daughter Luis A Little and son in living room with CRISSY Marshall upon arrival to home. RNCM went to bedroom to assess patient while they finished speaking with SW. Patient resting with eyes closed. Attempted to talk with patient, but he remained with eyes closed, but pulled arm away from [facility] during assessment. Vital and assessment obtained, but patient remained nonverbal and did not interact with RNCM. Daughter Luis A Little says that her father is a stubborn man and often not very nice. Appeared agitated at time of visit. Daughter pulled up patient's tshirt to show drain site to [facility], dressing is dry and intact with no drainage noted. Abdomen soft and nondistended. Respiratory effort normal, no shortness of breath noted. Discussed when patient was drained, daughter reports 4L total drained in hospital, last on Friday. Decision was made not to drain patient today since he is comfortable and asymptomatic. Daughter agrees. Discussed hospice philosphy and routine of RNCM. Discussed that family should call main triage number with any needs or change in patient's condition 24/7. Explained to daughter that the hospice admission process can be overwhelming and it will get easier as we settle into a routine. She expresses appreciation for care received. Patient has not had a bowel movement since hospital and is not able to swallow the senna in crushed form due to sour taste. Encouraged family to use the miralax they have and give that daily. Also instructed to use Dulcolax suppository if needed. Son verbalizes understanding. Aspira drain supplies noted to be at bedside. Son requests more sacral dressings. No refills needed at this time. Encouraged family to call hospice with any further needs. Son expresses understanding.

## 2022-03-23 NOTE — HOSPICE
Patient lying in bed upon arrival for visit; patient's son Bon Covarrubias present. Patient lethargic; opens eyes occasionally. Patient is nonverbal for entirety of visit. No signs of pain, dyspnea or anxiety observed. Fair intake; family feeds patient soft foods such as baby food and bananas. Family has been giving medications with applesauce. Patient's son reports patient has not had a bowel movement for over one week. Bisacodyl suppository administered during visit. Family to notify hospice if no bowel movement results by tomorrow. Left Aspira drain. Dressing clean, dry and intact. 1,000 ml yellow drainage drained during visit. Foam dressing to sacrum for protection; clean, dry and intact. Reinforced hospice 24/7 for any concerns or change in patient's condition. Medication refilled: Amlodipine via [employer]. Supplies ordered: Sacral dressings via [employer].

## 2022-03-29 NOTE — HOSPICE
Received patient in hospital bed in semi fowlers in [facility] and brief. Patient nonverbal but able to nod head \"yes\" and \"no\" appropriately to questions. Denies pain or shortness of breath at this time. Son Toshia Carrion is present during visit. Son reports that suppository given on Friday was successful, patient had a bowel movement on Saturday. Will continue to assess for ongoing need for stool softener/laxative to move bowels. Bowel sounds hypoactive today on auscultation. Son reports that patient is eating small portions of 3 meals daily usually consisting of mashed potatoes, peanut butter, and toast.Patient is able to take his pill form medications at this time per son. Lungs clear with diminished bases bilaterally. No fluid accumulation/ascites visible in abdomen. Patient last successfully drained on Wednesday of last week. RNCM drained patient today for 1000 ml yellow clear fluid without difficulty. Patient tolerated procedure well with no coughing noted after fluid removed. Dressing to site dry and intact, no redness or drainage noted. No refills requested at this time. No supplies needed at this time. Encouraged patient's son to call hospice 24/7 with any needs or change in patient's condition. Toshia Freddytracy verbalizes understanding and agrees.

## 2022-03-30 PROBLEM — Z51.5 HOSPICE CARE: Status: ACTIVE | Noted: 2022-01-01

## 2022-03-30 NOTE — HOSPICE
Patient lying in bed upon arrival for visit; patient's son Ezra Ozuna present. Patient lethargic; opens eyes occasionally. No signs of pain, dyspnea or anxiety observed. Fair intake continues; baby food and soft foods. Patient's son reports patient's last bowel movement one week ago. Bisacodyl suppository administered during visit. Left Aspira drain; drained 200 ml. Aspira dressing changed during visit. Foam dressing to sacrum for protection; clean, dry and intact. Reinforced hospice 24/7 for any concerns or change in patient's condition. Medication refilled: Bisacodyl suppositories via [facility].

## 2022-04-05 NOTE — HOSPICE
Patient lying in bed upon arrival for visit; patient's son Floridalma Irwin present. Patient lethargic; opens eyes occasionally. No signs of pain, dyspnea or anxiety observed. Patient on 4L O2 continuously. Fair intake continues; baby food and soft foods. Patient's son reports patient has had multiple bowel movements over the last few days. Left Aspira drain; drained 100 ml. Foam dressing to sacrum for protection; clean, dry and intact. Patient's recent blood glucose readings: This mornin;  yesterday 270, 310, 264;  two days ago: 398, 416. Jessica Khan NP notified; family to start Lantus: 5 units subcutaneously every evening at bedtime. Patient's son Floridalma Irwin reports the patient was previously taking Lantus prior to hospice admission and still has 3 vials in the home; will start tonight. Family aware insulin is not covered by hospice. Reinforced hospice  for any concerns or change in patient's condition. Medication refilled: Norvasc via [employer]. Supplies ordered: Sacral dressings, wipes, foam cleanser via [employer]. NEW MEDICATION INITIATION DOCUMENTATION:  Consulted AT MD to report change in patient status: YES  Obtained Order from Provider for initiation of Symptom relief medication / other medication needed:YES   Documentation completed in Telephone/Visit Note in [facility]  Reason medication is being initiated: High blood glucose readings. MD / Provider name consulted re: change in status / initiation of new medication: Jessica Khan NP. New Symptom(s):  High blood glucose readings. New Order(s):  Lantus: 5 units subcut QHS. Name of person being taught:  Floridalma Irwin and Kourtney Goldsmith. Instructions given: YES  Side Effects taught:YES  Response to teaching:  Verbalized understanding.

## 2022-04-05 NOTE — HOSPICE
Received patient resting in hospital bed in gown and brief under sheet resting with eyes closed. Patient rouses easily with conversation but remains nonverbal, only nodding head \"yes\" sometimes giving inaccurate answers to question being asked per son Penelope Morrow. No nonverbal signs of pain or shortness of breath at this time. O2 sats consistently have run % on 3L/O2 via NC, so oxygen titrated down to 3L/min. Pulse ox reading reassessment at end of visit, O2 sat still 98% on 3L/min. Son Penelope Morrow reports vital signs they have taken this morning and last evening says patient's temperature was elevated, but still less than 100.2. Temperature today 97. No signs of infection noted to drain site. Dressing dry and intact, no drainage noted on dressing. Drained 1000 ml yellow fluid from pleural drain without difficulty. Patient tolerated procedure well, no coughing noted after procedure and patient drifted back to sleep. Son Penelope Morrow reports that his father often sleeps all day and then is awake during the night. States he isn't as agitated at night as he was prior to hospice admission, but just awake. Small bowel movement after suppository given in Wednesday. Encouraged son to continue giving fluids with miralax, even if patient only drinks less than a complete serving. No refills or supplies requested at this time. Encouraged family to call hospice 24/7 with any needs or change in patient's condition. Penelope Juarespratik verbalizes understanding and agrees.

## 2022-04-08 NOTE — HOSPICE
Patient lying in bed upon arrival for visit; patient's son Lenny Galeano present. Patient lethargic; opens eyes at times then returns to resting state. No signs of pain, dyspnea or anxiety observed. Patient on 4L O2 continuously. Fair intake continues; baby food and soft foods. Incontinent of bowel and bladder. Patient's son reports patient has had regular bowel movements this week. Left Aspira drain; drained 900 ml. Barrier cream and foam dressing applied to reddened sacrum. Reinforced hospice 24/7 for any concerns or change in patient's condition.

## 2022-04-08 NOTE — HOSPICE
Received patient in hospital bed resting with eyes closed. Easy rouses with verbal greeting. Patient remains alert and oriented to self and place, smiling and cooperative. Son Charlette Lindsey reports Isadora Yepez was just here for service call to oxygen concentrator. The problem was apparently the tubing and it was replaced. Unit working properly at this time. Patient's O2 sat 98% on 3L/min at this time. Charlette Lindsey reports restarting the Lantus insulin for elevated blood sugar readings. Yesterday BS was 366. Lantus given. This morning was downt to 256. Family to continue to monitor. Patient's appetite remains stable eating 3 small portion meals per day consisting of mostly snack type foods and peanut butter crackers. Charlette Lindsey also reports miralax is working, patient in now having regular bowel movements daily. Drained 1100 ml yellow clear fluid from pleural drain today. Dressing changed using sterile technique. Site without redness or swelling. No signs of infection. Patient tolerated procedure well with no pain or coughing after. No refills requested at this time. Encouraged family to call hospice 24/7 with any needs or change in patient's condition. Charlette Lindsey verbalizes understanding and agrees.

## 2022-04-09 NOTE — HOSPICE
Upon visit arrival pt. noted lying supine propped with pillows to the right. No s/s of pain or SOB. O2 at 4 lpm via nc continuously. Pt.s eyes closed/non-verbal and was pulling on his sheet. Prn SN visit done due to son Dani Lakhani called and reported that pt. was agitated and pulled left chest Aspira drain dressing off as he gets sun-downers. I applied a fresh dressing using sterile technique and did teaching with son on how to do it as well. Son verbalized understanding. Dressing was reinforced with Hypafix tape to prevent pt. from pulling it off again. Hospice volunteers arrived during visit to stay with pt. while son went to the store. No medication or supplies requested. Son encouraged to Hospice with any pt. status changes or concerns.

## 2022-04-13 NOTE — HOSPICE
Visiting jointly today with Shine Perez NP. Merle Harkins has cared for this patient prior to hospice and is familiar with his history. Merle Harkins discussed medications and overall care with son Johnathon Manzo in living room. HHA Mireya present giving the patient a bath. RNCM joined for skin assessment. HHA reports redness to sacral area, but no breakdown. Patient is cachectic with poor nutritional intake further exacorbated by protein loss due to pleural drain. Triad cream given to family from [facility] trunk stock to apply to red areas. Sure prep pads ordered at [facility] request for boggy heels. Son Johnathon Manzo requests a charan lift so that he can get his dad out of bed to his recliner. Johnathon Manzo has used a charan lift with him previously and is able to verbalize safety measures of use. Transport wheelchair also ordered for fire safety reasons per [employer] request. Johnathon Manzo discussed with RNCM and NP about his father's sleep patterns. He sleeps most of the day and then is often awake at night, and sometimes agitated. Lorazepam has been tried but only works for about 4 hours, so patient is often awake again at 4 am. Merle Harkins encouraged Johnathon Manzo to give Seroquel which is already on [facility] and available in the home. Merle Harkins instructed that if it overly sedates the patientthe tablet can be split to give 12.5 mg dose instead of full 25 mg. Wilmer Settles understanding and will try. RNCM will follow up to assess effectiveness. On assessment, patient was very tired and became sllightly agitated while RNCM was draining fluid from chest. Over the weekend, patient attempted to pull drain out, so visiting nurse reinforced dressing with medifix tape and covered over drainage tube entirely. RNCM had to cut dressing to access tube. 1000 ml yellow fluid drained without difficulty. Patient tolerated procedure well with no shortness of breath or coughing noted. Patient was not cooperative with dressing change, so drainage tube was taped to outer dressing with new cap placed.  Supplies: sure prep pads, wipes, chux and body wash. Refills: Son Rohith Raya requests One Touch Ultra test strips for glucose meter. Will get pharmacy information from sister Gordon Lucio and call in refill. Encouraged Rohith Raya to call [facility] [phone] with any needs or change in patient's condition. Rohith Raya agrees.

## 2022-04-14 NOTE — HOSPICE
Arrived at patient's home; son, Annabelle Walls, present for visit. Patient received in hospital bed. Son stated that patient has been picking at aspira dressing and drain. Aspira drained, 1000 cc of straw colored fluid drained, patient tolerated well. Dressing changed. Patient had new 0.5 x 0.5 opening on sacrum. Triad cream applied. Patient had not had BM in 4 days, bisacodyl suppository given . Son stated that patient still is up during the nights, he said lorazepam has been effective and he has not used seroquel. Encouraged son to use serqouel to help with patient's sleep and agitation. Medications reconciled, none needed at this time. No supplies needed at this time. Reminded son to call hospice number with any needs or concerns.

## 2022-04-21 NOTE — HOSPICE
Patient lying in bed upon arrival for visit; patient's son Annabelle Walls present. Patient lethargic, nonverbal during visit. No signs of pain, dyspnea or anxiety observed. Patient on 4L O2 continuously. Fair intake continues; baby food and soft foods. Incontinent of bowel and bladder. Patient's son reports patient has had regular bowel movements this week. Left Aspira drain; drained 1,000 ml. Foam dressing intact to sacrum. Blood glucose readings: today: 156, 266. Yesterday: 450, 203, 315. Two days ago: 227, 147, 247. Three days ago: 168, 301, 326. Readings sent to [facility] NP; no adjustments to insulins at this time. Recent blood pressures also sent to NP; discontinue Norvasc and hospice nurses to monitor blood pressure readings. Patient's son Annabelle Walls updated and verbalizes understanding. Reinforced hospice 24/7 for any concerns or change in patient's condition.

## 2022-04-24 NOTE — HOSPICE
Received patient in hospital bed in tshirt and brief under sheet. Patient resting with eyes closed but rousable with verbal stimuli. Patient cooperative but nonverbal during visit. Son Ronny Ortiz present during visit, reports that patient is agitated at night, often taking off his shirt, brief, pillow cases and attempts to pull dressing from pleural drain. Son encouraged to use lorazepam or haldol during these times for agitation to maintain patient safety. Son verbalizes understanding and has tried lorazepam, but it only lasts about 4 hours at a time. Drained today for 850 ml yellow fluid. Dressing to drain site changed in sterile fashion. Patient cooperative, with positioning help from radha Ortiz. Skin intact to sacral area with triad treatment. Supplies needed: wipes, gloves. Radha Ortiz reports needing refill on blood pressure medication, not currently on MAR. Patient's blood pressure within normal limits. Encouraged Ronny Ortiz to call hospice 24/7 with any needs or change in patient's condition. Ronny Ortiz understands and agrees.

## 2022-04-25 NOTE — HOSPICE
Call to triage received from family reporting change in patient's condition. Son Jessica Nicolas reports that despite normal vital signs, family have been unable to wake patient today. Patient had Lorazepam dose at 4 am for agitation. Did wake briefly to eat small breakfast and has been sleeping soundly since that time. Received patient in hospital bed, resting with eyes closed. No nonverbal signs of pain or shortness of breath. Daughter Rehana Garcia and son Jessica Nicolas at bedside during visit. Vital signs within normal limits at this time. On assessment, lungs clear with diminished bases bilaterally. Patient's O2 sat on 2L/min via NC is 99%. Heart rate is irregularly irregular with loud pronounced murmur ascultated, which represents a change since last visit by [facility]. Lengthy discussion held with family regarding decline and patient's diagnosis of end stage heart failure. Instructed family that this may be natural progression of illness that we are seeing today. Discussed that loss of protein from pleural drain and patient's nutritional status have left patient cacthetic. Offered active listening and advised family to not force eating and drinking if patient is not fully awake and able to safely swallow to avoid aspiration. Family verbalize understanding. Offered weekend visit on Sunday so that a nurse can reassess and regular nursing visit to drain pleural fluid on Monday. Almaz Bynum to hold regular pill form medications if patient is still unresponsive and only use liquid symptom relief medications as needed for pain or agitation. Jessicatrixie Nicolas agrees. Christy Gauthier, NP notified of change in patient's condition by phone. Discussed pleural drain, Mireya Ruiz recommends only draining fluid on Monday if patient's blood pressure is high enough to tolerate or if patient is symptomatic/short of breath. Encouraged family to call hospice 24/7 with any needs or change in patient's condition. Rehana Garcia and Jessicatrixie Nicolas understand and agree.

## 2022-04-26 NOTE — HOSPICE
Patient lying in bed upon arrival for visit; patient's son Charlette Lindsey present. Patient responds to voice and answers a couple of questions when asked. No signs of pain, dyspnea or anxiety observed. Patient's son reports patient has been alert over the weekend and appetite has improved. Patient on 4L O2 continuously. Left Aspira drain; drained 1,000 ml. Aspira dressing changed. Medication refilled: Omeprazole via [facility]. Supplies ordered: Briefs, sacral dressings, chux, wipes, Aspira bags via [employer]. Reinforced hospice 24/7 for any concerns or change in patient's condition.

## 2022-04-27 NOTE — HOSPICE
MSW completed a virtual visit with pts daughter Jo-Ann Rivera. Jo-Ann Rivera stated that patient is having his ups and downs but is still here. MSW inquired about needing any other support or resources. Daughter stated not needing assistance this month. MSW offered community resources, emotional support, and information on the additional services in hospice. Jo-Ann Rivera shared still being the primary caregiver for the pt. The patient appetite has remained the same.   Problem: Emotional Support as needed   Plan: Continue to provide support and socialization as needed  Advanced Directives: on file, Gatito Victor, daughter POA  DDNR: Yes   Home: TBD

## 2022-05-04 NOTE — HOSPICE
Received patient in hospital bed in [facility] and brief. Son Alexandre Smith and hospice volunteer Vera present during visit. Alexandre Smith to run errands after [facility] visit while Jennifer Padron sits with patient. Patient alert and oriented to self and place, pleasant and cooperative with assessment and care. Son reports agitation continues at night despite lorazepam given occasionally. Family pleased with care provided from [facility]. Son states family is having a difficult time finding baby food that they feed the patient, that there is a shortage in the area. RNCM to keep a look out at local stores to assist. Patient eats Beef and carrots and turkey and rice. Dressing to Aspira drain dry and intact, changed at last RN visit. RNCM drained patient of 1100 ml yellow fluid without difficulty. Patient tolerated procedure well with no coughing during or after procedure. Sacral area with dry intact dressing to bony prominence. Skin intact at this time per son. Heels intact as skin prep pads are being applied to protect skin. No supplies or refills requested at this time. Encouraged son to call hospice 24/7 with any needs or change in patient's condition. Alexandre Smith verbalizes understanding and agrees.

## 2022-05-04 NOTE — HOSPICE
Patient lying in bed upon arrival for visit; patient's son Dhiraj Flowers present. Patient responds to voice. No signs of pain, dyspnea or anxiety observed. Patient on 4L O2 continuously. Left Aspira drain; drained 1,000 ml. Aspira dressing changed. Supplies ordered: Briefs, chux, soap, wipes, zinc cream via [employer]. Reinforced hospice 24/7 for any concerns or change in patient's condition.

## 2022-05-05 NOTE — HOSPICE
Patient lying in bed upon arrival for visit; patient's son Veronica Logan present. Patient responds to voice. No signs of pain, dyspnea or anxiety observed. Patient on 4L O2 continuously. Patient continues to eat food that is fed to him by family. Last bowel movement today. Left Aspira drain; drained 1,000 ml. No medications or supplies needed at this time. Reinforced hospice 24/7 for any concerns or change in patient's condition.

## 2022-05-10 NOTE — HOSPICE
Patient lying in bed upon arrival for visit; patient's son Johnathon Manzo present. Patient with eyes open but nonverbal during visit. .  No signs of pain, dyspnea or anxiety observed. Patient on 4L O2 continuously. Patient continues to eat food that is fed to him by family. Multiple bowel movements daily over the weekend, son reports as not loose. Left Aspira drain; drained 1,000 ml. Supplies ordered: Aspira drain and dressing kits, soap, briefs, chux, zinc barrier cream via [employer]. Reinforced hospice 24/7 for any concerns or change in patient's condition.

## 2022-05-17 NOTE — HOSPICE
Received patient in hospital bed resting comfortably with eyes closed. Patient rouses easily with verbal stimuli. Patient remained calm, cooperative and nonverbal throughout visit. Dressing to pleural drain clean dry and intact, dressing to be changed on Thursday. Drained 800 ml yellow fluid without difficulty. Patient tolerated procedure well with no pain coughing or shortness of breath. Supplies ordered per family request: chux, wipes, zinc paste, briefs. No refills needed at this time. RNCM to follow up with Jane Monterroso, NP to send written Rx for test strips to [facility] DR MEAGAN JENNINGS. Encouraged family to call hospice 24/7 with any needs or changes in condition. Ronny Ortiz verbalizes understanding and agrees.

## 2022-05-19 NOTE — HOSPICE
routine visit. On arrival patient laying in bed a&o x1-2. Caregiver stated patient has not been in pain. blood sugars running 300's. (L) side aspira catheter drained. 850ml clear, light yellow fluid removed. Dressing changed. No other needs at this time.

## 2022-05-25 NOTE — HOSPICE
Received patient in hospital bed resting with eyes closed. Patient easily roused with verbal stimuli. Patient remained calm and nonverbal throughout visit. Heart rate is irregular with pronounced murmur today. Son reports increasing agitation especially at night. Patient attempts to pull drain out, takes off gown and brief and oxygen. Son states lorazepam is only marginally effective. Encouraged son to give a dose of haldol tonight at onset of agitation. RNCM to monitor for effectiveness. Loose stools have now resolved. Son instructed to discontinue probiotic. Son agrees. Pleural drain accessed and drained of 800 ml yellow fluid. Dressing to site changed. Site with no signs of redness, drainage or warmth. Refils: lorazepam, omeprazole via [employer] confirmation #[id number]. No supplies requested at this time. Encouraged patient's son Lenny Galeano to call hospice 24/7 with any needs or change in patient condition. He agrees.

## 2022-05-25 NOTE — HOSPICE
MSW completed a virtual visit with pts Son Lalitha Watson. Son stated pt is doing about the same and CM come in twice a week. Son shared that his feet continue to swell some due to heart failure. Son seems comfortable as possible and resting with little to no pain. Eating 3x a day and drinking plenty of fluids. The patient appetite has remained the same. Problem: Emotional Support as needed   Plan: Continue to provide support and socialization as needed  Community Resources: No resources identified.   Advanced Directives: on file, Deborah Hernandez, daughter POA  DDNR: Yes   Home: TBD

## 2022-05-26 NOTE — HOSPICE
Received patient in hospital bed in gown and brief. Patient remains alert and rarely verbal. Son Summer Arevalo is present for visit and reports that he gave the haldol at night for agitation and it is effective in helping the patient sleep. Encouraged Summer Arevalo to continue to use as needed for night time agitation. HHA visited with Janice Kimball RN this morning for supervisory visit. Wound assessed and dressing changed at that time. Dressing to left flank for Aspira drain is dry and intact, dressing was changed on Thursday. Drained 1000 ml yellow fluid from patient today. Patient tolerated procedure without difficulty. Heels remain intact, son is doing a good job keeping them floating and applying Sure prep. Supplies ordered on family request: chux, wipes, zinc paste, Triad paste, body wash and no rinse foam. No refills needed at this time. Encouraged radha Arevalo to call hospice 24/7 with any needs or change in patient's condition. Summer Arevalo verbalizes understanding and agrees.

## 2022-05-26 NOTE — HOSPICE
Received patient in hospital bed alert and awake, nonverbal today. Patient just finished eating lunch per son Army Saeed. No significant changes in assessment since Monday's visit. Drained 1000 ml yellow fluid from pleural drain. Patient tolerated procedure without difficulty. No signs of nonverbal pain or shortness of breath. Dressing to drain site changed using sterile technique. Site remains free of redness or drainage. Pedal edema increased today to 3+ pitting to both feet and ankles. No refills requested at this time. Requests softer nasal cannula with 7 foot tubing. Will order next week as not enough for full order. Encouraged Army Saeed to call hospice 24/7 with any needs or change in patient's condition. Army Saeed agrees. Instructed that an on call nurse would be coming Monday to drain pleural fluid.

## 2022-05-31 NOTE — HOSPICE
Patient's son Skye Don called during the night to report change in patient's condition. Patient had bowel movement and urinated so son was turning patient to clean and place new brief. Patient began making either choking noises or heaving from nausea per son. Son called triage who encouraged him to medicate with morphine for comfort, which he did. Also attempted to give another dose this morning at 7 am, but patient clinched his teeth together. Dose was placed in cheek. Family requested visit by [facility] to assess condition. Received patient resting in bed with eyes closed. No nonverbal signs of pain or shortness of breath at this time. Appears comfortable. RR 16 and shallow. Lungs with diminished sounds throughout all fields, but clear. Heart rate irregular with apical rate of 50 BPM with pronounced murmur auscultated. Blood pressure 116/69. Patient was seen by RN yesterday and drained of 1000 ml pleural fluid without difficulty, pressure yesterday was 100/70. Patient less responsive today than last visit by [facility]. Tops of toes on left foot purple and mottled which is also a change. No pedal pulses palpable. 3+ pedal edema in both feet. Discussed findings with son Skye Don and daughter Lyla Mayen and that this represents decline due to heart failure. Family are sad, but verbalize understanding and express just wanting their dad to be comfortable. Encouraged family to continue to medicate with morphine and or lorazepam as needed for patient comfort. Encouraged family to call hospice 24/7 with any changes or needs. Explained that we will make daily nursing visits to make sure the patient is comfortable and family's needs are met. Family verbalize appreciation and understanding.

## 2022-05-31 NOTE — HOSPICE
Jessica RAINEY visit today to drain Asira drain and provide wound care    Patient received in hospital bed with [facility] elevated 45 degrees  Eyes closed and does not arouse to verbal or tactile stimuli  Skin warm and dry  Lower extremities 3+ pitting edema  Right foot mottling of toes vikash is new  Cold totouch   Son, Army Saeed, providing total care for his father including changing of dressing to sacrum per woud care instructions     Son reports he just provided wound care last pm due to soilage  Wound care not needed today  Aspira drained 850 cc of yellow fluid and patient tolerated procedure well   Discussed S/S of EOL with sonArmy Saeed and daughter Coreen Dominguez who was visiting as well   Education and support provided family   Discussed use of Morphine for comfort  Army Christophe Padgett would rather give only the Lorazepam for now which he says has been effective    Informed Army Christophe padgett to call hospice with concerns / needs

## 2022-06-01 NOTE — HOSPICE
Received patient in hospital bed in gown and brief, resting with eyes closed. No nonverbal signs of pain or shortness of breath. Family is medicating with symptom relief medications appropriately. Patient is unresponsive to tactile stimuli. Fingers on both hands are mottled and cool to touch. Toes on left foot purple. Both feet are quite cold to touch with 3+ pitting edema remaining. Unable to obtain blood pressure or pulse ox with equipment. Pulse rate 48 apical. RR 12 with 10 second pauses. Discussed what to do when patient passess with all three adult children. Daughter Karla Cali reports they haven't chosen a  home to handle cremation yet. Resource guide located in patient's folder. Dani Lakhani and Karla Holdernett both sad and tearful, but accepting of patient's imminent passing. Encouraged to call hospice / with any needs or if patient passes. No refiils or supplies needed at this visit.

## 2022-06-02 NOTE — HOSPICE
Russell Ricer (: 42) passed peacefully this evening in his home with family present. TOLEANNA 193. Medications wasted per hospice protocol with patient's son Herminia Huggins serving as witness. [facility]  [employer] to serve patient and family. Any notified to  DME. Hospice staff and Dr. Sudheer Cameron notified via this email.

## 2022-06-05 ENCOUNTER — HOME CARE VISIT (OUTPATIENT)
Dept: HOSPICE | Facility: HOSPICE | Age: 82
End: 2022-06-05
Payer: MEDICARE

## 2022-07-26 NOTE — TELEPHONE ENCOUNTER
Patient will start 100mg of seroquel nightly. It is recommended that patient have a baby monitor, or supervision this evening to observe how increase in dose will effect patient. Asked that Juliet Arcos please call back tomorrow to report how patient responded to increase. Gabapentin Counseling: I discussed with the patient the risks of gabapentin including but not limited to dizziness, somnolence, fatigue and ataxia.

## 2025-06-20 NOTE — ROUTINE PROCESS
Bedside shift change report given to vernell beebe rn (oncoming nurse) by TPACK rn (offgoing nurse). Report included the following information SBAR and Kardex. no

## (undated) DEVICE — PAD,ABDOMINAL,5"X9",ST,LF,25/BX: Brand: MEDLINE INDUSTRIES, INC.

## (undated) DEVICE — KIT EVAC 0.13IN RECT TB DIA10FR 400CC PVC 3 SPR Y CONN DRN

## (undated) DEVICE — SPONGE GZ W4XL4IN COT 12 PLY TYP VII WVN C FLD DSGN

## (undated) DEVICE — INTENDED FOR TISSUE SEPARATION, AND OTHER PROCEDURES THAT REQUIRE A SHARP SURGICAL BLADE TO PUNCTURE OR CUT.: Brand: BARD-PARKER ® CARBON RIB-BACK BLADES

## (undated) DEVICE — TOWEL SURG W17XL27IN STD BLU COT NONFENESTRATED PREWASHED

## (undated) DEVICE — PADDING CAST SPEC 6INX4YD COT --

## (undated) DEVICE — SOLUTION IRRIG 1000ML STRL H2O USP PLAS POUR BTL

## (undated) DEVICE — 6619 2 PTNT ISO SYS INCISE AREA&LT;(&GT;&&LT;)&GT;P: Brand: STERI-DRAPE™ IOBAN™ 2

## (undated) DEVICE — GOWN,SIRUS,POLYRNF,BRTHSLV,XL,30/CS: Brand: MEDLINE

## (undated) DEVICE — K-WIRE

## (undated) DEVICE — PENCIL SMK EVAC 10 FT BLADE ELECTRD ROCKER FOR TELSCP

## (undated) DEVICE — DRILL, AO SMALL: Brand: GAMMA

## (undated) DEVICE — TUBING HYDR IRR --

## (undated) DEVICE — GUIDE WIRE, BALL-TIPPED, STERILE

## (undated) DEVICE — SUTURE VCRL SZ 0 L27IN ABSRB UD L36MM CP-1 1/2 CIR REV CUT J267H

## (undated) DEVICE — FORCEPS BX L240CM JAW DIA2.8MM L CAP W/ NDL MIC MESH TOOTH

## (undated) DEVICE — GLOVE SURG SZ 55 THK91MIL ORANGE  LTX FREE SYN POLYISOPRENE

## (undated) DEVICE — DRESSING PETRO W3XL8IN N ADH OIL EMUL GZ CURAD

## (undated) DEVICE — BASIN ST MAJOR-NO CAUTERY: Brand: MEDLINE INDUSTRIES, INC.

## (undated) DEVICE — GLOVE SURG SZ 8 L12IN FNGR THK94MIL STD WHT LTX FREE

## (undated) DEVICE — DRAPE,U/ SHT,SPLIT,PLAS,STERIL: Brand: MEDLINE

## (undated) DEVICE — BNDG ELAS HK LOOP 6X5YD NS -- MATRIX

## (undated) DEVICE — Device

## (undated) DEVICE — GLOVE SURG SZ 85 L12IN FNGR ORTHO 126MIL CRM LTX FREE

## (undated) DEVICE — PACK,BASIC,SIRUS,V: Brand: MEDLINE

## (undated) DEVICE — SOLUTION IRRIG 1000ML 0.9% SOD CHL USP POUR PLAS BTL

## (undated) DEVICE — BANDAGE COBAN 6 IN WND 6INX5YD FOAM

## (undated) DEVICE — PADDING CST 4INX4YD --

## (undated) DEVICE — SOLUTION SURG PREP 26 CC PURPREP

## (undated) DEVICE — GLOVE ORANGE PI 8 1/2   MSG9085